# Patient Record
Sex: FEMALE | Race: WHITE | NOT HISPANIC OR LATINO | Employment: OTHER | URBAN - METROPOLITAN AREA
[De-identification: names, ages, dates, MRNs, and addresses within clinical notes are randomized per-mention and may not be internally consistent; named-entity substitution may affect disease eponyms.]

---

## 2017-01-03 ENCOUNTER — ALLSCRIPTS OFFICE VISIT (OUTPATIENT)
Dept: OTHER | Facility: OTHER | Age: 82
End: 2017-01-03

## 2017-01-03 LAB
CLARITY UR: NORMAL
COLOR UR: YELLOW
GLUCOSE (HISTORICAL): NORMAL
HGB UR QL STRIP.AUTO: NORMAL
KETONES UR STRIP-MCNC: NORMAL MG/DL
LEUKOCYTE ESTERASE UR QL STRIP: NORMAL
NITRITE UR QL STRIP: NORMAL
PH UR STRIP.AUTO: 7 [PH]
PROT UR STRIP-MCNC: NORMAL MG/DL
SP GR UR STRIP.AUTO: 1

## 2017-01-23 ENCOUNTER — HOSPITAL ENCOUNTER (OUTPATIENT)
Dept: MAMMOGRAPHY | Facility: HOSPITAL | Age: 82
Discharge: HOME | End: 2017-01-23
Attending: INTERNAL MEDICINE
Payer: MEDICARE

## 2017-01-23 DIAGNOSIS — C91.10 CHRONIC LYMPHOCYTIC LEUKEMIA OF B-CELL TYPE NOT HAVING ACHIEVED REMISSION (HCC): ICD-10-CM

## 2017-01-23 DIAGNOSIS — Z12.31 ENCOUNTER FOR SCREENING MAMMOGRAM FOR MALIGNANT NEOPLASM OF BREAST: ICD-10-CM

## 2017-01-23 PROCEDURE — G0202 SCR MAMMO BI INCL CAD: HCPCS

## 2017-01-24 ENCOUNTER — ALLSCRIPTS OFFICE VISIT (OUTPATIENT)
Dept: OTHER | Facility: OTHER | Age: 82
End: 2017-01-24

## 2017-01-24 ENCOUNTER — HOSPITAL ENCOUNTER (OUTPATIENT)
Dept: RADIOLOGY | Facility: MEDICAL CENTER | Age: 82
Discharge: HOME | End: 2017-01-24
Payer: MEDICARE

## 2017-01-24 ENCOUNTER — TRANSCRIBE ORDERS (OUTPATIENT)
Dept: ADMINISTRATIVE | Facility: HOSPITAL | Age: 82
End: 2017-01-24

## 2017-01-24 DIAGNOSIS — M25.562 PAIN IN LEFT KNEE: ICD-10-CM

## 2017-01-24 DIAGNOSIS — M19.90 OSTEOARTHRITIS: ICD-10-CM

## 2017-01-24 PROCEDURE — 73562 X-RAY EXAM OF KNEE 3: CPT

## 2017-01-25 ENCOUNTER — GENERIC CONVERSION - ENCOUNTER (OUTPATIENT)
Dept: OTHER | Facility: OTHER | Age: 82
End: 2017-01-25

## 2017-01-30 ENCOUNTER — ALLSCRIPTS OFFICE VISIT (OUTPATIENT)
Dept: OTHER | Facility: OTHER | Age: 82
End: 2017-01-30

## 2017-02-07 ENCOUNTER — GENERIC CONVERSION - ENCOUNTER (OUTPATIENT)
Dept: OTHER | Facility: OTHER | Age: 82
End: 2017-02-07

## 2017-04-04 ENCOUNTER — APPOINTMENT (OUTPATIENT)
Dept: LAB | Facility: HOSPITAL | Age: 82
End: 2017-04-04
Attending: UROLOGY
Payer: MEDICARE

## 2017-04-04 ENCOUNTER — TRANSCRIBE ORDERS (OUTPATIENT)
Dept: ADMINISTRATIVE | Facility: HOSPITAL | Age: 82
End: 2017-04-04

## 2017-04-04 ENCOUNTER — ALLSCRIPTS OFFICE VISIT (OUTPATIENT)
Dept: OTHER | Facility: OTHER | Age: 82
End: 2017-04-04

## 2017-04-04 DIAGNOSIS — C67.9 MALIGNANT NEOPLASM OF BLADDER (HCC): ICD-10-CM

## 2017-04-04 DIAGNOSIS — C67.9 MALIGNANT NEOPLASM OF URINARY BLADDER, UNSPECIFIED SITE (HCC): Primary | ICD-10-CM

## 2017-04-04 LAB
CLARITY UR: NORMAL
COLOR UR: YELLOW
GLUCOSE (HISTORICAL): NORMAL
HGB UR QL STRIP.AUTO: NORMAL
KETONES UR STRIP-MCNC: NORMAL MG/DL
LEUKOCYTE ESTERASE UR QL STRIP: NORMAL
NITRITE UR QL STRIP: NORMAL
PH UR STRIP.AUTO: 6.5 [PH]
PROT UR STRIP-MCNC: NORMAL MG/DL
SP GR UR STRIP.AUTO: 1.01

## 2017-04-04 PROCEDURE — 88112 CYTOPATH CELL ENHANCE TECH: CPT

## 2017-04-10 DIAGNOSIS — K86.2 CYST OF PANCREAS: ICD-10-CM

## 2017-04-24 ENCOUNTER — APPOINTMENT (OUTPATIENT)
Dept: LAB | Facility: CLINIC | Age: 82
End: 2017-04-24
Payer: MEDICARE

## 2017-04-24 DIAGNOSIS — K86.2 CYST OF PANCREAS: ICD-10-CM

## 2017-04-24 LAB
ALBUMIN SERPL BCP-MCNC: 3.7 G/DL (ref 3.5–5)
ALP SERPL-CCNC: 62 U/L (ref 46–116)
ALT SERPL W P-5'-P-CCNC: 32 U/L (ref 12–78)
ANION GAP SERPL CALCULATED.3IONS-SCNC: 7 MMOL/L (ref 4–13)
AST SERPL W P-5'-P-CCNC: 21 U/L (ref 5–45)
BASOPHILS # BLD AUTO: 0.04 THOUSANDS/ΜL (ref 0–0.1)
BASOPHILS NFR BLD AUTO: 1 % (ref 0–1)
BILIRUB SERPL-MCNC: 0.53 MG/DL (ref 0.2–1)
BUN SERPL-MCNC: 19 MG/DL (ref 5–25)
CALCIUM SERPL-MCNC: 9.7 MG/DL (ref 8.3–10.1)
CHLORIDE SERPL-SCNC: 99 MMOL/L (ref 100–108)
CO2 SERPL-SCNC: 33 MMOL/L (ref 21–32)
CREAT SERPL-MCNC: 0.85 MG/DL (ref 0.6–1.3)
EOSINOPHIL # BLD AUTO: 0.19 THOUSAND/ΜL (ref 0–0.61)
EOSINOPHIL NFR BLD AUTO: 4 % (ref 0–6)
ERYTHROCYTE [DISTWIDTH] IN BLOOD BY AUTOMATED COUNT: 12.3 % (ref 11.6–15.1)
GFR SERPL CREATININE-BSD FRML MDRD: >60 ML/MIN/1.73SQ M
GLUCOSE SERPL-MCNC: 90 MG/DL (ref 65–140)
HCT VFR BLD AUTO: 38.3 % (ref 34.8–46.1)
HGB BLD-MCNC: 12.6 G/DL (ref 11.5–15.4)
LDH SERPL-CCNC: 181 U/L (ref 81–234)
LYMPHOCYTES # BLD AUTO: 1.88 THOUSANDS/ΜL (ref 0.6–4.47)
LYMPHOCYTES NFR BLD AUTO: 39 % (ref 14–44)
MCH RBC QN AUTO: 31.7 PG (ref 26.8–34.3)
MCHC RBC AUTO-ENTMCNC: 32.9 G/DL (ref 31.4–37.4)
MCV RBC AUTO: 96 FL (ref 82–98)
MONOCYTES # BLD AUTO: 0.48 THOUSAND/ΜL (ref 0.17–1.22)
MONOCYTES NFR BLD AUTO: 10 % (ref 4–12)
NEUTROPHILS # BLD AUTO: 2.26 THOUSANDS/ΜL (ref 1.85–7.62)
NEUTS SEG NFR BLD AUTO: 46 % (ref 43–75)
NRBC BLD AUTO-RTO: 0 /100 WBCS
PLATELET # BLD AUTO: 199 THOUSANDS/UL (ref 149–390)
PMV BLD AUTO: 10.8 FL (ref 8.9–12.7)
POTASSIUM SERPL-SCNC: 3.9 MMOL/L (ref 3.5–5.3)
PROT SERPL-MCNC: 7.4 G/DL (ref 6.4–8.2)
RBC # BLD AUTO: 3.98 MILLION/UL (ref 3.81–5.12)
SODIUM SERPL-SCNC: 139 MMOL/L (ref 136–145)
WBC # BLD AUTO: 4.85 THOUSAND/UL (ref 4.31–10.16)

## 2017-04-24 PROCEDURE — 36415 COLL VENOUS BLD VENIPUNCTURE: CPT

## 2017-04-24 PROCEDURE — 85025 COMPLETE CBC W/AUTO DIFF WBC: CPT

## 2017-04-24 PROCEDURE — 80053 COMPREHEN METABOLIC PANEL: CPT

## 2017-04-24 PROCEDURE — 83615 LACTATE (LD) (LDH) ENZYME: CPT

## 2017-04-27 ENCOUNTER — ALLSCRIPTS OFFICE VISIT (OUTPATIENT)
Dept: OTHER | Facility: OTHER | Age: 82
End: 2017-04-27

## 2017-05-04 ENCOUNTER — ALLSCRIPTS OFFICE VISIT (OUTPATIENT)
Dept: OTHER | Facility: OTHER | Age: 82
End: 2017-05-04

## 2017-05-15 ENCOUNTER — ALLSCRIPTS OFFICE VISIT (OUTPATIENT)
Dept: OTHER | Facility: OTHER | Age: 82
End: 2017-05-15

## 2017-05-15 DIAGNOSIS — R89.2 ABNORMAL LEVEL OF OTHER DRUGS, MEDICAMENTS AND BIOLOGICAL SUBSTANCES IN SPECIMENS FROM OTHER ORGANS, SYSTEMS AND TISSUES: ICD-10-CM

## 2017-05-15 DIAGNOSIS — I10 ESSENTIAL (PRIMARY) HYPERTENSION: ICD-10-CM

## 2017-05-15 DIAGNOSIS — M79.643 PAIN OF HAND: ICD-10-CM

## 2017-05-15 DIAGNOSIS — K86.2 CYST OF PANCREAS: ICD-10-CM

## 2017-05-15 DIAGNOSIS — E78.2 MIXED HYPERLIPIDEMIA: ICD-10-CM

## 2017-05-15 DIAGNOSIS — I25.10 ATHEROSCLEROTIC HEART DISEASE OF NATIVE CORONARY ARTERY WITHOUT ANGINA PECTORIS: ICD-10-CM

## 2017-05-18 ENCOUNTER — OFFICE VISIT (OUTPATIENT)
Dept: URGENT CARE | Facility: MEDICAL CENTER | Age: 82
End: 2017-05-18
Payer: MEDICARE

## 2017-05-18 ENCOUNTER — HOSPITAL ENCOUNTER (OUTPATIENT)
Dept: RADIOLOGY | Facility: MEDICAL CENTER | Age: 82
Discharge: HOME | End: 2017-05-18
Admitting: FAMILY MEDICINE
Payer: MEDICARE

## 2017-05-18 ENCOUNTER — TRANSCRIBE ORDERS (OUTPATIENT)
Dept: ADMINISTRATIVE | Facility: HOSPITAL | Age: 82
End: 2017-05-18

## 2017-05-18 DIAGNOSIS — M81.0 SENILE OSTEOPOROSIS: ICD-10-CM

## 2017-05-18 DIAGNOSIS — M06.4 INFLAMMATORY POLYARTHROPATHY (HCC): ICD-10-CM

## 2017-05-18 DIAGNOSIS — M79.643 PAIN OF HAND: ICD-10-CM

## 2017-05-18 DIAGNOSIS — M81.0 OSTEOPOROSIS, UNSPECIFIED OSTEOPOROSIS TYPE, UNSPECIFIED PATHOLOGICAL FRACTURE PRESENCE: Primary | ICD-10-CM

## 2017-05-18 DIAGNOSIS — M79.643 PAIN OF HAND, UNSPECIFIED LATERALITY: ICD-10-CM

## 2017-05-18 DIAGNOSIS — M35.00 SICCA SYNDROME (HCC): ICD-10-CM

## 2017-05-18 DIAGNOSIS — M54.2 CERVICALGIA: ICD-10-CM

## 2017-05-18 PROCEDURE — G0463 HOSPITAL OUTPT CLINIC VISIT: HCPCS

## 2017-05-18 PROCEDURE — 73130 X-RAY EXAM OF HAND: CPT

## 2017-05-18 PROCEDURE — 99213 OFFICE O/P EST LOW 20 MIN: CPT

## 2017-05-23 ENCOUNTER — LAB (OUTPATIENT)
Dept: LAB | Facility: CLINIC | Age: 82
End: 2017-05-23
Payer: MEDICARE

## 2017-05-23 DIAGNOSIS — M06.4 INFLAMMATORY POLYARTHROPATHY (HCC): ICD-10-CM

## 2017-05-23 DIAGNOSIS — M81.0 SENILE OSTEOPOROSIS: ICD-10-CM

## 2017-05-23 DIAGNOSIS — M79.643 PAIN OF HAND, UNSPECIFIED LATERALITY: ICD-10-CM

## 2017-05-23 DIAGNOSIS — M81.0 OSTEOPOROSIS, UNSPECIFIED OSTEOPOROSIS TYPE, UNSPECIFIED PATHOLOGICAL FRACTURE PRESENCE: ICD-10-CM

## 2017-05-23 DIAGNOSIS — M35.00 SICCA SYNDROME (HCC): ICD-10-CM

## 2017-05-23 DIAGNOSIS — M54.2 CERVICALGIA: ICD-10-CM

## 2017-05-23 LAB
25(OH)D3 SERPL-MCNC: 41.9 NG/ML (ref 30–100)
CRP SERPL QL: <3 MG/L
ERYTHROCYTE [SEDIMENTATION RATE] IN BLOOD: 28 MM/HOUR (ref 0–20)

## 2017-05-23 PROCEDURE — 86430 RHEUMATOID FACTOR TEST QUAL: CPT

## 2017-05-23 PROCEDURE — 85652 RBC SED RATE AUTOMATED: CPT

## 2017-05-23 PROCEDURE — 86038 ANTINUCLEAR ANTIBODIES: CPT

## 2017-05-23 PROCEDURE — 86235 NUCLEAR ANTIGEN ANTIBODY: CPT

## 2017-05-23 PROCEDURE — 82306 VITAMIN D 25 HYDROXY: CPT

## 2017-05-23 PROCEDURE — 86140 C-REACTIVE PROTEIN: CPT

## 2017-05-23 PROCEDURE — 36415 COLL VENOUS BLD VENIPUNCTURE: CPT

## 2017-05-24 LAB
ENA SS-A AB SER-ACNC: <0.2 AI (ref 0–0.9)
ENA SS-B AB SER-ACNC: <0.2 AI (ref 0–0.9)
RHEUMATOID FACT SER QL LA: NEGATIVE
RYE IGE QN: NEGATIVE

## 2017-05-25 ENCOUNTER — HOSPITAL ENCOUNTER (OUTPATIENT)
Dept: RADIOLOGY | Age: 82
Discharge: HOME | End: 2017-05-25
Payer: MEDICARE

## 2017-05-25 ENCOUNTER — TRANSCRIBE ORDERS (OUTPATIENT)
Dept: ADMINISTRATIVE | Age: 82
End: 2017-05-25

## 2017-05-25 DIAGNOSIS — M06.4 INFLAMMATORY POLYARTHROPATHY (HCC): ICD-10-CM

## 2017-05-25 DIAGNOSIS — M54.40 ACUTE RIGHT-SIDED LOW BACK PAIN WITH SCIATICA, SCIATICA LATERALITY UNSPECIFIED: ICD-10-CM

## 2017-05-25 DIAGNOSIS — M81.0 OSTEOPOROSIS, UNSPECIFIED OSTEOPOROSIS TYPE, UNSPECIFIED PATHOLOGICAL FRACTURE PRESENCE: ICD-10-CM

## 2017-05-25 DIAGNOSIS — M81.0 SENILE OSTEOPOROSIS: ICD-10-CM

## 2017-05-25 DIAGNOSIS — M54.2 CERVICALGIA: Primary | ICD-10-CM

## 2017-05-25 DIAGNOSIS — M35.00 SICCA SYNDROME (HCC): ICD-10-CM

## 2017-05-25 DIAGNOSIS — M54.2 CERVICALGIA: ICD-10-CM

## 2017-05-25 DIAGNOSIS — M79.643 PAIN OF HAND, UNSPECIFIED LATERALITY: ICD-10-CM

## 2017-05-25 PROCEDURE — 77080 DXA BONE DENSITY AXIAL: CPT

## 2017-05-25 PROCEDURE — 72050 X-RAY EXAM NECK SPINE 4/5VWS: CPT

## 2017-06-05 ENCOUNTER — TRANSCRIBE ORDERS (OUTPATIENT)
Dept: ADMINISTRATIVE | Facility: HOSPITAL | Age: 82
End: 2017-06-05

## 2017-06-05 ENCOUNTER — ALLSCRIPTS OFFICE VISIT (OUTPATIENT)
Dept: OTHER | Facility: OTHER | Age: 82
End: 2017-06-05

## 2017-06-05 DIAGNOSIS — K86.3 CYST AND PSEUDOCYST OF PANCREAS: Primary | ICD-10-CM

## 2017-06-05 DIAGNOSIS — K86.2 CYST AND PSEUDOCYST OF PANCREAS: Primary | ICD-10-CM

## 2017-06-12 ENCOUNTER — APPOINTMENT (OUTPATIENT)
Dept: LAB | Facility: CLINIC | Age: 82
End: 2017-06-12
Payer: MEDICARE

## 2017-06-12 DIAGNOSIS — I10 ESSENTIAL (PRIMARY) HYPERTENSION: ICD-10-CM

## 2017-06-12 DIAGNOSIS — R89.2 ABNORMAL LEVEL OF OTHER DRUGS, MEDICAMENTS AND BIOLOGICAL SUBSTANCES IN SPECIMENS FROM OTHER ORGANS, SYSTEMS AND TISSUES: ICD-10-CM

## 2017-06-12 DIAGNOSIS — E78.2 MIXED HYPERLIPIDEMIA: ICD-10-CM

## 2017-06-12 DIAGNOSIS — I25.10 ATHEROSCLEROTIC HEART DISEASE OF NATIVE CORONARY ARTERY WITHOUT ANGINA PECTORIS: ICD-10-CM

## 2017-06-12 LAB
BUN SERPL-MCNC: 28 MG/DL (ref 5–25)
CHOLEST SERPL-MCNC: 166 MG/DL (ref 50–200)
CREAT SERPL-MCNC: 0.79 MG/DL (ref 0.6–1.3)
GFR SERPL CREATININE-BSD FRML MDRD: >60 ML/MIN/1.73SQ M
HDLC SERPL-MCNC: 79 MG/DL (ref 40–60)
LDLC SERPL CALC-MCNC: 70 MG/DL (ref 0–100)
TRIGL SERPL-MCNC: 86 MG/DL

## 2017-06-12 PROCEDURE — 36415 COLL VENOUS BLD VENIPUNCTURE: CPT

## 2017-06-12 PROCEDURE — 84520 ASSAY OF UREA NITROGEN: CPT

## 2017-06-12 PROCEDURE — 82565 ASSAY OF CREATININE: CPT

## 2017-06-12 PROCEDURE — 80061 LIPID PANEL: CPT

## 2017-06-14 ENCOUNTER — HOSPITAL ENCOUNTER (OUTPATIENT)
Dept: RADIOLOGY | Facility: HOSPITAL | Age: 82
Discharge: HOME | End: 2017-06-14
Attending: INTERNAL MEDICINE
Payer: MEDICARE

## 2017-06-14 DIAGNOSIS — K86.2 CYST OF PANCREAS: ICD-10-CM

## 2017-06-14 PROCEDURE — A9585 GADOBUTROL INJECTION: HCPCS | Performed by: INTERNAL MEDICINE

## 2017-06-14 PROCEDURE — 74183 MRI ABD W/O CNTR FLWD CNTR: CPT

## 2017-06-14 RX ADMIN — GADOBUTROL 5.49 ML: 604.72 INJECTION INTRAVENOUS at 13:04

## 2017-06-15 ENCOUNTER — GENERIC CONVERSION - ENCOUNTER (OUTPATIENT)
Dept: OTHER | Facility: OTHER | Age: 82
End: 2017-06-15

## 2017-07-07 ENCOUNTER — TRANSCRIBE ORDERS (OUTPATIENT)
Dept: LAB | Facility: CLINIC | Age: 82
End: 2017-07-07

## 2017-07-07 ENCOUNTER — APPOINTMENT (OUTPATIENT)
Dept: LAB | Facility: CLINIC | Age: 82
End: 2017-07-07
Payer: MEDICARE

## 2017-07-07 DIAGNOSIS — M79.643 PAIN OF HAND, UNSPECIFIED LATERALITY: ICD-10-CM

## 2017-07-07 DIAGNOSIS — C91.10 CHRONIC LYMPHOCYTIC LEUKEMIA OF B-CELL TYPE NOT HAVING ACHIEVED REMISSION (HCC): ICD-10-CM

## 2017-07-07 DIAGNOSIS — M19.041 ARTHRITIS OF RIGHT HAND: ICD-10-CM

## 2017-07-07 DIAGNOSIS — M54.5 LOW BACK PAIN, UNSPECIFIED BACK PAIN LATERALITY, UNSPECIFIED CHRONICITY, WITH SCIATICA PRESENCE UNSPECIFIED: ICD-10-CM

## 2017-07-07 DIAGNOSIS — M06.4 INFLAMMATORY POLYARTHROPATHY (HCC): ICD-10-CM

## 2017-07-07 DIAGNOSIS — M54.2 CERVICALGIA: Primary | ICD-10-CM

## 2017-07-07 DIAGNOSIS — M54.2 CERVICALGIA: ICD-10-CM

## 2017-07-07 DIAGNOSIS — M81.0 SENILE OSTEOPOROSIS: ICD-10-CM

## 2017-07-07 DIAGNOSIS — M35.00 SICCA SYNDROME (HCC): ICD-10-CM

## 2017-07-07 LAB
ALBUMIN SERPL BCP-MCNC: 3.7 G/DL (ref 3.5–5)
ALP SERPL-CCNC: 76 U/L (ref 46–116)
ALT SERPL W P-5'-P-CCNC: 40 U/L (ref 12–78)
ANION GAP SERPL CALCULATED.3IONS-SCNC: 2 MMOL/L (ref 4–13)
AST SERPL W P-5'-P-CCNC: 25 U/L (ref 5–45)
BASOPHILS # BLD AUTO: 0.04 THOUSANDS/ΜL (ref 0–0.1)
BASOPHILS NFR BLD AUTO: 1 % (ref 0–1)
BILIRUB SERPL-MCNC: 0.42 MG/DL (ref 0.2–1)
BUN SERPL-MCNC: 26 MG/DL (ref 5–25)
CALCIUM SERPL-MCNC: 9.7 MG/DL (ref 8.3–10.1)
CHLORIDE SERPL-SCNC: 102 MMOL/L (ref 100–108)
CO2 SERPL-SCNC: 33 MMOL/L (ref 21–32)
CREAT SERPL-MCNC: 0.86 MG/DL (ref 0.6–1.3)
EOSINOPHIL # BLD AUTO: 0.28 THOUSAND/ΜL (ref 0–0.61)
EOSINOPHIL NFR BLD AUTO: 6 % (ref 0–6)
ERYTHROCYTE [DISTWIDTH] IN BLOOD BY AUTOMATED COUNT: 12.8 % (ref 11.6–15.1)
ERYTHROCYTE [SEDIMENTATION RATE] IN BLOOD: 16 MM/HOUR (ref 0–20)
GFR SERPL CREATININE-BSD FRML MDRD: >60 ML/MIN/1.73SQ M
GLUCOSE P FAST SERPL-MCNC: 86 MG/DL (ref 65–99)
HCT VFR BLD AUTO: 36.8 % (ref 34.8–46.1)
HGB BLD-MCNC: 12.2 G/DL (ref 11.5–15.4)
LDH SERPL-CCNC: 167 U/L (ref 81–234)
LYMPHOCYTES # BLD AUTO: 1.91 THOUSANDS/ΜL (ref 0.6–4.47)
LYMPHOCYTES NFR BLD AUTO: 44 % (ref 14–44)
MCH RBC QN AUTO: 31.2 PG (ref 26.8–34.3)
MCHC RBC AUTO-ENTMCNC: 33.2 G/DL (ref 31.4–37.4)
MCV RBC AUTO: 94 FL (ref 82–98)
MONOCYTES # BLD AUTO: 0.46 THOUSAND/ΜL (ref 0.17–1.22)
MONOCYTES NFR BLD AUTO: 10 % (ref 4–12)
NEUTROPHILS # BLD AUTO: 1.71 THOUSANDS/ΜL (ref 1.85–7.62)
NEUTS SEG NFR BLD AUTO: 39 % (ref 43–75)
NRBC BLD AUTO-RTO: 0 /100 WBCS
PLATELET # BLD AUTO: 190 THOUSANDS/UL (ref 149–390)
PMV BLD AUTO: 10.6 FL (ref 8.9–12.7)
POTASSIUM SERPL-SCNC: 4 MMOL/L (ref 3.5–5.3)
PROT SERPL-MCNC: 7.1 G/DL (ref 6.4–8.2)
RBC # BLD AUTO: 3.91 MILLION/UL (ref 3.81–5.12)
SODIUM SERPL-SCNC: 137 MMOL/L (ref 136–145)
URATE SERPL-MCNC: 4.7 MG/DL (ref 2–6.8)
WBC # BLD AUTO: 4.41 THOUSAND/UL (ref 4.31–10.16)

## 2017-07-07 PROCEDURE — 85652 RBC SED RATE AUTOMATED: CPT

## 2017-07-07 PROCEDURE — 36415 COLL VENOUS BLD VENIPUNCTURE: CPT

## 2017-07-07 PROCEDURE — 84550 ASSAY OF BLOOD/URIC ACID: CPT

## 2017-07-07 PROCEDURE — 85025 COMPLETE CBC W/AUTO DIFF WBC: CPT

## 2017-07-07 PROCEDURE — 83615 LACTATE (LD) (LDH) ENZYME: CPT

## 2017-07-07 PROCEDURE — 80053 COMPREHEN METABOLIC PANEL: CPT

## 2017-07-24 ENCOUNTER — ALLSCRIPTS OFFICE VISIT (OUTPATIENT)
Dept: OTHER | Facility: OTHER | Age: 82
End: 2017-07-24

## 2017-07-24 DIAGNOSIS — C91.10 CHRONIC LYMPHOCYTIC LEUKEMIA OF B-CELL TYPE NOT HAVING ACHIEVED REMISSION (HCC): ICD-10-CM

## 2017-08-01 ENCOUNTER — ALLSCRIPTS OFFICE VISIT (OUTPATIENT)
Dept: OTHER | Facility: OTHER | Age: 82
End: 2017-08-01

## 2017-08-03 ENCOUNTER — ALLSCRIPTS OFFICE VISIT (OUTPATIENT)
Dept: OTHER | Facility: OTHER | Age: 82
End: 2017-08-03

## 2017-09-14 ENCOUNTER — ALLSCRIPTS OFFICE VISIT (OUTPATIENT)
Dept: OTHER | Facility: OTHER | Age: 82
End: 2017-09-14

## 2017-09-14 DIAGNOSIS — M25.511 PAIN IN RIGHT SHOULDER: ICD-10-CM

## 2017-09-14 DIAGNOSIS — M54.2 CERVICALGIA: ICD-10-CM

## 2017-09-19 ENCOUNTER — TRANSCRIBE ORDERS (OUTPATIENT)
Dept: LAB | Facility: CLINIC | Age: 82
End: 2017-09-19

## 2017-09-19 ENCOUNTER — APPOINTMENT (OUTPATIENT)
Dept: LAB | Facility: CLINIC | Age: 82
End: 2017-09-19
Payer: MEDICARE

## 2017-09-19 DIAGNOSIS — C67.9 MALIGNANT NEOPLASM OF BLADDER (HCC): ICD-10-CM

## 2017-09-19 DIAGNOSIS — I10 ESSENTIAL (PRIMARY) HYPERTENSION: ICD-10-CM

## 2017-09-19 LAB
ANION GAP SERPL CALCULATED.3IONS-SCNC: 6 MMOL/L (ref 4–13)
BUN SERPL-MCNC: 24 MG/DL (ref 5–25)
CALCIUM SERPL-MCNC: 9.5 MG/DL (ref 8.3–10.1)
CHLORIDE SERPL-SCNC: 101 MMOL/L (ref 100–108)
CO2 SERPL-SCNC: 31 MMOL/L (ref 21–32)
CREAT SERPL-MCNC: 0.67 MG/DL (ref 0.6–1.3)
GFR SERPL CREATININE-BSD FRML MDRD: 83 ML/MIN/1.73SQ M
GLUCOSE SERPL-MCNC: 95 MG/DL (ref 65–140)
POTASSIUM SERPL-SCNC: 3.8 MMOL/L (ref 3.5–5.3)
SODIUM SERPL-SCNC: 138 MMOL/L (ref 136–145)

## 2017-09-19 PROCEDURE — 36415 COLL VENOUS BLD VENIPUNCTURE: CPT

## 2017-09-19 PROCEDURE — 80048 BASIC METABOLIC PNL TOTAL CA: CPT

## 2017-09-25 ENCOUNTER — HOSPITAL ENCOUNTER (OUTPATIENT)
Dept: RADIOLOGY | Facility: MEDICAL CENTER | Age: 82
Discharge: HOME | End: 2017-09-25
Payer: MEDICARE

## 2017-09-25 DIAGNOSIS — C67.9 MALIGNANT NEOPLASM OF BLADDER (HCC): ICD-10-CM

## 2017-09-25 PROCEDURE — 74178 CT ABD&PLV WO CNTR FLWD CNTR: CPT

## 2017-09-25 RX ADMIN — IOHEXOL 100 ML: 350 INJECTION, SOLUTION INTRAVENOUS at 10:23

## 2017-09-27 ENCOUNTER — LAB REQUISITION (OUTPATIENT)
Dept: LAB | Facility: HOSPITAL | Age: 82
End: 2017-09-27
Payer: MEDICARE

## 2017-09-27 ENCOUNTER — ALLSCRIPTS OFFICE VISIT (OUTPATIENT)
Dept: OTHER | Facility: OTHER | Age: 82
End: 2017-09-27

## 2017-09-27 DIAGNOSIS — N39.0 URINARY TRACT INFECTION: ICD-10-CM

## 2017-09-27 LAB
BILIRUB UR QL STRIP: ABNORMAL
CLARITY UR: ABNORMAL
COLOR UR: YELLOW
GLUCOSE (HISTORICAL): ABNORMAL
HGB UR QL STRIP.AUTO: ABNORMAL
KETONES UR STRIP-MCNC: ABNORMAL MG/DL
LEUKOCYTE ESTERASE UR QL STRIP: ABNORMAL
NITRITE UR QL STRIP: ABNORMAL
PH UR STRIP.AUTO: 8 [PH]
PROT UR STRIP-MCNC: ABNORMAL MG/DL
SP GR UR STRIP.AUTO: 1.01
UROBILINOGEN UR QL STRIP.AUTO: ABNORMAL

## 2017-09-27 PROCEDURE — 87086 URINE CULTURE/COLONY COUNT: CPT | Performed by: PHYSICIAN ASSISTANT

## 2017-09-28 LAB — BACTERIA UR CULT: NORMAL

## 2017-09-29 ENCOUNTER — GENERIC CONVERSION - ENCOUNTER (OUTPATIENT)
Dept: OTHER | Facility: OTHER | Age: 82
End: 2017-09-29

## 2017-10-02 ENCOUNTER — GENERIC CONVERSION - ENCOUNTER (OUTPATIENT)
Dept: OTHER | Facility: OTHER | Age: 82
End: 2017-10-02

## 2017-10-02 ENCOUNTER — ALLSCRIPTS OFFICE VISIT (OUTPATIENT)
Dept: OTHER | Facility: OTHER | Age: 82
End: 2017-10-02

## 2017-10-03 ENCOUNTER — ALLSCRIPTS OFFICE VISIT (OUTPATIENT)
Dept: OTHER | Facility: OTHER | Age: 82
End: 2017-10-03

## 2017-10-03 ENCOUNTER — TRANSCRIBE ORDERS (OUTPATIENT)
Dept: ADMINISTRATIVE | Facility: HOSPITAL | Age: 82
End: 2017-10-03

## 2017-10-03 DIAGNOSIS — C67.9 MALIGNANT NEOPLASM OF URINARY BLADDER, UNSPECIFIED SITE (HCC): Primary | ICD-10-CM

## 2017-10-03 LAB
BILIRUB UR QL STRIP: NORMAL
CLARITY UR: NORMAL
COLOR UR: NORMAL
GLUCOSE (HISTORICAL): NORMAL
HGB UR QL STRIP.AUTO: NORMAL
KETONES UR STRIP-MCNC: NORMAL MG/DL
LEUKOCYTE ESTERASE UR QL STRIP: NORMAL
NITRITE UR QL STRIP: NORMAL
PH UR STRIP.AUTO: 6 [PH]
PROT UR STRIP-MCNC: NORMAL MG/DL
SP GR UR STRIP.AUTO: 1
UROBILINOGEN UR QL STRIP.AUTO: NORMAL

## 2017-10-04 NOTE — PROCEDURES
Assessment  1  Bladder cancer (188 9) (C67 9)    Plan  Bladder cancer    · (1) BASIC METABOLIC PROFILE; Status:Active; Requested for:03Apr2018; Perform:[facility]; BWA:[id number];VDGVPHA; For:Bladder cancer; Ordered By:Mariia Betancourt;   · CT ABDOMEN PELVIS W WO CONTRAST; Status:Hold For - Scheduling; Requested  for:03Apr2018; Perform:[facility]; FMP:65NRZ3324;EGMTBWE; For:Bladder cancer; Ordered By:Mariia Betancourt;   · Cystourethroscopy - POC; Status:Active - Perform Order; Requested for:03Apr2018; Perform: In Office; Due:03Apr2019;Ordered; For:Bladder cancer; Ordered By:Mariia Betancourt;   · Urine Dip Non-Automated- POC; Status:Complete - Retrospective By Protocol  Authorization;   Done: 01EEU7209 12:24PM   Performed: In Office; [phone]; Last Updated By:Chris Mercedes; 10/3/2017 12:28:41 PM;Ordered; For:Bladder cancer; Ordered By:Mariia Betancourt;    Discussion/Summary  Discussion Summary:   Cystoscopy shows no evidence of recurrent bladder cancer at this time  She'll return in 6 months for repeat cystoscopy with CT abdomen/pelvis prior to visit  All questions answered at this time  Medication SE Review and Pt Understands Tx: The treatment plan was reviewed with the patient/guardian  The patient/guardian understands and agrees with the treatment plan   Understands and agrees with treatment plan: The treatment plan was reviewed with the patient/guardian  The patient/guardian understands and agrees with the treatment plan      Chief Complaint  Chief Complaint Free Text Note Form: Patient presents for cysto; bladder cancer      History of Present Illness  HPI: Patient is seen for a 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  She denies any gross hematuria or changes in urinary pattern  Prior CT scan shows no evidence of upper tract recurrence  A septated cyst is noted within the right kidney  Presents for surveillance cystoscopy               Review of Systems  Complete-Female Urology:   Constitutional: No fever, no chills, feels well, no tiredness, no recent weight gain or weight loss  Respiratory: No complaints of shortness of breath, no wheezing, no cough, no SOB on exertion, no orthopnea, no PND  Cardiovascular: No complaints of slow heart rate, no fast heart rate, no chest pain, no palpitations, no leg claudication, no lower extremity edema  Gastrointestinal: No complaints of abdominal pain, no constipation, no nausea or vomiting, no diarrhea, no bloody stools  Genitourinary: Empty sensation,-hematuria-and-stream quality fair, but-as noted in HPI,-no dysuria,-no urinary hesitancy,-no feelings of urinary urgency-and-no incontinence-   The patient presents with complaints of nocturia (2 times )  Musculoskeletal: No complaints of arthralgias, no myalgias, no joint swelling or stiffness, no limb pain or swelling  Integumentary: No complaints of skin rash or lesions, no itching, no skin wounds, no breast pain or lump  Hematologic/Lymphatic: No complaints of swollen glands, no swollen glands in the neck, does not bleed easily, does not bruise easily  Neurological: No complaints of headache, no confusion, no convulsions, no numbness, no dizziness or fainting, no tingling, no limb weakness, no difficulty walking  Active Problems  1  Acute pain of left knee (719 46) (M25 562)   2  Advance directive declined by patient (V49 89) (Z78 9)   3  Allergic rhinitis (477 9) (J30 9)   4  Anemia due to vitamin B12 deficiency (281 1) (D51 9)   5  Ankle pain, left (719 47) (M25 572)   6  Ankle pain, right (719 47) (M25 571)   7  Arthritis (716 90) (M19 90)   8  Back pain (724 5) (M54 9)   9  Bilateral impacted cerumen (380 4) (H61 23)   10  Bladder cancer (188 9) (C67 9)   11  CAD (coronary artery disease) (414 00) (I25 10)   12  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)   13   Chronic right shoulder pain (436 28,914 92) (M25 511,G89 29) 14  Closed displaced fracture of trapezium of left wrist, initial encounter (814 05) (S62 172A)   15  Depression, controlled (311) (F32 9)   16  Drug toxicity (796 0) (R89 2)   17  Ear canal dryness, bilateral (380 89) (H61 893)   18  Edema, lower extremity (782 3) (R60 0)   19  Encounter for special screening examination for genitourinary disorder (V81 6) (Z13 89)   20  Fatigue (780 79) (R53 83)   21  Frontal headache (784 0) (R51)   22  Gastric reflux syndrome (530 81) (K21 9)   23  Hand pain (729 5) (M79 643)   24  Heart valve disorder (424 90) (I38)   25  High blood pressure (401 9) (I10)   26  IBS (irritable bowel syndrome) (564 1) (K58 9)   27  Impacted cerumen of left ear (380 4) (H61 22)   28  Left wrist pain (719 43) (M25 532)   29  Leukopenia (288 50) (D72 819)   30  Medication refill (V68 1) (Z76 0)   31  Mild vitamin D deficiency (268 9) (E55 9)   32  Mixed hyperlipidemia (272 2) (E78 2)   33  Neck pain (723 1) (M54 2)   34  Need for influenza vaccination (V04 81) (Z23)   35  Need for pneumococcal vaccination (V03 82) (Z23)   36  Pancreatic cyst (577 2) (K86 2)   37  Pre-syncope (780 2) (R55)   38  Right wrist pain (719 43) (M25 531)   39  Screening for neurological condition (V80 09) (Z13 89)   40  Screening mammogram for high-risk patient (V76 11) (Z12 31)   41  Urge incontinence of urine (788 31) (N39 41)   42  Urinary urgency (788 63) (R39 15)   43  UTI (urinary tract infection) (599 0) (N39 0)    Past Medical History  1  History of cancer (V10 90) (Z85 9)   2  History of hepatitis (V12 09) (Z86 19)   3  History of kidney stones (V13 01) (Z87 442)   4  History of pneumonia (V12 61) (Z87 01)   5  History of shingles (V12 09) (Z86 19)   6  History of urinary frequency (V13 09) (Z87 898)   7  History of urinary tract infection (V13 02) (Z87 440)   8  History of varicella (V12 09) (Z86 19)  Active Problems And Past Medical History Reviewed:    The active problems and past medical history were reviewed and updated today  Surgical History  1  History of Cath Stent Placement   2  History of Diagnostic Cystoscopy   3  History of Hysterectomy  Surgical History Reviewed: The surgical history was reviewed and updated today  Family History  Mother    1  Denied: Family history of Colon cancer   2  Family history of arthritis (V17 7) (Z82 61)   3  Family history of cardiac disorder (V17 49) (Z82 49)   4  Denied: Family history of colonic polyps   5  Denied: Family history of Crohn's disease   6  Denied: Family history of liver disease   7  Family history of osteoporosis (V17 81) (Z82 62)  Father    8  Denied: Family history of Colon cancer   9  Family history of cardiac disorder (V17 49) (Z82 49)   10  Denied: Family history of colonic polyps   11  Denied: Family history of Crohn's disease   12  Family history of hypertension (V17 49) (Z82 49)   13  Denied: Family history of liver disease  Brother    15  Family history of hypertension (V17 49) (Z82 49)  Family History Reviewed: The family history was reviewed and updated today  Social History   · Advance directive declined by patient (V49 89) (Z78 9)   · Always uses seat belt   · Never a smoker   · No alcohol use   · No drug use   ·  (V61 07) (Z63 4)  Social History Reviewed: The social history was reviewed and updated today  Current Meds   1  Adult Aspirin EC Low Strength 81 MG Oral Tablet Delayed Release; TAKE 1 TABLET   DAILY; Therapy: (Recorded:04Oct2016) to Recorded   2  Allegra Allergy 180 MG Oral Tablet; Therapy: (Recorded:04Oct2016) to Recorded   3  AmLODIPine Besylate 5 MG Oral Tablet; TAKE 1 TABLET DAILY; Therapy: (Recorded:37Tso3420) to  Requested for: 77SHN2302 Recorded   4  Atenolol 25 MG Oral Tablet; take 1 tablet by mouth once daily; Therapy: 18NNH6450 to (Evaluate:30Jan2018)  Requested for: 38Yvp0705; Last   Rx:61Nel0031 Ordered   5  Atorvastatin Calcium 40 MG Oral Tablet; take 1 tablet by mouth once daily;    Therapy: 50ZEQ8268 to (Ian Nephew)  Requested for: 92Pwt6018; Last   Rx:04Spa5179 Ordered   6  Bumetanide 1 MG Oral Tablet; take half tablet by mouth once daily  Requested for:   78Qeq5996; Last Rx:03Dfj7172 Ordered   7  Citrucel Oral Powder; Therapy: (Recorded:19Lyu0968) to Recorded   8  [employer] Daily Multiple Oral Tablet; Therapy: (Recorded:05Dii8680) to Recorded   9  Dicyclomine HCl - 10 MG Oral Capsule; TAKE ONE CAPSULE BY MOUTH TWICE DAILY; Therapy: 13SNA9564 to (Last Rx:50Hom3310)  Requested for: 61OHJ0887 Ordered   10  DULoxetine HCl - 40 MG Oral Capsule Delayed Release Particles; Therapy: (Recorded:17Zsu8007) to Recorded   11  Meloxicam 7 5 MG Oral Tablet; TAKE 1 TABLET DAILY WITH FOOD; Therapy: 78Yea3617 to (Evaluate:76Axm8598)  Requested for: 58Rty8319 Recorded   12  Mometasone Furoate 0 1 % External Cream; APPLY SPARINGLY TO AFFECTED AREA(S)    ONCE DAILY; Therapy: 02SHI6671 to (Evaluate:33Vhu2494)  Requested for: 42AZR1490; Last    Rx:03Hrz2664 Ordered   13  Montelukast Sodium 10 MG Oral Tablet; Take 1 tablet daily  Requested for: 53Qlw4381;    Last Rx:92Sfe6678 Ordered   14  Pantoprazole Sodium 40 MG Oral Tablet Delayed Release; TAKE 1 TABLET DAILY; Therapy: 39MNH2058 to (Paul Brar)  Requested for: 92Kwb1551; Last    Rx:48Esg9860 Ordered   15  Vitamin D3 2000 UNIT Oral Tablet; Therapy: (Recorded:49Dxt6164) to Recorded  Medication List Reviewed: The medication list was reviewed and updated today  Allergies  1  No Known Drug Allergies    Vitals  Vital Signs    Recorded: 36EDG7388 12:24PM   Heart Rate 70   Systolic 641   Diastolic 74   Height 5 ft 1 in   Weight 120 lb 2 oz   BMI Calculated 22 7   BSA Calculated 1 52     Physical Exam    Constitutional   General appearance: No acute distress, well appearing and well nourished  Pulmonary   Respiratory effort: No increased work of breathing or signs of respiratory distress  Auscultation of lungs: Clear to auscultation  Cardiovascular   Auscultation of heart: Normal rate and rhythm, normal S1 and S2, without murmurs  Examination of extremities for edema and/or varicosities: Normal     Abdomen   Abdomen: Non-tender, no masses  Genitourinary   External genitalia and vagina: Abnormal  -atrophy  Urethra: Normal, no discharge  Musculoskeletal   Gait and station: Normal     Digits and nails: Normal without clubbing or cyanosis  Inspection/palpation of joints, bones, and muscles: Normal     Skin   Skin and subcutaneous tissue: Normal without rashes or lesions  Lymphatic   Palpation of lymph nodes in groin: No lymphadenopathy  Neurologic   Cranial nerves: Cranial nerves 2-12 intact  Results/Data  Urine Dip Non-Automated- POC 62VLW2655 12:24PM Elena Vidal     Test Name Result Flag Reference   Color Lucy     Clarity Transparent     Leukocytes -     Nitrite -     Blood +     Bilirubin -     Urobilinogen -     Protein -     Ph 6 0     Specific Gravity 1 005     Ketone -     Glucose -         Procedure    Procedure: Diagnostic cystourethroscopy  Indications for the procedure include Bladder Cancer  Risks and benefits were discussed with the patient  Consent was obtained prior to the procedure and is detailed in the patient's record  Pre-op evaluation: urinalysis was performed with normal results  Anesthesia: the cystoscope was lubricated with 2% lidocaine gel  Procedure Note:     The patient was prepped and draped in the usual sterile fashion using betadine  The periurethral area was exposed and a lubricated 16 Indian flexible cystoscope was introduced into the urethral meatus  The urethra was normal  The cystoscope was advanced to the urethrovesical junction and the bladder was distended with saline  All regions of the bladder were systematically inspected and the bladder appeared normal and No recurrent bladder tumor  The specimen(s) were sent to pathology  Post-Procedure:  The bladder was drained and the cystoscope was removed the patient tolerated the procedure well-and-the vital signs were stable  Complications: none        Future Appointments    Date/Time Provider Specialty Site   10/05/2017 10:15 AM Daquan Mendoza, [address]     Signatures   Electronically signed by : DANIEL Baker ; Oct  3 2017 12:38PM EST                       (Author)

## 2017-10-05 ENCOUNTER — ALLSCRIPTS OFFICE VISIT (OUTPATIENT)
Dept: OTHER | Facility: OTHER | Age: 82
End: 2017-10-05

## 2017-10-12 ENCOUNTER — HOSPITAL ENCOUNTER (OUTPATIENT)
Dept: NON INVASIVE DIAGNOSTICS | Facility: MEDICAL CENTER | Age: 82
Discharge: HOME | End: 2017-10-12
Payer: MEDICARE

## 2017-10-12 DIAGNOSIS — I10 ESSENTIAL (PRIMARY) HYPERTENSION: ICD-10-CM

## 2017-10-12 PROCEDURE — 93306 TTE W/DOPPLER COMPLETE: CPT

## 2017-10-16 ENCOUNTER — APPOINTMENT (OUTPATIENT)
Dept: PHYSICAL THERAPY | Facility: REHABILITATION | Age: 82
End: 2017-10-16
Payer: MEDICARE

## 2017-10-16 PROCEDURE — 97162 PT EVAL MOD COMPLEX 30 MIN: CPT

## 2017-10-16 PROCEDURE — G8991 OTHER PT/OT GOAL STATUS: HCPCS

## 2017-10-16 PROCEDURE — G8990 OTHER PT/OT CURRENT STATUS: HCPCS

## 2017-10-17 NOTE — PROGRESS NOTES
[address] [place] [id number] [phone]               Thank you for choosing us for your health care visit with Jessica Wellington DO.   We are glad to serve you and happy to provide you with this summary of your vi Assessment  1  Heartburn (787 1) (R12)   2  Abdominal discomfort, epigastric (789 06) (R10 13)    Plan  Abdominal discomfort, epigastric, Heartburn    · 1 - Monica Iglesias DO (Gastroenterology) Co-Management  *  Status: Active   Requested for: 02DXI3441  Care Summary provided  : Yes  Arthritis, Depression, controlled    · DULoxetine HCl - 30 MG Oral Capsule Delayed Release Particles; take 1  capsule daily  Bladder cancer    · Fluzone High-Dose 0 5 ML Intramuscular Suspension Prefilled Syringe  Gastric reflux syndrome    · Pantoprazole Sodium 40 MG Oral Tablet Delayed Release; TAKE 1 TABLET  TWICE DAILY 30 MINUTES BEFORE BREAKFAST AND DINNER  Unlinked    · DULoxetine HCl - 40 MG Oral Capsule Delayed Release Particles    Discussion/Summary    F/u approx 2 months  The patient was counseled regarding instructions for management,-- patient and family education,-- impressions  Possible side effects of new medications were reviewed with the patient/guardian today  The treatment plan was reviewed with the patient/guardian  The patient/guardian understands and agrees with the treatment plan      Chief Complaint  Patient presents for follow up exam of chronic conditions and to review recent blood work results  History of Present Illness  urine sx resolvedhave heartburn so bad and burning in stomach, usually take rolaids before going to bed, doesn't wake me during the nighton protonix for so long, wondering if just not working   after I eat about half hour, get burning so bad, had ulcers years ago and that's what it feels like it's bugging me againon 12th for echo rx'd by Cheikh Machuca her cymbalta today- she is and always has been on 30mg capsule one a day, her initial new pt med list recorded as 2 a day incorrectly, then refill, pt thinks she looked at her 's bottle by mistake, and he was taking a 40mg tablet once a day      Review of Systems    Constitutional: No fever, no chills, feels well, no tiredness, no Assoc Dx:  Gastroesophageal reflux disease without esophagitis [K21.9]          Medical Issues Discussed Today     Encounter for routine child health examination without abnormal findings    -  Primary    Gastroesophageal reflux disease without esophagiti recent weight gain or weight loss  Eyes: No complaints of eye pain, no red eyes, no eyesight problems, no discharge, no dry eyes, no itching of eyes  Cardiovascular: No complaints of slow heart rate, no fast heart rate, no chest pain, no palpitations, no leg claudication, no lower extremity edema  Respiratory: No complaints of shortness of breath, no wheezing, no cough, no SOB on exertion, no orthopnea, no PND  Gastrointestinal: as noted in HPI,-- no nausea,-- no vomiting,-- no constipation,-- no diarrhea-- and-- no blood in stools  Genitourinary: No complaints of dysuria, no incontinence, no pelvic pain, no dysmenorrhea, no vaginal discharge or bleeding  Integumentary: No complaints of skin rash or lesions, no itching, no skin wounds, no breast pain or lump  Neurological: No complaints of headache, no confusion, no convulsions, no numbness, no dizziness or fainting, no tingling, no limb weakness, no difficulty walking  Endocrine: No complaints of proptosis, no hot flashes, no muscle weakness, no deepening of the voice, no feelings of weakness  Hematologic/Lymphatic: No complaints of swollen glands, no swollen glands in the neck, does not bleed easily, does not bruise easily  Active Problems  1  Acute pain of left knee (719 46) (M25 562)   2  Advance directive declined by patient (V49 89) (Z78 9)   3  Allergic rhinitis (477 9) (J30 9)   4  Anemia due to vitamin B12 deficiency (281 1) (D51 9)   5  Ankle pain, left (719 47) (M25 572)   6  Ankle pain, right (719 47) (M25 571)   7  Arthritis (716 90) (M19 90)   8  Back pain (724 5) (M54 9)   9  Bilateral impacted cerumen (380 4) (H61 23)   10  Bladder cancer (188 9) (C67 9)   11  CAD (coronary artery disease) (414 00) (I25 10)   12  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)   13  Chronic right shoulder pain (719 41,338 29) (M25 511,G89 29)   14   Closed displaced fracture of trapezium of left wrist, initial encounter (814 05) (S62 172A)   15  Depression, controlled (311) (F32 9)   16  Drug toxicity (796 0) (R89 2)   17  Ear canal dryness, bilateral (380 89) (H61 893)   18  Edema, lower extremity (782 3) (R60 0)   19  Encounter for special screening examination for genitourinary disorder (V81 6) (Z13 89)   20  Fatigue (780 79) (R53 83)   21  Frontal headache (784 0) (R51)   22  Gastric reflux syndrome (530 81) (K21 9)   23  Hand pain (729 5) (M79 643)   24  Heart valve disorder (424 90) (I38)   25  High blood pressure (401 9) (I10)   26  IBS (irritable bowel syndrome) (564 1) (K58 9)   27  Impacted cerumen of left ear (380 4) (H61 22)   28  Left wrist pain (719 43) (M25 532)   29  Leukopenia (288 50) (D72 819)   30  Medication refill (V68 1) (Z76 0)   31  Mild vitamin D deficiency (268 9) (E55 9)   32  Mixed hyperlipidemia (272 2) (E78 2)   33  Neck pain (723 1) (M54 2)   34  Need for influenza vaccination (V04 81) (Z23)   35  Need for pneumococcal vaccination (V03 82) (Z23)   36  Pancreatic cyst (577 2) (K86 2)   37  Pre-syncope (780 2) (R55)   38  Right wrist pain (719 43) (M25 531)   39  Screening for neurological condition (V80 09) (Z13 89)   40  Screening mammogram for high-risk patient (V76 11) (Z12 31)   41  Urge incontinence of urine (788 31) (N39 41)   42  Urinary urgency (788 63) (R39 15)   43  UTI (urinary tract infection) (599 0) (N39 0)    Past Medical History  1  History of cancer (V10 90) (Z85 9)   2  History of hepatitis (V12 09) (Z86 19)   3  History of kidney stones (V13 01) (Z87 442)   4  History of pneumonia (V12 61) (Z87 01)   5  History of shingles (V12 09) (Z86 19)   6  History of urinary frequency (V13 09) (Z87 898)   7  History of urinary tract infection (V13 02) (Z87 440)   8  History of varicella (V12 09) (Z86 19)    The active problems and past medical history were reviewed and updated today  Surgical History  1  History of Cath Stent Placement   2  History of Diagnostic Cystoscopy   3   History of often. Sometimes toddlers need to try a food 10 times before they actually accept and enjoy it. It is also important to encourage play time as soon as they start crawling and walking.  As your children grow, continue to help them live a healthy active lifes Hysterectomy    The surgical history was reviewed and updated today  Family History  Mother    1  Denied: Family history of Colon cancer   2  Family history of arthritis (V17 7) (Z82 61)   3  Family history of cardiac disorder (V17 49) (Z82 49)   4  Denied: Family history of colonic polyps   5  Denied: Family history of Crohn's disease   6  Denied: Family history of liver disease   7  Family history of osteoporosis (V17 81) (Z82 62)  Father    8  Denied: Family history of Colon cancer   9  Family history of cardiac disorder (V17 49) (Z82 49)   10  Denied: Family history of colonic polyps   11  Denied: Family history of Crohn's disease   12  Family history of hypertension (V17 49) (Z82 49)   13  Denied: Family history of liver disease  Brother    15  Family history of hypertension (V17 49) (Z82 49)    The family history was reviewed and updated today  Social History   · Advance directive declined by patient (V49 89) (Z78 9)   · Always uses seat belt   · Never a smoker   · No alcohol use   · No drug use   ·  (V61 07) (Z63 4)  The social history was reviewed and updated today  The social history was reviewed and is unchanged  Current Meds   1  Adult Aspirin EC Low Strength 81 MG Oral Tablet Delayed Release; TAKE 1 TABLET   DAILY; Therapy: (Recorded:21Ooa8790) to Recorded   2  Allegra Allergy 180 MG Oral Tablet; Therapy: (Recorded:04Oct2016) to Recorded   3  AmLODIPine Besylate 5 MG Oral Tablet; TAKE 1 TABLET DAILY; Therapy: (Recorded:57Yls9076) to  Requested for: 29WUC0350 Recorded   4  Atenolol 25 MG Oral Tablet; take 1 tablet by mouth once daily; Therapy: 51VNK5173 to (Evaluate:86Ntm1079)  Requested for: 58Boc6020; Last   Rx:44Wns0867 Ordered   5  Atorvastatin Calcium 40 MG Oral Tablet; take 1 tablet by mouth once daily; Therapy: 37ZYT2563 to (Magdy Sargent)  Requested for: 99Luq0313; Last   Rx:74Glr7834 Ordered   6   Bumetanide 1 MG Oral Tablet; take half tablet by mouth once daily  Requested for:   15Ufh7991; Last Rx:85Grj9588 Ordered   7  Citrucel Oral Powder; Therapy: (Recorded:04Oct2016) to Recorded   8  [employer]S Daily Multiple Oral Tablet; Therapy: (Recorded:04Oct2016) to Recorded   9  DULoxetine HCl - 40 MG Oral Capsule Delayed Release Particles; Therapy: (Recorded:45Rtd3130) to Recorded   10  Meloxicam 7 5 MG Oral Tablet; TAKE 1 TABLET DAILY WITH FOOD; Therapy: 19Sjq8548 to (Evaluate:64Gpi1461)  Requested for: 26Hky1331 Recorded   11  Montelukast Sodium 10 MG Oral Tablet; Take 1 tablet daily  Requested for: 57Zfg6204;    Last Rx:67Nda4012 Ordered   12  Pantoprazole Sodium 40 MG Oral Tablet Delayed Release; TAKE 1 TABLET DAILY; Therapy: 61HEB5089 to (Sybil Burt)  Requested for: 64Bti7144; Last    Rx:87Enj4218 Ordered   13  Vitamin D3 2000 UNIT Oral Tablet; Therapy: (Recorded:34Yzg2416) to Recorded    The medication list was reviewed and updated today  Allergies  1  No Known Drug Allergies    Vitals  Vital Signs    Recorded: 54BND5001 10:22AM   Temperature 97 3 F   Heart Rate 68   Systolic 535   Diastolic 72   Height 5 ft 1 in   Weight 120 lb 6 oz   BMI Calculated 22 74   BSA Calculated 1 52   O2 Saturation 99     Physical Exam    Constitutional   General appearance: No acute distress, well appearing and well nourished  appears younger than stated age  Eyes   Conjunctiva and lids: No swelling, erythema or discharge  Pupils and irises: Equal, round and reactive to light  Ears, Nose, Mouth, and Throat   Oropharynx: Normal with no erythema, edema, exudate or lesions  Pulmonary   Respiratory effort: No increased work of breathing or signs of respiratory distress  Auscultation of lungs: Clear to auscultation  Cardiovascular   Auscultation of heart: Normal rate and rhythm, normal S1 and S2, without murmurs  Examination of extremities for edema and/or varicosities: Normal     Abdomen   Abdomen: Non-tender, no masses   -- except epigastric discomfort/mild tenderness  Liver and spleen: No hepatomegaly or splenomegaly  Musculoskeletal   Gait and station: Normal     Digits and nails: Normal without clubbing or cyanosis  Skin   Skin and subcutaneous tissue: Normal without rashes or lesions  Neurologic   Reflexes: 2+ and symmetric  Sensation: No sensory loss  Psychiatric   Mood and affect: Normal          Future Appointments    Date/Time Provider Specialty Site   12/12/2017 11:00 AM Shyann Can DO Gastroenterology Adult [facility]   12/05/2017 11:00 AM Rob Veronica DO Family Medicine [facility] [place]   04/03/2018 02:00 PM ADNIEL Mchugh   Urology [facility] [place]     Signatures   Electronically signed by : Yuridia Luz DO; Oct 16 2017  9:59AM EST                       (Author)

## 2017-10-19 ENCOUNTER — APPOINTMENT (OUTPATIENT)
Dept: PHYSICAL THERAPY | Facility: REHABILITATION | Age: 82
End: 2017-10-19
Payer: MEDICARE

## 2017-10-19 PROCEDURE — 97140 MANUAL THERAPY 1/> REGIONS: CPT

## 2017-10-19 PROCEDURE — 97110 THERAPEUTIC EXERCISES: CPT

## 2017-10-23 ENCOUNTER — APPOINTMENT (OUTPATIENT)
Dept: PHYSICAL THERAPY | Facility: REHABILITATION | Age: 82
End: 2017-10-23
Payer: MEDICARE

## 2017-10-23 PROCEDURE — 97140 MANUAL THERAPY 1/> REGIONS: CPT

## 2017-10-23 PROCEDURE — 97110 THERAPEUTIC EXERCISES: CPT

## 2017-10-26 ENCOUNTER — APPOINTMENT (OUTPATIENT)
Dept: PHYSICAL THERAPY | Facility: REHABILITATION | Age: 82
End: 2017-10-26
Payer: MEDICARE

## 2017-10-26 PROCEDURE — 97110 THERAPEUTIC EXERCISES: CPT

## 2017-10-26 PROCEDURE — 97140 MANUAL THERAPY 1/> REGIONS: CPT

## 2017-10-30 ENCOUNTER — APPOINTMENT (OUTPATIENT)
Dept: PHYSICAL THERAPY | Facility: REHABILITATION | Age: 82
End: 2017-10-30
Payer: MEDICARE

## 2017-10-30 PROCEDURE — 97140 MANUAL THERAPY 1/> REGIONS: CPT

## 2017-10-30 PROCEDURE — 97014 ELECTRIC STIMULATION THERAPY: CPT

## 2017-10-30 PROCEDURE — 97110 THERAPEUTIC EXERCISES: CPT

## 2017-11-02 ENCOUNTER — APPOINTMENT (OUTPATIENT)
Dept: PHYSICAL THERAPY | Facility: REHABILITATION | Age: 82
End: 2017-11-02
Payer: MEDICARE

## 2017-11-03 ENCOUNTER — APPOINTMENT (OUTPATIENT)
Dept: PHYSICAL THERAPY | Facility: REHABILITATION | Age: 82
End: 2017-11-03
Payer: MEDICARE

## 2017-11-03 PROCEDURE — 97140 MANUAL THERAPY 1/> REGIONS: CPT

## 2017-11-03 PROCEDURE — 97110 THERAPEUTIC EXERCISES: CPT

## 2017-11-06 ENCOUNTER — APPOINTMENT (OUTPATIENT)
Dept: PHYSICAL THERAPY | Facility: REHABILITATION | Age: 82
End: 2017-11-06
Payer: MEDICARE

## 2017-11-06 PROCEDURE — 97110 THERAPEUTIC EXERCISES: CPT

## 2017-11-06 PROCEDURE — 97140 MANUAL THERAPY 1/> REGIONS: CPT

## 2017-11-09 ENCOUNTER — APPOINTMENT (OUTPATIENT)
Dept: PHYSICAL THERAPY | Facility: REHABILITATION | Age: 82
End: 2017-11-09
Payer: MEDICARE

## 2017-11-09 PROCEDURE — 97140 MANUAL THERAPY 1/> REGIONS: CPT

## 2017-11-09 PROCEDURE — G8991 OTHER PT/OT GOAL STATUS: HCPCS | Performed by: PHYSICAL THERAPIST

## 2017-11-09 PROCEDURE — 97014 ELECTRIC STIMULATION THERAPY: CPT

## 2017-11-09 PROCEDURE — 97110 THERAPEUTIC EXERCISES: CPT

## 2017-11-09 PROCEDURE — G8992 OTHER PT/OT  D/C STATUS: HCPCS | Performed by: PHYSICAL THERAPIST

## 2017-11-16 ENCOUNTER — APPOINTMENT (OUTPATIENT)
Dept: PHYSICAL THERAPY | Facility: REHABILITATION | Age: 82
End: 2017-11-16
Payer: MEDICARE

## 2017-11-20 ENCOUNTER — TRANSCRIBE ORDERS (OUTPATIENT)
Dept: LAB | Facility: CLINIC | Age: 82
End: 2017-11-20

## 2017-11-20 ENCOUNTER — APPOINTMENT (OUTPATIENT)
Dept: LAB | Facility: CLINIC | Age: 82
End: 2017-11-20
Payer: MEDICARE

## 2017-11-20 DIAGNOSIS — D72.819 DECREASED WHITE BLOOD CELL COUNT: ICD-10-CM

## 2017-11-20 LAB
ALBUMIN SERPL BCP-MCNC: 3.3 G/DL (ref 3.5–5)
ALP SERPL-CCNC: 76 U/L (ref 46–116)
ALT SERPL W P-5'-P-CCNC: 29 U/L (ref 12–78)
ANION GAP SERPL CALCULATED.3IONS-SCNC: 3 MMOL/L (ref 4–13)
AST SERPL W P-5'-P-CCNC: 16 U/L (ref 5–45)
BASOPHILS # BLD AUTO: 0.02 THOUSANDS/ΜL (ref 0–0.1)
BASOPHILS NFR BLD AUTO: 1 % (ref 0–1)
BILIRUB SERPL-MCNC: 0.62 MG/DL (ref 0.2–1)
BUN SERPL-MCNC: 20 MG/DL (ref 5–25)
CALCIUM SERPL-MCNC: 9.5 MG/DL (ref 8.3–10.1)
CHLORIDE SERPL-SCNC: 102 MMOL/L (ref 100–108)
CO2 SERPL-SCNC: 32 MMOL/L (ref 21–32)
CREAT SERPL-MCNC: 0.59 MG/DL (ref 0.6–1.3)
EOSINOPHIL # BLD AUTO: 0.16 THOUSAND/ΜL (ref 0–0.61)
EOSINOPHIL NFR BLD AUTO: 4 % (ref 0–6)
ERYTHROCYTE [DISTWIDTH] IN BLOOD BY AUTOMATED COUNT: 11.8 % (ref 11.6–15.1)
GFR SERPL CREATININE-BSD FRML MDRD: 87 ML/MIN/1.73SQ M
GLUCOSE SERPL-MCNC: 93 MG/DL (ref 65–140)
HCT VFR BLD AUTO: 35.7 % (ref 34.8–46.1)
HGB BLD-MCNC: 11.9 G/DL (ref 11.5–15.4)
LDH SERPL-CCNC: 153 U/L (ref 81–234)
LYMPHOCYTES # BLD AUTO: 1.65 THOUSANDS/ΜL (ref 0.6–4.47)
LYMPHOCYTES NFR BLD AUTO: 42 % (ref 14–44)
MCH RBC QN AUTO: 30.4 PG (ref 26.8–34.3)
MCHC RBC AUTO-ENTMCNC: 33.3 G/DL (ref 31.4–37.4)
MCV RBC AUTO: 91 FL (ref 82–98)
MONOCYTES # BLD AUTO: 0.47 THOUSAND/ΜL (ref 0.17–1.22)
MONOCYTES NFR BLD AUTO: 12 % (ref 4–12)
NEUTROPHILS # BLD AUTO: 1.67 THOUSANDS/ΜL (ref 1.85–7.62)
NEUTS SEG NFR BLD AUTO: 41 % (ref 43–75)
NRBC BLD AUTO-RTO: 0 /100 WBCS
PLATELET # BLD AUTO: 180 THOUSANDS/UL (ref 149–390)
PMV BLD AUTO: 10.7 FL (ref 8.9–12.7)
POTASSIUM SERPL-SCNC: 4.4 MMOL/L (ref 3.5–5.3)
PROT SERPL-MCNC: 6.8 G/DL (ref 6.4–8.2)
RBC # BLD AUTO: 3.91 MILLION/UL (ref 3.81–5.12)
SODIUM SERPL-SCNC: 137 MMOL/L (ref 136–145)
URATE SERPL-MCNC: 4.2 MG/DL (ref 2–6.8)
WBC # BLD AUTO: 3.98 THOUSAND/UL (ref 4.31–10.16)

## 2017-11-20 PROCEDURE — 85025 COMPLETE CBC W/AUTO DIFF WBC: CPT

## 2017-11-20 PROCEDURE — 80053 COMPREHEN METABOLIC PANEL: CPT

## 2017-11-20 PROCEDURE — 84550 ASSAY OF BLOOD/URIC ACID: CPT

## 2017-11-20 PROCEDURE — 83615 LACTATE (LD) (LDH) ENZYME: CPT

## 2017-11-20 PROCEDURE — 36415 COLL VENOUS BLD VENIPUNCTURE: CPT

## 2017-12-04 ENCOUNTER — ALLSCRIPTS OFFICE VISIT (OUTPATIENT)
Dept: OTHER | Facility: OTHER | Age: 82
End: 2017-12-04

## 2017-12-05 ENCOUNTER — ALLSCRIPTS OFFICE VISIT (OUTPATIENT)
Dept: OTHER | Facility: OTHER | Age: 82
End: 2017-12-05

## 2017-12-06 NOTE — PROGRESS NOTES
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)   2  Leukopenia (288 50) (D72 819)   3  Pancreatic cyst (577 2) (K86 2)    Plan  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leukemia    · (1) CBC/PLT/DIFF; Status:Active; Requested for:21May2018; Perform:[facility]; BFD:[id number];MNSVHBM; For: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leukemia; Ordered By:Proothi, Gentry;   · (1) COMPREHENSIVE METABOLIC PANEL; Status:Active; Requested for:21May2018; Perform:[facility]; OTU:68MXT2069;BNZWBCN; For: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leukemia; Ordered By:Proothi, Gentry;   · (1) LD (LDH); Status:Active; Requested for:21May2018; Perform:[facility]; JOSY:37NPW5025;GGYOFJD;NXQIN granular lymphocytic leukemia; Ordered By:Proothi, Gentry;   · Follow-up visit in 6 months Evaluation and Treatment  Follow-up  Status: Complete Done: 32IEO7657 10:40AM   Ordered;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leukemia; Ordered By: Finn Gu Performed:  Due: 03LFN2235; Last Updated By: Jorgito Herron; 12/4/2017 11:57:13 AM  Pancreatic cyst    · We recommend that you examine your own breasts for lumps and other changes  ;Status:Complete;   Done: 91SUB4883   Ordered;cyst; Ordered By:Es Patel;    Discussion/Summary  Discussion Summary: In 2011 patient was diagnosed to have asymptomatic large granular cell lymphocytic leukemia and for that she has not required therapy and disease has not progressed  Patient is not symptomatic from this disease  Parameters are being monitored  No fever, chills or bleeding  No enlarged liver and spleen  No enlarged lymph nodes  No night sweats or weight loss  No skin rash has other medical problems  For pancreatic cyst and IBS patient follows with her gastroenterologist has 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 and had laser therapy and BCG and undergoes cystoscopies on regular basis  She has 2 stents in her heart   She has history of kidney stone  She has history of Raynaud's  She had herpes zoster in January 2016  History of osteopenia  She has been on vitamin D and prolia  She has appointment with her rheumatologist   History of hepatitis in 1993   History of ALONSO and BSO in 1980 for bleeding  No cancer  History of pulmonary embolism several years ago  History of stomach ulcer in 1970 of pneumonias in 2003 and 2004  of hypertension  History of IBS with irregular bowel movements  History of pancreatic cyst  She follows with a gastroenterologist has chronic fatigue  Migue Henriquez Her weight has stabilized  She is sensitive to cold weather  swelling of the ankles  of dizziness in November 2016  History of arthritis in the neck and all over  She follows with a rheumatologist examination and test results are as recorded and discussed in detail  Hematologically stable  Condition discussed and explained  Questions answered  She will continue to follow with her primary physician and other consultants  discussed importance of self breast examination, eating healthy foods and exercises as tolerated  Counseling Documentation With Imm: The patient was counseled regarding diagnostic results,-- risk factor reductions,-- prognosis,-- patient and family education,-- impressions  total time of encounter was 30 minutes-- and-- 20 minutes was spent counseling  Goals and Barriers: The patient has the current Goals: Prolongation of survival  The patent has the current Barriers: None  Patient's Capacity to Self-Care: Patient is able to Self-Care  Patient agrees and allows to involve family/caregiver in development of care plan:   Medication SE Review and Pt Understands Tx: The treatment plan was reviewed with the patient/guardian  The patient/guardian understands and agrees with the treatment plan   Self Referrals:   Self Referrals: No      Chief Complaint  Chief Complaint Free Text Note Form: Large granular cell lymphocytic leukemia, LGL        History of Present Illness  HPI: In 2011 patient was diagnosed to have asymptomatic large granular cell lymphocytic leukemia and for that she has not required therapy and disease has not progressed  Patient is not symptomatic from this disease  Parameters are being monitored  No fever, chills or bleeding  No enlarged liver and spleen  No enlarged lymph nodes  No night sweats or weight loss  No skin rash has other medical problems  For pancreatic cyst and IBS patient follows with her gastroenterologist has 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 and had laser therapy and BCG and undergoes cystoscopies on regular basis  She has 2 stents in her heart  She has history of kidney stone  She has history of Raynaud's  She had herpes zoster in January 2016  History of osteopenia  She has been on vitamin D and prolia  She has appointment with her rheumatologist   History of hepatitis in 1993   History of ALONSO and BSO in 1980 for bleeding  No cancer  History of pulmonary embolism several years ago  History of stomach ulcer in 1970 of pneumonias in 2003 and 2004  of hypertension  History of IBS with irregular bowel movements  History of pancreatic cyst  She follows with a gastroenterologist has chronic fatigue  Christine Cutting Her weight has stabilized  She is sensitive to cold weather  swelling of the ankles  of dizziness in November 2016  History of arthritis in the neck and all over  She follows with a rheumatologist       Review of Systems        Presently no headaches, seizures, diplopia, dysphagia, hoarseness, angina pain, chest pain, palpitations, shortness of breath, cough, hemoptysis, abdominal pain, melena, or hematuria  No fever, chills, bleeding, bone pains, skin rash, weight loss, nausea, and vomiting  Christine Cutting Appetite is fair  No night sweats  Patient has  arthritic symptoms  No leg cramps  No swollen glands  Anxious  No recent or frequent infections  No GYN symptoms    Other symptoms as in history of present illness  Reviewed 14 systems  ROS Reviewed:   ROS reviewed  Active Problems    1  Abdominal discomfort, epigastric (789 06) (R10 13)   2  Acute pain of left knee (719 46) (M25 562)   3  Advance directive declined by patient (V49 89) (Z78 9)   4  Allergic rhinitis (477 9) (J30 9)   5  Anemia due to vitamin B12 deficiency (281 1) (D51 9)   6  Ankle pain, left (719 47) (M25 572)   7  Ankle pain, right (719 47) (M25 571)   8  Arthritis (716 90) (M19 90)   9  Back pain (724 5) (M54 9)   10  Bilateral impacted cerumen (380 4) (H61 23)   11  Bladder cancer (188 9) (C67 9)   12  CAD (coronary artery disease) (414 00) (I25 10)   13  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)   14  Chronic right shoulder pain (719 41,338 29) (M25 511,G89 29)   15  Closed displaced fracture of trapezium of left wrist, initial encounter (814 05) (S62 172A)   16  Depression, controlled (311) (F32 9)   17  Drug toxicity (796 0) (R89 2)   18  Ear canal dryness, bilateral (380 89) (H61 893)   19  Edema, lower extremity (782 3) (R60 0)   20  Encounter for special screening examination for genitourinary disorder (V81 6) (Z13 89)   21  Fatigue (780 79) (R53 83)   22  Frontal headache (784 0) (R51)   23  Gastric reflux syndrome (530 81) (K21 9)   24  Hand pain (729 5) (M79 643)   25  Heart valve disorder (424 90) (I38)   26  Heartburn (787 1) (R12)   27  High blood pressure (401 9) (I10)   28  IBS (irritable bowel syndrome) (564 1) (K58 9)   29  Impacted cerumen of left ear (380 4) (H61 22)   30  Left wrist pain (719 43) (M25 532)   31  Leukopenia (288 50) (D72 819)   32  Medication refill (V68 1) (Z76 0)   33  Mild vitamin D deficiency (268 9) (E55 9)   34  Mixed hyperlipidemia (272 2) (E78 2)   35  Neck pain (723 1) (M54 2)   36  Need for influenza vaccination (V04 81) (Z23)   37  Need for pneumococcal vaccination (V03 82) (Z23)   38  Pancreatic cyst (577 2) (K86 2)   39  Pre-syncope (780 2) (R55)   40   Right wrist pain (716 43) (M26 626)   41  Screening for neurological condition (V80 09) (Z13 89)   42  Screening mammogram for high-risk patient (V76 11) (Z12 31)   43  Urge incontinence of urine (788 31) (N39 41)   44  Urinary urgency (788 63) (R39 15)   45  UTI (urinary tract infection) (599 0) (N39 0)      Reviewed   Past Medical History  1  History of cancer (V10 90) (Z85 9)   2  History of hepatitis (V12 09) (Z86 19)   3  History of kidney stones (V13 01) (Z87 442)   4  History of pneumonia (V12 61) (Z87 01)   5  History of shingles (V12 09) (Z86 19)   6  History of urinary frequency (V13 09) (Z87 898)   7  History of urinary tract infection (V13 02) (Z87 440)   8  History of varicella (V12 09) (Z86 19)  Active Problems And Past Medical History Reviewed: The active problems and past medical history were reviewed and updated today  Surgical History  1  History of Cath Stent Placement   2  History of Diagnostic Cystoscopy   3  History of Hysterectomy  Surgical History Reviewed: The surgical history was reviewed and updated today  Family History  Mother    1  Denied: Family history of Colon cancer   2  Family history of arthritis (V17 7) (Z82 61)   3  Family history of cardiac disorder (V17 49) (Z82 49)   4  Denied: Family history of colonic polyps   5  Denied: Family history of Crohn's disease   6  Denied: Family history of liver disease   7  Family history of osteoporosis (V17 81) (Z82 62)  Father    8  Denied: Family history of Colon cancer   9  Family history of cardiac disorder (V17 49) (Z82 49)   10  Denied: Family history of colonic polyps   11  Denied: Family history of Crohn's disease   12  Family history of hypertension (V17 49) (Z82 49)   13  Denied: Family history of liver disease  Brother    15  Family history of hypertension (V17 49) (Z82 49)  Family History Reviewed: The family history was reviewed and updated today         Social History     · Advance directive declined by patient (V49 89) (Z78 9) · Always uses seat belt   · Never a smoker   · No alcohol use   · No drug use   ·  (V61 07) (Z63 4)  Social History Reviewed: The social history was reviewed and updated today  Current Meds   1  Adult Aspirin EC Low Strength 81 MG Oral Tablet Delayed Release; TAKE 1 TABLET DAILY; Therapy: (Recorded:04Oct2016) to Recorded   2  Allegra Allergy 180 MG Oral Tablet; Therapy: (Recorded:04Oct2016) to Recorded   3  AmLODIPine Besylate 5 MG Oral Tablet; TAKE 1 TABLET DAILY; Therapy: (Recorded:14Dqu3066) to  Requested for: 84TFB9417 Recorded   4  Atenolol 25 MG Oral Tablet; take 1 tablet by mouth once daily; Therapy: 12ZSH7071 to (Evaluate:30Jan2018)  Requested for: 38Nmz7516; Last Rx:75Tva1045 Ordered   5  Atorvastatin Calcium 40 MG Oral Tablet; take 1 tablet by mouth once daily; Therapy: 00EUF7898 to (Evaluate:89Waq3097)  Requested for: 22Nov2017; Last Rx:22Nov2017 Ordered   6  Bumetanide 1 MG Oral Tablet; take half tablet by mouth once daily  Requested for: 14Gfv2722; Last Rx:02Aug2017 Ordered   7  Citrucel Oral Powder; Therapy: (Recorded:04Oct2016) to Recorded   8  [employer] Daily Multiple Oral Tablet; Therapy: (Recorded:04Oct2016) to Recorded   9  DULoxetine HCl - 30 MG Oral Capsule Delayed Release Particles; take 1 capsule daily  Requested for: 02UFU9283; Last Rx:05Oct2017 Ordered   10  Meloxicam 7 5 MG Oral Tablet; TAKE 1 TABLET DAILY WITH FOOD; Therapy: 58Mcy4013 to (Evaluate:03Rcr3221)  Requested for: 14Sep2017 Recorded   11  Montelukast Sodium 10 MG Oral Tablet; Take 1 tablet daily  Requested for: 24Thv6236;  Last Rx:27Qcj1514 Ordered   12  Pantoprazole Sodium 40 MG Oral Tablet Delayed Release; TAKE 1 TABLET TWICE DAILY  30 MINUTES BEFORE BREAKFAST AND DINNER; Therapy: 72JJA3062 to (Percell Mask)  Requested for: 00ZJR9347; Last  Rx:05Oct2017 Ordered   15  Vitamin D3 2000 UNIT Oral Tablet; Therapy: (Recorded:96Ish9308) to Recorded    Allergies    1   No Known Drug Allergies      Reviewed   Vitals  Vital Signs    Recorded: 38NSK7220 11:05AM   Temperature 97 4 F   Heart Rate 79   Respiration 15   Systolic 161   Diastolic 74   Height 5 ft 1 in   Weight 120 lb 2 oz   BMI Calculated 22 7   BSA Calculated 1 52   O2 Saturation 99   Pain Scale 0         Reviewed   Physical Exam        Patient is alert and oriented   She is not in distress  Vital signs are stable  No icterus  No oral thrush  No palpable neck mass  Regular heart rate  Systolic murmur  Clear lung fields  No palpable breast mass  Abdomen soft and nontender  No palpable abdominal mass  No ascites  No edema of ankles  No calf tenderness  No focal neurological deficit  No skin rash  No palpable lymphadenopathy  Good arterial pulses  No clubbing  Anxious     Performance status one  ECOG 1       Results/Data  (1) CBC/PLT/DIFF 56RHG3609 10:23AM José Luis Patel Order Number: [id number]     Test Name Result Flag Reference   WBC COUNT 3 98 Thousand/uL L 4 31-10 16   RBC COUNT 3 91 Million/uL  3 81-5 12   HEMOGLOBIN 11 9 g/dL  11 5-15 4   HEMATOCRIT 35 7 %  34 8-46  1   MCV 91 fL  82-98   MCH 30 4 pg  26 8-34 3   MCHC 33 3 g/dL  31 4-37 4   RDW 11 8 %  11 6-15 1   MPV 10 7 fL  8 9-12 7   PLATELET COUNT 211 Thousands/uL  149-390   nRBC AUTOMATED 0 /100 WBCs     NEUTROPHILS RELATIVE PERCENT 41 % L 43-75   LYMPHOCYTES RELATIVE PERCENT 42 %  14-44   MONOCYTES RELATIVE PERCENT 12 %  4-12   EOSINOPHILS RELATIVE PERCENT 4 %  0-6   BASOPHILS RELATIVE PERCENT 1 %  0-1   NEUTROPHILS ABSOLUTE COUNT 1 67 Thousands/? ??L L 1 85-7 62   LYMPHOCYTES ABSOLUTE COUNT 1 65 Thousands/? ??L  0 60-4 47   MONOCYTES ABSOLUTE COUNT 0 47 Thousand/? ??L  0 17-1 22   EOSINOPHILS ABSOLUTE COUNT 0 16 Thousand/? ??L  0 00-0 61   BASOPHILS ABSOLUTE COUNT 0 02 Thousands/? ??L  0 00-0 10     (1) COMPREHENSIVE METABOLIC PANEL 95NOG4962 14:45WV Sin Quan Order Number: [id number]     Test Name Result Flag Reference   GLUCOSE,RANDM 93 mg/dL       If the patient is fasting, the ADA then defines impaired fasting glucose as > 100 mg/dL and diabetes as > or equal to 123 mg/dL  Specimen collection should occur prior to Sulfasalazine administration due to the potential for falsely depressed results  Specimen collection should occur prior to Sulfapyridine administration due to the potential for falsely elevated results  SODIUM 137 mmol/L  136-145   POTASSIUM 4 4 mmol/L  3 5-5 3   CHLORIDE 102 mmol/L  100-108   CARBON DIOXIDE 32 mmol/L  21-32   ANION GAP (CALC) 3 mmol/L L 4-13   BLOOD UREA NITROGEN 20 mg/dL  5-25   CREATININE 0 59 mg/dL L 0 60-1 30   Standardized to IDMS reference method   CALCIUM 9 5 mg/dL  8 3-10 1   BILI, TOTAL 0 62 mg/dL  0 20-1 00   ALK PHOSPHATAS 76 U/L     ALT (SGPT) 29 U/L  12-78   Specimen collection should occur prior to Sulfasalazine and/or Sulfapyridine administration due to the potential for falsely depressed results  AST(SGOT) 16 U/L  5-45   Specimen collection should occur prior to Sulfasalazine administration due to the potential for falsely depressed results  ALBUMIN 3 3 g/dL L 3 5-5 0   TOTAL PROTEIN 6 8 g/dL  6 4-8 2   eGFR 87 ml/min/1 73sq m       National Kidney Disease Education Program recommendations are as follows: GFR calculation is accurate only with a steady state creatinine Chronic Kidney disease less than 60 ml/min/1 73 sq  meters Kidney failure less than 15 ml/min/1 73 sq  meters  (1) LD (LDH) 51VHL7366 10:23AM Trever Weeks    Order Number: [id number]     Test Name Result Flag Reference   LD (LDH) 153 U/L       (1) URIC ACID 51BPE6157 10:23AM Lynda Rogers Order Number: [id number]     Test Name Result Flag Reference   URIC ACID 4 2 mg/dL  2 0-6 8   Specimen collection should occur prior to Metamizole administration due to the potential for falsely depressed results       ECHO COMPLETE WITH CONTRAST IF INDICATED 12Oct2017 12:41PM Nahomi Sanchez      Order Number: [id number]   - Patient Instructions: To schedule this appointment, please contact Central Scheduling at [phone]  Test Name Result Flag Reference   ECHO COMPLETE WITH CONTRAST IF INDICATED (Report)       [facility], [address] [place] [address]  [phone]   Transthoracic Echocardiogram  2D, M-mode, Doppler, and Color Doppler   Study date: 12-Oct-2017   Patient: Sabra Melara  MR number: [id number]  Account number: [de-identified]  : 1935  Age: [de-identified] years  Gender: Female  Status: Outpatient  Location: [place] [address]   Height: 59 in  Weight: 120 lb  BP: 142/ 70 mmHg   Indications: Evaluate for coronary artery disease  Diagnoses: I25 10 - Atherosclerotic heart disease of native coronary artery without angina pectoris   Sonographer: REJI Thakkar  Primary Physician: Puja Oreilly DO  Referring Physician: Chaya Chaidez DO  Group: [facility]  Interpreting Physician: Chaya Chaidez DO   SUMMARY   LEFT VENTRICLE:  Systolic function was normal  Ejection fraction was estimated in the range of 60 % to 65 %  There were no regional wall motion abnormalities  Doppler parameters were consistent with abnormal left ventricular relaxation (grade 1 diastolic dysfunction)  MITRAL VALVE:  There was mild regurgitation  AORTIC VALVE:  There was mild to moderate regurgitation  TRICUSPID VALVE:  There was mild regurgitation  Estimated peak PA pressure was 43 mmHg  PULMONIC VALVE:  There was mild regurgitation  HISTORY: Lower extremity edema  PRIOR HISTORY: Risk factors: hypertension  PROCEDURE: The study was performed in the [place] [address]  This was a routine study  The transthoracic approach was used  The study included complete 2D imaging, M-mode, complete spectral Doppler, and color Doppler  The heart rate was  72 bpm, at the start of the study   Images were obtained from the parasternal, apical, subcostal, and suprasternal notch acoustic windows  Echocardiographic views were limited due to poor acoustic window availability, decreased penetration,  and lung interference  This was a technically difficult study  LEFT VENTRICLE: Size was normal  Systolic function was normal  Ejection fraction was estimated in the range of 60 % to 65 %  There were no regional wall motion abnormalities  Wall thickness was normal  DOPPLER: Doppler parameters were  consistent with abnormal left ventricular relaxation (grade 1 diastolic dysfunction)  RIGHT VENTRICLE: The size was normal  Systolic function was normal  Wall thickness was normal    LEFT ATRIUM: Size was normal    RIGHT ATRIUM: Size was normal    MITRAL VALVE: Valve structure was normal  There was normal leaflet separation  DOPPLER: The transmitral velocity was within the normal range  There was no evidence for stenosis  There was mild regurgitation  AORTIC VALVE: The valve was trileaflet  Leaflets exhibited mildly to moderately increased thickness, normal cuspal separation, and sclerosis  DOPPLER: Transaortic velocity was within the normal range  There was no evidence for stenosis  There was mild to moderate regurgitation  TRICUSPID VALVE: The valve structure was normal  There was normal leaflet separation  DOPPLER: The transtricuspid velocity was within the normal range  There was no evidence for stenosis  There was mild regurgitation  Estimated peak PA  pressure was 43 mmHg  PULMONIC VALVE: Leaflets exhibited normal thickness, no calcification, and normal cuspal separation  DOPPLER: The transpulmonic velocity was within the normal range  There was mild regurgitation  PERICARDIUM: There was no pericardial effusion  The pericardium was normal in appearance  AORTA: The root exhibited normal size  SYSTEMIC VEINS: IVC: The inferior vena cava was not well visualized     SYSTEM MEASUREMENT TABLES   2D  %FS: 33 5 %  AV Diam: 2 85 cm  EDV(Teich): 54 1 ml  EF(Teich): 63 21 %  ESV(Teich): 19 9 ml  IVSd: 1 09 cm  LA Diam: 3 14 cm  LVIDd: 3 59 cm  LVIDs: 2 39 cm  LVPWd: 0 99 cm  SV(Cube): 32 69 ml  SV(Teich): 34 19 ml   CW  AR Dec Clarke: 4 72 m/s2  AR Dec Time: 893 81 ms  AR PHT: 259 21 ms  AR Vmax: 4 22 m/s  AR maxP 16 mmHg  AV Env  Ti: 336 41 ms  AV MaxP 23 mmHg  AV VTI: 37 72 cm  AV Vmax: 1 82 m/s  AV Vmean: 1 12 m/s  AV meanP 86 mmHg  TR MaxP 11 mmHg  TR Vmax: 2 92 m/s   MM  TAPSE: 2 55 cm   PW  E': 0 06 m/s  E/E': 13 8  LVOT Env  Ti: 365 99 ms  LVOT VTI: 23 23 cm  LVOT Vmax: 1 08 m/s  LVOT Vmean: 0 64 m/s  LVOT maxP 7 mmHg  LVOT meanP 88 mmHg  MV A Ricky: 1 08 m/s  MV Dec Clarke: 4 89 m/s2  MV DecT: 178 93 ms  MV E Ricky: 0 88 m/s  MV E/A Ratio: 0 81   [facility] Accredited [facility]chocardi[facility] Laboratory   Prepared and electronically signed by   Paulo Huynh DO  Signed 12-Oct-2017 17:21:03     CT ABDOMEN PELVIS W WO CONTRAST [id number] 09:59AM Alejandro Wright Order Number: [id number]   - Patient Instructions: [facility]   4 HOUR FAST   To schedule this appointment, please contact Central Scheduling at [phone]  Test Name Result Flag Reference   CT ABDOMEN PELVIS W WO CONTRAST (Report)       CT ABDOMEN AND PELVIS WITH AND WITHOUT IV CONTRAST   INDICATION: Follow-up bladder and left ureteral cancer  COMPARISON: CT chest, abdomen, pelvis 2016; MRI abdomen 2007  TECHNIQUE: CT of the kidneys was performed without intravenous contrast  Dynamic postcontrast CT evaluation of the abdomen and pelvis was performed in both nephrographic and delayed phases after the administration of intravenous contrast  Reformatted   images were created in axial, sagittal, and coronal planes  Radiation dose length product (DLP) for this visit: 499 mGy-cm    This examination, like all CT scans performed in the [facility], was performed utilizing techniques to minimize radiation dose exposure, including the use of iterative   reconstruction and automated exposure control  IV Contrast: 100 mL of iohexol (OMNIPAQUE)     Enteric Contrast: Not administered   FINDINGS:   ABDOMEN   RIGHT KIDNEY AND URETER:  No solid renal mass  No detectable ureteral mass  Multiple cysts are stable as is scarring in the midpole  No hydronephrosis or hydroureter  No urinary tract calculi  No perinephric collection  LEFT KIDNEY AND URETER:  No solid renal mass  No detectable ureteral mass  Several cortical cysts are stable the largest arising from the upper and lower poles  No hydronephrosis or hydroureter  No urinary tract calculi  No perinephric collection  URINARY BLADDER:  No bladder wall mass  No calculi  LUNG BASES: Unremarkable  LIVER/BILIARY TREE: There are one or more hepatic simple cyst(s) present  No CT evidence of suspicious solid hepatic mass  Normal hepatic contours  No biliary dilatation  GALLBLADDER: No calcified gallstones  No pericholecystic inflammatory change  SPLEEN: Unremarkable  PANCREAS: Nonenhancing cysts within the pancreatic neck and the pancreatic tail are stable in size and morphology with no dilatation of main pancreatic duct or acute peripancreatic inflammation seen  These have been described in the MRI of 2017 and   continued surveillance is recommended including repeat MRI/MRCP in June 2018 to confirm stability at one year  ADRENAL GLANDS: Unremarkable  STOMACH, BOWEL AND APPENDIX: Abundant fecal debris throughout the colon without obstruction or wall thickening  ABDOMINOPELVIC CAVITY: No ascites  No free intraperitoneal air  No lymphadenopathy  VESSELS: Atherosclerotic changes are present  No evidence of aneurysm  PELVIS   REPRODUCTIVE ORGANS: Patient is status post hysterectomy  ABDOMINAL WALL/INGUINAL REGIONS: A small fat-containing supraumbilical hernia stable  OSSEOUS STRUCTURES: No acute fracture or destructive osseous lesion  IMPRESSION:    1   No enhancing urogenital mass lesions with stable bilateral renal cysts and right mid pole scarring  2  Stable nonenhancing pancreatic cysts with no dilatation of main duct  Follow-up MRI/MRCP in June 2018 recommended as discussed above  3  No metastatic disease in the abdomen or pelvis      Workstation performed: [id number]   Signed by:  Isidoro Nelson MD  9/28/17     Future Appointments    Date/Time Provider Specialty Site   12/12/2017 11:00 AM Madelaine Linares DO Gastroenterology Adult [facility] GASTROENTEROLOGY  [facility]   01/08/2018 03:00 PM Marni Moncada DO Cardiology [facility] CARDIOLOGY [facility]   12/05/2017 11:00 AM Afia Staley DO Family Medicine [facility]   06/08/2018 10:40 AM Proothi, Darcel Cogan, MD Hematology [facility]       Signatures   Electronically signed by : Ariella Ho MD; Dec  5 2017 12:03AM EST                       (Author)

## 2017-12-13 NOTE — PROGRESS NOTES
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)   2  Raynaud's disease without gangrene (443 0) (I73 00)   3  Herpes zoster infection of thoracic region (053 9) (B02 9)   4  Encounter for Medicare annual wellness exam (V70 0) (Z00 00)   5  Arthritis (716 90) (M19 90)    Plan  Herpes zoster infection of thoracic region    · Acyclovir 5 % External Ointment (Zovirax); APPLY A SMALL AMOUNT 3 TIMES DAILYAS DIRECTED   · PredniSONE 20 MG Oral Tablet; 2 tabs po day 1, then 1 tab po day 2, then half tabpo days 3 and 4, then d/c    Discussion/Summary    Start humidifier, if no better then hold allegra unless allergy sx flarehad shingles about a year or two ago -april 2016- and had the vaccine given 6 months later- in [place], [place], will need repeat vaccination- either with zostavax, which provides immunity only about 51% of the time, or with new zoster vaccine when available  The patient was counseled regarding diagnostic results,-- instructions for management,-- patient and family education,-- impressions  Possible side effects of new medications were reviewed with the patient/guardian today  The treatment plan was reviewed with the patient/guardian  The patient/guardian understands and agrees with the treatment plan      Chief Complaint  Pt is here for a check up  Pt has lots of questions about different doctors        History of Present Illness  saw her oncolog yesterday and all good don't need to see again for 6 monthssure why I'm going to arthritis doctor, because can't take antiinflammatories- kill my stomach- so all she tells me is to take tylenolto ear doctor to clean out earsdoctor - had to see her due to problem with my stomach, reflux, but couldn't get an appt with her for 2 months and during that time the reflux disappearedtaking the allegra, over a month voice is hoarse it's drying me out so, so what I've done is take singulair in morning and allegra at night, but still wake up drybeen off of prolia for close to a year per ptitchy rash left upper back/shoulder for about 3-4 weeks      Review of Systems   Constitutional: No fever, no chills, feels well, no tiredness, no recent weight gain or weight loss  Eyes: No complaints of eye pain, no red eyes, no eyesight problems, no discharge, no dry eyes, no itching of eyes  ENT: as noted in HPI,-- no earache,-- no nosebleeds,-- no sore throat-- and-- no nasal discharge  Cardiovascular: No complaints of slow heart rate, no fast heart rate, no chest pain, no palpitations, no leg claudication, no lower extremity edema  Respiratory: No complaints of shortness of breath, no wheezing, no cough, no SOB on exertion, no orthopnea, no PND  Gastrointestinal: No complaints of abdominal pain, no constipation, no nausea or vomiting, no diarrhea, no bloody stools  Genitourinary: No complaints of dysuria, no incontinence, no pelvic pain, no dysmenorrhea, no vaginal discharge or bleeding  Musculoskeletal: PT for the neck did take the tingling away, but didn't seem to help otherwise, so stopped, but-- as noted in HPI,-- no limb pain-- and-- no limb swelling  Integumentary: as noted in HPI  Psychiatric: stressed out with the holidays here, and it's 3 yrs nov 25tth that lost my daughter, and  been gone for 7 months now, but-- not suicidal,-- no personality change-- and-- no emotional problems  Endocrine: No complaints of proptosis, no hot flashes, no muscle weakness, no deepening of the voice, no feelings of weakness  Hematologic/Lymphatic: No complaints of swollen glands, no swollen glands in the neck, does not bleed easily, does not bruise easily  Active Problems  1  Acute pain of left knee (719 46) (M25 562)   2  Advance directive declined by patient (V49 89) (Z78 9)   3  Allergic rhinitis (477 9) (J30 9)   4  Anemia due to vitamin B12 deficiency (281 1) (D51 9)   5  Ankle pain, left (719 47) (M25 572)   6  Ankle pain, right (719 47) (M25 571)   7  Arthritis (716 90) (M19 90)   8  Back pain (724 5) (M54 9)   9  Bladder cancer (188 9) (C67 9)   10  CAD (coronary artery disease) (414 00) (I25 10)   11  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)   12  Chronic right shoulder pain (719 41,338 29) (M25 511,G89 29)   13  Depression, controlled (311) (F32 9)   14  Ear canal dryness, bilateral (380 89) (H61 893)   15  Encounter for special screening examination for genitourinary disorder (V81 6) (Z13 89)   16  Fatigue (780 79) (R53 83)   17  Hand pain (729 5) (M79 643)   18  Heart valve disorder (424 90) (I38)   19  Heartburn (787 1) (R12)   20  High blood pressure (401 9) (I10)   21  IBS (irritable bowel syndrome) (564 1) (K58 9)   22  Left wrist pain (719 43) (M25 532)   23  Leukopenia (288 50) (D72 819)   24  Medication refill (V68 1) (Z76 0)   25  Mild vitamin D deficiency (268 9) (E55 9)   26  Mixed hyperlipidemia (272 2) (E78 2)   27  Neck pain (723 1) (M54 2)   28  Need for influenza vaccination (V04 81) (Z23)   29  Need for pneumococcal vaccination (V03 82) (Z23)   30  Pancreatic cyst (577 2) (K86 2)   31  Pre-syncope (780 2) (R55)   32  Right wrist pain (719 43) (M25 531)   33  Screening for neurological condition (V80 09) (Z13 89)   34  Screening mammogram for high-risk patient (V76 11) (Z12 31)    Past Medical History    1  History of Abdominal discomfort, epigastric (789 06) (R10 13)   2  History of Bilateral impacted cerumen (380 4) (H61 23)   3  History of Closed displaced fracture of trapezium of left wrist, initial encounter (814 05) (S62 172A)   4  History of Drug toxicity (796 0) (R89 2)   5  History of Edema, lower extremity (782 3) (R60 0)   6  History of Frontal headache (784 0) (R51)   7  History of Gastric reflux syndrome (530 81) (K21 9)   8  History of cancer (V10 90) (Z85 9)   9  History of hepatitis (V12 09) (Z86 19)   10  History of kidney stones (V13 01) (Z87 442)   11   History of malignant neoplasm of bladder (V10 51) (Z85 51)   12  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)   13  History of pneumonia (V12 61) (Z87 01)   14  History of shingles (V12 09) (Z86 19)   15  History of urinary frequency (V13 09) (Z87 898)   16  History of urinary tract infection (V13 02) (Z87 440)   17  History of urinary tract infection (V13 02) (Z87 440)   18  History of urinary urgency (V13 00) (Z87 448)   19  History of varicella (V12 09) (Z86 19)   20  History of Impacted cerumen of left ear (380 4) (H61 22)   21  History of Urge incontinence of urine (788 31) (N39 41)    The active problems and past medical history were reviewed and updated today  Surgical History  1  History of Cath Stent Placement   2  History of Diagnostic Cystoscopy   3  History of Hysterectomy    The surgical history was reviewed and updated today  Family History  Mother    1  Denied: Family history of Colon cancer   2  Family history of arthritis (V17 7) (Z82 61)   3  Family history of cardiac disorder (V17 49) (Z82 49)   4  Denied: Family history of colonic polyps   5  Denied: Family history of Crohn's disease   6  Denied: Family history of liver disease   7  Family history of osteoporosis (V17 81) (Z82 62)  Father    8  Denied: Family history of Colon cancer   9  Family history of cardiac disorder (V17 49) (Z82 49)   10  Denied: Family history of colonic polyps   11  Denied: Family history of Crohn's disease   12  Family history of hypertension (V17 49) (Z82 49)   13  Denied: Family history of liver disease  Brother    15  Family history of hypertension (V17 49) (Z82 49)    The family history was reviewed and updated today  Social History     · Advance directive declined by patient (V47 89) (Z78 9)   · Always uses seat belt   · Never a smoker   · No alcohol use   · No drug use   ·  (V61 07) (Z63 4)  The social history was reviewed and updated today  The social history was reviewed and is unchanged  Current Meds   1   Adult Aspirin EC Low Strength 81 MG Oral Tablet Delayed Release; TAKE 1 TABLET DAILY; Therapy: (Recorded:04Oct2016) to Recorded   2  Allegra Allergy 180 MG Oral Tablet; Therapy: (Recorded:04Oct2016) to Recorded   3  AmLODIPine Besylate 5 MG Oral Tablet; TAKE 1 TABLET DAILY; Therapy: (Recorded:48Xmi5246) to  Requested for: 10MXH9135 Recorded   4  Atenolol 25 MG Oral Tablet; take 1 tablet by mouth once daily; Therapy: 73ARJ8678 to (Evaluate:30Jan2018)  Requested for: 54Vrs0734; Last Rx:73Sdp4898 Ordered   5  Atorvastatin Calcium 40 MG Oral Tablet; take 1 tablet by mouth once daily; Therapy: 76AZP8537 to (Evaluate:84Mgk3611)  Requested for: 22Nov2017; Last Rx:22Nov2017 Ordered   6  Bumetanide 1 MG Oral Tablet; take half tablet by mouth once daily  Requested for: 19Wfj1897; Last Rx:97Sha0732 Ordered   7  Citrucel Oral Powder; Therapy: (Recorded:04Oct2016) to Recorded   8  [employer] Daily Multiple Oral Tablet; Therapy: (Recorded:04Oct2016) to Recorded   9  DULoxetine HCl - 30 MG Oral Capsule Delayed Release Particles; take 1 capsule daily  Requested for: 70LHH8469; Last Rx:05Oct2017 Ordered   10  Meloxicam 7 5 MG Oral Tablet; TAKE 1 TABLET DAILY WITH FOOD; Therapy: 97Qvs8346 to (Evaluate:45Nkl5998)  Requested for: 07Nig5373 Recorded   11  Montelukast Sodium 10 MG Oral Tablet; Take 1 tablet daily  Requested for: 27Apr2017;  Last Rx:75Baw4064 Ordered   12  Pantoprazole Sodium 40 MG Oral Tablet Delayed Release; TAKE 1 TABLET TWICE DAILY  30 MINUTES BEFORE BREAKFAST AND DINNER; Therapy: 48WJV4675 to (Neil Seek)  Requested for: 73ZGD8930; Last  Rx:79Dww9010 Ordered   15  Vitamin D3 2000 UNIT Oral Tablet; Therapy: (Recorded:00Drt9412) to Recorded    The medication list was reviewed and updated today  Allergies  1   No Known Drug Allergies    Vitals  Vital Signs    Recorded: 26MYN4334 11:08AM   Temperature 98 1 F   Heart Rate 83   Respiration 16   Systolic 538   Diastolic 70   Height 5 ft 1 in   Weight 120 lb 2 oz   BMI Calculated 22 7   BSA Calculated 1 52   O2 Saturation 98       Physical Exam   Constitutional  General appearance: No acute distress, well appearing and well nourished  appears younger than stated age  Eyes  Conjunctiva and lids: No swelling, erythema or discharge  Pupils and irises: Equal, round and reactive to light  Ears, Nose, Mouth, and Throat  External inspection of ears and nose: Normal    Otoscopic examination: Tympanic membranes translucent with normal light reflex  Canals patent without erythema  Oropharynx: Normal with no erythema, edema, exudate or lesions  Pulmonary  Respiratory effort: No increased work of breathing or signs of respiratory distress  Auscultation of lungs: Clear to auscultation  Cardiovascular  Auscultation of heart: Normal rate and rhythm, normal S1 and S2, without murmurs  Examination of extremities for edema and/or varicosities: Normal    Abdomen  Abdomen: Non-tender, no masses  Liver and spleen: No hepatomegaly or splenomegaly  Lymphatic  Palpation of lymph nodes in neck: No lymphadenopathy  Musculoskeletal  Gait and station: Normal    Digits and nails: Normal without clubbing or cyanosis  Skin  Skin and subcutaneous tissue: Normal without rashes or lesions  Examination of the skin for lesions: Abnormal   Multiple vesicle(s) left T2 area clustered unilaterally in dermatomal distrib left T2, all scabbed  Neurologic  Cranial nerves: Cranial nerves 2-12 intact  Reflexes: 2+ and symmetric  Sensation: No sensory loss     Psychiatric  Orientation to person, place, and time: Normal    Mood and affect: Normal          Results/Data  Urine Dip Non-Automated- POC 14KDW6376 12:43PM Juan Lockelencho     Test Name Result Flag Reference   Color Yellow       Clarity Transparent     Leukocytes -     Nitrite -     Blood large     Protein -     Ph 7 0     Specific Gravity 1 005     Ketone -     Glucose -         Future Appointments    Date/Time Provider Specialty Site 01/08/2018 03:00 PM Sophia Rios DO Cardiology [facility] [place]   03/06/2018 10:30 AM Foreign Moya DO Family Medicine [facility] [place]   06/08/2018 10:40 AM Roberto Patel MD Hematology [facility]       Signatures   Electronically signed by : Candelaria Sargent DO; Dec 12 2017 10:37AM EST                       (Author)

## 2018-01-08 ENCOUNTER — GENERIC CONVERSION - ENCOUNTER (OUTPATIENT)
Dept: OTHER | Facility: OTHER | Age: 83
End: 2018-01-08

## 2018-01-10 ENCOUNTER — TRANSCRIBE ORDERS (OUTPATIENT)
Dept: ADMINISTRATIVE | Facility: HOSPITAL | Age: 83
End: 2018-01-10

## 2018-01-10 DIAGNOSIS — Z12.39 SCREENING BREAST EXAMINATION: Primary | ICD-10-CM

## 2018-01-10 DIAGNOSIS — Z12.31 ENCOUNTER FOR SCREENING MAMMOGRAM FOR MALIGNANT NEOPLASM OF BREAST: ICD-10-CM

## 2018-01-11 NOTE — RESULT NOTES
Verified Results  * XR KNEE 3 VW LEFT NON INJURY [id number] 01:04PM Molly De Guzman Order Number: [id number]     Test Name Result Flag Reference   XR KNEE 3 VW LEFT (Report)     LEFT KNEE     INDICATION: Left knee pain  COMPARISON: None     VIEWS: 3; 4 images     FINDINGS:     There is no acute fracture or dislocation  There is no joint effusion  Minimal spurring medially  No lytic or blastic lesions are seen  Arterial calcifications present  IMPRESSION:     No acute osseous abnormality         Workstation performed: [id number]     Signed by:   Galilea Miranda MD   1/25/17

## 2018-01-12 NOTE — RESULT NOTES
Discussion/Summary   discussed cysts with pt, they are stable, will repeat imaging in 6 months to one year      Verified Results  * MRI ABDOMEN W WO CONTRAST AND MRCP [id number] 11:15AM Hilda Mckinney Order Number: [id number]   Performing Comments: please evaluate pancreas cyst   - Patient Instructions: To schedule this appointment, please contact Central Scheduling at [phone]  Test Name Result Flag Reference   MRI ABDOMEN W WO CONTRAST AND MRCP (Report)     MRI OF THE ABDOMEN (PANCREAS) WITH AND WITHOUT CONTRAST WITH MRCP     INDICATION: Evaluate pancreatic cyst      COMPARISON: CT chest, abdomen and pelvis 12/6/2016     TECHNIQUE: The following pulse sequences were obtained on a 1 5 T scanner: Coronal and axial T2 with TE of 90 and 180 respectively, axial T2 with fat saturation, axial FIESTA fat-sat, axial T1-weighted in-and-out-of phase, axial DWI/ADC, pre-contrast    axial T1 with fat saturation, post-contrast dynamic axial T1 with fat saturation at 20, 70, and 180 seconds, followed by coronal T1 with fat saturation and 7-minute delayed axial T1 with fat saturation  3D MRCP images were obtained with radial thick    slabs and projections  Pre- and postcontrast subtraction images were also obtained  IV Contrast: 5 49 mL of gadobutrol injection (MULTI-DOSE)      FINDINGS:   Study is limited by respiratory motion  PANCREAS:    General: Mildly atrophic  Lesions: 2 cystic lesions are again identified, 1 in the neck measuring 14 x 12 mm x 20 the other in the tail measuring 14 x 7 x 11 mm  These appear stable accounting for differences in interobserver variability  No discernible postcontrast enhancement    within limitations of motion  Both appear to indicate with the pancreatic duct and 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)  Additional subcentimeter cysts are seen in the head and body on MRCP images       Duct: Main pancreatic duct is normal caliber  Pancreas divisum is evident  BILARY DUCTS:    No intra-hepatic biliary ductal dilatation  Common bile duct is normal in caliber  No evidence of bile duct stricture or choledocholithiasis  No mass identified  LIVER: There are tiny cysts in both lobes  GALLBLADDER: Normal      ADRENAL GLANDS: Normal      SPLEEN: Normal      KIDNEYS: Bilateral renal cysts  Probable scarring anterior right kidney  ABDOMINAL CAVITY: No lymphadenopathy or mass  No ascites  BOWEL: Unremarkable MRI appearance  OSSEOUS STRUCTURES: No osseous destruction  Multilevel degenerative changes of the spine with lumbar levoscoliosis  2 2 x 2 1 cm well-circumscribed T2 hyperintense lesion centered along the left S2 neural foramen  There is postcontrast enhancement, 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  This is unchanged in overall size from the prior study in    retrospect  VASCULAR STRUCTURES: Visualized vasculature is normal      ABDOMINAL WALL: Normal      LOWER CHEST: Unremarkable MRI appearance  IMPRESSION:     1  Pancreatic cystic lesions as above the largest of which appear stable accounting for differences in interobserver variability  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 and are without suspicious stigmata such as postcontrast enhancement or main    pancreatic duct dilatation  Study is moderately limited by respiratory motion  Follow-up MRI/MRCP in 6 months recommended to ensure stability  2  Pancreas divisum  Otherwise, unremarkable MRCP  3  2 2 cm enhancing 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

## 2018-01-13 ENCOUNTER — OFFICE VISIT (OUTPATIENT)
Dept: URGENT CARE | Facility: MEDICAL CENTER | Age: 83
End: 2018-01-13
Payer: MEDICARE

## 2018-01-13 VITALS
TEMPERATURE: 96.8 F | RESPIRATION RATE: 18 BRPM | DIASTOLIC BLOOD PRESSURE: 82 MMHG | SYSTOLIC BLOOD PRESSURE: 142 MMHG | HEIGHT: 61 IN | OXYGEN SATURATION: 98 % | BODY MASS INDEX: 23.26 KG/M2 | HEART RATE: 62 BPM | WEIGHT: 123.2 LBS

## 2018-01-13 VITALS
DIASTOLIC BLOOD PRESSURE: 68 MMHG | RESPIRATION RATE: 16 BRPM | TEMPERATURE: 97.4 F | SYSTOLIC BLOOD PRESSURE: 114 MMHG | BODY MASS INDEX: 22.56 KG/M2 | HEIGHT: 61 IN | OXYGEN SATURATION: 99 % | WEIGHT: 119.5 LBS | HEART RATE: 62 BPM

## 2018-01-13 VITALS
WEIGHT: 124 LBS | HEART RATE: 74 BPM | DIASTOLIC BLOOD PRESSURE: 80 MMHG | BODY MASS INDEX: 24.35 KG/M2 | SYSTOLIC BLOOD PRESSURE: 112 MMHG | HEIGHT: 60 IN

## 2018-01-13 VITALS
RESPIRATION RATE: 18 BRPM | OXYGEN SATURATION: 98 % | HEART RATE: 69 BPM | DIASTOLIC BLOOD PRESSURE: 70 MMHG | BODY MASS INDEX: 22.75 KG/M2 | SYSTOLIC BLOOD PRESSURE: 124 MMHG | HEIGHT: 61 IN | TEMPERATURE: 97.7 F | WEIGHT: 120.5 LBS

## 2018-01-13 VITALS
WEIGHT: 121.25 LBS | BODY MASS INDEX: 22.89 KG/M2 | HEIGHT: 61 IN | HEART RATE: 68 BPM | TEMPERATURE: 97.4 F | OXYGEN SATURATION: 95 % | DIASTOLIC BLOOD PRESSURE: 80 MMHG | SYSTOLIC BLOOD PRESSURE: 148 MMHG

## 2018-01-13 VITALS
HEART RATE: 71 BPM | SYSTOLIC BLOOD PRESSURE: 142 MMHG | HEIGHT: 61 IN | DIASTOLIC BLOOD PRESSURE: 80 MMHG | WEIGHT: 120 LBS | BODY MASS INDEX: 22.66 KG/M2

## 2018-01-13 VITALS
WEIGHT: 122.38 LBS | BODY MASS INDEX: 23.11 KG/M2 | SYSTOLIC BLOOD PRESSURE: 138 MMHG | OXYGEN SATURATION: 99 % | HEIGHT: 61 IN | HEART RATE: 69 BPM | TEMPERATURE: 95.9 F | DIASTOLIC BLOOD PRESSURE: 68 MMHG

## 2018-01-13 VITALS
SYSTOLIC BLOOD PRESSURE: 132 MMHG | HEART RATE: 68 BPM | OXYGEN SATURATION: 99 % | HEIGHT: 61 IN | BODY MASS INDEX: 22.73 KG/M2 | DIASTOLIC BLOOD PRESSURE: 72 MMHG | TEMPERATURE: 97.3 F | WEIGHT: 120.38 LBS

## 2018-01-13 VITALS
DIASTOLIC BLOOD PRESSURE: 72 MMHG | WEIGHT: 121.5 LBS | HEIGHT: 60 IN | BODY MASS INDEX: 23.85 KG/M2 | RESPIRATION RATE: 16 BRPM | HEART RATE: 74 BPM | OXYGEN SATURATION: 99 % | TEMPERATURE: 96.6 F | SYSTOLIC BLOOD PRESSURE: 140 MMHG

## 2018-01-13 PROCEDURE — 99213 OFFICE O/P EST LOW 20 MIN: CPT

## 2018-01-13 PROCEDURE — G0463 HOSPITAL OUTPT CLINIC VISIT: HCPCS

## 2018-01-13 NOTE — RESULT NOTES
Verified Results  (1) CBC/PLT/DIFF 19Oct2016 09:12AM Demi Sebastian Order Number: [id number]     Test Name Result Flag Reference   WBC COUNT 3 88 Thousand/uL L 4 31-10 16   RBC COUNT 3 93 Million/uL  3 81-5 12   HEMOGLOBIN 12 3 g/dL  11 5-15 4   HEMATOCRIT 37 5 %  34 8-46  1   MCV 95 fL  82-98   MCH 31 3 pg  26 8-34 3   MCHC 32 8 g/dL  31 4-37 4   RDW 12 3 %  11 6-15 1   MPV 10 3 fL  8 9-12 7   PLATELET COUNT 034 Thousands/uL  149-390   nRBC AUTOMATED 0 /100 WBCs     NEUTROPHILS RELATIVE PERCENT 35 % L 43-75   LYMPHOCYTES RELATIVE PERCENT 49 % H 14-44   MONOCYTES RELATIVE PERCENT 10 %  4-12   EOSINOPHILS RELATIVE PERCENT 5 %  0-6   BASOPHILS RELATIVE PERCENT 1 %  0-1   NEUTROPHILS ABSOLUTE COUNT 1 35 Thousands/?L L 1 85-7 62   LYMPHOCYTES ABSOLUTE COUNT 1 88 Thousands/?L  0 60-4 47   MONOCYTES ABSOLUTE COUNT 0 39 Thousand/?L  0 17-1 22   EOSINOPHILS ABSOLUTE COUNT 0 21 Thousand/?L  0 00-0 61   BASOPHILS ABSOLUTE COUNT 0 05 Thousands/?L  0 00-0 10   - Patient Instructions: This bloodwork is non-fasting  Please drink two glasses of water morning of bloodwork  - Patient Instructions: This bloodwork is non-fasting  Please drink two glasses of water morning of bloodwork  (1) COMPREHENSIVE METABOLIC PANEL 10MFI7111 83:37YR Demi Sebastian Order Number: [id number]     Test Name Result Flag Reference   GLUCOSE,RANDM 88 mg/dL     If the patient is fasting, the ADA then defines impaired fasting glucose as > 100 mg/dL and diabetes as > or equal to 123 mg/dL     SODIUM 136 mmol/L  136-145   POTASSIUM 3 9 mmol/L  3 5-5 3   CHLORIDE 101 mmol/L  100-108   CARBON DIOXIDE 32 mmol/L  21-32   ANION GAP (CALC) 3 mmol/L L 4-13   BLOOD UREA NITROGEN 22 mg/dL  5-25   CREATININE 0 92 mg/dL  0 60-1 30   Standardized to IDMS reference method   CALCIUM 8 8 mg/dL  8 3-10 1   BILI, TOTAL 0 57 mg/dL  0 20-1 00   ALK PHOSPHATAS 42 U/L L    ALT (SGPT) 32 U/L  12-78   AST(SGOT) 23 U/L  5-45   ALBUMIN 3 8 g/dL  3 5-5 0   TOTAL PROTEIN 7 4 g/dL  6 4-8 2   eGFR Non-African American 58 9 ml/min/1 73sq m     - Patient Instructions: This is a fasting blood test  Water,black tea or black  coffee only after 9:00pm the night before test Drink 2 glasses of water the morning of test - Patient Instructions: This bloodwork is non-fasting  Please drink two glasses of   water morning of bloodwork  National Kidney Disease Education Program recommendations are as follows:  GFR calculation is accurate only with a steady state creatinine  Chronic Kidney disease less than 60 ml/min/1 73 sq  meters  Kidney failure less than 15 ml/min/1 73 sq  meters  (1) LIPID PANEL, FASTING 19Oct2016 09:12AM Tennille Rios Order Number: [id number]     Test Name Result Flag Reference   CHOLESTEROL 159 mg/dL     HDL,DIRECT 59 mg/dL  40-60   Specimen collection should occur prior to Metamizole administration due to the potential for falsely depressed results  LDL CHOLESTEROL CALCULATED 78 mg/dL  0-100   - Patient Instructions: This is a fasting blood test  Water,black tea or black  coffee only after 9:00pm the night before test   Drink 2 glasses of water the morning of test     - Patient Instructions: This is a fasting blood test  Water,black tea or black  coffee only after 9:00pm the night before test Drink 2 glasses of water the morning of test - Patient Instructions: This bloodwork is non-fasting  Please drink two glasses of   water morning of bloodwork  Triglyceride:         Normal              <150 mg/dl       Borderline High    150-199 mg/dl       High               200-499 mg/dl       Very High          >499 mg/dl  Cholesterol:         Desirable        <200 mg/dl      Borderline High  200-239 mg/dl      High             >239 mg/dl  HDL Cholesterol:        High    >59 mg/dL      Low     <41 mg/dL  LDL CALCULATED:    This screening LDL is a calculated result    It does not have the accuracy of the Direct Measured LDL in the monitoring of patients with hyperlipidemia and/or statin therapy  Direct Measure LDL (QPV505) must be ordered separately in these patients  TRIGLYCERIDES 108 mg/dL  <=150   Specimen collection should occur prior to N-Acetylcysteine or Metamizole administration due to the potential for falsely depressed results  (1) TSH 89ADB0627 09:12AM David Grossman Order Number: [id number]     Test Name Result Flag Reference   TSH 3 190 uIU/mL  0 358-3 740   - Patient Instructions: This bloodwork is non-fasting  Please drink two glasses of water morning of bloodwork  - Patient Instructions: This is a fasting blood test  Water,black tea or black  coffee only after 9:00pm the night before test Drink 2 glasses of water the morning of test - Patient Instructions: This bloodwork is non-fasting  Please drink two glasses of   water morning of bloodwork  Patients undergoing fluorescein dye angiography may retain small amounts of fluorescein in the body for 48-72 hours post procedure  Samples containing fluorescein can produce falsely depressed TSH values  If the patient had this procedure,a specimen should be resubmitted post fluorescein clearance            The recommended reference ranges for TSH during pregnancy are as follows:  First trimester 0 1 to 2 5 uIU/mL  Second trimester  0 2 to 3 0 uIU/mL  Third trimester 0 3 to 3 0 uIU/m     (1) URINALYSIS w URINE C/S REFLEX (will reflex a microscopy if leukocytes, occult blood, or nitrites are not within normal limits) 19Oct2016 09:12AM David Grossman Order Number: [id number]     Test Name Result Flag Reference   COLOR Yellow     CLARITY Clear     PH UA 7 5  4 5-8 0   LEUKOCYTE ESTERASE UA Negative  Negative   NITRITE UA Negative  Negative   PROTEIN UA Negative mg/dl  Negative   GLUCOSE UA Negative mg/dl  Negative   KETONES UA Negative mg/dl  Negative   UROBILINOGEN UA 0 2 E U /dl  0 2, 1 0 E U /dl   BILIRUBIN UA Negative  Negative   BLOOD UA Small A Negative   SPECIFIC GRAVITY UA 1 021  1 003-1 030   BACTERIA None Seen /hpf  None Seen, Occasional   EPITHELIAL CELLS None Seen /hpf  None Seen, Occasional   RBC UA 4-10 /hpf A None Seen   WBC UA None Seen /hpf  None Seen     (1) VITAMIN D 25-HYDROXY 19Oct2016 09:12AM Jori Yin    Order Number: [id number]     Test Name Result Flag Reference   VIT D 25-HYDROX 38 7 ng/mL  30 0-100 0   This assay is a certified procedure of the CDC Vitamin D Standardization Certification Program (VDSCP)     Deficiency <20ng/ml   Insufficiency 20-30ng/ml   Sufficient  ng/ml     *Patients undergoing fluorescein dye angiography may retain small amounts of fluorescein in the body for 48-72 hours post procedure  Samples containing fluorescein can produce falsely elevated Vitamin D values  If the patient had this procedure, a specimen should be resubmitted post fluorescein clearance  (1) VITAMIN B12 19[facility]2016 09:12[facility]: [id number]     Test Name Result Flag Reference   VITAMIN B12 407 pg/mL  100-900   - Patient Instructions: This is a fasting blood test  Water,black tea or black  coffee only after 9:00pm the night before test Drink 2 glasses of water the morning of test - Patient Instructions: This bloodwork is non-fasting  Please drink two glasses of   water morning of bloodwork  (1) FERRITIN 44TED6090 09:12AM Bobbi Octave Order Number: [id number]     Test Name Result Flag Reference   FERRITIN 59 ng/mL  8-388   - Patient Instructions: This is a fasting blood test  Water,black tea or black  coffee only after 9:00pm the night before test Drink 2 glasses of water the morning of test - Patient Instructions: This bloodwork is non-fasting  Please drink two glasses of   water morning of bloodwork       (1) IRON 18DWG6231 09:12AM Bobbi Octave Order Number: [id number]     Test Name Result Flag Reference   IRON 72 ug/dL     Patients treated with metal-binding drugs (ie  Deferoxamine) may have depressed iron values  - Patient Instructions: This is a fasting blood test  Water,black tea or black  coffee only after 9:00pm the night before test Drink 2 glasses of water the morning of test - Patient Instructions: This bloodwork is non-fasting  Please drink two glasses of   water morning of bloodwork

## 2018-01-14 VITALS
WEIGHT: 119.5 LBS | TEMPERATURE: 97.7 F | OXYGEN SATURATION: 94 % | SYSTOLIC BLOOD PRESSURE: 140 MMHG | RESPIRATION RATE: 16 BRPM | HEART RATE: 69 BPM | HEIGHT: 59 IN | BODY MASS INDEX: 24.09 KG/M2 | DIASTOLIC BLOOD PRESSURE: 62 MMHG

## 2018-01-14 VITALS
BODY MASS INDEX: 24.15 KG/M2 | HEIGHT: 60 IN | SYSTOLIC BLOOD PRESSURE: 154 MMHG | HEART RATE: 78 BPM | RESPIRATION RATE: 16 BRPM | DIASTOLIC BLOOD PRESSURE: 78 MMHG | WEIGHT: 123 LBS | TEMPERATURE: 98.5 F

## 2018-01-14 VITALS
WEIGHT: 121.13 LBS | DIASTOLIC BLOOD PRESSURE: 78 MMHG | HEART RATE: 68 BPM | BODY MASS INDEX: 24.42 KG/M2 | TEMPERATURE: 96.2 F | OXYGEN SATURATION: 99 % | HEIGHT: 59 IN | SYSTOLIC BLOOD PRESSURE: 138 MMHG

## 2018-01-14 VITALS
WEIGHT: 120.13 LBS | SYSTOLIC BLOOD PRESSURE: 138 MMHG | DIASTOLIC BLOOD PRESSURE: 74 MMHG | HEART RATE: 70 BPM | HEIGHT: 61 IN | BODY MASS INDEX: 22.68 KG/M2

## 2018-01-14 VITALS
SYSTOLIC BLOOD PRESSURE: 162 MMHG | BODY MASS INDEX: 23.11 KG/M2 | HEART RATE: 76 BPM | DIASTOLIC BLOOD PRESSURE: 84 MMHG | WEIGHT: 122.4 LBS | HEIGHT: 61 IN

## 2018-01-14 VITALS
OXYGEN SATURATION: 99 % | RESPIRATION RATE: 16 BRPM | TEMPERATURE: 96.6 F | HEIGHT: 61 IN | SYSTOLIC BLOOD PRESSURE: 130 MMHG | BODY MASS INDEX: 22.73 KG/M2 | HEART RATE: 67 BPM | DIASTOLIC BLOOD PRESSURE: 78 MMHG | WEIGHT: 120.38 LBS

## 2018-01-14 NOTE — RESULT NOTES
Verified Results  (1) THYROXINE 19Oct2016 09:12AM Josselyn Khannalding Order Number: [id number]     Test Name Result Flag Reference   T4 TOTAL 9 2 ug/dL  4 5 - 12 0   Performed at:  [facility] [place] [address]  [id number]  : Hima Royal MD, Phone:  [phone]     (1) URINALYSIS w URINE C/S REFLEX (will reflex a microscopy if leukocytes, occult blood, or nitrites are not within normal limits) 19Oct2016 09:12AM Josselyn Marieing Order Number: [id number]     Test Name Result Flag Reference   COLOR Yellow     CLARITY Clear     PH UA 7 5  4 5-8 0   LEUKOCYTE ESTERASE UA Negative  Negative   NITRITE UA Negative  Negative   PROTEIN UA Negative mg/dl  Negative   GLUCOSE UA Negative mg/dl  Negative   KETONES UA Negative mg/dl  Negative   UROBILINOGEN UA 0 2 E U /dl  0 2, 1 0 E U /dl   BILIRUBIN UA Negative  Negative   BLOOD UA Small A Negative   SPECIFIC GRAVITY UA 1 021  1 003-1 030   BACTERIA None Seen /hpf  None Seen, Occasional   EPITHELIAL CELLS None Seen /hpf  None Seen, Occasional   RBC UA 4-10 /hpf A None Seen   WBC UA None Seen /hpf  None Seen   CLINICAL REPORT (Report)     Test:        Urine culture  Specimen Source:  Urine, Clean Catch  Specimen Type:   Urine  Specimen Date:   10/19/2016 9:12 AM  Result Date:    10/20/2016 12:08 PM  Result Status:   Final result  Resulting Lab:   [place] [address]            Tel: [phone]      CULTURE                                       ------------------                                   40,000-49,000 cfu/ml Mixed Contaminants X3

## 2018-01-16 NOTE — RESULT NOTES
Discussion/Summary   Spoke with patient on the phone, urine culture negative may stop antibiotics      Verified Results  (1) URINE CULTURE 87Liq4423 05:40PM Junior Sierra     Test Name Result Flag Reference   CLINICAL REPORT (Report)     Test:        Urine culture  Specimen Type:   Urine  Specimen Date:   9/27/2017 5:40 PM  Result Date:    9/28/2017 6:53 PM  Result Status:   Final result  Resulting Lab:   [facility] [address] [place] [address]            Tel: [phone]      CULTURE                                       ------------------                                   1148-9902 cfu/ml Mixed Contaminants X2

## 2018-01-16 NOTE — PROGRESS NOTES
Assessment   1  Stye (373 11) (H00 019)    Plan   Stye    · Sulfamethoxazole-Trimethoprim 800-160 MG Oral Tablet (Bactrim DS); TAKE 1    TABLET TWICE DAILY UNTIL FINISHED      continue hot packs 3 times a day  if worse go to ER/ f/u with PCP next week if not resolved  Chief Complaint   1  Eye Pain  Chief Complaint Free Text Note Form: Pt started with redness/swelling of bottom R eye lid since yesterday  Eye is tearing and is uncomfortable      History of Present Illness   HPI: 46-year-old female complains of right eye lower lid swelling and pain for 2 days  She reports that is progressing and now more painful  Eyes watering  No change in vision  Hospital Based Practices Required Assessment:      Pain Assessment      the patient states they have pain  The pain is located in the R eye lid  (on a scale of 0 to 10, the patient rates the pain at 3 )       Prefered Language is  english  Primary Language is  english  Readiness To Learn: Receptive  Barriers To Learning: none  Preferred Learning: verbal      Active Problems   1  Acute pain of left knee (719 46) (M25 562)   2  Advance directive declined by patient (V49 89) (Z78 9)   3  Allergic rhinitis (477 9) (J30 9)   4  Anemia due to vitamin B12 deficiency (281 1) (D51 9)   5  Ankle pain, left (719 47) (M25 572)   6  Ankle pain, right (719 47) (M25 571)   7  Arthritis (716 90) (M19 90)   8  Back pain (724 5) (M54 9)   9  Bladder cancer (188 9) (C67 9)   10  CAD (coronary artery disease) (414 00) (I25 10)   11  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)   12  Chronic right shoulder pain (719 41,338 29) (M25 511,G89 29)   13  Depression, controlled (311) (F32 9)   14  Ear canal dryness, bilateral (380 89) (H61 893)   15  Encounter for Medicare annual wellness exam (V70 0) (Z00 00)   16  Encounter for special screening examination for genitourinary disorder (V81 6) (Z13 89)   17  Fatigue (780 79) (R53 83)   18   Hand pain (729 5) (M79 643)   19  Heart valve disorder (424 90) (I38)   20  Heartburn (787 1) (R12)   21  Herpes zoster infection of thoracic region (053 9) (B02 9)   22  High blood pressure (401 9) (I10)   23  IBS (irritable bowel syndrome) (564 1) (K58 9)   24  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)   25  Left wrist pain (719 43) (M25 532)   26  Leukopenia (288 50) (D72 819)   27  Medication refill (V68 1) (Z76 0)   28  Mild vitamin D deficiency (268 9) (E55 9)   29  Mixed hyperlipidemia (272 2) (E78 2)   30  Neck pain (723 1) (M54 2)   31  Need for influenza vaccination (V04 81) (Z23)   32  Need for pneumococcal vaccination (V03 82) (Z23)   33  Pancreatic cyst (577 2) (K86 2)   34  Pre-syncope (780 2) (R55)   35  Raynaud's disease without gangrene (443 0) (I73 00)   36  Right wrist pain (719 43) (M25 531)   37  Screening for neurological condition (V80 09) (Z13 89)   38  Screening mammogram for high-risk patient (V76 11) (Z12 31)    Past Medical History   1  History of Abdominal discomfort, epigastric (789 06) (R10 13)   2  History of Bilateral impacted cerumen (380 4) (H61 23)   3  History of Closed displaced fracture of trapezium of left wrist, initial encounter (814 05)     (S62 172A)   4  History of Drug toxicity (796 0) (R89 2)   5  History of Edema, lower extremity (782 3) (R60 0)   6  History of Frontal headache (784 0) (R51)   7  History of Gastric reflux syndrome (530 81) (K21 9)   8  History of cancer (V10 90) (Z85 9)   9  History of hepatitis (V12 09) (Z86 19)   10  History of kidney stones (V13 01) (Z87 442)   11  History of malignant neoplasm of bladder (V10 51) (Z85 51)   12  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)   13  History of pneumonia (V12 61) (Z87 01)   14  History of shingles (V12 09) (Z86 19)   15  History of urinary frequency (V13 09) (Z87 898)   16  History of urinary tract infection (V13 02) (Z87 440)   17  History of urinary tract infection (V13 02) (Z87 440)   18  History of urinary urgency (V13 00) (Z87 448)   19  History of varicella (V12 09) (Z86 19)   20  History of Impacted cerumen of left ear (380 4) (H61 22)   21  History of Urge incontinence of urine (788 31) (N39 41)    Family History   Mother    1  Denied: Family history of Colon cancer   2  Family history of arthritis (V17 7) (Z82 61)   3  Family history of cardiac disorder (V17 49) (Z82 49)   4  Denied: Family history of colonic polyps   5  Denied: Family history of Crohn's disease   6  Denied: Family history of liver disease   7  Family history of osteoporosis (V17 81) (Z82 62)  Father    8  Denied: Family history of Colon cancer   9  Family history of cardiac disorder (V17 49) (Z82 49)   10  Denied: Family history of colonic polyps   11  Denied: Family history of Crohn's disease   12  Family history of hypertension (V17 49) (Z82 49)   13  Denied: Family history of liver disease  Brother    15  Family history of hypertension (V17 49) (Z82 49)    Social History    · Advance directive declined by patient (V49 89) (Z78 9)   · Always uses seat belt   · Never a smoker   · No alcohol use   · No drug use   ·  (V61 07) (Z63 4)    Surgical History   1  History of Cath Stent Placement   2  History of Diagnostic Cystoscopy   3  History of Hysterectomy    Current Meds    1  Adult Aspirin EC Low Strength 81 MG Oral Tablet Delayed Release; TAKE 1 TABLET     DAILY; Therapy: (Recorded:04Oct2016) to Recorded   2  Allegra Allergy 180 MG Oral Tablet; Therapy: (Recorded:04Oct2016) to Recorded   3  AmLODIPine Besylate 5 MG Oral Tablet; TAKE 1 TABLET DAILY  Requested for:     95DUM7883; Last Rx:05Jan2018 Ordered   4  Atenolol 25 MG Oral Tablet; take 1 tablet by mouth once daily; Therapy: 27UIR0327 to (Evaluate:30Jan2018)  Requested for: 51Bqn6778; Last     Rx:79Euq7849 Ordered   5  Atorvastatin Calcium 40 MG Oral Tablet; take 1 tablet by mouth once daily;      Therapy: 90DVR3495 to (Evaluate:20Feb2018)  Requested for: 22Nov2017; Last     Rx:22Nov2017 Ordered   6  Bumetanide 1 MG Oral Tablet; take half tablet by mouth once daily  Requested for:     02Lbu1895; Last Rx:65Aok5064 Ordered   7  Citrucel Oral Powder; Therapy: (Recorded:04Oct2016) to Recorded   8  [facility] Daily Multiple Oral Tablet; Therapy: (Recorded:04Oct2016) to Recorded   9  DULoxetine HCl - 30 MG Oral Capsule Delayed Release Particles; take 1 capsule daily      Requested for: 06XRK7895; Last Rx:05Oct2017 Ordered   10  Meloxicam 7 5 MG Oral Tablet; TAKE TABLET  PRN; Therapy: 83Bzw1101 to (Evaluate:70Rvw4799)  Requested for: 69UJT4870 Recorded   11  Montelukast Sodium 10 MG Oral Tablet; Take 1 tablet daily  Requested for: 27Apr2017;      Last Rx:27Apr2017 Ordered   12  Norvasc 5 MG Oral Tablet; TAKE 1 TABLET DAILY; Therapy: 34CRN8611 to Recorded   13  Pantoprazole Sodium 40 MG Oral Tablet Delayed Release; TAKE 1 TABLET TWICE DAILY      30 MINUTES BEFORE BREAKFAST AND DINNER; Therapy: 80QFR3631 to (Robin Maldonado)  Requested for: 40RMD6542; Last      Rx:36Mta3201 Ordered   14  Vitamin D3 2000 UNIT Oral Tablet; Therapy: (Recorded:90Tat0163) to Recorded    Allergies   1  No Known Drug Allergies    Vitals   Signs   Recorded: 87IMR2961 12:59PM   Temperature: 98 4 F  Heart Rate: 76  Respiration: 18  Systolic: 508  Diastolic: 82  Weight: 896 lb   BMI Calculated: 23 24  BSA Calculated: 1 54  O2 Saturation: 100  Pain Scale: 3    Physical Exam        Constitutional      General appearance: No acute distress, well appearing and well nourished  Eyes      Conjunctiva and lids: Abnormal  -- Right eye lower lid medial margin with 3 small pustules and 2 mm underlying nodule  Nothing expressed with gentle pressure  Conjunctiva normal       Pupils and irises: Equal, round and reactive to light         Ears, Nose, Mouth, and Throat      External inspection of ears and nose: Normal        Otoscopic examination: Tympanic membranes translucent with normal light reflex  Canals patent without erythema  Nasal mucosa, septum, and turbinates: Normal without edema or erythema  Oropharynx: Normal with no erythema, edema, exudate or lesions  Lymphatic      Palpation of lymph nodes in neck: No lymphadenopathy         Future Appointments      Date/Time Provider Specialty Site   03/06/2018 10:30 AM Basim Akbar DO Family Medicine [facility]   06/08/2018 10:40 AM Sourav Patel MD Hematology Oncology [facility]     Signatures    Electronically signed by : Linh Dumont, [facility]; Jan 13 2018  1:22PM EST                       (Author)     Electronically signed by : THOM Barnes ; Pedrito 15 2018 11:49AM EST                       (Co-author)

## 2018-01-17 NOTE — MISCELLANEOUS
Message   Recorded as Task   Date: 02/07/2017 10:47 AM, Created By: GIORGI BRADSHAWM   Task Name: Call Back   Assigned To: Alexa Logan   Regarding Patient: Liz Cobb, Status: Active   Comment:    Hanna Seth - 07 Feb 2017 10:47 AM     TASK CREATED  FYI: [facility] called and stated the pt wants to wait until april to see the GI specialist    Noted  Active Problems    1  Acute pain of left knee (719 46) (M25 562)   2  Advance directive declined by patient (V49 89) (Z78 9)   3  Allergic rhinitis (477 9) (J30 9)   4  Anemia due to vitamin B12 deficiency (281 1) (D51 9)   5  Arthritis (716 90) (M19 90)   6  Back pain (724 5) (M54 9)   7  Bladder cancer (188 9) (C67 9)   8  CAD (coronary artery disease) (414 00) (I25 10)   9  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)   10  Chronic lymphocytic leukemia (204 10) (C91 10)   11  Depression, controlled (311) (F32 9)   12  Encounter for special screening examination for genitourinary disorder (V81 6) (Z13 89)   13  Fatigue (780 79) (R53 83)   14  Gastric reflux syndrome (530 81) (K21 9)   15  Heart valve disorder (424 90) (I38)   16  High blood pressure (401 9) (I10)   17  IBS (irritable bowel syndrome) (564 1) (K58 9)   18  Leukopenia (288 50) (D72 819)   19  Mild vitamin D deficiency (268 9) (E55 9)   20  Mixed hyperlipidemia (272 2) (E78 2)   21  Neck pain (723 1) (M54 2)   22  Need for influenza vaccination (V04 81) (Z23)   23  Pancreatic cyst (577 2) (K86 2)   24  Pre-syncope (780 2) (R55)   25  Screening for neurological condition (V80 09) (Z13 89)   26  Screening mammogram for high-risk patient (V76 11) (Z12 31)    Current Meds   1  Adult Aspirin EC Low Strength 81 MG Oral Tablet Delayed Release; TAKE 1 TABLET   DAILY; Therapy: (Recorded:04Oct2016) to Recorded   2  Allegra Allergy 180 MG Oral Tablet (Fexofenadine HCl); Therapy: (Recorded:04Oct2016) to Recorded   3  AmLODIPine Besylate 5 MG Oral Tablet (Norvasc);  Take 1 tablet daily  Requested for:   35UFG9729; Last Rx:04Jan2017 Ordered   4  Atenolol 25 MG Oral Tablet (Tenormin); take 1 tablet by mouth once daily; Therapy: 62GTA0535 to (Shameka Gil)  Requested for: 26Jan2017; Last   Rx:26Jan2017 Ordered   5  Bentyl 20 MG TABS (Dicyclomine HCl); Take 1 tablet daily; Therapy: (Recorded:04Oct2016) to Recorded   6  Bumex 0 5 MG Oral Tablet (Bumetanide); take 1 tablet daily prn; Therapy: (Recorded:04Oct2016) to Recorded   7  Citrucel Oral Powder; Therapy: (Recorded:04Oct2016) to Recorded   8  [employer] Daily Multiple Oral Tablet; Therapy: (Recorded:04Oct2016) to Recorded   9  DULoxetine HCl - 40 MG Oral Capsule Delayed Release Particles; Therapy: (Recorded:82Ivk4072) to Recorded   10  Lipitor 20 MG Oral Tablet (Atorvastatin Calcium); TAKE 40 MG Every other day; Therapy: (Recorded:29Nov2016) to Recorded   11  Prolia 60 MG/ML Subcutaneous Solution; Therapy: (Recorded:04Oct2016) to Recorded   12  Protonix 40 MG Oral Packet; Therapy: (Recorded:04Oct2016) to Recorded   13  Singulair 10 MG Oral Tablet (Montelukast Sodium); Take 1 tablet daily; Therapy: (Recorded:04Oct2016) to Recorded   14  Ultram 50 MG Oral Tablet (TraMADol HCl); Therapy: (Recorded:54Lwp4602) to Recorded   15  Vitamin D3 2000 UNIT Oral Tablet; Therapy: (Recorded:69Bou1192) to Recorded    Allergies    1   No Known Drug Allergies    Signatures   Electronically signed by : Hardy Alvarado, ; Feb 7 2017 10:51AM EST                       (Author)

## 2018-01-19 ENCOUNTER — ALLSCRIPTS OFFICE VISIT (OUTPATIENT)
Dept: OTHER | Facility: OTHER | Age: 83
End: 2018-01-19

## 2018-01-21 NOTE — PROGRESS NOTES
Assessment   1  Hordeolum internum of right lower eyelid (373 12) (H00 022)   2  Hordeolum externum of right lower eyelid (373 11) (H00 012)    Plan    Allergic rhinitis    · Montelukast Sodium 10 MG Oral Tablet (Singulair); Take 1 tablet daily  Bladder cancer    · Shingrix 50 MCG Intramuscular Suspension Reconstituted (Shingrix 50 MCG    Intramuscular Suspension Reconstituted); 0 5 ml IM  Hordeolum externum of right lower eyelid    · Cephalexin 500 MG Oral Tablet (Cephalexin Monohydrate); TAKE 1 TABLET 3    TIMES DAILY   · Erythromycin 5 MG/GM Ophthalmic Ointment; Apply to inside edge of eyelid four    times a day   · *VB - Eye Exam; Status:Active; Requested JSY:[id number];       Prashant Whittington MD, Kathi Zelaya  (Ophthalmology) Co-Management  *  Status: Hold For - Scheduling  Requested for: 05UOP8854     Ordered; For: Hordeolum externum of right lower eyelid, Hordeolum internum of right lower eyelid;  Ordered By: Ravi Amador  Performed:   Due: 31UBT4272       Discussion/Summary      Continue compresses, pt also agreeab;e to obtaining shingrix- rx escribed to pharmacy and pt advised 2 doses with second dose to be given 2-6 months after first     The patient was counseled regarding instructions for management,-- patient and family education,-- impressions  Possible side effects of new medications were reviewed with the patient/guardian today  The treatment plan was reviewed with the patient/guardian  The patient/guardian understands and agrees with the treatment plan      Chief Complaint   Patient went to the urgent care at [place] [address] in [address] because she developed 4 styes in her right eye  She states that she was put on Bactrim DS  She states that she is finished with the medication but does not think that she has improved  She states there is some improvement        History of Present Illness   HPI: per c/c reviewed by me, took 5 days bactrim - finished 2 days ago, was starting to get better, but now coming back again- swelling/bump, pain, drainage, redness prior similar problem, no difficulty with vision from sx      Review of Systems        Constitutional: No fever, no chills, feels well, no tiredness, no recent weight gain or loss  ENT: no ear ache, no loss of hearing, no nosebleeds or nasal discharge, no sore throat or hoarseness  Other Symptoms: eyes per hpi  Active Problems   1  Acute pain of left knee (719 46) (M25 562)   2  Advance directive declined by patient (V49 89) (Z78 9)   3  Allergic rhinitis (477 9) (J30 9)   4  Anemia due to vitamin B12 deficiency (281 1) (D51 9)   5  Ankle pain, left (719 47) (M25 572)   6  Ankle pain, right (719 47) (M25 571)   7  Arthritis (716 90) (M19 90)   8  Back pain (724 5) (M54 9)   9  Bladder cancer (188 9) (C67 9)   10  CAD (coronary artery disease) (414 00) (I25 10)   11  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)   12  Chronic right shoulder pain (719 41,338 29) (M25 511,G89 29)   13  Depression, controlled (311) (F32 9)   14  Ear canal dryness, bilateral (380 89) (H61 893)   15  Encounter for Medicare annual wellness exam (V70 0) (Z00 00)   16  Encounter for special screening examination for genitourinary disorder (V81 6) (Z13 89)   17  Fatigue (780 79) (R53 83)   18  Hand pain (729 5) (M79 643)   19  Heart valve disorder (424 90) (I38)   20  Heartburn (787 1) (R12)   21  Herpes zoster infection of thoracic region (053 9) (B02 9)   22  High blood pressure (401 9) (I10)   23  Hordeolum externum of right lower eyelid (373 11) (H00 012)   24  IBS (irritable bowel syndrome) (564 1) (K58 9)   25  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)   26  Left wrist pain (719 43) (M25 532)   27  Leukopenia (288 50) (D72 819)   28  Medication refill (V68 1) (Z76 0)   29  Mild vitamin D deficiency (268 9) (E55 9)   30  Mixed hyperlipidemia (272 2) (E78 2)   31  Neck pain (723 1) (M54 2)   32  Need for influenza vaccination (V04 81) (Z23)   33   Need for pneumococcal vaccination (V03 82) (Z23)   34  Pancreatic cyst (577 2) (K86 2)   35  Pre-syncope (780 2) (R55)   36  Raynaud's disease without gangrene (443 0) (I73 00)   37  Right wrist pain (719 43) (M25 531)   38  Screening for neurological condition (V80 09) (Z13 89)   39  Screening mammogram for high-risk patient (V76 11) (Z12 31)    Past Medical History   1  History of Abdominal discomfort, epigastric (789 06) (R10 13)   2  History of Bilateral impacted cerumen (380 4) (H61 23)   3  History of Closed displaced fracture of trapezium of left wrist, initial encounter (814 05)     (S62 172A)   4  History of Drug toxicity (796 0) (R89 2)   5  History of Edema, lower extremity (782 3) (R60 0)   6  History of Frontal headache (784 0) (R51)   7  History of Gastric reflux syndrome (530 81) (K21 9)   8  History of cancer (V10 90) (Z85 9)   9  History of hepatitis (V12 09) (Z86 19)   10  History of kidney stones (V13 01) (Z87 442)   11  History of malignant neoplasm of bladder (V10 51) (Z85 51)   12  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)   13  History of pneumonia (V12 61) (Z87 01)   14  History of shingles (V12 09) (Z86 19)   15  History of urinary frequency (V13 09) (Z87 898)   16  History of urinary tract infection (V13 02) (Z87 440)   17  History of urinary tract infection (V13 02) (Z87 440)   18  History of urinary urgency (V13 00) (Z87 448)   19  History of varicella (V12 09) (Z86 19)   20  History of Impacted cerumen of left ear (380 4) (H61 22)   21  History of Urge incontinence of urine (788 31) (N39 41)  Active Problems And Past Medical History Reviewed: The active problems and past medical history were reviewed and updated today  Family History   Mother    1  Denied: Family history of Colon cancer   2  Family history of arthritis (V17 7) (Z82 61)   3  Family history of cardiac disorder (V17 49) (Z82 49)   4  Denied: Family history of colonic polyps   5   Denied: Family history of Crohn's disease   6  Denied: Family history of liver disease   7  Family history of osteoporosis (V17 81) (Z82 62)  Father    8  Denied: Family history of Colon cancer   9  Family history of cardiac disorder (V17 49) (Z82 49)   10  Denied: Family history of colonic polyps   11  Denied: Family history of Crohn's disease   12  Family history of hypertension (V17 49) (Z82 49)   13  Denied: Family history of liver disease  Brother    15  Family history of hypertension (V17 49) (Z82 49)  Family History Reviewed: The family history was reviewed and updated today  Social History    · Advance directive declined by patient (V49 89) (Z78 9)   · Always uses seat belt   · Never a smoker   · No alcohol use   · No drug use   ·  (V61 07) (Z63 4)  The social history was reviewed and updated today  The social history was reviewed and is unchanged  Surgical History   1  History of Cath Stent Placement   2  History of Diagnostic Cystoscopy   3  History of Hysterectomy  Surgical History Reviewed: The surgical history was reviewed and updated today  Current Meds    1  Adult Aspirin EC Low Strength 81 MG Oral Tablet Delayed Release; TAKE 1 TABLET     DAILY; Therapy: (Recorded:04Oct2016) to Recorded   2  Allegra Allergy 180 MG Oral Tablet; Therapy: (Recorded:04Oct2016) to Recorded   3  AmLODIPine Besylate 5 MG Oral Tablet; TAKE 1 TABLET DAILY  Requested for:     64MCI6996; Last Rx:05Jan2018 Ordered   4  Atenolol 25 MG Oral Tablet; take 1 tablet by mouth once daily; Therapy: 79AHX3369 to (Evaluate:30Jan2018)  Requested for: 93Oid2432; Last     Rx:03Aug2017 Ordered   5  Atorvastatin Calcium 40 MG Oral Tablet; take 1 tablet by mouth once daily; Therapy: 38PLM2417 to (Evaluate:68Zbf4244)  Requested for: 22Nov2017; Last     Rx:22Nov2017 Ordered   6  Bumetanide 1 MG Oral Tablet; take half tablet by mouth once daily  Requested for:     02Aug2017; Last Rx:02Aug2017 Ordered   7   Citrucel Oral Powder; Therapy: (Recorded:56Ezb4201) to Recorded   8  [facility] Daily Multiple Oral Tablet; Therapy: (Recorded:04Oct2016) to Recorded   9  DULoxetine HCl - 30 MG Oral Capsule Delayed Release Particles; take 1 capsule daily      Requested for: 20WXB3682; Last Rx:73Ncs6636 Ordered   10  Meloxicam 7 5 MG Oral Tablet; TAKE TABLET  PRN; Therapy: 35Jik1948 to (Evaluate:48Ccp8692)  Requested for: 79HKV4911 Recorded   11  Montelukast Sodium 10 MG Oral Tablet; Take 1 tablet daily  Requested for: 10Bcv8467;      Last Rx:33Lhj1141 Ordered   12  Pantoprazole Sodium 40 MG Oral Tablet Delayed Release; TAKE 1 TABLET TWICE      DAILY 30 MINUTES BEFORE BREAKFAST AND DINNER; Therapy: 01FBX7217 to (Birtha Skiff)  Requested for: 41FDJ0586; Last      Rx:89Ghq7136 Ordered   15  Vitamin D3 2000 UNIT Oral Tablet; Therapy: (Recorded:35Dfc3464) to Recorded     The medication list was reviewed and updated today  Allergies   1  No Known Drug Allergies    Vitals    Recorded: 96KIO6248 10:30AM   Temperature 97 1 F   Heart Rate 80   Respiration 16   Systolic 720   Diastolic 72   Height 5 ft 1 in   Weight 121 lb 4 oz   BMI Calculated 22 91   BSA Calculated 1 53     Physical Exam        Constitutional      General appearance: No acute distress, well appearing and well nourished  Eyes      Conjunctiva and lids: Abnormal   Conjunctiva Findings: right hyperemia-- and-- increased tearing on the right  Eye Lids: 7 mm right lower eyelid stye-- and-- right lower eyelid chalazion, but-- no lower eyelid blepharitis  -- internal and external hordeol  Pupils and irises: Equal, round and reactive to light  Ears, Nose, Mouth, and Throat      Oropharynx: Normal with no erythema, edema, exudate or lesions  Pulmonary      Respiratory effort: No increased work of breathing or signs of respiratory distress  Lymphatic      Palpation of lymph nodes in neck: No lymphadenopathy         Neurologic      Cranial nerves: Cranial nerves 2-12 intact  Psychiatric      Mood and affect: Normal           Results/Data   PHQ-9 Adult Depression Screening 73JZN9233 11:15AM User, Ahs      Test Name Result Flag Reference   PHQ-9 Adult Depression Score 1     Over the last two weeks, how often have you been bothered by any of the following problems? Little interest or pleasure in doing things: Not at all - 0     Feeling down, depressed, or hopeless: Not at all - 0     Trouble falling or staying asleep, or sleeping too much: Not at all - 0     Feeling tired or having little energy: Several days - 1     Poor appetite or over eating: Not at all - 0     Feeling bad about yourself - or that you are a failure or have let yourself or your family down: Not at all - 0     Trouble concentrating on things, such as reading the newspaper or watching television: Not at all - 0     Moving or speaking so slowly that other people could have noticed  Or the opposite -  being so fidgety or restless that you have been moving around a lot more than usual: Not at all - 0     Thoughts that you would be better off dead, or of hurting yourself in some way: Not at all - 0   PHQ-9 Adult Depression Screening Negative     PHQ-9 Difficulty Level Not difficult at all     PHQ-9 Severity Minimal Depression          Procedure        Procedure: Visual Acuity Test       Indication: suspected vision loss  Inforrmation supplied by a Snellen chart        Results: 20/50 in both eyes with corrective device,-- 20/50 in the right eye with corrective device,-- 20/50 in the left eye with corrective device      Future Appointments      Date/Time Provider Specialty Site   03/06/2018 10:30 AM Jenna Chauhan, [address] [place] [address]   06/08/2018 10:40 AM Tanisha Funes MD Hematology Oncology [facility]     Signatures    Electronically signed by : Michel Alonso DO; Jan 20 2018  4:09PM EST                       (Author)

## 2018-01-22 VITALS
HEIGHT: 61 IN | RESPIRATION RATE: 16 BRPM | TEMPERATURE: 97.1 F | DIASTOLIC BLOOD PRESSURE: 72 MMHG | SYSTOLIC BLOOD PRESSURE: 140 MMHG | BODY MASS INDEX: 22.89 KG/M2 | WEIGHT: 121.25 LBS | HEART RATE: 80 BPM

## 2018-01-22 VITALS
HEART RATE: 83 BPM | RESPIRATION RATE: 16 BRPM | HEIGHT: 61 IN | TEMPERATURE: 98.1 F | OXYGEN SATURATION: 98 % | SYSTOLIC BLOOD PRESSURE: 130 MMHG | WEIGHT: 120.13 LBS | DIASTOLIC BLOOD PRESSURE: 70 MMHG | BODY MASS INDEX: 22.68 KG/M2

## 2018-01-22 VITALS
DIASTOLIC BLOOD PRESSURE: 74 MMHG | TEMPERATURE: 97.4 F | HEIGHT: 61 IN | SYSTOLIC BLOOD PRESSURE: 136 MMHG | HEART RATE: 79 BPM | BODY MASS INDEX: 22.68 KG/M2 | WEIGHT: 120.13 LBS | OXYGEN SATURATION: 99 % | RESPIRATION RATE: 15 BRPM

## 2018-01-22 VITALS
DIASTOLIC BLOOD PRESSURE: 70 MMHG | HEIGHT: 60 IN | HEART RATE: 73 BPM | BODY MASS INDEX: 23.57 KG/M2 | WEIGHT: 120.06 LBS | SYSTOLIC BLOOD PRESSURE: 142 MMHG

## 2018-01-23 VITALS
DIASTOLIC BLOOD PRESSURE: 82 MMHG | OXYGEN SATURATION: 100 % | RESPIRATION RATE: 18 BRPM | BODY MASS INDEX: 23.24 KG/M2 | TEMPERATURE: 98.4 F | WEIGHT: 123 LBS | HEART RATE: 76 BPM | SYSTOLIC BLOOD PRESSURE: 174 MMHG

## 2018-01-23 NOTE — PROGRESS NOTES
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)   2  Raynaud's disease without gangrene (443 0) (I73 00)   3  Herpes zoster infection of thoracic region (053 9) (B02 9)   4  Encounter for Medicare annual wellness exam (V70 0) (Z00 00)    Plan  Acute pain of left knee    · 1 - Sebastián Jaramillo MD, Uma Soto  (Orthopedic Surgery) Physician Referral  Consult Only: the  expectation is that the referring provider will communicate back to the patient on  treatment options  Evaluation and Treatment: the expectation is that the referred to  provider will communicate back to the patient on treatment options  Status: Canceled  Care Summary provided  : Yes  Ankle pain, left, Ankle pain, right, Left wrist pain, Right wrist pain    · 2 - Yolette Ramirez MD  (Rheumatology) Co-Management  *  Status: Canceled  Care Summary provided  : Yes  Chronic right shoulder pain, Neck pain    · *1 -  PHYSICAL THERAPY-[facility] Co-Management  *  Status: Canceled  Care Summary provided  : Yes  Herpes zoster infection of thoracic region    · Acyclovir 5 % External Ointment (Zovirax); APPLY A SMALL AMOUNT 3 TIMES  DAILY AS DIRECTED   · PredniSONE 20 MG Oral Tablet; 2 tabs po day 1, then 1 tab po day 2, then half tab  po days 3 and 4, then d/c    Discussion/Summary  Impression: Initial Annual Wellness Visit  Cardiovascular screening and counseling: Dx - V81 2 Screen for CV Disorder  Diabetes screening and counseling: Dx - V77 1 Screen for DM  Colorectal cancer screening and counseling: Dx - V76 51 Screen for CRC  Abdominal aortic aneurysm screening and counseling: Dx - V81 2 Screen for CV Disorder  Glaucoma screening and counseling: Dx - V80 1 Screen for Glaucoma  Chief Complaint  Patient presents for Medicare wellness visit  History of Present Illness  Welcome to Medicare and Wellness Visits: The patient is being seen for the initial annual wellness visit     Medicare Screening and Risk Factors Hospitalizations: no previous hospitalizations  Medicare Screening Tests Risk Questions   Abdominal aortic aneurysm risk assessment: none indicated  Osteoporosis risk assessment: , female gender and over 48years of age  Drug and Alcohol Use: The patient has never smoked cigarettes and has never used smokeless tobacco  The patient reports never drinking alcohol  She has never used illicit drugs  Diet and Physical Activity: Current diet includes well balanced meals, 2 servings of fruit per day, 1 servings of vegetables per day, 1 servings of meat per day, 1-2 servings of whole grains per day, 1 servings of simple carbohydrates per day, 2 servings of dairy products per day and 3 cups of tea per day  She exercises infrequently  Exercise: walking  Mood Disorder and Cognitive Impairment Screening: PHQ-9 Depression Scale   Over the past 2 weeks, how often have you been bothered by the following problems? 1 ) Little interest or pleasure in doing things? Not at all    2 ) Feeling down, depressed or hopeless? Not at all    3 ) Trouble falling asleep or sleeping too much? Several days  4 ) Feeling tired or having little energy? Not at all    5 ) Poor appetite or overeating? Not at all    6 ) Feeling bad about yourself, or that you are a failure, or have let yourself or your family down? Not at all    7 ) Trouble concentrating on things, such as reading a newspaper or watching television? Several days  8 ) Moving or speaking so slowly that other people could have noticed, or the opposite, moving or speaking faster than usual? Not at all  TOTAL SCORE: 2  How difficult have these problems made it for you to do your work, take care of things at home, or get along with people? Not at all  Functional Ability/Level of Safety: Hearing is slightly decreased, slightly decreased in the right ear and slightly decreased in the left ear  She reports hearing difficulties  She does not use a hearing aid   The patient is currently able to do activities of daily living without limitations, able to participate in social activities without limitations and able to drive without limitations  Activities of daily living details: does not need help using the phone, no transportation help needed, does not need help shopping, no meal preparation help needed, does not need help doing housework, does not need help doing laundry, does not need help managing medications and does not need help managing money  Fall risk factors: The patient fell 1 times in the past 12 months  Injury History: no alcohol use, no mobility impairment, no visual impairment, no urinary incontinence and no previous fall  Home safety risk factors:  no grab bars in the bathroom, but no unfamiliar surroundings, no loose rugs, no poor household lighting, no uneven floors, no household clutter and handrails on the stairs  Advance Directives: Advance directives: living will, durable power of  for health care directives and advance directives  Co-Managers and Medical Equipment/Suppliers: See Patient Care Team      Patient Care Team    Care Team Member Role Specialty Office Number   Naynati TOMAS  Urology [id number], 1966 [facility]  Cardiology [phone]   Bettina Hernandez MD Specialist Hematology Oncology [phone]   [address] [facility] Specialist Gastroenterology Adult [phone]   Luis Speaker DO  Family Medicine [phone]     Review of Systems    Constitutional: negative  Cardiovascular: negative  Respiratory: negative  Gastrointestinal: negative  Integumentary and Breasts: negative  Neurological: negative  Endocrine: negative  Hematologic and Lymphatic: negative  Active Problems    1  Acute pain of left knee (719 46) (R14 029)   2  Advance directive declined by patient (V49 89) (Z78 9)   3  Allergic rhinitis (477 9) (J30 9)   4  Anemia due to vitamin B12 deficiency (281 1) (D51 9)   5  Ankle pain, left (719 47) (M25 572)   6  Ankle pain, right (719 47) (M25 571)   7  Arthritis (716 90) (M19 90)   8  Back pain (724 5) (M54 9)   9  Bilateral impacted cerumen (380 4) (H61 23)   10  Bladder cancer (188 9) (C67 9)   11  CAD (coronary artery disease) (414 00) (I25 10)   12  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)   13  Chronic right shoulder pain (719 41,338 29) (M25 511,G89 29)   14  Closed displaced fracture of trapezium of left wrist, initial encounter (814 05) (S62 172A)   15  Depression, controlled (311) (F32 9)   16  Drug toxicity (796 0) (R89 2)   17  Ear canal dryness, bilateral (380 89) (H61 893)   18  Edema, lower extremity (782 3) (R60 0)   19  Encounter for special screening examination for genitourinary disorder (V81 6) (Z13 89)   20  Fatigue (780 79) (R53 83)   21  Frontal headache (784 0) (R51)   22  Gastric reflux syndrome (530 81) (K21 9)   23  Hand pain (729 5) (M79 643)   24  Heart valve disorder (424 90) (I38)   25  Heartburn (787 1) (R12)   26  Herpes zoster infection of thoracic region (053 9) (B02 9)   27  High blood pressure (401 9) (I10)   28  IBS (irritable bowel syndrome) (564 1) (K58 9)   29  Impacted cerumen of left ear (380 4) (H61 22)   30  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)   31  Left wrist pain (719 43) (M25 532)   32  Leukopenia (288 50) (D72 819)   33  Medication refill (V68 1) (Z76 0)   34  Mild vitamin D deficiency (268 9) (E55 9)   35  Mixed hyperlipidemia (272 2) (E78 2)   36  Neck pain (723 1) (M54 2)   37  Need for influenza vaccination (V04 81) (Z23)   38  Need for pneumococcal vaccination (V03 82) (Z23)   39  Pancreatic cyst (577 2) (K86 2)   40  Pre-syncope (780 2) (R55)   41  Raynaud's disease without gangrene (443 0) (I73 00)   42  Right wrist pain (719 43) (M25 531)   43  Screening for neurological condition (V80 09) (Z13 89)   44  Screening mammogram for high-risk patient (V76 11) (Z12 31)   45   Urge incontinence of urine (788 31) (N39 41)   46  Urinary urgency (788 63) (R39 15)   47  UTI (urinary tract infection) (599 0) (N39 0)    Past Medical History    · History of Abdominal discomfort, epigastric (789 06) (R10 13)   · History of cancer (V10 90) (Z85 9)   · History of hepatitis (V12 09) (Z86 19)   · History of kidney stones (V13 01) (Z87 442)   · History of malignant neoplasm of bladder (V10 51) (Z85 51)   · 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)   · History of pneumonia (V12 61) (Z87 01)   · History of shingles (V12 09) (Z86 19)   · History of urinary frequency (V13 09) (D51 764)   · History of urinary tract infection (V13 02) (Z87 440)   · History of varicella (V12 09) (Z86 19)    The active problems and past medical history were reviewed and updated today  Surgical History    · History of Cath Stent Placement   · History of Diagnostic Cystoscopy   · History of Hysterectomy    The surgical history was reviewed and updated today  Family History  Mother    · Denied: Family history of Colon cancer   · Family history of arthritis (V17 7) (Z82 61)   · Family history of cardiac disorder (V17 49) (Z82 49)   · Denied: Family history of colonic polyps   · Denied: Family history of Crohn's disease   · Denied: Family history of liver disease   · Family history of osteoporosis (V17 81) (Z82 62)  Father    · Denied: Family history of Colon cancer   · Family history of cardiac disorder (V17 49) (Z82 49)   · Denied: Family history of colonic polyps   · Denied: Family history of Crohn's disease   · Family history of hypertension (V17 49) (Z82 49)   · Denied: Family history of liver disease  Brother    · Family history of hypertension (V17 49) (Z82 49)    The family history was reviewed and updated today         Social History    · Advance directive declined by patient (V49 89) (Z78 9)   · Always uses seat belt   · Never a smoker   · No alcohol use   · No drug use   ·  (V61 07) (Z63 4)  The social history was reviewed and updated today  The social history was reviewed and is unchanged  Current Meds   1  Adult Aspirin EC Low Strength 81 MG Oral Tablet Delayed Release; TAKE 1 TABLET   DAILY; Therapy: (Recorded:04Oct2016) to Recorded   2  Allegra Allergy 180 MG Oral Tablet; Therapy: (Recorded:04Oct2016) to Recorded   3  AmLODIPine Besylate 5 MG Oral Tablet; TAKE 1 TABLET DAILY; Therapy: (Recorded:14Dfr2357) to  Requested for: 06DVH1518 Recorded   4  Atenolol 25 MG Oral Tablet; take 1 tablet by mouth once daily; Therapy: 82HPF0442 to (Evaluate:30Jan2018)  Requested for: 56Esk8074; Last   Rx:81Vce2174 Ordered   5  Atorvastatin Calcium 40 MG Oral Tablet; take 1 tablet by mouth once daily; Therapy: 00ELH6121 to (Evaluate:66Ikj1191)  Requested for: 22Nov2017; Last   Rx:22Nov2017 Ordered   6  Bumetanide 1 MG Oral Tablet; take half tablet by mouth once daily  Requested for:   99Nre3433; Last Rx:91Qmp5483 Ordered   7  Citrucel Oral Powder; Therapy: (Recorded:04Oct2016) to Recorded   8  [facility]S Daily Multiple Oral Tablet; Therapy: (Recorded:04Oct2016) to Recorded   9  DULoxetine HCl - 30 MG Oral Capsule Delayed Release Particles; take 1 capsule daily    Requested for: 09EZD7554; Last Rx:05Oct2017 Ordered   10  Meloxicam 7 5 MG Oral Tablet; TAKE 1 TABLET DAILY WITH FOOD; Therapy: 58Nva6088 to (Evaluate:28Sep2017)  Requested for: 33Isa3903 Recorded   11  Montelukast Sodium 10 MG Oral Tablet; Take 1 tablet daily  Requested for: 90Nsn4320;    Last Rx:09Iga6665 Ordered   12  Pantoprazole Sodium 40 MG Oral Tablet Delayed Release; TAKE 1 TABLET TWICE    DAILY 30 MINUTES BEFORE BREAKFAST AND DINNER; Therapy: 61XCW1067 to (Milagros Ledesma)  Requested for: 41HLX5955; Last    Rx:05Oct2017 Ordered   15  Vitamin D3 2000 UNIT Oral Tablet; Therapy: (Recorded:81Puf3694) to Recorded    Allergies    1   No Known Drug Allergies    Immunizations   1 2    Influenza  04-Oct-2016 05-Oct-2017    PCV  24-Jul-2017 Vitals  Signs    Temperature: 98 1 F  Heart Rate: 83  Respiration: 16  Systolic: 370  Diastolic: 70  Height: 5 ft 1 in  Weight: 120 lb 2 oz  BMI Calculated: 22 7  BSA Calculated: 1 52  O2 Saturation: 98    Physical Exam    Constitutional   General appearance: No acute distress, well appearing and well nourished  appears younger than stated age  Head and Face   Head and face: Normal     Palpation of the face and sinuses: No sinus tenderness  Eyes   Conjunctiva and lids: No swelling, erythema or discharge  Pupils and irises: Equal, round, reactive to light  Ears, Nose, Mouth, and Throat   Oropharynx: Normal with no erythema, edema, exudate or lesions  Neck   Neck: Supple, symmetric, trachea midline, no masses  Thyroid: Normal, no thyromegaly  Pulmonary   Respiratory effort: No increased work of breathing or signs of respiratory distress  Percussion of chest: Normal     Auscultation of lungs: Clear to auscultation  Cardiovascular   Auscultation of heart: Normal rate and rhythm, normal S1 and S2, no murmurs  Carotid pulses: 2+ bilaterally  Abdominal aorta: Normal     Femoral pulses: 2+ bilaterally  Pedal pulses: 2+ bilaterally  Peripheral vascular exam: Normal     Examination of extremities for edema and/or varicosities: Normal     Abdomen   Abdomen: Non-tender, no masses  Liver and spleen: No hepatomegaly or splenomegaly  Examination for hernias: No hernia appreciated  Lymphatic   Palpation of lymph nodes in neck: No lymphadenopathy  Musculoskeletal   Muscle strength/tone: Normal     Skin   Skin and subcutaneous tissue: Normal without rashes or lesions  Palpation of skin and subcutaneous tissue: Normal turgor  Neurologic   Cranial nerves: Cranial nerves II-XII intact  Cortical function: Normal mental status  Reflexes: 2+ and symmetric  Sensation: No sensory loss  Coordination: Normal finger to nose and heel to shin      Psychiatric   Orientation to person, place, and time: Normal     Mood and affect: Normal        Results/Data  Urine Dip Non-Automated- POC [id number] 12:43PM Wyatt Savory     Test Name Result Flag Reference   Color Yellow     Clarity Transparent     Leukocytes -     Nitrite -     Blood large     Protein -     Ph 7 0     Specific Gravity 1 005     Ketone -     Glucose -         Future Appointments    Date/Time Provider Specialty Site   01/08/2018 03:00 PM Laurent Hammond DO Cardiology [facility]   03/06/2018 10:30 AM Kayley Wharton DO Family Medicine [facility]   06/08/2018 10:40 AM Nga Patel MD Hematology Oncology [facility]     Signatures   Electronically signed by : Morena Sanchez DO; Dec 10 2017 10:38AM EST                       (Author)

## 2018-01-24 VITALS
DIASTOLIC BLOOD PRESSURE: 64 MMHG | HEIGHT: 61 IN | HEART RATE: 78 BPM | BODY MASS INDEX: 22.84 KG/M2 | SYSTOLIC BLOOD PRESSURE: 122 MMHG | WEIGHT: 121 LBS

## 2018-01-26 DIAGNOSIS — I10 ESSENTIAL HYPERTENSION: Primary | ICD-10-CM

## 2018-01-26 RX ORDER — BUMETANIDE 1 MG/1
TABLET ORAL
Refills: 0 | COMMUNITY
Start: 2017-10-23 | End: 2018-01-26 | Stop reason: SDUPTHER

## 2018-01-29 RX ORDER — ATENOLOL 50 MG/1
50 TABLET ORAL DAILY
Qty: 90 TABLET | Refills: 0 | Status: ON HOLD | OUTPATIENT
Start: 2018-01-29 | End: 2018-04-26 | Stop reason: CLARIF

## 2018-01-29 RX ORDER — BUMETANIDE 1 MG/1
1 TABLET ORAL DAILY
Qty: 45 TABLET | Refills: 0 | Status: ON HOLD | OUTPATIENT
Start: 2018-01-29 | End: 2018-04-26 | Stop reason: CLARIF

## 2018-01-31 ENCOUNTER — HOSPITAL ENCOUNTER (OUTPATIENT)
Dept: RADIOLOGY | Facility: MEDICAL CENTER | Age: 83
Discharge: HOME | End: 2018-01-31
Payer: MEDICARE

## 2018-01-31 DIAGNOSIS — Z12.31 ENCOUNTER FOR SCREENING MAMMOGRAM FOR MALIGNANT NEOPLASM OF BREAST: ICD-10-CM

## 2018-01-31 PROCEDURE — 77067 SCR MAMMO BI INCL CAD: CPT

## 2018-02-12 DIAGNOSIS — E78.5 HYPERLIPIDEMIA, UNSPECIFIED HYPERLIPIDEMIA TYPE: Primary | ICD-10-CM

## 2018-02-12 RX ORDER — ATORVASTATIN CALCIUM 40 MG/1
40 TABLET, FILM COATED ORAL DAILY
Qty: 90 TABLET | Refills: 0 | Status: ON HOLD | OUTPATIENT
Start: 2018-02-12 | End: 2018-04-26 | Stop reason: CLARIF

## 2018-03-06 ENCOUNTER — OFFICE VISIT (OUTPATIENT)
Dept: FAMILY MEDICINE CLINIC | Facility: CLINIC | Age: 83
End: 2018-03-06
Payer: MEDICARE

## 2018-03-06 VITALS
WEIGHT: 132 LBS | DIASTOLIC BLOOD PRESSURE: 80 MMHG | HEIGHT: 61 IN | BODY MASS INDEX: 24.92 KG/M2 | TEMPERATURE: 96.7 F | HEART RATE: 64 BPM | RESPIRATION RATE: 17 BRPM | OXYGEN SATURATION: 98 % | SYSTOLIC BLOOD PRESSURE: 160 MMHG

## 2018-03-06 DIAGNOSIS — I10 ESSENTIAL HYPERTENSION: ICD-10-CM

## 2018-03-06 PROCEDURE — 99214 OFFICE O/P EST MOD 30 MIN: CPT | Performed by: FAMILY MEDICINE

## 2018-03-06 RX ORDER — DULOXETIN HYDROCHLORIDE 30 MG/1
1 CAPSULE, DELAYED RELEASE ORAL DAILY
COMMUNITY
End: 2018-04-03 | Stop reason: SDUPTHER

## 2018-03-06 RX ORDER — MELOXICAM 7.5 MG/1
TABLET ORAL
Status: ON HOLD | COMMUNITY
Start: 2016-08-31 | End: 2018-04-26 | Stop reason: CLARIF

## 2018-03-06 RX ORDER — AMLODIPINE BESYLATE 5 MG/1
10 TABLET ORAL DAILY
Qty: 180 TABLET | Refills: 0 | Status: SHIPPED | OUTPATIENT
Start: 2018-03-06 | End: 2018-03-19 | Stop reason: DRUGHIGH

## 2018-03-06 RX ORDER — MONTELUKAST SODIUM 10 MG/1
1 TABLET ORAL DAILY
Status: ON HOLD | COMMUNITY
End: 2018-04-26 | Stop reason: CLARIF

## 2018-03-06 RX ORDER — FEXOFENADINE HCL 180 MG/1
TABLET ORAL
Status: ON HOLD | COMMUNITY
End: 2018-04-26 | Stop reason: CLARIF
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
## 2018-03-19 ENCOUNTER — OFFICE VISIT (OUTPATIENT)
Dept: FAMILY MEDICINE CLINIC | Facility: CLINIC | Age: 83
End: 2018-03-19
Payer: MEDICARE

## 2018-03-19 VITALS
HEIGHT: 61 IN | WEIGHT: 123.4 LBS | TEMPERATURE: 96.9 F | HEART RATE: 96 BPM | SYSTOLIC BLOOD PRESSURE: 148 MMHG | DIASTOLIC BLOOD PRESSURE: 74 MMHG | BODY MASS INDEX: 23.3 KG/M2 | OXYGEN SATURATION: 99 %

## 2018-03-19 DIAGNOSIS — T50.905A MEDICATION SIDE EFFECT, INITIAL ENCOUNTER: Primary | ICD-10-CM

## 2018-03-19 DIAGNOSIS — I10 ESSENTIAL HYPERTENSION: ICD-10-CM

## 2018-03-19 PROCEDURE — 99213 OFFICE O/P EST LOW 20 MIN: CPT | Performed by: FAMILY MEDICINE

## 2018-03-19 RX ORDER — AMLODIPINE BESYLATE 5 MG/1
TABLET ORAL
Qty: 180 TABLET | Refills: 0 | Status: ON HOLD | OUTPATIENT
Start: 2018-03-19 | End: 2018-04-26 | Stop reason: CLARIF

## 2018-03-19 NOTE — PATIENT INSTRUCTIONS
Keep appt already scheduled to f/u here, if bp climbs back up, would consider 7 5mg daily amlodipine, which pt states she tried one day and didn't make her feel as bad as the 10mg daily

## 2018-03-19 NOTE — PROGRESS NOTES
Assessment/Plan:         Diagnoses and all orders for this visit:    Medication side effect, initial encounter    Essential hypertension  -     amLODIPine (NORVASC) 5 mg tablet; Take 5mg every day except Sundays take 10mg          Subjective:   Chief Complaint   Patient presents with    Follow-up     follow up exam because does not feel well from recently added medication  Patient ID: Pippa Munoz is a [de-identified] y o  female  Per c/c reviewed by me  Was here 3/6 with continued uncontrolled HTN, so amlodipine dose was increased from 5mg to 10mg daily   States, "For a week didn't feel anything, then all of a sudden, my face is flushing, my head is pounding, eyes very tired, legs are more swollen, but by late afternoon every day, start to feel alright"  Pt brought in bp log today per copy, and some readings low- 101/65- 111/62        The following portions of the patient's history were reviewed and updated as appropriate: allergies, current medications, past family history, past medical history, past social history, past surgical history and problem list     Review of Systems   Constitutional: Negative  HENT: Negative  Eyes: Negative for photophobia, discharge, redness and visual disturbance  Respiratory: Negative  Cardiovascular: Positive for leg swelling  Negative for chest pain and palpitations  Neurological: Positive for light-headedness (admits feels a little lightheaded) and headaches (per hpi)  Negative for dizziness, tremors, seizures, syncope, facial asymmetry, speech difficulty, weakness and numbness  Objective:      /74 (BP Location: Left arm, Patient Position: Sitting, Cuff Size: Standard)   Pulse 96   Temp (!) 96 9 °F (36 1 °C) (Tympanic)   Ht 5' 1" (1 549 m)   Wt 56 kg (123 lb 6 4 oz)   SpO2 99%   BMI 23 32 kg/m²          Physical Exam   Constitutional: She appears well-developed and well-nourished  She is cooperative  Non-toxic appearance   She does not have a sickly appearance  She does not appear ill  No distress  Eyes: Conjunctivae and lids are normal  Pupils are equal, round, and reactive to light  Cardiovascular: Normal rate, regular rhythm, normal heart sounds and normal pulses  Pulmonary/Chest: Effort normal and breath sounds normal    Neurological: She is alert  Nursing note and vitals reviewed

## 2018-03-22 DIAGNOSIS — K21.9 GASTROESOPHAGEAL REFLUX DISEASE, ESOPHAGITIS PRESENCE NOT SPECIFIED: Primary | ICD-10-CM

## 2018-03-23 ENCOUNTER — APPOINTMENT (OUTPATIENT)
Dept: LAB | Facility: CLINIC | Age: 83
End: 2018-03-23
Payer: MEDICARE

## 2018-03-23 ENCOUNTER — TRANSCRIBE ORDERS (OUTPATIENT)
Dept: LAB | Facility: CLINIC | Age: 83
End: 2018-03-23

## 2018-03-23 DIAGNOSIS — C67.9 MALIGNANT NEOPLASM OF BLADDER (HCC): ICD-10-CM

## 2018-03-23 LAB
ANION GAP SERPL CALCULATED.3IONS-SCNC: 5 MMOL/L (ref 4–13)
BUN SERPL-MCNC: 21 MG/DL (ref 5–25)
CALCIUM SERPL-MCNC: 9.5 MG/DL (ref 8.3–10.1)
CHLORIDE SERPL-SCNC: 102 MMOL/L (ref 100–108)
CO2 SERPL-SCNC: 31 MMOL/L (ref 21–32)
CREAT SERPL-MCNC: 0.67 MG/DL (ref 0.6–1.3)
GFR SERPL CREATININE-BSD FRML MDRD: 83 ML/MIN/1.73SQ M
GLUCOSE SERPL-MCNC: 91 MG/DL (ref 65–140)
POTASSIUM SERPL-SCNC: 4 MMOL/L (ref 3.5–5.3)
SODIUM SERPL-SCNC: 138 MMOL/L (ref 136–145)

## 2018-03-23 PROCEDURE — 80048 BASIC METABOLIC PNL TOTAL CA: CPT

## 2018-03-23 PROCEDURE — 36415 COLL VENOUS BLD VENIPUNCTURE: CPT

## 2018-03-24 RX ORDER — PANTOPRAZOLE SODIUM 40 MG/1
40 TABLET, DELAYED RELEASE ORAL DAILY
Qty: 90 TABLET | Refills: 0 | Status: ON HOLD | OUTPATIENT
Start: 2018-03-24 | End: 2018-04-26 | Stop reason: CLARIF

## 2018-03-26 ENCOUNTER — HOSPITAL ENCOUNTER (OUTPATIENT)
Dept: RADIOLOGY | Facility: MEDICAL CENTER | Age: 83
Discharge: HOME | End: 2018-03-26
Payer: MEDICARE

## 2018-03-26 DIAGNOSIS — C67.9 MALIGNANT NEOPLASM OF BLADDER (HCC): ICD-10-CM

## 2018-03-26 PROCEDURE — 74178 CT ABD&PLV WO CNTR FLWD CNTR: CPT

## 2018-03-26 RX ADMIN — IOHEXOL 100 ML: 350 INJECTION, SOLUTION INTRAVENOUS at 10:58

## 2018-04-03 DIAGNOSIS — F32.A DEPRESSION, UNSPECIFIED DEPRESSION TYPE: Primary | ICD-10-CM

## 2018-04-03 DIAGNOSIS — C67.9 MALIGNANT NEOPLASM OF BLADDER (HCC): ICD-10-CM

## 2018-04-03 RX ORDER — DULOXETIN HYDROCHLORIDE 30 MG/1
30 CAPSULE, DELAYED RELEASE ORAL DAILY
Qty: 30 CAPSULE | Refills: 3 | Status: ON HOLD | OUTPATIENT
Start: 2018-04-03 | End: 2018-04-26 | Stop reason: CLARIF

## 2018-04-07 ENCOUNTER — APPOINTMENT (EMERGENCY)
Dept: RADIOLOGY | Facility: HOSPITAL | Age: 83
DRG: 481 | End: 2018-04-07
Payer: MEDICARE

## 2018-04-07 ENCOUNTER — HOSPITAL ENCOUNTER (INPATIENT)
Facility: HOSPITAL | Age: 83
LOS: 5 days | DRG: 481 | End: 2018-04-12
Attending: EMERGENCY MEDICINE | Admitting: ORTHOPAEDIC SURGERY
Payer: MEDICARE

## 2018-04-07 DIAGNOSIS — S72.002A CLOSED FRACTURE OF LEFT HIP, INITIAL ENCOUNTER (HCC): ICD-10-CM

## 2018-04-07 DIAGNOSIS — S72.22XA CLOSED LEFT SUBTROCHANTERIC FEMUR FRACTURE, INITIAL ENCOUNTER (HCC): Primary | ICD-10-CM

## 2018-04-07 DIAGNOSIS — I10 ESSENTIAL HYPERTENSION: ICD-10-CM

## 2018-04-07 DIAGNOSIS — T50.905A MEDICATION SIDE EFFECT, INITIAL ENCOUNTER: ICD-10-CM

## 2018-04-07 PROBLEM — I25.10 CORONARY ARTERY DISEASE WITHOUT ANGINA PECTORIS: Status: ACTIVE | Noted: 2018-04-07

## 2018-04-07 PROBLEM — E78.5 HYPERLIPIDEMIA: Status: ACTIVE | Noted: 2018-04-07

## 2018-04-07 PROBLEM — B02.9 SHINGLES: Status: ACTIVE | Noted: 2018-04-07

## 2018-04-07 PROBLEM — K21.9 GASTROESOPHAGEAL REFLUX DISEASE WITHOUT ESOPHAGITIS: Status: ACTIVE | Noted: 2018-04-07

## 2018-04-07 LAB
ANION GAP SERPL CALCULATED.3IONS-SCNC: 6 MMOL/L (ref 4–13)
ATRIAL RATE: 84 BPM
BASOPHILS # BLD AUTO: 0.03 THOUSANDS/ΜL (ref 0–0.1)
BASOPHILS NFR BLD AUTO: 0 % (ref 0–1)
BUN SERPL-MCNC: 23 MG/DL (ref 5–25)
CALCIUM SERPL-MCNC: 8.7 MG/DL (ref 8.3–10.1)
CHLORIDE SERPL-SCNC: 105 MMOL/L (ref 100–108)
CO2 SERPL-SCNC: 27 MMOL/L (ref 21–32)
CREAT SERPL-MCNC: 0.74 MG/DL (ref 0.6–1.3)
EOSINOPHIL # BLD AUTO: 0.06 THOUSAND/ΜL (ref 0–0.61)
EOSINOPHIL NFR BLD AUTO: 1 % (ref 0–6)
ERYTHROCYTE [DISTWIDTH] IN BLOOD BY AUTOMATED COUNT: 12.7 % (ref 11.6–15.1)
GFR SERPL CREATININE-BSD FRML MDRD: 76 ML/MIN/1.73SQ M
GLUCOSE SERPL-MCNC: 118 MG/DL (ref 65–140)
HCT VFR BLD AUTO: 36.8 % (ref 34.8–46.1)
HGB BLD-MCNC: 11.9 G/DL (ref 11.5–15.4)
LYMPHOCYTES # BLD AUTO: 1.42 THOUSANDS/ΜL (ref 0.6–4.47)
LYMPHOCYTES NFR BLD AUTO: 14 % (ref 14–44)
MCH RBC QN AUTO: 30.3 PG (ref 26.8–34.3)
MCHC RBC AUTO-ENTMCNC: 32.3 G/DL (ref 31.4–37.4)
MCV RBC AUTO: 94 FL (ref 82–98)
MONOCYTES # BLD AUTO: 0.72 THOUSAND/ΜL (ref 0.17–1.22)
MONOCYTES NFR BLD AUTO: 7 % (ref 4–12)
NEUTROPHILS # BLD AUTO: 8.11 THOUSANDS/ΜL (ref 1.85–7.62)
NEUTS SEG NFR BLD AUTO: 78 % (ref 43–75)
NRBC BLD AUTO-RTO: 0 /100 WBCS
P AXIS: 73 DEGREES
PLATELET # BLD AUTO: 189 THOUSANDS/UL (ref 149–390)
PMV BLD AUTO: 10 FL (ref 8.9–12.7)
POTASSIUM SERPL-SCNC: 3.8 MMOL/L (ref 3.5–5.3)
PR INTERVAL: 152 MS
QRS AXIS: 77 DEGREES
QRSD INTERVAL: 80 MS
QT INTERVAL: 388 MS
QTC INTERVAL: 458 MS
RBC # BLD AUTO: 3.93 MILLION/UL (ref 3.81–5.12)
SODIUM SERPL-SCNC: 138 MMOL/L (ref 136–145)
T WAVE AXIS: 51 DEGREES
VENTRICULAR RATE: 84 BPM
WBC # BLD AUTO: 10.37 THOUSAND/UL (ref 4.31–10.16)

## 2018-04-07 PROCEDURE — 99285 EMERGENCY DEPT VISIT HI MDM: CPT

## 2018-04-07 PROCEDURE — 72170 X-RAY EXAM OF PELVIS: CPT

## 2018-04-07 PROCEDURE — 99222 1ST HOSP IP/OBS MODERATE 55: CPT | Performed by: PHYSICIAN ASSISTANT

## 2018-04-07 PROCEDURE — 80048 BASIC METABOLIC PNL TOTAL CA: CPT | Performed by: EMERGENCY MEDICINE

## 2018-04-07 PROCEDURE — 71045 X-RAY EXAM CHEST 1 VIEW: CPT

## 2018-04-07 PROCEDURE — 96374 THER/PROPH/DIAG INJ IV PUSH: CPT

## 2018-04-07 PROCEDURE — 73552 X-RAY EXAM OF FEMUR 2/>: CPT

## 2018-04-07 PROCEDURE — 85025 COMPLETE CBC W/AUTO DIFF WBC: CPT | Performed by: EMERGENCY MEDICINE

## 2018-04-07 PROCEDURE — 36415 COLL VENOUS BLD VENIPUNCTURE: CPT | Performed by: EMERGENCY MEDICINE

## 2018-04-07 PROCEDURE — 93005 ELECTROCARDIOGRAM TRACING: CPT

## 2018-04-07 PROCEDURE — 96375 TX/PRO/DX INJ NEW DRUG ADDON: CPT

## 2018-04-07 RX ORDER — BUMETANIDE 1 MG/1
1 TABLET ORAL DAILY
Status: CANCELLED | OUTPATIENT
Start: 2018-04-08

## 2018-04-07 RX ORDER — SODIUM CHLORIDE, SODIUM LACTATE, POTASSIUM CHLORIDE, CALCIUM CHLORIDE 600; 310; 30; 20 MG/100ML; MG/100ML; MG/100ML; MG/100ML
75 INJECTION, SOLUTION INTRAVENOUS CONTINUOUS
Status: DISCONTINUED | OUTPATIENT
Start: 2018-04-08 | End: 2018-04-08

## 2018-04-07 RX ORDER — DOCUSATE SODIUM 100 MG/1
100 CAPSULE, LIQUID FILLED ORAL 2 TIMES DAILY
Status: DISCONTINUED | OUTPATIENT
Start: 2018-04-08 | End: 2018-04-12 | Stop reason: HOSPADM

## 2018-04-07 RX ORDER — SENNOSIDES 8.6 MG
1 TABLET ORAL
Status: DISCONTINUED | OUTPATIENT
Start: 2018-04-07 | End: 2018-04-12 | Stop reason: HOSPADM

## 2018-04-07 RX ORDER — ACETAMINOPHEN 325 MG/1
650 TABLET ORAL EVERY 6 HOURS SCHEDULED
Status: DISCONTINUED | OUTPATIENT
Start: 2018-04-08 | End: 2018-04-12 | Stop reason: HOSPADM

## 2018-04-07 RX ORDER — FENTANYL CITRATE 50 UG/ML
50 INJECTION, SOLUTION INTRAMUSCULAR; INTRAVENOUS ONCE
Status: COMPLETED | OUTPATIENT
Start: 2018-04-07 | End: 2018-04-07

## 2018-04-07 RX ORDER — MORPHINE SULFATE 2 MG/ML
2 INJECTION, SOLUTION INTRAMUSCULAR; INTRAVENOUS EVERY 2 HOUR PRN
Status: DISCONTINUED | OUTPATIENT
Start: 2018-04-07 | End: 2018-04-12 | Stop reason: HOSPADM

## 2018-04-07 RX ORDER — METHOCARBAMOL 500 MG/1
500 TABLET, FILM COATED ORAL EVERY 6 HOURS SCHEDULED
Status: DISCONTINUED | OUTPATIENT
Start: 2018-04-08 | End: 2018-04-08

## 2018-04-07 RX ORDER — ONDANSETRON 2 MG/ML
4 INJECTION INTRAMUSCULAR; INTRAVENOUS ONCE
Status: COMPLETED | OUTPATIENT
Start: 2018-04-07 | End: 2018-04-07

## 2018-04-07 RX ORDER — OXYCODONE HYDROCHLORIDE 10 MG/1
10 TABLET ORAL EVERY 4 HOURS PRN
Status: DISCONTINUED | OUTPATIENT
Start: 2018-04-07 | End: 2018-04-12 | Stop reason: HOSPADM

## 2018-04-07 RX ORDER — ONDANSETRON 2 MG/ML
4 INJECTION INTRAMUSCULAR; INTRAVENOUS EVERY 6 HOURS PRN
Status: DISCONTINUED | OUTPATIENT
Start: 2018-04-07 | End: 2018-04-12 | Stop reason: HOSPADM

## 2018-04-07 RX ORDER — OXYCODONE HYDROCHLORIDE 5 MG/1
5 TABLET ORAL EVERY 4 HOURS PRN
Status: DISCONTINUED | OUTPATIENT
Start: 2018-04-07 | End: 2018-04-12 | Stop reason: HOSPADM

## 2018-04-07 RX ORDER — CALCIUM CARBONATE 200(500)MG
1000 TABLET,CHEWABLE ORAL DAILY PRN
Status: DISCONTINUED | OUTPATIENT
Start: 2018-04-07 | End: 2018-04-12 | Stop reason: HOSPADM

## 2018-04-07 RX ORDER — SIMETHICONE 80 MG
80 TABLET,CHEWABLE ORAL 4 TIMES DAILY PRN
Status: DISCONTINUED | OUTPATIENT
Start: 2018-04-07 | End: 2018-04-12 | Stop reason: HOSPADM

## 2018-04-07 RX ORDER — LORAZEPAM 2 MG/ML
0.5 INJECTION INTRAMUSCULAR ONCE
Status: COMPLETED | OUTPATIENT
Start: 2018-04-07 | End: 2018-04-07

## 2018-04-07 RX ADMIN — FENTANYL CITRATE 50 MCG: 50 INJECTION, SOLUTION INTRAMUSCULAR; INTRAVENOUS at 22:39

## 2018-04-07 RX ADMIN — ONDANSETRON 4 MG: 2 INJECTION INTRAMUSCULAR; INTRAVENOUS at 21:39

## 2018-04-07 RX ADMIN — LORAZEPAM 0.5 MG: 2 INJECTION INTRAMUSCULAR; INTRAVENOUS at 23:16

## 2018-04-07 RX ADMIN — HYDROMORPHONE HYDROCHLORIDE 1 MG: 1 INJECTION, SOLUTION INTRAMUSCULAR; INTRAVENOUS; SUBCUTANEOUS at 21:40

## 2018-04-07 NOTE — ED ATTENDING ATTESTATION
Jackie William MD, saw and evaluated the patient  All available labs and X-rays were ordered by me or the resident and have been reviewed by myself  I discussed the patient with the resident / non-physician and agree with the resident's / non-physician practitioner's findings and plan as documented in the resident's / non-physician practicitioner's note, except where noted  At this point, I agree with the current assessment done in the ED  Chief Complaint   Patient presents with    Fall     pt with shortening and external rotation of L hip following fall in shower   Hip Injury - Major     This is an 82year old who was at home and in the bathroom and then fell over on to her left side  She had sudden onset of severe pain in the left hip, feeling like something cracked  No numbness/tingling distally  No f/ch/n/v/cp/sob  This was not related to dizziness  No prodrome  She's met Saavna before  PE:  Vitals:    04/11/18 1720 04/12/18 0029 04/12/18 0600 04/12/18 0759   BP:  129/63  115/53   BP Location:  Right arm  Right arm   Pulse:  82  95   Resp:  18  18   Temp:  98 3 °F (36 8 °C)  98 2 °F (36 8 °C)   TempSrc:  Oral  Oral   SpO2: 98% 99%  98%   Weight:   62 6 kg (138 lb)    Height:       General: VSS, NAD, awake, alert  Well-nourished, well-developed  Appears stated age  Speaking normally in full sentences  Head: Normocephalic, atraumatic, nontender  Eyes: PERRL, EOM-I  No diplopia  No hyphema  No subconjunctival hemorrhages  Symmetrical lids  ENT: Atraumatic external nose and ears  MMM  No malocclusion  No stridor  Normal phonation  No drooling  Normal swallowing  Neck: Symmetric, trachea midline  No JVD  CV: RRR  +S1/S2  No murmurs or gallops  Peripheral pulses +2 throughout  No chest wall tenderness  Lungs:   Unlabored No retractions  CTAB, lungs sounds equal bilateral    No tachypnea     Abd: +BS, soft, NT/ND    MSK:   LEFT>  EHL/FHL/PF/DF 5/5  She attempts HF and has severe pain  Palpable tenderness left lateral  No hematoma yet  Left leg is externally rotated + shortened  No tenderness of the 5th metatarsal, no tenderness of fibular head, no cog-sensation with rotation along joint of LisFranc  Back:   No rashes  Skin: Dry, intact  Neuro: AAOx3, GCS 15, CN II-XII grossly intact  Motor grossly intact  Psychiatric/Behavioral: Appropriate mood and affect   Exam: deferred  A:  - Left hip fracture  P:  - XR to confirm  - pain control  - ORtho   - 13 point ROS was performed and all are normal unless stated in the history above  - Nursing note reviewed  Vitals reviewed  - Orders placed by myself and/or advanced practitioner / resident     - Previous chart was not reviewed  - No language barrier    - History obtained from patient  - There are no limitations to the history obtained  - Critical care time: Not applicable for this patient  Final Diagnosis:  1  Closed left subtrochanteric femur fracture, initial encounter ([facility] [address] )    2  Medication side effect, initial encounter    3  Essential hypertension    4   Closed fracture of left hip, initial encounter [facility])      ED Course      Medications   sodium chloride 0 9 % infusion (0 mL/hr Intravenous Stopped 4/9/18 1217)   HYDROmorphone (DILAUDID) injection 1 mg (1 mg Intravenous Given 4/7/18 2140)   ondansetron (ZOFRAN) injection 4 mg (4 mg Intravenous Given 4/7/18 2139)   fentanyl citrate (PF) 100 MCG/2ML 50 mcg (50 mcg Intravenous Given 4/7/18 2239)   ceFAZolin (ANCEF) IVPB (premix) 2,000 mg (2,000 mg Intravenous Given 4/8/18 1231)   LORazepam (ATIVAN) 2 mg/mL injection 0 5 mg (0 5 mg Intravenous Given 4/7/18 2316)   ceFAZolin (ANCEF) IVPB (premix) 1,000 mg (1,000 mg Intravenous New Bag 4/9/18 0540)   albumin human (FLEXBUMIN) 5 % injection 12 5 g (0 g Intravenous Stopped 4/8/18 2158)   sodium chloride 0 9 % bolus 250 mL (250 mL Intravenous New Bag 4/9/18 0848)   diphenhydrAMINE (BENADRYL) injection 25 mg (25 mg Intravenous Given 4/9/18 1030)   furosemide (LASIX) injection 20 mg (20 mg Intravenous Given 4/9/18 1407)     XR femur 2 vw left   Final Result      Fluoroscopic guidance provided for surgical procedure  Please refer to the separate procedure notes for additional details  Workstation performed: [id number]         XR chest 1 view   Final Result      No acute cardiopulmonary disease  Workstation performed: [id number]         XR pelvis ap only 1 or 2 vw   ED Interpretation   Abnormal   The PELVIS was ordered by me and interpreted by me independently  On my read, it appears:   - Left proximal femur fx      Final Result      Intertrochanteric, left hip fracture  As per comments in the PACS workstation, findings are concordant with preliminary interpretation provided by the emergency room physician  Workstation performed: [id number]         XR femur 2 views LEFT   Final Result      Intertrochanteric hip fracture  As per comments in the PACS workstation, findings are concordant with preliminary interpretation provided by the emergency room physician                 Workstation performed: [id number]           Orders Placed This Encounter   Procedures    Walker    Commode chair    Commode chair    Walker Rolling    XR femur 2 views LEFT    XR pelvis ap only 1 or 2 vw    XR chest 1 view    XR femur 2 vw left    CBC and differential    Basic metabolic panel    Protime-INR    APTT    Troponin I    CBC and differential    Basic metabolic panel    Basic metabolic panel    Hemoglobin and hematocrit, blood    PTH, intact    Sedimentation rate, automated    TSH, 3rd generation    Vitamin D 25 hydroxy    T4, free    Protein electrophoresis, serum    Protein electrophoresis, urine    Thyroid stimulating immunoglobulin    Albumin    Anti-microsomal antibody    Anti-thyroglobulin antibody    Ambulatory referral to Physical Therapy    Insert urinary catheter    Continuous Pulse Oximetry    Activity as tolerated    Weight Bearing Restrictions    No driving until    Call provider for:  severe uncontrolled pain    Call provider for:  redness, tenderness, or signs of infection (pain, swelling, redness, odor or green/yellow discharge around incision site)    Call provider for: active or persistent bleeding    Leave dressing on - Keep it clean, dry, and intact until clinic visit    Inpatient consult to Endocrinology    Inpatient consult to Physical Medicine Rehab    Discontinue Isolation: Contact, Droplet    OT eval and treat    EKG RESULTS    ECG 12 lead    ECG 12 lead    Type and screen    Prepare RBC:Has consent been obtained? Yes; Where is the Surgery Scheduled? [address] [facility], 2 Units    Inpatient Admission     Labs Reviewed   CBC AND DIFFERENTIAL - Abnormal        Result Value Ref Range Status    WBC 10 37 (*) 4 31 - 10 16 Thousand/uL Final    RBC 3 93  3 81 - 5 12 Million/uL Final    Hemoglobin 11 9  11 5 - 15 4 g/dL Final    Hematocrit 36 8  34 8 - 46 1 % Final    MCV 94  82 - 98 fL Final    MCH 30 3  26 8 - 34 3 pg Final    MCHC 32 3  31 4 - 37 4 g/dL Final    RDW 12 7  11 6 - 15 1 % Final    MPV 10 0  8 9 - 12 7 fL Final    Platelets 938  648 - 390 Thousands/uL Final    nRBC 0  /100 WBCs Final    Neutrophils Relative 78 (*) 43 - 75 % Final    Lymphocytes Relative 14  14 - 44 % Final    Monocytes Relative 7  4 - 12 % Final    Eosinophils Relative 1  0 - 6 % Final    Basophils Relative 0  0 - 1 % Final    Neutrophils Absolute 8 11 (*) 1 85 - 7 62 Thousands/µL Final    Lymphocytes Absolute 1 42  0 60 - 4 47 Thousands/µL Final    Monocytes Absolute 0 72  0 17 - 1 22 Thousand/µL Final    Eosinophils Absolute 0 06  0 00 - 0 61 Thousand/µL Final    Basophils Absolute 0 03  0 00 - 0 10 Thousands/µL Final   BASIC METABOLIC PANEL    Sodium 227  136 - 145 mmol/L Final    Potassium 3 8  3 5 - 5 3 mmol/L Final    Comment: Slightly Hemolyzed;  Results May be Affected    Chloride 105  100 - 108 mmol/L Final    CO2 27  21 - 32 mmol/L Final    Anion Gap 6  4 - 13 mmol/L Final    BUN 23  5 - 25 mg/dL Final    Creatinine 0 74  0 60 - 1 30 mg/dL Final    Comment: Standardized to IDMS reference method    Glucose 118  65 - 140 mg/dL Final    Comment:   If the patient is fasting, the ADA then defines impaired fasting glucose as > 100 mg/dL and diabetes as > or equal to 123 mg/dL  Specimen collection should occur prior to Sulfasalazine administration due to the potential for falsely depressed results  Specimen collection should occur prior to Sulfapyridine administration due to the potential for falsely elevated results  Calcium 8 7  8 3 - 10 1 mg/dL Final    eGFR 76  ml/min/1 73sq m Final    Narrative:     National Kidney Disease Education Program recommendations are as follows:  GFR calculation is accurate only with a steady state creatinine  Chronic Kidney disease less than 60 ml/min/1 73 sq  meters  Kidney failure less than 15 ml/min/1 73 sq  meters  Time reflects when diagnosis was documented in both MDM as applicable and the Disposition within this note     Time User Action Codes Description Comment    4/7/2018 10:00 PM Neal Baptiste [address]  Closed left subtrochanteric femur fracture, initial encounter ([facility] )     4/7/2018 10:36 PM Learta Buerger [T88  7XXA] Medication side effect, initial encounter     4/7/2018 10:36 PM Arty Dueñas T Modify Jassi Rama  7XXA] Medication side effect, initial encounter     4/7/2018 10:36 PM Arty Dueñas T Add [I10] Essential hypertension     4/7/2018 10:36 PM Arty Dueñas T Modify [I10] Essential hypertension     4/7/2018 10:36 PM Noretta Kroner [T88  7XXA] Medication side effect, initial encounter     4/7/2018 10:36 PM Arty Dueñas T Add [T88  7XXA] Medication side effect, initial encounter     4/7/2018 10:36 PM Arty Dueñas T Modify Jassi Rama  7XXA] Medication side effect, initial encounter     4/7/2018 10:36 PM Loretta Mcwilliams Remove [I10] Essential hypertension     4/7/2018 10:36 PM Evette Michael T Add [I10] Essential hypertension     4/7/2018 10:36 PM Evette Michael T Modify [I10] Essential hypertension     4/7/2018 10:38 PM Evette Michael T Add [S72 002A] Closed fracture of left hip, initial encounter ([facility] [address] )     4/7/2018 10:38 PM Evette Michael T Modify [S72 002A] Closed fracture of left hip, initial encounter [facility])       ED Disposition     None      MD Documentation    [address] Name, [address] Acute      RN Documentation    [address] Name, [address] Acute      Follow-up Information     Follow up With Specialties Details Why [address] [facility] MD [place] [facility] Call in 2 day(s)  [address] [facility] [place] [address]  Man 73, DO Family Medicine Follow up Within 1 week of discharge [address] [facility]      King Adriana MD Endocrinology Follow up Please call for follow up appointment in 6 weeks for your hyperthyroidism   [address] [facility] [address] [facility]          Discharge Medication List as of 4/12/2018  1:56 PM      START taking these medications    Details   acetaminophen (TYLENOL) 650 mg CR tablet Take 1 tablet (650 mg total) by mouth every 8 (eight) hours as needed for mild pain for up to 30 days, Starting Sun 4/8/2018, Until Tue 5/8/2018, Print      enoxaparin (LOVENOX) 40 mg/0 4 mL 1 subcutaneous injection daily x 28 days AFTER surgery, Print      oxyCODONE (ROXICODONE) 5 mg immediate release tablet Take 1-2 tablets every 4-6 hrs as needed for pain control, Print         CONTINUE these medications which have NOT CHANGED    Details   amLODIPine (NORVASC) 5 mg tablet Take 5mg every day except Sundays take 10mg, Normal      aspirin 81 MG tablet Take 81 mg by mouth daily, Until Discontinued, Historical Med      atenolol (TENORMIN) 50 mg tablet Take 1 tablet (50 mg total) by mouth daily, Starting Mon 1/29/2018, Normal      atorvastatin (LIPITOR) 40 mg tablet Take 1 tablet (40 mg total) by mouth daily, Starting Mon 2/12/2018, Normal      bumetanide (BUMEX) 1 mg tablet Take 1 tablet (1 mg total) by mouth daily, Starting Mon 1/29/2018, Normal      dicyclomine (BENTYL) 20 mg tablet Take 20 mg by mouth every 6 (six) hours, Until Discontinued, Historical Med      DULoxetine (CYMBALTA) 30 mg delayed release capsule Take 1 capsule (30 mg total) by mouth daily, Starting Tue 4/3/2018, Normal      fexofenadine (ALLEGRA) 180 MG tablet Take by mouth, Historical Med      meloxicam (MOBIC) 7 5 mg tablet Take by mouth, Starting Wed 8/31/2016, Historical Med      methylcellulose (CITRUCEL) oral powder Take by mouth, Historical Med      montelukast (SINGULAIR) 10 mg tablet Take 1 tablet by mouth daily, Historical Med      Multiple Vitamin ([facility] DAILY MULTIPLE PO) Take by mouth, Historical Med      pantoprazole (PROTONIX) 40 mg tablet Take 1 tablet (40 mg total) by mouth daily, Starting Sat 3/24/2018, Normal             Outpatient Discharge Orders  Walker     Commode chair     Commode chair     Walker Rolling     Ambulatory referral to Physical Therapy   Standing Status: Future  Standing Exp  Date: 10/08/18     Activity as tolerated     Weight Bearing Restrictions     No driving until     Call provider for:  severe uncontrolled pain     Call provider for:  redness, tenderness, or signs of infection (pain, swelling, redness, odor or green/yellow discharge around incision site)     Call provider for: active or persistent bleeding     Leave dressing on - Keep it clean, dry, and intact until clinic visit       Prior to Admission Medications   Prescriptions Last Dose Informant Patient Reported? Taking?    DULoxetine (CYMBALTA) 30 mg delayed release capsule   No Yes   Sig: Take 1 capsule (30 mg total) by mouth daily   Multiple Vitamin ([facility] DAILY MULTIPLE PO)   Yes Yes   Sig: Take by mouth   amLODIPine (NORVASC) 5 mg tablet   No Yes   Sig: Take 5mg every day except Sundays take 10mg   aspirin 81 MG tablet   Yes Yes   Sig: Take 81 mg by mouth daily   atenolol (TENORMIN) 50 mg tablet   No Yes   Sig: Take 1 tablet (50 mg total) by mouth daily   Patient taking differently: Take 25 mg by mouth daily     atorvastatin (LIPITOR) 40 mg tablet   No Yes   Sig: Take 1 tablet (40 mg total) by mouth daily   bumetanide (BUMEX) 1 mg tablet   No Yes   Sig: Take 1 tablet (1 mg total) by mouth daily   dicyclomine (BENTYL) 20 mg tablet   Yes Yes   Sig: Take 20 mg by mouth every 6 (six) hours   fexofenadine (ALLEGRA) 180 MG tablet   Yes Yes   Sig: Take by mouth   meloxicam (MOBIC) 7 5 mg tablet   Yes Yes   Sig: Take by mouth   methylcellulose (CITRUCEL) oral powder   Yes Yes   Sig: Take by mouth   montelukast (SINGULAIR) 10 mg tablet   Yes Yes   Sig: Take 1 tablet by mouth daily   pantoprazole (PROTONIX) 40 mg tablet   No Yes   Sig: Take 1 tablet (40 mg total) by mouth daily      Facility-Administered Medications: None       Portions of the record may have been created with voice recognition software  Occasional wrong word or "sound a like" substitutions may have occurred due to the inherent limitations of voice recognition software  Read the chart carefully and recognize, using context, where substitutions have occurred      Electronically signed by:  [facility]

## 2018-04-08 ENCOUNTER — APPOINTMENT (INPATIENT)
Dept: RADIOLOGY | Facility: HOSPITAL | Age: 83
DRG: 481 | End: 2018-04-08
Payer: MEDICARE

## 2018-04-08 ENCOUNTER — ANESTHESIA (INPATIENT)
Dept: PERIOP | Facility: HOSPITAL | Age: 83
DRG: 481 | End: 2018-04-08
Payer: MEDICARE

## 2018-04-08 ENCOUNTER — ANESTHESIA EVENT (INPATIENT)
Dept: PERIOP | Facility: HOSPITAL | Age: 83
DRG: 481 | End: 2018-04-08
Payer: MEDICARE

## 2018-04-08 PROBLEM — I50.32 CHRONIC DIASTOLIC (CONGESTIVE) HEART FAILURE (HCC): Chronic | Status: ACTIVE | Noted: 2018-04-08

## 2018-04-08 LAB
ABO GROUP BLD: NORMAL
APTT PPP: 30 SECONDS (ref 23–35)
BLD GP AB SCN SERPL QL: NEGATIVE
INR PPP: 0.99 (ref 0.86–1.16)
PROTHROMBIN TIME: 13.1 SECONDS (ref 12.1–14.4)
RH BLD: POSITIVE
SPECIMEN EXPIRATION DATE: NORMAL
TROPONIN I SERPL-MCNC: <0.02 NG/ML

## 2018-04-08 PROCEDURE — 73552 X-RAY EXAM OF FEMUR 2/>: CPT

## 2018-04-08 PROCEDURE — C1769 GUIDE WIRE: HCPCS | Performed by: ORTHOPAEDIC SURGERY

## 2018-04-08 PROCEDURE — 27245 TREAT THIGH FRACTURE: CPT | Performed by: ORTHOPAEDIC SURGERY

## 2018-04-08 PROCEDURE — 84484 ASSAY OF TROPONIN QUANT: CPT | Performed by: INTERNAL MEDICINE

## 2018-04-08 PROCEDURE — 99222 1ST HOSP IP/OBS MODERATE 55: CPT | Performed by: ORTHOPAEDIC SURGERY

## 2018-04-08 PROCEDURE — C1713 ANCHOR/SCREW BN/BN,TIS/BN: HCPCS | Performed by: ORTHOPAEDIC SURGERY

## 2018-04-08 PROCEDURE — 86850 RBC ANTIBODY SCREEN: CPT | Performed by: ORTHOPAEDIC SURGERY

## 2018-04-08 PROCEDURE — 86920 COMPATIBILITY TEST SPIN: CPT

## 2018-04-08 PROCEDURE — 99232 SBSQ HOSP IP/OBS MODERATE 35: CPT | Performed by: INTERNAL MEDICINE

## 2018-04-08 PROCEDURE — 86901 BLOOD TYPING SEROLOGIC RH(D): CPT | Performed by: ORTHOPAEDIC SURGERY

## 2018-04-08 PROCEDURE — 85610 PROTHROMBIN TIME: CPT | Performed by: ORTHOPAEDIC SURGERY

## 2018-04-08 PROCEDURE — 85730 THROMBOPLASTIN TIME PARTIAL: CPT | Performed by: ORTHOPAEDIC SURGERY

## 2018-04-08 PROCEDURE — 86900 BLOOD TYPING SEROLOGIC ABO: CPT | Performed by: ORTHOPAEDIC SURGERY

## 2018-04-08 PROCEDURE — 0QS706Z REPOSITION LEFT UPPER FEMUR WITH INTRAMEDULLARY INTERNAL FIXATION DEVICE, OPEN APPROACH: ICD-10-PCS | Performed by: ORTHOPAEDIC SURGERY

## 2018-04-08 DEVICE — 11.0MM TI HELICAL BLADE 90MM-STERILE: Type: IMPLANTABLE DEVICE | Site: HIP | Status: FUNCTIONAL

## 2018-04-08 DEVICE — 10MM/130 DEG TI CANN TROCH FIXATION NAIL 380MM/LEFT-STER: Type: IMPLANTABLE DEVICE | Site: HIP | Status: FUNCTIONAL

## 2018-04-08 DEVICE — 5.0MM TI LOCKING SCREW W/T25 STARDRIVE 48MM F/IM NAIL-STER: Type: IMPLANTABLE DEVICE | Site: HIP | Status: FUNCTIONAL

## 2018-04-08 RX ORDER — OXYCODONE HYDROCHLORIDE 5 MG/1
TABLET ORAL
Qty: 30 TABLET | Refills: 0 | Status: ON HOLD | OUTPATIENT
Start: 2018-04-08 | End: 2018-04-26 | Stop reason: CLARIF

## 2018-04-08 RX ORDER — SENNOSIDES 8.6 MG
650 CAPSULE ORAL EVERY 8 HOURS PRN
Qty: 30 TABLET | Refills: 0 | Status: ON HOLD | OUTPATIENT
Start: 2018-04-08 | End: 2018-04-26 | Stop reason: CLARIF

## 2018-04-08 RX ORDER — SODIUM CHLORIDE, SODIUM LACTATE, POTASSIUM CHLORIDE, CALCIUM CHLORIDE 600; 310; 30; 20 MG/100ML; MG/100ML; MG/100ML; MG/100ML
INJECTION, SOLUTION INTRAVENOUS CONTINUOUS PRN
Status: DISCONTINUED | OUTPATIENT
Start: 2018-04-08 | End: 2018-04-08 | Stop reason: SURG

## 2018-04-08 RX ORDER — ASPIRIN 81 MG/1
81 TABLET, CHEWABLE ORAL DAILY
Status: DISCONTINUED | OUTPATIENT
Start: 2018-04-09 | End: 2018-04-12 | Stop reason: HOSPADM

## 2018-04-08 RX ORDER — METHOCARBAMOL 500 MG/1
500 TABLET, FILM COATED ORAL EVERY 6 HOURS PRN
Status: DISCONTINUED | OUTPATIENT
Start: 2018-04-08 | End: 2018-04-12 | Stop reason: HOSPADM

## 2018-04-08 RX ORDER — FENTANYL CITRATE 50 UG/ML
INJECTION, SOLUTION INTRAMUSCULAR; INTRAVENOUS AS NEEDED
Status: DISCONTINUED | OUTPATIENT
Start: 2018-04-08 | End: 2018-04-08 | Stop reason: SURG

## 2018-04-08 RX ORDER — DICYCLOMINE HCL 20 MG
20 TABLET ORAL EVERY 6 HOURS
Status: DISCONTINUED | OUTPATIENT
Start: 2018-04-08 | End: 2018-04-09

## 2018-04-08 RX ORDER — PROPOFOL 10 MG/ML
INJECTION, EMULSION INTRAVENOUS AS NEEDED
Status: DISCONTINUED | OUTPATIENT
Start: 2018-04-08 | End: 2018-04-08 | Stop reason: SURG

## 2018-04-08 RX ORDER — LIDOCAINE HYDROCHLORIDE 10 MG/ML
INJECTION, SOLUTION INFILTRATION; PERINEURAL AS NEEDED
Status: DISCONTINUED | OUTPATIENT
Start: 2018-04-08 | End: 2018-04-08 | Stop reason: SURG

## 2018-04-08 RX ORDER — VALACYCLOVIR HYDROCHLORIDE 1 G/1
1000 TABLET, FILM COATED ORAL EVERY 12 HOURS SCHEDULED
Status: DISCONTINUED | OUTPATIENT
Start: 2018-04-08 | End: 2018-04-08

## 2018-04-08 RX ORDER — FENTANYL CITRATE/PF 50 MCG/ML
50 SYRINGE (ML) INJECTION
Status: DISCONTINUED | OUTPATIENT
Start: 2018-04-08 | End: 2018-04-08 | Stop reason: HOSPADM

## 2018-04-08 RX ORDER — EPHEDRINE SULFATE 50 MG/ML
INJECTION, SOLUTION INTRAVENOUS AS NEEDED
Status: DISCONTINUED | OUTPATIENT
Start: 2018-04-08 | End: 2018-04-08 | Stop reason: SURG

## 2018-04-08 RX ORDER — ONDANSETRON 2 MG/ML
INJECTION INTRAMUSCULAR; INTRAVENOUS AS NEEDED
Status: DISCONTINUED | OUTPATIENT
Start: 2018-04-08 | End: 2018-04-08 | Stop reason: SURG

## 2018-04-08 RX ORDER — AMLODIPINE BESYLATE 5 MG/1
5 TABLET ORAL DAILY
Status: DISCONTINUED | OUTPATIENT
Start: 2018-04-08 | End: 2018-04-12 | Stop reason: HOSPADM

## 2018-04-08 RX ORDER — PANTOPRAZOLE SODIUM 40 MG/1
40 TABLET, DELAYED RELEASE ORAL
Status: DISCONTINUED | OUTPATIENT
Start: 2018-04-08 | End: 2018-04-12 | Stop reason: HOSPADM

## 2018-04-08 RX ORDER — SODIUM CHLORIDE, SODIUM LACTATE, POTASSIUM CHLORIDE, CALCIUM CHLORIDE 600; 310; 30; 20 MG/100ML; MG/100ML; MG/100ML; MG/100ML
75 INJECTION, SOLUTION INTRAVENOUS CONTINUOUS
Status: DISCONTINUED | OUTPATIENT
Start: 2018-04-08 | End: 2018-04-09

## 2018-04-08 RX ORDER — SODIUM CHLORIDE, SODIUM LACTATE, POTASSIUM CHLORIDE, CALCIUM CHLORIDE 600; 310; 30; 20 MG/100ML; MG/100ML; MG/100ML; MG/100ML
50 INJECTION, SOLUTION INTRAVENOUS CONTINUOUS
Status: DISCONTINUED | OUTPATIENT
Start: 2018-04-08 | End: 2018-04-08 | Stop reason: SDUPTHER

## 2018-04-08 RX ORDER — ONDANSETRON 2 MG/ML
4 INJECTION INTRAMUSCULAR; INTRAVENOUS ONCE AS NEEDED
Status: DISCONTINUED | OUTPATIENT
Start: 2018-04-08 | End: 2018-04-08 | Stop reason: HOSPADM

## 2018-04-08 RX ORDER — ATENOLOL 25 MG/1
25 TABLET ORAL DAILY
Status: DISCONTINUED | OUTPATIENT
Start: 2018-04-08 | End: 2018-04-12 | Stop reason: HOSPADM

## 2018-04-08 RX ORDER — ATORVASTATIN CALCIUM 40 MG/1
40 TABLET, FILM COATED ORAL
Status: DISCONTINUED | OUTPATIENT
Start: 2018-04-08 | End: 2018-04-12 | Stop reason: HOSPADM

## 2018-04-08 RX ORDER — ALBUMIN, HUMAN INJ 5% 5 %
12.5 SOLUTION INTRAVENOUS ONCE
Status: COMPLETED | OUTPATIENT
Start: 2018-04-08 | End: 2018-04-08

## 2018-04-08 RX ORDER — DULOXETIN HYDROCHLORIDE 30 MG/1
30 CAPSULE, DELAYED RELEASE ORAL DAILY
Status: DISCONTINUED | OUTPATIENT
Start: 2018-04-08 | End: 2018-04-12 | Stop reason: HOSPADM

## 2018-04-08 RX ADMIN — ATORVASTATIN CALCIUM 40 MG: 40 TABLET, FILM COATED ORAL at 18:00

## 2018-04-08 RX ADMIN — ACETAMINOPHEN 650 MG: 325 TABLET ORAL at 05:03

## 2018-04-08 RX ADMIN — ACETAMINOPHEN 650 MG: 325 TABLET ORAL at 18:00

## 2018-04-08 RX ADMIN — ALBUMIN HUMAN 12.5 G: 0.05 INJECTION, SOLUTION INTRAVENOUS at 21:38

## 2018-04-08 RX ADMIN — FENTANYL CITRATE 25 MCG: 50 INJECTION, SOLUTION INTRAMUSCULAR; INTRAVENOUS at 13:51

## 2018-04-08 RX ADMIN — METHOCARBAMOL 500 MG: 500 TABLET ORAL at 05:03

## 2018-04-08 RX ADMIN — DOCUSATE SODIUM 100 MG: 100 CAPSULE, LIQUID FILLED ORAL at 18:00

## 2018-04-08 RX ADMIN — ACETAMINOPHEN 650 MG: 325 TABLET ORAL at 00:14

## 2018-04-08 RX ADMIN — PANTOPRAZOLE SODIUM 40 MG: 40 TABLET, DELAYED RELEASE ORAL at 05:11

## 2018-04-08 RX ADMIN — FENTANYL CITRATE 50 MCG: 50 INJECTION, SOLUTION INTRAMUSCULAR; INTRAVENOUS at 12:54

## 2018-04-08 RX ADMIN — OXYCODONE HYDROCHLORIDE 5 MG: 5 TABLET ORAL at 20:48

## 2018-04-08 RX ADMIN — SODIUM CHLORIDE, SODIUM LACTATE, POTASSIUM CHLORIDE, AND CALCIUM CHLORIDE: .6; .31; .03; .02 INJECTION, SOLUTION INTRAVENOUS at 12:13

## 2018-04-08 RX ADMIN — PROPOFOL 180 MG: 10 INJECTION, EMULSION INTRAVENOUS at 12:24

## 2018-04-08 RX ADMIN — ATENOLOL 25 MG: 25 TABLET ORAL at 08:52

## 2018-04-08 RX ADMIN — SODIUM CHLORIDE, SODIUM LACTATE, POTASSIUM CHLORIDE, AND CALCIUM CHLORIDE 75 ML/HR: .6; .31; .03; .02 INJECTION, SOLUTION INTRAVENOUS at 00:15

## 2018-04-08 RX ADMIN — METHOCARBAMOL 500 MG: 500 TABLET ORAL at 18:00

## 2018-04-08 RX ADMIN — EPHEDRINE SULFATE 10 MG: 50 INJECTION, SOLUTION INTRAMUSCULAR; INTRAVENOUS; SUBCUTANEOUS at 13:17

## 2018-04-08 RX ADMIN — DULOXETINE HYDROCHLORIDE 30 MG: 30 CAPSULE, DELAYED RELEASE ORAL at 08:52

## 2018-04-08 RX ADMIN — CEFAZOLIN SODIUM 1000 MG: 1 SOLUTION INTRAVENOUS at 20:41

## 2018-04-08 RX ADMIN — VALACYCLOVIR 1000 MG: 1 TABLET, FILM COATED ORAL at 01:23

## 2018-04-08 RX ADMIN — OXYCODONE HYDROCHLORIDE 5 MG: 5 TABLET ORAL at 09:43

## 2018-04-08 RX ADMIN — FENTANYL CITRATE 50 MCG: 50 INJECTION, SOLUTION INTRAMUSCULAR; INTRAVENOUS at 14:28

## 2018-04-08 RX ADMIN — FENTANYL CITRATE 25 MCG: 50 INJECTION, SOLUTION INTRAMUSCULAR; INTRAVENOUS at 12:24

## 2018-04-08 RX ADMIN — OXYCODONE HYDROCHLORIDE 10 MG: 10 TABLET ORAL at 16:36

## 2018-04-08 RX ADMIN — EPHEDRINE SULFATE 10 MG: 50 INJECTION, SOLUTION INTRAMUSCULAR; INTRAVENOUS; SUBCUTANEOUS at 12:42

## 2018-04-08 RX ADMIN — SODIUM CHLORIDE, SODIUM LACTATE, POTASSIUM CHLORIDE, AND CALCIUM CHLORIDE 50 ML/HR: .6; .31; .03; .02 INJECTION, SOLUTION INTRAVENOUS at 14:48

## 2018-04-08 RX ADMIN — METHOCARBAMOL 500 MG: 500 TABLET ORAL at 00:15

## 2018-04-08 RX ADMIN — CEFAZOLIN SODIUM 2000 MG: 2 SOLUTION INTRAVENOUS at 12:31

## 2018-04-08 RX ADMIN — AMLODIPINE BESYLATE 5 MG: 5 TABLET ORAL at 08:52

## 2018-04-08 RX ADMIN — LIDOCAINE HYDROCHLORIDE 50 MG: 10 INJECTION, SOLUTION INFILTRATION; PERINEURAL at 12:24

## 2018-04-08 RX ADMIN — ONDANSETRON 4 MG: 2 INJECTION INTRAMUSCULAR; INTRAVENOUS at 13:38

## 2018-04-08 NOTE — DISCHARGE INSTRUCTIONS
Discharge Instructions - Orthopedics  Elva Allen 82 y o  female MRN: [id number]  Unit/Bed#: PACU 02    Weight Bearing Status:                                           Weight bearing as tolerated left lower extremity    DVT prophylaxis:  Complete course of Lovenox as directed    Pain:  Continue analgesics as directed    Dressing Instructions:   Keep dressing clean, dry and intact until follow up appointment  PT/OT:  Continue PT/OT on outpatient basis as directed    Appt Instructions: If you do not have your appointment, please call the clinic at [phone] to f/u with Dr Rosalee Rogers in 2 weeks  Otherwise followup as scheduled below:      Contact the office sooner if you experience any increased numbness/tingling in the extremities  Follow up with Endocrine in 4-6 weeks  Call for appt once discharged from hospital       Prevention of Falls and Fractures  Safety first to prevent falls: At any age, people can change their environments to reduce their risk of falling and breaking a bone  Outdoor safety tips: In nasty weather, use a walker or cane for added stability  Wear warm boots with rubber soles for added traction  Look carefully at floor surfaces in public buildings  Many floors are made of highly polished marble or tile that can be very slippery  If floors have plastic or carpet runners in place, stay on them whenever possible  Identify community services that can provide assistance, such as 24-hour pharmacies and grocery stores that take orders over the phone and deliver  It is especially important to use these services in bad weather  Use a shoulder bag, makenna pack, or backpack to leave hands free  Stop at curbs and check their height before stepping up or down  Be cautious at curbs that have been cut away to allow access for bikes or wheelchairs  The incline up or down may lead to a fall  Indoor safety tips:  Keep all rooms free from clutter, especially the floors    Keep floor surfaces smooth but not slippery  When entering rooms, be aware of differences in floor levels and thresholds  Wear supportive, low-heeled shoes, even at home  Avoid walking around in socks, stockings, or floppy, backless slippers  Check that all carpets and area rugs have skid-proof backing or are tacked to the floor, including carpeting on stairs  Keep electrical and telephone cords and wires out of walkways  Be sure that all stairwells are adequately lit and that stairs have handrails on both sides  Consider placing fluorescent tape on the edges of the top and bottom steps  For optimal safety, install grab bars on bathroom walls beside tubs, showers, and toilets  If you are unstable on your feet, consider using a plastic chair with a back and nonskid leg tips in the shower  Use a rubber bath mat in the shower or tub  Keep a flashlight with fresh batteries beside your bed  Add ceiling fixtures to rooms lit by lamps only, or install lamps that can be turned on by a switch near the entry point into the room  Another option is to install voice- or sound-activated lamps  Use bright light bulbs in your home  If you must use a step-stool for hard-to-reach areas, use a sturdy one with a handrail and wide steps  A better option is to reorganize work and storage areas to minimize the need for stooping or excessive reaching  Consider purchasing a portable phone that you can take with you from room to room  It provides security because you can answer the phone without rushing for it and you can call for help should an accident occur  Dont let prescriptions run low  Always keep at least 1 weeks worth of medications on hand at home  Check prescriptions with your doctor and pharmacist to see if they may be increasing your risk of falling  If you take multiple medications, check with your doctor and pharmacist about possible interactions between the different medications    Arrange with a family member or friend for daily contact  Try to have at least one person who knows where you are  If you live alone, you may wish to contract with a monitoring company that will respond to your call 24 hours a day  Watch yourself in a mirror  Does your body lean or sway back and forth or side to side? People with decreased ability to balance often have a high degree of body sway and are more likely to fall  ([employer] com au  [facility] gov/Health_Info/Bone/Osteoporosis/Fracture/prevent_falls  asp#d)  This patient has been enrolled in Own the Bone program (www ownthebone  org), which is a national post-fracture, systems-based, multidisciplinary fragility fracture prevention initiative program   In order to help with this initiative, please provide the appropriate counseling regarding the following prevention measures at the patient's next office visit:    Regular weight bearing and muscle strengthening  Fall Prevention  Smoking Cessation  Alcohol Consumption  Vitamin D supplementation  Calcium supplementation  Any additional pharmacotherapy recommendations    Per endocrinology, you will need repeat T4 in 4 weeks and follow up in their office in 6 weeks, number provided  Please take your methimazole 5 mg daily

## 2018-04-08 NOTE — ASSESSMENT & PLAN NOTE
· bumex on hold for surgery  · Okay for some gentle IVF if needed post operatively by primary team but would be hopeful to restart diuretics tomorrow

## 2018-04-08 NOTE — PROGRESS NOTES
Progress Note - Leslie Tim 1935, 82 y o  female MRN: [id number]    Unit/Bed#: OR POOL Encounter: [id number]    Primary Care Provider: Sarah Alcaraz DO   Date and time admitted to hospital: 4/7/2018  7:47 PM      * Closed fracture of left hip ([address] )   Assessment & Plan    - S/P ground level mechanical fall in bathtub  - Care per primary team - s/p OR today for cephalo-medullary nailing left intertrochanteric femur fracture  - Pain control, DVT ppx, PT/OT  -monitor labs closely for any post-operative anemia          Chronic diastolic (congestive) heart failure (HCC)   Assessment & Plan    · bumex on hold for surgery  · Okay for some gentle IVF if needed post operatively by primary team but would be hopeful to restart diuretics tomorrow         Shingles   Assessment & Plan    Per attending note, no need for tx since present >72 hours  Monitor symptoms         Gastroesophageal reflux disease without esophagitis   Assessment & Plan    - No symptoms currently  - Continue home dose protonix        Coronary artery disease without angina pectoris - S/P stent placement x 2 (2011)   Assessment & Plan    - No anginal symptoms  - 12 lead EKG reveals SR with PVC's  No ischemic changes noted  - Echocardiogram 10/2017 revealed LVEF 60-65%  Grade 1 diastolic dysfunction  Mild mitral/tricuspid regurgitation  Moderate aortic regurgitation  No evidence of aortic stenosis  - Continue home dose ASA 81mg          Hyperlipidemia   Assessment & Plan    - Well controlled on current regimen  - Continue home dose Lipitor        Essential hypertension   Assessment & Plan    - BP well controlled  - Continue home dose Norvasc and Atenolol  - Monitor BP per unit protocol          VTE Pharmacologic Prophylaxis:   Pharmacologic: Enoxaparin (Lovenox)  Mechanical VTE Prophylaxis in Place: Yes    Education and Discussions with Family / Patient: patient  Son and DIL at bedside    Time Spent for Care: 30 minutes    More than 50% of total time spent on counseling and coordination of care as described above  Current Length of Stay: 1 day(s)    Current Patient Status: Inpatient   Certification Statement: The patient will continue to require additional inpatient hospital stay due to per primary service    Discharge Plan: per primary service    Code Status: Level 1 - Full Code      Subjective:   Pt seen immediately post-op and she is very lethargic but appears comfortable  Family at bedside  Objective:     Vitals:   Temp (24hrs), Av 5 °F (36 9 °C), Min:97 8 °F (36 6 °C), Max:98 9 °F (37 2 °C)    HR:  [] 84  Resp:  [9-42] 9  BP: (100-185)/(52-83) 122/53  SpO2:  [89 %-100 %] 100 %  Body mass index is 23 99 kg/m²  Input and Output Summary (last 24 hours): Intake/Output Summary (Last 24 hours) at 18 1507  Last data filed at 18 1351   Gross per 24 hour   Intake              600 ml   Output             2750 ml   Net            -2150 ml       Physical Exam:     Physical Exam   Constitutional: She appears lethargic  No distress  Nasal cannula in place  Cardiovascular: Normal rate and regular rhythm  Murmur heard  Pulmonary/Chest: Effort normal and breath sounds normal  No respiratory distress  She has no wheezes  Abdominal: Soft  Bowel sounds are normal  There is tenderness (mild suprapubic tenderness )  Musculoskeletal: She exhibits no edema  Pain with ROM L leg   Neurological: She appears lethargic  Skin: Skin is warm and dry  She is not diaphoretic  Psychiatric: She has a normal mood and affect  Nursing note and vitals reviewed        Additional Data:     Labs:      Results from last 7 days  Lab Units 18   WBC Thousand/uL 10 37*   HEMOGLOBIN g/dL 11 9   HEMATOCRIT % 36 8   PLATELETS Thousands/uL 189   NEUTROS PCT % 78*   LYMPHS PCT % 14   MONOS PCT % 7   EOS PCT % 1       Results from last 7 days  Lab Units 18   SODIUM mmol/L 138   POTASSIUM mmol/L 3 8   CHLORIDE mmol/L 105   CO2 mmol/L 27   BUN mg/dL 23   CREATININE mg/dL 0 74   CALCIUM mg/dL 8 7   GLUCOSE RANDOM mg/dL 118       Results from last 7 days  Lab Units 04/08/18  0203   INR  0 99       * I Have Reviewed All Lab Data Listed Above  * Additional Pertinent Lab Tests Reviewed:  All Labs Within Last 24 Hours Reviewed    Imaging:    Imaging Reports Reviewed Today Include: all  Imaging Personally Reviewed by Myself Includes:  none    Recent Cultures (last 7 days):           Last 24 Hours Medication List:     Current Facility-Administered Medications:  EMS replenish medication  Does not apply Once Marvin Barrett MD    acetaminophen 650 mg Oral Q6H Alesha Saavedra MD    amLODIPine 5 mg Oral Daily Kana Hensley MD    [START ON 4/9/2018] aspirin 81 mg Oral Daily Kana Hensley MD    atenolol 25 mg Oral Daily Kana Hensley MD    atorvastatin 40 mg Oral After Iker Wakefield MD    calcium carbonate 1,000 mg Oral Daily PRN Kana Hensley MD    dicyclomine 20 mg Oral Q6H Kana Hensley MD    docusate sodium 100 mg Oral BID Kana Hensley MD    DULoxetine 30 mg Oral Daily Kana Hensley MD    [START ON 4/9/2018] enoxaparin 40 mg Subcutaneous Daily Kana Hensley MD    fentaNYL 50 mcg Intravenous Q5 Min PRN Tomasz Saleem MD    lactated ringers 50 mL/hr Intravenous Continuous Tomasz Saleem MD Last Rate: 50 mL/hr (04/08/18 1448)   methocarbamol 500 mg Oral Q6H [facility] Zia Britton PA-C    morphine injection 2 mg Intravenous Q2H PRN Kana Hensley MD    ondansetron 4 mg Intravenous Q6H PRN Kana Hensley MD    ondansetron 4 mg Intravenous Once PRN Tomasz Saleem MD    oxyCODONE 10 mg Oral Q4H PRN Kana Hensley MD    oxyCODONE 5 mg Oral Q4H PRN Kana Hensley MD    pantoprazole 40 mg Oral Early Morning Kana Hensley MD    senna 1 tablet Oral HS PRN Kana Hensley MD    simethicone 80 mg Oral 4x Daily PRN Kana Hensley MD         Today, Patient Was Seen By: Jayna Fagan PA-C    ** Please Note: Dictation voice to text software may have been used in the creation of this document   **

## 2018-04-08 NOTE — PHYSICAL THERAPY NOTE
PT CANCELLATION NOTE    Orders received  PT reviewed chart  However patient did not have an "up with assistance" or "OOB" order  PT contacted ortho  MD told PT to hold at this time and wait until after she has surgery  PT will follow up after surgery        Paige Markham, PT

## 2018-04-08 NOTE — ASSESSMENT & PLAN NOTE
- S/P ground level mechanical fall in bathtub  - Care per primary team - s/p OR today for cephalo-medullary nailing left intertrochanteric femur fracture  - Pain control, DVT ppx, PT/OT  -monitor labs closely for any post-operative anemia

## 2018-04-08 NOTE — PROGRESS NOTES
Orthopedics   Juanetta Lesches 82 y o  female MRN: [id number]  Unit/Bed#: CW3 351-01      Subjective:   82 y  o female status post fall in shower with leftIntertrochanteric femur fracture  No events overnight  Has continued left hip pain          Labs:    0  Lab Value Date/Time   HCT 36 8 04/07/2018 2030   HCT 35 7 11/20/2017 1023   HCT 36 8 07/07/2017 0909   HGB 11 9 04/07/2018 2030   HGB 11 9 11/20/2017 1023   HGB 12 2 07/07/2017 0909   INR 0 99 04/08/2018 0203   WBC 10 37 (H) 04/07/2018 2030   WBC 3 98 (L) 11/20/2017 1023   WBC 4 41 07/07/2017 0909   ESR 16 07/07/2017 0909   CRP <3 0 05/23/2017 1129       Meds:    Current Facility-Administered Medications:      EMS REPLENISHMENT MED, , Does not apply, Once, Marvin Barrett MD    acetaminophen (TYLENOL) tablet 650 mg, 650 mg, Oral, Q6H [facility], Kana Hensley MD, 650 mg at 04/08/18 0503    amLODIPine (NORVASC) tablet 5 mg, 5 mg, Oral, Daily, Kana Hensley MD  [facility]  [START ON 4/9/2018] aspirin chewable tablet 81 mg, 81 mg, Oral, Daily, Kana Hensley MD    atenolol (TENORMIN) tablet 25 mg, 25 mg, Oral, Daily, Kana Hensley MD    atorvastatin (LIPITOR) tablet 40 mg, 40 mg, Oral, After Poonam Basurto MD    calcium carbonate (TUMS) chewable tablet 1,000 mg, 1,000 mg, Oral, Daily PRN, Kana Hensley MD    dicyclomine (BENTYL) tablet 20 mg, 20 mg, Oral, Q6H, Kana Hensley MD    docusate sodium (COLACE) capsule 100 mg, 100 mg, Oral, BID, Kana Hensley MD    DULoxetine (CYMBALTA) delayed release capsule 30 mg, 30 mg, Oral, Daily, Kana Hensley MD  [facility]  [START ON 4/9/2018] enoxaparin (LOVENOX) subcutaneous injection 40 mg, 40 mg, Subcutaneous, Daily, Kana Hensley MD    methocarbamol (ROBAXIN) tablet 500 mg, 500 mg, Oral, Q6H [facility], Staci Flynn PA-C, 500 mg at 04/08/18 0503    morphine injection 2 mg, 2 mg, Intravenous, Q2H PRN, Kana Hensley MD    ondansetron (ZOFRAN) injection 4 mg, 4 mg, Intravenous, Q6H PRN, Kana Hensley MD    oxyCODONE (ROXICODONE) immediate release tablet 10 mg, 10 mg, Oral, Q4H PRN, Melinda Lopez MD    oxyCODONE (ROXICODONE) IR tablet 5 mg, 5 mg, Oral, Q4H PRN, Melinda Lopez MD    pantoprazole (PROTONIX) EC tablet 40 mg, 40 mg, Oral, Early Morning, Melinda Lopez MD, 40 mg at 04/08/18 0511    senna (SENOKOT) tablet 8 6 mg, 1 tablet, Oral, HS PRN, Melinda Lopez MD    simethicone (MYLICON) chewable tablet 80 mg, 80 mg, Oral, 4x Daily PRN, Melinda Loepz MD    Blood Culture:   No results found for: BLOODCX    Wound Culture:   No results found for: WOUNDCULT    Ins and Outs:  I/O last 24 hours: In: -   Out: 2600 [Urine:2600]          Physical Exam:   /63 (BP Location: Left arm)   Pulse 91   Temp 98 8 °F (37 1 °C) (Oral)   Resp 16   Ht 5' 1" (1 549 m)   Wt 57 4 kg (126 lb 8 7 oz)   SpO2 100%   BMI 23 91 kg/m²   Musculoskeletal: left lower extremity  · Skin intact  · Leg externally rotated  · Tender to palpation over hip  · Sensation intact L1-S1  · Positive ankle dorsi/plantar flexion, EHL/FHL  · 2+ DP pulse      _*_*_*_*_*_*_*_*_*_*_*_*_*_*_*_*_*_*_*_*_*_*_*_*_*_*_*_*_*_*_*_*_*_*_*_*_*_*_*_*_*    Assessment:  82 y  o female status post fall in shower with leftIntertrochanteric femur fracture    Plan:   · Non weight bearing left lower extremity  · To OR today for IMN left femur   Cleared  · Will check pre op troponin and monitor post op  · Post op EKG  · Post op PT  · DVT ppx  · NPO  · Pain control  · Medicine for all medical management   · Dispo: Pending Jey Vargas MD

## 2018-04-08 NOTE — PLAN OF CARE
Problem: SAFETY ADULT  Goal: Maintain or return mobility status to optimal level  INTERVENTIONS:  - Assess patient's baseline mobility status (ambulation, transfers, stairs, etc )    - Identify cognitive and physical deficits and behaviors that affect mobility  - Identify mobility aids required to assist with transfers and/or ambulation (gait belt, sit-to-stand, lift, walker, cane, etc )  - [place] fall precautions as indicated by assessment  - Record patient progress and toleration of activity level on Mobility SBAR; progress patient to next Phase/Stage  - Instruct patient to call for assistance with activity based on assessment  - Request Rehabilitation consult to assist with strengthening/weightbearing, etc   Outcome: Progressing

## 2018-04-08 NOTE — H&P
Orthopedics   Speedy Nelson 82 y o  female MRN: [id number]  Unit/Bed#: X ray      Chief Complaint:   left hip pain    HPI:   82 y  o female ambulates with cane status post slip and fall in shower complaining of left hip pain and inability to bear weight  Pain is well localized to the hip and is made worse with motion or contact to the area  Denies numbness or tingling  She has a history of bladder cancer that is in remission and CAD requiring stent in 2011, hypertension, and hyperlipidemia  Takes aspirin    Review Of Systems:   · Skin: Normal  · Neuro: See HPI  · Musculoskeletal: See HPI  · 14 point review of systems negative except as stated above     Past Medical History:   Past Medical History:   Diagnosis Date    Bladder cancer ([facility] [address] )     GERD (gastroesophageal reflux disease)     Hepatitis     Hyperlipidemia     Hypertension     Kidney stones     Malignant neoplasm of urethra (HCC)     Pneumonia     Shingles        Past Surgical History:   Past Surgical History:   Procedure Laterality Date    CORONARY ANGIOPLASTY WITH STENT PLACEMENT      stent x 2    CYSTOSCOPY      diagnostic - onset 4/4/17    HYSTERECTOMY         Family History:  Family history reviewed and non-contributory  Family History   Problem Relation Age of Onset    Arthritis Mother     Osteoporosis Mother     Heart disease Father     Hypertension Father     Hypertension Brother        Social History:  Social History     Social History    Marital status:      Spouse name: N/A    Number of children: N/A    Years of education: N/A     Social History Main Topics    Smoking status: Never Smoker    Smokeless tobacco: Never Used    Alcohol use No    Drug use: No    Sexual activity: No     Other Topics Concern    None     Social History Narrative    Advance directives declined by parents    Always uses seat belt           Allergies: Allergies   Allergen Reactions    Lactose      Other reaction(s):  Indigestion/GI Upset  Other      Other reaction(s): Mental Status Change  After surgery   Whole blood  = passed out   historical allergy; verify with patient           Labs:    0  Lab Value Date/Time   HCT 36 8 04/07/2018 2030   HCT 35 7 11/20/2017 1023   HCT 36 8 07/07/2017 0909   HGB 11 9 04/07/2018 2030   HGB 11 9 11/20/2017 1023   HGB 12 2 07/07/2017 0909   WBC 10 37 (H) 04/07/2018 2030   WBC 3 98 (L) 11/20/2017 1023   WBC 4 41 07/07/2017 0909   ESR 16 07/07/2017 0909   CRP <3 0 05/23/2017 1129       Meds:    Current Facility-Administered Medications:      EMS REPLENISHMENT MED, , Does not apply, Once, Byron Haddad MD    [START ON 4/8/2018] acetaminophen (TYLENOL) tablet 650 mg, 650 mg, Oral, Q6H [facility], Jolly Das MD    calcium carbonate (TUMS) chewable tablet 1,000 mg, 1,000 mg, Oral, Daily PRN, MD Nasra Paul  [START ON 4/8/2018] docusate sodium (COLACE) capsule 100 mg, 100 mg, Oral, BID, MD Nasra Paul  [START ON 4/9/2018] enoxaparin (LOVENOX) subcutaneous injection 40 mg, 40 mg, Subcutaneous, Daily, Jolly Das MD    [START ON 4/8/2018] lactated ringers infusion, 75 mL/hr, Intravenous, Continuous, Jolly Das MD    morphine injection 2 mg, 2 mg, Intravenous, Q2H PRN, Jolly Das MD    ondansetron (ZOFRAN) injection 4 mg, 4 mg, Intravenous, Q6H PRN, Jolly Das MD    oxyCODONE (ROXICODONE) immediate release tablet 10 mg, 10 mg, Oral, Q4H PRN, Jolly Das MD    oxyCODONE (ROXICODONE) IR tablet 5 mg, 5 mg, Oral, Q4H PRN, Jolly Das MD    senna (SENOKOT) tablet 8 6 mg, 1 tablet, Oral, HS PRN, Jolly Das MD    simethicone (MYLICON) chewable tablet 80 mg, 80 mg, Oral, 4x Daily PRN, Jolly Das MD    Current Outpatient Prescriptions:     amLODIPine (NORVASC) 5 mg tablet, Take 5mg every day except Sundays take 10mg, Disp: 180 tablet, Rfl: 0    aspirin 81 MG tablet, Take 81 mg by mouth daily, Disp: , Rfl:     atenolol (TENORMIN) 50 mg tablet, Take 1 tablet (50 mg total) by mouth daily (Patient taking differently: Take 25 mg by mouth daily  ), Disp: 90 tablet, Rfl: 0    atorvastatin (LIPITOR) 40 mg tablet, Take 1 tablet (40 mg total) by mouth daily, Disp: 90 tablet, Rfl: 0    bumetanide (BUMEX) 1 mg tablet, Take 1 tablet (1 mg total) by mouth daily, Disp: 45 tablet, Rfl: 0    dicyclomine (BENTYL) 20 mg tablet, Take 20 mg by mouth every 6 (six) hours, Disp: , Rfl:     DULoxetine (CYMBALTA) 30 mg delayed release capsule, Take 1 capsule (30 mg total) by mouth daily, Disp: 30 capsule, Rfl: 3    fexofenadine (ALLEGRA) 180 MG tablet, Take by mouth, Disp: , Rfl:     meloxicam (MOBIC) 7 5 mg tablet, Take by mouth, Disp: , Rfl:     methylcellulose (CITRUCEL) oral powder, Take by mouth, Disp: , Rfl:     montelukast (SINGULAIR) 10 mg tablet, Take 1 tablet by mouth daily, Disp: , Rfl:     Multiple Vitamin ([facility] DAILY MULTIPLE PO), Take by mouth, Disp: , Rfl:     pantoprazole (PROTONIX) 40 mg tablet, Take 1 tablet (40 mg total) by mouth daily, Disp: 90 tablet, Rfl: 0    Blood Culture:   No results found for: BLOODCX    Wound Culture:   No results found for: WOUNDCULT    Ins and Outs:  No intake/output data recorded  Physical Exam:   /76 (BP Location: Right arm)   Pulse 104   Temp 98 4 °F (36 9 °C) (Oral)   Resp (!) 42   Wt 54 9 kg (121 lb)   SpO2 100%   BMI 22 86 kg/m²   Gen: Alert and oriented to person, place, time  HEENT: EOMI, eyes clear, moist mucus membranes, hearing intact  Respiratory: Bilateral chest rise  No audible wheezing found  Cardiovascular: Regular Rate and Rhythm  Abdomen: soft nontender/nondistended  Musculoskeletal: left lower extremity  · Skin intact, limb shortened and externally rotated  · Tender to palpation over hip  · Positive log roll  · Sensation intact L1-S1  · Positive ankle dorsi/plantar flexion, EHL/FHL  · 2+ DP pulse    Radiology:   I personally reviewed the films    X-rays left hip shows Intertrochanteric femur fracture    _*_*_*_*_*_*_*_*_*_*_*_*_*_*_*_*_*_*_*_*_*_*_*_*_*_*_*_*_*_*_*_*_*_*_*_*_*_*_*_*_*    Assessment:  82 y  o female status post fall in shower with leftIntertrochanteric femur fracture    Plan:   · Non weight bearing left lower extremity  · Analgesics for pain  · NPO at midnight  · [place] Catheter insertion  · Post op PT  · Medicine consult for all medical management and preoperative risk stratification  · To OR for IM nail femur fracture of Intertrochanteric femur fracture  · Dispo: Pending Jey Vargas MD

## 2018-04-08 NOTE — OP NOTE
Brief Op Note  Elham Ped  MRN: [id number]      Unit/Bed#: Operating Room    PreOp Dx: left intertrochanteric femur fracture      Postop Dx: left intertrochanteric femur fracture      Procedure:  Cephalo-medullary Nailing left intertrochanteric femur fracture      Surgeon: Jose Scott MD      Assistants:Adonis Nazario MD ,Johnathon Doran PA-C       Fluids:       EBL:       Drains: none      Specimens: No       Complications: No       Condition: stable transferred to postanesthesia care unit      Implants: Synthes TFN  Femoral nail: 10 x 102 mm  Helical Blade: 90 mm  One distal locking screw        82 y  o female, presents at this time, secondary to treatment of left intertrochanteric femur fracture    The patient was told of all the pros and cons of operative and nonoperative intervention  Some of the complications of operative intervention include infection, bleeding, scarring, nerve injury, vascular injury, malunion, nonunion, continued pain, decreased range of motion, DVT, PE death, loss of limb, need for further surgery, not obtain an results  The patient wished  Patient did consent for operative intervention for this pathology  Patient was brought to the operating room  Patient was anesthetized as anesthesia team  Patient was prepped and draped in normal sterile fashion  After this was done, we did conduct a time out to make sure correct  Patient was in the room, prepped marked and draped  Implants were in the room, Rep  For the implants were present, DVT prophylaxis and antibiotics were dressed  Patient was placed in the fracture table  Reduction was obtained and this was confirmed with C-arm imaging  This was done prior to patient being prepped and draped in normal sterile fashion  Incision was made 2 cm proximal to the greater trochanteric region and this was extended proximally about 4-5 cm  After going through skin and fatty tissue, incision was made through tensor fascia   Guidewire was placed at the greater trochanteric region and advanced past the lesser trochanteric region  This was confirmed on both AP and lateral films  Opening reamer was then used  A guidewire was then passed to the level of the physeal scar of the distal femur  We confirmed appropriate positioning at the distal femur to make sure we did not broach the anterior cortex  At this time, we did wean, with the fracture reduced  Once this was done, placed our true femoral nail  We did make an incision at this time for helical blade  After going through skin, fatty tissue, and fascia mil[place], were able to place our jig in the appropriate position  Guidewire was then placed at the appropriate position in the femoral head and this was confirmed on both AP and lateral films  We did use a lateral wall reamer and then a step drill  After measuring the appropriate size of our helical blade, a helical blade was then placed and it was confirmed to be in the appropriate position in the femoral head on both AP and lateral films  Perfect circles were obtained at the distal femur for distal locking screw placement  Appropriate drill was used and then the appropriate screw was placed at the static distal locking screw region  Final x-rays were then obtained and it was noted that our reduction was obtained and maintained with our construct  All wounds were copiously irrigated with normal saline  #1 Vicryl sutures for the fascial layer, 2-0 Vicryl sutures for the subcutaneous layer, and this was reinforced with staples  Wounds were cleaned and dried  Adaptic, 4 x 4, ABDs, Tegaderm was placed   Patient was awakened as anesthesia team, and transported to postanesthesia care unit in stable condition

## 2018-04-08 NOTE — SOCIAL WORK
CM met with Pt, her son and daughter-n-law with an introduction and explanation of role  Pt reported residing alone in a ranch style home with no use of DME, either 5 steps or a ramp to enter the home  Pt reported being independent with ADLs with no hx of VNA, SNF, mental health or drug/alcohol placements  Pt denied having a living will and reported using [employer] in [facility]  CM reviewed d/c planning process including the following: identifying help at home, patient preference for d/c planning needs, Discharge Lounge, [facility] Meds to Bed program, availability of treatment team to discuss questions or concerns patient and/or family may have regarding understanding medications and recognizing signs and symptoms once discharged  CM also encouraged patient to follow up with all recommended appointments after discharge  Patient advised of importance for patient and family to participate in managing patients medical well being

## 2018-04-08 NOTE — PLAN OF CARE
Problem: Potential for Falls  Goal: Patient will remain free of falls  INTERVENTIONS:  - Assess patient frequently for physical needs  -  Identify cognitive and physical deficits and behaviors that affect risk of falls  -  [place] fall precautions as indicated by assessment   - Educate patient/family on patient safety including physical limitations  - Instruct patient to call for assistance with activity based on assessment  - Modify environment to reduce risk of injury  - Consider OT/PT consult to assist with strengthening/mobility   Outcome: Progressing      Problem: SAFETY ADULT  Goal: Patient will remain free of falls  INTERVENTIONS:  - Assess patient frequently for physical needs  -  Identify cognitive and physical deficits and behaviors that affect risk of falls    -  [place] fall precautions as indicated by assessment   - Educate patient/family on patient safety including physical limitations  - Instruct patient to call for assistance with activity based on assessment  - Modify environment to reduce risk of injury  - Consider OT/PT consult to assist with strengthening/mobility   Outcome: Progressing

## 2018-04-08 NOTE — ASSESSMENT & PLAN NOTE
- No anginal symptoms  - 12 lead EKG reveals SR with PVC's  No ischemic changes noted  - Echocardiogram 10/2017 revealed LVEF 60-65%  Grade 1 diastolic dysfunction  Mild mitral/tricuspid regurgitation  Moderate aortic regurgitation  No evidence of aortic stenosis    - Continue home dose ASA 81mg

## 2018-04-08 NOTE — ED PROVIDER NOTES
History  Chief Complaint   Patient presents with    Fall     pt with shortening and external rotation of L hip following fall in shower   Hip Injury - Major     This is a 83 yo F with history of cardiac disease who presents today with fall and left hip injury  Patient states she was in the shower when she had a mechanical fall  She did not hit her head, no LOC  No headache or neck pain  Paramedic found patient in the bathtub with rotated left leg  Patient was in severe pain and received fentanyl  Currently states she cannot move her left leg  No numbness or tingling  No back pain,a bdominal pain, chest pain or shortness of breath  Impression: most likely fracture  Will xray and labs  Treat pain             Prior to Admission Medications   Prescriptions Last Dose Informant Patient Reported? Taking?    DULoxetine (CYMBALTA) 30 mg delayed release capsule   No Yes   Sig: Take 1 capsule (30 mg total) by mouth daily   Multiple Vitamin ([facility] DAILY MULTIPLE PO)   Yes Yes   Sig: Take by mouth   amLODIPine (NORVASC) 5 mg tablet   No Yes   Sig: Take 5mg every day except Sundays take 10mg   aspirin 81 MG tablet   Yes Yes   Sig: Take 81 mg by mouth daily   atenolol (TENORMIN) 50 mg tablet   No Yes   Sig: Take 1 tablet (50 mg total) by mouth daily   Patient taking differently: Take 25 mg by mouth daily     atorvastatin (LIPITOR) 40 mg tablet   No Yes   Sig: Take 1 tablet (40 mg total) by mouth daily   bumetanide (BUMEX) 1 mg tablet   No Yes   Sig: Take 1 tablet (1 mg total) by mouth daily   dicyclomine (BENTYL) 20 mg tablet   Yes Yes   Sig: Take 20 mg by mouth every 6 (six) hours   fexofenadine (ALLEGRA) 180 MG tablet   Yes Yes   Sig: Take by mouth   meloxicam (MOBIC) 7 5 mg tablet   Yes Yes   Sig: Take by mouth   methylcellulose (CITRUCEL) oral powder   Yes Yes   Sig: Take by mouth   montelukast (SINGULAIR) 10 mg tablet   Yes Yes   Sig: Take 1 tablet by mouth daily   pantoprazole (PROTONIX) 40 mg tablet   No Yes   Sig: Take 1 tablet (40 mg total) by mouth daily      Facility-Administered Medications: None       Past Medical History:   Diagnosis Date    Bl[facility] ([facility] [address] )     Coronary artery disease     S/P stent placement x 2 in 2011    GERD (gastroesophageal reflux disease)     Hepatitis     Hyperlipidemia     Hypertension     Kidney stones     Malignant neoplasm of urethra (HCC)     Pneumonia     Shingles        Past Surgical History:   Procedure Laterality Date    CORONARY ANGIOPLASTY WITH STENT PLACEMENT      stent x 2    CYSTOSCOPY      diagnostic - onset 4/4/17    HYSTERECTOMY         Family History   Problem Relation Age of Onset    Arthritis Mother     Osteoporosis Mother     Heart disease Father     Hypertension Father     Hypertension Brother      I have reviewed and agree with the history as documented  Social History   Substance Use Topics    Smoking status: Never Smoker    Smokeless tobacco: Never Used    Alcohol use No        Review of Systems   Constitutional: Negative  Negative for diaphoresis and fever  HENT: Negative  Respiratory: Negative  Negative for cough, shortness of breath and wheezing  Cardiovascular: Negative  Negative for chest pain, palpitations and leg swelling  Gastrointestinal: Negative for abdominal distention, abdominal pain, nausea and vomiting  Genitourinary: Negative  Musculoskeletal: Negative for neck pain  Left hip pain     Skin: Negative  Neurological: Negative  Psychiatric/Behavioral: Negative  All other systems reviewed and are negative        Physical Exam  ED Triage Vitals [04/07/18 1957]   Temperature Pulse Respirations Blood Pressure SpO2   98 4 °F (36 9 °C) 83 18 (!) 176/72 98 %      Temp Source Heart Rate Source Patient Position - Orthostatic VS BP Location FiO2 (%)   Oral Monitor Lying Right arm --      Pain Score       8           Orthostatic Vital Signs  Vitals:    04/07/18 2245 04/07/18 2345 04/08/18 0142 04/08/18 0722   BP: 156/73 (!) 185/83 134/63 123/60   Pulse: 98 91  94   Patient Position - Orthostatic VS: Lying   Lying       Physical Exam   Constitutional: She is oriented to person, place, and time  She appears well-developed  HENT:   Head: Normocephalic and atraumatic  Eyes: EOM are normal  Pupils are equal, round, and reactive to light  Neck: Normal range of motion  Neck supple  Cardiovascular: Normal rate, regular rhythm and normal heart sounds  No murmur heard  Pulmonary/Chest: Effort normal and breath sounds normal    Abdominal: Soft  Bowel sounds are normal  She exhibits no distension  There is no tenderness  There is no rebound and no guarding  Musculoskeletal: Normal range of motion  She exhibits tenderness  Left leg external rotated and shortened  2+ DP pulses  Normal cap refill    Neurological: She is alert and oriented to person, place, and time  Skin: Skin is warm and dry  Psychiatric: She has a normal mood and affect         ED Medications  Medications    EMS REPLENISHMENT MED (not administered)   amLODIPine (NORVASC) tablet 5 mg (5 mg Oral Given 4/8/18 0852)   aspirin chewable tablet 81 mg (not administered)   atenolol (TENORMIN) tablet 25 mg (25 mg Oral Given 4/8/18 0852)   atorvastatin (LIPITOR) tablet 40 mg (not administered)   dicyclomine (BENTYL) tablet 20 mg (20 mg Oral Not Given 4/8/18 0842)   DULoxetine (CYMBALTA) delayed release capsule 30 mg (30 mg Oral Given 4/8/18 0852)   pantoprazole (PROTONIX) EC tablet 40 mg (40 mg Oral Given 4/8/18 0511)   docusate sodium (COLACE) capsule 100 mg ( Oral MAR Unhold 4/8/18 1201)   senna (SENOKOT) tablet 8 6 mg ( Oral MAR Unhold 4/8/18 1201)   ondansetron (ZOFRAN) injection 4 mg ( Intravenous MAR Unhold 4/8/18 1201)   calcium carbonate (TUMS) chewable tablet 1,000 mg ( Oral MAR Unhold 4/8/18 1201)   simethicone (MYLICON) chewable tablet 80 mg ( Oral MAR Unhold 4/8/18 1201)   enoxaparin (LOVENOX) subcutaneous injection 40 mg ( Subcutaneous MAR Unhold 4/8/18 1201)   acetaminophen (TYLENOL) tablet 650 mg ( Oral MAR Unhold 4/8/18 1201)   oxyCODONE (ROXICODONE) IR tablet 5 mg ( Oral MAR Unhold 4/8/18 1201)   oxyCODONE (ROXICODONE) immediate release tablet 10 mg ( Oral MAR Unhold 4/8/18 1201)   morphine injection 2 mg ( Intravenous MAR Unhold 4/8/18 1201)   methocarbamol (ROBAXIN) tablet 500 mg ( Oral MAR Unhold 4/8/18 1201)   HYDROmorphone (DILAUDID) injection 1 mg (1 mg Intravenous Given 4/7/18 2140)   ondansetron (ZOFRAN) injection 4 mg (4 mg Intravenous Given 4/7/18 2139)   fentanyl citrate (PF) 100 MCG/2ML 50 mcg (50 mcg Intravenous Given 4/7/18 2239)   ceFAZolin (ANCEF) IVPB (premix) 2,000 mg (2,000 mg Intravenous Given 4/8/18 1231)   LORazepam (ATIVAN) 2 mg/mL injection 0 5 mg (0 5 mg Intravenous Given 4/7/18 2316)       Diagnostic Studies  Results Reviewed     Procedure Component Value Units Date/Time    Protime-INR [[id number]]  (Normal) Collected:  04/08/18 0203    Lab Status:  Final result Specimen:  Blood from Arm, Right Updated:  04/08/18 0235     Protime 13 1 seconds      INR 0 99    APTT [[id number]]  (Normal) Collected:  04/08/18 0203    Lab Status:  Final result Specimen:  Blood from Arm, Right Updated:  04/08/18 0235     PTT 30 seconds     Narrative: Therapeutic Heparin Range = 60-90 seconds    Basic metabolic panel [[id number]] Collected:  04/07/18 2030    Lab Status:  Final result Specimen:  Blood from Arm, Left Updated:  04/07/18 2056     Sodium 138 mmol/L      Potassium 3 8 mmol/L      Chloride 105 mmol/L      CO2 27 mmol/L      Anion Gap 6 mmol/L      BUN 23 mg/dL      Creatinine 0 74 mg/dL      Glucose 118 mg/dL      Calcium 8 7 mg/dL      eGFR 76 ml/min/1 73sq m     Narrative:         National Kidney Disease Education Program recommendations are as follows:  GFR calculation is accurate only with a steady state creatinine  Chronic Kidney disease less than 60 ml/min/1 73 sq  meters  Kidney failure less than 15 ml/min/1 73 sq  meters      CBC and differential [[id number]]  (Abnormal) Collected:  04/07/18 2030    Lab Status:  Final result Specimen:  Blood from Arm, Left Updated:  04/07/18 2040     WBC 10 37 (H) Thousand/uL      RBC 3 93 Million/uL      Hemoglobin 11 9 g/dL      Hematocrit 36 8 %      MCV 94 fL      MCH 30 3 pg      MCHC 32 3 g/dL      RDW 12 7 %      MPV 10 0 fL      Platelets 099 Thousands/uL      nRBC 0 /100 WBCs      Neutrophils Relative 78 (H) %      Lymphocytes Relative 14 %      Monocytes Relative 7 %      Eosinophils Relative 1 %      Basophils Relative 0 %      Neutrophils Absolute 8 11 (H) Thousands/µL      Lymphocytes Absolute 1 42 Thousands/µL      Monocytes Absolute 0 72 Thousand/µL      Eosinophils Absolute 0 06 Thousand/µL      Basophils Absolute 0 03 Thousands/µL                  XR chest 1 view   Final Result by Oliver Hancock MD (04/08 0732)      No acute cardiopulmonary disease  Workstation performed: [id number]         XR pelvis ap only 1 or 2 vw   ED Interpretation by Lavell Eid MD (04/07 2026)   Abnormal   The PELVIS was ordered by me and interpreted by me independently  On my read, it appears:   - Left proximal femur fx      Final Result by Oliver Hancock MD (04/08 1783)      Intertrochanteric, left hip fracture  As per comments in the PACS workstation, findings are concordant with preliminary interpretation provided by the emergency room physician  Workstation performed: [id number]         XR femur 2 views LEFT   Final Result by Oliver Hancock MD (04/08 4457)      Intertrochanteric hip fracture  As per comments in the PACS workstation, findings are concordant with preliminary interpretation provided by the emergency room physician                 Workstation performed: [id number]         XR femur 2 vw left    (Results Pending)         Procedures  Procedures      Phone Consults  ED Phone Contact    ED Course  ED Course as of Apr 08 1401   Sat Apr 07, 2018 2045 Spoke with ortho      2058   I have discussed the results with the patient  Patient states she she does not want anything for pain currently  Identification of Seniors at [address] [place] [address] Most Recent Value   (ISAR) Identification of Seniors at Risk   Before the illness or injury that brought you to the Emergency, did you need someone to help you on a regular basis? 0 Filed at: 04/07/2018 1959   In the last 24 hours, have you needed more help than usual?  0 Filed at: 04/07/2018 1959   Have you been hospitalized for one or more nights during the past 6 months? 0 Filed at: 04/07/2018 1959   In general, do you see well?  0 Filed at: 04/07/2018 1959   In general, do you have serious problems with your memory? 0 Filed at: 04/07/2018 1959   Do you take more than three different medications every day? 1 Filed at: 04/07/2018 1959   ISAR Score  1 Filed at: 04/07/2018 [address] Time    Disposition  Final diagnoses:   Closed left subtrochanteric femur fracture, initial encounter [facility])     Time reflects when diagnosis was documented in both MDM as applicable and the Disposition within this note     Time User Action Codes Description Comment    4/7/2018 10:00 PM Neal Baptiste 92  Closed left [facility]rochanteric femur [facility]     4/7/2018 10:36 PM Aashish Davis  7XXA] Medication side effect, initial encounter     4/7/2018 10:36 PM [place] T Modify [place] Dally  7XXA] Medication side effect, initial encounter     4/7/2018 10:36 PM [place] T Add [I10] Essential hypertension     4/7/2018 10:36 PM [place] T Modify [I10] Essential hypertension     4/7/2018 10:36 PM Ana Francisco [T88  7XXA] Medication side effect, initial encounter     4/7/2018 10:36 PM Gurdon Armin T Add [T88  7XXA] Medication side effect, initial encounter     4/7/2018 10:36 PM Gurdon [place] T Modify [place]  7XXA] Medication side effect, initial encounter     4/7/2018 10:36 PM Luis Fernando Mitchel[place]ung OMER Remove [I10] Essential hypertension     4/7/2018 10:36 PM Gould Raymond OMER Add [I10] Essential hypertension     4/7/2018 10:36 PM Luis Fernando [place] OMER Modify [I10] Essential hypertension     4/7/2018 10:38 PM Luis Fernando OMER Add [S72 002A] Closed fracture of left hip, initial encounter ([facility] [address] )     4/7/2018 10:38 PM Luis Fernando OMER Modify [S72 002A] Closed fracture of left hip, initial encounter [facility])       ED Disposition     None      Follow-up Information    None       Current Discharge Medication List      CONTINUE these medications which have NOT CHANGED    Details   amLODIPine (NORVASC) 5 mg tablet Take 5mg every day except Sundays take 10mg  Qty: 180 tablet, Refills: 0    Associated Diagnoses: Essential hypertension      aspirin 81 MG tablet Take 81 mg by mouth daily      atenolol (TENORMIN) 50 mg tablet Take 1 tablet (50 mg total) by mouth daily  Qty: 90 tablet, Refills: 0    Associated Diagnoses: Essential hypertension      atorvastatin (LIPITOR) 40 mg tablet Take 1 tablet (40 mg total) by mouth daily  Qty: 90 tablet, Refills: 0    Associated Diagnoses: Hyperlipidemia, unspecified hyperlipidemia type      bumetanide (BUMEX) 1 mg tablet Take 1 tablet (1 mg total) by mouth daily  Qty: 45 tablet, Refills: 0    Associated Diagnoses: Essential hypertension      dicyclomine (BENTYL) 20 mg tablet Take 20 mg by mouth every 6 (six) hours      DULoxetine (CYMBALTA) 30 mg delayed release capsule Take 1 capsule (30 mg total) by mouth daily  Qty: 30 capsule, Refills: 3    Associated Diagnoses: Depression, unspecified depression type      fexofenadine (ALLEGRA) 180 MG tablet Take by mouth      meloxicam (MOBIC) 7 5 mg tablet Take by mouth      methylcellulose (CITRUCEL) oral powder Take by mouth      montelukast (SINGULAIR) 10 mg tablet Take 1 tablet by mouth daily      Multiple Vitamin ([facility] DAILY MULTIPLE PO) Take by mouth      pantoprazole (PROTONIX) 40 mg tablet Take 1 tablet (40 mg total) by mouth daily  Qty: 90 tablet, Refills: 0    Associated Diagnoses: Gastroesophageal reflux disease, esophagitis presence not specified           No discharge procedures on file  ED Provider  Attending physically available and evaluated Roberto Sherif CORTEZ managed the patient along with the ED Attending      Electronically Signed by         Yo Santa MD  04/08/18 4428

## 2018-04-08 NOTE — CONSULTS
Consult- Darlene Zaman 1935, 82 y o  female MRN: [id number]    Unit/Bed#: CW3 351-01 Encounter: [id number]    Primary Care Provider: Mara Todd DO   Date and time admitted to hospital: 4/7/2018  7:47 PM      Consults    * Closed fracture of left hip ([address] )   Assessment & Plan    - S/P ground level mechanical fall in bathtub  - Care per primary team - plan for OR  - Pain control: Oxycodone and Morphine PRN, OTC Tylenol and Robaxin          Shingles   Assessment & Plan    - Initiate Valtrex 1000mg PO Q8hrs x 7 days  - Airborne/contact precautions  Gastroesophageal reflux disease without esophagitis   Assessment & Plan    - No symptoms currently  - Continue home dose protonix        Coronary artery disease without angina pectoris - S/P stent placement x 2 (2011)   Assessment & Plan    - No anginal symptoms  - 12 lead EKG reveals SR with PVC's  No ischemic changes noted  - Echocardiogram 10/2017 revealed LVEF 60-65%  Grade 1 diastolic dysfunction  Mild mitral/tricuspid regurgitation  Moderate aortic regurgitation  No evidence of aortic stenosis  - Continue home dose ASA 81mg          Hyperlipidemia   Assessment & Plan    - Well controlled on current regimen  - Continue home dose Lipitor        Essential hypertension   Assessment & Plan    - BP well controlled  - Continue home dose Norvasc and Atenolol  - Monitor BP per unit protocol          ---- Attending to complete pre-op risk stratification---      VTE Prophylaxis: Enoxaparin (Lovenox)  / foot pump applied     Recommendations for Discharge:  · Discharge per primary team      Counseling / Coordination of Care Time: 30 minutes  Greater than 50% of total time spent on patient counseling and coordination of care  Collaboration of Care:  Were Recommendations Directly Discussed with Primary Treatment Team? - No     History of Present Illness:    Darlene Zaman is a 82 y o  female with PMH of HTN, hyperlipidemia, GERD, IBS and bladder/urethral Ca that is currently in remission who is originally admitted to the Ortho surgery service on 4/7/2018 due to Lt intertrochanteric femur fracture s/p ground level mechanical fall  Denies any dizziness or LOC  Denies head injury  Denies CHP or palpitations  We are consulted for medical management and pre-op clearance     CV:   - Hx of CAD  No active anginal symptoms   - Last seen by Dr Radha Mendoza on 01/08/2017  Per records of that visit CAD is stable, BP well controlled, Echo revealed preserved LV systolic function  No need for repeat stress test unless patient has anginal symptoms  - 12 lead EKG reveals SR with occasional PVCs  No ischemic changes noted   - (+) murmur - known  Resp:  - No hx of asthma or COPD  - Does have reactive airway 2/2 seasonal allergies  Currently on Allegra daily and Singulair PRN  - BBSCTA  - CXR - No acute cardiopulmonary process noted  FEN:  - Electrolytes WNL  GI:  - Hx of IBS  Symptoms currently well controlled  - No active diarrhea/constipation      Review of Systems:    Review of Systems   Constitutional: Negative for activity change, appetite change, chills, fever and unexpected weight change  HENT: Negative for postnasal drip, rhinorrhea, sinus pressure and sore throat  Respiratory: Negative for cough, chest tightness and shortness of breath  Cardiovascular: Negative for chest pain, palpitations and leg swelling  Gastrointestinal: Positive for nausea  Negative for abdominal pain, constipation, diarrhea and vomiting  Genitourinary: Negative for difficulty urinating, dysuria and urgency  Musculoskeletal: Positive for back pain and gait problem  Negative for neck pain and neck stiffness  Skin: Positive for rash  Recurrence of shingles Rt flank   Neurological: Negative for dizziness, weakness, light-headedness and headaches         Past Medical and Surgical History:     Past Medical History:   Diagnosis Date    Bladder cancer ([facility] [address] )     Coronary artery disease     S/P stent placement x 2 in 2011    GERD (gastroesophageal reflux disease)     Hepatitis     Hyperlipidemia     Hypertension     Kidney stones     Malignant neoplasm of urethra (HCC)     Pneumonia     Shingles        Past Surgical History:   Procedure Laterality Date    CORONARY ANGIOPLASTY WITH STENT PLACEMENT      stent x 2    CYSTOSCOPY      diagnostic - onset 4/4/17    HYSTERECTOMY         Meds/Allergies:    all medications and allergies reviewed    Allergies: Allergies   Allergen Reactions    Lactose      Other reaction(s): Indigestion/GI Upset    Other      Other reaction(s): Mental Status Change  After surgery  Whole blood  = passed out   historical allergy; verify with patient       Social History:     Marital Status:     Substance Use History:   History   Alcohol Use No     History   Smoking Status    Never Smoker   Smokeless Tobacco    Never Used     History   Drug Use No       Family History:    Family History   Problem Relation Age of Onset    Arthritis Mother     Osteoporosis Mother     Heart disease Father     Hypertension Father     Hypertension Brother        Physical Exam:     Vitals:   Blood Pressure: 156/73 (04/07/18 2245)  Pulse: 98 (04/07/18 2245)  Temperature: 98 4 °F (36 9 °C) (04/07/18 1957)  Temp Source: Oral (04/07/18 1957)  Respirations: (!) 24 (04/07/18 2245)  Weight - Scale: 54 9 kg (121 lb) (04/07/18 1957)  SpO2: 98 % (04/07/18 2338)    Physical Exam   Constitutional: She is oriented to person, place, and time  She appears well-developed and well-nourished  She appears distressed  HENT:   Head: Normocephalic and atraumatic  Eyes: Conjunctivae and EOM are normal  Pupils are equal, round, and reactive to light  Neck: Normal range of motion  Neck supple  Cardiovascular: Normal rate, regular rhythm and intact distal pulses  Exam reveals no gallop and no friction rub  Murmur heard    Pulmonary/Chest: Effort normal and breath sounds normal  No respiratory distress  She has no wheezes  She has no rales  Abdominal: Soft  Bowel sounds are normal  She exhibits no distension  There is tenderness  There is no rebound and no guarding  Slight tenderness to BLQ and suprapubic area  States this is chronic  Musculoskeletal: Normal range of motion  She exhibits no edema or deformity  Neurological: She is alert and oriented to person, place, and time  Skin: Skin is warm and dry  No rash noted  No erythema  Psychiatric: She has a normal mood and affect  Additional Data:     Lab Results: I have personally reviewed pertinent reports  Results from last 7 days  Lab Units 04/07/18 2030   WBC Thousand/uL 10 37*   HEMOGLOBIN g/dL 11 9   HEMATOCRIT % 36 8   PLATELETS Thousands/uL 189   NEUTROS PCT % 78*   LYMPHS PCT % 14   MONOS PCT % 7   EOS PCT % 1       Results from last 7 days  Lab Units 04/07/18 2030   SODIUM mmol/L 138   POTASSIUM mmol/L 3 8   CHLORIDE mmol/L 105   CO2 mmol/L 27   BUN mg/dL 23   CREATININE mg/dL 0 74   CALCIUM mg/dL 8 7   GLUCOSE RANDOM mg/dL 118             No results found for: HGBA1C    Imaging: I have personally reviewed pertinent reports  XR pelvis ap only 1 or 2 vw   ED Interpretation by Kwesi Luna MD (04/07 2026)   Abnormal   The PELVIS was ordered by me and interpreted by me independently  On my read, it appears:   - Left proximal femur fx      XR femur 2 views LEFT    (Results Pending)   XR chest 1 view    (Results Pending)       EKG, Pathology, and Other Studies Reviewed on Admission:   · EKG: SR with occasional PVC  No acute ischemic changes noted  ** Please Note: This note has been constructed using a voice recognition system   **

## 2018-04-08 NOTE — ASSESSMENT & PLAN NOTE
- S/P ground level mechanical fall in bathtub  - Care per primary team - plan for OR  - Pain control: Oxycodone and Morphine PRN, OTC Tylenol and Robaxin

## 2018-04-08 NOTE — OCCUPATIONAL THERAPY NOTE
OCCUPATIONAL THERAPY SCREEN  Orders received and chart reviewed  Pt is pending sx s/p intertrochanteric fx L femur  Pt initially admitted s/p fall at home  OT will formally evaluate postop IMN L femur  Please reconsult postoperatively   Thank you  Nancy Bangura, OT

## 2018-04-08 NOTE — ANESTHESIA PREPROCEDURE EVALUATION
Review of Systems/Medical History  Patient summary reviewed        Cardiovascular  EKG reviewed, Hyperlipidemia, Hypertension , CAD, , Cardiac stents (X2 in 2011) > 1 year   Comment: LEFT VENTRICLE:  Systolic function was normal  Ejection fraction was estimated in the range of 60 % to 65 %  There were no regional wall motion abnormalities  Doppler parameters were consistent with abnormal left ventricular relaxation (grade 1 diastolic dysfunction)      MITRAL VALVE:  There was mild regurgitation      AORTIC VALVE:  There was mild to moderate regurgitation      TRICUSPID VALVE:  There was mild regurgitation  Estimated peak PA pressure was 43 mmHg      PULMONIC VALVE:  There was mild regurgitation  ,  Pulmonary  Pneumonia,        GI/Hepatic  Negative GI/hepatic ROS   GERD , Liver disease, , Hepatitis ,        Kidney stones,        Endo/Other  Negative endo/other ROS      GYN  Negative gynecology ROS          Hematology  Negative hematology ROS      Musculoskeletal  Negative musculoskeletal ROS        Neurology  Negative neurology ROS      Psychology   Negative psychology ROS              Physical Exam    Airway    Mallampati score: I  TM Distance: >3 FB  Neck ROM: full     Dental       Cardiovascular  Cardiovascular exam normal    Pulmonary  Pulmonary exam normal     Other Findings        Anesthesia Plan  ASA Score- 3     Anesthesia Type- general with ASA Monitors  Additional Monitors:   Airway Plan:         Plan Factors-    Induction- intravenous  Postoperative Plan-     Informed Consent- Anesthetic plan and risks discussed with patient  I personally reviewed this patient with the CRNA  Discussed and agreed on the Anesthesia Plan with the CRNA  Checo Devi

## 2018-04-08 NOTE — ED NOTES
Pt sleeping and O2 sat went down to 80  Admitting at bedside when episode happened, pt repositioned and placed on 3 L via NC and sat returned to 98 percent         Phoenix Griffiths, NORBERT  04/07/18 5644

## 2018-04-09 PROBLEM — D62 ACUTE BLOOD LOSS ANEMIA: Status: ACTIVE | Noted: 2018-04-09

## 2018-04-09 LAB
25(OH)D3 SERPL-MCNC: 47.9 NG/ML (ref 30–100)
ALBUMIN SERPL BCP-MCNC: 2.9 G/DL (ref 3.5–5)
ANION GAP SERPL CALCULATED.3IONS-SCNC: 4 MMOL/L (ref 4–13)
BASOPHILS # BLD AUTO: 0.01 THOUSANDS/ΜL (ref 0–0.1)
BASOPHILS NFR BLD AUTO: 0 % (ref 0–1)
BUN SERPL-MCNC: 24 MG/DL (ref 5–25)
CALCIUM SERPL-MCNC: 8.2 MG/DL (ref 8.3–10.1)
CHLORIDE SERPL-SCNC: 103 MMOL/L (ref 100–108)
CO2 SERPL-SCNC: 28 MMOL/L (ref 21–32)
CREAT SERPL-MCNC: 0.94 MG/DL (ref 0.6–1.3)
EOSINOPHIL # BLD AUTO: 0 THOUSAND/ΜL (ref 0–0.61)
EOSINOPHIL NFR BLD AUTO: 0 % (ref 0–6)
ERYTHROCYTE [DISTWIDTH] IN BLOOD BY AUTOMATED COUNT: 13.2 % (ref 11.6–15.1)
ERYTHROCYTE [SEDIMENTATION RATE] IN BLOOD: 28 MM/HOUR (ref 0–20)
GFR SERPL CREATININE-BSD FRML MDRD: 57 ML/MIN/1.73SQ M
GLUCOSE SERPL-MCNC: 117 MG/DL (ref 65–140)
HCT VFR BLD AUTO: 22 % (ref 34.8–46.1)
HCT VFR BLD AUTO: 28.6 % (ref 34.8–46.1)
HGB BLD-MCNC: 7.2 G/DL (ref 11.5–15.4)
HGB BLD-MCNC: 9.9 G/DL (ref 11.5–15.4)
LYMPHOCYTES # BLD AUTO: 1.65 THOUSANDS/ΜL (ref 0.6–4.47)
LYMPHOCYTES NFR BLD AUTO: 29 % (ref 14–44)
MCH RBC QN AUTO: 30.8 PG (ref 26.8–34.3)
MCHC RBC AUTO-ENTMCNC: 32.7 G/DL (ref 31.4–37.4)
MCV RBC AUTO: 94 FL (ref 82–98)
MONOCYTES # BLD AUTO: 0.81 THOUSAND/ΜL (ref 0.17–1.22)
MONOCYTES NFR BLD AUTO: 14 % (ref 4–12)
NEUTROPHILS # BLD AUTO: 3.22 THOUSANDS/ΜL (ref 1.85–7.62)
NEUTS SEG NFR BLD AUTO: 57 % (ref 43–75)
NRBC BLD AUTO-RTO: 1 /100 WBCS
PLATELET # BLD AUTO: 124 THOUSANDS/UL (ref 149–390)
PMV BLD AUTO: 9.7 FL (ref 8.9–12.7)
POTASSIUM SERPL-SCNC: 4 MMOL/L (ref 3.5–5.3)
PTH-INTACT SERPL-MCNC: 106.8 PG/ML (ref 18.4–80.1)
RBC # BLD AUTO: 2.34 MILLION/UL (ref 3.81–5.12)
SODIUM SERPL-SCNC: 135 MMOL/L (ref 136–145)
T4 FREE SERPL-MCNC: 1.75 NG/DL (ref 0.76–1.46)
TSH SERPL DL<=0.05 MIU/L-ACNC: 0.02 UIU/ML (ref 0.36–3.74)
WBC # BLD AUTO: 5.7 THOUSAND/UL (ref 4.31–10.16)

## 2018-04-09 PROCEDURE — 84165 PROTEIN E-PHORESIS SERUM: CPT | Performed by: PATHOLOGY

## 2018-04-09 PROCEDURE — G8978 MOBILITY CURRENT STATUS: HCPCS

## 2018-04-09 PROCEDURE — 30233N1 TRANSFUSION OF NONAUTOLOGOUS RED BLOOD CELLS INTO PERIPHERAL VEIN, PERCUTANEOUS APPROACH: ICD-10-PCS | Performed by: INTERNAL MEDICINE

## 2018-04-09 PROCEDURE — 85025 COMPLETE CBC W/AUTO DIFF WBC: CPT | Performed by: INTERNAL MEDICINE

## 2018-04-09 PROCEDURE — G8988 SELF CARE GOAL STATUS: HCPCS

## 2018-04-09 PROCEDURE — 84443 ASSAY THYROID STIM HORMONE: CPT | Performed by: ORTHOPAEDIC SURGERY

## 2018-04-09 PROCEDURE — 80048 BASIC METABOLIC PNL TOTAL CA: CPT | Performed by: INTERNAL MEDICINE

## 2018-04-09 PROCEDURE — 84165 PROTEIN E-PHORESIS SERUM: CPT | Performed by: INTERNAL MEDICINE

## 2018-04-09 PROCEDURE — 85652 RBC SED RATE AUTOMATED: CPT | Performed by: ORTHOPAEDIC SURGERY

## 2018-04-09 PROCEDURE — 84439 ASSAY OF FREE THYROXINE: CPT | Performed by: INTERNAL MEDICINE

## 2018-04-09 PROCEDURE — 99223 1ST HOSP IP/OBS HIGH 75: CPT | Performed by: INTERNAL MEDICINE

## 2018-04-09 PROCEDURE — G8979 MOBILITY GOAL STATUS: HCPCS

## 2018-04-09 PROCEDURE — G8987 SELF CARE CURRENT STATUS: HCPCS

## 2018-04-09 PROCEDURE — 84166 PROTEIN E-PHORESIS/URINE/CSF: CPT | Performed by: INTERNAL MEDICINE

## 2018-04-09 PROCEDURE — 99232 SBSQ HOSP IP/OBS MODERATE 35: CPT | Performed by: INTERNAL MEDICINE

## 2018-04-09 PROCEDURE — P9021 RED BLOOD CELLS UNIT: HCPCS

## 2018-04-09 PROCEDURE — 97167 OT EVAL HIGH COMPLEX 60 MIN: CPT

## 2018-04-09 PROCEDURE — 85014 HEMATOCRIT: CPT | Performed by: INTERNAL MEDICINE

## 2018-04-09 PROCEDURE — 82040 ASSAY OF SERUM ALBUMIN: CPT | Performed by: INTERNAL MEDICINE

## 2018-04-09 PROCEDURE — 85018 HEMOGLOBIN: CPT | Performed by: INTERNAL MEDICINE

## 2018-04-09 PROCEDURE — 84166 PROTEIN E-PHORESIS/URINE/CSF: CPT | Performed by: PATHOLOGY

## 2018-04-09 PROCEDURE — 83970 ASSAY OF PARATHORMONE: CPT | Performed by: ORTHOPAEDIC SURGERY

## 2018-04-09 PROCEDURE — 99024 POSTOP FOLLOW-UP VISIT: CPT | Performed by: ORTHOPAEDIC SURGERY

## 2018-04-09 PROCEDURE — 82306 VITAMIN D 25 HYDROXY: CPT | Performed by: ORTHOPAEDIC SURGERY

## 2018-04-09 PROCEDURE — 97163 PT EVAL HIGH COMPLEX 45 MIN: CPT

## 2018-04-09 RX ORDER — FUROSEMIDE 10 MG/ML
20 INJECTION INTRAMUSCULAR; INTRAVENOUS ONCE
Status: COMPLETED | OUTPATIENT
Start: 2018-04-09 | End: 2018-04-09

## 2018-04-09 RX ORDER — DIPHENHYDRAMINE HYDROCHLORIDE 50 MG/ML
25 INJECTION INTRAMUSCULAR; INTRAVENOUS ONCE
Status: COMPLETED | OUTPATIENT
Start: 2018-04-09 | End: 2018-04-09

## 2018-04-09 RX ORDER — SODIUM CHLORIDE 9 MG/ML
75 INJECTION, SOLUTION INTRAVENOUS CONTINUOUS
Status: DISPENSED | OUTPATIENT
Start: 2018-04-09 | End: 2018-04-09

## 2018-04-09 RX ORDER — METHIMAZOLE 5 MG/1
5 TABLET ORAL DAILY
Status: DISCONTINUED | OUTPATIENT
Start: 2018-04-10 | End: 2018-04-12 | Stop reason: HOSPADM

## 2018-04-09 RX ORDER — SODIUM CHLORIDE 9 MG/ML
75 INJECTION, SOLUTION INTRAVENOUS CONTINUOUS
Status: DISCONTINUED | OUTPATIENT
Start: 2018-04-09 | End: 2018-04-09

## 2018-04-09 RX ORDER — DICYCLOMINE HCL 20 MG
20 TABLET ORAL EVERY 6 HOURS PRN
Status: DISCONTINUED | OUTPATIENT
Start: 2018-04-09 | End: 2018-04-12 | Stop reason: HOSPADM

## 2018-04-09 RX ORDER — CALCIUM CARBONATE 200(500)MG
500 TABLET,CHEWABLE ORAL 2 TIMES DAILY
Status: DISCONTINUED | OUTPATIENT
Start: 2018-04-09 | End: 2018-04-12 | Stop reason: HOSPADM

## 2018-04-09 RX ADMIN — FUROSEMIDE 20 MG: 10 INJECTION, SOLUTION INTRAMUSCULAR; INTRAVENOUS at 14:07

## 2018-04-09 RX ADMIN — ACETAMINOPHEN 650 MG: 325 TABLET ORAL at 05:17

## 2018-04-09 RX ADMIN — METHOCARBAMOL 500 MG: 500 TABLET ORAL at 16:21

## 2018-04-09 RX ADMIN — ACETAMINOPHEN 650 MG: 325 TABLET ORAL at 00:39

## 2018-04-09 RX ADMIN — ATORVASTATIN CALCIUM 40 MG: 40 TABLET, FILM COATED ORAL at 17:45

## 2018-04-09 RX ADMIN — OXYCODONE HYDROCHLORIDE 10 MG: 10 TABLET ORAL at 12:30

## 2018-04-09 RX ADMIN — PANTOPRAZOLE SODIUM 40 MG: 40 TABLET, DELAYED RELEASE ORAL at 05:17

## 2018-04-09 RX ADMIN — MORPHINE SULFATE 2 MG: 2 INJECTION, SOLUTION INTRAMUSCULAR; INTRAVENOUS at 00:40

## 2018-04-09 RX ADMIN — DOCUSATE SODIUM 100 MG: 100 CAPSULE, LIQUID FILLED ORAL at 08:44

## 2018-04-09 RX ADMIN — MORPHINE SULFATE 2 MG: 2 INJECTION, SOLUTION INTRAMUSCULAR; INTRAVENOUS at 09:54

## 2018-04-09 RX ADMIN — ACETAMINOPHEN 650 MG: 325 TABLET ORAL at 17:45

## 2018-04-09 RX ADMIN — OXYCODONE HYDROCHLORIDE 10 MG: 10 TABLET ORAL at 16:21

## 2018-04-09 RX ADMIN — DIPHENHYDRAMINE HYDROCHLORIDE 25 MG: 50 INJECTION, SOLUTION INTRAMUSCULAR; INTRAVENOUS at 10:30

## 2018-04-09 RX ADMIN — CEFAZOLIN SODIUM 1000 MG: 1 SOLUTION INTRAVENOUS at 05:40

## 2018-04-09 RX ADMIN — DULOXETINE HYDROCHLORIDE 30 MG: 30 CAPSULE, DELAYED RELEASE ORAL at 08:43

## 2018-04-09 RX ADMIN — Medication 500 MG: at 17:45

## 2018-04-09 RX ADMIN — SODIUM CHLORIDE 250 ML: 0.9 INJECTION, SOLUTION INTRAVENOUS at 08:48

## 2018-04-09 RX ADMIN — ENOXAPARIN SODIUM 40 MG: 40 INJECTION SUBCUTANEOUS at 08:43

## 2018-04-09 RX ADMIN — OXYCODONE HYDROCHLORIDE 10 MG: 10 TABLET ORAL at 21:13

## 2018-04-09 RX ADMIN — DOCUSATE SODIUM 100 MG: 100 CAPSULE, LIQUID FILLED ORAL at 17:45

## 2018-04-09 RX ADMIN — ASPIRIN 81 MG 81 MG: 81 TABLET ORAL at 08:44

## 2018-04-09 RX ADMIN — OXYCODONE HYDROCHLORIDE 10 MG: 10 TABLET ORAL at 05:17

## 2018-04-09 RX ADMIN — SODIUM CHLORIDE 75 ML/HR: 0.9 INJECTION, SOLUTION INTRAVENOUS at 08:48

## 2018-04-09 NOTE — ASSESSMENT & PLAN NOTE
· Patient's hgb dropped from 11 9-->7 2 today  Recommending transfusing 2 units PRBC, d/w ortho  · Patient reports reaction of "passing out" from whole blood infusion >40 years ago after hysterectomy  · I discussed this with son who confirms this as well  I spoke with blood bank who confirms PRBC should be okay if run slow  Patient willing to accept blood transfusion   Consent done by ortho included in operative consent

## 2018-04-09 NOTE — PROGRESS NOTES
UO 250mL over last 24hrs with [place] cath in place  BP has been soft overnight  Creatinine increased to 0 94 this am from 0 74 yesterday  250mL of 5% Albumin admin last night  Will admin 250mL NS bolus now followed by 75mL/hr until tolerating PO diet well and urine output increases

## 2018-04-09 NOTE — PLAN OF CARE
Problem: PHYSICAL THERAPY ADULT  Goal: Performs mobility at highest level of function for planned discharge setting  See evaluation for individualized goals  Treatment/Interventions: Functional transfer training, LE strengthening/ROM, Therapeutic exercise, Endurance training, Patient/family training, Equipment eval/education, Bed mobility, Gait training, Spoke to nursing, OT  Equipment Recommended: Ramya Centeno       See flowsheet documentation for full assessment, interventions and recommendations  Prognosis: Good  Problem List: Decreased strength, Decreased range of motion, Decreased endurance, Impaired balance, Decreased mobility, Decreased skin integrity, Orthopedic restrictions, Pain  Assessment: Pt is 82 y o  female seen for PT evaluation s/p admit to [facility] on 4/7/2018 w/ Closed fracture of left hip (HCC) s/p fall in bath tub  Pt POD#1 L IM nailing PT consulted to assess pt's functional mobility and d/c needs  Order placed for PT eval and tx, w/ up as tolerated and WBAT L LE order  Comorbidities affecting pt's physical performance at time of assessment include: h/o bladder CA, CAD, HLD, HTN  PTA, pt was independent w/ all functional mobility w/ no AD  Personal factors affecting pt at time of IE include: stairs to enter home, inability to ambulate household distances, inability to navigate community distances, inability to navigate level surfaces w/o external assistance, limited home support, positive fall history, anxiety, inability to perform IADLs, inability to perform ADLs and inability to live alone  Please find objective findings from PT assessment regarding body systems outlined above with impairments and limitations including weakness, decreased ROM, impaired balance, decreased endurance, impaired coordination, gait deviations, pain, decreased activity tolerance, decreased functional mobility tolerance, fall risk, orthopedic restrictions and decreased skin integrity   The following objective measures performed on IE also reveal limitations: Barthel Index: 35/100  Pt's clinical presentation is currently unstable/unpredictable seen in pt's presentation of pain, IV meds, fall risk, significant decline from baseline, currently low hemoglobin requiring blood transfusion  Pt to benefit from continued PT tx to address deficits as defined above and maximize level of functional independent mobility and consistency  From PT/mobility standpoint, recommendation at time of d/c would be IP rehab pending progress in order to facilitate return to PLOF  Barriers to Discharge: Decreased caregiver support     Recommendation: Post acute IP rehab     PT - OK to Discharge: Yes (to rehab when medically cleared)    See flowsheet documentation for full assessment

## 2018-04-09 NOTE — PHYSICAL THERAPY NOTE
PHYSICAL THERAPY EVALUATION        Patient Name: Amanda Carrera  Today's Date: 4/9/2018         Patient Active Problem List   Diagnosis    Essential hypertension    Medication side effect, initial encounter    [facility] [facility]    [facility] fracture of left hip [facility] [address] [facility]    Hyperlipidemia    Coronary artery disease without angina pectoris - S/P stent placement x 2 (2011)    Gastroesophageal reflux disease without esophagitis    Shingles    Chronic diastolic (congestive) heart failure (HCC)    Acute blood loss anemia       Past Medical History:   Diagnosis Date    [facility]adder [facility] [address] [facility]     Coronary artery disease     S/P stent placement x 2 in 2011    GERD (gastroesophageal reflux disease)     Hepatitis     Hyperlipidemia     Hypertension     Kidney stones     Malignant neoplasm of urethra (HCC)     Pneumonia     Shingles        Past Surgical History:   Procedure Laterality Date    CORONARY ANGIOPLASTY WITH STENT PLACEMENT      stent x 2    CYSTOSCOPY      diagnostic - onset 4/4/17    HYSTERECTOMY        04/09/18 1022   Note Type   Note type Eval only   Pain Assessment   Pain Assessment 0-10   Pain Score 8   Pain Type Acute pain;Surgical pain   Pain Location Hip;Leg   Pain Orientation Left   Home Living   Type of [address] One level;Stairs to enter with rails; Ramped entrance  (5 [address]ALL[address])   Prior Function   Level of [place] Independent with ADLs and functional mobility   Lives With Alone   Receives Help From Family   ADL Assistance Independent   IADLs Needs assistance   Falls in the last 6 months 1 to 4   Comments pt reports no falls previous to this one   Restrictions/Precautions   Weight Bearing Precautions Per Order Yes   LLE Weight Bearing Per Order WBAT   Other Precautions Fall Risk;Pain;Multiple lines;WBS; Telemetry   General   Family/Caregiver Present Yes  (family at bedside)   Cognition   Overall Cognitive Status [facility]   Arousal/Participation Alert   Attention Attends with cues to redirect   Orientation Level Oriented X4   Memory Within functional limits   Following Commands Follows multistep commands with increased time or repetition   Comments pt very anxious, occasionally screaming when she was not being touched or moved; RN aware   RUE Assessment   RUE Assessment WFL   LUE Assessment   LUE Assessment WFL   RLE Assessment   RLE Assessment X  (AROM WFL)   Strength RLE   RLE Overall Strength 4/5   LLE Assessment   LLE Assessment X  (AROM ltd by pain/weakness/edema, dec  willingness to move)   Strength LLE   LLE Overall Strength 2-/5   Coordination   Sensation (reports 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 )   Light Touch   RLE Light Touch Grossly intact   LLE Light Touch Grossly intact   Bed Mobility   Supine to Sit 2  Maximal assistance   Additional items Assist x 2; Increased time required;Verbal cues;LE management   Sit to Supine 1  Dependent   Additional items Assist x 2; Increased time required;Verbal cues;LE management   Additional Comments pt c/o lightheadedness/dizziness/nausea at EOB  Pt resisting assist from PT/OT to return to bed, despite pt wanting to return to bed   Transfers   Sit to Stand 3  Moderate assistance   Additional items Assist x 2; Increased time required;Verbal cues   Stand to Sit 3  Moderate assistance   Additional items Assist x 2; Increased time required;Verbal cues   Additional Comments pt unable to initiate gait or side stepping toward [facility]   Balance   Static Sitting Fair -   Dynamic Sitting Poor +   Static Standing Poor   Dynamic Standing Poor -   Endurance Deficit   Endurance Deficit Yes   Endurance Deficit Description pain, weakness, self-limiting, low hemoglobin, anxiety   Activity Tolerance   Activity Tolerance Patient limited by pain; Patient limited by fatigue;Treatment limited secondary to medical complications (Comment)   Nurse Made Aware Rn confirmed pt appropriate for PT; discussed pt's progress and anxiety with RN   Assessment   Prognosis Good Problem List Decreased strength;Decreased range of motion;Decreased endurance; Impaired balance;Decreased mobility; Decreased skin integrity;Orthopedic restrictions;Pain   Assessment Pt is 82 y o  female seen for PT evaluation s/p admit to [facility] on 4/7/2018 w/ Closed fracture of left hip (HCC) s/p fall in bath tub  Pt POD#1 L IM nailing PT consulted to assess pt's functional mobility and d/c needs  Order placed for PT eval and tx, w/ up as tolerated and WBAT L LE order  Comorbidities affecting pt's physical performance at time of assessment include: h/o bladder CA, CAD, HLD, HTN  PTA, pt was independent w/ all functional mobility w/ no AD  Personal factors affecting pt at time of IE include: stairs to enter home, inability to ambulate household distances, inability to navigate community distances, inability to navigate level surfaces w/o external assistance, limited home support, positive fall history, anxiety, inability to perform IADLs, inability to perform ADLs and inability to live alone  Please find objective findings from PT assessment regarding body systems outlined above with impairments and limitations including weakness, decreased ROM, impaired balance, decreased endurance, impaired coordination, gait deviations, pain, decreased activity tolerance, decreased functional mobility tolerance, fall risk, orthopedic restrictions and decreased skin integrity  The following objective measures performed on IE also reveal limitations: Barthel Index: 35/100  Pt's clinical presentation is currently unstable/unpredictable seen in pt's presentation of pain, IV meds, fall risk, significant decline from baseline, currently low hemoglobin requiring blood transfusion  Pt to benefit from continued PT tx to address deficits as defined above and maximize level of functional independent mobility and consistency   From PT/mobility standpoint, recommendation at time of d/c would be IP rehab pending progress in order to facilitate return to PLOF  Barriers to Discharge Decreased caregiver support   Goals   Patient Goals to have less pain   STG Expiration Date 04/19/18   Short Term Goal #1 In 10 days: Pt will perform sup<>sit Antonio  Pt will perform sit<>stand and bed<>chair transfer Antonio with RW  Pt will progress to ambulation as tolerated and perform Antonio x50 ft with RW  Treatment Day 0   Plan   Treatment/Interventions Functional transfer training;LE strengthening/ROM; Therapeutic exercise; Endurance training;Patient/family training;Equipment eval/education; Bed mobility;Gait training;Spoke to nursing;OT   PT Frequency Other (Comment)  (6x/wk)   Recommendation   Recommendation Post acute IP rehab   Equipment Recommended Walker   PT - OK to Discharge Yes  (to rehab when medically cleared)   Barthel Index   Feeding 10   Bathing 0   Grooming Score 0   Dressing Score 0   Bladder Score 10   Bowels Score 10   Toilet Use Score 0   Transfers (Bed/Chair) Score 5   Mobility (Level Surface) Score 0   Stairs Score 0   Barthel Index Score 35         Delilah Onofre, PT

## 2018-04-09 NOTE — PROGRESS NOTES
Orthopedics   Viann August 82 y o  female MRN: [id number]  Unit/Bed#: CW3 351-01      Subjective:  82 y  o female post operative day 1 left femoral intramedullary nail  No current complaints, pain controlled      Labs:    0  Lab Value Date/Time   HCT 36 8 04/07/2018 2030   HCT 35 7 11/20/2017 1023   HCT 36 8 07/07/2017 0909   HGB 11 9 04/07/2018 2030   HGB 11 9 11/20/2017 1023   HGB 12 2 07/07/2017 0909   INR 0 99 04/08/2018 0203   WBC 10 37 (H) 04/07/2018 2030   WBC 3 98 (L) 11/20/2017 1023   WBC 4 41 07/07/2017 0909   ESR 16 07/07/2017 0909   CRP <3 0 05/23/2017 1129       Meds:    Current Facility-Administered Medications:      EMS REPLENISHMENT MED, , Does not apply, Once, Sandra Hughes MD    acetaminophen (TYLENOL) tablet 650 mg, 650 mg, Oral, Q6H [facility], Jaime Aldridge MD, 650 mg at 04/09/18 0517    amLODIPine (NORVASC) tablet 5 mg, 5 mg, Oral, Daily, Jaime Aldridge MD, 5 mg at 04/08/18 0574    aspirin chewable tablet 81 mg, 81 mg, Oral, Daily, Jaime Aldridge MD    atenolol (TENORMIN) tablet 25 mg, 25 mg, Oral, Daily, Jaime Aldridge MD, 25 mg at 04/08/18 5987    atorvastatin (LIPITOR) tablet 40 mg, 40 mg, Oral, After Charmayne Greenhouse, MD, 40 mg at 04/08/18 1800    calcium carbonate (TUMS) chewable tablet 1,000 mg, 1,000 mg, Oral, Daily PRN, Jaime Aldridge MD    dicyclomine (BENTYL) tablet 20 mg, 20 mg, Oral, Q6H, Jaime Aldridge MD    docusate sodium (COLACE) capsule 100 mg, 100 mg, Oral, BID, Jamie Aldridge MD, 100 mg at 04/08/18 1800    DULoxetine (CYMBALTA) delayed release capsule 30 mg, 30 mg, Oral, Daily, Jaime Aldridge MD, 30 mg at 04/08/18 0852    enoxaparin (LOVENOX) subcutaneous injection 40 mg, 40 mg, Subcutaneous, Daily, Jaime Aldridge MD    lactated ringers infusion, 75 mL/hr, Intravenous, Continuous, Milly Galicia PA-C, Last Rate: 75 mL/hr at 04/08/18 2158, 75 mL/hr at 04/08/18 2158    methocarbamol (ROBAXIN) tablet 500 mg, 500 mg, Oral, Q6H PRN, Antoine Rosales PA-C   morphine injection 2 mg, 2 mg, Intravenous, Q2H PRN, Kathleen Guardado MD, 2 mg at 04/09/18 0040    ondansetron (ZOFRAN) injection 4 mg, 4 mg, Intravenous, Q6H PRN, Kathleen Guardado MD    oxyCODONE (ROXICODONE) immediate release tablet 10 mg, 10 mg, Oral, Q4H PRN, Kathleen Guardado MD, 10 mg at 04/09/18 0517    oxyCODONE (ROXICODONE) IR tablet 5 mg, 5 mg, Oral, Q4H PRN, Kathleen Guardado MD, 5 mg at 04/08/18 2048    pantoprazole (PROTONIX) EC tablet 40 mg, 40 mg, Oral, Early Morning, Kathleen Guardado MD, 40 mg at 04/09/18 0517    senna (SENOKOT) tablet 8 6 mg, 1 tablet, Oral, HS PRN, Kathleen Guardado MD    simethicone (MYLICON) chewable tablet 80 mg, 80 mg, Oral, 4x Daily PRN, Kathleen Guardado MD    Blood Culture:   No results found for: BLOODCX    Wound Culture:   No results found for: WOUNDCULT    Ins and Outs:  I/O last 24 hours: In: 1380 [P O :480; I V :900]  Out: 2850 [Urine:2850]          Physical exam:  Vitals:    04/09/18 0314   BP: 109/54   Pulse: 87   Resp: 18   Temp: 97 8 °F (36 6 °C)   SpO2: 97%     left lower extremity  · Dressing clean dry intact  · Sensation intact L1-S1  · Motor intact to knee flexion/extension, EHL/FHL  · 2+ dorsalis pedis pulse    _*_*_*_*_*_*_*_*_*_*_*_*_*_*_*_*_*_*_*_*_*_*_*_*_*_*_*_*_*_*_*_*_*_*_*_*_*_*_*_*_*    Assessment: 82 y  o female post operative day 1 left femur IMN   Pt doing well    Plan:  · Up and out of bed  · Weightbearing as tolerated  · PT/OT  · DVT prophylaxis  · Analgesics  · Dispo: Ortho will follow  · Will continue to assess for acute blood loss anemia    Gary Rodríguez MD

## 2018-04-09 NOTE — PLAN OF CARE
Problem: OCCUPATIONAL THERAPY ADULT  Goal: Performs self-care activities at highest level of function for planned discharge setting  See evaluation for individualized goals  Treatment Interventions: ADL retraining, UE strengthening/ROM, Functional transfer training, Endurance training, Patient/family training, Equipment evaluation/education, Compensatory technique education, Activityengagement          See flowsheet documentation for full assessment, interventions and recommendations  Limitation: Decreased ADL status, Decreased Safe judgement during ADL, Decreased endurance, Decreased self-care trans, Decreased high-level ADLs  Prognosis: Good  Assessment: Pt is a 82 y o  female who was admitted to [facility] on 4/7/2018 with Closed fracture of left hip (HCC) 2* slip and fall in shower s/p IMN  Pt's problem list also includes PMH of HTN, previous surgery, cancer history and bladder ca, GERD, hepatitis, HLD, kidney stones, malignant neoplasm of urethra, pneumonia, shingles  At baseline pt was completing adls and mobility independently  Pt lives alone in ranch home with 5 steps or ramp  Currently pt requires mod to max assist for overall ADLS and mod to max x 2  for functional mobility/transfers  Pt currently presents with impairments in the following categories -limited home support, difficulty performing ADLS, difficulty performing IADLS , decreased initiation and engagement  and health management  activity tolerance, endurance, standing balance/tolerance, sitting balance/tolerance and safety    These impairments, as well as pt's fatigue, pain, orthopedic restricitions , WBS , decreased caregiver support and risk for falls  limit pt's ability to safely engage in all baseline areas of occupation, includingbathing, dressing, toileting, functional mobility/transfers, community mobility, laundry , house maintenance, medication management, meal prep, cleaning, social participation  and leisure activities From OT standpoint, recommend inpt rehab upon D/C  OT will continue to follow to address the below stated goals        OT Discharge Recommendation: Short Term Rehab

## 2018-04-09 NOTE — OCCUPATIONAL THERAPY NOTE
[address]  Evaluation     Patient Name: Devante Cole  Today's Date: 4/9/2018  Problem List  Patient Active Problem List   Diagnosis    Essential hypertension    Medication side effect, initial encounter    [facility]    [facility] fracture of [facility] hip [facility]    Hyperlipidemia    Coronary artery disease without angina pectoris - S/P stent placement x 2 (2011)    Gastroesophageal reflux disease without esophagitis    Shingles    Chronic diastolic (congestive) heart failure (HCC)    Acute blood loss anemia     Past Medical History  Past Medical History:   Diagnosis Date    Bladder cancer [facility] [address] [facility]     Coronary artery disease     S/P stent placement x 2 in 2011    GERD (gastroesophageal reflux disease)     Hepatitis     Hyperlipidemia     Hypertension     Kidney stones     Malignant neoplasm of urethra (HCC)     Pneumonia     Shingles      Past Surgical History  Past Surgical History:   Procedure Laterality Date    CORONARY ANGIOPLASTY WITH STENT PLACEMENT      stent x 2    CYSTOSCOPY      diagnostic - onset 4/4/17    HYSTERECTOMY      GA OPEN RX FEMUR FX+INTRAMED ALEJANDRO Left 4/8/2018    Procedure: INSERTION NAIL IM FEMUR ANTEGRADE (TROCHANTERIC); Surgeon: Zelda Reyes MD;  Location: [facility];  Service: Orthopedics         04/09/18 1025   Note Type   Note type Eval/Treat   Restrictions/Precautions   Weight Bearing Precautions Per Order Yes   RUE Weight Bearing Per Order WBAT   LUE Weight Bearing Per Order WBAT   RLE Weight Bearing Per Order WBAT   LLE Weight Bearing Per Order WBAT   Other Precautions Chair Alarm; Fall Risk;Pain   Pain Assessment   Pain Assessment 0-10   Pain Score 8   Pain Type Acute pain   Pain Location Hip   Pain Orientation Left   Hospital Pain Intervention(s) Repositioned; Ambulation/increased activity; Emotional support   Home Living   Type of [address];Stairs to enter with rails  (also has ramp access)   Prior Function Level of [place] Independent with ADLs and functional mobility   Lives With Alone   Receives Help From Family   ADL Assistance Independent   IADLs Needs assistance   Falls in the last 6 months 1 to 4   Vocational Retired   [address] [employer] PRN   Reciprocal Relationships SUPPORTIVE FAMILY   Service to Others RETIRED   Intrinsic Gratification MOSTLY SEDENTARY   Subjective   Subjective "I HAVE SO MUCH PAIN   ADL   Eating Assistance 5  Supervision/Setup   Grooming Assistance 5  Supervision/Setup   UB Bathing Assistance 3  Moderate Assistance   LB Bathing Assistance 2  Maximal Assistance   UB Dressing Assistance 3  Moderate Assistance   LB Dressing Assistance 2  Maximal [address]  2  Maximal Assistance   Bed Mobility   Supine to Sit 2  Maximal assistance   Additional items Assist x 2; Increased time required;Verbal cues   Sit to Supine 2  Maximal assistance   Additional items Assist x 2; Increased time required   Transfers   Sit to Stand 3  Moderate assistance   Additional items Assist x 2   Stand to Sit 3  Moderate assistance   Additional items Assist x 2   Stand pivot Unable to assess   Additional Comments STOOD WITH ASSIST X 2 - TOLERATED STANDING X 1 MIN - UNABLE TO TRANSITION OOB TO CHAIR    Balance   Static Sitting Fair   Dynamic Sitting Fair -   Static Standing Poor   Activity Tolerance   Activity Tolerance Patient limited by fatigue;Patient limited by pain;Treatment limited secondary to medical complications (Comment)   RUE Assessment   RUE Assessment WFL   LUE Assessment   LUE Assessment WFL   Cognition   Arousal/Participation Alert   Attention Attends with cues to redirect   Orientation Level Oriented X4   Memory Within functional limits   Following Commands Follows multistep commands with increased time or repetition   Comments ANXIOUS T/O SESSION RE: PAIN AND ANTICIPATION OF PAIN - SPOKE TO RN (ADAM) RE: POSSIBILITY OF ADDING ANTIANXIETY MEDICATION TO PAIN REGIME   Assessment   Limitation Decreased ADL status; Decreased Safe judgement during ADL;Decreased endurance;Decreased self-care trans;Decreased high-level ADLs   Prognosis Good   Assessment Pt is a 82 y o  female who was [facility] to [facility] on [facility]7/2018 with Closed fracture of left hip (HCC) 2* slip and fall in shower s/p IMN  Pt's problem list also includes PMH of HTN, previous surgery, cancer history and bladder ca, GERD, hepatitis, HLD, kidney stones, malignant neoplasm of urethra, pneumonia, shingles  At baseline pt was completing adls and mobility independently  Pt lives alone in ranch home with 5 steps or ramp  Currently pt requires mod to max assist for overall ADLS and mod to max x 2  for functional mobility/transfers  Pt currently presents with impairments in the following categories -limited home support, difficulty performing ADLS, difficulty performing IADLS , decreased initiation and engagement  and health management  activity tolerance, endurance, standing balance/tolerance, sitting balance/tolerance and safety   These impairments, as well as pt's fatigue, pain, orthopedic restricitions , WBS , decreased caregiver support and risk for falls  limit pt's ability to safely engage in all baseline areas of occupation, includingbathing, dressing, toileting, functional mobility/transfers, community mobility, laundry , house maintenance, medication management, meal prep, cleaning, social participation  and leisure activities  From OT standpoint, recommend inpt rehab upon D/C  OT will continue to follow to address the below stated goals  Goals   Patient Goals less pan    LTG Time Frame 10-14   Long Term Goal #1 REFER TO ESTABLISHED GOALS BELOW   Plan   Treatment Interventions ADL retraining;UE strengthening/ROM; Functional transfer training; Endurance training;Patient/family training;Equipment evaluation/education; Compensatory technique education; Activityengagement Goal Expiration Date 04/23/18   OT Frequency 3-5x/wk   Recommendation   OT Discharge Recommendation Short Term Rehab   Barthel Index   Feeding 10   Bathing 0   Grooming Score 0   Dressing Score 5   Bladder Score 5   Bowels Score 10   Toilet Use Score 5   Transfers (Bed/Chair) Score 5   Mobility (Level Surface) Score 0   Stairs Score 0   Barthel Index Score 40       OCCUPATIONAL THERAPY GOALS:    *MIN A  ADLS AFTER SETUP WITH USE OF ADAPTIVE DEVICES PRN  *MIN A  TOILETING AND CLOTHING MANAGEMENT   *MIN A  FUNCTIONAL MOB AND TRANSFERS TO ALL SURFACES WITH FAIR TO FAIR+ BALANCE/SAFETY FOR PARTICIPATION IN DYNAMIC ADLS   *DEMONSTRATE GOOD CARRYOVER WITH SAFE USE OF RW DURING FUNCTIONAL TASKS  *ASSESS DME NEEDS  *INCREASE ACTIVITY TOLERANCE TO 40-45 MIN FOR PARTICIPATION IN ADLS AND ENJOYABLE ACTIVITIES     *PT TO PARTICIPATE IN ONGOING FUNCTIONAL COGNITIVE ASSESSMENT WITH GOOD ATTENTION/PARTICIPATION TO ASSIST WITH SAFE D/C RECOMMENDATIONS     Ilya Rivera, OT

## 2018-04-09 NOTE — PROGRESS NOTES
Progress Note - Amanda Carrera 1935, 82 y o  female MRN: [id number]    Unit/Bed#: CW3 351-01 Encounter: [id number]    Primary Care Provider: Yuridia Luz DO   Date and time admitted to hospital: 4/7/2018  7:47 PM      * Closed fracture of left hip (HCC)   Assessment & Plan    · S/P ground level mechanical fall in bathtub  · Care per primary team -POD #1 s/p left femur IMN  · Pain control, DVT ppx, PT/OT  · Patient's hgb dropped from 11 9-->7 2 today  Recommending transfusing 2 units PRBC, d/w ortho  · Patient reports reaction of "passing out" from whole blood infusion >40 years ago after hysterectomy  · I discussed this with son who confirms this as well  I spoke with blood bank who confirms PRBC should be okay if run slow  Patient willing to accept blood transfusion  Consent done by ortho included in operative consent          Acute blood loss anemia   Assessment & Plan    · Patient's hgb dropped from 11 9-->7 2 today  Recommending transfusing 2 units PRBC, d/w ortho  · Patient reports reaction of "passing out" from whole blood infusion >40 years ago after hysterectomy  · I discussed this with son who confirms this as well  I spoke with blood bank who confirms PRBC should be okay if run slow  Patient willing to accept blood transfusion  Consent done by ortho included in operative consent        Chronic diastolic (congestive) heart failure ([facility] [address] )   Assessment & Plan    · bumex on hold currently  · Okay for some gentle IVF given low BPs   Would most benefit from blood  · Will give dose of IV lasix in between PRBC units        Shingles   Assessment & Plan    Per attending note, no need for tx since present >72 hours  Monitor symptoms         Gastroesophageal reflux disease without esophagitis   Assessment & Plan    - No symptoms currently  - Continue home dose protonix        Coronary artery disease without angina pectoris - S/P stent placement x 2 (2011)   Assessment & Plan    - No anginal symptoms  - 12 lead EKG reveals SR with PVC's  No ischemic changes noted  - Echocardiogram 10/2017 revealed LVEF 60-65%  Grade 1 diastolic dysfunction  Mild mitral/tricuspid regurgitation  Moderate aortic regurgitation  No evidence of aortic stenosis  - Continue home dose ASA 81mg          Hyperlipidemia   Assessment & Plan    - Well controlled on current regimen  - Continue home dose Lipitor        Essential hypertension   Assessment & Plan    - BP well controlled  - Continue home dose Norvasc and Atenolol  - Monitor BP per unit protocol          VTE Pharmacologic Prophylaxis:   Pharmacologic: Enoxaparin (Lovenox)  Mechanical VTE Prophylaxis in Place: Yes    Patient Centered Rounds: I have performed bedside rounds with nursing staff today  Discussions with Specialists or Other Care Team Provider: [facility] blood bank  Education and Discussions with Family / Patient: patient  Son updated via phone  Time Spent for Care: 30 minutes  More than 50% of total time spent on counseling and coordination of care as described above  Current Length of Stay: 2 day(s)    Current Patient Status: Inpatient     Discharge Plan: per primary service  Not yet medically stable  Needs Hgb better    Code Status: Level 1 - Full Code      Subjective:   Pt reports having some pain in L hip with any movement but okay at rest  She denies dizzines,lightheadedness, SOB, CP  She does feel very fatigued  I discussed her HGb level with her and she told me about her reaction to whole blood 40 years ago  Objective:     Vitals:   Temp (24hrs), Av 9 °F (36 6 °C), Min:97 6 °F (36 4 °C), Max:98 5 °F (36 9 °C)    HR:  [81-96] 96  Resp:  [9-20] 18  BP: ()/(52-76) 112/59  SpO2:  [89 %-100 %] 96 %  Body mass index is 23 99 kg/m²  Input and Output Summary (last 24 hours):        Intake/Output Summary (Last 24 hours) at 18 0923  Last data filed at 18 0600   Gross per 24 hour   Intake             1580 ml   Output              500 ml   Net 1080 ml       Physical Exam:     Physical Exam   Constitutional: She is oriented to person, place, and time  Fatigued    Cardiovascular: Normal rate and regular rhythm  Murmur heard  Pulmonary/Chest: Effort normal and breath sounds normal  No respiratory distress  She has no wheezes  Abdominal: Soft  Bowel sounds are normal  She exhibits no distension  There is no tenderness  Musculoskeletal: She exhibits no edema  L hip dressing c/d/i   Neurological: She is alert and oriented to person, place, and time  Skin: There is pallor  Additional Data:     Labs:      Results from last 7 days  Lab Units 04/09/18  0548   WBC Thousand/uL 5 70   HEMOGLOBIN g/dL 7 2*   HEMATOCRIT % 22 0*   PLATELETS Thousands/uL 124*   NEUTROS PCT % 57   LYMPHS PCT % 29   MONOS PCT % 14*   EOS PCT % 0       Results from last 7 days  Lab Units 04/09/18  0548   SODIUM mmol/L 135*   POTASSIUM mmol/L 4 0   CHLORIDE mmol/L 103   CO2 mmol/L 28   BUN mg/dL 24   CREATININE mg/dL 0 94   CALCIUM mg/dL 8 2*   GLUCOSE RANDOM mg/dL 117       Results from last 7 days  Lab Units 04/08/18  0203   INR  0 99       * I Have Reviewed All Lab Data Listed Above  * Additional Pertinent Lab Tests Reviewed:  All Labs Within Last 24 Hours Reviewed    Imaging:    Imaging Reports Reviewed Today Include: dio  Imaging Personally Reviewed by Myself Includes:  none    Recent Cultures (last 7 days):           Last 24 Hours Medication List:     Current Facility-Administered Medications:  EMS replenish medication  Does not apply Once Laura Gomez MD    acetaminophen 650 mg Oral Q6H Ritu Reina MD    amLODIPine 5 mg Oral Daily Bebo Bennett MD    aspirin 81 mg Oral Daily Bebo Bennett MD    atenolol 25 mg Oral Daily Bebo Bennett MD    atorvastatin 40 mg Oral After Belinda Yancey MD    calcium carbonate 1,000 mg Oral Daily PRN Bebo Bennett MD    dicyclomine 20 mg Oral Q6H Bebo Bennett MD    diphenhydrAMINE 25 mg Intravenous Once Aura Rodríguez PA-C    docusate sodium 100 mg Oral BID Debi Fear, MD    DULoxetine 30 mg Oral Daily Debi Fear, MD    enoxaparin 40 mg Subcutaneous Daily Debi Fear, MD    furosemide 20 mg Intravenous Once Aura Rodríguez PA-C    methocarbamol 500 mg Oral Q6H PRN Shreyas Suazo PA-C    morphine injection 2 mg Intravenous Q2H PRN Debi Fear, MD    ondansetron 4 mg Intravenous Q6H PRN Debi Fear, MD    oxyCODONE 10 mg Oral Q4H PRN Debi Fear, MD    oxyCODONE 5 mg Oral Q4H PRN Debi Fear, MD    pantoprazole 40 mg Oral Early Morning Debi Fear, MD    senna 1 tablet Oral HS PRN Debi Fear, MD    simethicone 80 mg Oral 4x Daily PRN Debi Fear, MD    sodium chloride 250 mL Intravenous Once Shreyas Suazo PA-C Last Rate: 250 mL (04/09/18 0848)   sodium chloride 75 mL/hr Intravenous Continuous Shreyas Suazo PA-C Last Rate: 75 mL/hr (04/09/18 0848)        Today, Patient Was Seen By: Aura Rodríguez PA-C    ** Please Note: Dictation voice to text software may have been used in the creation of this document   **

## 2018-04-09 NOTE — CASE MANAGEMENT
Initial Clinical Review    Admission: Date/Time/Statement: 4/7/18 @ 2236 Inpatient Written     Orders Placed This Encounter   Procedures    Inpatient Admission     Standing Status:   Standing     Number of Occurrences:   1     Order Specific Question:   Admitting Physician     Answer:   Antwan Cohen [[id number]]     Order Specific Question:   Level of Care     Answer:   Med Surg [16]     Order Specific Question:   Estimated length of stay     Answer:   More than 2 Midnights     Order Specific Question:   Certification     Answer:   I certify that inpatient services are medically necessary for this patient for a duration of greater than two midnights  See H&P and MD Progress Notes for additional information about the patient's course of treatment  ED: Date/Time/Mode of Arrival:   ED Arrival Information     Expected Arrival Acuity Means of Arrival Escorted By Service Admission Type    - 4/7/2018 19:46 Emergent Ambulance [facility] Emergency    Arrival Complaint    injury from fall          Chief Complaint:   Chief Complaint   Patient presents with   [facility] Fall     pt with shortening and external rotation of L hip following fall in shower   Hip Injury - Major       History of Illness: This is a 83 yo F with history of cardiac disease who presents today with fall and left hip injury  Patient states she was in the shower when she had a mechanical fall  She did not hit her head, no LOC  No headache or neck pain  Paramedic found patient in the bathtub with rotated left leg  Patient was in severe pain and received fentanyl  Currently states she cannot move her left leg       ED Vital Signs:   ED Triage Vitals   Temperature Pulse Respirations Blood Pressure SpO2   04/07/18 1957 04/07/18 1957 04/07/18 1957 04/07/18 1957 04/07/18 1957   98 4 °F (36 9 °C) 83 18 (!) 176/72 98 %      Temp Source Heart Rate Source Patient Position - Orthostatic VS BP Location FiO2 (%)   04/07/18 1957 04/07/18 1957 04/07/18 1957 04/07/18 1957 04/08/18 1356   Oral Monitor Lying Right arm 100      Pain Score       04/07/18 1957       8        Wt Readings from Last 1 Encounters:   04/08/18 57 6 kg (126 lb 15 8 oz)       Vital Signs (abnormal): /83    Abnormal Labs/Diagnostic Test Results:   WBC 10 37 (H)   Neutrophils Relative 78 (H)     Neutrophils Absolute 8 11 (H)     CXR:  NAD  XR left femur:  Intertrochanteric, left hip fracture  XR pelvis:  Intertrochanteric, left hip fracture  ED Treatment:   Medication Administration from 04/07/2018 1946 to 04/07/2018 2355       Date/Time Order Dose Route Action     04/07/2018 2140 HYDROmorphone (DILAUDID) injection 1 mg 1 mg Intravenous Given     04/07/2018 2139 ondansetron (ZOFRAN) injection 4 mg 4 mg Intravenous Given     04/07/2018 2239 fentanyl citrate (PF) 100 MCG/2ML 50 mcg 50 mcg Intravenous Given     04/07/2018 2316 LORazepam (ATIVAN) 2 mg/mL injection 0 5 mg 0 5 mg Intravenous Given          Past Medical/Surgical History: Active Ambulatory Problems     Diagnosis Date Noted    Essential hypertension 03/19/2018    Medication side effect, initial encounter 03/19/2018    Closed left hip fracture ([address] ) 04/07/2018     Resolved Ambulatory Problems     Diagnosis Date Noted    No Resolved Ambulatory Problems     Past Medical History:   Diagnosis Date    Bladder cancer ([address] )     Coronary artery disease     GERD (gastroesophageal reflux disease)     Hepatitis     Hyperlipidemia     Hypertension     Kidney stones     Malignant neoplasm of urethra ([place] [address] )     Pneumonia     Shingles        Admitting Diagnosis: Injury [T14 90XA]  Essential hypertension [I10]  Closed fracture of left hip, initial encounter ([place] [address] ) [S72 002A]  Medication side effect, initial encounter [T88  7XXA]  Closed left subtrochanteric femur fracture, initial encounter ([place] [address] ) [S72 22XA]    Age/Sex: 82 y o  female    Assessment:   82 y  o female status post fall in shower with leftIntertrochanteric femur fracture     Plan:   · Non weight bearing left lower extremity  · Analgesics for pain  · NPO at midnight  · [place] Catheter insertion  · Post op PT  · Medicine consult for all medical management and preoperative risk stratification  · To OR for IM nail femur fracture of Intertrochanteric femur fracture  · Dispo: Pending OR    Admission Orders:  NPO  OR for insertion nail IM femur antegrade  Non weight bearing  Incentive spirometry  Urinary catheter  Daily weights, I/O  Neurovascular checks q4h  Pt, ot  Consult internal med  Sequential compression device    Scheduled Meds:   Current Facility-Administered Medications:  EMS replenish medication  Does not apply Once   acetaminophen 650 mg Oral Q6H [address]   amLODIPine 5 mg Oral Daily   aspirin 81 mg Oral Daily   atenolol 25 mg Oral Daily   atorvastatin 40 mg Oral After Dinner   calcium carbonate 1,000 mg Oral Daily PRN   dicyclomine 20 mg Oral Q6H   docusate sodium 100 mg Oral BID   DULoxetine 30 mg Oral Daily   enoxaparin 40 mg Subcutaneous Daily   methocarbamol 500 mg Oral Q6H PRN   morphine injection 2 mg Intravenous Q2H PRN   ondansetron 4 mg Intravenous Q6H PRN   oxyCODONE 10 mg Oral Q4H PRN   oxyCODONE 5 mg Oral Q4H PRN   pantoprazole 40 mg Oral Early Morning   senna 1 tablet Oral HS PRN   simethicone 80 mg Oral 4x Daily PRN   sodium chloride 250 mL Intravenous Once   sodium chloride 75 mL/hr Intravenous Continuous     Continuous Infusions:   sodium chloride 75 mL/hr     PRN Meds: calcium carbonate    methocarbamol    morphine injection 2mg IV x1 thus far    ondansetron    oxyCODONE IR 10mg x2 thus far    oxyCODONE IR 5mg x2 thus far    senna    simethicone    Thank you,  [facility] in the [facility] by [address] for 2017  Network Utilization Review Department  Phone: [phone]; Fax [phone]  ATTENTION: The Network Utilization Review Department is now centralized for our 7 Facilities   Make a note that we have a new phone and fax numbers for our Department  Please call with any questions or concerns to [phone] and carefully follow the prompts so that you are directed to the right person  All voicemails are confidential  Fax any determinations, approvals, denials, and requests for initial or continue stay review clinical to [phone]  Due to HIGH CALL volume, it would be easier if you could please send faxed requests to expedite your requests and in part, help us provide discharge notifications faster

## 2018-04-09 NOTE — PROGRESS NOTES
Lor Bunch with [facility] notified pt still having low Bps and has c/o lightheadedness  Albumin to be administered  Will continue to monitor

## 2018-04-09 NOTE — CONSULTS
Consultation - Rian Gil 82 y o  female MRN: [id number]    Unit/Bed#: CW3 351-01 Encounter: [id number]      Assessment/Plan     Assessment: This is a 82y o -year-old female with vitamin D deficiency and left intratrochanteric femur fracture  Plan:    Osteoporosis with fracture of left hip  -Check PTH, Vit D, ESR, TSH/FT4, SPEP and UPEP  -will need DEXA as o/p, will need o/p Endo follow-up at d/c for discussion of treatment to prevent future fractures  -will need Vit D replacement, dose dependent on current level, will await final vit D results    Vitamin D deficiency  -will replete with dose dependent on most recent Vit D level    Consults    CC: Left hip fracture    History of Present Illness     HPI: Rian Gil is a 82y o  year old female with history of CLL, osteopenia, Vitamin D deficiency, urothelial cancer, CAD, and GERD who presented with a left hip fracture sustained during a fall in the shower at home  She reports that she has had osteopenia for at least ten years and has followed with a Rheumatologist who has previously prescribed Prolia and Boniva  She has been off both of these for at least three years although she does continue to take low dose vitamin D daily  She does not drink large amounts of carbonated beverages, she is lactose intolerant and only drinks small amounts of lactose free milk  She is a lifelong non-smoker and does not drink alcohol  Review of Systems   Constitutional: Negative for chills and fever  HENT: Negative  Respiratory: Negative for cough and shortness of breath  Cardiovascular: Negative for chest pain and palpitations  Gastrointestinal: Negative  Genitourinary: Negative  Musculoskeletal: Positive for arthralgias  Neurological: Negative  Psychiatric/Behavioral: Positive for confusion  Pt attributes to "10mg of oxy" administered prior to interview    All other systems reviewed and are negative        Historical Information   Past Medical History:   Diagnosis Date    Bladder cancer ([facility] [address] )     Coronary artery disease     S/P stent placement x 2 in 2011    GERD (gastroesophageal reflux disease)     Hepatitis     Hyperlipidemia     Hypertension     Kidney stones     Malignant neoplasm of urethra (HCC)     Pneumonia     Shingles      Past Surgical History:   Procedure Laterality Date    CORONARY ANGIOPLASTY WITH STENT PLACEMENT      stent x 2    CYSTOSCOPY      diagnostic - onset 4/4/17    HYSTERECTOMY       Social History   History   Alcohol Use No     History   Drug Use No     History   Smoking Status    Never Smoker   Smokeless Tobacco    Never Used     Family History:   Family History   Problem Relation Age of Onset    Arthritis Mother     Osteoporosis Mother     Heart disease Father     Hypertension Father     Hypertension Brother        Meds/Allergies   Current Facility-Administered Medications   Medication Dose Route Frequency Provider Last Rate Last Dose     EMS REPLENISHMENT MED   Does not apply Once Karin Henriquez MD        acetaminophen (TYLENOL) tablet 650 mg  650 mg Oral Q6H Selene Jane MD   650 mg at 04/09/18 0517    amLODIPine (NORVASC) tablet 5 mg  5 mg Oral Daily Kevin Pierce MD   5 mg at 04/08/18 4068    aspirin chewable tablet 81 mg  81 mg Oral Daily Kevin Pierce MD   81 mg at 04/09/18 0844    atenolol (TENORMIN) tablet 25 mg  25 mg Oral Daily Kevin Pierce MD   25 mg at 04/08/18 0852    atorvastatin (LIPITOR) tablet 40 mg  40 mg Oral After Barbara Owusu MD   40 mg at 04/08/18 1800    calcium carbonate (TUMS) chewable tablet 1,000 mg  1,000 mg Oral Daily PRN Kevin Pierce MD        dicyclomine (BENTYL) tablet 20 mg  20 mg Oral Q6H PRN Trisha De Jesus PA-C        docusate sodium (COLACE) capsule 100 mg  100 mg Oral BID Kevin Pierce MD   100 mg at 04/09/18 0844    DULoxetine (CYMBALTA) delayed release capsule 30 mg  30 mg Oral Daily Kevin Pierce MD   30 mg at 04/09/18 0843    enoxaparin (LOVENOX) subcutaneous injection 40 mg  40 mg Subcutaneous Daily Kevin Pierce MD   40 mg at 04/09/18 0843    furosemide (LASIX) injection 20 mg  20 mg Intravenous Once Trisha De Jesus PA-C        methocarbamol (ROBAXIN) tablet 500 mg  500 mg Oral Q6H PRN Ana Toney PA-C        morphine injection 2 mg  2 mg Intravenous Q2H PRN Kevin Pierce MD   2 mg at 04/09/18 0954    ondansetron (ZOFRAN) injection 4 mg  4 mg Intravenous Q6H PRN Kevin Pierce MD        oxyCODONE (ROXICODONE) immediate release tablet 10 mg  10 mg Oral Q4H PRN Kevin Pierce MD   10 mg at 04/09/18 1230    oxyCODONE (ROXICODONE) IR tablet 5 mg  5 mg Oral Q4H PRN Kevin Pierce MD   5 mg at 04/08/18 2048    pantoprazole (PROTONIX) EC tablet 40 mg  40 mg Oral Early Morning Kevin Pierce MD   40 mg at 04/09/18 0517    senna (SENOKOT) tablet 8 6 mg  1 tablet Oral HS PRN Kevin Pierce MD        simethicone (MYLICON) chewable tablet 80 mg  80 mg Oral 4x Daily PRN Kevin Pierce MD        sodium chloride 0 9 % infusion  75 mL/hr Intravenous Continuous Ana Toney PA-C   Stopped at 04/09/18 1217     Allergies   Allergen Reactions    Lactose      Other reaction(s): Indigestion/GI Upset    Other      Other reaction(s): Mental Status Change  After surgery  Whole blood  = passed out   historical allergy; verify with patient       Objective   Vitals: Blood pressure 101/52, pulse 101, temperature 98 °F (36 7 °C), temperature source Oral, resp  rate 18, height 5' 1" (1 549 m), weight 57 6 kg (126 lb 15 8 oz), SpO2 96 %  Intake/Output Summary (Last 24 hours) at 04/09/18 1333  Last data filed at 04/09/18 4523   Gross per 24 hour   Intake             1700 ml   Output              350 ml   Net             1350 ml     Invasive Devices     Peripheral Intravenous Line            Peripheral IV 04/07/18 Left Antecubital 1 day                Physical Exam   Constitutional: She is oriented to person, place, and time   She appears well-developed and well-nourished  No distress  HENT:   Head: Normocephalic and atraumatic  Eyes: Pupils are equal, round, and reactive to light  Right eye exhibits no discharge  Left eye exhibits no discharge  No scleral icterus  Neck: Normal range of motion  Neck supple  No JVD present  No tracheal deviation present  Cardiovascular: Normal rate, regular rhythm, normal heart sounds and intact distal pulses  Exam reveals no gallop and no friction rub  No murmur heard  Pulmonary/Chest: Effort normal    Abdominal: Soft  Bowel sounds are normal    Musculoskeletal: She exhibits deformity  LLE in surgical dressings   Neurological: She is alert and oriented to person, place, and time  Skin: Skin is warm and dry  She is not diaphoretic  Psychiatric: She has a normal mood and affect  Nursing note and vitals reviewed  The history was obtained from the review of the chart, patient  Lab Results:       Lab Results   Component Value Date    WBC 5 70 04/09/2018    HGB 7 2 (L) 04/09/2018    HCT 22 0 (L) 04/09/2018    MCV 94 04/09/2018     (L) 04/09/2018     Lab Results   Component Value Date/Time    BUN 24 04/09/2018 05:48 AM     (L) 04/09/2018 05:48 AM    K 4 0 04/09/2018 05:48 AM     04/09/2018 05:48 AM    CO2 28 04/09/2018 05:48 AM    CREATININE 0 94 04/09/2018 05:48 AM    AST 16 11/20/2017 10:23 AM    ALT 29 11/20/2017 10:23 AM    ALB 3 3 (L) 11/20/2017 10:23 AM     No results for input(s): CHOL, HDL, LDL, TRIG, VLDL in the last 72 hours  No results found for: Timur Herron  No results found for: POCGLU    Imaging Studies: I have personally reviewed pertinent reports  Portions of the record may have been created with voice recognition software

## 2018-04-09 NOTE — PROGRESS NOTES
04/09/18 1100   Clinical Encounter Type   Visited With Patient; Family   Routine Visit Introduction   Referral From    Referral To [phone] Encounters   Communion Patient wants communion   Patient Spiritual Encounters   Spiritual Encounter Notes Cultivated a relationship of care and support;     [employer]  Provided prayer

## 2018-04-09 NOTE — ASSESSMENT & PLAN NOTE
· bumex on hold currently  · Okay for some gentle IVF given low BPs   Would most benefit from blood  · Will give dose of IV lasix in between PRBC units

## 2018-04-10 PROBLEM — M81.0 OSTEOPOROSIS: Chronic | Status: ACTIVE | Noted: 2018-04-10

## 2018-04-10 PROBLEM — E87.0 HYPERNATREMIA: Status: ACTIVE | Noted: 2018-04-10

## 2018-04-10 PROBLEM — E05.90 HYPERTHYROIDISM: Status: ACTIVE | Noted: 2018-04-10

## 2018-04-10 LAB
ABO GROUP BLD BPU: NORMAL
ABO GROUP BLD BPU: NORMAL
ANION GAP SERPL CALCULATED.3IONS-SCNC: 5 MMOL/L (ref 4–13)
BPU ID: NORMAL
BPU ID: NORMAL
BUN SERPL-MCNC: 28 MG/DL (ref 5–25)
CALCIUM SERPL-MCNC: 8.5 MG/DL
CHLORIDE SERPL-SCNC: 101 MMOL/L (ref 100–108)
CO2 SERPL-SCNC: 29 MMOL/L (ref 21–32)
CREAT SERPL-MCNC: 0.78 MG/DL (ref 0.6–1.3)
CROSSMATCH: NORMAL
CROSSMATCH: NORMAL
ERYTHROCYTE [DISTWIDTH] IN BLOOD BY AUTOMATED COUNT: 14.7 % (ref 11.6–15.1)
GFR SERPL CREATININE-BSD FRML MDRD: 72 ML/MIN/1.73SQ M
GLUCOSE SERPL-MCNC: 118 MG/DL (ref 65–140)
HCT VFR BLD AUTO: 27.9 % (ref 34.8–46.1)
HGB BLD-MCNC: 9.3 G/DL (ref 11.5–15.4)
MCH RBC QN AUTO: 30.1 PG (ref 26.8–34.3)
MCHC RBC AUTO-ENTMCNC: 33.3 G/DL (ref 31.4–37.4)
MCV RBC AUTO: 90 FL (ref 82–98)
PLATELET # BLD AUTO: 120 THOUSANDS/UL (ref 149–390)
PMV BLD AUTO: 10 FL (ref 8.9–12.7)
POTASSIUM SERPL-SCNC: 3.9 MMOL/L (ref 3.5–5.3)
RBC # BLD AUTO: 3.09 MILLION/UL (ref 3.81–5.12)
SODIUM SERPL-SCNC: 135 MMOL/L (ref 136–145)
UNIT DISPENSE STATUS: NORMAL
UNIT DISPENSE STATUS: NORMAL
UNIT PRODUCT CODE: NORMAL
UNIT PRODUCT CODE: NORMAL
UNIT RH: NORMAL
UNIT RH: NORMAL
WBC # BLD AUTO: 9.32 THOUSAND/UL (ref 4.31–10.16)

## 2018-04-10 PROCEDURE — 84445 ASSAY OF TSI GLOBULIN: CPT | Performed by: INTERNAL MEDICINE

## 2018-04-10 PROCEDURE — 99232 SBSQ HOSP IP/OBS MODERATE 35: CPT | Performed by: INTERNAL MEDICINE

## 2018-04-10 PROCEDURE — 86800 THYROGLOBULIN ANTIBODY: CPT | Performed by: INTERNAL MEDICINE

## 2018-04-10 PROCEDURE — 86376 MICROSOMAL ANTIBODY EACH: CPT | Performed by: INTERNAL MEDICINE

## 2018-04-10 PROCEDURE — 80048 BASIC METABOLIC PNL TOTAL CA: CPT | Performed by: INTERNAL MEDICINE

## 2018-04-10 PROCEDURE — 85027 COMPLETE CBC AUTOMATED: CPT | Performed by: PHYSICIAN ASSISTANT

## 2018-04-10 PROCEDURE — 99024 POSTOP FOLLOW-UP VISIT: CPT | Performed by: ORTHOPAEDIC SURGERY

## 2018-04-10 RX ADMIN — ACETAMINOPHEN 650 MG: 325 TABLET ORAL at 23:10

## 2018-04-10 RX ADMIN — METHOCARBAMOL 500 MG: 500 TABLET ORAL at 01:10

## 2018-04-10 RX ADMIN — DOCUSATE SODIUM 100 MG: 100 CAPSULE, LIQUID FILLED ORAL at 09:16

## 2018-04-10 RX ADMIN — METHOCARBAMOL 500 MG: 500 TABLET ORAL at 16:23

## 2018-04-10 RX ADMIN — PANTOPRAZOLE SODIUM 40 MG: 40 TABLET, DELAYED RELEASE ORAL at 06:03

## 2018-04-10 RX ADMIN — Medication 500 MG: at 09:15

## 2018-04-10 RX ADMIN — DULOXETINE HYDROCHLORIDE 30 MG: 30 CAPSULE, DELAYED RELEASE ORAL at 09:15

## 2018-04-10 RX ADMIN — OXYCODONE HYDROCHLORIDE 10 MG: 10 TABLET ORAL at 01:10

## 2018-04-10 RX ADMIN — Medication 500 MG: at 17:28

## 2018-04-10 RX ADMIN — METHOCARBAMOL 500 MG: 500 TABLET ORAL at 09:14

## 2018-04-10 RX ADMIN — ACETAMINOPHEN 650 MG: 325 TABLET ORAL at 12:03

## 2018-04-10 RX ADMIN — ENOXAPARIN SODIUM 40 MG: 40 INJECTION SUBCUTANEOUS at 09:16

## 2018-04-10 RX ADMIN — METHOCARBAMOL 500 MG: 500 TABLET ORAL at 22:47

## 2018-04-10 RX ADMIN — ACETAMINOPHEN 650 MG: 325 TABLET ORAL at 17:28

## 2018-04-10 RX ADMIN — ACETAMINOPHEN 650 MG: 325 TABLET ORAL at 01:10

## 2018-04-10 RX ADMIN — ATORVASTATIN CALCIUM 40 MG: 40 TABLET, FILM COATED ORAL at 17:28

## 2018-04-10 RX ADMIN — ACETAMINOPHEN 650 MG: 325 TABLET ORAL at 06:03

## 2018-04-10 RX ADMIN — OXYCODONE HYDROCHLORIDE 10 MG: 10 TABLET ORAL at 12:03

## 2018-04-10 RX ADMIN — OXYCODONE HYDROCHLORIDE 10 MG: 10 TABLET ORAL at 22:47

## 2018-04-10 RX ADMIN — DOCUSATE SODIUM 100 MG: 100 CAPSULE, LIQUID FILLED ORAL at 17:28

## 2018-04-10 RX ADMIN — OXYCODONE HYDROCHLORIDE 10 MG: 10 TABLET ORAL at 16:23

## 2018-04-10 RX ADMIN — OXYCODONE HYDROCHLORIDE 10 MG: 10 TABLET ORAL at 06:03

## 2018-04-10 RX ADMIN — ASPIRIN 81 MG 81 MG: 81 TABLET ORAL at 09:16

## 2018-04-10 RX ADMIN — METHIMAZOLE 5 MG: 5 TABLET ORAL at 09:16

## 2018-04-10 NOTE — PROGRESS NOTES
Progress Note - Chavez Strickland 1935, 82 y o  female MRN: [id number]    Unit/Bed#: CW3 351-01 Encounter: [id number]    Primary Care Provider: Jeffery Luong DO   Date and time admitted to hospital: 4/7/2018  7:47 PM     Patient is medically cleared from [address] perspective  Please start methimazole at dc per endocrinology instructions  She will need repeat T4 in 4 weeks and f/u with endocrinology in 6 weeks  (placed in discharge instructions ) She can resume bumex at discharge  All other medications should stay same  Please call with any questions or concerns    * Closed fracture of left hip ([address] )   Assessment & Plan    · S/P ground level mechanical fall in bathtub  · Care per primary team -POD #2 s/p left femur IMN  · Pain control, DVT ppx, PT/OT  · Patient's hgb dropped from 11 9-->7 2 yesterday  Received 2 units PRBC with improvement to 9 3 today          Hyperthyroidism   Assessment & Plan    · Endocrinology following  · Started on methimazole  · Needs repeat FT4 in 4 weeks and f u with endocrinology in 6 weeks        Acute blood loss anemia   Assessment & Plan    · Patient's hgb dropped from 11 9-->7 2 yesterday  S/P 2 units PRBCs with improvement to 9 3  · Patient reports reaction of "passing out" from whole blood infusion >40 years ago after hysterectomy  · I discussed this with son who confirmed this as well  I spoke with blood bank who confirms PRBC should be okay if run slow  Patient tolerated transfusion well  Osteoporosis   Assessment & Plan    · Endo following  W/u ongoing with SPEP,UPEP, PTH, albumin and vit D level        Chronic diastolic (congestive) heart failure (HCC)   Assessment & Plan    · bumex on hold  · Okay for some gentle IVF per ortho   Got dose of IV lasix in between blood transfusions  · Resume bumex at dc        Gastroesophageal reflux disease without esophagitis   Assessment & Plan    - No symptoms currently  - Continue home dose protonix        Coronary artery disease without angina pectoris - S/P stent placement x 2 ()   Assessment & Plan    - No anginal symptoms  - 12 lead EKG reveals SR with PVC's  No ischemic changes noted  - Echocardiogram 10/2017 revealed LVEF 60-65%  Grade 1 diastolic dysfunction  Mild mitral/tricuspid regurgitation  Moderate aortic regurgitation  No evidence of aortic stenosis  - Continue home dose ASA 81mg          Hyperlipidemia   Assessment & Plan    - Well controlled on current regimen  - Continue home dose Lipitor        Essential hypertension   Assessment & Plan    - BP well controlled  - Continue home dose Norvasc and Atenolol  - Monitor BP per unit protocol          VTE Pharmacologic Prophylaxis:   Pharmacologic: Enoxaparin (Lovenox)  Mechanical VTE Prophylaxis in Place: Yes    Patient Centered Rounds: I have performed bedside rounds with nursing staff today  Education and Discussions with Family / Patient: patient  Time Spent for Care: 30 minutes  More than 50% of total time spent on counseling and coordination of care as described above  Current Length of Stay: 3 day(s)    Current Patient Status: Inpatient   Certification Statement: Per primary team     Discharge Plan: patient is medically stable from [address]  Will sign off  Code Status: Level 1 - Full Code      Subjective:   Pt reports feeling well today  Still tired but improved since getting blood  Denies any SOB, CP, palpitations  Pain stable at rest but increases with movement  Objective:     Vitals:   Temp (24hrs), Av 9 °F (37 2 °C), Min:98 °F (36 7 °C), Max:100 3 °F (37 9 °C)    HR:  [] 110  Resp:  [18-20] 18  BP: (100-140)/(52-75) 100/52  SpO2:  [93 %-97 %] 94 %  Body mass index is 25 91 kg/m²  Input and Output Summary (last 24 hours):        Intake/Output Summary (Last 24 hours) at 04/10/18 0912  Last data filed at 04/10/18 0757   Gross per 24 hour   Intake             1272 ml   Output             1100 ml   Net              172 ml Physical Exam:     Physical Exam   Constitutional: She is oriented to person, place, and time  No distress  Cardiovascular: Normal rate and regular rhythm  Murmur heard  Pulmonary/Chest: Effort normal and breath sounds normal    Abdominal: Soft  Bowel sounds are normal  She exhibits no distension  There is no tenderness  Musculoskeletal: She exhibits no edema  LLE dressing c/d/i  Pain with ROM   Neurological: She is alert and oriented to person, place, and time  Skin: Skin is warm and dry  She is not diaphoretic  Psychiatric: She has a normal mood and affect  Nursing note and vitals reviewed  Additional Data:     Labs:      Results from last 7 days  Lab Units 04/10/18  0528  04/09/18  0548   WBC Thousand/uL 9 32  --  5 70   HEMOGLOBIN g/dL 9 3*  < > 7 2*   HEMATOCRIT % 27 9*  < > 22 0*   PLATELETS Thousands/uL 120*  --  124*   NEUTROS PCT %  --   --  57   LYMPHS PCT %  --   --  29   MONOS PCT %  --   --  14*   EOS PCT %  --   --  0   < > = values in this interval not displayed  Results from last 7 days  Lab Units 04/10/18  0528   SODIUM mmol/L 135*   POTASSIUM mmol/L 3 9   CHLORIDE mmol/L 101   CO2 mmol/L 29   BUN mg/dL 28*   CREATININE mg/dL 0 78   CALCIUM mg/dL 8 5   GLUCOSE RANDOM mg/dL 118       Results from last 7 days  Lab Units 04/08/18  0203   INR  0 99       * I Have Reviewed All Lab Data Listed Above  * Additional Pertinent Lab Tests Reviewed:  All Labs Within Last 24 Hours Reviewed    Imaging:    Imaging Reports Reviewed Today Include: none  Imaging Personally Reviewed by Myself Includes:  none    Recent Cultures (last 7 days):           Last 24 Hours Medication List:     Current Facility-Administered Medications:  EMS replenish medication  Does not apply Once Georges Quispe MD   acetaminophen 650 mg Oral Q6H Skinny Hanna MD   amLODIPine 5 mg Oral Daily Bg Desai MD   aspirin 81 mg Oral Daily Bg Desai MD   atenolol 25 mg Oral Daily Bg Desai MD atorvastatin 40 mg Oral After Tiki Dumont MD   calcium carbonate 1,000 mg Oral Daily PRN Heather Ceballos MD   calcium carbonate 500 mg Oral BID Render Iglesias, DO   dicyclomine 20 mg Oral Q6H PRN Alyce Smith PA-C   docusate sodium 100 mg Oral BID Heather Ceballos MD   DULoxetine 30 mg Oral Daily Heather Ceballos MD   enoxaparin 40 mg Subcutaneous Daily Heather Ceballos MD   methimazole 5 mg Oral Daily Render Iglesias, DO   methocarbamol 500 mg Oral Q6H PRN Gary Frey PA-C   morphine injection 2 mg Intravenous Q2H PRN Heather Ceballos MD   ondansetron 4 mg Intravenous Q6H PRN Heather Ceballos MD   oxyCODONE 10 mg Oral Q4H PRN Heather Ceballos MD   oxyCODONE 5 mg Oral Q4H PRN Heather Ceballos MD   pantoprazole 40 mg Oral Early Morning Heather Ceballos MD   senna 1 tablet Oral HS PRN Heather Ceballos MD   simethicone 80 mg Oral 4x Daily PRN Heather Ceballos MD        Today, Patient Was Seen By: Sg Vogel PA-C    ** Please Note: Dictation voice to text software may have been used in the creation of this document   **

## 2018-04-10 NOTE — ASSESSMENT & PLAN NOTE
· Endocrinology following  · Started on methimazole  · Needs repeat FT4 in 4 weeks and f u with endocrinology in 6 weeks

## 2018-04-10 NOTE — SOCIAL WORK
CM met with patient, to discuss discharge plan of going to rehab prior to discharge  Preferred providers list given to patient with explanation  Patient would like to talk this over with her family, CM will meet up early in am for  choices

## 2018-04-10 NOTE — PROGRESS NOTES
Orthopedics   Erendira Roberts 82 y o  female MRN: [id number]  Unit/Bed#: CW3 351-01      Subjective:  82 y  o female post operative day 2 left femoral intramedullary nail   No O/N events    Labs:    0  Lab Value Date/Time   HCT 27 9 (L) 04/10/2018 0528   HCT 28 6 (L) 04/09/2018 1829   HCT 22 0 (L) 04/09/2018 0548   HGB 9 3 (L) 04/10/2018 0528   HGB 9 9 (L) 04/09/2018 1829   HGB 7 2 (L) 04/09/2018 0548   INR 0 99 04/08/2018 0203   WBC 9 32 04/10/2018 0528   WBC 5 70 04/09/2018 0548   WBC 10 37 (H) 04/07/2018 2030   ESR 28 (H) 04/09/2018 1829   CRP <3 0 05/23/2017 1129       Meds:    Current Facility-Administered Medications:      EMS REPLENISHMENT MED, , Does not apply, Once, Aimee Patino MD    acetaminophen (TYLENOL) tablet 650 mg, 650 mg, Oral, Q6H [facility], Heather Ceballos MD, 650 mg at 04/10/18 0603    amLODIPine (NORVASC) tablet 5 mg, 5 mg, Oral, Daily, Heather Ceballos MD, 5 mg at 04/08/18 7308    aspirin chewable tablet 81 mg, 81 mg, Oral, Daily, Heather Ceballos MD, 81 mg at 04/09/18 0844    atenolol (TENORMIN) tablet 25 mg, 25 mg, Oral, Daily, Heather Ceballos MD, 25 mg at 04/08/18 7681    atorvastatin (LIPITOR) tablet 40 mg, 40 mg, Oral, After Cody Castellon MD, 40 mg at 04/09/18 1745    calcium carbonate (TUMS) chewable tablet 1,000 mg, 1,000 mg, Oral, Daily PRN, Heather Ceballos MD    calcium carbonate (TUMS) chewable tablet 500 mg, 500 mg, Oral, BID, Carola Zepeda DO, 500 mg at 04/09/18 1745    dicyclomine (BENTYL) tablet 20 mg, 20 mg, Oral, Q6H PRN, Sg Vogel PA-C    docusate sodium (COLACE) capsule 100 mg, 100 mg, Oral, BID, Heather Ceballos MD, 100 mg at 04/09/18 1745    DULoxetine (CYMBALTA) delayed release capsule 30 mg, 30 mg, Oral, Daily, Heather Ceballos MD, 30 mg at 04/09/18 0843    enoxaparin (LOVENOX) subcutaneous injection 40 mg, 40 mg, Subcutaneous, Daily, Heather Ceballos MD, 40 mg at 04/09/18 0843    methimazole (TAPAZOLE) tablet 5 mg, 5 mg, Oral, Daily, Suyapa Queen, DO   methocarbamol (ROBAXIN) tablet 500 mg, 500 mg, Oral, Q6H PRN, Betty Andrade PA-C, 500 mg at 04/10/18 0110    morphine injection 2 mg, 2 mg, Intravenous, Q2H PRN, David Spicer MD, 2 mg at 04/09/18 0954    ondansetron (ZOFRAN) injection 4 mg, 4 mg, Intravenous, Q6H PRN, David Spicer MD    oxyCODONE (ROXICODONE) immediate release tablet 10 mg, 10 mg, Oral, Q4H PRN, David Spicer MD, 10 mg at 04/10/18 0603    oxyCODONE (ROXICODONE) IR tablet 5 mg, 5 mg, Oral, Q4H PRN, David Spicer MD, 5 mg at 04/08/18 2048    pantoprazole (PROTONIX) EC tablet 40 mg, 40 mg, Oral, Early Morning, David Spicer MD, 40 mg at 04/10/18 0603    senna (SENOKOT) tablet 8 6 mg, 1 tablet, Oral, HS PRN, David Spicer MD    simethicone (MYLICON) chewable tablet 80 mg, 80 mg, Oral, 4x Daily PRN, David Spicer MD    Blood Culture:   No results found for: BLOODCX    Wound Culture:   No results found for: WOUNDCULT    Ins and Outs:  I/O last 24 hours: In: 8656 [P O :920; Blood:402]  Out: 1350 [Urine:1350]          Physical exam:  Vitals:    04/10/18 0318   BP: 134/63   Pulse: (!) 115   Resp: 18   Temp: 98 8 °F (37 1 °C)   SpO2: 93%     left lower extremity  · Dressing clean dry intact  · Sensation intact L1-S1  · Motor intact to knee flexion/extension, EHL/FHL  · Toes WWP    _*_*_*_*_*_*_*_*_*_*_*_*_*_*_*_*_*_*_*_*_*_*_*_*_*_*_*_*_*_*_*_*_*_*_*_*_*_*_*_*_*    Assessment: 82 y  o female post operative day 2 left femur IMN   Pt doing well    Plan:  · Up and out of bed  · Weightbearing as tolerated  · PT/OT  · DVT prophylaxis  · Analgesics  · Dispo: Ortho will follow  · Patient noted to have acute blood loss anemia due to a drop in Hbg of > 2 0g from preop levels, will monitor vital signs and resuscitate with IV fluids as needed    Casi Mayen MD

## 2018-04-10 NOTE — ASSESSMENT & PLAN NOTE
· bumex on hold  · Okay for some gentle IVF per ortho   Got dose of IV lasix in between blood transfusions  · Resume bumex at dc

## 2018-04-10 NOTE — DISCHARGE SUMMARY
Discharge Summary - Orthopedics   Alpha Erp 82 y o  female MRN: [id number]  Unit/Bed#: CW3 351-01    Attending Physician: Isabel Guzman    Admitting diagnosis: Injury [T14 90XA]  Essential hypertension [I10]  Closed fracture of left hip, initial encounter ([facility] ) [S72 002A]  Medication side effect, initial encounter [T88  7XXA]  Closed left subtrochanteric femur fracture, initial encounter [facility]) [S72 22XA]    Discharge diagnosis: Injury [T14 90XA]  Essential hypertension [I10]  Closed fracture of left hip, initial encounter ([facility] ) [S72 002A]  Medication side effect, initial encounter [T88  7XXA]  Closed left subtrochanteric femur fracture, initial encounter [facility]) [S72 22XA]    Date of admission: 4/7/2018    Date of discharge: 04/10/18    Procedure: Left Femoral Cephalomedulalry Nail    HPI & Hospital course:  82 y o  female who presented to the ED after a fall sustaining a left intertrochanteric fracture  Pt was admitted to the orthopaedic service and taken to the OR on 4/8/2018 for Left femoral cephallomedullary naiing  Prior to surgery the risks and benefits of surgery were explained and informed consent was obtained  Surgery went without complications and pt was discharged to the PACU in a stable condition and was transferred to the floor  Post-operatively pain control was achieved and patient worked with physical and occupational therapy, who agreed that patient needed acute rehab  Patient also received 2 units packed RBC for low hemoglobin  On discharge date pt was cleared by PT and the medicine team and determined to be safe for discharge  Daily discussion was had with the patient, nursing staff, orthopaedic team, and family members if present  All questions were answered to the patients satisifaction          0  Lab Value Date/Time   HGB 9 3 (L) 04/10/2018 0528   HGB 9 9 (L) 04/09/2018 1829   HGB 7 2 (L) 04/09/2018 0548   HGB 11 9 04/07/2018 2030   HGB 11 9 11/20/2017 1023   HGB 12 2 07/07/2017 0909   HGB 12 6 04/24/2017 1128   HGB 12 4 12/27/2016 0927   HGB 11 4 (L) 11/24/2016 1824   HGB 12 3 10/19/2016 0912     Greater than 2 gram decrease in Hb qualifies for diagnosis of acute blood loss anemia  Vital signs remained stable and pt was resuscitated with IVF as needed  Discharge Instructions:   · WBAT LLE  · Keep dressings clean and dry at all times   · Complete DVT prophylaxis as prescribed   · Physical therapy  · Follow-up as scheduled, otherwise call for appt  Discharge Medications: For the complete list of discharge medications, please refer to the patient's medication reconciliation

## 2018-04-10 NOTE — ASSESSMENT & PLAN NOTE
· Patient's hgb dropped from 11 9-->7 2 yesterday  S/P 2 units PRBCs with improvement to 9 3  · Patient reports reaction of "passing out" from whole blood infusion >40 years ago after hysterectomy  · I discussed this with son who confirmed this as well  I spoke with blood bank who confirms PRBC should be okay if run slow  Patient tolerated transfusion well

## 2018-04-10 NOTE — PLAN OF CARE
DISCHARGE PLANNING     Discharge to home or other facility with appropriate resources Progressing        DISCHARGE PLANNING - CARE MANAGEMENT     Discharge to post-acute care or home with appropriate resources Progressing        PAIN - ADULT     Verbalizes/displays adequate comfort level or baseline comfort level Progressing        Potential for Falls     Patient will remain free of falls Progressing        Prexisting or High Potential for Compromised Skin Integrity     Skin integrity is maintained or improved Progressing        SAFETY ADULT     Maintain or return mobility status to optimal level Progressing     Patient will remain free of falls Progressing

## 2018-04-11 LAB
ERYTHROCYTE [DISTWIDTH] IN BLOOD BY AUTOMATED COUNT: 14.2 % (ref 11.6–15.1)
HCT VFR BLD AUTO: 25.1 % (ref 34.8–46.1)
HGB BLD-MCNC: 8.4 G/DL (ref 11.5–15.4)
MCH RBC QN AUTO: 30.9 PG (ref 26.8–34.3)
MCHC RBC AUTO-ENTMCNC: 33.5 G/DL (ref 31.4–37.4)
MCV RBC AUTO: 92 FL (ref 82–98)
PLATELET # BLD AUTO: 118 THOUSANDS/UL (ref 149–390)
PMV BLD AUTO: 10.1 FL (ref 8.9–12.7)
RBC # BLD AUTO: 2.72 MILLION/UL (ref 3.81–5.12)
THYROGLOB AB SERPL-ACNC: <1 IU/ML (ref 0–0.9)
THYROPEROXIDASE AB SERPL-ACNC: 149 IU/ML (ref 0–34)
TSI SER-ACNC: 0.69 IU/L (ref 0–0.55)
WBC # BLD AUTO: 7.4 THOUSAND/UL (ref 4.31–10.16)

## 2018-04-11 PROCEDURE — 97530 THERAPEUTIC ACTIVITIES: CPT

## 2018-04-11 PROCEDURE — 99221 1ST HOSP IP/OBS SF/LOW 40: CPT | Performed by: PHYSICAL MEDICINE & REHABILITATION

## 2018-04-11 PROCEDURE — 85027 COMPLETE CBC AUTOMATED: CPT | Performed by: PHYSICIAN ASSISTANT

## 2018-04-11 PROCEDURE — 97112 NEUROMUSCULAR REEDUCATION: CPT

## 2018-04-11 PROCEDURE — 97535 SELF CARE MNGMENT TRAINING: CPT

## 2018-04-11 PROCEDURE — 99024 POSTOP FOLLOW-UP VISIT: CPT | Performed by: ORTHOPAEDIC SURGERY

## 2018-04-11 RX ADMIN — DULOXETINE HYDROCHLORIDE 30 MG: 30 CAPSULE, DELAYED RELEASE ORAL at 09:26

## 2018-04-11 RX ADMIN — ASPIRIN 81 MG 81 MG: 81 TABLET ORAL at 09:26

## 2018-04-11 RX ADMIN — AMLODIPINE BESYLATE 5 MG: 5 TABLET ORAL at 09:26

## 2018-04-11 RX ADMIN — DOCUSATE SODIUM 100 MG: 100 CAPSULE, LIQUID FILLED ORAL at 17:33

## 2018-04-11 RX ADMIN — Medication 500 MG: at 17:33

## 2018-04-11 RX ADMIN — OXYCODONE HYDROCHLORIDE 5 MG: 5 TABLET ORAL at 17:33

## 2018-04-11 RX ADMIN — ACETAMINOPHEN 650 MG: 325 TABLET ORAL at 05:25

## 2018-04-11 RX ADMIN — Medication 500 MG: at 09:26

## 2018-04-11 RX ADMIN — ACETAMINOPHEN 650 MG: 325 TABLET ORAL at 13:11

## 2018-04-11 RX ADMIN — ATORVASTATIN CALCIUM 40 MG: 40 TABLET, FILM COATED ORAL at 17:33

## 2018-04-11 RX ADMIN — ACETAMINOPHEN 650 MG: 325 TABLET ORAL at 19:09

## 2018-04-11 RX ADMIN — ENOXAPARIN SODIUM 40 MG: 40 INJECTION SUBCUTANEOUS at 09:26

## 2018-04-11 RX ADMIN — PANTOPRAZOLE SODIUM 40 MG: 40 TABLET, DELAYED RELEASE ORAL at 05:25

## 2018-04-11 RX ADMIN — METHIMAZOLE 5 MG: 5 TABLET ORAL at 09:26

## 2018-04-11 RX ADMIN — DOCUSATE SODIUM 100 MG: 100 CAPSULE, LIQUID FILLED ORAL at 09:26

## 2018-04-11 RX ADMIN — ATENOLOL 25 MG: 25 TABLET ORAL at 09:26

## 2018-04-11 NOTE — PLAN OF CARE
Problem: PHYSICAL THERAPY ADULT  Goal: Performs mobility at highest level of function for planned discharge setting  See evaluation for individualized goals  Treatment/Interventions: Functional transfer training, LE strengthening/ROM, Therapeutic exercise, Endurance training, Patient/family training, Equipment eval/education, Bed mobility, Gait training, Spoke to nursing, OT  Equipment Recommended: Lulú Quintanilla       See flowsheet documentation for full assessment, interventions and recommendations  Outcome: Progressing  Prognosis: Good  Problem List: Decreased strength, Decreased range of motion, Decreased endurance, Impaired balance, Decreased mobility, Decreased skin integrity, Orthopedic restrictions, Pain  Assessment: The pt  has improved mobility today, but she remains fearful of increased pain  She was able to stand and pivot over to the chair, but she was unable to take any steps again today  She was able to maintain standing for 7 minutes during the transfer as well  She required instruction throughout the session, and occasional redirection to task as she would get distracted with her fear of pain and falling  She will benefit from continued physical therapy in order to safely progress her functional mobility  Barriers to Discharge: Inaccessible home environment, Decreased caregiver support     Recommendation: Post acute IP rehab     PT - OK to Discharge: Yes (to rehab when medically cleared)    See flowsheet documentation for full assessment

## 2018-04-11 NOTE — PROGRESS NOTES
Orthopedics   Alpha Erp 82 y o  female MRN: [id number]  Unit/Bed#: CW3 351-01      Subjective:  82 y  o female post operative day 3 left femoral intramedullary nail   Continuing physical therapy    Labs:    0  Lab Value Date/Time   HCT 25 1 (L) 04/11/2018 0546   HCT 27 9 (L) 04/10/2018 0528   HCT 28 6 (L) 04/09/2018 1829   HGB 8 4 (L) 04/11/2018 0546   HGB 9 3 (L) 04/10/2018 0528   HGB 9 9 (L) 04/09/2018 1829   INR 0 99 04/08/2018 0203   WBC 7 40 04/11/2018 0546   WBC 9 32 04/10/2018 0528   WBC 5 70 04/09/2018 0548   ESR 28 (H) 04/09/2018 1829   CRP <3 0 05/23/2017 1129       Meds:    Current Facility-Administered Medications:      EMS REPLENISHMENT MED, , Does not apply, Once, Stu Boucher MD    acetaminophen (TYLENOL) tablet 650 mg, 650 mg, Oral, Q6H [address], Sara Tpoete MD, 650 mg at 04/11/18 1311    amLODIPine (NORVASC) tablet 5 mg, 5 mg, Oral, Daily, Sara Topete MD, 5 mg at 04/11/18 1633    aspirin chewable tablet 81 mg, 81 mg, Oral, Daily, Sara Topete MD, 81 mg at 04/11/18 0926    atenolol (TENORMIN) tablet 25 mg, 25 mg, Oral, Daily, Sara Topete MD, 25 mg at 04/11/18 0926    atorvastatin (LIPITOR) tablet 40 mg, 40 mg, Oral, After Trever Bose MD, 40 mg at 04/10/18 1728    calcium carbonate (TUMS) chewable tablet 1,000 mg, 1,000 mg, Oral, Daily PRN, Sara Topete MD    calcium carbonate (TUMS) chewable tablet 500 mg, 500 mg, Oral, BID, Carola Zepeda DO, 500 mg at 04/11/18 0926    dicyclomine (BENTYL) tablet 20 mg, 20 mg, Oral, Q6H PRN, Shakir Otero PA-C    docusate sodium (COLACE) capsule 100 mg, 100 mg, Oral, BID, Sara Topete MD, 100 mg at 04/11/18 0926    DULoxetine (CYMBALTA) delayed release capsule 30 mg, 30 mg, Oral, Daily, Sara Topete MD, 30 mg at 04/11/18 0926    enoxaparin (LOVENOX) subcutaneous injection 40 mg, 40 mg, Subcutaneous, Daily, Sara Topete MD, 40 mg at 04/11/18 0926    methimazole (TAPAZOLE) tablet 5 mg, 5 mg, Oral, Daily, Beverly Siddiqi, DO, 5 mg at 04/11/18 0926    methocarbamol (ROBAXIN) tablet 500 mg, 500 mg, Oral, Q6H PRN, Gary Frey PA-C, 500 mg at 04/10/18 2247    morphine injection 2 mg, 2 mg, Intravenous, Q2H PRN, Heather Ceballos MD, 2 mg at 04/09/18 0954    ondansetron (ZOFRAN) injection 4 mg, 4 mg, Intravenous, Q6H PRN, Heather Ceballos MD    oxyCODONE (ROXICODONE) immediate release tablet 10 mg, 10 mg, Oral, Q4H PRN, Heather Ceballos MD, 10 mg at 04/10/18 2247    oxyCODONE (ROXICODONE) IR tablet 5 mg, 5 mg, Oral, Q4H PRN, Heather Ceballos MD, 5 mg at 04/08/18 2048    pantoprazole (PROTONIX) EC tablet 40 mg, 40 mg, Oral, Early Morning, Heather Ceballos MD, 40 mg at 04/11/18 0525    senna (SENOKOT) tablet 8 6 mg, 1 tablet, Oral, HS PRN, Heather Ceballos MD    simethicone (MYLICON) chewable tablet 80 mg, 80 mg, Oral, 4x Daily PRN, Heather Ceballos MD    Blood Culture:   No results found for: BLOODCX    Wound Culture:   No results found for: WOUNDCULT    Ins and Outs:  I/O last 24 hours: In: 780 [P O :780]  Out: 850 [Urine:850]          Physical exam:  Vitals:    04/11/18 1138   BP: 117/56   Pulse: 87   Resp: 16   Temp: 98 1 °F (36 7 °C)   SpO2: 100%     left lower extremity  · Dressing clean dry intact  · Sensation intact L1-S1  · Motor intact to knee flexion/extension, EHL/FHL  · Toes WWP, extremity warm and well perfused    _*_*_*_*_*_*_*_*_*_*_*_*_*_*_*_*_*_*_*_*_*_*_*_*_*_*_*_*_*_*_*_*_*_*_*_*_*_*_*_*_*    Assessment: 82 y  o female post operative day 3  left femur IMN   Pt doing well    Plan:  · Up and out of bed  · Weightbearing as tolerated  · PT/OT  · DVT prophylaxis  · Analgesics as needed  · Dispo: Ortho will follow  · Patient noted to have acute blood loss anemia due to a drop in Hbg of > 2 0g from preop levels, will monitor vital signs and resuscitate with IV fluids as needed    Dexter Baxter

## 2018-04-11 NOTE — PLAN OF CARE
Problem: OCCUPATIONAL THERAPY ADULT  Goal: Performs self-care activities at highest level of function for planned discharge setting  See evaluation for individualized goals  Treatment Interventions: ADL retraining, UE strengthening/ROM, Functional transfer training, Endurance training, Patient/family training, Equipment evaluation/education, Compensatory technique education, Activityengagement          See flowsheet documentation for full assessment, interventions and recommendations  Outcome: Progressing  Limitation: Decreased ADL status, Decreased Safe judgement during ADL, Decreased endurance, Decreased self-care trans, Decreased high-level ADLs  Prognosis: Good  Assessment: pt participated in pm ot session and was seen focusing on toileting and return to bed transfer after sitting in recliner cahir since lunch  pt requried mod asst x 2 to stand and trnasfer to/from chair commode and eob  pt required max asst x 2 for sit to supine  pt with c/o feeling faint after toileting and when returning to bed  pt reports relief after lying in bed 5 min wit hcold cloth  pts hr was 120 and pulse ox was 99% on RA   pt requries asst x 2 for toileting and mobility  pt with c/o pain and swlling in  l le   pt was motivated and cooperative this session and willing to attempt commode for first time vs using bed pan       OT Discharge Recommendation: Short Term Rehab     April Trang Coleman, [address]

## 2018-04-11 NOTE — PHYSICAL THERAPY NOTE
Physical Therapy Progress Note     04/11/18 1215   Pain Assessment   Pain Assessment 0-10   Pain Score 6   Pain Type Acute pain   Pain Location Hip   Pain Orientation Left   Hospital Pain Intervention(s) Cold applied;Repositioned; Ambulation/increased activity; Emotional support   Response to Interventions Tolerated  Restrictions/Precautions   RLE Weight Bearing Per Order WBAT   LLE Weight Bearing Per Order WBAT   Other Precautions Chair Alarm; Bed Alarm; Fall Risk;Pain  (Alarm active post session )   Subjective   Subjective The pt  is fearful of increased pain, but she would like to get out to the chair  Bed Mobility   Supine to Sit 3  Moderate assistance   Additional items Assist x 2   Transfers   Sit to Stand 3  Moderate assistance   Additional items Assist x 2; Increased time required;Verbal cues   Stand to Sit 3  Moderate assistance   Additional items Assist x 2; Increased time required;Verbal cues   Stand pivot 3  Moderate assistance   Additional items Assist x 2; Increased time required;Verbal cues   Ambulation/Elevation   Assistive Device Rolling walker   Balance   Static Sitting Fair +   Dynamic Sitting Fair -   Static Standing Poor   Ambulatory Poor -   Activity Tolerance   Activity Tolerance Patient limited by pain; Patient tolerated treatment well   Nurse Made Aware Brittanie Jeter RN  Exercises   THR Supine;Sitting;Bilateral;AROM;AAROM;10 reps   Assessment   Prognosis Good   Problem List Decreased strength;Decreased range of motion;Decreased endurance; Impaired balance;Decreased mobility; Decreased skin integrity;Orthopedic restrictions;Pain   Assessment The pt  has improved mobility today, but she remains fearful of increased pain  She was able to stand and pivot over to the chair, but she was unable to take any steps again today  She was able to maintain standing for 7 minutes during the transfer as well   She required instruction throughout the session, and occasional redirection to task as she would get distracted with her fear of pain and falling  She will benefit from continued physical therapy in order to safely progress her functional mobility  Barriers to Discharge Inaccessible home environment;Decreased caregiver support   Goals   Patient Goals To get better  STG Expiration Date 04/19/18   Treatment Day 1   Plan   Treatment/Interventions Functional transfer training;LE strengthening/ROM; Therapeutic exercise; Endurance training;Patient/family training;Bed mobility;Gait training   Progress Progressing toward goals   PT Frequency 5x/wk; Weekend   Recommendation   Recommendation Post acute IP rehab   Equipment Recommended HERRERA Marie, PTA

## 2018-04-11 NOTE — SOCIAL WORK
CM received a phone call from Mehrdad Marquis who did the PM&R consult and reported that patient is appropriate for acute rehab  CM received a phone call from Abel Jackson from [facility] admissions, who reported they can accept the patient tomorrow to [facility]  Admissions will call tomorrow with room assignment

## 2018-04-11 NOTE — CONSULTS
PHYSICAL MEDICINE AND REHABILITATION CONSULT NOTE  Pierce Lea 82 y o  female MRN: [id number]  Unit/Bed#: CW3 351-01 Encounter: [id number]    Requested by (Physician/Service): Bob Li MD  Reason for Consultation:  Assessment of rehabilitation needs    Chief complaint: left intertrochanteric femur fracute     HPI: Pierce Lea is a 82 y o  female with a PMH of HTN, HLD, GERD, bladder cancer who presented to [place] [id number] after having fallen at home in the shower  She was found by EMS with a rotated left leg  She was seen by orthopedics and underwent a left femoral IM nailing  She was anemic post-operatively and required 2 units of PRBC  She was seen by endocrinology for severe osteoporosis and hyperthyroidism  She follows with a rheumatologist as an out patient and will need to follow up with them in six weeks  She was started on methimazole for hyperthyroidism  She has been otherwise stable and participating with therapy  Last therapy notes were from 4/9 and will need updated therapy notes      Subjective: The patient was seen in her room  She states she is feeling better then she has been and her pain is better controlled  She feels that she did better with therapy today because of better pain control  She lives alone, she reports supportive family near by but they can not provide 24/7 assistance  She was independent prior to admission  She denies any other complaints        Review of Systems: A 10-point review of systems was performed  Negative except as listed above      Assessment: Therapy notes from 4/9       PT OT   Bed Mobility   Supine to Sit 2  Maximal assistance   Additional items Assist x 2; Increased time required;Verbal cues;LE management   Sit to Supine 1  Dependent   Additional items Assist x 2; Increased time required;Verbal cues;LE management   Additional Comments pt c/o lightheadedness/dizziness/nausea at EOB   Pt resisting assist from PT/OT to return to bed, despite pt wanting to return to bed   Transfers   Sit to Stand 3  Moderate assistance   Additional items Assist x 2; Increased time required;Verbal cues   Stand to Sit 3  Moderate assistance   Additional items Assist x 2; Increased time required;Verbal cues   Additional Comments pt unable to initiate gait or side stepping toward [facility]   Balance   Static Sitting Fair -   Dynamic Sitting Poor +   Static Standing Poor   Dynamic Standing Poor -   Endurance Deficit   Endurance Deficit Yes   Endurance Deficit Description pain, weakness, self-limiting, low hemoglobin, anxiety   Activity Tolerance   Activity Tolerance Patient limited by pain; Patient limited by fatigue;Treatment limited secondary to medical complications (Comment)      Subjective   Subjective "I HAVE SO MUCH PAIN   ADL   Eating Assistance 5  Supervision/Setup   Grooming Assistance 5  Supervision/Setup   UB Bathing Assistance 3  Moderate Assistance   LB Bathing Assistance 2  Maximal Assistance   UB Dressing Assistance 3  Moderate Assistance   LB Dressing Assistance 2  Maximal [address]  2  Maximal Assistance   Bed Mobility   Supine to Sit 2  Maximal assistance   Additional items Assist x 2; Increased time required;Verbal cues   Sit to Supine 2  Maximal assistance   Additional items Assist x 2; Increased time required   Transfers   Sit to Stand 3  Moderate assistance   Additional items Assist x 2   Stand to Sit 3  Moderate assistance   Additional items Assist x 2   Stand pivot Unable to assess   Additional Comments STOOD WITH ASSIST X 2 - TOLERATED STANDING X 1 MIN - UNABLE TO TRANSITION OOB TO CHAIR    Balance   Static Sitting Fair   Dynamic Sitting Fair -   Static Standing Poor          Plan:    - Chart reviewed  - Labs reviewed  - Imaging reviewed  - Continue PT/OT while in hospital  - Patient may be a good candidate for acute inpatient rehabilitation  Will need updated therapy notes to make final determination      Thank you for allowing the PM&R service to participate in the care of this patient  We will continue to follow [employer] [place] [employer] progress with you  Please do not hesitate to call with questions or concerns    Physical Exam:  General: alert, no apparent distress, cooperative and comfortable  Head: Normal, normocephalic, atraumatic  Eye: Normal external eye, conjunctiva, lids   Ears: Normal external ears  Nose: Normal external nose, mucus membranes  Pharynx: Dental Hygiene adequate  Normal buccal mucosa  Normal pharynx  Neck / Thyroid: Supple, no masses, nodes, nodules or enlargement  Pulmonary: clear to auscultation bilaterally and no crackles, no wheezes, chest expansion normal  Cardiovascular: normal rate, regular rhythm, normal S1, S2, no murmurs, rubs, clicks or gallops  Abdomen: soft, nontender, nondistended, no masses or organomegaly  Skin/Extremity: LLE edema at surgical site  Inicision: Dressing C/D/I   Neurologic: normal without focal findings  Psych: Appropriate affect, alert and oriented to person, place and time   Musculoskeletal - Strength: Left lower extremity limited due to pain and swelling  Right  Left  Site  Right  Left  Site    5 5  S Ab: Shoulder Abductors  5  NT HF: Hip Flexors    5 5  EF: Elbow Flexors  5 NT KF: Knee Flexors    5  5  EE: Elbow Extensors  5  NT KE: Knee Extensors    5  5  WE: Wrist Extensors  5  4 DR: Dorsi Flexors    5  5  FF: Finger Flexors  5  4 PF: Plantar Flexors    5  5  HI: Hand Intrinsics  5  4 EHL: Extensor Hallucis Longus     Viann August Lives with: lives alone  She lives in [facility]) single family home  The living area: can live on one level  Equipment in home: None  There 5 steps or ramp to enter the home  Patient/family's goals: Return to previous home/apartment  The patient will not have 24 hour ARC Supervision/physical assistance: supervision/physical assistance available upon discharge      Allergies: Allergies   Allergen Reactions    Lactose      Other reaction(s):  Indigestion/GI Upset    Other Other reaction(s): Mental Status Change  After surgery  Whole blood  = passed out   historical allergy; verify with patient        Past Medical History:   Past Surgical History:   Family History:   Social history:   Past Medical History:   Diagnosis Date    [facility] [address]     Coronary artery disease     S/P stent placement x 2 in 2011    GERD (gastroesophageal reflux disease)     Hepatitis     Hyperlipidemia     Hypertension     Kidney stones     Malignant neoplasm of urethra ([facility] [address]     Pneumonia     Shingles     Past Surgical History:   Procedure Laterality Date    CORONARY ANGIOPLASTY WITH STENT PLACEMENT      stent x 2    CYSTOSCOPY      diagnostic - onset 4/4/17    HYSTERECTOMY      VT OPEN RX FEMUR FX+INTRAMED ALEJANDRO Left 4/8/2018    Procedure: INSERTION NAIL IM FEMUR ANTEGRADE (TROCHANTERIC); Surgeon: Yolanda Quevedo MD;  Location: [facility];  Service: Orthopedics     Family History   Problem Relation Age of Onset    Arthritis Mother     Osteoporosis Mother     Heart disease Father     Hypertension Father     Hypertension Brother       Social History     Social History    Marital status:       Spouse name: N/A    Number of children: N/A    Years of education: N/A     Social History Main Topics    Smoking status: Never Smoker    Smokeless tobacco: Never Used    Alcohol use No    Drug use: No    Sexual activity: No     Other Topics Concern    None     Social History Narrative    Advance directives declined by parents    Always uses seat belt              Vital Signs: Reviewed    Temp:  [98 1 °F (36 7 °C)-99 3 °F (37 4 °C)] 98 1 °F (36 7 °C)  HR:  [] 87  Resp:  [16-18] 16  BP: ()/(50-62) 117/56   Intake/Output Summary (Last 24 hours) at 04/11/18 1320  Last data filed at 04/11/18 0810   Gross per 24 hour   Intake                0 ml   Output              550 ml   Net             -550 ml        Laboratory: Reviewed    Lab Results   Component Value Date    HGB 8 4 (L) 04/11/2018    HCT 25 1 (L) 04/11/2018    WBC 7 40 04/11/2018     Lab Results   Component Value Date    BUN 28 (H) 04/10/2018     (L) 04/10/2018    K 3 9 04/10/2018     04/10/2018    GLUCOSE 118 04/10/2018    CREATININE 0 78 04/10/2018     Lab Results   Component Value Date    PROTIME 13 1 04/08/2018    INR 0 99 04/08/2018        Imaging: Reviewed  Xr Femur 2 Vw Left    Result Date: 4/8/2018  Impression: Fluoroscopic guidance provided for surgical procedure  Please refer to the separate procedure notes for additional details  Workstation performed: [id number]     Xr Femur 2 Views Left    Result Date: 4/8/2018  Impression: Intertrochanteric hip fracture  As per comments in the PACS workstation, findings are concordant with preliminary interpretation provided by the emergency room physician  Workstation performed: [id number]     Xr Chest 1 View    Result Date: 4/8/2018  Impression: No acute cardiopulmonary disease  Workstation performed: [id number]     Xr Pelvis Ap Only 1 Or 2 Vw    Result Date: 4/8/2018  Impression: Intertrochanteric, left hip fracture  As per comments in the PACS workstation, findings are concordant with preliminary interpretation provided by the emergency room physician     Workstation performed: [id number]       Current Medications:     Current Facility-Administered Medications:      EMS REPLENISHMENT MED, , Does not apply, Once, Esa Burton MD    acetaminophen (TYLENOL) tablet 650 mg, 650 mg, Oral, Q6H [address], Jacinta Doherty MD, 650 mg at 04/11/18 1311    amLODIPine (NORVASC) tablet 5 mg, 5 mg, Oral, Daily, Jacinta Doherty MD, 5 mg at 04/11/18 1600    aspirin chewable tablet 81 mg, 81 mg, Oral, Daily, Jacinta Doherty MD, 81 mg at 04/11/18 0926    atenolol (TENORMIN) tablet 25 mg, 25 mg, Oral, Daily, Jacinta Doherty MD, 25 mg at 04/11/18 0926    atorvastatin (LIPITOR) tablet 40 mg, 40 mg, Oral, After Luh Li MD, 40 mg at 04/10/18 1728    calcium carbonate (TUMS) chewable tablet 1,000 mg, 1,000 mg, Oral, Daily PRN, Beto Swenson MD    calcium carbonate (TUMS) chewable tablet 500 mg, 500 mg, Oral, BID, Carola Zepeda DO, 500 mg at 04/11/18 0926    dicyclomine (BENTYL) tablet 20 mg, 20 mg, Oral, Q6H PRN, Yanet Ro PA-C    docusate sodium (COLACE) capsule 100 mg, 100 mg, Oral, BID, Beto Swenson MD, 100 mg at 04/11/18 0926    DULoxetine (CYMBALTA) delayed release capsule 30 mg, 30 mg, Oral, Daily, Beto Swenson MD, 30 mg at 04/11/18 0926    enoxaparin (LOVENOX) subcutaneous injection 40 mg, 40 mg, Subcutaneous, Daily, Beto Swenson MD, 40 mg at 04/11/18 0926    methimazole (TAPAZOLE) tablet 5 mg, 5 mg, Oral, Daily, Ema Espinal DO, 5 mg at 04/11/18 0926    methocarbamol (ROBAXIN) tablet 500 mg, 500 mg, Oral, Q6H PRN, Maria Eugenia Thorpe PA-C, 500 mg at 04/10/18 2247    morphine injection 2 mg, 2 mg, Intravenous, Q2H PRN, Beto Swenson MD, 2 mg at 04/09/18 0954    ondansetron (ZOFRAN) injection 4 mg, 4 mg, Intravenous, Q6H PRN, Beto Swenson MD    oxyCODONE (ROXICODONE) immediate release tablet 10 mg, 10 mg, Oral, Q4H PRN, Beto Swenson MD, 10 mg at 04/10/18 2247    oxyCODONE (ROXICODONE) IR tablet 5 mg, 5 mg, Oral, Q4H PRN, Beto Swenson MD, 5 mg at 04/08/18 2048    pantoprazole (PROTONIX) EC tablet 40 mg, 40 mg, Oral, Early Morning, Beto Swenson MD, 40 mg at 04/11/18 0525    senna (SENOKOT) tablet 8 6 mg, 1 tablet, Oral, HS PRN, Beto Swenson MD    simethicone (MYLICON) chewable tablet 80 mg, 80 mg, Oral, 4x Daily PRN, Beto Swenson MD

## 2018-04-11 NOTE — OCCUPATIONAL THERAPY NOTE
Occupational Therapy Treatment Note:       04/11/18 1650   Restrictions/Precautions   RLE Weight Bearing Per Order WBAT   LLE Weight Bearing Per Order WBAT   Pain Assessment   Pain Assessment 0-10   Pain Score 6   Pain Type Acute pain   Pain Location Hip   Pain Orientation Left   ADL   Toileting Assistance  2  Maximal Assistance   Toileting Comments pt was able to wipe arden area seated and requried asst for backside  pt required max asst to amnage hosp gown prior to and after sitting   Functional Standing Tolerance   Time 1 min during toileting demosntrating fair minus balnace with rw   Bed Mobility   Sit to Supine 2  Maximal assistance   Additional items Assist x 2   Transfers   Sit to Stand 3  Moderate assistance   Additional items Assist x 2  (rw)   Stand to Sit 3  Moderate assistance   Additional items Assist x 2  (rw)   Toilet Transfers   Toilet Transfer to Standard bedside commode   Toilet Transfer Technique Stand pivot   Toilet Transfers Maximal assistance   Activity Tolerance   Activity Tolerance Patient limited by pain   Assessment   Assessment pt participated in pm ot session and was seen focusing on toileting and return to bed transfer after sitting in recliner cahir since lunch  pt requried mod asst x 2 to stand and trnasfer to/from chair commode and eob  pt required max asst x 2 for sit to supine  pt with c/o feeling faint after toileting and when returning to bed  pt reports relief after lying in bed 5 min wit hcold cloth  pts hr was 120 and pulse ox was 99% on RA   pt requries asst x 2 for toileting and mobility  pt with c/o pain and swlling in  l le   pt was motivated and cooperative this session and willing to attempt commode for first time vs using bed pan  Plan   Treatment Interventions ADL retraining;Functional transfer training;UE strengthening/ROM; Activityengagement;Patient/family training;Equipment evaluation/education; Endurance training   Goal Expiration Date 04/23/18   OT Frequency 3-5x/wk   Recommendation   OT Discharge Recommendation Short Term Rehab   Barthel Index   Feeding 10   Bathing 0   Grooming Score 5   Dressing Score 5   Bladder Score 5   Bowels Score 10   Toilet Use Score 0   Transfers (Bed/Chair) Score 5   Mobility (Level Surface) Score 0   Stairs Score 0   Barthel Index Score 40   Modified [place] Scale   Modified Thelma Scale 4   April A VICTORINO Callahan

## 2018-04-12 ENCOUNTER — HOSPITAL ENCOUNTER (INPATIENT)
Facility: HOSPITAL | Age: 83
LOS: 15 days | Discharge: HOME HEALTH | DRG: 560 | End: 2018-04-27
Attending: PHYSICAL MEDICINE & REHABILITATION | Admitting: PHYSICAL MEDICINE & REHABILITATION
Payer: MEDICARE

## 2018-04-12 VITALS
TEMPERATURE: 98.2 F | DIASTOLIC BLOOD PRESSURE: 53 MMHG | RESPIRATION RATE: 18 BRPM | HEIGHT: 61 IN | WEIGHT: 138 LBS | HEART RATE: 95 BPM | BODY MASS INDEX: 26.06 KG/M2 | SYSTOLIC BLOOD PRESSURE: 115 MMHG | OXYGEN SATURATION: 98 %

## 2018-04-12 DIAGNOSIS — E05.90 HYPERTHYROIDISM: ICD-10-CM

## 2018-04-12 DIAGNOSIS — L60.2 OVERGROWN TOENAILS: ICD-10-CM

## 2018-04-12 DIAGNOSIS — D62 ACUTE BLOOD LOSS ANEMIA: ICD-10-CM

## 2018-04-12 DIAGNOSIS — M81.0 OSTEOPOROSIS: Chronic | ICD-10-CM

## 2018-04-12 DIAGNOSIS — I10 ESSENTIAL HYPERTENSION: Primary | ICD-10-CM

## 2018-04-12 DIAGNOSIS — S72.142D INTERTROCHANTERIC FRACTURE OF LEFT FEMUR, CLOSED, WITH ROUTINE HEALING, SUBSEQUENT ENCOUNTER: ICD-10-CM

## 2018-04-12 LAB
ALBUMIN SERPL ELPH-MCNC: 3.28 G/DL (ref 3.5–5)
ALBUMIN SERPL ELPH-MCNC: 58.5 % (ref 52–65)
ALBUMIN UR ELPH-MCNC: 100 %
ALPHA1 GLOB MFR UR ELPH: 0 %
ALPHA1 GLOB SERPL ELPH-MCNC: 0.47 G/DL (ref 0.1–0.4)
ALPHA1 GLOB SERPL ELPH-MCNC: 8.4 % (ref 2.5–5)
ALPHA2 GLOB MFR UR ELPH: 0 %
ALPHA2 GLOB SERPL ELPH-MCNC: 0.57 G/DL (ref 0.4–1.2)
ALPHA2 GLOB SERPL ELPH-MCNC: 10.1 % (ref 7–13)
B-GLOBULIN MFR UR ELPH: 0 %
BETA GLOB ABNORMAL SERPL ELPH-MCNC: 0.29 G/DL (ref 0.4–0.8)
BETA1 GLOB SERPL ELPH-MCNC: 5.1 % (ref 5–13)
BETA2 GLOB SERPL ELPH-MCNC: 4.5 % (ref 2–8)
BETA2+GAMMA GLOB SERPL ELPH-MCNC: 0.25 G/DL (ref 0.2–0.5)
GAMMA GLOB ABNORMAL SERPL ELPH-MCNC: 0.75 G/DL (ref 0.5–1.6)
GAMMA GLOB MFR UR ELPH: 0 %
GAMMA GLOB SERPL ELPH-MCNC: 13.4 % (ref 12–22)
IGG/ALB SER: 1.41 {RATIO} (ref 1.1–1.8)
PROT PATTERN UR ELPH-IMP: NORMAL
PROT SERPL-MCNC: 5.6 G/DL (ref 6.4–8.2)
PROT UR-MCNC: 7 MG/DL

## 2018-04-12 PROCEDURE — 99024 POSTOP FOLLOW-UP VISIT: CPT | Performed by: ORTHOPAEDIC SURGERY

## 2018-04-12 PROCEDURE — 97530 THERAPEUTIC ACTIVITIES: CPT

## 2018-04-12 PROCEDURE — 99223 1ST HOSP IP/OBS HIGH 75: CPT | Performed by: PHYSICAL MEDICINE & REHABILITATION

## 2018-04-12 PROCEDURE — 97112 NEUROMUSCULAR REEDUCATION: CPT

## 2018-04-12 PROCEDURE — 97116 GAIT TRAINING THERAPY: CPT

## 2018-04-12 PROCEDURE — 97110 THERAPEUTIC EXERCISES: CPT

## 2018-04-12 RX ORDER — METHIMAZOLE 5 MG/1
5 TABLET ORAL DAILY
Status: CANCELLED | OUTPATIENT
Start: 2018-04-13

## 2018-04-12 RX ORDER — POLYETHYLENE GLYCOL 3350 17 G/17G
17 POWDER, FOR SOLUTION ORAL DAILY PRN
Status: DISCONTINUED | OUTPATIENT
Start: 2018-04-12 | End: 2018-04-26

## 2018-04-12 RX ORDER — MORPHINE SULFATE 2 MG/ML
2 INJECTION, SOLUTION INTRAMUSCULAR; INTRAVENOUS EVERY 2 HOUR PRN
Status: CANCELLED | OUTPATIENT
Start: 2018-04-12

## 2018-04-12 RX ORDER — DOCUSATE SODIUM 100 MG/1
100 CAPSULE, LIQUID FILLED ORAL 2 TIMES DAILY
Status: DISCONTINUED | OUTPATIENT
Start: 2018-04-12 | End: 2018-04-26

## 2018-04-12 RX ORDER — ATENOLOL 25 MG/1
25 TABLET ORAL DAILY
Status: CANCELLED | OUTPATIENT
Start: 2018-04-13

## 2018-04-12 RX ORDER — SENNOSIDES 8.6 MG
1 TABLET ORAL
Status: CANCELLED | OUTPATIENT
Start: 2018-04-12

## 2018-04-12 RX ORDER — ONDANSETRON 2 MG/ML
4 INJECTION INTRAMUSCULAR; INTRAVENOUS EVERY 6 HOURS PRN
Status: CANCELLED | OUTPATIENT
Start: 2018-04-12

## 2018-04-12 RX ORDER — ACETAMINOPHEN 325 MG/1
650 TABLET ORAL EVERY 6 HOURS SCHEDULED
Status: CANCELLED | OUTPATIENT
Start: 2018-04-12

## 2018-04-12 RX ORDER — ASPIRIN 81 MG/1
81 TABLET, CHEWABLE ORAL DAILY
Status: DISCONTINUED | OUTPATIENT
Start: 2018-04-13 | End: 2018-04-27 | Stop reason: HOSPADM

## 2018-04-12 RX ORDER — DULOXETIN HYDROCHLORIDE 30 MG/1
30 CAPSULE, DELAYED RELEASE ORAL DAILY
Status: CANCELLED | OUTPATIENT
Start: 2018-04-13

## 2018-04-12 RX ORDER — OXYCODONE HYDROCHLORIDE 5 MG/1
5 TABLET ORAL EVERY 4 HOURS PRN
Status: DISCONTINUED | OUTPATIENT
Start: 2018-04-12 | End: 2018-04-27 | Stop reason: HOSPADM

## 2018-04-12 RX ORDER — BUMETANIDE 1 MG/1
0.5 TABLET ORAL DAILY
Status: DISCONTINUED | OUTPATIENT
Start: 2018-04-13 | End: 2018-04-27 | Stop reason: HOSPADM

## 2018-04-12 RX ORDER — CALCIUM CARBONATE 200(500)MG
1000 TABLET,CHEWABLE ORAL DAILY PRN
Status: CANCELLED | OUTPATIENT
Start: 2018-04-12

## 2018-04-12 RX ORDER — PANTOPRAZOLE SODIUM 40 MG/1
40 TABLET, DELAYED RELEASE ORAL
Status: DISCONTINUED | OUTPATIENT
Start: 2018-04-13 | End: 2018-04-27 | Stop reason: HOSPADM

## 2018-04-12 RX ORDER — OXYCODONE HYDROCHLORIDE 10 MG/1
10 TABLET ORAL EVERY 4 HOURS PRN
Status: DISCONTINUED | OUTPATIENT
Start: 2018-04-12 | End: 2018-04-27 | Stop reason: HOSPADM

## 2018-04-12 RX ORDER — METHIMAZOLE 5 MG/1
5 TABLET ORAL DAILY
Status: DISCONTINUED | OUTPATIENT
Start: 2018-04-13 | End: 2018-04-27 | Stop reason: HOSPADM

## 2018-04-12 RX ORDER — OXYCODONE HYDROCHLORIDE 5 MG/1
5 TABLET ORAL EVERY 4 HOURS PRN
Status: CANCELLED | OUTPATIENT
Start: 2018-04-12

## 2018-04-12 RX ORDER — AMLODIPINE BESYLATE 5 MG/1
5 TABLET ORAL DAILY
Status: CANCELLED | OUTPATIENT
Start: 2018-04-13

## 2018-04-12 RX ORDER — OXYCODONE HYDROCHLORIDE 10 MG/1
10 TABLET ORAL EVERY 4 HOURS PRN
Status: CANCELLED | OUTPATIENT
Start: 2018-04-12

## 2018-04-12 RX ORDER — AMLODIPINE BESYLATE 5 MG/1
5 TABLET ORAL DAILY
Status: DISCONTINUED | OUTPATIENT
Start: 2018-04-13 | End: 2018-04-27 | Stop reason: HOSPADM

## 2018-04-12 RX ORDER — DOCUSATE SODIUM 100 MG/1
100 CAPSULE, LIQUID FILLED ORAL 2 TIMES DAILY
Status: CANCELLED | OUTPATIENT
Start: 2018-04-12

## 2018-04-12 RX ORDER — ATENOLOL 25 MG/1
25 TABLET ORAL DAILY
Status: DISCONTINUED | OUTPATIENT
Start: 2018-04-13 | End: 2018-04-27 | Stop reason: HOSPADM

## 2018-04-12 RX ORDER — ACETAMINOPHEN 325 MG/1
975 TABLET ORAL EVERY 8 HOURS SCHEDULED
Status: DISCONTINUED | OUTPATIENT
Start: 2018-04-12 | End: 2018-04-27 | Stop reason: HOSPADM

## 2018-04-12 RX ORDER — ACETAMINOPHEN 325 MG/1
TABLET ORAL
Status: COMPLETED
Start: 2018-04-12 | End: 2018-04-12

## 2018-04-12 RX ORDER — DICYCLOMINE HCL 20 MG
20 TABLET ORAL EVERY 6 HOURS PRN
Status: CANCELLED | OUTPATIENT
Start: 2018-04-12

## 2018-04-12 RX ORDER — ASPIRIN 81 MG/1
81 TABLET, CHEWABLE ORAL DAILY
Status: CANCELLED | OUTPATIENT
Start: 2018-04-13

## 2018-04-12 RX ORDER — MELATONIN
2000 DAILY
Status: DISCONTINUED | OUTPATIENT
Start: 2018-04-13 | End: 2018-04-27 | Stop reason: HOSPADM

## 2018-04-12 RX ORDER — ATORVASTATIN CALCIUM 40 MG/1
40 TABLET, FILM COATED ORAL
Status: CANCELLED | OUTPATIENT
Start: 2018-04-12

## 2018-04-12 RX ORDER — PANTOPRAZOLE SODIUM 40 MG/1
40 TABLET, DELAYED RELEASE ORAL
Status: CANCELLED | OUTPATIENT
Start: 2018-04-13

## 2018-04-12 RX ORDER — SIMETHICONE 80 MG
80 TABLET,CHEWABLE ORAL 4 TIMES DAILY PRN
Status: CANCELLED | OUTPATIENT
Start: 2018-04-12

## 2018-04-12 RX ORDER — METHOCARBAMOL 500 MG/1
500 TABLET, FILM COATED ORAL EVERY 6 HOURS PRN
Status: CANCELLED | OUTPATIENT
Start: 2018-04-12

## 2018-04-12 RX ORDER — BISACODYL 10 MG
10 SUPPOSITORY, RECTAL RECTAL DAILY PRN
Status: DISCONTINUED | OUTPATIENT
Start: 2018-04-12 | End: 2018-04-26

## 2018-04-12 RX ORDER — ATORVASTATIN CALCIUM 40 MG/1
40 TABLET, FILM COATED ORAL
Status: DISCONTINUED | OUTPATIENT
Start: 2018-04-12 | End: 2018-04-27 | Stop reason: HOSPADM

## 2018-04-12 RX ORDER — DULOXETIN HYDROCHLORIDE 30 MG/1
30 CAPSULE, DELAYED RELEASE ORAL DAILY
Status: DISCONTINUED | OUTPATIENT
Start: 2018-04-13 | End: 2018-04-27 | Stop reason: HOSPADM

## 2018-04-12 RX ORDER — CALCIUM CARBONATE 200(500)MG
500 TABLET,CHEWABLE ORAL 2 TIMES DAILY
Status: CANCELLED | OUTPATIENT
Start: 2018-04-12

## 2018-04-12 RX ADMIN — ATORVASTATIN CALCIUM 40 MG: 40 TABLET, FILM COATED ORAL at 19:36

## 2018-04-12 RX ADMIN — DOCUSATE SODIUM 100 MG: 100 CAPSULE, LIQUID FILLED ORAL at 17:01

## 2018-04-12 RX ADMIN — ENOXAPARIN SODIUM 40 MG: 40 INJECTION SUBCUTANEOUS at 08:41

## 2018-04-12 RX ADMIN — ACETAMINOPHEN 650 MG: 325 TABLET ORAL at 06:12

## 2018-04-12 RX ADMIN — ACETAMINOPHEN 975 MG: 325 TABLET, FILM COATED ORAL at 21:08

## 2018-04-12 RX ADMIN — ACETAMINOPHEN 650 MG: 325 TABLET ORAL at 01:03

## 2018-04-12 RX ADMIN — DOCUSATE SODIUM 100 MG: 100 CAPSULE, LIQUID FILLED ORAL at 08:41

## 2018-04-12 RX ADMIN — PANTOPRAZOLE SODIUM 40 MG: 40 TABLET, DELAYED RELEASE ORAL at 06:12

## 2018-04-12 RX ADMIN — DULOXETINE HYDROCHLORIDE 30 MG: 30 CAPSULE, DELAYED RELEASE ORAL at 08:41

## 2018-04-12 RX ADMIN — OXYCODONE HYDROCHLORIDE 5 MG: 5 TABLET ORAL at 17:01

## 2018-04-12 RX ADMIN — OXYCODONE HYDROCHLORIDE 5 MG: 5 TABLET ORAL at 04:24

## 2018-04-12 RX ADMIN — ATENOLOL 25 MG: 25 TABLET ORAL at 08:42

## 2018-04-12 RX ADMIN — Medication 500 MG: at 08:41

## 2018-04-12 RX ADMIN — ASPIRIN 81 MG 81 MG: 81 TABLET ORAL at 08:41

## 2018-04-12 RX ADMIN — METHIMAZOLE 5 MG: 5 TABLET ORAL at 08:41

## 2018-04-12 RX ADMIN — AMLODIPINE BESYLATE 5 MG: 5 TABLET ORAL at 08:41

## 2018-04-12 NOTE — SOCIAL WORK
CM received a phone call from [facility], reporting they can accept patient today,Patient will go to room 971, with 2:00 PM  time  Patient, nursing, physician made aware of  time

## 2018-04-12 NOTE — PROGRESS NOTES
PHYSICAL MEDICINE AND REHABILITATION   PREADMISSION ASSESSMENT     Projected [facility] Diagnoses:  Impairment of mobility, safety and Activities of Daily Living (ADLs) due to Orthopedic Disorders:  08 11  Unilateral Hip Fracture   Etiologic Dx: Left Intertrochanteric Femur Fracture  Date of Onset: 4/7/2018   Date of surgery: 4/8/2018 Cephalo-medullary Nailing left intertrochanteric femur fracture    PATIENT INFORMATION  Name: Rian Gil Phone #: [phone] (home)   Address: [facility] [place] [address]  YOB: 1935 Age: 82 y o  SS#   Marital Status:   Ethnicity:   Employment Status: retired  Extended Emergency Contact Information  Primary Emergency Contact: Concepción Louis, [address] Phone: [phone]  Relation: Relative  Advance Directive: Level 1 Full Code - unknown advanced directive    INSURANCE/COVERAGE:     Primary Payor: MEDICARE / Plan: MEDICARE A AND B / Product Type: Medicare A & B Fee for Service /   Secondary Payer: AARP/AARP   Payer Contact:  Payer Contact:   Contact Phone:  Contact Phone:   Authorization #:   Coverage Dates:  LCD:   Medicare #: [id number]  Medicare Days: 60/30/60  Medical Record #: [id number]    REFERRAL SOURCE:   Referring provider: Aakash Blair MD  Referring facility: [facility]  Room: [place] [id number]  PCP: Jane Florence DO PCP phone number: [phone]    MEDICAL INFORMATION  HPI: Patient is an 82year old female that presented to [place] [address] on 4/7/2018 s/p fall while in the shower, with complaints of severe left leg pain  Patient denies loss of consciousness or hitting her head  Patient was found by EMS with noted shortening and external rotation of left lower extremity  Left femur x-ray showed an intertrochanteric hip fracture  CXR was negative   Patient was admitted under Orthopedic services, who made her non-weight bearing to left lower extremity and valle catheter was inserted  Overnight into 4/8, patient de-satted down to 86% on RA, requiring 3L of Oxygen via NC  SLIM was consulted regarding medical clearance  The patient's last ECHO showed a preserved LV function  It was also noted that patient had a recurrence of shingles on the right flank, but there was no need for Valtrex as it had been greater than 72 hours since it flared up  Recommendations were made to monitor cardiac status and fluid status pre-/post-op  On 4/8, patient went to the OR with Orthopedics for a cephalo-medullary nailing of the left intertrochanteric femur fracture  Post-operatively, EKG remained in SR, however, patient did develop hypotension and lightheadedness, requiring IV Albumin  Patient was made weight-bearing as tolerated to LLE  On 4/9, patient had low urine output with a slightly increased Creatinine, and patient was administered IVF bolus, with noted improvement  Patient's Hgb was also noted to be 7 2, and was transfused with 2 units of PRBC  Endocrinology was consulted regarding osteoporosis with hip fracture, and patient was initiated on Vitamin D supplement, with recommendations for a DEXA scan as an outpatient  TSH level was drawn, which was 0 016, consistent with hyperthyroidism, and patient was started on Methimazole  Patient is currently on a Regular diet, and is tolerating well  PT/OT therapies and PMR were consulted, and they are recommending patient for inpatient Acute Rehab  She has demonstrated that she can tolerate and participate in 3 hours of therapy per day  Past Medical History:   Past Surgical History:    Allergies:     Past Medical History:   Diagnosis Date    Bladder cancer ([facility] [address] )     Coronary artery disease     S/P stent placement x 2 in 2011    GERD (gastroesophageal reflux disease)     Hepatitis     Hyperlipidemia     Hypertension     Kidney stones     Malignant neoplasm of urethra (HCC)  Pneumonia     Shingles     Past Surgical History:   Procedure Laterality Date    CORONARY ANGIOPLASTY WITH STENT PLACEMENT      stent x 2    CYSTOSCOPY      diagnostic - onset 4/4/17    HYSTERECTOMY      KS OPEN RX FEMUR FX+INTRAMED ALEJANDRO Left 4/8/2018    Procedure: INSERTION NAIL IM FEMUR ANTEGRADE (TROCHANTERIC); Surgeon: Jonatan Marie MD;  Location: [facility];  Service: Orthopedics     Allergies   Allergen Reactions    Lactose      Other reaction(s): Indigestion/GI Upset    Other      Other reaction(s): Mental Status Change  After surgery  Whole blood  = passed out   historical allergy; verify with patient         Comorbidities: Fall, recurrence shingles right flank, hypotension, ABLA, anxiety, tachycardia, hyperthyroidism, osteoporosis, bladder cancer s/p cystoscopy, CAD s/p stents x2, GERD, hepatitis, HLD, HTN, kidney stones, PNA, shingles, hysterectomy  CURRENT VITAL SIGNS:   Temp:  [97 8 °F (36 6 °C)-98 3 °F (36 8 °C)] 98 2 °F (36 8 °C)  HR:  [82-95] 95  Resp:  [16-18] 18  BP: (115-129)/(53-63) 115/53   Intake/Output Summary (Last 24 hours) at 04/12/18 1040  Last data filed at 04/12/18 0844   Gross per 24 hour   Intake             1120 ml   Output              300 ml   Net              820 ml        LABORATORY RESULTS:      Lab Results   Component Value Date    HGB 8 4 (L) 04/11/2018    HCT 25 1 (L) 04/11/2018    WBC 7 40 04/11/2018     Lab Results   Component Value Date    BUN 28 (H) 04/10/2018     (L) 04/10/2018    K 3 9 04/10/2018     04/10/2018    GLUCOSE 118 04/10/2018    CREATININE 0 78 04/10/2018     Lab Results   Component Value Date    PROTIME 13 1 04/08/2018    INR 0 99 04/08/2018        DIAGNOSTIC STUDIES:  Xr Femur 2 Vw Left    Result Date: 4/8/2018  Impression: Fluoroscopic guidance provided for surgical procedure  Please refer to the separate procedure notes for additional details   Workstation performed: [id number]     Xr Femur 2 Views Left    Result Date: 4/8/2018  Impression: Intertrochanteric hip fracture  As per comments in the PACS workstation, findings are concordant with preliminary interpretation provided by the emergency room physician  Workstation performed: [id number]     Xr Chest 1 View    Result Date: 4/8/2018  Impression: No acute cardiopulmonary disease  Workstation performed: [id number]     Xr Pelvis Ap Only 1 Or 2 Vw    Result Date: 4/8/2018  Impression: Intertrochanteric, left hip fracture  As per comments in the PACS workstation, findings are concordant with preliminary interpretation provided by the emergency room physician  Workstation performed: [id number]       PRECAUTIONS/SPECIAL NEEDS:  Tobacco:   History   Smoking Status    Never Smoker   Smokeless Tobacco    Never Used   , Alcohol:    History   Alcohol Use No   , Weight Bearing Precautions:  weight bearing as tolerated, Anticoagulation:  aspirin 81 mg orally every day and Lovenox 40mg SQ daily, Edema Management, Safety Concerns, Pain Management, IV: Type: Peripheral Location: Left Forearm Reason: Medications/IVF, Visually Impaired, Language Preference: English and Fall Precautions      MEDICATIONS:     Current Facility-Administered Medications:      EMS REPLENISHMENT MED, , Does not apply, Once, Germaine Rangel MD    acetaminophen (TYLENOL) tablet 650 mg, 650 mg, Oral, Q6H [address], Nery Lam MD, 650 mg at 04/12/18 0612    amLODIPine (NORVASC) tablet 5 mg, 5 mg, Oral, Daily, Nery Lam MD, 5 mg at 04/12/18 0841    aspirin chewable tablet 81 mg, 81 mg, Oral, Daily, Nery Lam MD, 81 mg at 04/12/18 0841    atenolol (TENORMIN) tablet 25 mg, 25 mg, Oral, Daily, Nery Lam MD, 25 mg at 04/12/18 0842    atorvastatin (LIPITOR) tablet 40 mg, 40 mg, Oral, After Sergio Phan MD, 40 mg at 04/11/18 1733    calcium carbonate (TUMS) chewable tablet 1,000 mg, 1,000 mg, Oral, Daily PRN, Nery Lam MD    calcium carbonate (TUMS) chewable tablet 500 mg, 500 mg, Oral, BID, Danuta Peach, DO, 500 mg at 04/12/18 0841    dicyclomine (BENTYL) tablet 20 mg, 20 mg, Oral, Q6H PRN, Jean Merritt PA-C    docusate sodium (COLACE) capsule 100 mg, 100 mg, Oral, BID, Zeenat Juan MD, 100 mg at 04/12/18 0841    DULoxetine (CYMBALTA) delayed release capsule 30 mg, 30 mg, Oral, Daily, Zeenat Juan MD, 30 mg at 04/12/18 0841    enoxaparin (LOVENOX) subcutaneous injection 40 mg, 40 mg, Subcutaneous, Daily, Zeenat Juan MD, 40 mg at 04/12/18 0841    methimazole (TAPAZOLE) tablet 5 mg, 5 mg, Oral, Daily, Danuta Peach, DO, 5 mg at 04/12/18 0841    methocarbamol (ROBAXIN) tablet 500 mg, 500 mg, Oral, Q6H PRN, Gerson Holder PA-C, 500 mg at 04/10/18 2247    morphine injection 2 mg, 2 mg, Intravenous, Q2H PRN, Zeenat Juan MD, 2 mg at 04/09/18 0954    ondansetron (ZOFRAN) injection 4 mg, 4 mg, Intravenous, Q6H PRN, Zeenat Juan MD    oxyCODONE (ROXICODONE) immediate release tablet 10 mg, 10 mg, Oral, Q4H PRN, Zeenat Juan MD, 10 mg at 04/10/18 2247    oxyCODONE (ROXICODONE) IR tablet 5 mg, 5 mg, Oral, Q4H PRN, Zeenat Juan MD, 5 mg at 04/12/18 0424    pantoprazole (PROTONIX) EC tablet 40 mg, 40 mg, Oral, Early Morning, Zeenat Juan MD, 40 mg at 04/12/18 0612    senna (SENOKOT) tablet 8 6 mg, 1 tablet, Oral, HS PRN, Zeenat Juan MD    simethicone (MYLICON) chewable tablet 80 mg, 80 mg, Oral, 4x Daily PRN, Zeenat Juan MD    SKIN INTEGRITY:   no rashes, no erythema, rash right flank, Incision: healing well, no significant drainage, no dehiscence, no significant erythema, left hip incision with DSD    PRIOR LEVEL OF FUNCTION:  She lives in a(n) single family home  Speedy Nelson is  and lives alone  Self Care: Independent, Indoor Mobility: Independent, Stairs (in/outdoor): Independent and Cognition: Independent   Patient received assistance from family for IADL's as needed      HOME ENVIRONMENT:  The living area: can live on one level, bedroom on 1st floor and bathroom on 1st floor  There are 5 steps to enter the home, or option of a ramped entrance  The patient will not have 24 hour supervision/physical assistance available upon discharge  PREVIOUS DME:  Equipment in home (previous DME): None    FUNCTIONAL STATUS:  Physical Therapy Occupational Therapy Speech Therapy   4/12/2018, per PTA    Bed Mobility   Supine to Sit 4  Minimal assistance   Additional items Assist x 2; Increased time required;Verbal cues;LE management   Transfers   Sit to Stand 4  Minimal assistance   Additional items Assist x 2; Increased time required;Verbal cues   Stand to Sit 4  Minimal assistance   Additional items Assist x 1; Increased time required;Verbal cues   Ambulation/Elevation   Gait pattern Excessively slow; Step to;Short stride; Inconsistent jere; Shuffling;Decreased foot clearance;R Foot drag;L Foot drag   Gait Assistance 4  Minimal assist   Additional items Assist x 1;Verbal cues; Tactile cues   Assistive Device Rolling walker   Distance 5 feet  Balance   Static Sitting Fair +   Static Standing Poor +   Ambulatory Poor +   Activity Tolerance   Activity Tolerance Patient tolerated treatment well;Patient limited by pain   Nurse [address] Villaseñor RN  Exercises   THR Supine;Left;AAROM;10 reps   Assessment   Prognosis Good   Problem List Decreased strength;Decreased range of motion;Decreased endurance; Impaired balance;Decreased mobility; Decreased skin integrity;Orthopedic restrictions;Pain   Assessment The pt  has improved mobility as she was able to take steps today  She was only able to clear her feet partially, and as she fatigued she resorted to swivelling versus dragging her feet forward  She does continue to require instruction for each activity as she does occasionally become distracted  She also has less pain overall with mobility   She does continue to remain significantly impaired from her baseline, and she will benefit from continued physical therapy in order to safely progress her functional mobility  She continues to require extensive time for all mobility at this time  4/11/2018, per GLEASON    ADL   Toileting Assistance  2  Maximal Assistance   Toileting Comments pt was able to wipe arden area seated and requried asst for backside  pt required max asst to amnage hosp gown prior to and after sitting   Functional Standing Tolerance   Time 1 min during toileting demosntrating fair minus balnace with rw   Bed Mobility   Sit to Supine 2  Maximal assistance   Additional items Assist x 2   Transfers   Sit to Stand 3  Moderate assistance   Additional items Assist x 2  (rw)   Stand to Sit 3  Moderate assistance   Additional items Assist x 2  (rw)   Toilet Transfers   Toilet Transfer to Standard bedside commode   Toilet Transfer Technique Stand pivot   Toilet Transfers Maximal assistance   Activity Tolerance   Activity Tolerance Patient limited by pain   Assessment   Assessment pt participated in pm ot session and was seen focusing on toileting and return to bed transfer after sitting in recliner cahir since lunch  pt requried mod asst x 2 to stand and trnasfer to/from chair commode and eob  pt required max asst x 2 for sit to supine  pt with c/o feeling faint after toileting and when returning to bed  pt reports relief after lying in bed 5 min wit hcold cloth  pts hr was 120 and pulse ox was 99% on RA   pt requries asst x 2 for toileting and mobility  pt with c/o pain and swlling in  l le   pt was motivated and cooperative this session and willing to attempt commode for first time vs using bed pan  Per Nursing on 4/11, patient was mod assist with bathing and self-care  N/A     CURRENT GAP IN FUNCTION     Prior to Admission:     Functional Status: Patient was independent with mobility/ambulation, transfers, ADL's, IADL's  Estimated length of stay: 10 to 14 days    Anticipated Post-Discharge Disposition/Treatment  Disposition: Return to previous home/apartment    Outpatient Services: Physical Therapy (PT) and Occupational Therapy (OT)    BARRIERS TO DISCHARGE  Lovenox, Weakness, Pain, Balance Difficulty, Dizziness, Fatigue, Home Accessibility, Caregiver Accessibility, Financial Resources, Equipment Needs, Resource Availability and Lives Alone    INTERVENTIONS FOR DISCHARGE  Adaptive equipment, Patient/Family/Caregiver Education, [employer], Home Evaluation, Support Group, Financial Assistance, Arrange DME needs, Medication Changes as per MD recommendations, Therapy exercises and Center of balance support     REQUIRED THERAPY:  Patient will require PT and OT 90 minutes each per day, five days per week to achieve rehab goals  REQUIRED FUNCTIONAL AND MEDICAL MANAGEMENT FOR INPATIENT REHABILITATION:  Skin:  There are no pressure sores currently, rash right flank, left hip incision with DSD, Pain Management: Overall pain is well controlled, Deep Vein Thrombosis (DVT) Prophylaxis:  SCD's while in bed and ASA 81mg PO daily, Lovenox 40mg SQ daily, further IM management of additional medical conditions while on the ARC, she needs PT/OT intervention, patient/family education and training, possible Neuropsych and Orthopedics consults with any other needed consults prn, nursing medication review and management of bowel/bladder function  RECOMMENDED LEVEL OF CARE:  Patient presented to [facility] on [facility]/7/2018 s/p fall while in the shower, with complaints of severe left leg pain  Patient denies loss of consciousness or hitting her head  Patient was found by EMS with noted shortening and external rotation of left lower extremity  Left femur x-ray showed an intertrochanteric hip fracture  The patient's last ECHO showed a preserved LV function  It was also noted that patient had a recurrence of shingles on the right flank, but there was no need for Valtrex as it had been greater than 72 hours since it flared up   On 4/8, patient went to the OR with Orthopedics for a cephalo-medullary nailing of the left intertrochanteric femur fracture  Post-operatively, patient was made weight-bearing as tolerated to LLE  On 4/9, patient's Hgb was noted to be 7 2, and was transfused with 2 units of PRBC  Endocrinology was consulted regarding osteoporosis with hip fracture, and patient was initiated on Vitamin D supplement, with recommendations for a DEXA scan as an outpatient  TSH level was drawn, which was 0 016, consistent with hyperthyroidism, and patient was started on Methimazole  Patient is currently on a Regular diet, and is tolerating well  Prior to patient's admission, patient was fully independent with both ADL's and IADL's, however, family did assist with IADL's as needed  Currently, patient is a Min assist of 1-2 with RW for gait and transfers, and Mod assist for ADL's  Close medical management and PM&R management is recommended at this time while patient is on the [facility] acute rehab is recommended for patient to maximize overall strength and mobility to Modified Independent upon discharge to home with support of family

## 2018-04-12 NOTE — CONSULTS
Consultation - Wilder Hill 82 y o  female MRN: [id number]    Unit/Bed#: -01 Encounter: [id number]        History of Present Illness     HPI: Wilder Hill is a 82y o  year old female with a history of hypertension, hyperlipidemia, GERD, CAD hx stent x 2, bladder cancer, CLL, reactive airway disease, chronic diastolic heart failure presented on 4/8 after she slipped and fell onto her left hip while she was in the shower  She was found to have a left hip fracture  She was taken to the OR for IMN of the left femur  She had acute blood loss anemia with drop in hgb from 11 9 to 7 2  She was transfused with 2 units PRBC on 4/9  She was seen by endocrinology due to history of osteoporosis and incidentally found to have hyperthyroidism and started on methimazole  She was deemed medically stable and transferred to [facility] for ongoing therapy    ROS:  Constitutional: Negative  HENT: Negative  Respiratory: Negative  Cardiovascular: Negative  Gastrointestinal: Negative  Musculoskeletal: + left hip pain   Neurological: Negative  Psychiatric/Behavioral: Negative  Historical Information   Past Medical History:   Diagnosis Date    Bladder cancer ([address] )     Coronary artery disease     S/P stent placement x 2 in 2011    GERD (gastroesophageal reflux disease)     Hepatitis     Hyperlipidemia     Hypertension     Kidney stones     Malignant neoplasm of urethra (HCC)     Pneumonia     Shingles      Past Surgical History:   Procedure Laterality Date    CORONARY ANGIOPLASTY WITH STENT PLACEMENT      stent x 2    CYSTOSCOPY      diagnostic - onset 4/4/17    HYSTERECTOMY      [facility] OPEN RX FEMUR FX+INTRAMED ALEJANDRO Left 4/8/2018    Procedure: INSERTION NAIL IM FEMUR ANTEGRADE (TROCHANTERIC);   Surgeon: Stewart Maddox MD;  Location:  MAIN OR;  Service: Orthopedics     Social History   History   Alcohol Use No     History   Drug Use No     History   Smoking Status    Never Smoker   Smokeless Tobacco TRANSITIONAL CARE MANAGEMENT - HOSPITAL DISCHARGE FOLLOW-UP    Contacted Ms. Werner regarding follow-up for  after hospital discharge. She was discharged from the hospital on 12/16/17 for Increased ammonia level, hepatic encephalopathy. Review of the After Visit Summary from the recent hospitalization indicates that the patient needs to follow up with Dr. Vo.    She feels that she is doing fairly well at home.   Her diet concern is none. Overall, the patient is eating well.   Ambulation: stayed the same  Fever: is not present  Pain: has lidocaine cream for pain/cramps.  Activities of Daily Living (global): Partial assistance   Patient states that she does have sufficient family support. She feels that she is able to ask for assistance when needed.    Spoke with her aunt, Na Nunn, who is currently her caregiver.  Patient is living at her home.  Patient is doing \"a little better\" since discharge.     Additional patient/family concerns:  She will need additional home care and eventual change in living situation due to need for supervision and assistance with medication, disoriented.    Discharge medications were verified with the patient. She is fully compliant with the medication regimen prescribed at the time of discharge. She reports that she is not experiencing any medication side effects.    Upon discharge, the patient was to receive home healthcare services . These services have been initiated. These services have been scheduled and no further arrangements are needed at this time.     Patient has an appointment on 12/22 with Jr Vo MD. Ms. Werner was reminded about the importance of keeping this appointment.       Never Used     Family History   Problem Relation Age of Onset    Arthritis Mother     Osteoporosis Mother     Heart disease Father     Hypertension Father     Hypertension Brother        Meds/Allergies   current meds:  Current Facility-Administered Medications   Medication Dose Route Frequency    acetaminophen (TYLENOL) tablet 975 mg  975 mg Oral Q8H [address]    [START ON 4/13/2018] amLODIPine (NORVASC) tablet 5 mg  5 mg Oral Daily    [START ON 4/13/2018] aspirin chewable tablet 81 mg  81 mg Oral Daily    [START ON 4/13/2018] atenolol (TENORMIN) tablet 25 mg  25 mg Oral Daily    atorvastatin (LIPITOR) tablet 40 mg  40 mg Oral After Dinner    bisacodyl (DULCOLAX) rectal suppository 10 mg  10 mg Rectal Daily PRN    docusate sodium (COLACE) capsule 100 mg  100 mg Oral BID    [START ON 4/13/2018] DULoxetine (CYMBALTA) delayed release capsule 30 mg  30 mg Oral Daily    [START ON 4/13/2018] enoxaparin (LOVENOX) subcutaneous injection 40 mg  40 mg Subcutaneous Daily    [START ON 4/13/2018] methimazole (TAPAZOLE) tablet 5 mg  5 mg Oral Daily    oxyCODONE (ROXICODONE) immediate release tablet 10 mg  10 mg Oral Q4H PRN    oxyCODONE (ROXICODONE) IR tablet 5 mg  5 mg Oral Q4H PRN    [START ON 4/13/2018] pantoprazole (PROTONIX) EC tablet 40 mg  40 mg Oral Early Morning    polyethylene glycol (MIRALAX) packet 17 g  17 g Oral Daily PRN       PTA meds:   Prescriptions Prior to Admission   Medication    acetaminophen (TYLENOL) 650 mg CR tablet    amLODIPine (NORVASC) 5 mg tablet    aspirin 81 MG tablet    atenolol (TENORMIN) 50 mg tablet    atorvastatin (LIPITOR) 40 mg tablet    bumetanide (BUMEX) 1 mg tablet    dicyclomine (BENTYL) 20 mg tablet    DULoxetine (CYMBALTA) 30 mg delayed release capsule    enoxaparin (LOVENOX) 40 mg/0 4 mL    fexofenadine (ALLEGRA) 180 MG tablet    meloxicam (MOBIC) 7 5 mg tablet    methylcellulose (CITRUCEL) oral powder    montelukast (SINGULAIR) 10 mg tablet    Multiple Vitamin ([facility] DAILY MULTIPLE PO)    oxyCODONE (ROXICODONE) 5 mg immediate release tablet    pantoprazole (PROTONIX) 40 mg tablet     Allergies   Allergen Reactions    Lactose      Other reaction(s): Indigestion/GI Upset    Other      Other reaction(s): Mental Status Change  After surgery  Whole blood  = passed out   historical allergy; verify with patient       Objective   Vitals: Blood pressure 123/55, pulse 86, temperature 98 2 °F (36 8 °C), temperature source Oral, resp  rate 18, height 5' 1" (1 549 m), weight 62 6 kg (138 lb), SpO2 100 %  Physical Exam   Constitutional: Pt is in NAD, nontoxic  HENT: nares patent, oropharynx negative for thrush  Head: Normocephalic  Eyes: EOM are normal  Pupils are equal, round, and reactive to light  Neck: Neck supple  Cardiovascular: Normal rate and regular rhythm  No murmur heard  Pulmonary/Chest: Breath sounds normal  No respiratory distress  Pt has no wheezes  Pt has no rales  Abdominal: Soft  Bowel sounds are normal  Pt exhibits no distension  There is no tenderness  There is no rebound and no guarding  Extremities: Trace LE edema   Neurological: Pt is alert and oriented to person, place, and time  Psychiatric: Pt has a normal mood and affect         Lab Results:   Results from last 7 days  Lab Units 04/11/18  0546 04/10/18  0528   WBC Thousand/uL 7 40 9 32   HEMOGLOBIN g/dL 8 4* 9 3*   HEMATOCRIT % 25 1* 27 9*   PLATELETS Thousands/uL 118* 120*       Results from last 7 days  Lab Units 04/10/18  0528 04/09/18  0548   SODIUM mmol/L 135* 135*   POTASSIUM mmol/L 3 9 4 0   CHLORIDE mmol/L 101 103   CO2 mmol/L 29 28   BUN mg/dL 28* 24   CREATININE mg/dL 0 78 0 94   GLUCOSE RANDOM mg/dL 118 117   CALCIUM mg/dL 8 5 8 2*           Results from last 7 days  Lab Units 04/08/18  0203   INR  0 99       Glucose (mg/dL)   Date Value   04/10/2018 118   04/09/2018 117   04/07/2018 118   03/23/2018 91       Labs reviewed    Imaging: reviewed  EKG, Pathology, and Other Studies: I have personally reviewed pertinent reports  VTE Prophylaxis: Enoxaparin (Lovenox)    Code Status: Level 1 - Full Code     Assessment/Plan       1  Left Intertrochanteric femur fx s/p IMN: monitor incision  Pain control and therapy per primary team  Follow up with ortho as indicated  2  ABLA: s/p 2 units PRBC; follow hgb  Slight trend down after transfusion  CBC in AM  3  Chronic diastolic heart failure: Bumex has been on hold; Resume Bumex 0 5mg daily and monitor volume status  4  CAD hx Stent x 2: continue ASA 81mg, Atenolol and Lipitor  5  Hypertension: on home regimen of Norvasc 5mg daily and Atenolol 25mg daily  Monitor blood pressure and adjust as needed  6  Hyperthryoidism: continue methimazole; Repeat TSH/T4 in 4 weeks  Follow up with endocrinology  7  Reactive airway disease: No signs of acute flare  Continue Allegra and Singulair  8  DVT prophylaxis: Lovenox 40mg daily    Counseling / Coordination of Care  Total floor / unit time spent today 60 minutes  Greater than 50% of total time was spent with the patient and / or family counseling and / or coordination of care        Dannielle Patton PA-C

## 2018-04-12 NOTE — PROGRESS NOTES
Orthopedics   Devante Left 82 y o  female MRN: [id number]  Unit/Bed#: CW3 351-01      Subjective:  82 y  o female post operative day 4 left femoral intramedullary nail  Sat up in chair yesterday with physical therapy      Labs:    0  Lab Value Date/Time   HCT 25 1 (L) 04/11/2018 0546   HCT 27 9 (L) 04/10/2018 0528   HCT 28 6 (L) 04/09/2018 1829   HGB 8 4 (L) 04/11/2018 0546   HGB 9 3 (L) 04/10/2018 0528   HGB 9 9 (L) 04/09/2018 1829   INR 0 99 04/08/2018 0203   WBC 7 40 04/11/2018 0546   WBC 9 32 04/10/2018 0528   WBC 5 70 04/09/2018 0548   ESR 28 (H) 04/09/2018 1829   CRP <3 0 05/23/2017 1129       Meds:    Current Facility-Administered Medications:      EMS REPLENISHMENT MED, , Does not apply, Once, Marlen Cruz MD    acetaminophen (TYLENOL) tablet 650 mg, 650 mg, Oral, Q6H [facility], Laya Reynolds MD, 650 mg at 04/12/18 0612    amLODIPine (NORVASC) tablet 5 mg, 5 mg, Oral, Daily, Laya Reynolds MD, 5 mg at 04/11/18 1121    aspirin chewable tablet 81 mg, 81 mg, Oral, Daily, Laya Reynolds MD, 81 mg at 04/11/18 0926    atenolol (TENORMIN) tablet 25 mg, 25 mg, Oral, Daily, Laya Reynolds MD, 25 mg at 04/11/18 0926    atorvastatin (LIPITOR) tablet 40 mg, 40 mg, Oral, After Julia Ortiz MD, 40 mg at 04/11/18 1733    calcium carbonate (TUMS) chewable tablet 1,000 mg, 1,000 mg, Oral, Daily PRN, Laya Reynolds MD    calcium carbonate (TUMS) chewable tablet 500 mg, 500 mg, Oral, BID, Carola Zepeda DO, 500 mg at 04/11/18 1733    dicyclomine (BENTYL) tablet 20 mg, 20 mg, Oral, Q6H PRN, Ela Jc PA-C    docusate sodium (COLACE) capsule 100 mg, 100 mg, Oral, BID, Laya Reynolds MD, 100 mg at 04/11/18 1733    DULoxetine (CYMBALTA) delayed release capsule 30 mg, 30 mg, Oral, Daily, Laya Reynolds MD, 30 mg at 04/11/18 0926    enoxaparin (LOVENOX) subcutaneous injection 40 mg, 40 mg, Subcutaneous, Daily, Laya Reynolds MD, 40 mg at 04/11/18 0926    methimazole (TAPAZOLE) tablet 5 mg, 5 mg, Oral, Daily, Maia Marie DO, 5 mg at 04/11/18 0926    methocarbamol (ROBAXIN) tablet 500 mg, 500 mg, Oral, Q6H PRN, Gustavo Slimmer, PA-C, 500 mg at 04/10/18 2247    morphine injection 2 mg, 2 mg, Intravenous, Q2H PRN, Xu Strange MD, 2 mg at 04/09/18 0954    ondansetron (ZOFRAN) injection 4 mg, 4 mg, Intravenous, Q6H PRN, Xu Strange MD    oxyCODONE (ROXICODONE) immediate release tablet 10 mg, 10 mg, Oral, Q4H PRN, Xu Strange MD, 10 mg at 04/10/18 2247    oxyCODONE (ROXICODONE) IR tablet 5 mg, 5 mg, Oral, Q4H PRN, Xu Strange MD, 5 mg at 04/12/18 0424    pantoprazole (PROTONIX) EC tablet 40 mg, 40 mg, Oral, Early Morning, Xu Strange MD, 40 mg at 04/12/18 0612    senna (SENOKOT) tablet 8 6 mg, 1 tablet, Oral, HS PRN, Xu Strange MD    simethicone (MYLICON) chewable tablet 80 mg, 80 mg, Oral, 4x Daily PRN, Xu Strange MD    Blood Culture:   No results found for: BLOODCX    Wound Culture:   No results found for: WOUNDCULT    Ins and Outs:  I/O last 24 hours: In: 200 [P O :980]  Out: 650 [Urine:650]          Physical exam:  Vitals:    04/12/18 0029   BP: 129/63   Pulse: 82   Resp: 18   Temp: 98 3 °F (36 8 °C)   SpO2: 99%     left lower extremity  · Dressing CDI  · Sensation intact L1-S1  · Motor intact EHL/FHL  · Toes WWP, extremity warm and well perfused    _*_*_*_*_*_*_*_*_*_*_*_*_*_*_*_*_*_*_*_*_*_*_*_*_*_*_*_*_*_*_*_*_*_*_*_*_*_*_*_*_*    Assessment: 82 y  o female post operative day 4  left femur IMN   Pt doing well  will require rehab placement    Plan:  · Up and out of bed  · Weightbearing as tolerated  · PT/OT as able  · DVT prophylaxis  · Analgesics as needed  · Dispo: Ortho will follow  · Patient noted to have acute blood loss anemia due to a drop in Hbg of > 2 0g from preop levels, will monitor vital signs and resuscitate with IV fluids as needed    Kathryn Owens

## 2018-04-12 NOTE — PHYSICAL THERAPY NOTE
Physical Therapy Progress Note     04/12/18 0835   Pain Assessment   Pain Assessment 0-10   Pain Score 3   Pain Type Acute pain   Pain Location Leg   Pain Orientation Left   Hospital Pain Intervention(s) Repositioned;Cold applied; Ambulation/increased activity; Emotional support   Response to Interventions Tolerated  Restrictions/Precautions   RLE Weight Bearing Per Order WBAT   LLE Weight Bearing Per Order WBAT   Other Precautions Chair Alarm; Fall Risk;Pain   Subjective   Subjective The pt  states that she is doing better, and she is agreeable to get out to the chair for breakfast    Bed Mobility   Supine to Sit 4  Minimal assistance   Additional items Assist x 2; Increased time required;Verbal cues;LE management   Transfers   Sit to Stand 4  Minimal assistance   Additional items Assist x 2; Increased time required;Verbal cues   Stand to Sit 4  Minimal assistance   Additional items Assist x 1; Increased time required;Verbal cues   Ambulation/Elevation   Gait pattern Excessively slow; Step to;Short stride; Inconsistent jere; Shuffling;Decreased foot clearance;R Foot drag;L Foot drag   Gait Assistance 4  Minimal assist   Additional items Assist x 1;Verbal cues; Tactile cues   Assistive Device Rolling walker   Distance 5 feet  Balance   Static Sitting Fair +   Static Standing Poor +   Ambulatory Poor +   Activity Tolerance   Activity Tolerance Patient tolerated treatment well;Patient limited by pain   Nurse [address] Villaseñor RN  Exercises   THR Supine;Left;AAROM;10 reps   Assessment   Prognosis Good   Problem List Decreased strength;Decreased range of motion;Decreased endurance; Impaired balance;Decreased mobility; Decreased skin integrity;Orthopedic restrictions;Pain   Assessment The pt  has improved mobility as she was able to take steps today  She was only able to clear her feet partially, and as she fatigued she resorted to swivelling versus dragging her feet forward   She does continue to require instruction for each activity as she does occasionally become distracted  She also has less pain overall with mobility  She does continue to remain significantly impaired from her baseline, and she will benefit from continued physical therapy in order to safely progress her functional mobility  She continues to require extensive time for all mobility at this time  Barriers to Discharge Inaccessible home environment;Decreased caregiver support   Goals   Patient Goals To continue getting better  STG Expiration Date 04/19/18   Treatment Day 2   Plan   Treatment/Interventions Functional transfer training;LE strengthening/ROM; Therapeutic exercise; Endurance training;Patient/family training;Bed mobility;Gait training   Progress Progressing toward goals   PT Frequency 5x/wk; Weekend   Recommendation   Recommendation Post acute IP rehab   Equipment Recommended [employer] Lauren, PTA

## 2018-04-12 NOTE — PLAN OF CARE
Problem: PHYSICAL THERAPY ADULT  Goal: Performs mobility at highest level of function for planned discharge setting  See evaluation for individualized goals  Treatment/Interventions: Functional transfer training, LE strengthening/ROM, Therapeutic exercise, Endurance training, Patient/family training, Equipment eval/education, Bed mobility, Gait training, Spoke to nursing, OT  Equipment Recommended: Miguel Angel Weston       See flowsheet documentation for full assessment, interventions and recommendations  Outcome: Progressing  Prognosis: Good  Problem List: Decreased strength, Decreased range of motion, Decreased endurance, Impaired balance, Decreased mobility, Decreased skin integrity, Orthopedic restrictions, Pain  Assessment: The pt  has improved mobility as she was able to take steps today  She was only able to clear her feet partially, and as she fatigued she resorted to swivelling versus dragging her feet forward  She does continue to require instruction for each activity as she does occasionally become distracted  She also has less pain overall with mobility  She does continue to remain significantly impaired from her baseline, and she will benefit from continued physical therapy in order to safely progress her functional mobility  She continues to require extensive time for all mobility at this time  Barriers to Discharge: Inaccessible home environment, Decreased caregiver support     Recommendation: Post acute IP rehab     PT - OK to Discharge: Yes (to rehab when medically cleared)    See flowsheet documentation for full assessment

## 2018-04-12 NOTE — H&P
H&P Exam - [place] 82 y o  female MRN: [id number]    Unit/Bed#: -01 Encounter: [id number]      Primary Rehab dx:  L IT fx    History of Present Illness   Pt is 81 yo female with L IT fx s/p IM nail fixation by Dr Candy Hester on 4/8  Issue #1: per patient pain is controlled when in bed however does require pain medication when Romy Side #2: d/w patient rehab plan        PMHx:  Hyperparathyroidism, hyperthyroidism, bladder CA, CLL, CAD, HTN, GERD, seasonal allergies, grade I DD w/chronic LE edema, depression, osteoporosis, hyponatremia, anemia, thrombocytopenia    Incidental findings of CT A/P of 3/26/18:  1) b/l renal cysts: per report unchanged compared to previous study of 9/25/17  2) rt renal nodules: per report unchanged compared to previous study of 9/25/17  3) hepatic cysts: per report unchanged compared to previous study of 9/25/17  4) pancreatic cysts: per report unchanged compared to previous study of 9/25/17  5) ventral hernia containing adipose: OP FU w/PCP    Other incidental findings:  6) PVCs: OP FU with Dr Mark Puente (pt's cardiologist)  7) elevated peak PA pressure: OP FU with Dr Mark Puente (pt's cardiologist)    Family History: Non contributory    ROS:  Constitutional: denies fevers, chills  HEENT: denies tinnitus  Pulm: denies SOB  CV: denies CP  GI: denies abdominal pain  Neuro: denies headache  Skin: denies rashes  Psych: denies depression  Extremities: denies edema    Allergies   Allergen Reactions    Lactose      Other reaction(s): Indigestion/GI Upset    Other      Other reaction(s): Mental Status Change  After surgery   Whole blood  = passed out   historical allergy; verify with patient         Funtional status on admission: min amb/tx  Functional status prior to admission: I PTA   Social history: lives alone, FF setup available, 5 DALLIN    Objective     Current Vitals:   Blood Pressure: 123/55 (04/12/18 1417)  Pulse: 86 (04/12/18 1417)  Temperature: 98 2 °F (36 8 °C) (04/12/18 1417)  Temp Source: Oral (04/12/18 1417)  Respirations: 18 (04/12/18 1417)  Height: 5' 1" (154 9 cm) (04/12/18 1417)  Weight - Scale: 62 6 kg (138 lb) (04/12/18 1417)  SpO2: 100 % (04/12/18 1417)    Gen: NAD   HEENT: No swelling of the tongue  Pulm: respirations unlabored  Card: +S1/S2   Abd: soft NT  Neuro: CN grossly intact, lt touch grossly intact, finger to nose without gross dysmetria b/l  MSK: 4/5 UE B/L, 4/5 dorsiflexion/plantar flexion b/l, 4/5 Rt hip flexion  Extrem: + LE edema L>R   Skin: no rashes  Psych: mood/affect stable               Comorbidities:   Hyperparathyroidism, hyperthyroidism, bladder CA, CLL, CAD, HTN, GERD, seasonal allergies, grade I DD w/chronic LE edema, depression, osteoporosis, hyponatremia, anemia, thrombocytopenia    Incidental findings of CT A/P of 3/26/18:  1) b/l renal cysts: per report unchanged compared to previous study of 9/25/17  2) rt renal nodules: per report unchanged compared to previous study of 9/25/17  3) hepatic cysts: per report unchanged compared to previous study of 9/25/17  4) pancreatic cysts: per report unchanged compared to previous study of 9/25/17  5) ventral hernia containing adipose: OP FU w/PCP    Other incidental findings:  6) PVCs: OP FU with Dr Scooby Reina (pt's cardiologist)  7) elevated peak PA pressure: OP FU with Dr Scooby Reina (pt's cardiologist)    Assessment:  Pt is 81 yo female with L IT fx s/p IM nail fixation by Dr Juany Marquis on 4/8    Plan:    Rehabilitation Necessity:   Medical impact on function as a result of impaired functional mobility, bed mobility, transfers, self-care/ADL's, endurance, range of motion/flexibility, and impaired coordination  Treatment plan will include a comprehensive rehabilitation program with intense therapies for 3 hours/day, 5 days/week    1  24-hour availability of a physician specializing in rehabilitation who will coordinate the rehabilitation disciplines, manage the comorbid conditions, monitor the patient's functional improvement, and maximize the rehabilitation outcome  2  Physical therapy to address bed mobidility, car and mat transfer, functional mobility with use of least restrictive assistive device, truncal strengthening, coordination, range of motion/flexibility, durable medical equipment evaluation, patient and family instruction and endurance training  3  Occupational therapy to address feeding, grooming, upper and lower body dressing and bathing, toileting, tub/toilet/bed transfers, durable medical equipment evaluation, range of motion/flexibility, dexterity, coordination and patient and family instruction  4  Rehabilitation nursing 24 hours per day to monitor bowel and bladder function, work on bowel routine, assess falls risk upon admission and periodically thereafter, implement and revise falls prevention strategies, maintain skin integrity through initial and daily pressure sore risk assessment (Silvestre scale), implement and revise pressure sore prevention strategies, educate patient and family members regarding medication administration, ADL's, transfers, and mobility and continue therapy carryover with ADL's, transfers, and mobility  5  Social work and case management consults for discharge planning/disposition issues, as well as coordination and communication of patient progress between family and providers    6  Rehab psychology- as needed for adjustment, coping        L IT fx: s/p IM nail fixation by Dr More Main on 4/8      Hyperparathyroidism: seen by endocrine as inpt in acute care, will d/w endocrine service (on home vit D 2000 units QD)    Hyperthyroidism: elevated thyroid microsomal antibody, elevated thyroid stimulating immunoglobulin, decreased TSH, elevated free T4, likely autoimmune etiology, seen by endocrine in acute care and started on tapazole and have recommended repeat free T4 in 4 wks and FU in 6 wks with endocrine    bladder CA: CT A/P of 3/26/18 did not reveal any findings to suggest recurrent or new urogenital neoplasm; follows with Dr Marni Bennett: follows with Dr Nakul Davis who per last visit note felt pt was hematologically stable     CAD: s/p stents, on home regimen of ASA 81 mg qd, atenolol 25 mg qd, and lipitor 40 mg qPM; OP FU with Dr Iesha Venegas    HTN: on home regimen of norvasc 5 mg qd and atenolol 25 mg qd     GERD: on home dose of protonix 40 mg qd (per pt will occasionally take BID if she feels it is very bad that day)     seasonal allergies: at home takes prn allegra and prn singulair; currently asymptomatic per pt    grade I DD w/chronic LE edema: per IM recs in acute care pt resumed by IM on home bumex 0 5 mg qd     Depression: on home cymbalta 30 mg qd     Osteoporosis: on home vitamin D 2000 units qd (vit D level of 4/9 WNL at 47 9), also on prolia as OP, managed by her rheumatologist, seen by endocrine and recommend FU with her rheumatologist for further management and DEXA scan at her rheumatologist discretion    Hyponatremia: mild 135 (), IM monitoring     Anemia: ABLA, Hg currently 8 4, transfuse for Hg <7 0, IM monitoring      Thrombocytopenia: plt ct currently 118, IM monitoring    DVT ppx: SCDs, lovenox x 28 days post-op per ortho    Incidental findings of CT A/P of 3/26/18:  1) b/l renal cysts: per report unchanged compared to previous study of 9/25/17  2) rt renal nodules: per report unchanged compared to previous study of 9/25/17  3) hepatic cysts: per report unchanged compared to previous study of 9/25/17  4) pancreatic cysts: per report unchanged compared to previous study of 9/25/17  5) ventral hernia containing adipose: OP FU w/PCP    Other incidental findings:  6) PVCs: OP FU with Dr Iesha Venegas (pt's cardiologist)  7) elevated peak PA pressure: OP FU with Dr Iesha Venegas (pt's cardiologist)          III  Estimated length of stay:  Weeks: 1-2    IV  Goals  Maximize functional transfers, ADLs, and mobility to decrease caregiver burden  V   Anticipated discharge setting  Home    VI  Prognosis:  Fair     CMS Required Post-Admission Physician Evaluation Elements  History and Physical, including medical history, functional history and active comorbidities as in above text  Post Admission Physician Evaluation:  The patient has the potential to make improvement and is in need of at least 2 of the following multidisciplinary therapies including, but not limited to physical, occupational, speech and respiratory  The patient may also need nutritional services, wound care and prosthetics/orthotics prescription  Given the patient's complex medical condition and risk of further medical complications, rehabilitative services cannot be safely provided at a lower level of care, such as a skilled nursing facility  I have reviewed the patient's functional and medical status at the time of the preadmission screening and they are the same as on the day of this admission  I acknowledge that I have personally performed a full physical examination on this patient within 24 hours of admission  I have determined that the patient is able to tolerate the above course of treatment, at the appropriate level of intensity, for a reasonable period of time  The patient demonstrated understanding the rehabilitation program and the discharge process after we discussed them    Agree in entirety: yes  Minor adaptions: none   Major changes: none    Note:    In addition to rehab needs this patient requires acute inpatient rehabilitation for hyponatremia, anemia, thrombocytopenia

## 2018-04-12 NOTE — CASE MANAGEMENT
Continued Stay Review    Date: 4/11    Vital Signs:    04/11/18 1138   BP: 117/56   Pulse: 87   Resp: 16   Temp: 98 1 °F (36 7 °C)   SpO2: 100%       Medications:   Scheduled Meds:   Current Facility-Administered Medications:  acetaminophen 650 mg Oral Q6H [address]   amLODIPine 5 mg Oral Daily   aspirin 81 mg Oral Daily   atenolol 25 mg Oral Daily   atorvastatin 40 mg Oral After Dinner   calcium carbonate 500 mg Oral BID   docusate sodium 100 mg Oral BID   DULoxetine 30 mg Oral Daily   enoxaparin 40 mg Subcutaneous Daily   methimazole 5 mg Oral Daily   pantoprazole 40 mg Oral Early Morning     Continuous Infusions:    PRN Meds: calcium carbonate    dicyclomine    methocarbamol    morphine injection    ondansetron    oxyCODONE    senna    simethicone    Abnormal Labs/Diagnostic Results:    04/11/18 0546    WBC 4 31 - 10 16 Thousand/uL 7 40    RBC 3 81 - 5 12 Million/uL 2 72     Hemoglobin 11 5 - 15 4 g/dL 8 4     Hematocrit 34 8 - 46 1 % 25 1         Age/Sex: 82 y o  female     Assessment: 82 y  o female post operative day 3  left femur IMN   Pt doing well     Plan:  · Up and out of bed  · Weightbearing as tolerated  · PT/OT  · DVT prophylaxis  · Analgesics as needed  · Dispo: Ortho will follow  · Patient noted to have acute blood loss anemia due to a drop in Hbg of > 2 0g from preop levels, will monitor vital signs and resuscitate with IV fluids as needed    Discharge Plan: Referral to [facility]

## 2018-04-13 LAB
ANION GAP SERPL CALCULATED.3IONS-SCNC: 4 MMOL/L (ref 4–13)
BASOPHILS # BLD AUTO: 0.02 THOUSANDS/ΜL (ref 0–0.1)
BASOPHILS NFR BLD AUTO: 0 % (ref 0–1)
BUN SERPL-MCNC: 19 MG/DL (ref 5–25)
CALCIUM SERPL-MCNC: 8.4 MG/DL
CHLORIDE SERPL-SCNC: 105 MMOL/L (ref 100–108)
CO2 SERPL-SCNC: 30 MMOL/L (ref 21–32)
CREAT SERPL-MCNC: 0.6 MG/DL (ref 0.6–1.3)
EOSINOPHIL # BLD AUTO: 0.05 THOUSAND/ΜL (ref 0–0.61)
EOSINOPHIL NFR BLD AUTO: 1 % (ref 0–6)
ERYTHROCYTE [DISTWIDTH] IN BLOOD BY AUTOMATED COUNT: 13.6 % (ref 11.6–15.1)
ERYTHROCYTE [DISTWIDTH] IN BLOOD BY AUTOMATED COUNT: 13.7 % (ref 11.6–15.1)
GFR SERPL CREATININE-BSD FRML MDRD: 86 ML/MIN/1.73SQ M
GLUCOSE SERPL-MCNC: 100 MG/DL (ref 65–140)
HCT VFR BLD AUTO: 22.5 % (ref 34.8–46.1)
HCT VFR BLD AUTO: 23.5 % (ref 34.8–46.1)
HGB BLD-MCNC: 7.5 G/DL (ref 11.5–15.4)
HGB BLD-MCNC: 7.5 G/DL (ref 11.5–15.4)
LYMPHOCYTES # BLD AUTO: 0.81 THOUSANDS/ΜL (ref 0.6–4.47)
LYMPHOCYTES NFR BLD AUTO: 17 % (ref 14–44)
MCH RBC QN AUTO: 30.1 PG (ref 26.8–34.3)
MCH RBC QN AUTO: 30.6 PG (ref 26.8–34.3)
MCHC RBC AUTO-ENTMCNC: 31.9 G/DL (ref 31.4–37.4)
MCHC RBC AUTO-ENTMCNC: 33.3 G/DL (ref 31.4–37.4)
MCV RBC AUTO: 92 FL (ref 82–98)
MCV RBC AUTO: 94 FL (ref 82–98)
MONOCYTES # BLD AUTO: 0.69 THOUSAND/ΜL (ref 0.17–1.22)
MONOCYTES NFR BLD AUTO: 14 % (ref 4–12)
NEUTROPHILS # BLD AUTO: 3.26 THOUSANDS/ΜL (ref 1.85–7.62)
NEUTS SEG NFR BLD AUTO: 68 % (ref 43–75)
NRBC BLD AUTO-RTO: 0 /100 WBCS
PLATELET # BLD AUTO: 166 THOUSANDS/UL (ref 149–390)
PLATELET # BLD AUTO: 172 THOUSANDS/UL (ref 149–390)
PMV BLD AUTO: 9.1 FL (ref 8.9–12.7)
PMV BLD AUTO: 9.9 FL (ref 8.9–12.7)
POTASSIUM SERPL-SCNC: 3.9 MMOL/L (ref 3.5–5.3)
RBC # BLD AUTO: 2.45 MILLION/UL (ref 3.81–5.12)
RBC # BLD AUTO: 2.49 MILLION/UL (ref 3.81–5.12)
SODIUM SERPL-SCNC: 139 MMOL/L (ref 136–145)
WBC # BLD AUTO: 4.85 THOUSAND/UL (ref 4.31–10.16)
WBC # BLD AUTO: 4.91 THOUSAND/UL (ref 4.31–10.16)

## 2018-04-13 PROCEDURE — 80048 BASIC METABOLIC PNL TOTAL CA: CPT | Performed by: PHYSICAL MEDICINE & REHABILITATION

## 2018-04-13 PROCEDURE — 85025 COMPLETE CBC W/AUTO DIFF WBC: CPT | Performed by: NURSE PRACTITIONER

## 2018-04-13 PROCEDURE — 97162 PT EVAL MOD COMPLEX 30 MIN: CPT

## 2018-04-13 PROCEDURE — 97166 OT EVAL MOD COMPLEX 45 MIN: CPT

## 2018-04-13 PROCEDURE — 97530 THERAPEUTIC ACTIVITIES: CPT

## 2018-04-13 PROCEDURE — 85027 COMPLETE CBC AUTOMATED: CPT | Performed by: PHYSICAL MEDICINE & REHABILITATION

## 2018-04-13 PROCEDURE — 99232 SBSQ HOSP IP/OBS MODERATE 35: CPT | Performed by: PHYSICAL MEDICINE & REHABILITATION

## 2018-04-13 PROCEDURE — 97110 THERAPEUTIC EXERCISES: CPT

## 2018-04-13 PROCEDURE — 97535 SELF CARE MNGMENT TRAINING: CPT

## 2018-04-13 RX ORDER — CALCIUM CARBONATE 500(1250)
1 TABLET ORAL
Status: DISCONTINUED | OUTPATIENT
Start: 2018-04-14 | End: 2018-04-27 | Stop reason: HOSPADM

## 2018-04-13 RX ADMIN — ASPIRIN 81 MG 81 MG: 81 TABLET ORAL at 10:08

## 2018-04-13 RX ADMIN — BUMETANIDE 0.5 MG: 1 TABLET ORAL at 10:10

## 2018-04-13 RX ADMIN — PANTOPRAZOLE SODIUM 40 MG: 40 TABLET, DELAYED RELEASE ORAL at 05:42

## 2018-04-13 RX ADMIN — ATENOLOL 25 MG: 25 TABLET ORAL at 10:08

## 2018-04-13 RX ADMIN — ACETAMINOPHEN 975 MG: 325 TABLET, FILM COATED ORAL at 21:22

## 2018-04-13 RX ADMIN — ACETAMINOPHEN 975 MG: 325 TABLET, FILM COATED ORAL at 14:27

## 2018-04-13 RX ADMIN — DOCUSATE SODIUM 100 MG: 100 CAPSULE, LIQUID FILLED ORAL at 10:12

## 2018-04-13 RX ADMIN — OXYCODONE HYDROCHLORIDE 5 MG: 5 TABLET ORAL at 09:10

## 2018-04-13 RX ADMIN — ACETAMINOPHEN 975 MG: 325 TABLET, FILM COATED ORAL at 05:42

## 2018-04-13 RX ADMIN — ENOXAPARIN SODIUM 40 MG: 40 INJECTION SUBCUTANEOUS at 10:12

## 2018-04-13 RX ADMIN — VITAMIN D, TAB 1000IU (100/BT) 2000 UNITS: 25 TAB at 10:11

## 2018-04-13 RX ADMIN — DULOXETINE HYDROCHLORIDE 30 MG: 30 CAPSULE, DELAYED RELEASE ORAL at 10:12

## 2018-04-13 RX ADMIN — POLYETHYLENE GLYCOL 3350 17 G: 17 POWDER, FOR SOLUTION ORAL at 09:08

## 2018-04-13 RX ADMIN — DOCUSATE SODIUM 100 MG: 100 CAPSULE, LIQUID FILLED ORAL at 17:27

## 2018-04-13 RX ADMIN — METHIMAZOLE 5 MG: 5 TABLET ORAL at 11:31

## 2018-04-13 RX ADMIN — AMLODIPINE BESYLATE 5 MG: 5 TABLET ORAL at 10:07

## 2018-04-13 RX ADMIN — ATORVASTATIN CALCIUM 40 MG: 40 TABLET, FILM COATED ORAL at 17:27

## 2018-04-13 NOTE — SOCIAL WORK
Met w/pt and reviewed rehab routine and cm role  Pt resides alone and doesn't have any concerns about returning home  Pt states her dtr in law neva is very helpful but she doesn't expect her to stay with her  Pt has a ramp to enter her home due to her late husbands use of a w/c  Pt has a cane and shower seat w/o back  Pt is familiar with [facility] services and believes she had [facility] in the past  Pt is not interested in meals on wheels as she has too many dietary restrictions  Pt uses [facility] [address] in [place] for rx needs  Made pt aware of [facility] for dc needs  Informed of team mtg process and potential los of a week to 10 days  Pt is on  lovenox and is familiar as she had to provide to her late    Following for assist w/dc planning needs

## 2018-04-13 NOTE — PROGRESS NOTES
04/13/18 1300   Pain Assessment   Pain Score 6   Pain Location Hip   Pain Orientation Left   Hospital Pain Intervention(s) Medication (See MAR); Cold applied;Repositioned   Response to Interventions pt reported inc pain in LLE duirng standing, and some seated therex  end of session pt in recliner with legs elevated and ice pack on L hip for 20 min    Restrictions/Precautions   LLE Weight Bearing Per Order WBAT   Cognition   Overall Cognitive Status WFL   Arousal/Participation Cooperative   Subjective   Subjective pt reported feeling pretty well but having pain in LLE   QI: Sit to Stand   Assistance Needed Physical assistance; Adaptive equipment   Assistance Provided by [place] Less than 25%   Sit to Stand CARE Score 3   QI: Chair/Bed-to-Chair Transfer   Assistance Needed Physical assistance; Adaptive equipment   Assistance Provided by [place] Less than 25%   Chair/Bed-to-Chair Transfer CARE Score 3   Transfer Bed/Chair/Wheelchair   Limitations Noted In Balance; Endurance;LE Strength   Adaptive Equipment Roller Walker   Stand Pivot Minimal Assist   Sit to Stand Minimal Assist   Stand to Sit Minimal Assist   Bed, Chair, Wheelchair Transfer (FIM) 2 - [place] needs to lift or boost to rise AND assist to sit   Therapeutic Interventions   Strengthening AAROM for SAQ LLE seated in WC  AROM for knee flex/ext over SB over 2 dowels to dec friction and improve AROM L knee  Switched out WC to a better fit for pt  End of session legs elevated in recliner with pillow under LLE    Flexibility stretching L hamstring and calf 2x60 sec each    Assessment   Treatment Assessment Pt cont to demonstrate safety with functional tasks, as she takes her time and can sequence through transfers and sit<>stands well  Pt's LLE pain and dec strength are biggest barriers to safe dc home, as this limits her ability to ambulate  To cont to focus on progressing LLE strength, AROM and ability to WB in stance to progress ambulation distance      Barriers to Discharge Decreased caregiver support  (family works )   PT Barriers   Physical Impairment Decreased strength;Decreased range of motion;Decreased endurance; Impaired balance;Decreased mobility;Pain   Functional Limitation Car transfers; Ramp negotiation;Stair negotiation;Standing;Transfers; Walking   Plan   Treatment/Interventions LE strengthening/ROM; Functional transfer training; Therapeutic exercise; Endurance training;Bed mobility;Gait training;Equipment eval/education;Patient/family training   Recommendation   Recommendation Home with family support;Home PT;Outpatient PT   Equipment Recommended Walker   PT Therapy Minutes   PT Time In 1330   PT Time Out 1400   PT Total Time (minutes) 30   PT Mode of treatment - Individual (minutes) 0   PT Mode of treatment - Concurrent (minutes) 30   PT Mode of treatment - Group (minutes) 0   PT Mode of treatment - Co-treat (minutes) 0   PT Mode of Teatment - Total time(minutes) 30 minutes   Therapy Time missed   Time missed?  No

## 2018-04-13 NOTE — TREATMENT PLAN
Individualized Plan of [facility]  Rian Gil 82 y o  female MRN: [id number]  Unit/Bed#: -01 Encounter: [id number]     PATIENT INFORMATION  ADMISSION DATE: 4/12/2018  1:58 PM SAMANTHA CATEGORY: L hip fx   ADMISSION DIAGNOSIS: Displaced apophyseal fracture of left femur, initial encounter for closed fracture [S72 132A]  EXPECTED LOS: 1-2 wks     MEDICAL/FUNCTIONAL PROGNOSIS  Based on my assessment of the patient's medical conditions and current functional status, the prognosis for attaining medical and functional goals or the IRF stay is:  Fair     Medical Goals: pain control     ANTICIPATED DISCHARGE DISPOSITION AND SERVICES  COMMUNITY SETTING: home     ANTICIPATED FOLLOW-UP SERVICE:   Outpatient Therapy Services: PT/OT    DISCIPLINE SPECIFIC PLANS:  Required Disciplines & Services: PT/OT  REQUIRED THERAPY:  Therapy Hours per Day Days per Week Total Days   Physical Therapy 1 5 5 10   Occupational Therapy 1 5 5 10   Speech/Language Therapy 0 0 0   NOTE: Additional therapy time(s) may be added as appropriate to meet patient needs and to achieve functional goals        ANTICIPATED FUNCTIONAL OUTCOMES:  ADL: Patient will be independent with ADLs with least restrictive device upon completion of rehab program   Bladder/Bowel:   Patient will be independent with bladder/bowel managment with least restrictive device upon completion of rehab program   Transfers:   Patient will be independent with transfers with least restrictive device upon completion of rehab program   Locomotion:   Patient will be independent with locomotion with least restrictive device upon completion of rehab program   Cognitive: Patient will return to premorbid level of cognitive activity upon completion of rehab program     DISCHARGE PLANNING NEEDS  Equipment needs: tbd       REHAB ANTICIPATED PARTICIPATION RESTRICTIONS:  None

## 2018-04-13 NOTE — PROGRESS NOTES
04/13/18 1035   Patient Data   Rehab Impairment unilateral hip fx   Etiologic Diagnosis left intertrochanteric femur fx   Date of Onset 04/07/18   Home Setup   Type of Home Single Level   Method of Entry Stairs;Ramp;Hand Rail Bilateral   Number of Stairs 4   Number of Stairs in Home (first floor set up)   Prior Level of Function   Self-Care 3  Independent - Patient completed the activities by him/herself, with or without an assistive device, with no assistance from a helper  Indoor-Mobility (Ambulation) 3  Independent - Patient completed the activities by him/herself, with or without an assistive device, with no assistance from a helper  Stairs 3  Independent - Patient completed the activities by him/herself, with or without an assistive device, with no assistance from a helper  Functional Cognition 3  Independent - Patient completed the activities by him/herself, with or without an assistive device, with no assistance from a helper  Patient Preference   Nickname (Patient Preference) guero   Psychosocial   Psychosocial (WDL) WDL   Restrictions/Precautions   Precautions Fall Risk   LLE Weight Bearing Per Order WBAT   Pain Assessment   Pain Score 8   Pain Location Hip   Pain Orientation Left   Hospital Pain Intervention(s) Medication (See MAR); Repositioned   Response to Interventions pt reported feeling comfortable at rest start of session and then inc pain with transfers and standing   end of session pt sitting in recliner with legs rests elevated  and LLE on pillow for lunch    QI: M[address] 53 CARE Score 4   Toileting   Findings Pt was able to manage hyeinge after urination and needed A for underwear to move it over/around L hip dressing    Toileting (FIM) 3 - Patient completes  50-74% of all tasks   QI: Roll Left and Right   Assistance Needed Physical assistance   Assistance Provided by [place] 50%-74%   Roll Left and Right CARE Score 2   QI: Sit to Lying   Assistance Needed Physical assistance   Assistance Provided by [place] Less than 25%   Sit to Lying CARE Score 3   QI: Lying to Sitting on Side of Bed   Assistance Needed Physical assistance   Assistance Provided by [place] Less than 25%   Lying to Sitting on Side of Bed CARE Score 3   QI: Sit to Stand   Assistance Needed Physical assistance   Assistance Provided by [place] Less than 25%   Sit to Stand CARE Score 3   QI: Chair/Bed-to-Chair Transfer   Assistance Needed Physical assistance; Adaptive equipment   Assistance Provided by [place] Less than 25%   Chair/Bed-to-Chair Transfer CARE Score 3   QI: Car Transfer   Reason if not Attempted Activity not applicable   Car Transfer CARE Score 9   Transfer Bed/Chair/Wheelchair   Limitations Noted In Balance; Endurance;LE Strength   Adaptive Equipment Roller Walker   Stand Pivot Minimal Assist   Sit to Stand Minimal   Stand to Sit Minimal   Supine to Sit Minimal   Sit to Supine Minimal   Bed, Chair, Wheelchair Transfer (FIM) 2 - [place] needs to lift or boost to rise AND assist to sit   QI: Toilet Transfer   Assistance Needed Physical assistance; Adaptive equipment   Assistance Provided by [place] Less than 25%   Toilet Transfer CARE Score 3   Toilet Transfer   Surface Assessed Raised Toilet   Transfer Technique Stand Pivot   Limitations Noted In Balance;LE Strength; Endurance   Adaptive Equipment (RW)   Findings [place] down and up   Toilet Transfer (FIM) 2 - [place] needs to lift or boost to rise AND assist to sit   QI: Walk 10 Feet   Reason if not Attempted Activity not applicable   Walk 10 Feet CARE Score 9   QI: Walk 50 Feet with Two Turns   Reason if not Attempted Activity not applicable   Walk 50 Feet with Two Turns CARE Score 9   QI: Walk 150 Feet   Reason if not Attempted Activity not applicable   Walk 829 Feet CARE Score 9   QI: Walking 10 Feet on Uneven Surfaces   Reason if not Attempted Activity not applicable   Walking 10 Feet on Uneven Surfaces CARE Score 9 Ambulation   Does the patient walk? 2  Yes   Primary Discharge Mode of Locomotion Walk   Walk Assist Level Minimum Assist   Gait Pattern Inconsistant Geetha; Slow Geetha;Decreased foot clearance; Improper weight shift;Decreased L stance;Decreased R stance; Step to   Assist Device Mis Sandoval Walked (feet) 5 ft   Limitations Noted In Balance; Endurance; Heel Strike;Speed;Strength;Swing   Findings limited by pain   Walking (FIM) 1 - Patient ambulates less than 49 feet regardless of assist/device/set up   Wheelchair mobility   QI: Does the patient use a wheelchair? 0  No   QI: 1 Step (Curb)   Reason if not Attempted Activity not applicable   1 Step (Curb) CARE Score 9   QI: 4 Steps   Reason if not Attempted Activity not applicable   4 Steps CARE Score 9   QI: 12 Steps   Reason if not Attempted Activity not applicable   12 Steps CARE Score 9   Stairs   Findings not assessed due to inc pain  Pt also has ramp to enter in the back of the house if she has difficulty on stairs  Comprehension   QI: Comprehension 4  Undestands: Clear comprehension without cues or repetitions   Comprehension (FIM) 6 - Understands complex/abstract but requires  glasses for visual comp   Expression   QI: Expression 4  Express complex messages without difficulty and with speech that is clear and easy to [place]   Expression (FIM) 6 - Has only MILD difficulty with complex/abstract info   Social Interaction   Social Interaction (FIM) 6 - Interacts appropriately with others BUT requires extra  time   Problem Solving   Problem solving (FIM) 5 - Solves basic problems 90% of time   Memory   Memory (FIM) 5 - Needs cueing reminders <10%   RLE Assessment   RLE Assessment WFL   LLE Assessment   LLE Assessment (gross MMT 2/5)   Cognition   Arousal/Participation Cooperative   Objective Measure   PT Measure(s) session focused on review of household setup, and practicing bed mobility with bed elevated to height of bed at home   while supine, jimenez QUINTEROS for LLE KTC and hip flexion/knee flexion with limited ROM due to pain and swelling  Practiced transfer OOB to [facility], and then to commode over toilet  Assisted pt back to recliner at end of session  Discussed plan to progress to [place] level with use of RW for safe dc home, and will practice household management tasks that she will need to perform on her own, like meal prep and moving food from counter to table  Discussed recommendation for household chores and continuation of PT  Discharge Information   Patient's Discharge Plan to dc home with family support   Patient's Rehab Expectations to get better to go home and walk more   Impressions Pt presents w/inc pain, weakness and swelling LLE that limits her abiliyt to perform all functional tasks at this time  Switched RW to a youth RW so pt is able to use BUE support to offload LLE more, which assisted with ease of transfers  Although pt does not need much physical A for funcitonal mobility, she needs inc time to perform tasks and pivots on both feet instead of picking her feet up  This is limiting her from progressing ambulation at this time  Pt will cont to benefit from skilled PT to further progress WBing LLE, strength and AROM, as well as to improve overall activity tolerance     PT Therapy Minutes   PT Time In 1035   PT Time Out 1135   PT Total Time (minutes) 60   PT Mode of treatment - Individual (minutes) 60   PT Mode of treatment - Concurrent (minutes) 0   PT Mode of treatment - Group (minutes) 0   PT Mode of treatment - Co-treat (minutes) 0   PT Mode of Teatment - Total time(minutes) 60 minutes

## 2018-04-13 NOTE — PROGRESS NOTES
OT LTG   04/13/18 0700   Rehab Team Goals   ADL Team Goal Patient will be independent with ADLs with least restrictive device upon completion of rehab program   Cognitive Team Goal Patient will return to premorbid level of cognitive activity upon completion of rehab program   Rehab Team Interventions   OT Interventions Self Care;Home Management; Therapeutic Exercise;Community Reintegration; Energy Conservation;Splint Fabrication/Modification;Patient/Family Education   Eating Goal   QI: Eating Goal 06  Independent - Patient completes the activity by him/herself with no assistance from a helper  FIM Eating Goal Independant   Meal Complete All meals   Diet Level Regular   Status Target goal - two weeks   Interventions Optimal Position   Grooming Goal   QI: Oral Hygiene Goal 06  Independent - Patient completes the activity by him/herself with no assistance from a helper  FIM Grooming Goal Moderate [place]   Task Wash/Dry Face;Wash/Dry Hands;Brush Teeth;Comb Hair;Acquire Items; Make Up; Initiate Task;Complete Groom   Environment Seated at [facility] WBAT  (LLE)   Status Target goal - two weeks   Intervention Balance Work; Therapeutic Exercise; Tolerance Work   Bathing Goal   QI: Shower/bathe self Goal 06  Independent - Patient completes the activity by him/herself with no assistance from a helper  FIM Bathing Goal Moderate [place]   Task Lower Body Bathing   Adaptive Equipment Seat with back   Safety Precautions WBAT  (LLE)   Status Target goal - two weeks   Intervention Therapeutic Exercise;ADL Training;Assistive Device   Upper Body Dressing Goal   QI: Upper body dressing Goal 06  Independent - Patient completes the activity by him/herself with no assistance from a helper     FIM Upper Body Dressing Goal Moderate [place]   Task Upper Body   Environment Seated;Standing   Safety Precautions WBAT  (LLE)   Status Target goal - two weeks   Intervention Assistive Device;Balance Work;Therapeutic Exercise; Tolerance Work   Lower [place] Global Dressing Goal   QI: Lower body dressing Goal 06  Independent - Patient completes the activity by him/herself with no assistance from a helper  QI: Putting on/taking off footwear Goal 06  Independent - Patient completes the activity by him/herself with no assistance from a helper  FIM Lower Body Dressing Goal Moderate [place]   Task Lower Body   Adaptive Equipment Reacher;Sock Aide;Dressing Stick; Elastic Laces; Shoe Horn  (RW)   Environment Seated;Standing   Safety Precautions WBAT  (LLE)   Status Target goal - two weeks   Intervention Assistive Device;Balance Work; Therapeutic Exercise; Tolerance Work   Toileting Goal   QI: Toileting hygiene Goal 06  Independent - Patient completes the activity by him/herself with no assistance from a helper  FIM Toileting Goal Moderate [place]   Task Pants Up;Pants Down;Hygiene   Assistive Device (RW)   Safety Balance   Status Target goal - two weeks   Intervention ADL Training;Balance Work;Assistive Device   Comprehension Goal   Comprehension Assist Level Moderate [place]   Assist Device Glasses   Status Target goal - two weeks   Expression Goal   Expression Assist Level Moderate [place]   Function Demand Complex Needs   Status Target goal - two weeks   Social Interaction Goal   Social Interaction Assist Level Moderate [place]   Behaviors Appropriate   Status Target goal - two weeks   Intervention Social Skill Training;Stress Management;1:1 Counseling   Problem Solving Goal   Problem Solving Assist Level Moderate [place]   Basic Function Routine Solution;Problem Recognition   Executive Function Cause/Effect;Provide Solution;Reason/; Thought Organ;Sequencing   Status Target goal - two weeks   Intervention Cognitive Training;Perceptual Training; Safety Education;Visual Training   Memory Goal   Memory Assist Level Moderate [place]   Assist Device Memory Book;Name Sign;Patient Schedule;Communication Board   Short-Term Memory Orientation; Recent Recall   Long-Term Memory Past Events; [address] [place] [address] Program;Medication; Safety Strategy   Status Target goal - two weeks   Intervention Memory Book;Precautions Review;Assistive Device   Object Retrieval Goal   QI: Picking up object Goal 06  Independent - Patient completes the activity by him/herself with no assistance from a helper     Assistive Device  Reacher   Small Object Picked Up shoe   Additional Goal (other)   Goal Type (other) Pt will complete meal prep, medication management, and fincial management with mod I    Status Target goal - two weeks

## 2018-04-13 NOTE — PROGRESS NOTES
Progress Note - Sonia Lagunas 82 y o  female MRN: [id number]    Unit/Bed#: -01 Encounter: [id number]            Subjective:   D/W patient effective use of pain meds     Objective:     ROS  Gen: denies recent wt loss   Psych: denies mood change    Vitals: Blood pressure 129/60, pulse 86, temperature 97 7 °F (36 5 °C), temperature source Oral, resp  rate 17, height 5' 1" (1 549 m), weight 62 6 kg (138 lb), SpO2 99 %      Physical Exam:     Gen:        NAD   Neck:   trachea midline  Lungs:  respirations unlabored   Heart:    + S1 and S2   Abdomen:    Soft, non-tender  Extremities: + B/L LE edema L> R  Psych: mood/affect appropriate  Neurologic: awake, alert, appropriately answering questions     Functional :  Mobility: PENDING  Tx: PENDING  ADLs: PENDING         Current Facility-Administered Medications:  acetaminophen 975 mg Oral Q8H Roland Lombardo MD   amLODIPine 5 mg Oral Daily Lele Mccullough MD   aspirin 81 mg Oral Daily Lele Mccullough MD   atenolol 25 mg Oral Daily Lele Mccullough MD   atorvastatin 40 mg Oral After Rayshawn Real MD   bisacodyl 10 mg Rectal Daily PRN Lele Mccullough MD   bumetanide 0 5 mg Oral Daily Lianet Leal PA-C   cholecalciferol 2,000 Units Oral Daily Lele Mccullough MD   docusate sodium 100 mg Oral BID Lele Mccullough MD   DULoxetine 30 mg Oral Daily Lele Mccullough MD   enoxaparin 40 mg Subcutaneous Daily Lele Mccullough MD   methimazole 5 mg Oral Daily Lele Mccullough MD   oxyCODONE 10 mg Oral Q4H PRN Lele Mccullough MD   oxyCODONE 5 mg Oral Q4H PRN Lele Mccullough MD   pantoprazole 40 mg Oral Early Morning Lele Mccullough MD   polyethylene glycol 17 g Oral Daily PRN Lele Mccullough MD       bisacodyl    oxyCODONE    oxyCODONE    polyethylene glycol      Assessment:  Pt is 83 yo female with L IT fx s/p IM nail fixation by Dr Emmanuelle Mcdonald on 4/8    Plan:    Rehabilitation: cont PT/OT for ambulatory/ADL dysfxn    L IT fx: s/p IM nail fixation by Dr Emmanuelle Mcdonald on 4/8 (SPEP/UPEP, vit D level WNL)        Hyperparathyroidism: on home vit D 2000 units QD, d/w endocrine and recommended starting pt on 1000 mg calcium carbonate qd (1250 mg formulation per tab is what is on formulary)      Hyperthyroidism: elevated thyroid microsomal antibody, elevated thyroid stimulating immunoglobulin, decreased TSH, elevated free T4, likely autoimmune etiology, seen by endocrine in acute care and started on tapazole and have recommended repeat free T4 in 4 wks and FU in 6 wks with endocrine     bladder CA: CT A/P of 3/26/18 did not reveal any findings to suggest recurrent or new urogenital neoplasm; follows with Dr Olegario Pathak     CLL: follows with Dr Jose Parrish who per last visit note felt pt was hematologically stable      CAD: s/p stents, on home regimen of ASA 81 mg qd, atenolol 25 mg qd, and lipitor 40 mg qPM; OP FU with Dr Mark Puente     HTN: on home regimen of norvasc 5 mg qd and atenolol 25 mg qd      GERD: on home dose of protonix 40 mg qd (per pt will occasionally take BID if she feels it is very bad that day)      seasonal allergies: at home takes prn allegra and prn singulair; currently asymptomatic per pt     grade I DD w/chronic LE edema: per IM recs in acute care pt resumed by IM on home bumex 0 5 mg qd      Depression: on home cymbalta 30 mg qd      Osteoporosis: on home vitamin D 2000 units qd (vit D level of 4/9 WNL at 47 9), also on prolia as OP, managed by her rheumatologist, seen by endocrine and recommend FU with her rheumatologist for further management and DEXA scan at her rheumatologist discretion (SPEP/UPEP w/o monoclonal bands, vit D level WNL)     Hyponatremia: currently WNL      Anemia: ABLA, Hg currently 7 5, asymptomatic, not tachycardic or hypotensive & RR/POx 17 / 99%; per d/w IM monitor at this time, t/c transfusion for any further decrease in Hg     Thrombocytopenia: plt ct currently WNL     DVT ppx: SCDs, lovenox x 28 days post-op per ortho     Incidental findings of CT A/P of 3/26/18:  1) b/l renal cysts: per report unchanged compared to previous study of 9/25/17  2) rt renal nodules: per report unchanged compared to previous study of 9/25/17  3) hepatic cysts: per report unchanged compared to previous study of 9/25/17  4) pancreatic cysts: per report unchanged compared to previous study of 9/25/17  5) ventral hernia containing adipose: OP FU w/PCP     Other incidental findings:  6) PVCs: OP FU with Dr Valere Eisenmenger (pt's cardiologist)  7) elevated peak PA pressure: OP FU with Dr Valere Eisenmenger (pt's cardiologist)

## 2018-04-13 NOTE — PROGRESS NOTES
Internal Medicine Progress Note  Patient: Elva Allen  Age/sex: 82 y o  female  Medical Record #: [id number]      ASSESSMENT/PLAN:  Elva Allen is seen and examined and management for following issues:    1  Left Intertrochanteric femur fx s/p IMN on 4/8/18 ([place]):  Pain control per primary team  WBAT  2   ABLA; s/p 2 units PRBC: hemoglobin down to 7 5 this AM = repeat same; no sx so will not transfuse today  3   Chronic diastolic heart failure: Bumex has been on hold  Resumed Bumex 0 5mg daily today and will monitor; no dizziness/SOB    4   CAD hx Stent x 2 circumflex '11: continue ASA 81mg, Atenolol and Lipitor    5  HTN: stable on home regimen of Norvasc 5mg daily and Atenolol 25mg daily  6   Hyperthyroidism: continue Methimazole; Repeat TSH/T4 in 4 weeks  Follow up with endocrinology    7  Reactive airway disease: No signs of acute flare  Continue Allegra and Singulair    8  DVT prophylaxis: Lovenox 40mg daily    9  Hx chronic large granular lymphocytic leukemia:  Sees Dr Sade Yancey as OP; has not required tx    10  Hyperparathyroidism:  Calcium/Vit D; OP followup with Endocrine        Subjective: Patient seen and examined   No new or overnight issues     ROS:   GI: denies abdominal pain, change bowel habits or reflux symptoms  Neuro: No new neurologic changes  Respiratory: No Cough, SOB  Cardiovascular: No CP, palpitations     Scheduled Meds:    Current Facility-Administered Medications:  acetaminophen 975 mg Oral Q8H Anuj Comer MD   amLODIPine 5 mg Oral Daily Mayra Beach MD   aspirin 81 mg Oral Daily Mayra Beach MD   atenolol 25 mg Oral Daily Mayra Beach MD   atorvastatin 40 mg Oral After Virgil Morton MD   bisacodyl 10 mg Rectal Daily PRN Mayra Beach MD   bumetanide 0 5 mg Oral Daily Laura Bennett PA-C   cholecalciferol 2,000 Units Oral Daily Mayra Beach MD   docusate sodium 100 mg Oral BID Mayra Beach MD   DULoxetine 30 mg Oral Daily Mayra Beach MD   enoxaparin 40 mg Subcutaneous Daily Patricia Ness MD   methimazole 5 mg Oral Daily Patricia Ness MD   oxyCODONE 10 mg Oral Q4H PRN Patricia Ness MD   oxyCODONE 5 mg Oral Q4H PRN Patricia Ness MD   pantoprazole 40 mg Oral Early Morning Patricia Ness MD   polyethylene glycol 17 g Oral Daily PRN Patricia Ness MD       Labs:       Results from last 7 days  Lab Units 04/13/18  0525 04/11/18  0546   WBC Thousand/uL 4 91 7 40   HEMOGLOBIN g/dL 7 5* 8 4*   HEMATOCRIT % 22 5* 25 1*   PLATELETS Thousands/uL 172 118*       Results from last 7 days  Lab Units 04/13/18  0525 04/10/18  0528   SODIUM mmol/L 139 135*   POTASSIUM mmol/L 3 9 3 9   CHLORIDE mmol/L 105 101   CO2 mmol/L 30 29   BUN mg/dL 19 28*   CREATININE mg/dL 0 60 0 78   GLUCOSE RANDOM mg/dL 100 118   CALCIUM mg/dL 8 4 8 5           Results from last 7 days  Lab Units 04/08/18  0203   INR  0 99        Glucose (mg/dL)   Date Value   04/13/2018 100   04/10/2018 118   04/09/2018 117   04/07/2018 118       Labs reviewed    Physical Examination:  Vitals:   Vitals:    04/12/18 2046 04/13/18 0501 04/13/18 1007 04/13/18 1008   BP: 112/54 137/65 129/59    BP Location: Left arm      Pulse: 93 90  88   Resp: 16 20     Temp: 98 9 °F (37 2 °C) 98 6 °F (37 °C)     TempSrc: Oral Oral     SpO2: 98% 98%     Weight:       Height:           GEN: NAD  HEENT: NC/AT  RESP: BBS w/o crackles/wheeze/rhonci; resp unlabored  CV: +S1 S2, regular rate, no rubs/murmurs  ABD: soft, NT, ND, normal BS   : no valle  EXT: no edema of left lower leg but +edema of left hip and thigh  Skin: no rashes  Neuro: AAO      [x ] Total time spent: 30 Mins and greater than 50% of this time was spent counseling/coordinating care        Anice Patient, [address] [place] [address]  Internal Medicine

## 2018-04-14 PROCEDURE — 97530 THERAPEUTIC ACTIVITIES: CPT

## 2018-04-14 PROCEDURE — 97535 SELF CARE MNGMENT TRAINING: CPT

## 2018-04-14 PROCEDURE — 97110 THERAPEUTIC EXERCISES: CPT

## 2018-04-14 PROCEDURE — 97116 GAIT TRAINING THERAPY: CPT

## 2018-04-14 RX ADMIN — BUMETANIDE 0.5 MG: 1 TABLET ORAL at 09:08

## 2018-04-14 RX ADMIN — VITAMIN D, TAB 1000IU (100/BT) 2000 UNITS: 25 TAB at 09:04

## 2018-04-14 RX ADMIN — ENOXAPARIN SODIUM 40 MG: 40 INJECTION SUBCUTANEOUS at 09:03

## 2018-04-14 RX ADMIN — DULOXETINE HYDROCHLORIDE 30 MG: 30 CAPSULE, DELAYED RELEASE ORAL at 09:07

## 2018-04-14 RX ADMIN — ASPIRIN 81 MG 81 MG: 81 TABLET ORAL at 09:05

## 2018-04-14 RX ADMIN — OXYCODONE HYDROCHLORIDE 10 MG: 10 TABLET ORAL at 13:21

## 2018-04-14 RX ADMIN — ACETAMINOPHEN 975 MG: 325 TABLET, FILM COATED ORAL at 22:25

## 2018-04-14 RX ADMIN — ACETAMINOPHEN 975 MG: 325 TABLET, FILM COATED ORAL at 13:20

## 2018-04-14 RX ADMIN — ATORVASTATIN CALCIUM 40 MG: 40 TABLET, FILM COATED ORAL at 17:15

## 2018-04-14 RX ADMIN — DOCUSATE SODIUM 100 MG: 100 CAPSULE, LIQUID FILLED ORAL at 09:05

## 2018-04-14 RX ADMIN — POLYETHYLENE GLYCOL 3350 17 G: 17 POWDER, FOR SOLUTION ORAL at 09:03

## 2018-04-14 RX ADMIN — DOCUSATE SODIUM 100 MG: 100 CAPSULE, LIQUID FILLED ORAL at 17:15

## 2018-04-14 RX ADMIN — PANTOPRAZOLE SODIUM 40 MG: 40 TABLET, DELAYED RELEASE ORAL at 06:25

## 2018-04-14 RX ADMIN — OXYCODONE HYDROCHLORIDE 10 MG: 10 TABLET ORAL at 09:07

## 2018-04-14 RX ADMIN — AMLODIPINE BESYLATE 5 MG: 5 TABLET ORAL at 09:06

## 2018-04-14 RX ADMIN — Medication 1 TABLET: at 09:06

## 2018-04-14 RX ADMIN — ACETAMINOPHEN 975 MG: 325 TABLET, FILM COATED ORAL at 06:25

## 2018-04-14 RX ADMIN — METHIMAZOLE 5 MG: 5 TABLET ORAL at 09:08

## 2018-04-14 RX ADMIN — ATENOLOL 25 MG: 25 TABLET ORAL at 09:05

## 2018-04-14 NOTE — PROGRESS NOTES
Internal Medicine Progress Note  Patient: Juanetta Lesches  Age/sex: 82 y o  female  Medical Record #: [id number]      ASSESSMENT/PLAN:  Juanetta Lesches is seen and examined and management for following issues:    1  Left Intertrochanteric femur fx s/p IMN on 4/8/18 ([place]):  Pain control per primary team  WBAT  Has a lot of edema of left thigh which is uncomfortable (family says has decreased however) = will use thigh high SINHG on that leg    2  ABLA; s/p 2 units PRBC: hemoglobin down to 7 5 yesterday AM = repeat same; no sx so did not transfuse  Seems stable today so will continue to watch  3   Chronic diastolic heart failure: Bumex has been on hold  Resumed Bumex 0 5mg daily today and will monitor; no dizziness/SOB    4   CAD hx Stent x 2 circumflex '11: continue ASA 81mg, Atenolol and Lipitor    5  HTN: stable on home regimen of Norvasc 5mg daily and Atenolol 25mg daily  6   Hyperthyroidism: continue Methimazole; Repeat TSH/T4 in 4 weeks  Follow up with endocrinology    7  Reactive airway disease: No signs of acute flare  Continue Allegra and Singulair    8  DVT prophylaxis: Lovenox 40mg daily    9  Hx chronic large granular lymphocytic leukemia:  Sees Dr Louisa Carmona as OP; has not required tx    10  Hyperparathyroidism:  Calcium/Vit D; OP followup with Endocrine        Subjective: Patient seen and examined   No new or overnight issues     ROS:   GI: denies abdominal pain, change bowel habits or reflux symptoms  Neuro: No new neurologic changes  Respiratory: No Cough, SOB  Cardiovascular: No CP, palpitations     Scheduled Meds:    Current Facility-Administered Medications:  acetaminophen 975 mg Oral Q8H [address] Shona Jimenez MD   amLODIPine 5 mg Oral Daily Shona Jimenez MD   aspirin 81 mg Oral Daily Shona Jimenez MD   atenolol 25 mg Oral Daily Shona Jimenez MD   atorvastatin 40 mg Oral After [facility] MD Alexandra   bisacodyl 10 mg Rectal Daily PRN Shona Jimenez MD   bumetanide 0 5 mg Oral Daily Chetan López PABRENDA   calcium carbonate 1 tablet Oral Daily With Breakfast Alice Vogel MD   cholecalciferol 2,000 Units Oral Daily Alice Vogel MD   docusate sodium 100 mg Oral BID Alice Vogel MD   DULoxetine 30 mg Oral Daily Alice Vogel MD   enoxaparin 40 mg Subcutaneous Daily Alice Vogel MD   methimazole 5 mg Oral Daily Alice Vogel MD   oxyCODONE 10 mg Oral Q4H PRN Alice Vogel MD   oxyCODONE 5 mg Oral Q4H PRN Alice Vogel MD   pantoprazole 40 mg Oral Early Morning Alice Vogel MD   polyethylene glycol 17 g Oral Daily PRN Alice Vogel MD       Labs:       Results from last 7 days  Lab Units 04/13/18  0949 04/13/18  0525   WBC Thousand/uL 4 85 4 91   HEMOGLOBIN g/dL 7 5* 7 5*   HEMATOCRIT % 23 5* 22 5*   PLATELETS Thousands/uL 166 172       Results from last 7 days  Lab Units 04/13/18  0525 04/10/18  0528   SODIUM mmol/L 139 135*   POTASSIUM mmol/L 3 9 3 9   CHLORIDE mmol/L 105 101   CO2 mmol/L 30 29   BUN mg/dL 19 28*   CREATININE mg/dL 0 60 0 78   GLUCOSE RANDOM mg/dL 100 118   CALCIUM mg/dL 8 4 8 5           Results from last 7 days  Lab Units 04/08/18  0203   INR  0 99          Glucose (mg/dL)   Date Value   04/13/2018 100   04/10/2018 118   04/09/2018 117   04/07/2018 118       Labs reviewed    Physical Examination:  Vitals:   Vitals:    04/13/18 2044 04/14/18 0707 04/14/18 0905 04/14/18 0906   BP: 118/56 127/59 133/69 133/69   BP Location: Right arm Right arm     Pulse: 90 88 94    Resp: 16 16     Temp: 98 1 °F (36 7 °C) 98 1 °F (36 7 °C)     TempSrc: Oral Oral     SpO2: 98% 99%     Weight:       Height:           GEN: NAD  HEENT: NC/AT  RESP: BBS w/o crackles/wheeze/rhonci; resp unlabored  CV: +S1 S2, regular rate, no rubs/murmurs  ABD: soft, NT, ND, normal BS   : no valle  EXT: no edema of left lower leg but +edema of left hip and thigh  Skin: no rashes  Neuro: AAO      [x ] Total time spent: 30 Mins and greater than 50% of this time was spent counseling/coordinating care        Maida Stevenson, [address] [place] [address] Medicine

## 2018-04-14 NOTE — PROGRESS NOTES
Physical Therapy Progress Note   04/14/18 2040   Pain Assessment   Pain Assessment 0-10   Pain Score Worst Possible Pain   Pain Location Hip;Leg   Pain Orientation Left   Hospital Pain Intervention(s) Medication (See MAR)  (given by NSG during session; )   Restrictions/Precautions   Precautions Fall Risk   Weight Bearing Restrictions Yes   RUE Weight Bearing Per Order WBAT   LUE Weight Bearing Per Order WBAT   RLE Weight Bearing Per Order WBAT   LLE Weight Bearing Per Order WBAT   ROM Restrictions No   Cognition   Overall Cognitive Status WFL   Arousal/Participation Cooperative   Attention Within functional limits   Orientation Level Oriented X4   Memory Decreased short term memory   Following Commands Follows multistep commands with increased time or repetition   Subjective   Subjective reports severe pain hip and leg pre/during/post session;    QI: Roll Left and Right   Comment in bedside recliner pre and post session;    QI: Sit to [address] Provided by [place] No physical assistance   Sit to Stand CARE Score 4   Bed Mobility   Findings NT   QI: Chair/Bed-to-Chair Transfer   Assistance Needed Supervision   Assistance Provided by [place] No physical assistance   Comment inc'd time d/t antalgia   Chair/Bed-to-Chair Transfer CARE Score 4   Transfer Bed/Chair/Wheelchair   Limitations Noted In Balance; Endurance   Adaptive Equipment Roller Walker   Stand Pivot Contact Guard   Sit to TXU Ian   Stand to W  R  Lenore, Chair, Wheelchair Transfer (FIM) 4 - Patient requires steadying assist or light touching   QI: Walk 10 Feet   Assistance Needed Supervision   Assistance Provided by [place] No physical assistance   Comment RW x30'    Walk 10 Feet CARE Score 4   Ambulation   Does the patient walk? 2  Yes   Primary Discharge Mode of Locomotion Walk   Walk Assist Level Contact Guard;Minimum Assist   Gait Pattern Inconsistant Geetha; Antalgic; Slow Geetha;Decreased foot clearance;R foot drag; Forward Flexion; Shuffle;Step to; Improper weight shift;Decreased L stance   Assist Device Roller Claire Sandoval Walked (feet) 30 ft   Limitations Noted In Balance; Endurance   Walking (FIM) 1 - Patient ambulates less than 49 feet regardless of assist/device/set up   Wheelchair mobility   QI: Does the patient use a wheelchair? 0  No   Findings NT   Wheelchair (FIM) 0 - Activity does not occur   Stairs   Findings NT   Toilet Transfer   Surface Assessed Standard Toilet   Findings cgA/Antonio   Toilet Transfer (FIM) 4 - Patient requires steadying assist or light touching   Therapeutic Interventions   Strengthening seated TE (marching and LAQ, AROM/AAROM; STS trials )   Equipment Use   NuStep x14 minutes B LE and B UE   Assessment   Treatment Assessment Pt severely limited by pain in R hip/leg this session; pain from affected limb have limited nearly all xfers and amb; pt cgA/Antonio for STS from bedside recliner; instructed in seated/standing TE and STS trials (as above) in effort to inc amount of weight bearing on R LE; NuStep x14 minutes, level 1; recommend cont PT POC   Barriers to Discharge Decreased caregiver support  (family works )   PT Barriers   Physical Impairment Decreased strength;Decreased range of motion;Decreased endurance; Impaired balance;Decreased mobility;Pain   Functional Limitation Car transfers; Ramp negotiation;Stair negotiation;Standing;Transfers; Walking   Plan   Treatment/Interventions ADL retraining;Functional transfer training;LE strengthening/ROM; Elevations; Therapeutic exercise; Endurance training;Cognitive reorientation;Patient/family training;Equipment eval/education; Bed mobility;Gait training   Progress Progressing toward goals   Recommendation   Recommendation Home with family support;Home PT;Outpatient PT   Equipment Recommended Walker   PT Therapy Minutes   PT Time In 1230   PT Time Out 1400   PT Total Time (minutes) 90   PT Mode of treatment - Individual (minutes) 90   PT Mode of treatment - Concurrent (minutes) 0   PT Mode of treatment - Group (minutes) 0   PT Mode of treatment - Co-treat (minutes) 0   PT Mode of Teatment - Total time(minutes) 90 minutes   Therapy Time missed   Time missed?  Delisa Gomez, PTA

## 2018-04-14 NOTE — PROGRESS NOTES
04/14/18 0931   Pain Assessment   Pain Assessment 0-10   Pain Score 5   Pain Type Surgical pain   Pain Location Hip;Leg   Pain Orientation Left   Hospital Pain Intervention(s) Medication (See MAR); Emotional support;Repositioned   Restrictions/Precautions   Precautions Fall Risk   Weight Bearing Restrictions Yes   LLE Weight Bearing Per Order WBAT   ROM Restrictions No   QI: Putting On/Taking Off Footwear   Assistance Needed Physical assistance   Assistance Provided by [place] 25%-49%   Comment Education and practice with use of [facility]  Pt osiris GRANT understanding of technique but does demo difficulty completing on LLE 2* to weakness  Putting On/Taking Off Footwear CARE Score 3   QI: Sit to Stand   Assistance Needed Physical assistance   Assistance Provided by [place] Less than 25%   Sit to Stand CARE Score 3   QI: Chair/Bed-to-Chair Transfer   Assistance Needed Physical assistance   Assistance Provided by [place] Less than 25%   Chair/Bed-to-Chair Transfer CARE Score 3   Transfer Bed/Chair/Wheelchair   Limitations Noted In Balance; Endurance;LE Strength   Adaptive Equipment Roller [employer], Chair, Wheelchair Transfer (FIM) 4 - Patient requires steadying assist or light touching   Functional Standing Tolerance   Time 5 minutes    Activity static standing with unilateral UE support    Comments See below  Exercise Tools   UE Ergometer Pt completes scifit for 5 minutes prograde and 5 minutes retrograde to increase overall endurance and UB strengthening for increased independence with ADL tasks      Cognition   Overall Cognitive Status WFL   Arousal/Participation Cooperative   Attention Within functional limits   Orientation Level Oriented X4   Memory Decreased short term memory   Following Commands Follows multistep commands with increased time or repetition   Activity Tolerance   Activity Tolerance Patient limited by pain   Assessment   Treatment Assessment Pt participated in skilled OT services with focus on LB dressing with use of LHAE, UB strengthening, and stand tolerance  Pt continues to be limited by decreased ROM/increased pain of LLE, decreased stand tolerance, and decreased endurance  Pt engaged in static standing activity with unilateral UE support on RW to maintain balance while completing card sorting activity  She tolerates ~5 minutes of static standing with VC's required to maintain wide MIRIAM and equal weight shift  Pt demos ability to complete LB dressing on RLE with use of LHAE she does require support of LLE to complete LB dressing with LHAE  Pt will continue to benefit from skilled OT services with focus on standing tolerance, functional mobility, UB strength, and endurance to increase safety and independence with ADL tasks  Prognosis Good   Problem List Decreased strength;Decreased range of motion;Decreased endurance; Impaired balance;Decreased mobility; Decreased skin integrity;Orthopedic restrictions;Pain   Plan   Treatment/Interventions ADL retraining;Functional transfer training; Therapeutic exercise; Compensatory technique education   Progress Progressing toward goals   Recommendation   OT Discharge Recommendation Home with family support   OT Therapy Minutes   OT Time In 0930   OT Time Out 1100   OT Total Time (minutes) 90   OT Mode of treatment - Individual (minutes) 0   OT Mode of treatment - Concurrent (minutes) 90   OT Mode of treatment - Group (minutes) 0   OT Mode of treatment - Co-treat (minutes) 0   OT Mode of Teatment - Total time(minutes) 90 minutes   Therapy Time missed   Time missed?  No

## 2018-04-15 RX ADMIN — METHIMAZOLE 5 MG: 5 TABLET ORAL at 09:46

## 2018-04-15 RX ADMIN — ATORVASTATIN CALCIUM 40 MG: 40 TABLET, FILM COATED ORAL at 17:33

## 2018-04-15 RX ADMIN — ACETAMINOPHEN 975 MG: 325 TABLET, FILM COATED ORAL at 14:04

## 2018-04-15 RX ADMIN — ACETAMINOPHEN 975 MG: 325 TABLET, FILM COATED ORAL at 05:46

## 2018-04-15 RX ADMIN — PANTOPRAZOLE SODIUM 40 MG: 40 TABLET, DELAYED RELEASE ORAL at 05:46

## 2018-04-15 RX ADMIN — DULOXETINE HYDROCHLORIDE 30 MG: 30 CAPSULE, DELAYED RELEASE ORAL at 09:46

## 2018-04-15 RX ADMIN — ASPIRIN 81 MG 81 MG: 81 TABLET ORAL at 09:43

## 2018-04-15 RX ADMIN — ACETAMINOPHEN 975 MG: 325 TABLET, FILM COATED ORAL at 21:49

## 2018-04-15 RX ADMIN — DOCUSATE SODIUM 100 MG: 100 CAPSULE, LIQUID FILLED ORAL at 09:42

## 2018-04-15 RX ADMIN — ENOXAPARIN SODIUM 40 MG: 40 INJECTION SUBCUTANEOUS at 09:42

## 2018-04-15 RX ADMIN — ATENOLOL 25 MG: 25 TABLET ORAL at 09:43

## 2018-04-15 RX ADMIN — BUMETANIDE 0.5 MG: 1 TABLET ORAL at 00:05

## 2018-04-15 RX ADMIN — DOCUSATE SODIUM 100 MG: 100 CAPSULE, LIQUID FILLED ORAL at 17:33

## 2018-04-15 RX ADMIN — VITAMIN D, TAB 1000IU (100/BT) 2000 UNITS: 25 TAB at 09:43

## 2018-04-15 RX ADMIN — Medication 1 TABLET: at 09:42

## 2018-04-15 RX ADMIN — AMLODIPINE BESYLATE 5 MG: 5 TABLET ORAL at 09:43

## 2018-04-15 NOTE — PROGRESS NOTES
Internal Medicine Progress Note  Patient: Speedy Nelson  Age/sex: 82 y o  female  Medical Record #: [id number]      ASSESSMENT/PLAN:  Speedy Nelson is seen and examined and management for following issues:    1  Left Intertrochanteric femur fx s/p IMN on 4/8/18 ([place]):  Pain control per primary team  WBAT  Has edema of left thigh which is uncomfortable (family says has decreased however) = will use thigh high SINGH on that leg    2  ABLA; s/p 2 units PRBC: hemoglobin down to 7 5 on 7 13/18 = repeat done and was same; no sx so did not transfuse  Seems stable today so will continue to watch and recheck in AM     3   Chronic diastolic heart failure: Bumex had been on hold  Resumed Bumex 0 5mg daily 4/14/18; no dizziness/SOB    4   CAD hx Stent x 2 circumflex '11: continue ASA 81mg, Atenolol and Lipitor    5  HTN: stable on home regimen of Norvasc 5mg daily and Atenolol 25mg daily  6   Hyperthyroidism: continue Methimazole; Repeat TSH/T4 in 4 weeks  Follow up with endocrinology    7  Reactive airway disease: No signs of acute flare  Continue Allegra and Singulair    8  DVT prophylaxis: Lovenox 40mg daily    9  Hx chronic large granular lymphocytic leukemia:  Sees Dr Lucia Kong as OP; has not required tx    10  Hyperparathyroidism:  Calcium/Vit D; OP followup with Endocrine        Subjective: Patient seen and examined   No new or overnight issues except low back pain    ROS:   GI: denies abdominal pain, change bowel habits or reflux symptoms  Neuro: No new neurologic changes  Respiratory: No Cough, SOB  Cardiovascular: No CP, palpitations     Scheduled Meds:    Current Facility-Administered Medications:  acetaminophen 975 mg Oral Q8H [facility] Fawad Mckeon MD   amLODIPine 5 mg Oral Daily Fawad Mckeon MD   aspirin 81 mg Oral Daily Fawad Mckeon MD   atenolol 25 mg Oral Daily Fawad Mckoen MD   atorvastatin 40 mg Oral After Fidelina Richard MD   bisacodyl 10 mg Rectal Daily PRN Fawad Mckeon MD   bumetanide 0 5 mg Oral Daily Alka Contreras PA-C   calcium carbonate 1 tablet Oral Daily With Breakfast Mari Vela MD   cholecalciferol 2,000 Units Oral Daily Mari Vela MD   docusate sodium 100 mg Oral BID Mari Vela MD   DULoxetine 30 mg Oral Daily Mari Vela MD   enoxaparin 40 mg Subcutaneous Daily Mari Vela MD   methimazole 5 mg Oral Daily Mari Vela MD   oxyCODONE 10 mg Oral Q4H PRN Mari Vela MD   oxyCODONE 5 mg Oral Q4H PRN Mari Vela MD   pantoprazole 40 mg Oral Early Morning Mari Vela MD   polyethylene glycol 17 g Oral Daily PRN Mari Vela MD       Labs:       Results from last 7 days  Lab Units 04/13/18  0949 04/13/18  0525   WBC Thousand/uL 4 85 4 91   HEMOGLOBIN g/dL 7 5* 7 5*   HEMATOCRIT % 23 5* 22 5*   PLATELETS Thousands/uL 166 172       Results from last 7 days  Lab Units 04/13/18  0525 04/10/18  0528   SODIUM mmol/L 139 135*   POTASSIUM mmol/L 3 9 3 9   CHLORIDE mmol/L 105 101   CO2 mmol/L 30 29   BUN mg/dL 19 28*   CREATININE mg/dL 0 60 0 78   GLUCOSE RANDOM mg/dL 100 118   CALCIUM mg/dL 8 4 8 5                  Glucose (mg/dL)   Date Value   04/13/2018 100   04/10/2018 118   04/09/2018 117   04/07/2018 118       Labs reviewed    Physical Examination:  Vitals:   Vitals:    04/14/18 0906 04/14/18 1433 04/14/18 2047 04/15/18 0602   BP: 133/69 109/54 117/58 125/58   BP Location:  Right arm Right arm Right arm   Pulse:  89 86 97   Resp:  16 16 17   Temp:  97 7 °F (36 5 °C) 98 °F (36 7 °C) 98 9 °F (37 2 °C)   TempSrc:  Oral Oral Oral   SpO2:  99% 99% 97%   Weight:       Height:           GEN: NAD  HEENT: NC/AT  RESP: BBS w/o crackles/wheeze/rhonci; resp unlabored  CV: +S1 S2, regular rate, no rubs/murmurs  ABD: soft, NT, ND, normal BS   : no valle  EXT: no edema of left lower leg but +edema of left hip and thigh (improving)  Skin: no rashes  Neuro: AAO      [x ] Total time spent: 30 Mins and greater than 50% of this time was spent counseling/coordinating care        Mindy James, [facility]  Internal Medicine

## 2018-04-16 LAB
ANION GAP SERPL CALCULATED.3IONS-SCNC: 4 MMOL/L (ref 4–13)
BASOPHILS # BLD AUTO: 0.02 THOUSANDS/ΜL (ref 0–0.1)
BASOPHILS NFR BLD AUTO: 0 % (ref 0–1)
BUN SERPL-MCNC: 19 MG/DL (ref 5–25)
CALCIUM SERPL-MCNC: 8.7 MG/DL
CHLORIDE SERPL-SCNC: 103 MMOL/L (ref 100–108)
CO2 SERPL-SCNC: 31 MMOL/L (ref 21–32)
CREAT SERPL-MCNC: 0.54 MG/DL (ref 0.6–1.3)
EOSINOPHIL # BLD AUTO: 0.2 THOUSAND/ΜL (ref 0–0.61)
EOSINOPHIL NFR BLD AUTO: 4 % (ref 0–6)
ERYTHROCYTE [DISTWIDTH] IN BLOOD BY AUTOMATED COUNT: 14.8 % (ref 11.6–15.1)
GFR SERPL CREATININE-BSD FRML MDRD: 89 ML/MIN/1.73SQ M
GLUCOSE SERPL-MCNC: 97 MG/DL (ref 65–140)
HCT VFR BLD AUTO: 24 % (ref 34.8–46.1)
HGB BLD-MCNC: 7.7 G/DL (ref 11.5–15.4)
LYMPHOCYTES # BLD AUTO: 1.21 THOUSANDS/ΜL (ref 0.6–4.47)
LYMPHOCYTES NFR BLD AUTO: 26 % (ref 14–44)
MCH RBC QN AUTO: 30.6 PG (ref 26.8–34.3)
MCHC RBC AUTO-ENTMCNC: 32.1 G/DL (ref 31.4–37.4)
MCV RBC AUTO: 95 FL (ref 82–98)
MONOCYTES # BLD AUTO: 0.64 THOUSAND/ΜL (ref 0.17–1.22)
MONOCYTES NFR BLD AUTO: 14 % (ref 4–12)
NEUTROPHILS # BLD AUTO: 2.55 THOUSANDS/ΜL (ref 1.85–7.62)
NEUTS SEG NFR BLD AUTO: 56 % (ref 43–75)
NRBC BLD AUTO-RTO: 0 /100 WBCS
PLATELET # BLD AUTO: 295 THOUSANDS/UL (ref 149–390)
PMV BLD AUTO: 9.1 FL (ref 8.9–12.7)
POTASSIUM SERPL-SCNC: 4.3 MMOL/L (ref 3.5–5.3)
RBC # BLD AUTO: 2.52 MILLION/UL (ref 3.81–5.12)
SODIUM SERPL-SCNC: 138 MMOL/L (ref 136–145)
WBC # BLD AUTO: 4.68 THOUSAND/UL (ref 4.31–10.16)

## 2018-04-16 PROCEDURE — 97110 THERAPEUTIC EXERCISES: CPT

## 2018-04-16 PROCEDURE — 97535 SELF CARE MNGMENT TRAINING: CPT

## 2018-04-16 PROCEDURE — 97116 GAIT TRAINING THERAPY: CPT

## 2018-04-16 PROCEDURE — 80048 BASIC METABOLIC PNL TOTAL CA: CPT | Performed by: NURSE PRACTITIONER

## 2018-04-16 PROCEDURE — 97112 NEUROMUSCULAR REEDUCATION: CPT

## 2018-04-16 PROCEDURE — 85025 COMPLETE CBC W/AUTO DIFF WBC: CPT | Performed by: NURSE PRACTITIONER

## 2018-04-16 PROCEDURE — 99232 SBSQ HOSP IP/OBS MODERATE 35: CPT | Performed by: PHYSICAL MEDICINE & REHABILITATION

## 2018-04-16 PROCEDURE — G0515 COGNITIVE SKILLS DEVELOPMENT: HCPCS

## 2018-04-16 RX ADMIN — ASPIRIN 81 MG 81 MG: 81 TABLET ORAL at 08:18

## 2018-04-16 RX ADMIN — AMLODIPINE BESYLATE 5 MG: 5 TABLET ORAL at 08:21

## 2018-04-16 RX ADMIN — ENOXAPARIN SODIUM 40 MG: 40 INJECTION SUBCUTANEOUS at 08:18

## 2018-04-16 RX ADMIN — PANTOPRAZOLE SODIUM 40 MG: 40 TABLET, DELAYED RELEASE ORAL at 05:24

## 2018-04-16 RX ADMIN — ACETAMINOPHEN 975 MG: 325 TABLET, FILM COATED ORAL at 21:31

## 2018-04-16 RX ADMIN — OXYCODONE HYDROCHLORIDE 10 MG: 10 TABLET ORAL at 08:16

## 2018-04-16 RX ADMIN — DULOXETINE HYDROCHLORIDE 30 MG: 30 CAPSULE, DELAYED RELEASE ORAL at 08:23

## 2018-04-16 RX ADMIN — BUMETANIDE 0.5 MG: 1 TABLET ORAL at 10:04

## 2018-04-16 RX ADMIN — ATORVASTATIN CALCIUM 40 MG: 40 TABLET, FILM COATED ORAL at 17:49

## 2018-04-16 RX ADMIN — ACETAMINOPHEN 975 MG: 325 TABLET, FILM COATED ORAL at 05:24

## 2018-04-16 RX ADMIN — DOCUSATE SODIUM 100 MG: 100 CAPSULE, LIQUID FILLED ORAL at 08:17

## 2018-04-16 RX ADMIN — OXYCODONE HYDROCHLORIDE 10 MG: 10 TABLET ORAL at 12:38

## 2018-04-16 RX ADMIN — METHIMAZOLE 5 MG: 5 TABLET ORAL at 08:23

## 2018-04-16 RX ADMIN — VITAMIN D, TAB 1000IU (100/BT) 2000 UNITS: 25 TAB at 08:17

## 2018-04-16 RX ADMIN — Medication 1 TABLET: at 08:17

## 2018-04-16 RX ADMIN — DOCUSATE SODIUM 100 MG: 100 CAPSULE, LIQUID FILLED ORAL at 17:49

## 2018-04-16 RX ADMIN — ATENOLOL 25 MG: 25 TABLET ORAL at 08:22

## 2018-04-16 RX ADMIN — ACETAMINOPHEN 975 MG: 325 TABLET, FILM COATED ORAL at 14:10

## 2018-04-16 NOTE — OCCUPATIONAL THERAPY NOTE
PT SEEN FOR BALWINDER COGNITIVE ASSESSMENT  SCORED  21/30 INDICATING A MILD COGNITIVE IMPAIRMENT FOR AGE/EDUCATION    SCORES ARE AS FOLLOWS:    VISUOSPATIAL/EXECUTIVE FUNCTION: 3/5    NAMING: 3/3    ATTENTION: 6/6    LANGUAGE: 1/3    ABSTRACTION: 1/2    DELAYED RECALL: 1/5    ORIENTATION: 6/6

## 2018-04-16 NOTE — PROGRESS NOTES
Internal Medicine Progress Note  Patient: Ashlee Nunez  Age/sex: 82 y o  female  Medical Record #: [id number]      ASSESSMENT/PLAN:  Ashlee Nunez is seen and examined and management for following issues:    1  Left Intertrochanteric femur fx s/p IMN on 4/8/18 ([place]):  Pain control per primary team  WBAT  Has edema of left thigh which is uncomfortable (family says has decreased however) = using thigh high SINGH on that leg and has improved    2  ABLA; s/p 2 units PRBC: hemoglobin down to 7 5 on 7 13/18 = repeat done and was same; no sx so did not transfuse  Stable today so will continue to watch  3   Chronic diastolic heart failure: Bumex had been on hold  Resumed Bumex 0 5mg daily 4/14/18; no dizziness/SOB    4   CAD hx Stent x 2 circumflex '11: continue ASA 81mg, Atenolol and Lipitor    5  HTN: stable on home regimen of Norvasc 5mg daily and Atenolol 25mg daily  6   Hyperthyroidism: continue Methimazole; Repeat TSH/T4 in 4 weeks  Follow up with endocrinology    7  Reactive airway disease: No signs of acute flare  Continue Allegra and Singulair    8  DVT prophylaxis: Lovenox 40mg daily    9  Hx chronic large granular lymphocytic leukemia:  Sees Dr Joshua Jane as OP; has not required tx    10  Hyperparathyroidism:  Calcium/Vit D; OP followup with Endocrine        Subjective: Patient seen and examined   No new or overnight issues except low back pain    ROS:   GI: denies abdominal pain, change bowel habits or reflux symptoms  Neuro: No new neurologic changes  Respiratory: No Cough, SOB  Cardiovascular: No CP, palpitations     Scheduled Meds:    Current Facility-Administered Medications:  acetaminophen 975 mg Oral Q8H [address] Jeramie Falcon MD   amLODIPine 5 mg Oral Daily Jeramie Falcon MD   aspirin 81 mg Oral Daily Jeramie Falcon MD   atenolol 25 mg Oral Daily Jeramie Falcon MD   atorvastatin 40 mg Oral After MD Davy   bisacodyl 10 mg Rectal Daily PRN Jeramie Falcon MD   bumetanide 0 5 mg Oral Daily Jessica Gomez PA-C   calcium carbonate 1 tablet Oral Daily With Breakfast Alice Vogel MD   cholecalciferol 2,000 Units Oral Daily Alice Vogel MD   docusate sodium 100 mg Oral BID Alice Vogel MD   DULoxetine 30 mg Oral Daily Alice Vogel MD   enoxaparin 40 mg Subcutaneous Daily Alice Vogel MD   methimazole 5 mg Oral Daily Alice Vogel MD   oxyCODONE 10 mg Oral Q4H PRN Alice Vogel MD   oxyCODONE 5 mg Oral Q4H PRN Alice Vogel MD   pantoprazole 40 mg Oral Early Morning Alice Vogel MD   polyethylene glycol 17 g Oral Daily PRN Alice Vogel MD       Labs:       Results from last 7 days  Lab Units 04/16/18  0513 04/13/18  0949   WBC Thousand/uL 4 68 4 85   HEMOGLOBIN g/dL 7 7* 7 5*   HEMATOCRIT % 24 0* 23 5*   PLATELETS Thousands/uL 295 166       Results from last 7 days  Lab Units 04/16/18  0513 04/13/18  0525   SODIUM mmol/L 138 139   POTASSIUM mmol/L 4 3 3 9   CHLORIDE mmol/L 103 105   CO2 mmol/L 31 30   BUN mg/dL 19 19   CREATININE mg/dL 0 54* 0 60   GLUCOSE RANDOM mg/dL 97 100   CALCIUM mg/dL 8 7 8 4                  Glucose (mg/dL)   Date Value   04/16/2018 97   04/13/2018 100   04/10/2018 118   04/09/2018 117       Labs reviewed    Physical Examination:  Vitals:   Vitals:    04/15/18 1328 04/15/18 2047 04/16/18 0507 04/16/18 0816   BP: 121/54 132/67 147/66 126/58   BP Location: Right arm Right arm Right arm Right arm   Pulse: 86 85 90 94   Resp: 16 16 18    Temp: 98 1 °F (36 7 °C) 98 3 °F (36 8 °C) 98 3 °F (36 8 °C)    TempSrc: Oral Oral Oral    SpO2: 100% 100% 97%    Weight:       Height:           GEN: NAD  HEENT: NC/AT  RESP: BBS w/o crackles/wheeze/rhonci; resp unlabored  CV: +S1 S2, regular rate, no rubs/murmurs  ABD: soft, NT, ND, normal BS   : no valle  EXT: no edema of left lower leg but +edema of left hip and thigh (improving)  Skin: no rashes  Neuro: AAO      [x ] Total time spent: 30 Mins and greater than 50% of this time was spent counseling/coordinating care        Maida Stevenson, [facility] ([place], [place])  Internal Medicine

## 2018-04-16 NOTE — PROGRESS NOTES
04/16/18 1230   Pain Assessment   Pain Assessment 0-10   Pain Score 7   Pain Type Surgical pain   Pain Location Hip   Pain Orientation Left   Restrictions/Precautions   Precautions Fall Risk   Weight Bearing Restrictions Yes   LLE Weight Bearing Per Order WBAT   ROM Restrictions No   QI: Putting On/Taking Off Footwear   Assistance Needed Physical assistance   Assistance Provided by [place] 25%-49%   Putting On/Taking Off Footwear CARE Score 3   QI: Sit to Stand   Assistance Needed Incidental touching; Adaptive equipment   Assistance Provided by [place] No physical assistance   Comment RW   Sit to Stand CARE Score 4   QI: Chair/Bed-to-Chair Transfer   Assistance Needed Incidental touching; Adaptive equipment   Assistance Provided by [place] No physical assistance   Chair/Bed-to-Chair Transfer CARE Score 4   Transfer Bed/Chair/Wheelchair   Limitations Noted In Balance; Endurance;Pain Management;UE Strength;LE Strength   Adaptive Equipment Roller Walker   Bed, Chair, Wheelchair Transfer (FIM) 4 - Patient requires steadying assist or light touching   Exercise Tools   Other Exercise Tool 1 Pt complete 3x10 UE ex using 1# dowel bar in all planes to promote UE strength/ ROM to increase independence with ADLs  Cognition   Overall Cognitive Status Impaired   Arousal/Participation Cooperative   Attention Within functional limits   Orientation Level Oriented X4   Memory Within functional limits   Following Commands Follows multistep commands with increased time or repetition   Activity Tolerance   Activity Tolerance Patient limited by pain   Medical Staff Made Aware RN made aware   Assessment   Treatment Assessment Pt seen in skilled OT for functional transfers, use of LHAE, strengthening, and endurance  Pt completed transfer from chair to recliner but pt stated she noticed her leg was extremely fatigued after having PT earlier in the day  PT completed BUE #1 dowel bar exercise in all planes while seated with good tolerance   Pt then practiced use of LHAE for donning/ doffing socks and shoes  Pt was able to recall and properly apply each piece of equipment appropriately when questioned by therapist how she would go about taking off/ putting on her socks and shoes  Pt cont to require A for donning L shoe due to WB restrictions  MOCA administered  Refer to supervising VICTORINO's note  Recommend that pt cont to work on ADLs, practicing using [address]  [employer], functional transfers, standing tolerance, endurance, UE strengthening, and safety awareness  Prognosis Good   Problem List Decreased strength;Decreased endurance;Decreased range of motion; Impaired balance;Decreased mobility; Decreased skin integrity;Orthopedic restrictions;Pain   Barriers to Discharge Decreased caregiver support; Inaccessible home environment   Plan   Treatment/Interventions ADL retraining;Functional transfer training;LE strengthening/ROM; Therapeutic exercise; Endurance training;Equipment eval/education; Compensatory technique education   Progress Progressing toward goals   Recommendation   OT Discharge Recommendation Home with family support   OT Therapy Minutes   OT Time In 1230   OT Time Out 1400   OT Total Time (minutes) 90   OT Mode of treatment - Individual (minutes) 0   OT Mode of treatment - Concurrent (minutes) 90   OT Mode of treatment - Group (minutes) 0   OT Mode of treatment - Co-treat (minutes) 0   OT Mode of Teatment - Total time(minutes) 90 minutes   Therapy Time missed   Time missed?  No

## 2018-04-16 NOTE — PROGRESS NOTES
Progress Note - Alpha Erp 82 y o  female MRN: [id number]    Unit/Bed#: -01 Encounter: [id number]            Subjective:   D/W patient coordinating timing of use of PRN pain meds with therapy     Objective:     ROS  Gen: denies recent wt loss   Psych: denies mood change    Vitals: Blood pressure 126/58, pulse 94, temperature 98 3 °F (36 8 °C), temperature source Oral, resp  rate 18, height 5' 1" (1 549 m), weight 62 6 kg (138 lb), SpO2 97 %      Physical Exam:     Gen:        NAD   Neck:   trachea midline  Lungs:  respirations unlabored   Heart:    + S1 and S2   Abdomen:    Soft, non-tender  Extremities: + B/L LE edema L> R  Psych: mood/affect appropriate  Neurologic: awake, alert, appropriately answering questions     Functional :  Mobility: CG-min  Tx: CG-sup  ADLs: PENDING         Current Facility-Administered Medications:  acetaminophen 975 mg Oral Q8H Cyndy Noriega MD   amLODIPine 5 mg Oral Daily Constantin Limon MD   aspirin 81 mg Oral Daily Constantin Limon MD   atenolol 25 mg Oral Daily Constantin Limon MD   atorvastatin 40 mg Oral After Frederick Ambriz MD   bisacodyl 10 mg Rectal Daily PRN Constantin Limon MD   bumetanide 0 5 mg Oral Daily Lisa Nguyen PA-C   calcium carbonate 1 tablet Oral Daily With Breakfast Constantin Limon MD   cholecalciferol 2,000 Units Oral Daily Constantin Limno MD   docusate sodium 100 mg Oral BID Constantin Limon MD   DULoxetine 30 mg Oral Daily Constantin Limon MD   enoxaparin 40 mg Subcutaneous Daily Constantin Limon MD   methimazole 5 mg Oral Daily Constantin Limon MD   oxyCODONE 10 mg Oral Q4H PRN Constantin Limon MD   oxyCODONE 5 mg Oral Q4H PRN Constantin Limon MD   pantoprazole 40 mg Oral Early Morning Constantin Limon MD   polyethylene glycol 17 g Oral Daily PRN Constantin Limon MD       bisacojamil    oxyCODONE    oxyCODONE    polyethylene glycol      Assessment:  Pt is 83 yo female with L IT fx s/p IM nail fixation by Dr Isabel Guzman on 4/8    Plan:    Rehabilitation: cont PT/OT for ambulatory/ADL dysfxn    L IT fx: s/p IM nail fixation by Dr Em Reynolds on 4/8 (SPEP/UPEP, vit D level WNL)        Hyperparathyroidism: on home vit D 2000 units QD, d/w endocrine and recommended starting pt on 1000 mg calcium carbonate qd (1250 mg formulation per tab is what is on formulary)      Hyperthyroidism: elevated thyroid microsomal antibody, elevated thyroid stimulating immunoglobulin, decreased TSH, elevated free T4, likely autoimmune etiology, seen by endocrine in acute care and started on tapazole and have recommended repeat free T4 in 4 wks and FU in 6 wks with endocrine     bladder CA: CT A/P of 3/26/18 did not reveal any findings to suggest recurrent or new urogenital neoplasm; follows with Dr Vijay Corona     CLL: follows with Dr Joshua Jane who per last visit note felt pt was hematologically stable      CAD: s/p stents, on home regimen of ASA 81 mg qd, atenolol 25 mg qd, and lipitor 40 mg qPM; OP FU with Dr Silvia Butt     HTN: on home regimen of norvasc 5 mg qd and atenolol 25 mg qd      GERD: on home dose of protonix 40 mg qd (per pt will occasionally take BID if she feels it is very bad that day)      seasonal allergies: at home takes prn allegra and prn singulair; currently asymptomatic per pt     grade I DD w/chronic LE edema: per IM recs in acute care pt resumed by IM on home bumex 0 5 mg qd      Depression: on home cymbalta 30 mg qd      Osteoporosis: on home vitamin D 2000 units qd (vit D level of 4/9 WNL at 47 9), also on prolia as OP, managed by her rheumatologist, seen by endocrine and recommend FU with her rheumatologist for further management and DEXA scan at her rheumatologist discretion (SPEP/UPEP w/o monoclonal bands, vit D level WNL)       Anemia: ABLA, Hg currently stable at 7 7, asymptomatic, not tachycardic or hypotensive & RR/POx 18 / 97%; per d/w IM monitor at this time, t/c transfusion for any further decrease in Hg        DVT ppx: SCDs, lovenox x 28 days post-op per ortho     Incidental findings of CT A/P of 3/26/18:  1) b/l renal cysts: per report unchanged compared to previous study of 9/25/17  2) rt renal nodules: per report unchanged compared to previous study of 9/25/17  3) hepatic cysts: per report unchanged compared to previous study of 9/25/17  4) pancreatic cysts: per report unchanged compared to previous study of 9/25/17  5) ventral hernia containing adipose: OP FU w/PCP     Other incidental findings:  6) PVCs: OP FU with Dr Marylen Duval (pt's cardiologist)  7) elevated peak PA pressure: OP FU with Dr Marylen Duval (pt's cardiologist)

## 2018-04-16 NOTE — PROGRESS NOTES
04/16/18 0830   Pain Assessment   Pain Assessment 0-10   Pain Score 7  (7 start of session, 5 end of session)   Pain Type Surgical pain   Pain Location Hip   Pain Orientation Left   Restrictions/Precautions   Weight Bearing Restrictions Yes   LLE Weight Bearing Per Order WBAT   Cognition   Overall Cognitive Status WFL   Arousal/Participation Cooperative   Attention Within functional limits   Orientation Level Oriented X4   Memory Within functional limits   Following Commands Follows multistep commands with increased time or repetition   Subjective   Subjective Pt was resting in recliner at start and end of session; was ready for therapy  QI: Sit to Lying   Assistance Needed Physical assistance   Assistance Provided by [place] Less than 25%   Comment support for LLE   Sit to Lying CARE Score 3   QI: Lying to Sitting on Side of Bed   Assistance Needed Physical assistance   Assistance Provided by [place] Less than 25%   Comment support LLE   Lying to Sitting on Side of Bed CARE Score 3   QI: Sit to Stand   Assistance Needed Supervision   Sit to Stand CARE Score 4   Bed Mobility   Able to Roll Left to Right;Right to Left;Scoot Up;Scoot Down   QI: Chair/Bed-to-Chair Transfer   Assistance Needed Incidental touching   Assistance Provided by [place] Less than 25%   Comment RW   Chair/Bed-to-Chair Transfer CARE Score 3   Transfer Bed/Chair/Wheelchair   Limitations Noted In Balance; Endurance;UE Strength;LE Strength   Adaptive Equipment Roller Walker   Stand Pivot Supervision  (Simultaneous filing   User may not have seen previous data )   Sit to Stand Supervision   Stand to Sit Supervision   Supine to Sit Minimal Assist   Sit to Supine Minimal Assist   Findings LLE support for supine<>sit   Bed, Chair, Wheelchair Transfer (FIM) 4 - Patient requires steadying assist or light touching   QI: Walk 10 [address] Provided by [place] No physical assistance   Comment RW 50'  (x1, 20')   Walk 10 Feet CARE Score 4   QI: Walk 50 Feet with Two [facility] Provided by [place] No physical assistance   Comment CGA   Walk 50 Feet with Two Turns CARE Score 4   Ambulation   Does the patient walk? 2  Yes   Primary Discharge Mode of Locomotion Walk   Walk Assist Level Supervision;Contact Guard  (CGA when fatigued)   Gait Pattern Slow Geetha; Forward Flexion; Inconsistant Geetha;Decreased foot clearance; Improper weight shift;Decreased R stance;Decreased L stance; Step to   Assist Device Roller Claire Sandoval Walked (feet) 50 ft  (x1, 20' Simultaneous filing  User may not have seen previous data )   Limitations Noted In Balance; Endurance; Heel Strike;Speed;Strength;Swing   Findings limited pain at end of session   Walking (FIM) 2 - Patient ambulates between 50 - 149 feet regardless of assist/device/set up   Wheelchair mobility   QI: Does the patient use a wheelchair? 0  No   Stairs   Type Stairs   Weight Bearing Precautions WBAT   Assist Devices Roller Walker;Bilateral Rail   Findings Pt attempted to place R foot on 4" step, limited by pain in LLE  (Simultaneous filing  User may not have seen previous data )   Stairs (FIM) 1 - Patient completes less than 25% of all tasks   Therapeutic Interventions   Strengthening Supine: SAQ LLE with small maroon bolster  AAROM LLE heel slide to midrange to improve L knee and hip flexion, limited by pain  AROM L hip ER  Raised walker height 1 level, pt approved  Flexibility L hamstring and calf in supine, passive stretch 3x30 sec each   Neuromuscular Re-Education Standing at  steps holding bilateral rails, weight shift into LLE   Modalities ice to L hip and lateral thigh 15 min supine midsession   Other switched to knee height winsome lynch, med   Assessment   Treatment Assessment Pt performed all transfers safely  Pt demonstrates some improvement in LLE strengthening, AROM, and ambulation with RW   Pt attempted WB on LLE to prepare for stepping onto stair, and was limited by pain  Pt continues to exhibit dec strength and pain, which limit ambulation and are a barrier to safe dc home at this time  Pt would benefit from cont skilled PT to address strength, activity tolerance, and pain to improve ambulation and safe dc home  Problem List Decreased strength;Decreased endurance;Decreased range of motion; Impaired balance;Decreased mobility; Decreased skin integrity;Orthopedic restrictions;Pain   Barriers to Discharge Decreased caregiver support   PT Barriers   Physical Impairment Decreased strength;Decreased range of motion;Decreased endurance; Impaired balance;Decreased mobility;Pain;Orthopedic restrictions   Functional Limitation Car transfers; Ramp negotiation;Stair negotiation;Standing;Transfers; Walking   Plan   Treatment/Interventions Functional transfer training;LE strengthening/ROM; Therapeutic exercise; Endurance training;Patient/family training;Equipment eval/education; Bed mobility;Gait training   Recommendation   Recommendation Outpatient PT   Equipment Recommended Walker   PT Therapy Minutes   PT Time In 0830   PT Time Out 1000   PT Total Time (minutes) 90   PT Mode of treatment - Individual (minutes) 90   PT Mode of treatment - Concurrent (minutes) 0   PT Mode of treatment - Group (minutes) 0   PT Mode of treatment - Co-treat (minutes) 0   PT Mode of Teatment - Total time(minutes) 90 minutes   Therapy Time missed   Time missed?  No      04/16/18 0830   Pain Assessment   Pain Assessment 0-10   Pain Score 7  (7 start of session, 5 end of session)   Pain Type Surgical pain   Pain Location Hip   Pain Orientation Left   Restrictions/Precautions   Weight Bearing Restrictions Yes   LLE Weight Bearing Per Order WBAT   Cognition   Overall Cognitive Status WFL   Arousal/Participation Cooperative   Attention Within functional limits   Orientation Level Oriented X4   Memory Within functional limits   Following Commands Follows multistep commands with increased time or repetition   Subjective   Subjective Pt was resting in recliner at start and end of session; was ready for therapy  QI: Sit to Lying   Assistance Needed Physical assistance   Assistance Provided by [place] Less than 25%   Comment support for LLE   Sit to Lying CARE Score 3   QI: Lying to Sitting on Side of Bed   Assistance Needed Physical assistance   Assistance Provided by [place] Less than 25%   Comment support LLE   Lying to Sitting on Side of Bed CARE Score 3   QI: Sit to Stand   Assistance Needed Supervision   Sit to Stand CARE Score 4   Bed Mobility   Able to Roll Left to Right;Right to Left;Scoot Up;Scoot Down   QI: Chair/Bed-to-Chair Transfer   Assistance Needed Incidental touching   Assistance Provided by [place] Less than 25%   Comment RW   Chair/Bed-to-Chair Transfer CARE Score 3   Transfer Bed/Chair/Wheelchair   Limitations Noted In Balance; Endurance;UE Strength;LE Strength   Adaptive Equipment Roller Walker   Stand Pivot Supervision  (Simultaneous filing  User may not have seen previous data )   Sit to Stand Supervision   Stand to Sit Supervision   Supine to Sit Minimal Assist   Sit to Supine Minimal Assist   Findings LLE support for supine<>sit   Bed, Chair, Wheelchair Transfer (FIM) 4 - Patient requires steadying assist or light touching   QI: Walk 10 [address] Provided by [place] No physical assistance   Comment RW 50'  (x1, 20')   Walk 10 Feet CARE Score 4   QI: Walk 50 Feet with Two [address] Provided by [place] No physical assistance   Comment CGA   Walk 50 Feet with Two Turns CARE Score 4   Ambulation   Does the patient walk? 2  Yes   Primary Discharge Mode of Locomotion Walk   Walk Assist Level Supervision;Contact Guard  (CGA when fatigued)   Gait Pattern Slow Geetha; Forward Flexion; Inconsistant Geetha;Decreased foot clearance; Improper weight shift;Decreased R stance;Decreased L stance; Step to Assist Device Roller Claire Sandoval Walked (feet) 50 ft  (x1, 20' Simultaneous filing  User may not have seen previous data )   Limitations Noted In Balance; Endurance; Heel Strike;Speed;Strength;Swing   Findings limited pain at end of session   Walking (FIM) 2 - Patient ambulates between 50 - 149 feet regardless of assist/device/set up   Wheelchair mobility   QI: Does the patient use a wheelchair? 0  No   Stairs   Type Stairs   Weight Bearing Precautions WBAT   Assist Devices Roller Walker;Bilateral Rail   Findings Pt attempted to place R foot on 4" step, limited by pain in LLE  (Simultaneous filing  User may not have seen previous data )   Stairs (FIM) 1 - Patient completes less than 25% of all tasks   Therapeutic Interventions   Strengthening Supine: SAQ LLE with small maroon bolster  AAROM LLE heel slide to midrange to improve L knee and hip flexion, limited by pain  AROM L hip ER  Raised walker height 1 level, pt approved  Flexibility L hamstring and calf in supine, passive stretch 3x30 sec each   Neuromuscular Re-Education Standing at  steps holding bilateral rails, weight shift into LLE   Modalities ice to L hip and lateral thigh 15 min supine midsession   Other switched to knee height winsome lynch, med   Assessment   Treatment Assessment Pt performed all transfers safely  Pt demonstrates some improvement in LLE strengthening, AROM, and ambulation with RW  Pt attempted WB on LLE to prepare for stepping onto stair, and was limited by pain  Pt continues to exhibit dec strength and pain, which limit ambulation and are a barrier to safe dc home at this time  Pt would benefit from cont skilled PT to address strength, activity tolerance, and pain to improve ambulation and safe dc home  Problem List Decreased strength;Decreased endurance;Decreased range of motion; Impaired balance;Decreased mobility; Decreased skin integrity;Orthopedic restrictions;Pain   Barriers to Discharge Decreased caregiver support PT Barriers   Physical Impairment Decreased strength;Decreased range of motion;Decreased endurance; Impaired balance;Decreased mobility;Pain;Orthopedic restrictions   Functional Limitation Car transfers; Ramp negotiation;Stair negotiation;Standing;Transfers; Walking   Plan   Treatment/Interventions Functional transfer training;LE strengthening/ROM; Therapeutic exercise; Endurance training;Patient/family training;Equipment eval/education; Bed mobility;Gait training   Recommendation   Recommendation Outpatient PT   Equipment Recommended Walker   PT Therapy Minutes   PT Time In 0830   PT Time Out 1000   PT Total Time (minutes) 90   PT Mode of treatment - Individual (minutes) 90   PT Mode of treatment - Concurrent (minutes) 0   PT Mode of treatment - Group (minutes) 0   PT Mode of treatment - Co-treat (minutes) 0   PT Mode of Teatment - Total time(minutes) 90 minutes   Therapy Time missed   Time missed?  No

## 2018-04-17 PROCEDURE — 97116 GAIT TRAINING THERAPY: CPT

## 2018-04-17 PROCEDURE — 99232 SBSQ HOSP IP/OBS MODERATE 35: CPT | Performed by: PHYSICAL MEDICINE & REHABILITATION

## 2018-04-17 PROCEDURE — 97535 SELF CARE MNGMENT TRAINING: CPT

## 2018-04-17 PROCEDURE — 97530 THERAPEUTIC ACTIVITIES: CPT

## 2018-04-17 PROCEDURE — 97110 THERAPEUTIC EXERCISES: CPT

## 2018-04-17 RX ADMIN — OXYCODONE HYDROCHLORIDE 10 MG: 10 TABLET ORAL at 08:11

## 2018-04-17 RX ADMIN — OXYCODONE HYDROCHLORIDE 10 MG: 10 TABLET ORAL at 12:27

## 2018-04-17 RX ADMIN — ATENOLOL 25 MG: 25 TABLET ORAL at 08:21

## 2018-04-17 RX ADMIN — ASPIRIN 81 MG 81 MG: 81 TABLET ORAL at 08:21

## 2018-04-17 RX ADMIN — BUMETANIDE 0.5 MG: 1 TABLET ORAL at 08:22

## 2018-04-17 RX ADMIN — DULOXETINE HYDROCHLORIDE 30 MG: 30 CAPSULE, DELAYED RELEASE ORAL at 08:22

## 2018-04-17 RX ADMIN — ACETAMINOPHEN 975 MG: 325 TABLET, FILM COATED ORAL at 06:18

## 2018-04-17 RX ADMIN — DOCUSATE SODIUM 100 MG: 100 CAPSULE, LIQUID FILLED ORAL at 08:19

## 2018-04-17 RX ADMIN — POLYETHYLENE GLYCOL 3350 17 G: 17 POWDER, FOR SOLUTION ORAL at 08:16

## 2018-04-17 RX ADMIN — ACETAMINOPHEN 975 MG: 325 TABLET, FILM COATED ORAL at 14:16

## 2018-04-17 RX ADMIN — PANTOPRAZOLE SODIUM 40 MG: 40 TABLET, DELAYED RELEASE ORAL at 06:18

## 2018-04-17 RX ADMIN — AMLODIPINE BESYLATE 5 MG: 5 TABLET ORAL at 08:20

## 2018-04-17 RX ADMIN — ENOXAPARIN SODIUM 40 MG: 40 INJECTION SUBCUTANEOUS at 08:21

## 2018-04-17 RX ADMIN — VITAMIN D, TAB 1000IU (100/BT) 2000 UNITS: 25 TAB at 08:20

## 2018-04-17 RX ADMIN — ATORVASTATIN CALCIUM 40 MG: 40 TABLET, FILM COATED ORAL at 17:39

## 2018-04-17 RX ADMIN — METHIMAZOLE 5 MG: 5 TABLET ORAL at 08:23

## 2018-04-17 RX ADMIN — ACETAMINOPHEN 975 MG: 325 TABLET, FILM COATED ORAL at 21:59

## 2018-04-17 RX ADMIN — Medication 1 TABLET: at 08:19

## 2018-04-17 RX ADMIN — DOCUSATE SODIUM 100 MG: 100 CAPSULE, LIQUID FILLED ORAL at 17:39

## 2018-04-17 NOTE — PROGRESS NOTES
04/17/18 0830   Pain Assessment   Pain Assessment 0-10   Pain Score 7   Pain Type Surgical pain   Pain Location Hip   Pain Orientation Left   Restrictions/Precautions   Precautions Fall Risk   Weight Bearing Restrictions Yes   LLE Weight Bearing Per Order WBAT   ROM Restrictions No   QI: Oral Hygiene   Assistance Needed Set-up / [address] Provided by [place] No physical assistance   Oral Hygiene CARE Score 5   Grooming   Able To Comb/Brush Hair;Wash/Dry Face;Brush/Clean Teeth;Wash/Dry Hands   Limitation Noted In Strength; Safety   Findings Pt completed all grooming tasks while seated in w/c at sink  Grooming (FIM) 5 - [place] sets up supplies or applies device   QI: Shower/Bathe Self   Assistance Needed Supervision;Verbal cues; Adaptive equipment   Assistance Provided by [place] No physical assistance   Shower/Bathe Self CARE Score 4   Bathing   Assessed Bath Style Shower   Anticipated D/C Bath Style Shower   Able to Tyrel Lee No   Able to [employer] Temperature No   Able to Wash/Rinse/Dry (body part) Left Arm;Right Arm;L Upper Leg;R Upper Leg;L Lower Leg/Foot;R Lower Leg/Foot;Chest;Abdomen;Perineal Area; Buttocks   Limitations Noted in Balance; Coordination; Endurance;ROM;Safety;Strength;Timeliness   Positioning Seated   Adaptive Equipment Hand Held Shower; Shower Bars;Tub Bench;Longhand Reacher   Findings  See assessment for further details  Bathing (FIM) 5 - Patient requires verbal cues but completes 10/10 parts   Tub/Shower Transfer   Limitations Noted In Balance; Coordination; Endurance;ROM;Safety;UE Strength;LE Strength   Adaptive Equipment Grab Bars;Transfer Bench   Assessed Shower   Findings Pt completed SPT from w/c <> shower bench  with use of grab bars and CGA for safety     Tub Transfer (FIM) 0 - Activity does not occur   Shower Transfer (FIM) 4 - Patient requires steadying assist or light touching   QI: Upper Body Dressing   Assistance Needed Supervision   Assistance Provided by [place] No physical assistance   Comment Pt completed UB dressing while seated in w/c  Upper Body Dressing CARE Score 4   QI: Lower Body Dressing   Assistance Needed Adaptive equipment;Supervision   Assistance Provided by [place] No physical assistance   Comment Pt completed LB dressing while seated and in stance for CM over hips with RW for support and CS for safety  Lower Body Dressing CARE Score 4   QI: Putting On/Taking Off Footwear   Assistance Needed Adaptive equipment;Physical assistance   Assistance Provided by [place] 25%-49%   Comment Pt able to don/doffing footwear with use of LHAE while seated  for RLE, cont to need A for donning LLE shoe  Putting On/Taking Off Footwear CARE Score 3   Dressing/Undressing Clothing   Remove UB Clothes Pullover Shirt   Remove LB Clothes Undergarment;Socks   Don UB Clothes Pullover Shirt;Bra   Don LB Clothes Pants; Undergarment;Socks;TEDs; Shoes   Limitations Noted In Balance; Endurance;Strength;Timeliness   Adaptive Equipment Reacher;Dressing Stick;LH [place];Sock Aide   Positioning Supported Sit;Standing   UB Dressing (FIM) 5 - Patient requires supervision/monitoring   LB Dressing (FIM) 4 - Patient completes 75% of all tasks   QI: Sit to Stand   Assistance Needed Incidental touching   Assistance Provided by [place] No physical assistance   Comment Pt able to push up from surface with RW for support  Pt noted to have good carryover with hand placement  Sit to Stand CARE Score 4   QI: Chair/Bed-to-Chair Transfer   Assistance Needed Incidental touching   Assistance Provided by [place] No physical assistance   Comment RW    Chair/Bed-to-Chair Transfer CARE Score 4   Transfer Bed/Chair/Wheelchair   Bed, Chair, Wheelchair Transfer (FIM) 4 - Patient requires steadying assist or light touching   QI: [address] Provided by [place] No physical assistance   Comment Pt completed arden hygiene while seated on [place] [id number] Score 4   Toileting   Able to Pull Clothing down yes, up yes  Able to Manage Clothing Closures Other   Manage Hygiene Bladder   Limitations Noted In Balance; Safety;UE Strength;LE Strength   Adaptive Equipment Grab Bar   Toileting (FIM) 4 - Patient requires steadying assist or light touching   QI: Toilet Transfer   Assistance Needed Incidental touching   Assistance Provided by [place] No physical assistance   Comment Pt cont to demo proper hand placement with SPT  but required incidential touching due to dec balance  Toilet Transfer CARE Score 4   Toilet Transfer   Surface Assessed Standard Commode   Transfer Technique Stand Pivot   Limitations Noted In Balance; Endurance; Safety;UE Strength;LE Strength   Toilet Transfer (FIM) 4 - Patient requires steadying assist or light touching   Functional Standing Tolerance   Time ~ 15 seconds    Activity to manage LB clothing over hips in stance  Cognition   Overall Cognitive Status Impaired   Arousal/Participation Alert; Cooperative   Attention Within functional limits   Orientation Level Oriented X4   Memory Within functional limits   Following Commands Follows multistep commands with increased time or repetition   Activity Tolerance   Activity Tolerance Patient limited by fatigue   Medical Staff Made Aware RN made aware  Assessment   Treatment Assessment Pt seen by skilled OT services to work on ADL re-training, functional transfers, use of LHAE, strength, endurance, safety awareness, and standing tolerance  Pt stated her pain "felt better today" and that she noticed an inc in flexibility in her L knee  Pt was agreeable to take shower in shower room  Pt stated that she has a walk in shower at home with a shower chair  Pt said she currently has suction cup grab bars at home for support in shower  Therapist discussed with patient how bolted grab bars are the safest option  Pt agreed to speak with her son about having grab bars installed   Pt discussed with therapist her most recent fall event at home  Therapist recommended to patient about looking into "Life Alert" communication system with family in event of another accident  Pt stated her children had already considered purchasing this system for her  Pt able to complete shower transfer from w/c <> transfer bench with use of grab bars and CG for safety  Once seated, pt completed all bathing tasks and was able to wash and rinse 10/10 body parts  Pt completed rear washing with B/L wt shifting on bench  Pt was able to reach R lower leg and foot to wash but had trouble reaching her LLE  Pt trialed using reacher wrapped with wash cloth in order to reach LLE  Therapist recommended to pt that a long handled sponge would work best  Pt was agreeable to the suggestion and claimed that she is interested in purchasing one in order to increase independence with bathing  Pt able to complete UB dressing while seated in w/c without A and bijan's good carryover with LHAE for LB dressing  Pt able to don undergarment, pants, and socks while seated and managed clothing over hips in stance while unilaterally supported by Rw  Pt demo's ability to don R shoe but cont to require A for donning L shoe due to wt bearing precaution  Pt stated that she lives alone and will be receiving help from family upon returning to home  Recommend that pt cont to work on I /ADL tasks, functional transfers, strength, endurance, standing tolerance, and safety  Prognosis Good   Problem List Decreased strength;Decreased endurance;Decreased range of motion; Impaired balance;Decreased mobility; Decreased skin integrity;Orthopedic restrictions;Pain;Decreased cognition   Barriers to Discharge Decreased caregiver support; Inaccessible home environment   Plan   Treatment/Interventions ADL retraining;Functional transfer training;LE strengthening/ROM; Therapeutic exercise; Endurance training;Equipment eval/education; Compensatory technique education   Progress Progressing toward goals Recommendation   OT Discharge Recommendation Home with family support   OT Therapy Minutes   OT Time In 0830   OT Time Out 1000   OT Total Time (minutes) 90   OT Mode of treatment - Individual (minutes) 90   OT Mode of treatment - Concurrent (minutes) 0   OT Mode of treatment - Group (minutes) 0   OT Mode of treatment - Co-treat (minutes) 0   OT Mode of Teatment - Total time(minutes) 90 minutes   Therapy Time missed   Time missed?  No

## 2018-04-17 NOTE — PROGRESS NOTES
Progress Note - Darlene Zaman 82 y o  female MRN: [id number]    Unit/Bed#: -01 Encounter: [id number]            Subjective:   Pt without complaint currently     Objective:     ROS  Gen: denies recent wt loss   Psych: denies mood change    Vitals: Blood pressure 144/63, pulse 86, temperature 97 7 °F (36 5 °C), temperature source Oral, resp  rate 18, height 5' 1" (1 549 m), weight 62 6 kg (138 lb), SpO2 97 %      Physical Exam:     Gen:        NAD   Neck:   trachea midline  Lungs:  respirations unlabored   Heart:    + S1 and S2   Abdomen:    Soft, non-tender  Extremities: + B/L LE edema L> R  Psych: mood/affect appropriate  Neurologic: awake, alert, appropriately answering questions     Functional :  Mobility: CG-min  Tx: CG-sup  ADLs: min         Current Facility-Administered Medications:  acetaminophen 975 mg Oral Q8H Yessi Dunaway MD   amLODIPine 5 mg Oral Daily Milka Mir MD   aspirin 81 mg Oral Daily Milka Mir MD   atenolol 25 mg Oral Daily Milka Mir MD   atorvastatin 40 mg Oral After Otto Richardson MD   bisacodyl 10 mg Rectal Daily PRN Milka Mir MD   bumetanide 0 5 mg Oral Daily Devon Benitez PA-C   calcium carbonate 1 tablet Oral Daily With Breakfast Milka Mir MD   cholecalciferol 2,000 Units Oral Daily Milka Mir MD   docusate sodium 100 mg Oral BID Milka Mir MD   DULoxetine 30 mg Oral Daily Milka Mir MD   enoxaparin 40 mg Subcutaneous Daily Milka Mir MD   methimazole 5 mg Oral Daily Milka Mir MD   oxyCODONE 10 mg Oral Q4H PRN Milka Mir MD   oxyCODONE 5 mg Oral Q4H PRN Milka Mir MD   pantoprazole 40 mg Oral Early Morning Milka Mir MD   polyethylene glycol 17 g Oral Daily PRN Milka Mir MD       bisacojamil    oxyCODONE    oxyCODONE    polyethylene glycol      Assessment:  Pt is 83 yo female with L IT fx s/p IM nail fixation by Dr Shama Gaytan on 4/8    Plan:    Rehabilitation: cont PT/OT for ambulatory/ADL dysfxn    L IT fx: s/p IM nail fixation by   [place] on 4/8 (SPEP/UPEP, vit D level WNL)        Hyperparathyroidism: on home vit D 2000 units QD, d/w endocrine and recommended starting pt on 1000 mg calcium carbonate qd (1250 mg formulation per tab is what is on formulary)      Hyperthyroidism: elevated thyroid microsomal antibody, elevated thyroid stimulating immunoglobulin, decreased TSH, elevated free T4, likely autoimmune etiology, seen by endocrine in acute care and started on tapazole and have recommended repeat free T4 in 4 wks and FU in 6 wks with endocrine     bladder CA: CT A/P of 3/26/18 did not reveal any findings to suggest recurrent or new urogenital neoplasm; follows with Dr Lesa Elkins     CLL: follows with Dr Torsten Hodgson who per last visit note felt pt was hematologically stable      CAD: s/p stents, on home regimen of ASA 81 mg qd, atenolol 25 mg qd, and lipitor 40 mg qPM; OP FU with Dr Masha Tee     HTN: on home regimen of norvasc 5 mg qd and atenolol 25 mg qd      GERD: on home dose of protonix 40 mg qd (per pt will occasionally take BID if she feels it is very bad that day)      seasonal allergies: at home takes prn allegra and prn singulair; currently asymptomatic per pt     grade I DD w/chronic LE edema: per IM recs in acute care pt resumed by IM on home bumex 0 5 mg qd      Depression: on home cymbalta 30 mg qd      Osteoporosis: on home vitamin D 2000 units qd (vit D level of 4/9 WNL at 47 9), also on prolia as OP, managed by her rheumatologist, seen by endocrine and recommend FU with her rheumatologist for further management and DEXA scan at her rheumatologist discretion (SPEP/UPEP w/o monoclonal bands, vit D level WNL)       Anemia: ABLA, Hg currently stable at 7 7, asymptomatic, not tachycardic or hypotensive & RR/POx 18 / 97%; per d/w IM monitor at this time, t/c transfusion for any further decrease in Hg        DVT ppx: SCDs, lovenox x 28 days post-op per ortho     Incidental findings of CT A/P of 3/26/18:  1) b/l renal cysts: per report unchanged compared to previous study of 9/25/17  2) rt renal nodules: per report unchanged compared to previous study of 9/25/17  3) hepatic cysts: per report unchanged compared to previous study of 9/25/17  4) pancreatic cysts: per report unchanged compared to previous study of 9/25/17  5) ventral hernia containing adipose: OP FU w/PCP     Other incidental findings:  6) PVCs: OP FU with Dr Jeni Ames (pt's cardiologist)  7) elevated peak PA pressure: OP FU with Dr Jeni Ames (pt's cardiologist)

## 2018-04-17 NOTE — PROGRESS NOTES
04/17/18 1300   Pain Assessment   Pain Assessment 0-10   Pain Score 5   Pain Type Acute pain   Pain Location Hip   Pain Orientation Left   Hospital Pain Intervention(s) Repositioned; Rest   Restrictions/Precautions   Precautions Fall Risk;Pain   Subjective   Subjective pt c/o increase pain this session, but ready for therapy   QI: Roll Left and Right   Reason if not Attempted Activity not applicable   Roll Left and Right CARE Score 9   QI: Sit to Lying   Reason if not Attempted Activity not applicable   Sit to Lying CARE Score 9   QI: Lying to Sitting on Side of Bed   Reason if not Attempted Activity not applicable   Lying to Sitting on Side of Bed CARE Score 9   QI: Sit to Stand   Assistance Needed Supervision; Adaptive equipment   Sit to Stand CARE Score 4   QI: Chair/Bed-to-Chair Transfer   Assistance Needed Incidental touching;Supervision; Adaptive equipment   Chair/Bed-to-Chair Transfer CARE Score 4   Transfer Bed/Chair/Wheelchair   Limitations Noted In Balance; Endurance;LE Strength   Adaptive Equipment Roller [employer], Chair, Wheelchair Transfer (FIM) 4 - Patient requires steadying assist or light touching   QI: Car Transfer   Reason if not Attempted Activity not applicable   Car Transfer CARE Score 9   QI: Walk 10 Feet   Assistance Needed Supervision; Adaptive equipment; Incidental touching   Walk 10 Feet CARE Score 4   QI: Walk 50 Feet with Two Turns   Reason if not Attempted Activity not applicable   Walk 50 Feet with Two Turns CARE Score 9   QI: Walk 150 Feet   Reason if not Attempted Activity not applicable   Walk 183 Feet CARE Score 9   QI: Walking 10 Feet on Uneven Surfaces   Reason if not Attempted Activity not applicable   Walking 10 Feet on Uneven Surfaces CARE Score 9   Ambulation   Does the patient walk? 2  Yes   Primary Discharge Mode of Locomotion Walk   Walk Assist Level Close Supervision;Contact Guard   Gait Pattern Slow Geetha;Decreased foot clearance;Decreased L stance; Improper weight shift Assist Device Taraer Claire Sandoval Walked (feet) 30 ft   Limitations Noted In Balance; Endurance; Heel Strike; Sequencing;Speed;Strength   Findings amb up ramp on ground floor   Walking (FIM) 1 - Patient ambulates less than 49 feet regardless of assist/device/set up   QI: Wheel 50 Feet with Two Turns   Reason if not Attempted Activity not applicable   Wheel 50 Feet with Two Turns CARE Score 9   QI: Wheel 150 Feet   Reason if not Attempted Activity not applicable   Wheel 835 Feet CARE Score 9   Wheelchair mobility   QI: Does the patient use a wheelchair? 0  No   QI: 1 Step (Curb)   Reason if not Attempted Activity not applicable   1 Step (Curb) CARE Score 9   QI: 4 Steps   Reason if not Attempted Activity not applicable   4 Steps CARE Score 9   QI: 12 Steps   Reason if not Attempted Safety concerns   12 Steps CARE Score 88   Stairs   Type Ramp   Findings pt amb up ramp on ground floor, but unable to amb back down from feeling lightheaded    QI: Picking Up Object   Reason if not Attempted Safety concerns   Picking Up Object CARE Score 88   QI: Toilet Transfer   Reason if not Attempted Activity not applicable   Toilet Transfer CARE Score 9   Therapeutic Interventions   Strengthening seated LAQ and hip add x30 each   Equipment Use   NuStep [id number]   Assessment   Treatment Assessment pt cont to focus on functional mobility to get home safely  pt able to amb up 30' ramp but due to fatigue and feeling lightheaded was unable to amb back down  will try again for progress  pt cont to have increase pain during functional mobility limiting gait distance and safety  pt to cont focus on strengthening, balance, and activity tolerance to progress with functional mobility and Ind  Problem List Decreased strength;Decreased endurance; Impaired balance;Decreased mobility; Decreased safety awareness;Pain   Barriers to Discharge Inaccessible home environment;Decreased caregiver support   PT Barriers   Physical Impairment Decreased strength;Decreased endurance; Impaired balance;Decreased mobility; Decreased safety awareness;Pain   Functional Limitation Walking;Ramp negotiation;Car transfers   Plan   Treatment/Interventions Functional transfer training;LE strengthening/ROM; Endurance training;Bed mobility;Gait training   Progress Progressing toward goals   Recommendation   Recommendation Outpatient PT   PT Therapy Minutes   PT Time In 1300   PT Time Out 1400   PT Total Time (minutes) 60   PT Mode of treatment - Individual (minutes) 0   PT Mode of treatment - Concurrent (minutes) 60   PT Mode of treatment - Group (minutes) 0   PT Mode of treatment - Co-treat (minutes) 0   PT Mode of Teatment - Total time(minutes) 60 minutes   Therapy Time missed   Time missed?  No

## 2018-04-17 NOTE — PCC OCCUPATIONAL THERAPY
Pt continues to make progress towards LTGs  She continues to present with dec standing tolerance, dec stand balance, dec endurance, and dec short term memory  Pt currently functioning at Supervision level for ADL tasks  Pt states she has a supportive family who can A with IADL tasks as needed  Pt will continue to benefit from skilled OT services with focus on noted deficits to reach Gloria goals

## 2018-04-17 NOTE — PROGRESS NOTES
Internal Medicine Progress Note  Patient: Brielle Bearden  Age/sex: 82 y o  female  Medical Record #: [id number]      ASSESSMENT/PLAN:  Brielle Bearden is seen and examined and management for following issues:    1  Left Intertrochanteric femur fx s/p IMN on 4/8/18 ([place]):  Pain control per primary team  WBAT  Has edema of left thigh which was uncomfortable = using thigh high SINGH on that leg and has improved    2  ABLA; s/p 2 units PRBC: hemoglobin down to 7 5 on 7 13/18 = repeat done and was same; no sx so did not transfuse  Stable today so will continue to watch  3   Chronic diastolic heart failure: Bumex had been on hold  Resumed Bumex 0 5mg daily 4/14/18; no dizziness/SOB    4   CAD hx Stent x 2 circumflex '11: continue ASA 81mg, Atenolol and Lipitor    5  HTN: stable on home regimen of Norvasc 5mg daily and Atenolol 25mg daily  6   Hyperthyroidism: continue Methimazole; Repeat TSH/T4 in 4 weeks  Follow up with endocrinology    7  Reactive airway disease: No signs of acute flare  Continue Allegra and Singulair    8  DVT prophylaxis: Lovenox 40mg daily    9  Hx chronic large granular lymphocytic leukemia:  Sees Dr Carola Kapadia as OP; has not required tx    10  Hyperparathyroidism:  Calcium/Vit D; OP followup with Endocrine        Subjective: Patient seen and examined   No new or overnight issues     ROS:   GI: denies abdominal pain, change bowel habits or reflux symptoms  Neuro: No new neurologic changes  Respiratory: No Cough, SOB  Cardiovascular: No CP, palpitations     Scheduled Meds:    Current Facility-Administered Medications:  acetaminophen 975 mg Oral Q8H [facility] Poonam Mosley MD   amLODIPine 5 mg Oral Daily Poonam Mosley MD   aspirin 81 mg Oral Daily Poonam Mosley MD   atenolol 25 mg Oral Daily Poonam Mosley MD   atorvastatin 40 mg Oral After Steve Lemon MD   bisacodyl 10 mg Rectal Daily PRN Poonam Mosley MD   bumetanide 0 5 mg Oral Daily Tom Sorensne PA-C   calcium carbonate 1 tablet Oral Daily With Breakfast Monica Reyes MD   cholecalciferol 2,000 Units Oral Daily Monica Reyes, MD   docusate sodium 100 mg Oral BID Monica Reyes MD   DULoxetine 30 mg Oral Daily Monica Reyes MD   enoxaparin 40 mg Subcutaneous Daily Monica Reyes MD   methimazole 5 mg Oral Daily Monica Reyes MD   oxyCODONE 10 mg Oral Q4H PRN Monica Reyes, MD   oxyCODONE 5 mg Oral Q4H PRN Monica Reyes, MD   pantoprazole 40 mg Oral Early Morning Monica Reyes, MD   polyethylene glycol 17 g Oral Daily PRN Monica Reyes MD       Labs:       Results from last 7 days  Lab Units 04/16/18  0513 04/13/18  0949   WBC Thousand/uL 4 68 4 85   HEMOGLOBIN g/dL 7 7* 7 5*   HEMATOCRIT % 24 0* 23 5*   PLATELETS Thousands/uL 295 166       Results from last 7 days  Lab Units 04/16/18  0513 04/13/18  0525   SODIUM mmol/L 138 139   POTASSIUM mmol/L 4 3 3 9   CHLORIDE mmol/L 103 105   CO2 mmol/L 31 30   BUN mg/dL 19 19   CREATININE mg/dL 0 54* 0 60   GLUCOSE RANDOM mg/dL 97 100   CALCIUM mg/dL 8 7 8 4                  Glucose (mg/dL)   Date Value   04/16/2018 97   04/13/2018 100   04/10/2018 118   04/09/2018 117       Labs reviewed    Physical Examination:  Vitals:   Vitals:    04/16/18 2036 04/17/18 0604 04/17/18 0819 04/17/18 0820   BP: 122/58 128/66 126/68 126/68   BP Location: Right arm Left arm     Pulse: 80 84  102   Resp: 16 18     Temp: 98 4 °F (36 9 °C) 98 7 °F (37 1 °C)     TempSrc: Oral Oral     SpO2: 98% 97%     Weight:       Height:           GEN: NAD  HEENT: NC/AT  RESP: BBS w/o crackles/wheeze/rhonci; resp unlabored  CV: +S1 S2, regular rate, no rubs/murmurs  ABD: soft, NT, ND, normal BS   : no valle  EXT: no edema of left lower leg but +edema of left hip and thigh (improving)  Skin: no rashes  Neuro: AAO      [x ] Total time spent: 30 Mins and greater than 50% of this time was spent counseling/coordinating care        Abhijit Brody, [address] [facility]  Internal Medicine

## 2018-04-17 NOTE — PCC PHYSICAL THERAPY
Pt functioning at CS/CGA with RW  Pt cont to have decrease strength, safety and increase in pain limiting overall activity tolerance  Due to pt living alone, pt cont to need skilled PT to improve deficits to progress to mod I for safe d/c  Home  Pt will need RW for d/c home      Pt cont to show progress towards mod I goals with RW  Pt cont to be limited due to weakness and activity tolerance due to pain  Pt to cont with skilled PT to progress with functional mobility and Ind for safe d/c home

## 2018-04-17 NOTE — PCC CARE MANAGEMENT
Pt is motivated and participating well with therapy  Pt expects to return home and is knowledgeable of both hhc and outpt services  Following to assist w/dc planning needs

## 2018-04-17 NOTE — PROGRESS NOTES
04/17/18 1100   Pain Assessment   Pain Assessment 0-10   Pain Score 3   Pain Type Acute pain   Pain Location Hip   Pain Orientation Left   Hospital Pain Intervention(s) Medication (See MAR)   Restrictions/Precautions   Precautions Fall Risk   Subjective   Subjective pt c/o some pain but feeling ok start of session   QI: Roll Left and Right   Reason if not Attempted Activity not applicable   Roll Left and Right CARE Score 9   QI: Sit to Lying   Assistance Needed Verbal cues; Incidental touching; Adaptive equipment   Comment practiced with leg    Sit to Lying CARE Score 4   QI: Lying to Sitting on Side of Bed   Assistance Needed Incidental touching;Verbal cues; Adaptive equipment   Comment with leg    Lying to Sitting on Side of Bed CARE Score 4   QI: Sit to Stand   Assistance Needed Supervision; Adaptive equipment   Sit to Stand CARE Score 4   QI: Chair/Bed-to-Chair Transfer   Assistance Needed Incidental touching;Supervision; Adaptive equipment   Assistance Provided by Sinclair No physical assistance   Comment RW; CS/CGA   Chair/Bed-to-Chair Transfer CARE Score 4   Transfer Bed/Chair/Wheelchair   Limitations Noted In Balance; Endurance;LE Strength;UE Strength   Adaptive Equipment Roller Walker   Stand Pivot Supervision;Contact Guard   Sit to TXU Ian   Stand to Sit Supervision   Supine to [facility]   Sit to Supine Contact Guard   Findings practiced bed txs with leg    Bed, Chair, Wheelchair Transfer (FIM) 4 - Patient requires steadying assist or light touching   QI: Car Transfer   Reason if not Attempted Activity not applicable   Car Transfer CARE Score 9   QI: Walk 10 Feet   Assistance Needed Supervision; Adaptive equipment; Incidental touching   Walk 10 Feet CARE Score 4   QI: Walk 50 Feet with Two Turns   Reason if not Attempted Activity not applicable   Walk 50 Feet with Two Turns CARE Score 9   QI: Walk 150 Feet   Reason if not Attempted Activity not applicable   Walk 505 Feet CARE Score 9   QI: Walking 10 Feet on Uneven Surfaces   Reason if not Attempted Activity not applicable   Walking 10 Feet on Uneven Surfaces CARE Score 9   Ambulation   Does the patient walk? 2  Yes   Primary Discharge Mode of Locomotion Walk   Walk Assist Level Close Supervision;Contact Guard   Gait Pattern Slow Geetha;Decreased foot clearance;Decreased L stance; Improper weight shift   Assist Device Roller Claire Sandoval Walked (feet) 10 ft  (x2)   Limitations Noted In Balance; Endurance; Heel Strike;Speed;Strength   Findings only short distance in gym, did not assess further distances   Walking (FIM) 1 - Patient ambulates less than 49 feet regardless of assist/device/set up   QI: Wheel 50 Feet with Two Turns   Reason if not Attempted Activity not applicable   Wheel 50 Feet with Two Turns CARE Score 9   QI: Wheel 150 Feet   Reason if not Attempted Activity not applicable   Wheel 901 Feet CARE Score 9   Wheelchair mobility   QI: Does the patient use a wheelchair? 0  No   Wheelchair (FIM) 0 - Activity does not occur   QI: 1 Step (Curb)   Reason if not Attempted Activity not applicable   1 Step (Curb) CARE Score 9   QI: 4 Steps   Reason if not Attempted Activity not applicable   4 Steps CARE Score 9   QI: 12 Steps   Reason if not Attempted Activity not applicable   12 Steps CARE Score 9   QI: Picking Up Object   Reason if not Attempted Safety concerns   Picking Up Object CARE Score 88   QI: Toilet Transfer   Reason if not Attempted Activity not applicable   Toilet Transfer CARE Score 9   Therapeutic Interventions   Strengthening supine heel slides LLE x20, AAROM, hip abd supine x20 AAROM   Assessment   Treatment Assessment session focused on improving bed txs with use of leg   pt practiced several times and by end of session pt able to perform at Sup level but requires increase time to perform  reviewed exs to perform in room to improve bed txs    pt to cont focus on strength, endurance and balance to progress with functional mobility and Ind   Problem List Decreased strength;Decreased endurance; Impaired balance;Decreased mobility; Decreased safety awareness;Pain   Barriers to Discharge Inaccessible home environment;Decreased caregiver support   PT Barriers   Physical Impairment Decreased strength;Decreased endurance; Impaired balance;Decreased mobility; Decreased safety awareness;Pain   Functional Limitation Walking;Ramp negotiation;Car transfers   Plan   Treatment/Interventions Functional transfer training;LE strengthening/ROM; Endurance training;Gait training;Bed mobility   Progress Progressing toward goals   Recommendation   Recommendation Outpatient PT; Home with family support   PT Therapy Minutes   PT Time In 1100   PT Time Out 1130   PT Total Time (minutes) 30   PT Mode of treatment - Individual (minutes) 30   PT Mode of treatment - Concurrent (minutes) 0   PT Mode of treatment - Group (minutes) 0   PT Mode of treatment - Co-treat (minutes) 0   PT Mode of Teatment - Total time(minutes) 30 minutes   Therapy Time missed   Time missed?  No

## 2018-04-18 PROCEDURE — 97110 THERAPEUTIC EXERCISES: CPT

## 2018-04-18 PROCEDURE — 97116 GAIT TRAINING THERAPY: CPT

## 2018-04-18 PROCEDURE — 99233 SBSQ HOSP IP/OBS HIGH 50: CPT | Performed by: PHYSICAL MEDICINE & REHABILITATION

## 2018-04-18 PROCEDURE — 97530 THERAPEUTIC ACTIVITIES: CPT

## 2018-04-18 RX ADMIN — ACETAMINOPHEN 975 MG: 325 TABLET, FILM COATED ORAL at 21:06

## 2018-04-18 RX ADMIN — DOCUSATE SODIUM 100 MG: 100 CAPSULE, LIQUID FILLED ORAL at 09:07

## 2018-04-18 RX ADMIN — BUMETANIDE 0.5 MG: 1 TABLET ORAL at 09:12

## 2018-04-18 RX ADMIN — PANTOPRAZOLE SODIUM 40 MG: 40 TABLET, DELAYED RELEASE ORAL at 05:55

## 2018-04-18 RX ADMIN — ACETAMINOPHEN 975 MG: 325 TABLET, FILM COATED ORAL at 13:20

## 2018-04-18 RX ADMIN — ENOXAPARIN SODIUM 40 MG: 40 INJECTION SUBCUTANEOUS at 09:07

## 2018-04-18 RX ADMIN — DOCUSATE SODIUM 100 MG: 100 CAPSULE, LIQUID FILLED ORAL at 17:10

## 2018-04-18 RX ADMIN — DULOXETINE HYDROCHLORIDE 30 MG: 30 CAPSULE, DELAYED RELEASE ORAL at 09:12

## 2018-04-18 RX ADMIN — ASPIRIN 81 MG 81 MG: 81 TABLET ORAL at 09:07

## 2018-04-18 RX ADMIN — ATORVASTATIN CALCIUM 40 MG: 40 TABLET, FILM COATED ORAL at 17:10

## 2018-04-18 RX ADMIN — ATENOLOL 25 MG: 25 TABLET ORAL at 10:11

## 2018-04-18 RX ADMIN — OXYCODONE HYDROCHLORIDE 10 MG: 10 TABLET ORAL at 09:10

## 2018-04-18 RX ADMIN — ACETAMINOPHEN 975 MG: 325 TABLET, FILM COATED ORAL at 05:55

## 2018-04-18 RX ADMIN — Medication 1 TABLET: at 09:07

## 2018-04-18 RX ADMIN — AMLODIPINE BESYLATE 5 MG: 5 TABLET ORAL at 10:11

## 2018-04-18 RX ADMIN — METHIMAZOLE 5 MG: 5 TABLET ORAL at 09:12

## 2018-04-18 RX ADMIN — VITAMIN D, TAB 1000IU (100/BT) 2000 UNITS: 25 TAB at 09:07

## 2018-04-18 NOTE — PCC NURSING
Pt admit to [facility] due to fall at home in shower, pt had Left intertrochanteric hip fx s/p IM nail fixation by Dr Renetta Sepulveda on 4/8  WBAT LLE; 3 Incisions- steri strips on one incisions, all DOMINIQUE, healing  Pain managed with tylenol and oxycodone  PMH: Hyperparathyroidism, hyperthyroidism, bladder CA, CLL, CAD, HTN, GERD, seasonal allergies, grade I DD w/chronic LE edema, depression, osteoporosis, hyponatremia, anemia, thrombocytopenia  Hyperthyroidism- managed with TAPAZOLE 5mg daily  HTN managed with Norvasc and Atenolol  C/O constipation- disimpacted by nursing and given bowel meds  This week we will continue to  monitor labs & VS  We will work on pain management, prevent skin breakdown and promote incisional healing  We will work on bowel management  We will work on safety with transfers and keep pt free from falls

## 2018-04-18 NOTE — NUTRITION
04/18/18 1633   Recommendations/Interventions   Nutrition Recommendations Other (specify)  (suggest diet change to 2gNa)

## 2018-04-18 NOTE — SOCIAL WORK
Met w/pt and reviewed team update and barriers to dc  Pt in agreement that she is not ready to return home  Following to assist w/dc planning needs after next team mtg

## 2018-04-18 NOTE — PROGRESS NOTES
Progress Note - Juanetta Lesches 82 y o  female MRN: [id number]    Unit/Bed#: -01 Encounter: [id number]            Subjective:   D/W pt continued rehab stay to improve function and pt agreeable     Objective:     ROS  Gen: denies recent wt loss   Psych: denies mood change    Vitals: Blood pressure 138/60, pulse 85, temperature 97 5 °F (36 4 °C), temperature source Oral, resp  rate 18, height 5' 1" (1 549 m), weight 59 kg (130 lb 1 1 oz), SpO2 99 %      Physical Exam:     Gen:        NAD   Neck:   trachea midline  Lungs:  respirations unlabored   Heart:    + S1 and S2   Abdomen:    Soft, non-tender  Extremities: + B/L LE edema L> R  Psych: mood/affect appropriate  Neurologic: awake, alert, appropriately answering questions     Functional :  Mobility: CG-sup  Tx: CG  ADLs: min-sup        Current Facility-Administered Medications:  acetaminophen 975 mg Oral Q8H Aman Gloria MD   amLODIPine 5 mg Oral Daily Shona Jimenez MD   aspirin 81 mg Oral Daily Shona Jimenez MD   atenolol 25 mg Oral Daily Shona Jimenez MD   atorvastatin 40 mg Oral After Eloisa Dominguez MD   bisacodyl 10 mg Rectal Daily PRN Shona Jimenez MD   bumetanide 0 5 mg Oral Daily Chetan López PA-C   calcium carbonate 1 tablet Oral Daily With Breakfast Shona Jimenez MD   cholecalciferol 2,000 Units Oral Daily Shona Jimenez MD   docusate sodium 100 mg Oral BID Shona Jimenez MD   DULoxetine 30 mg Oral Daily Shona Jimenez MD   enoxaparin 40 mg Subcutaneous Daily Shona Jimenez MD   methimazole 5 mg Oral Daily Shona Jimenez MD   oxyCODONE 10 mg Oral Q4H PRN Shona Jimenez MD   oxyCODONE 5 mg Oral Q4H PRN Shona Jimenez MD   pantoprazole 40 mg Oral Early Morning Shona Jimenez MD   polyethylene glycol 17 g Oral Daily PRN Shona Jimenez MD       bisacodyl    oxyCODONE    oxyCODONE    polyethylene glycol      Assessment:  Pt is 83 yo female with L IT fx s/p IM nail fixation by Dr Sukhdev Rush on 4/8    Plan:    Rehabilitation: cont PT/OT for ambulatory/ADL dysfxn    L IT fx: s/p IM nail fixation by Dr Warren Roblero on 4/8 (SPEP/UPEP, vit D level WNL)        Hyperparathyroidism: on home vit D 2000 units QD, d/w endocrine and recommended starting pt on 1000 mg calcium carbonate qd (1250 mg formulation per tab is what is on formulary)      Hyperthyroidism: elevated thyroid microsomal antibody, elevated thyroid stimulating immunoglobulin, decreased TSH, elevated free T4, likely autoimmune etiology, seen by endocrine in acute care and started on tapazole and have recommended repeat free T4 in 4 wks and FU in 6 wks with endocrine     bladder CA: CT A/P of 3/26/18 did not reveal any findings to suggest recurrent or new urogenital neoplasm; follows with Dr Ana Cali     CLL: follows with Dr Lucy Funes who per last visit note felt pt was hematologically stable      CAD: s/p stents, on home regimen of ASA 81 mg qd, atenolol 25 mg qd, and lipitor 40 mg qPM; OP FU with Dr Foreign Milian     HTN: on home regimen of norvasc 5 mg qd and atenolol 25 mg qd      GERD: on home dose of protonix 40 mg qd (per pt will occasionally take BID if she feels it is very bad that day)      seasonal allergies: at home takes prn allegra and prn singulair; currently asymptomatic per pt     grade I DD w/chronic LE edema: per IM recs in acute care pt resumed by IM on home bumex 0 5 mg qd      Depression: on home cymbalta 30 mg qd      Osteoporosis: on home vitamin D 2000 units qd (vit D level of 4/9 WNL at 47 9), also on prolia as OP, managed by her rheumatologist, seen by endocrine and recommend FU with her rheumatologist for further management and DEXA scan at her rheumatologist discretion (SPEP/UPEP w/o monoclonal bands, vit D level WNL)       Anemia: ABLA, Hg currently stable at 7 7, asymptomatic, not tachycardic or hypotensive & RR/POx 18 / 99%; per d/w IM monitor at this time, t/c transfusion for any further decrease in Hg        DVT ppx: SCDs, lovenox x 28 days post-op per ortho  Dispo: reteam     Incidental findings of CT A/P of 3/26/18:  1) b/l renal cysts: per report unchanged compared to previous study of 9/25/17  2) rt renal nodules: per report unchanged compared to previous study of 9/25/17  3) hepatic cysts: per report unchanged compared to previous study of 9/25/17  4) pancreatic cysts: per report unchanged compared to previous study of 9/25/17  5) ventral hernia containing adipose: OP FU w/PCP     Other incidental findings:  6) PVCs: OP FU with Dr Álvaro Ann (pt's cardiologist)  7) elevated peak PA pressure: OP FU with Dr Álvaro Ann (pt's cardiologist)     This patient was discussed by the Interdisciplinary Team in weekly case conference today  The care of the patient was extensively discussed with all care providers and an appropriate rehabilitation plan was formulated unique for this patient  Barriers were identified preventing progression of therapy and appropriate interventions were discussed with each discipline  Please see the team note for input from all disciplines regarding barriers, intervention, and discharge planning      [ x ] Total time spent: 45 Mins, and greater than 50% of this time was spent counseling/coordinating care

## 2018-04-18 NOTE — TEAM CONFERENCE
Acute RehabilitationTeam Conference Note  Date: 4/18/2018   Time: 11:32 AM       Patient Name:  Callum Madison       Medical Record Number: [id number]   YOB: 1935  Sex: Female          Room/Bed:  [facility]-01  Payor Info:  Payor: Marielena Pavon / Plan: MEDICARE A AND B / Product Type: Medicare A & B Fee for Service /      Admitting Diagnosis: Displaced apophyseal fracture of left femur, initial encounter for closed fracture [S72 132A]   Admit Date/Time:  4/12/2018  1:58 PM  Admission Comments: No comment available     Primary Diagnosis:  Intertrochanteric fracture of left femur, closed, with routine healing, subsequent encounter  Principal Problem: Intertrochanteric fracture of left femur, closed, with routine healing, subsequent encounter    Patient Active Problem List    Diagnosis Date Noted    Intertrochanteric fracture of left femur, closed, with routine healing, subsequent encounter 04/12/2018    Hyperthyroidism 04/10/2018    Osteoporosis 04/10/2018    Acute blood loss anemia 04/09/2018    Chronic diastolic [facility]congestive) heart failure ([facility] [place] [facility] ) 04/08/2018    Closed left hip fracture ([facility] ) 04/07/2018    Closed fracture of left hip ([facility] ) 04/07/2018    Hyperlipidemia 04/07/2018    Coronary artery disease without angina pectoris - S/P stent placement x 2 (2011) 04/07/2018    Gastroesophageal reflux disease without esophagitis 04/07/2018    Essential hypertension 03/19/2018    Medication side effect, initial encounter 03/19/2018       Physical Therapy:    Weight Bearing Status: Weight Bearing as Tolerated  Transfers: Contact Guard  Bed Mobility: Contact Guard  Amulation Distance (ft): 50 feet  Ambulation: Supervision, Contact Guard  Assistive Device for Ambulation: Roller Walker  Ramp: Minimal Assistance  Assistive Device for Ramp: Roller Walker  Discharge Recommendations: Home with:  [address] with[de-identified] Outpatient Physical Therapy    Pt functioning at /CGA with RW    Pt cont to have decrease strength, safety and increase in pain limiting overall activity tolerance  Due to pt living alone, pt cont to need skilled PT to improve deficits to progress to mod I for safe d/c  Home  Pt will need RW for d/c home      Occupational Therapy:  Eating: Independent  Grooming: Supervision  Bathing: Supervision  Bathing: Supervision  Upper Body Dressing: Supervision  Lower Body Dressing: Minimal Assistance  Toileting: Minimal Assistance  Tub/Shower Transfer: Minimal Assistance  Toilet Transfer: Minimal Assistance  Cognition: Exceptions to WNL  Cognition: Decreased Memory  Orientation: Person, Place, Time, Situation  Discharge Recommendations: Home with:  [address] with[de-identified] Family Support       Pt making progress towards LTGs  She continues to present with dec standing tolerance, dec standing balance, dec endurance, dec short term memory, and dec strength  Pt currently functioning at [address] level for ADL tasks  Pt will continue to benefit from skilled OT services with focus on noted deficits to reach Gloria goals  Speech Therapy:           No notes on file    Nursing Notes:  Appetite: Fair  Diet Type: Regular/House                      Diet Patient/Family Education Complete: No    Type of Wound (LDA): Incision           Incision 04/08/18 Hip Left (Active)   Incision Description Clean;Dry; Intact 4/17/2018  3:19 PM   Niyah-wound Assessment Clean;Dry; Intact 4/17/2018  3:19 PM   Closure Staples 4/17/2018  3:19 PM   Drainage Amount None 4/17/2018  3:19 PM   Drainage Description Serosanguineous 4/14/2018  9:07 AM   Treatments Site care;Cleansed 4/17/2018  8:11 AM   Dressing Open to air 4/17/2018  3:19 PM   Dressing Changed New dressing applied 4/14/2018  9:07 AM   Patient Tolerance Tolerated well 4/17/2018  3:19 PM   Dressing Status Clean;Dry; Intact 4/15/2018  7:50 PM           Type of Wound Patient/Family Education: No  Bladder: 6 - Modified [place]     Bladder Patient/Family Education: No  Bowel: 6 - Modified [place] Bowel Patient/Family Education: No  Pain Location: Leg  Pain Orientation: Left  Pain Score: 3                       Hospital Pain Intervention(s): Medication (See MAR), Repositioned  Pain Patient/Family Education: No  Medication Management/Safety  Injectable: Lovenox  Safe Administration: Yes  Medication Patient/Family Education Complete: No    Pt admit to [facility] due to fell at home in shower, pt had Left intertrochanteric hip fx s/p IM nail fixation by Dr Juany Marquis on 4/8  WBAT LLE  3 Incisions- staples; few intact blisters  Proximal incision w/small amount SS drainage, gauze&tegaderm  PMH: Hyperparathyroidism, hyperthyroidism, bladder CA, CLL, CAD, HTN, GERD, seasonal allergies, grade I DD w/chronic LE edema, depression, osteoporosis, hyponatremia, anemia, thrombocytopenia  Hyperthyroidism- managed with TAPAZOLE 5mg daily  4/16 Hgb 7 7  pt is Min A SPT w/ RW    This week we will monitor LAB ( Hgb), VS, and safety transfer  Free from fall and sepsis  Case Management:     Discharge Planning  Goal Length of Stay: 14  Living Arrangements: Alone  Support Systems: Children, Family members  Assistance Needed: TBD  Type of Current Residence: Private residence  Current Home Care Services: No  Pt is motivated and participating well with therapy  Pt expects to return home and is knowledgeable of both hhc and outpt services  Following to assist w/dc planning needs  Is the patient actively participating in therapies? yes  List any modifications to the treatment plan:     Barriers Interventions   Pain  Pain meds, repositioning   wbs status Therapy exercises   stairs Ramp entry available   Lives alone Family is supportive         Is the patient making expected progress toward goals?  yes  List any update or changes to goals:     Medical Goals: Patient will be medically stable for discharge to [facility] upon completion of rehab program and Patient will be able to manage medical conditions and comorbid conditions with medications and follow up upon completion of rehab program    Weekly Team Goals:   Rehab Team Goals  ADL Team Goal: Patient will be independent with ADLs with least restrictive device upon completion of rehab program  Transfer Team Goal: Patient will be independent with transfers with least restrictive device upon completion of rehab program  Locomotion Team Goal: Patient will be independent with locomotion with least restrictive device upon completion of rehab program  Cognitive Team Goal: Patient will return to premorbid level of cognitive activity upon completion of rehab program    Discussion: in attendance to review pts progress is rn pt ot cm and physician  Pt is making progress but is limited by wb status an dpain  Pt resides alone and will hav efamily to assist at nihgt and weekends  Pt is currently max to total lower body adls, and max for toileting  Pt is ambulating at close supervision for household distances       Anticipated Discharge Date:  reteam

## 2018-04-18 NOTE — PROGRESS NOTES
04/18/18 0830   Pain Assessment   Pain Assessment 0-10   Pain Score 5   Pain Type Surgical pain   Pain Location Leg   Pain Orientation Left   Restrictions/Precautions   Precautions Fall Risk;Pain   Weight Bearing Restrictions Yes   LLE Weight Bearing Per Order WBAT   ROM Restrictions No   QI: Sit to Stand   Assistance Needed Supervision   Assistance Provided by [place] No physical assistance   Sit to Stand CARE Score 4   QI: Chair/Bed-to-Chair Transfer   Assistance Needed Incidental touching   Assistance Provided by [place] No physical assistance   Comment CGA with RW  Chair/Bed-to-Chair Transfer CARE Score 4   Transfer Bed/Chair/Wheelchair   Limitations Noted In Balance; Endurance;Pain Management;LE Strength;UE Strength   Adaptive Equipment Roller Walker   Bed, Chair, Wheelchair Transfer (FIM) 4 - Patient requires steadying assist or light touching  (Simultaneous filing  User may not have seen previous data )   Functional Standing Tolerance   Time 10 min 20 seconds / 6 min 26 seconds   Activity pt completed table top card matching activity in stance  Pt also completed table top bingo in stance  Exercise Tools   Other Exercise Tool 1 Pt completed 3x10 BUE 2# dowel bar ex in all planes to promote UE strength/ ROM to increase independence with functional transfers  Cognition   Overall Cognitive Status Impaired   Arousal/Participation Alert; Cooperative   Attention Within functional limits   Orientation Level Oriented X4   Memory Within functional limits   Following Commands Follows multistep commands with increased time or repetition   Activity Tolerance   Activity Tolerance Patient limited by fatigue   Assessment   Treatment Assessment Pt seen by skilled OT services to work on functional transfers with Rw, UE/LE strength, standing tolerance, ROM endurance, and safety  Pt engaged in table top card matching activity in stance with support of RW for continuous 10 min and 20 secs   Pt required occasional v/c's to maintain proper base of support in stance due to notation of LLE gradually rotating inward  Pt then completed functional reach activity unsupported on EOM to promote core strengthening to increase independence with ADL tasks  Pt showed good tolerance with unsupported sit and reach saying she "was surprised she could do it " Pt then engaged in table top bingo activity seated while functionally reaching for pieces and in supported stance for 6 min 26 sec  Pt cont to be limited by strength, endurance, standing tolerance, and balance from wt bearing precautions  Recommend that pt cont to work on ADL tasks, functional transfers, strengthening, dynamic reaching, and ROM  Prognosis Good   Problem List Decreased strength;Decreased endurance; Impaired balance;Decreased mobility; Decreased safety awareness;Pain   Barriers to Discharge Inaccessible home environment;Decreased caregiver support   Plan   Treatment/Interventions Functional transfer training;LE strengthening/ROM; Therapeutic exercise; Endurance training;Equipment eval/education; Compensatory technique education; ADL retraining   Progress Progressing toward goals   Recommendation   OT Discharge Recommendation Home with family support   OT Therapy Minutes   OT Time In 0830   OT Time Out 1000   OT Total Time (minutes) 90   OT Mode of treatment - Individual (minutes) 30   OT Mode of treatment - Concurrent (minutes) 60   OT Mode of treatment - Group (minutes) 0   OT Mode of treatment - Co-treat (minutes) 0   OT Mode of Teatment - Total time(minutes) 90 minutes   Therapy Time missed   Time missed?  No

## 2018-04-18 NOTE — PLAN OF CARE
Discharge to home or other facility with appropriate resources Progressing      Absence or prevention of progression during hospitalization Progressing      Verbalizes/displays adequate comfort level or baseline comfort level Progressing      Patient will remain free of falls Progressing      Skin integrity is maintained or improved Progressing      Maintain or return to baseline ADL function Progressing      Maintain or return mobility status to optimal level Progressing

## 2018-04-18 NOTE — PROGRESS NOTES
Internal Medicine Progress Note  Patient: Nicole Sheets  Age/sex: 82 y o  female  Medical Record #: [id number]      ASSESSMENT/PLAN:  Nicole Sheets is seen and examined and management for following issues:    1  Left Intertrochanteric femur fx s/p IMN on 4/8/18 ([place]):  Pain control per primary team  WBAT  Has edema of left thigh = using thigh high SINGH on that leg and has improved    2  ABLA; s/p 2 units PRBC: hemoglobin down to 7 5 on 7 13/18 = repeat done and was same; no sx so did not transfuse  Stable - will continue to watch  3   Chronic diastolic heart failure: Bumex had been on hold  Resumed Bumex 0 5mg daily 4/14/18; no dizziness/SOB    4   CAD hx Stent x 2 circumflex '11: continue ASA 81mg, Atenolol and Lipitor    5  HTN: stable on home regimen of Norvasc 5mg daily and Atenolol 25mg daily  6   Hyperthyroidism: continue Methimazole; Repeat TSH/T4 in 4 weeks  Follow up with endocrinology    7  Reactive airway disease: No signs of acute flare  Continue Allegra and Singulair    8  DVT prophylaxis: Lovenox 40mg daily    9  Hx chronic large granular lymphocytic leukemia:  Sees Dr Colten Eldridge as OP; has not required tx    10  Hyperparathyroidism:  Calcium/Vit D; OP followup with Endocrine        Subjective: Patient seen and examined   No new or overnight issues     ROS:   GI: denies abdominal pain, change bowel habits or reflux symptoms  Neuro: No new neurologic changes  Respiratory: No Cough, SOB  Cardiovascular: No CP, palpitations     Scheduled Meds:    Current Facility-Administered Medications:  acetaminophen 975 mg Oral Q8H [address] Patricia Padilla MD   amLODIPine 5 mg Oral Daily Patricia Padilla MD   aspirin 81 mg Oral Daily Patricia Padilla MD   atenolol 25 mg Oral Daily Patricia Padilla MD   atorvastatin 40 mg Oral After Marlene Jarrell MD   bisacodyl 10 mg Rectal Daily PRN Patricia Padilla MD   bumetanide 0 5 mg Oral Daily Concepcion Reed PA-C   calcium carbonate 1 tablet Oral Daily With Breakfast Marcio Manriquez MD   cholecalciferol 2,000 Units Oral Daily Dwayne Chadwick MD   docusate sodium 100 mg Oral BID Dwayne Chadwick MD   DULoxetine 30 mg Oral Daily Dwayne Chadwick MD   enoxaparin 40 mg Subcutaneous Daily Dwayne Chadwick MD   methimazole 5 mg Oral Daily Dwayne Chadwick MD   oxyCODONE 10 mg Oral Q4H PRN Dwayne Chadwick MD   oxyCODONE 5 mg Oral Q4H PRN Dwayne Chadwick MD   pantoprazole 40 mg Oral Early Morning Dwayne Chadwick MD   polyethylene glycol 17 g Oral Daily PRN Dwayne Chadwick MD       Labs:       Results from last 7 days  Lab Units 04/16/18  0513 04/13/18  0949   WBC Thousand/uL 4 68 4 85   HEMOGLOBIN g/dL 7 7* 7 5*   HEMATOCRIT % 24 0* 23 5*   PLATELETS Thousands/uL 295 166       Results from last 7 days  Lab Units 04/16/18  0513 04/13/18  0525   SODIUM mmol/L 138 139   POTASSIUM mmol/L 4 3 3 9   CHLORIDE mmol/L 103 105   CO2 mmol/L 31 30   BUN mg/dL 19 19   CREATININE mg/dL 0 54* 0 60   GLUCOSE RANDOM mg/dL 97 100   CALCIUM mg/dL 8 7 8 4                  Glucose (mg/dL)   Date Value   04/16/2018 97   04/13/2018 100   04/10/2018 118   04/09/2018 117       Labs reviewed    Physical Examination:  Vitals:   Vitals:    04/17/18 0820 04/17/18 1347 04/17/18 2100 04/18/18 0528   BP: 126/68 144/63 133/62 133/61   BP Location:  Right arm Left arm Right arm   Pulse: 102 86 83 83   Resp:  18 18 18   Temp:  97 7 °F (36 5 °C) 97 9 °F (36 6 °C) 99 °F (37 2 °C)   TempSrc:  Oral Oral Oral   SpO2:   99% 98%   Weight:    59 kg (130 lb 1 1 oz)   Height:           GEN: NAD  HEENT: NC/AT  RESP: BBS w/o crackles/wheeze/rhonci; resp unlabored  CV: +S1 S2, regular rate, no rubs/murmurs  ABD: soft, NT, ND, normal BS   : no valle  EXT: no edema of left lower leg but +edema of left hip and thigh (improving)  Skin: no rashes  Neuro: AAO      [x ] Total time spent: 30 Mins and greater than 50% of this time was spent counseling/coordinating care        Yecenia Mars   Internal Medicine

## 2018-04-19 LAB
ANION GAP SERPL CALCULATED.3IONS-SCNC: 5 MMOL/L (ref 4–13)
BASOPHILS # BLD AUTO: 0.02 THOUSANDS/ΜL (ref 0–0.1)
BASOPHILS NFR BLD AUTO: 0 % (ref 0–1)
BUN SERPL-MCNC: 17 MG/DL (ref 5–25)
CALCIUM SERPL-MCNC: 8.8 MG/DL (ref 8.3–10.1)
CHLORIDE SERPL-SCNC: 105 MMOL/L (ref 100–108)
CO2 SERPL-SCNC: 28 MMOL/L (ref 21–32)
CREAT SERPL-MCNC: 0.56 MG/DL (ref 0.6–1.3)
EOSINOPHIL # BLD AUTO: 0.27 THOUSAND/ΜL (ref 0–0.61)
EOSINOPHIL NFR BLD AUTO: 5 % (ref 0–6)
ERYTHROCYTE [DISTWIDTH] IN BLOOD BY AUTOMATED COUNT: 16.3 % (ref 11.6–15.1)
GFR SERPL CREATININE-BSD FRML MDRD: 88 ML/MIN/1.73SQ M
GLUCOSE SERPL-MCNC: 96 MG/DL (ref 65–140)
HCT VFR BLD AUTO: 26 % (ref 34.8–46.1)
HGB BLD-MCNC: 8.4 G/DL (ref 11.5–15.4)
LYMPHOCYTES # BLD AUTO: 1.56 THOUSANDS/ΜL (ref 0.6–4.47)
LYMPHOCYTES NFR BLD AUTO: 31 % (ref 14–44)
MCH RBC QN AUTO: 31.5 PG (ref 26.8–34.3)
MCHC RBC AUTO-ENTMCNC: 32.3 G/DL (ref 31.4–37.4)
MCV RBC AUTO: 97 FL (ref 82–98)
MONOCYTES # BLD AUTO: 0.51 THOUSAND/ΜL (ref 0.17–1.22)
MONOCYTES NFR BLD AUTO: 10 % (ref 4–12)
NEUTROPHILS # BLD AUTO: 2.66 THOUSANDS/ΜL (ref 1.85–7.62)
NEUTS SEG NFR BLD AUTO: 54 % (ref 43–75)
NRBC BLD AUTO-RTO: 0 /100 WBCS
PLATELET # BLD AUTO: 375 THOUSANDS/UL (ref 149–390)
PMV BLD AUTO: 8.6 FL (ref 8.9–12.7)
POTASSIUM SERPL-SCNC: 4.1 MMOL/L (ref 3.5–5.3)
RBC # BLD AUTO: 2.67 MILLION/UL (ref 3.81–5.12)
SODIUM SERPL-SCNC: 138 MMOL/L (ref 136–145)
WBC # BLD AUTO: 5.11 THOUSAND/UL (ref 4.31–10.16)

## 2018-04-19 PROCEDURE — 97530 THERAPEUTIC ACTIVITIES: CPT | Performed by: OCCUPATIONAL THERAPY ASSISTANT

## 2018-04-19 PROCEDURE — 99232 SBSQ HOSP IP/OBS MODERATE 35: CPT | Performed by: PHYSICAL MEDICINE & REHABILITATION

## 2018-04-19 PROCEDURE — 80048 BASIC METABOLIC PNL TOTAL CA: CPT | Performed by: NURSE PRACTITIONER

## 2018-04-19 PROCEDURE — 97110 THERAPEUTIC EXERCISES: CPT | Performed by: OCCUPATIONAL THERAPY ASSISTANT

## 2018-04-19 PROCEDURE — 97110 THERAPEUTIC EXERCISES: CPT

## 2018-04-19 PROCEDURE — 97535 SELF CARE MNGMENT TRAINING: CPT | Performed by: OCCUPATIONAL THERAPY ASSISTANT

## 2018-04-19 PROCEDURE — 97530 THERAPEUTIC ACTIVITIES: CPT

## 2018-04-19 PROCEDURE — 85025 COMPLETE CBC W/AUTO DIFF WBC: CPT | Performed by: NURSE PRACTITIONER

## 2018-04-19 PROCEDURE — 97116 GAIT TRAINING THERAPY: CPT

## 2018-04-19 RX ADMIN — ACETAMINOPHEN 975 MG: 325 TABLET, FILM COATED ORAL at 13:32

## 2018-04-19 RX ADMIN — AMLODIPINE BESYLATE 5 MG: 5 TABLET ORAL at 08:02

## 2018-04-19 RX ADMIN — METHIMAZOLE 5 MG: 5 TABLET ORAL at 08:03

## 2018-04-19 RX ADMIN — DULOXETINE HYDROCHLORIDE 30 MG: 30 CAPSULE, DELAYED RELEASE ORAL at 08:03

## 2018-04-19 RX ADMIN — ATORVASTATIN CALCIUM 40 MG: 40 TABLET, FILM COATED ORAL at 17:01

## 2018-04-19 RX ADMIN — BUMETANIDE 0.5 MG: 1 TABLET ORAL at 08:03

## 2018-04-19 RX ADMIN — ASPIRIN 81 MG 81 MG: 81 TABLET ORAL at 08:02

## 2018-04-19 RX ADMIN — ATENOLOL 25 MG: 25 TABLET ORAL at 08:01

## 2018-04-19 RX ADMIN — Medication 1 TABLET: at 08:01

## 2018-04-19 RX ADMIN — ACETAMINOPHEN 975 MG: 325 TABLET, FILM COATED ORAL at 05:35

## 2018-04-19 RX ADMIN — ENOXAPARIN SODIUM 40 MG: 40 INJECTION SUBCUTANEOUS at 08:03

## 2018-04-19 RX ADMIN — VITAMIN D, TAB 1000IU (100/BT) 2000 UNITS: 25 TAB at 08:02

## 2018-04-19 RX ADMIN — OXYCODONE HYDROCHLORIDE 10 MG: 10 TABLET ORAL at 14:24

## 2018-04-19 RX ADMIN — DOCUSATE SODIUM 100 MG: 100 CAPSULE, LIQUID FILLED ORAL at 08:02

## 2018-04-19 RX ADMIN — DOCUSATE SODIUM 100 MG: 100 CAPSULE, LIQUID FILLED ORAL at 17:01

## 2018-04-19 RX ADMIN — OXYCODONE HYDROCHLORIDE 10 MG: 10 TABLET ORAL at 08:00

## 2018-04-19 RX ADMIN — ACETAMINOPHEN 975 MG: 325 TABLET, FILM COATED ORAL at 21:05

## 2018-04-19 RX ADMIN — PANTOPRAZOLE SODIUM 40 MG: 40 TABLET, DELAYED RELEASE ORAL at 06:19

## 2018-04-19 NOTE — PROGRESS NOTES
Physical Therapy Progress Note     04/18/18 1330   Pain Assessment   Pain Assessment 0-10   Pain Score 3   Restrictions/Precautions   Precautions Fall Risk;Pain   Weight Bearing Restrictions Yes   RUE Weight Bearing Per Order WBAT   LUE Weight Bearing Per Order WBAT   RLE Weight Bearing Per Order WBAT   LLE Weight Bearing Per Order WBAT   ROM Restrictions No   Cognition   Overall Cognitive Status Impaired   Arousal/Participation Alert; Cooperative   Attention Within functional limits   Orientation Level Oriented X4   Memory Within functional limits   Following Commands Follows multistep commands with increased time or repetition   Subjective   Subjective reports 3/10 pain at rest priro to session;    Bed Mobility   Able to Roll Left to Right;Right to Left;Scoot Up   Findings Antonio   Transfer Bed/Chair/Wheelchair   Limitations Noted In Balance; Endurance   Adaptive Equipment Roller Walker   Stand Pivot Supervision   Sit to Stand Supervision   Stand to Sit Supervision   Supine to Sit Minimal Assist   Sit to Supine Minimal Assist   Findings Antonio for B LE management;   Bed, Chair, Wheelchair Transfer (FIM) 4 - Patient requires steadying assist or light touching   Ambulation   Primary Discharge Mode of Locomotion Walk   Walk Assist Level Close Supervision;Minimum Assist   Gait Pattern Slow Geetha; Antalgic; Inconsistant Geetha;Decreased foot clearance   Assist Device Mis Sandoval Walked (feet) 150 ft   Limitations Noted In Balance; Endurance   Findings limited by pain   Walking (FIM) 4 - Patient requires steadying assist or light touching AND distance 150 feet or more, no rest   Wheelchair mobility   Findings NT   Stairs   Findings assessed ramp/curb in AM   Toilet Transfer   Surface Assessed Standard Toilet   Toilet Transfer (FIM) 4 - Patient requires steadying assist or light touching   Therapeutic Interventions   Strengthening supine TE (SAQ, SLR, hip abd/add, bridges)   Assessment   Treatment Assessment 30-minute session focused on pt xfer in/out of chair and bed as well as amb 150' CS/cgA with RW; instructed in supine TE (as above) and finished session in bedside relciner with all needs in reach; recommend cont PT POC:    Problem List Decreased strength;Decreased endurance; Impaired balance;Decreased mobility; Decreased safety awareness;Pain   Barriers to Discharge Inaccessible home environment;Decreased caregiver support   PT Barriers   Physical Impairment Decreased strength;Decreased endurance; Impaired balance;Decreased mobility; Decreased safety awareness;Pain   Functional Limitation Walking;Standing;Ramp negotiation;Car transfers   Plan   Treatment/Interventions ADL retraining;Functional transfer training;LE strengthening/ROM; Elevations; Therapeutic exercise; Endurance training;Cognitive reorientation;Patient/family training;Equipment eval/education; Bed mobility;Gait training   Progress Progressing toward goals   Recommendation   Recommendation Outpatient PT; Home with family support   Equipment Recommended Walker   PT Therapy Minutes   PT Time In 1330   PT Time Out 1400   PT Total Time (minutes) 30   PT Mode of treatment - Individual (minutes) 0   PT Mode of treatment - Concurrent (minutes) 30   PT Mode of treatment - Group (minutes) 0   PT Mode of treatment - Co-treat (minutes) 0   PT Mode of Teatment - Total time(minutes) 30 minutes   Therapy Time missed   Time missed?  No     Odalis Fell, PTA

## 2018-04-19 NOTE — PROGRESS NOTES
Physical Therapy Progress Note   04/18/18 1030   Pain Assessment   Pain Assessment 0-10   Pain Score 3   Pain Type Surgical pain   Pain Location Leg   Pain Orientation Left   Restrictions/Precautions   Precautions Fall Risk;Pain   Weight Bearing Restrictions Yes   RUE Weight Bearing Per Order WBAT   LUE Weight Bearing Per Order WBAT   RLE Weight Bearing Per Order WBAT   LLE Weight Bearing Per Order WBAT   ROM Restrictions No   Cognition   Overall Cognitive Status Impaired   Arousal/Participation Alert; Cooperative   Attention Within functional limits   Orientation Level Oriented X4   Memory Within functional limits   Following Commands Follows multistep commands with increased time or repetition   Subjective   Subjective reports 3/10 pain in hip/leg   QI: Roll Left and Right   Assistance Needed Physical assistance   Assistance Provided by [place] 50%-74%   Roll Left and Right CARE Score 2   QI: Sit to Lying   Assistance Needed Physical assistance   Assistance Provided by [place] 25%-49%   Sit to Lying CARE Score 3   QI: Lying to Sitting on Side of Bed   Assistance Needed Physical assistance   Assistance Provided by [place] Less than 25%   Lying to Sitting on Side of Bed CARE Score 3   QI: Sit to [address] Provided by [place] No physical assistance   Sit to Stand CARE Score 4   Bed Mobility   Able to Roll Left to Right;Right to Left;Scoot Up   Findings [place]   QI: Chair/Bed-to-Chair Transfer   Assistance Needed Supervision   Assistance Provided by [place] No physical assistance   Chair/Bed-to-Chair Transfer CARE Score 4   Transfer Bed/Chair/Wheelchair   Limitations Noted In Balance; Endurance   Adaptive Equipment Roller Walker   Stand Pivot Supervision   Sit to Stand Supervision   Stand to Sit Supervision   Supine to Sit Minimal Assist   Sit to Supine Minimal Assist   Findings requires [place] for supine <> sit d/t B LE management;    Bed, Chair, Wheelchair Transfer (FIM) 4 - Patient requires steadying assist or light touching   QI: Car Transfer   Reason if not Attempted Activity not applicable   Car Transfer CARE Score 9   QI: [address] Provided by [place] No physical assistance   Walk 10 Feet CARE Score 4   QI: Walk 50 Feet with Two [address] Provided by [place] No physical assistance   Walk 50 Feet with Two Turns CARE Score 4   QI: Walk 150 [address] Provided by [place] No physical assistance   Walk 150 Feet CARE Score 4   QI: Walking 10 Feet on Uneven Surfaces   Reason if not Attempted Activity not applicable   Walking 10 Feet on Uneven Surfaces CARE Score 9   Ambulation   Does the patient walk? 2  Yes   Primary Discharge Mode of Locomotion Walk   Walk Assist Level Close Supervision;Minimum Assist   Gait Pattern Slow Geetha; Antalgic; Inconsistant Geetha;Decreased foot clearance   Assist Device Roller Claire Sandoval Walked (feet) 150 ft   Limitations Noted In Balance; Endurance   Findings limited by pain   Walking (FIM) 4 - Patient requires steadying assist or light touching AND distance 150 feet or more, no rest   QI: Wheel 50 Feet with Two Turns   Reason if not Attempted Activity not applicable   Wheel 50 Feet with Two Turns CARE Score 9   QI: Wheel 150 Feet   Reason if not Attempted Activity not applicable   Wheel 119 Feet CARE Score 9   Wheelchair mobility   QI: Does the patient use a wheelchair? 0   No   Findings NT   QI: 1 Step (Curb)   Reason if not Attempted Activity not applicable   1 Step (Curb) CARE Score 9   QI: 4 Steps   Reason if not Attempted Activity not applicable   4 Steps CARE Score 9   QI: 12 Steps   Reason if not Attempted Activity not applicable   12 Steps CARE Score 9   Stairs   Type Ramp;Curb   # of Steps 1   Weight Bearing Precautions WBAT   Assist Devices Roller Walker   Findings S   QI: Picking Up Object   Reason if not Attempted Activity not applicable   Picking Up Object CARE Score 9   QI: Toilet Transfer   Assistance Needed Supervision   Assistance Provided by [place] No physical assistance   Toilet Transfer CARE Score 4   Toilet Transfer   Surface Assessed Standard Toilet   Toilet Transfer (FIM) 4 - Patient requires steadying assist or light touching   Therapeutic Interventions   Strengthening seated and standing TE (marches, LAQ, hip flex/ext/abd/add, mini squats, calf raises, HS curls) reps until fatigued   Flexibility manual stretch B HS and gastroc;    Equipment Use   NuStep 10 minutes, level 2   Assessment   Treatment Assessment Pt cont to be limited by pain and having a difficult time with initial stand up and weight bearing to amb; antalgic gait but can be cued to self correct; pain is limiting factor; curb and ramp with S/cgA and RW; instructed in seated/standing TE (as above) and NuStep x10 minutes, level 2; finished session in bedside recliner with alarms active and all needs in reach; recommend cont PT POC;    Problem List Decreased strength;Decreased endurance; Impaired balance;Decreased mobility; Decreased safety awareness;Pain   Barriers to Discharge Inaccessible home environment;Decreased caregiver support   PT Barriers   Physical Impairment Decreased strength;Decreased endurance; Impaired balance;Decreased mobility; Decreased safety awareness;Pain   Functional Limitation Walking;Standing;Ramp negotiation;Car transfers   Plan   Treatment/Interventions ADL retraining;Functional transfer training;LE strengthening/ROM; Elevations; Therapeutic exercise; Endurance training;Cognitive reorientation;Patient/family training;Equipment eval/education; Bed mobility;Gait training   Progress Progressing toward goals   Recommendation   Recommendation Outpatient PT; Home with family support   Equipment Recommended Walker   PT Therapy Minutes   PT Time In 1030   PT Time Out 1130   PT Total Time (minutes) 60   PT Mode of treatment - Individual (minutes) 60 PT Mode of treatment - Concurrent (minutes) 0   PT Mode of treatment - Group (minutes) 0   PT Mode of treatment - Co-treat (minutes) 0   PT Mode of Teatment - Total time(minutes) 60 minutes   Therapy Time missed   Time missed?  No     Jailene Leija, PTA

## 2018-04-19 NOTE — PROGRESS NOTES
Internal Medicine Progress Note  Patient: Leroy Talavera  Age/sex: 82 y o  female  Medical Record #: [id number]      ASSESSMENT/PLAN:  Leroy Talavera is seen and examined and management for following issues:    1  Left Intertrochanteric femur fx s/p IMN on 4/8/18 ([place]):  Pain control per primary team  WBAT  Has edema of left thigh = using thigh high SINGH on that leg and has improved    2  ABLA; s/p 2 units PRBC: improving - will continue to watch; no sx  3   Chronic diastolic heart failure: Bumex had been on hold  Resumed Bumex 0 5mg daily 4/14/18; no dizziness/SOB    4   CAD hx Stent x 2 circumflex '11: continue ASA 81mg, Atenolol and Lipitor    5  HTN: stable on home regimen of Norvasc 5mg daily and Atenolol 25mg daily  6   Hyperthyroidism: continue Methimazole; Repeat TSH/T4 in 4 weeks  Follow up with endocrinology    7  Reactive airway disease: No signs of acute flare  Continue Allegra and Singulair    8  DVT prophylaxis: Lovenox 40mg daily    9  Hx chronic large granular lymphocytic leukemia:  Sees Dr Lilo Nino as OP; has not required tx    10  Hyperparathyroidism:  Calcium/Vit D; OP followup with Endocrine        Subjective: Patient seen and examined   No new or overnight issues     ROS:   GI: denies abdominal pain, change bowel habits or reflux symptoms  Neuro: No new neurologic changes  Respiratory: No Cough, SOB  Cardiovascular: No CP, palpitations     Scheduled Meds:    Current Facility-Administered Medications:  acetaminophen 975 mg Oral Q8H Gunnar Galaviz MD   amLODIPine 5 mg Oral Daily Sriram Lindo MD   aspirin 81 mg Oral Daily Sriram Lindo MD   atenolol 25 mg Oral Daily Sriram Lindo MD   atorvastatin 40 mg Oral After Marylu Gibbons MD   bisacodyl 10 mg Rectal Daily PRN Sriram Lindo MD   bumetanide 0 5 mg Oral Daily Aaliyah Weir PA-C   calcium carbonate 1 tablet Oral Daily With Qamar Jackson MD   cholecalciferol 2,000 Units Oral Daily Sriram Lindo MD   docusate sodium 100 mg Oral BID Mayra Beach MD   DULoxetine 30 mg Oral Daily Mayra Beach MD   enoxaparin 40 mg Subcutaneous Daily Mayra Beach MD   methimazole 5 mg Oral Daily Mayra Beach MD   oxyCODONE 10 mg Oral Q4H PRN Mayra Beach MD   oxyCODONE 5 mg Oral Q4H PRN Mayra Beach MD   pantoprazole 40 mg Oral Early Morning Mayra Beach MD   polyethylene glycol 17 g Oral Daily PRN Mayra Beach MD       Labs:       Results from last 7 days  Lab Units 04/19/18  0547 04/16/18  0513   WBC Thousand/uL 5 11 4 68   HEMOGLOBIN g/dL 8 4* 7 7*   HEMATOCRIT % 26 0* 24 0*   PLATELETS Thousands/uL 375 295       Results from last 7 days  Lab Units 04/19/18  0547 04/16/18  0513   SODIUM mmol/L 138 138   POTASSIUM mmol/L 4 1 4 3   CHLORIDE mmol/L 105 103   CO2 mmol/L 28 31   BUN mg/dL 17 19   CREATININE mg/dL 0 56* 0 54*   GLUCOSE RANDOM mg/dL 96 97   CALCIUM mg/dL 8 8 8 7                  Glucose (mg/dL)   Date Value   04/19/2018 96   04/16/2018 97   04/13/2018 100   04/10/2018 118       Labs reviewed    Physical Examination:  Vitals:   Vitals:    04/18/18 1337 04/18/18 2049 04/19/18 0535 04/19/18 0737   BP: 138/60 114/60 129/60 127/60   BP Location: Left arm Left arm  Right arm   Pulse: 85  80 91   Resp: 18 18 20    Temp: 97 5 °F (36 4 °C) 98 4 °F (36 9 °C) 98 8 °F (37 1 °C)    TempSrc: Oral Oral Oral    SpO2: 99% 100% 98%    Weight:       Height:           GEN: NAD  HEENT: NC/AT  RESP: BBS w/o crackles/wheeze/rhonci; resp unlabored  CV: +S1 S2, regular rate, no rubs/murmurs  ABD: soft, NT, ND, normal BS   : no valle  EXT: no edema of left lower leg but +edema of left hip and thigh (steadily improving)  Skin: no rashes  Neuro: AAO      [x ] Total time spent: 30 Mins and greater than 50% of this time was spent counseling/coordinating care        Janie Holland, [address]  Internal Medicine

## 2018-04-19 NOTE — PROGRESS NOTES
Progress Note - Darlene Zaman 82 y o  female MRN: [id number]    Unit/Bed#: -01 Encounter: [id number]            Subjective:   Pt c/o intermittent Rt 2nd toe pain (not currently painful at all per pt)     Objective:     ROS  Gen: denies recent wt loss   Psych: denies mood change    Vitals: Blood pressure 129/61, pulse 76, temperature 98 3 °F (36 8 °C), temperature source Oral, resp  rate 20, height 5' 1" (1 549 m), weight 59 kg (130 lb 1 1 oz), SpO2 99 %      Physical Exam:     Gen:        NAD   Neck:   trachea midline  Lungs:  respirations unlabored   Heart:    + S1 and S2   Abdomen:    Soft, non-tender  Extremities: + B/L LE edema L> R, Rt 2nd toe--> no tenderness to palpation, no erythema, no swelling, no warmth, full pain free active and passive ROM (not currently painful at all per pt)   Psych: mood/affect appropriate  Neurologic: awake, alert, appropriately answering questions     Functional :  Mobility: CG-sup  Tx: CG  ADLs: min-sup        Current Facility-Administered Medications:  acetaminophen 975 mg Oral Q8H [facility] Milka Mir MD   amLODIPine 5 mg Oral Daily Milka Mir MD   aspirin 81 mg Oral Daily Milka Mir MD   atenolol 25 mg Oral Daily Milka Mir MD   atorvastatin 40 mg Oral After Otto Richardson MD   bisacodyl 10 mg Rectal Daily PRN Milka Mir MD   bumetanide 0 5 mg Oral Daily Devon Benitez PA-C   calcium carbonate 1 tablet Oral Daily With Primo Martin MD   cholecalciferol 2,000 Units Oral Daily Milka Mir MD   docusate sodium 100 mg Oral BID Milka Mir MD   DULoxetine 30 mg Oral Daily Milka Mir MD   enoxaparin 40 mg Subcutaneous Daily Milka Mir MD   methimazole 5 mg Oral Daily Milka Mir MD   oxyCODONE 10 mg Oral Q4H PRN Milka Mir MD   oxyCODONE 5 mg Oral Q4H PRN Milka Mir MD   pantoprazole 40 mg Oral Early Morning Milka Mir MD   polyethylene glycol 17 g Oral Daily PRN Milka Mir MD       bisacodyl    oxyCODONE    oxyCODONE    polyethylene glycol      Assessment:  Pt is 81 yo female with L IT fx s/p IM nail fixation by Dr Candy Hester on 4/8    Plan:    Rehabilitation: cont PT/OT for ambulatory/ADL dysfxn    L IT fx: s/p IM nail fixation by Dr Candy Hester on 4/8 (SPEP/UPEP, vit D level WNL)        Hyperparathyroidism: on home vit D 2000 units QD, d/w endocrine and recommended starting pt on 1000 mg calcium carbonate qd (1250 mg formulation per tab is what is on formulary)      Hyperthyroidism: elevated thyroid microsomal antibody, elevated thyroid stimulating immunoglobulin, decreased TSH, elevated free T4, likely autoimmune etiology, seen by endocrine in acute care and started on tapazole and have recommended repeat free T4 in 4 wks and FU in 6 wks with endocrine     bladder CA: CT A/P of 3/26/18 did not reveal any findings to suggest recurrent or new urogenital neoplasm; follows with Dr Olegario Pathak     CLL: follows with Dr Jose Parrish who per last visit note felt pt was hematologically stable      CAD: s/p stents, on home regimen of ASA 81 mg qd, atenolol 25 mg qd, and lipitor 40 mg qPM; OP FU with Dr Mark Puente     HTN: on home regimen of norvasc 5 mg qd and atenolol 25 mg qd      GERD: on home dose of protonix 40 mg qd (per pt will occasionally take BID if she feels it is very bad that day)      seasonal allergies: at home takes prn allegra and prn singulair; currently asymptomatic per pt     grade I DD w/chronic LE edema: per IM recs in acute care pt resumed by IM on home bumex 0 5 mg qd      Depression: on home cymbalta 30 mg qd      Osteoporosis: on home vitamin D 2000 units qd (vit D level of 4/9 WNL at 47 9), also on prolia as OP, managed by her rheumatologist, seen by endocrine and recommend FU with her rheumatologist for further management and DEXA scan at her rheumatologist discretion (SPEP/UPEP w/o monoclonal bands, vit D level WNL)     intermittent Rt 2nd toe pain: not currently painful at all per pt; no tenderness to palpation, no erythema, no swelling, no warmth, full pain free active and passive ROM; bedside evaluation/examination done by Dr Clarissa Alcaraz and felt no further imaging/intervention is warranted at this time (per Dr Susie Angela no need for formal consult order to be placed at this time but may do so if symptoms change or worsen)     Anemia: ABLA, Hg currently improved to 8 4        DVT ppx: SCDs, lovenox x 28 days post-op per ortho  Dispo: reteam     Incidental findings of CT A/P of 3/26/18:  1) b/l renal cysts: per report unchanged compared to previous study of 9/25/17  2) rt renal nodules: per report unchanged compared to previous study of 9/25/17  3) hepatic cysts: per report unchanged compared to previous study of 9/25/17  4) pancreatic cysts: per report unchanged compared to previous study of 9/25/17  5) ventral hernia containing adipose: OP FU w/PCP     Other incidental findings:  6) PVCs: OP FU with Dr Paco Delaney (pt's cardiologist)  7) elevated peak PA pressure: OP FU with Dr Paco Delaney (pt's cardiologist)

## 2018-04-19 NOTE — PROGRESS NOTES
Occupational Therapy Treatment Note:     04/19/18 0801   Pain Assessment   Pain Assessment 0-10   Pain Score 4   Pain Location Hip   Pain Orientation Left   Hospital Pain Intervention(s) Medication (See MAR); Cold applied;Repositioned; Rest   Restrictions/Precautions   Precautions Fall Risk;Pain   LLE Weight Bearing Per Order WBAT   ROM Restrictions No   QI: Putting On/Taking Off Footwear   Assistance Needed Adaptive equipment;Set-up / clean-up;Supervision;Verbal cues   Assistance Provided by [place] No physical assistance   Comment When provided with increased time and VCs for successful technique pt able to doff socks using dressing stick and don shoes with shoelaces remaining tied using reacher and [place] [address] with S; incuding managing velcro straps on shoes using reacher  Putting On/Taking Off Footwear CARE Score 4   QI: Sit to Stand   Assistance Needed Adaptive equipment;Set-up / clean-up;Supervision   Assistance Provided by [place] No physical assistance   Comment RW  Sit to Stand CARE Score 4   QI: Chair/Bed-to-Chair Transfer   Assistance Needed Adaptive equipment;Set-up / clean-up;Supervision   Assistance Provided by [place] No physical assistance   Comment RW  Chair/Bed-to-Chair Transfer CARE Score 4   Transfer Bed/Chair/Wheelchair   Limitations Noted In Balance; Endurance;LE Strength   Adaptive Equipment Roller Walker   Stand Pivot Supervision   Sit to Stand Supervision   Stand to Sit Supervision   Findings Pt completed functional mobility from pt room to OT gym using RW with CS and no needed rest breaks  VCs warranted at times to point L toes straight due to noted inversion, which pt reported was her "normal" at baseline      Bed, Chair, Wheelchair Transfer (FIM) 5 - Patient requires supervision/monitoring   QI: [address] Needed Adaptive equipment;Set-up / clean-up;Supervision   Assistance Provided by [place] No physical assistance   Comment Pt completed arden-care while seated and clothing mgmt over hips while standing briefly unsupported with S and no noted LOB  Toileting Hygiene CARE Score 4   Toileting   Able to [address] down yes, up yes  Able to Manage Clothing Closures Other  (None needed; elastic waisted pants  )   Manage Hygiene Bladder   Limitations Noted In Balance; Safety;LE Strength   Adaptive Equipment Other  (RW)   Toileting (FIM) 5 - Patient requires supervision/monitoring   QI: Toilet Transfer   Assistance Needed Adaptive equipment;Set-up / clean-up;Supervision   Assistance Provided by [place] No physical assistance   Comment Using RW and commode over toilet  Toilet Transfer CARE Score 4   Toilet Transfer   Surface Assessed Raised Toilet  (Commode over toilet  )   Transfer Technique Standard   Limitations Noted In Balance; Endurance; Safety;LE Strength   Adaptive Equipment (RW)   Toilet Transfer (FIM) 5 - Patient requires supervision/monitoring   Health Management   Health Management Level of Assistance Close supervision;Distant supervision   Health Management Pt reported to use an morning/noon/evening/bedtime style pill organizer at baseline  Pt completed simulated medication mgmt task using morning/noon/evening/bedtime style pill organizer with S; pt with 1 noted error however was able to self correct  Exercise Tools   Other Exercise Tool 1 Pt reported prior to fall she had recently completed outpt therapy to address R shoulder/neck discomfort and decreased cervical ROM which would impact tasks such as turning head to see for oncoming traffic when driving; pt reported noted aggravation from pushing through RW with UEs in order to compensate for WB'ing through LLE   Pt with partial carryover of exercises taught at outpt however refresher warranted; established new HEP and instructed pt on completing shoulder shrugs and rolls forward/backward and cervical forward flex/ext, lateral flex/ext, head turns, head circles clockwise and counter clockwise, chin tuck, and various isometric exercises  Cognition   Overall Cognitive Status Impaired  (Mild impairment questionable )   Arousal/Participation Alert; Cooperative   Attention Within functional limits   Orientation Level Oriented X4   Memory Decreased short term memory   Following Commands Follows multistep commands with increased time or repetition   Activity Tolerance   Activity Tolerance Patient tolerated treatment well   Assessment   Treatment Assessment OT tx sessions focused on transfers, standing balance, functional mobility, toileting, LB dressing task of sock and shoe mgmt, R shoulder/neck ROM/light stretching, and IADL task of medication mgmt  Refer above for details on pt performance  Pt would benefit from continued skilled OT services in order to achieve highest functional abilities  Prognosis Good   Problem List Decreased strength;Decreased range of motion;Decreased endurance; Impaired balance;Decreased mobility;Pain   Barriers to Discharge Inaccessible home environment;Decreased caregiver support   Plan   Treatment/Interventions ADL retraining;Functional transfer training;LE strengthening/ROM; Therapeutic exercise; Endurance training;Patient/family training;Equipment eval/education; Compensatory technique education;OT   Progress Progressing toward goals   Recommendation   OT Discharge Recommendation Home with family support   OT Therapy Minutes   OT Time In 0800   OT Time Out 0930   OT Total Time (minutes) 90   OT Mode of treatment - Individual (minutes) 90   OT Mode of treatment - Concurrent (minutes) 0   OT Mode of treatment - Group (minutes) 0   OT Mode of treatment - Co-treat (minutes) 0   OT Mode of Teatment - Total time(minutes) 90 minutes   Therapy Time missed   Time missed? No   VICTORINO Ashley     Additionally: Discussed Medical Alert system with pt and provided with handout as pt verbalizing interest and lives alone

## 2018-04-19 NOTE — PROGRESS NOTES
04/19/18 1350   Pain Assessment   Pain Assessment 0-10   Pain Score 7   Pain Type Surgical pain;Acute pain   Pain Location Hip   Pain Orientation Left   Hospital Pain Intervention(s) Medication (See MAR); Cold applied   Restrictions/Precautions   Precautions Fall Risk;Pain   Subjective   Subjective pt c/o increase pain after walking and stated she did not take her pain medication prior to therapy   QI: Roll Left and Right   Reason if not Attempted Activity not applicable   Roll Left and Right CARE Score 9   QI: Sit to Lying   Assistance Needed Physical assistance   Assistance Provided by [place] 25%-49%   Comment increase pain, needed more help this afternoon   Sit to Lying CARE Score 3   QI: Lying to Sitting on Side of Bed   Assistance Needed Physical assistance   Assistance Provided by [place] Less than 25%   Comment increase pain, needed more help   Lying to Sitting on Side of Bed CARE Score 3   QI: Sit to [address] Provided by [place] No physical assistance   Comment RW   Sit to Stand CARE Score 4   Bed Mobility   Findings Antonio   QI: Chair/Bed-to-Chair Transfer   Assistance Needed Supervision   Assistance Provided by [place] No physical assistance   Chair/Bed-to-Chair Transfer CARE Score 4   Transfer Bed/Chair/Wheelchair   Limitations Noted In Balance; Endurance;Pain Management;LE Strength   Adaptive Equipment Roller Walker   Stand Pivot Supervision   Sit to Stand Supervision   Stand to Sit Supervision   Supine to Sit Minimal Assist   Sit to Supine Minimal Assist   Findings needed more A with bed txs this session due to increase pain   Bed, Chair, Wheelchair Transfer (FIM) 4 - [place] lifts one extremity during transfer   QI: Car Transfer   Reason if not Attempted Activity not applicable   Car Transfer CARE Score 9   QI: Walk 10 Feet   Assistance Needed Supervision; Adaptive equipment   Walk 10 Feet CARE Score 4   QI: Walk 50 Feet with Two Turns   Assistance Needed Supervision; Adaptive equipment   Assistance Provided by [place] No physical assistance   Walk 50 Feet with Two Turns CARE Score 4   QI: Walk 150 Feet   Reason if not Attempted Activity not applicable   Walk 138 Feet CARE Score 9   QI: Walking 10 Feet on Uneven Surfaces   Reason if not Attempted Activity not applicable   Walking 10 Feet on Uneven Surfaces CARE Score 9   Ambulation   Does the patient walk? 2  Yes   Primary Discharge Mode of Locomotion Walk   Walk Assist Level Close Supervision   Gait Pattern Slow Geetha;Decreased foot clearance; Inconsistant Geetha;Decreased L stance; Improper weight shift   Assist Device Roller Claire Sandoval Walked (feet) 80 ft  (x2)   Limitations Noted In Balance; Endurance; Heel Strike; Sequencing;Speed;Strength   Findings limited by pain   Walking (FIM) 2 - Patient ambulates between 50 - 149 feet regardless of assist/device/set up   QI: Wheel 50 Feet with Two Turns   Reason if not Attempted Activity not applicable   Wheel 50 Feet with Two Turns CARE Score 9   QI: Wheel 150 Feet   Reason if not Attempted Activity not applicable   Wheel 445 Feet CARE Score 9   Wheelchair mobility   QI: Does the patient use a wheelchair? 0   No   QI: 1 Step (Curb)   Reason if not Attempted Activity not applicable   1 Step (Curb) CARE Score 9   QI: 4 Steps   Reason if not Attempted Activity not applicable   4 Steps CARE Score 9   QI: 12 Steps   Reason if not Attempted Activity not applicable   12 Steps CARE Score 9   QI: Picking Up Object   Reason if not Attempted Activity not applicable   Picking Up Object CARE Score 9   QI: Toilet Transfer   Reason if not Attempted Activity not applicable   Toilet Transfer CARE Score 9   Toilet Transfer   Toilet Transfer (FIM) 5 - Patient requires supervision/monitoring   Therapeutic Interventions   Strengthening supine SAQ x30, hip abd LLE x20, heel slides x20 LLE   Flexibility B/L passive stretching gastroc and HS   Assessment   Treatment Assessment pt with increase pain today limiting overall activity tolerance and needing increase A with bed txs today  pt felt RW was to low, raised up one notch for comfort  pt need cues during amb to clear L foot to decrease fall risk  pt to cont focus on strengthening, endurance and safety to progress with functional mobility and Ind  Problem List Decreased strength;Decreased endurance; Impaired balance;Decreased mobility; Decreased safety awareness;Pain   Barriers to Discharge Inaccessible home environment;Decreased caregiver support   PT Barriers   Physical Impairment Decreased strength;Decreased endurance; Impaired balance;Decreased mobility; Decreased safety awareness;Pain   Functional Limitation Walking;Standing;Ramp negotiation;Car transfers   Plan   Treatment/Interventions Functional transfer training;LE strengthening/ROM; Endurance training;Gait training;Bed mobility   Progress Progressing toward goals   Recommendation   Recommendation Outpatient PT   PT Therapy Minutes   PT Time In 1350   PT Time Out 1530   PT Total Time (minutes) 100   PT Mode of treatment - Individual (minutes) 100   PT Mode of treatment - Concurrent (minutes) 0   PT Mode of treatment - Group (minutes) 0   PT Mode of treatment - Co-treat (minutes) 0   PT Mode of Teatment - Total time(minutes) 100 minutes   Therapy Time missed   Time missed?  No

## 2018-04-20 PROCEDURE — 97530 THERAPEUTIC ACTIVITIES: CPT

## 2018-04-20 PROCEDURE — 99232 SBSQ HOSP IP/OBS MODERATE 35: CPT | Performed by: PHYSICAL MEDICINE & REHABILITATION

## 2018-04-20 PROCEDURE — 97110 THERAPEUTIC EXERCISES: CPT

## 2018-04-20 PROCEDURE — 97535 SELF CARE MNGMENT TRAINING: CPT

## 2018-04-20 RX ADMIN — ACETAMINOPHEN 975 MG: 325 TABLET, FILM COATED ORAL at 13:19

## 2018-04-20 RX ADMIN — DULOXETINE HYDROCHLORIDE 30 MG: 30 CAPSULE, DELAYED RELEASE ORAL at 10:00

## 2018-04-20 RX ADMIN — DOCUSATE SODIUM 100 MG: 100 CAPSULE, LIQUID FILLED ORAL at 09:59

## 2018-04-20 RX ADMIN — Medication 1 TABLET: at 07:42

## 2018-04-20 RX ADMIN — PANTOPRAZOLE SODIUM 40 MG: 40 TABLET, DELAYED RELEASE ORAL at 05:46

## 2018-04-20 RX ADMIN — ASPIRIN 81 MG 81 MG: 81 TABLET ORAL at 09:59

## 2018-04-20 RX ADMIN — ACETAMINOPHEN 975 MG: 325 TABLET, FILM COATED ORAL at 05:46

## 2018-04-20 RX ADMIN — ATENOLOL 25 MG: 25 TABLET ORAL at 10:00

## 2018-04-20 RX ADMIN — OXYCODONE HYDROCHLORIDE 10 MG: 10 TABLET ORAL at 07:42

## 2018-04-20 RX ADMIN — VITAMIN D, TAB 1000IU (100/BT) 2000 UNITS: 25 TAB at 09:59

## 2018-04-20 RX ADMIN — METHIMAZOLE 5 MG: 5 TABLET ORAL at 10:01

## 2018-04-20 RX ADMIN — ACETAMINOPHEN 975 MG: 325 TABLET, FILM COATED ORAL at 21:52

## 2018-04-20 RX ADMIN — OXYCODONE HYDROCHLORIDE 10 MG: 10 TABLET ORAL at 13:16

## 2018-04-20 RX ADMIN — BUMETANIDE 0.5 MG: 1 TABLET ORAL at 10:01

## 2018-04-20 RX ADMIN — ATORVASTATIN CALCIUM 40 MG: 40 TABLET, FILM COATED ORAL at 17:04

## 2018-04-20 RX ADMIN — ENOXAPARIN SODIUM 40 MG: 40 INJECTION SUBCUTANEOUS at 09:59

## 2018-04-20 RX ADMIN — AMLODIPINE BESYLATE 5 MG: 5 TABLET ORAL at 09:59

## 2018-04-20 NOTE — PROGRESS NOTES
Progress Note - Dillan Smith 82 y o  female MRN: [id number]    Unit/Bed#: -01 Encounter: [id number]            Subjective:   Pt without complaint currently     Objective:     ROS  Gen: denies recent wt loss   Psych: denies mood change    Vitals: Blood pressure 112/64, pulse 92, temperature 98 5 °F (36 9 °C), temperature source Oral, resp  rate 20, height 5' 1" (1 549 m), weight 59 kg (130 lb 1 1 oz), SpO2 98 %      Physical Exam:     Gen:        NAD   Neck:   trachea midline  Lungs:  respirations unlabored   Heart:    + S1 and S2   Abdomen:    Soft, non-tender  Extremities: + B/L LE edema L> R, Rt 2nd toe--> no tenderness to palpation, no erythema, no swelling, no warmth, full pain free active and passive ROM (not currently painful at all per pt)   Psych: mood/affect appropriate  Neurologic: awake, alert, appropriately answering questions     Functional :  Mobility: CG-sup  Tx: CG  ADLs: min-sup        Current Facility-Administered Medications:  acetaminophen 975 mg Oral Q8H [address] Fawad Gottlieb MD   amLODIPine 5 mg Oral Daily Fawad Gottlieb MD   aspirin 81 mg Oral Daily Fawad Gottlieb MD   atenolol 25 mg Oral Daily Fawad Gottlieb MD   atorvastatin 40 mg Oral After Emanuel Villela MD   bisacodyl 10 mg Rectal Daily PRN Fawad Gottlieb MD   bumetanide 0 5 mg Oral Daily Donavan Bee PA-C   calcium carbonate 1 tablet Oral Daily With Monster Briggs MD   cholecalciferol 2,000 Units Oral Daily Fawad Gottlieb MD   docusate sodium 100 mg Oral BID Fawad Gottlieb MD   DULoxetine 30 mg Oral Daily Fawad Gottlieb MD   enoxaparin 40 mg Subcutaneous Daily Fawad Gottlieb MD   methimazole 5 mg Oral Daily Fawad Gottlieb MD   oxyCODONE 10 mg Oral Q4H PRN Fawad Gottlieb MD   oxyCODONE 5 mg Oral Q4H PRN Fawad Gottlieb MD   pantoprazole 40 mg Oral Early Morning Fawad Gottlieb MD   polyethylene glycol 17 g Oral Daily PRN Fawad Gottlieb MD       bisacodyl    oxyCODONE    oxyCODONE    polyethylene glycol      Assessment:  Pt is 81 yo female with L IT fx s/p IM nail fixation by Dr Giovanni Cristina on 4/8    Plan:    Rehabilitation: cont PT/OT for ambulatory/ADL dysfxn    L IT fx: s/p IM nail fixation by Dr Giovanni Cristina on 4/8 (SPEP/UPEP, vit D level WNL)        Hyperparathyroidism: on home vit D 2000 units QD, d/w endocrine and recommended starting pt on 1000 mg calcium carbonate qd (1250 mg formulation per tab is what is on formulary)      Hyperthyroidism: elevated thyroid microsomal antibody, elevated thyroid stimulating immunoglobulin, decreased TSH, elevated free T4, likely autoimmune etiology, seen by endocrine in acute care and started on tapazole and have recommended repeat free T4 in 4 wks and FU in 6 wks with endocrine     bladder CA: CT A/P of 3/26/18 did not reveal any findings to suggest recurrent or new urogenital neoplasm; follows with Dr Lion Collins     CLL: follows with Dr Osiel Linn who per last visit note felt pt was hematologically stable      CAD: s/p stents, on home regimen of ASA 81 mg qd, atenolol 25 mg qd, and lipitor 40 mg qPM; OP FU with Dr Neena Bird     HTN: on home regimen of norvasc 5 mg qd and atenolol 25 mg qd      GERD: on home dose of protonix 40 mg qd (per pt will occasionally take BID if she feels it is very bad that day)      seasonal allergies: at home takes prn allegra and prn singulair; currently asymptomatic per pt     grade I DD w/chronic LE edema: per IM recs in acute care pt resumed by IM on home bumex 0 5 mg qd      Depression: on home cymbalta 30 mg qd      Osteoporosis: on home vitamin D 2000 units qd (vit D level of 4/9 WNL at 47 9), also on prolia as OP, managed by her rheumatologist, seen by endocrine and recommend FU with her rheumatologist for further management and DEXA scan at her rheumatologist discretion (SPEP/UPEP w/o monoclonal bands, vit D level WNL)     intermittent Rt 2nd toe pain: not currently painful at all per pt; no tenderness to palpation, no erythema, no swelling, no warmth, full pain free active and passive ROM; bedside evaluation/examination done by Dr Aashish Costello and felt no further imaging/intervention is warranted at this time (per Dr Stan Wyatt no need for formal consult order to be placed at this time but may do so if symptoms change or worsen)     Anemia: ABLA, Hg currently improved to 8 4        DVT ppx: SCDs, lovenox x 28 days post-op per ortho  Dispo: reteam     Incidental findings of CT A/P of 3/26/18:  1) b/l renal cysts: per report unchanged compared to previous study of 9/25/17  2) rt renal nodules: per report unchanged compared to previous study of 9/25/17  3) hepatic cysts: per report unchanged compared to previous study of 9/25/17  4) pancreatic cysts: per report unchanged compared to previous study of 9/25/17  5) ventral hernia containing adipose: OP FU w/PCP     Other incidental findings:  6) PVCs: OP FU with Dr Sylvie Katz (pt's cardiologist)  7) elevated peak PA pressure: OP FU with Dr Sylvie Katz (pt's cardiologist)

## 2018-04-20 NOTE — PROGRESS NOTES
04/20/18 1401   Pain Assessment   Pain Score 4   Pain Type Surgical pain   Pain Location Leg   Pain Orientation Left   Restrictions/Precautions   Precautions Fall Risk;Pain   Weight Bearing Restrictions Yes   RUE Weight Bearing Per Order --    LUE Weight Bearing Per Order --    RLE Weight Bearing Per Order --    LLE Weight Bearing Per Order WBAT   ROM Restrictions No   Cognition   Arousal/Participation Cooperative   Subjective   Subjective pt reports that she is good and ready for therapy   QI: Sit to [address] Needed Physical assistance   Assistance Provided by [place] Less than 25%   Comment support for LLE   Sit to Lying CARE Score 3   QI: Lying to Sitting on Side of Bed   Assistance Needed Physical assistance   Assistance Provided by [place] Less than 25%   Comment support LLE   Lying to Sitting on Side of Bed CARE Score 3   QI: Sit to Stand   Assistance Needed Adaptive equipment;Supervision   Assistance Provided by [place] No physical assistance   Comment RW   Sit to Stand CARE Score 4   QI: Chair/Bed-to-Chair Transfer   Assistance Needed Supervision; Adaptive equipment   Assistance Provided by [place] No physical assistance   Comment RW   Chair/Bed-to-Chair Transfer CARE Score 4   Transfer Bed/Chair/Wheelchair   Limitations Noted In Balance; Endurance;LE Strength   Adaptive Equipment Roller Walker   Sit Pivot Supervision   Stand Pivot Supervision   Sit to Stand Supervision   Stand to Sit Supervision   Supine to Sit Assist x 1;Minimal Assist  (support LLE)   Sit to Supine Assist x 1;Minimal Assist  (support LLE)   Findings support LLE supine<>sit   QI: Walk 10 Feet   Assistance Needed Supervision   Assistance Provided by [place] No physical assistance   Comment RW   Walk 10 Feet CARE Score 4   QI: Walk 50 Feet with Two [facility] Provided by [place] No physical assistance   Comment RW   Walk 50 Feet with Two Turns CARE Score 4   Ambulation   Does the patient walk?  2  Yes   Primary Discharge Mode of Locomotion Walk   Walk Assist Level Supervision   Gait Pattern Slow Geetha;Decreased foot clearance; Forward Flexion;Decreased L stance; Improper weight shift; Step through; Decreased R stance   Assist Device Mis Sandoval Walked (feet) 50 ft   Limitations Noted In Balance; Endurance; Heel Strike;Speed;Strength;Swing   Findings VC to keep RW closer so not reaching while ambulating   Walking (FIM) 2 - Patient ambulates between 50 - 149 feet regardless of assist/device/set up   Wheelchair mobility   QI: Does the patient use a wheelchair? 0  No   Stairs   Type Stairs   # of Steps 3  (x2 (4"  stairs))   Weight Bearing Precautions LLE;WBAT   Assist Devices Bilateral Rail   Findings Ax1 for safetyPt uses proper sequence, at times R foot does not clear stair   Stairs (FIM) 1 - Patient goes up and down less than 4 stairs regardless of assist/device/set up   Therapeutic Interventions   Strengthening supine L SAQ 2x10   Flexibility gastroc, hamstrings passive stretch 2x60   Neuromuscular Re-Education negotiating  steps using B rails   Assessment   Treatment Assessment session focused on LLE strengthening and overall activity tolerance  Pt cont to demonstrate diminised L knee flexion during stair negotiation and ambulation  Pt performed all sit<>stand transfers, gait training, and stair activity safely and with proper sequence  LLE strength is biggest barrier to safe dc to home at this time  Pt would benefit from cont skilled PT to improved LLE strength, AROM, and WB for safe ambulation and full functional activity participation  Barriers to Discharge Decreased caregiver support; Inaccessible home environment   PT Barriers   Physical Impairment Decreased strength;Decreased range of motion;Decreased endurance;Decreased mobility; Impaired balance;Pain   Functional Limitation Car transfers; Ramp negotiation;Stair negotiation;Transfers; Walking   Plan   Treatment/Interventions Functional transfer training;LE strengthening/ROM; Elevations; Therapeutic exercise; Endurance training;Bed mobility;Gait training   Progress Progressing toward goals   Recommendation   Recommendation Outpatient PT   PT Therapy Minutes   PT Time In 1400   PT Time Out 1430   PT Total Time (minutes) 30   PT Mode of treatment - Individual (minutes) 0   PT Mode of treatment - Concurrent (minutes) 30   PT Mode of treatment - Group (minutes) 0   PT Mode of treatment - Co-treat (minutes) 0   PT Mode of Teatment - Total time(minutes) 30 minutes   Therapy Time missed   Time missed?  No

## 2018-04-20 NOTE — PROGRESS NOTES
Internal Medicine Progress Note  Patient: Cooper Dance  Age/sex: 82 y o  female  Medical Record #: [id number]      ASSESSMENT/PLAN:  Cooper Dance is seen and examined and management for following issues:    1  Left Intertrochanteric femur fx s/p IMN on 4/8/18 ([place]):  Pain control per primary team  WBAT  Has edema of left thigh = using thigh high SINGH on that leg and has improved    2  ABLA; s/p 2 units PRBC: improving - will continue to watch; no sx  3   Chronic diastolic heart failure: Bumex had been on hold  Resumed Bumex 0 5mg daily 4/14/18; no dizziness/SOB    4   CAD hx Stent x 2 circumflex '11: continue ASA 81mg, Atenolol and Lipitor    5  HTN: stable on home regimen of Norvasc 5mg daily and Atenolol 25mg daily  6   Hyperthyroidism: continue Methimazole; Repeat TSH/T4 in 4 weeks  Follow up with endocrinology    7  Reactive airway disease: No signs of acute flare  Continue Allegra and Singulair    8  DVT prophylaxis: Lovenox 40mg daily    9  Hx chronic large granular lymphocytic leukemia:  Sees Dr Ayanna Trujillo as OP; has not required tx    10  Hyperparathyroidism:  Calcium/Vit D; OP followup with Endocrine        Subjective: Patient seen and examined   No new or overnight issues     ROS:   GI: denies abdominal pain, change bowel habits or reflux symptoms  Neuro: No new neurologic changes  Respiratory: No Cough, SOB  Cardiovascular: No CP, palpitations     Scheduled Meds:    Current Facility-Administered Medications:  acetaminophen 975 mg Oral Q8H Wil Breen MD   amLODIPine 5 mg Oral Daily Sheri Rojas MD   aspirin 81 mg Oral Daily Sheri Rojas MD   atenolol 25 mg Oral Daily Sheri Rojas MD   atorvastatin 40 mg Oral After [facility] MD Alexandra   bisacodyl 10 mg Rectal Daily PRN Sheri Rojas MD   bumetanide 0 5 mg Oral Daily Marlon Gramajo PA-C   calcium carbonate 1 tablet Oral Daily With Riccardo Duverney, MD   cholecalciferol 2,000 Units Oral Daily Sheri Rojas MD   docusate sodium 100 mg Oral BID Hayden Luo MD   DULoxetine 30 mg Oral Daily Hayden Luo MD   enoxaparin 40 mg Subcutaneous Daily Hayden Luo MD   methimazole 5 mg Oral Daily Hayden Luo MD   oxyCODONE 10 mg Oral Q4H PRN Hayden Luo MD   oxyCODONE 5 mg Oral Q4H PRN Hayden Luo MD   pantoprazole 40 mg Oral Early Morning Hayden Luo MD   polyethylene glycol 17 g Oral Daily PRN Hayden Luo MD       Labs:       Results from last 7 days  Lab Units 04/19/18  0547 04/16/18  0513   WBC Thousand/uL 5 11 4 68   HEMOGLOBIN g/dL 8 4* 7 7*   HEMATOCRIT % 26 0* 24 0*   PLATELETS Thousands/uL 375 295       Results from last 7 days  Lab Units 04/19/18  0547 04/16/18  0513   SODIUM mmol/L 138 138   POTASSIUM mmol/L 4 1 4 3   CHLORIDE mmol/L 105 103   CO2 mmol/L 28 31   BUN mg/dL 17 19   CREATININE mg/dL 0 56* 0 54*   GLUCOSE RANDOM mg/dL 96 97   CALCIUM mg/dL 8 8 8 7                  Glucose (mg/dL)   Date Value   04/19/2018 96   04/16/2018 97   04/13/2018 100   04/10/2018 118       Labs reviewed    Physical Examination:  Vitals:   Vitals:    04/19/18 2018 04/20/18 0546 04/20/18 0959 04/20/18 1000   BP: 126/60 149/67 112/64 112/64   BP Location: Right arm      Pulse: 75 83  92   Resp: 18 20     Temp: 98 4 °F (36 9 °C) 98 5 °F (36 9 °C)     TempSrc: Oral Oral     SpO2: 98% 98%     Weight:       Height:           GEN: NAD  HEENT: NC/AT  RESP: BBS w/o crackles/wheeze/rhonci; resp unlabored  CV: +S1 S2, regular rate, no rubs/murmurs  ABD: soft, NT, ND, normal BS   : no valle  EXT: no edema of left lower leg but + mild edema of left hip and thigh (steadily improving)  Skin: no rashes  Neuro: AAO      [x ] Total time spent: 30 Mins and greater than 50% of this time was spent counseling/coordinating care        Angelia Todd, [facility]  Internal Medicine

## 2018-04-20 NOTE — PROGRESS NOTES
04/20/18 0830   Pain Assessment   Pain Assessment 0-10   Pain Score 2   Pain Type Surgical pain   Pain Location Leg   Pain Orientation Left   Hospital Pain Intervention(s) Ambulation/increased activity   Cognition   Overall Cognitive Status Impaired   Arousal/Participation Alert; Cooperative   Attention Within functional limits   Orientation Level Oriented X4   Memory Decreased short term memory   Following Commands Follows multistep commands with increased time or repetition   Subjective   Subjective reports that she is doing well and her leg is feeling good today   QI: Sit to Lying   Assistance Needed Supervision   Comment uses leg    Sit to Lying CARE Score 4   QI: Lying to Sitting on Side of Bed   Assistance Needed Physical assistance   Assistance Provided by [place] Less than 25%   Lying to Sitting on Side of Bed CARE Score 3   QI: Sit to Stand   Assistance Needed Supervision   Sit to Stand CARE Score 4   QI: Chair/Bed-to-Chair Transfer   Assistance Needed Supervision   Comment RW   Chair/Bed-to-Chair Transfer CARE Score 4   Transfer Bed/Chair/Wheelchair   Limitations Noted In [place]; Endurance; Sequencing;UE Strength;LE Strength   Adaptive Equipment Roller Walker   Stand Pivot Supervision   Sit to Stand Supervision   Stand to Sit Supervision   Supine to [facility]   Sit to Supine Supervision   Bed, Chair, Wheelchair Transfer (FIM) 4 - Patient requires steadying assist or light touching   QI: Walk 10 Feet   Assistance Needed Supervision   Walk 10 Feet CARE Score 4   QI: Walk 50 Feet with Two Turns   Assistance Needed Supervision   Walk 50 Feet with Two Turns CARE Score 4   QI: Walk 150 Feet   Assistance Needed Supervision   Walk 150 Feet CARE Score 4   Ambulation   Does the patient walk? 2  Yes   Primary Discharge Mode of Locomotion Walk   Walk Assist Level Supervision   Gait Pattern Slow Geetha;Decreased foot clearance; Forward Flexion;Narrow MIRIAM; Poor UE WB;Step to; Step through; Decreased L stance; Improper weight shift   Assist Device Roller Claire Sandoval Walked (feet) 150 ft   Limitations Noted In Endurance; Heel Strike;Speed;Strength;Swing   Findings VC to increase knee flexion on L and increasing step length with RLE   Walking (FIM) 5 - Patient requires verbal cues AND distance 150 feet or more, no rest   QI: 4 Steps   Assistance Needed Incidental touching   Assistance Provided by [place] Less than 25%   Comment CG   4 Steps CARE Score 3   Stairs   Type Ramp;Stairs   # of Steps 5   Weight Bearing Precautions WBAT;LLE   Assist Devices Roller Walker;Bilateral Rail   Stairs (FIM) 4 - Patient requires steadying assist or light touching AND patient goes up and down full flight (12- 14 stairs)   Therapeutic Interventions   Strengthening SLR (AAROM LLE) 10x2   Flexibility hamstring and gastroc 60"x2   Assessment   Treatment Assessment session focused on increasing strength and endurance for increased functional mobility; pt ambs with decreased knee flexion on LLE and short step with RLE; VC to increase step length and the L step became shorter and the R then matched, switched from step to to step through pattern; pt needs CG for 5 stairs with BHR and moves her RLE quickly due to fear of hurting her LLE; continue POC as per PT   Problem List Decreased strength;Decreased endurance;Decreased mobility; Decreased coordination;Decreased range of motion   Barriers to Discharge Inaccessible home environment;Decreased caregiver support   PT Barriers   Physical Impairment Decreased strength;Decreased range of motion;Decreased endurance;Decreased mobility; Decreased coordination   Functional Limitation Walking;Stair negotiation;Ramp negotiation;Car transfers;Transfers   Plan   Treatment/Interventions ADL retraining;Functional transfer training;LE strengthening/ROM; Elevations; Therapeutic exercise; Endurance training;Bed mobility;Gait training   Progress Progressing toward goals   Recommendation   Recommendation Outpatient PT   Equipment Recommended Walker   PT Therapy Minutes   PT Time In 0830   PT Time Out 0930   PT Total Time (minutes) 60   PT Mode of treatment - Individual (minutes) 60   PT Mode of treatment - Concurrent (minutes) 0   PT Mode of treatment - Group (minutes) 0   PT Mode of treatment - Co-treat (minutes) 0   PT Mode of Teatment - Total time(minutes) 60 minutes   Therapy Time missed   Time missed?  No

## 2018-04-21 ENCOUNTER — APPOINTMENT (INPATIENT)
Dept: RADIOLOGY | Facility: HOSPITAL | Age: 83
DRG: 560 | End: 2018-04-21
Payer: MEDICARE

## 2018-04-21 PROCEDURE — 97542 WHEELCHAIR MNGMENT TRAINING: CPT

## 2018-04-21 PROCEDURE — 97530 THERAPEUTIC ACTIVITIES: CPT

## 2018-04-21 PROCEDURE — 73552 X-RAY EXAM OF FEMUR 2/>: CPT

## 2018-04-21 PROCEDURE — 97110 THERAPEUTIC EXERCISES: CPT

## 2018-04-21 RX ADMIN — ACETAMINOPHEN 975 MG: 325 TABLET, FILM COATED ORAL at 13:03

## 2018-04-21 RX ADMIN — DOCUSATE SODIUM 100 MG: 100 CAPSULE, LIQUID FILLED ORAL at 08:36

## 2018-04-21 RX ADMIN — VITAMIN D, TAB 1000IU (100/BT) 2000 UNITS: 25 TAB at 08:36

## 2018-04-21 RX ADMIN — ENOXAPARIN SODIUM 40 MG: 40 INJECTION SUBCUTANEOUS at 08:40

## 2018-04-21 RX ADMIN — BUMETANIDE 0.5 MG: 1 TABLET ORAL at 08:38

## 2018-04-21 RX ADMIN — PANTOPRAZOLE SODIUM 40 MG: 40 TABLET, DELAYED RELEASE ORAL at 05:42

## 2018-04-21 RX ADMIN — AMLODIPINE BESYLATE 5 MG: 5 TABLET ORAL at 08:38

## 2018-04-21 RX ADMIN — OXYCODONE HYDROCHLORIDE 10 MG: 10 TABLET ORAL at 08:36

## 2018-04-21 RX ADMIN — ACETAMINOPHEN 975 MG: 325 TABLET, FILM COATED ORAL at 05:42

## 2018-04-21 RX ADMIN — DULOXETINE HYDROCHLORIDE 30 MG: 30 CAPSULE, DELAYED RELEASE ORAL at 08:38

## 2018-04-21 RX ADMIN — ATORVASTATIN CALCIUM 40 MG: 40 TABLET, FILM COATED ORAL at 17:25

## 2018-04-21 RX ADMIN — METHIMAZOLE 5 MG: 5 TABLET ORAL at 08:38

## 2018-04-21 RX ADMIN — OXYCODONE HYDROCHLORIDE 5 MG: 5 TABLET ORAL at 20:27

## 2018-04-21 RX ADMIN — DOCUSATE SODIUM 100 MG: 100 CAPSULE, LIQUID FILLED ORAL at 17:25

## 2018-04-21 RX ADMIN — Medication 1 TABLET: at 08:39

## 2018-04-21 RX ADMIN — ACETAMINOPHEN 975 MG: 325 TABLET, FILM COATED ORAL at 21:17

## 2018-04-21 RX ADMIN — ASPIRIN 81 MG 81 MG: 81 TABLET ORAL at 08:36

## 2018-04-21 RX ADMIN — OXYCODONE HYDROCHLORIDE 5 MG: 5 TABLET ORAL at 13:04

## 2018-04-21 RX ADMIN — ATENOLOL 25 MG: 25 TABLET ORAL at 08:39

## 2018-04-21 NOTE — PROGRESS NOTES
04/21/18 0900   Pain Assessment   Pain Assessment 0-10   Pain Score 5   Pain Type Acute pain   Pain Location Hip;Rib cage; Neck   Restrictions/Precautions   Precautions Fall Risk;Pain   Weight Bearing Restrictions Yes   LLE Weight Bearing Per Order WBAT   ROM Restrictions No   Grooming   Grooming (FIM) 0 - Activity does not occur   Bathing   Bathing (FIM) 0 - Activity does not occur   Tub/Shower Transfer   Tub Transfer (FIM) 0 - Activity does not occur   Shower Transfer (FIM) 0 - Activity does not occur   Dressing/Undressing Clothing   UB Dressing (FIM) 0 - Activity does not occur   LB Dressing (FIM) 0 - Activity does not occur   QI: Sit to Stand   Assistance Needed Supervision   Assistance Provided by [place] No physical assistance   Comment CS with RW   Sit to Stand CARE Score 4   QI: Chair/Bed-to-Chair Transfer   Assistance Needed Supervision   Assistance Provided by [place] No physical assistance   Chair/Bed-to-Chair Transfer CARE Score 4   Transfer Bed/Chair/Wheelchair   Limitations Noted In Balance; Endurance;Pain Management;UE Strength;LE Strength   Adaptive Equipment Roller Walker   Findings Pt demo'd good carryover with proper hand placement and was CS with RW due to inc fatigue  Bed, Chair, Wheelchair Transfer (FIM) 5 - Patient requires supervision/monitoring   Toileting   Toileting (FIM) 0 - Activity does not occur   Toilet Transfer   Toilet Transfer (FIM) 0 - Activity does not occur   Functional Standing Tolerance   Time 10 minutes   Activity Balloon toss activity with RW for support  Exercise Tools   Other Exercise Tool 1 Pt completed 3x10 non-resistive towel glide stretches in all planes to promote flexibility/ ROM to increase independence with functional transfers  Cognition   Overall Cognitive Status WFL   Arousal/Participation Alert; Cooperative   Attention Within functional limits   Orientation Level Oriented X4   Memory Within functional limits   Following Commands Follows multistep commands with increased time or repetition   Activity Tolerance   Activity Tolerance Patient limited by fatigue;Patient limited by pain   Medical Staff Made Aware RN made aware  Assessment   Treatment Assessment Pt seen by skilled OT services to work on BUE strengthening, endurance, standing tolerance, balance, and IADL tasks (managing finances)  Upon arrival to therapy, pt stated feel inc pain in typical (hip)/ atypical (rib cage) areas  Pt completed seated non-resistive ther-ex at table top to incorporate gentle stretching in prep for session  Pt then engaged in balloon toss activity while standing with support of RW for 10 minutes before requiring rest break  Pt then completed check register worksheet while seated on EOM unsupported  Pt was able to complete check register worksheet with 100% accuracy with min v/c's for carry over of directions from helper  Good carryover with task after v/c's Pt stated she handles her own finances at home by having bills automatically withdrawn from her account and periodically checking her bank statement online to ensure correct amounts were cleared  Pt also reported to therapist, in detail, her home routine with medications  Pt stated she uses pills organizers and keeps a written log of PRN medications/ abx  No issues noted  Pt cont to be limited by inc pain/ fatigue with activity, balance, strength, and weight bearing tolerance of LLE  Recommend that pt cont to work on ADL tasks, functional transfers, activity tolerance, strengthening, and endurance  Prognosis Good   Problem List Decreased strength;Decreased range of motion;Decreased endurance; Impaired balance;Decreased mobility;Pain;Orthopedic restrictions   Barriers to Discharge Decreased caregiver support; Inaccessible home environment   Plan   Treatment/Interventions ADL retraining;Functional transfer training;LE strengthening/ROM; Therapeutic exercise; Endurance training;Patient/family training;Equipment eval/education; Compensatory technique education   Progress Progressing toward goals   Recommendation   OT Discharge Recommendation Home with family support   OT Therapy Minutes   OT Time In 0900   OT Time Out 1000   OT Total Time (minutes) 60   OT Mode of treatment - Individual (minutes) 30   OT Mode of treatment - Concurrent (minutes) 30   OT Mode of treatment - Group (minutes) 0   OT Mode of treatment - Co-treat (minutes) 0   OT Mode of Teatment - Total time(minutes) 60 minutes   Therapy Time missed   Time missed?  No

## 2018-04-21 NOTE — PROGRESS NOTES
04/20/18 1000   Pain Assessment   Pain Assessment 0-10   Pain Score 3   Pain Type Surgical pain   Pain Location Leg   Pain Orientation Left   Restrictions/Precautions   Precautions Fall Risk;Pain   Weight Bearing Restrictions Yes   LLE Weight Bearing Per Order WBAT   ROM Restrictions No   QI: Oral Hygiene   Assistance Needed Set-up / [facility] Provided by [place] No physical assistance   Oral Hygiene CARE Score 5   Grooming   Able To Initiate Tasks; Acquire Items;Comb/Brush Hair;Wash/Dry Face;Brush/Clean Teeth;Wash/Dry Hands   Limitation Noted In Safety;Strength   Findings Seated in w/c at sink 2* to decreased standing tolerance  Grooming (FIM) 5 - Patient requires supervision/monitoring   QI: Shower/Bathe Self   Assistance Needed Supervision   Assistance Provided by [place] No physical assistance   Shower/Bathe Self CARE Score 4   [address]; Tub   Anticipated D/C Bath Style Shower   Able to [employer] No   Able to [employer] Temperature Yes   Able to Wash/Rinse/Dry (body part) Left Arm;Right Arm;L Upper Leg;R Upper Leg;L Lower Leg/Foot;R Lower Leg/Foot;Chest;Abdomen;Perineal Area; Buttocks   Limitations Noted in Balance; Coordination; Endurance; Safety;Strength   Positioning Seated;Standing   Adaptive Equipment Hand Held Shower;Longhand Reacher;Tub Bench; Shower Bars   Findings  Pt able to complete all bathing while seated on shower chair  Use of LH reacher to wash LLE  Bathing (FIM) 5 - Patient requires supervision/monitoring but completes 10/10 parts   Tub/Shower Transfer   Limitations Noted In Balance; Endurance; Safety;LE Strength   Adaptive Equipment Transfer Bench   Assessed Tub/shower combo   Findings Pt will traditionally complete walk in shower transfer having to step over small lip      Tub Transfer (FIM) 4 - Patient requires steadying assist or light touching   QI: Upper Body Dressing   Assistance Needed Supervision   Assistance Provided by [place] No physical assistance   Upper Body Dressing CARE Score 4   QI: Lower Body Dressing   Assistance Needed Adaptive equipment; Incidental touching   Assistance Provided by [place] No physical assistance   Comment Pt demos G carryover of LHAE  Steadying A while in stance for CM over hips  Lower Body Dressing CARE Score 4   QI: Putting On/Taking Off Footwear   Assistance Needed Supervision; Adaptive equipment;Verbal cues   Assistance Provided by [place] No physical assistance   Putting On/Taking Off Footwear CARE Score 4   Dressing/Undressing Clothing   Remove UB Clothes Pullover Shirt;Bra   Remove LB Clothes Undergarment;Pants;Socks; [address] UB Clothes Pullover Shirt;Bra   Don LB Clothes Pants; Undergarment;Socks; Shoes   Limitations Noted In Balance; Endurance; Safety;Strength   Adaptive Equipment Reacher;Dressing Stick;LH [place];Sock Aide   Positioning Supported Sit;Standing   Findings Pt demos increased carryover of all LHAE  Demos increased strength and ROM of LLE  UB Dressing (FIM) 5 - Patient requires supervision/monitoring   LB Dressing (FIM) 4 - Patient requires steadying assist or light touching   QI: Sit to Stand   Assistance Needed Supervision; Adaptive equipment   Assistance Provided by [place] No physical assistance   Sit to Stand CARE Score 4   QI: Chair/Bed-to-Chair Transfer   Assistance Needed Supervision   Assistance Provided by [place] No physical assistance   Chair/Bed-to-Chair Transfer CARE Score 4   Transfer Bed/Chair/Wheelchair   Limitations Noted In Balance; Endurance;UE Strength;LE Strength;Pain Management   Adaptive Equipment Roller Walker   Bed, Chair, Wheelchair Transfer (FIM) 4 - Patient requires steadying assist or light touching   Exercise Tools   UE Ergometer Pt completes 5 minutes prograde and an additional 5 minutes retrograde on the scifit on level 1 to increase overall endurance and UB strength for increased independence with ADL tasks   Pt tolerates well with one rest break in between to manage fatigue  Cognition   Overall Cognitive Status Impaired   Arousal/Participation Alert; Cooperative   Attention Within functional limits   Orientation Level Oriented X4   Memory Decreased short term memory   Following Commands Follows multistep commands with increased time or repetition   Activity Tolerance   Activity Tolerance Patient tolerated treatment well   Assessment   Treatment Assessment Pt participated in skilled OT services with focus on ADL retraining, functional transfers, UB strengthening, and endurance  Pt continues to demonstrate G carryover of LHAE education and demos increased independence with LB dressing this session  She continues to require CGA while in stance for CM over hips and for functional mobility with RW 2* to increased pain in LLE with increased activity  Pt reports she is responsible for all IADL tasks but will have minimal A for more difficult tasks such as grocery shopping from her son and daughter in law  Educated pt on walker tray, basket, and bag to increase independence with item retrieval/transport tasks with pt verbalizing understanding  To trial kitchen mobility next session  Pt will continue to benefit from skilled OT services with focus on IADL tasks  Prognosis Good   Problem List Decreased strength;Decreased endurance;Decreased mobility; Decreased coordination;Decreased range of motion   Plan   Treatment/Interventions ADL retraining;Functional transfer training; Therapeutic exercise; Endurance training;Equipment eval/education; Compensatory technique education; Bed mobility   Progress Progressing toward goals   OT Therapy Minutes   OT Time In 1000   OT Time Out 1130   OT Total Time (minutes) 90   OT Mode of treatment - Individual (minutes) 90   OT Mode of treatment - Concurrent (minutes) 0   OT Mode of treatment - Group (minutes) 0   OT Mode of treatment - Co-treat (minutes) 0   OT Mode of Teatment - Total time(minutes) 90 minutes   Therapy Time missed   Time missed?  No

## 2018-04-21 NOTE — PROGRESS NOTES
04/21/18 0830   Pain Assessment   Pain Assessment 0-10   Pain Score 5   Pain Type Acute pain   Pain Location Hip;Sternum; Shoulder   Pain Orientation Right;Left   Hospital Pain Intervention(s) Medication (See MAR); Cold applied  (@ end of session)   Restrictions/Precautions   Precautions Fall Risk;Pain   Weight Bearing Restrictions No   ROM Restrictions No   Cognition   Overall Cognitive Status WFL   Arousal/Participation Cooperative   Attention Within functional limits   Orientation Level Oriented X4   Memory Decreased short term memory   Following Commands Follows multistep commands with increased time or repetition   Subjective   Subjective Pt states with overall soreness in rib cage shoulder region as well as hip  meds given during session   QI: Roll Left and Right   Reason if not Attempted Activity not applicable   Roll Left and Right CARE Score 9   QI: Sit to Lying   Reason if not Attempted Activity not applicable   Sit to Lying CARE Score 9   QI: Lying to Sitting on Side of Bed   Reason if not Attempted Activity not applicable   Lying to Sitting on Side of Bed CARE Score 9   QI: Sit to [place] [address] Provided by [place] No physical assistance   Sit to Stand CARE Score 4   QI: Chair/Bed-to-Chair Transfer   Assistance Needed Supervision;Verbal cues   Assistance Provided by [place] No physical assistance   Chair/Bed-to-Chair Transfer CARE Score 4   Transfer Bed/Chair/Wheelchair   Limitations Noted In Coordination;Pain Management; Sequencing;LE Strength   Adaptive Equipment Roller Walker   Stand Pivot Supervision   Sit to Stand Supervision   Stand to JULIANA HERNANDEZ  [place], Chair, Wheelchair Transfer (FIM) 5 - Patient requires supervision/monitoring   QI: Car Transfer   Reason if not Attempted Activity not applicable   Car Transfer CARE Score 9   QI: Walk 10 Feet   Reason if not Attempted Activity not applicable   Walk 10 Feet CARE Score 9   QI: Walk 50 Feet with Two Turns   Reason if not Attempted Activity not applicable   Walk 50 Feet with Two Turns CARE Score 9   QI: Walk 150 Feet   Reason if not Attempted Activity not applicable   Walk 467 Feet CARE Score 9   QI: Walking 10 Feet on Uneven Surfaces   Reason if not Attempted Activity not applicable   Walking 10 Feet on Uneven Surfaces CARE Score 9   Ambulation   Does the patient walk? 2  Yes   Primary Discharge Mode of Locomotion Walk   QI: Wheel 50 Feet with Two [address] Provided by [place] No physical assistance   Wheel 50 Feet with Two Turns CARE Score 4   QI: Wheel 150 Feet   Reason if not Attempted Activity not applicable   Wheel 859 Feet CARE Score 9   Wheelchair mobility   QI: Does the patient use a wheelchair? 1  Yes   QI: Indicate the type of wheelchair 1  Manual   Wheelchair Assist Level Supervision   Method Right upper extremity; Left upper extremity;Right lower extremity; Left lower extremity   Needs Assist With Locking Brakes   Distance Level Surface (feet) 100 ft   Findings wc propulsion for LE coordination and inc L LE knee flexion ; trialed only to conserve energy at this time   Wheelchair (FIM) 5 - Patient requires supervision/monitoring AND wheels distance 150 feet or more, no rest   QI: 1 Step (Curb)   Reason if not Attempted Activity not applicable   1 Step (Curb) CARE Score 9   QI: 4 Steps   Reason if not Attempted Activity not applicable   4 Steps CARE Score 9   QI: 12 Steps   Reason if not Attempted Activity not applicable   12 Steps CARE Score 9   QI: Picking Up Object   Reason if not Attempted Activity not applicable   Picking Up Object CARE Score 9   QI: Toilet Transfer   Assistance Needed Supervision;Verbal cues   Assistance Provided by [place] No physical assistance   Comment grab bars and wc   Toilet Transfer CARE Score 4   Toilet Transfer   Surface Assessed Raised Toilet   Limitations Noted In Balance; Endurance; Sequencing;LE Strength   Adaptive Equipment Grab Bar   Positioning Concerns LE Support   Findings S cues for stand PT noted good coordination of UEs vs LE to perform transfer   Toilet Transfer (FIM) 5 - Patient requires supervision/monitoring   Therapeutic Interventions   Balance static standing at recliner chair x 5'   Assessment   Treatment Assessment Focused on energy conservation, pain mgmgt and endurnace throguh functional therapuetic tasks and wc proplusion  Pt presents with incd soreness and difficulty grasping RW w L UE due to pre morbid issues she conveyed during session regarding her neck, shoulders and OQ issues w rib cage  Pt received meds during session  Able to transition S level w most difficulty w properly transferring wt laterally at this time  Pt will cont to benefit from PT services to meet fucntioanl goals  Problem List Decreased strength;Decreased endurance;Decreased mobility; Decreased coordination;Decreased range of motion   Barriers to Discharge Decreased caregiver support; Inaccessible home environment   PT Barriers   Physical Impairment Decreased strength;Decreased range of motion;Decreased endurance;Decreased mobility; Impaired balance;Pain   Functional Limitation Car transfers; Ramp negotiation;Stair negotiation;Transfers; Walking   Plan   Treatment/Interventions Functional transfer training;LE strengthening/ROM; Therapeutic exercise; Endurance training   Progress Progressing toward goals   Recommendation   Recommendation Outpatient PT; Home with family support   PT Therapy Minutes   PT Time In 0830   PT Time Out 0900   PT Total Time (minutes) 30   PT Mode of treatment - Individual (minutes) 30   PT Mode of treatment - Concurrent (minutes) 0   PT Mode of treatment - Group (minutes) 0   PT Mode of treatment - Co-treat (minutes) 0   PT Mode of Teatment - Total time(minutes) 30 minutes   Therapy Time missed   Time missed?  No

## 2018-04-21 NOTE — PROGRESS NOTES
Internal Medicine Progress Note  Patient: Devante Cole  Age/sex: 82 y o  female  Medical Record #: [id number]      ASSESSMENT/PLAN:  Devante Cole is seen and examined and management for following issues:    1  Left Intertrochanteric femur fx s/p IMN on 4/8/18 ([place]):  Pain control per primary team  WBAT  Has edema of left thigh = using thigh high SINGH on that leg and has improved    2  ABLA; s/p 2 units PRBC: improving - will continue to watch; no sx  3   Chronic diastolic heart failure: Bumex had been on hold  Resumed Bumex 0 5mg daily 4/14/18; no dizziness/SOB    4   CAD hx Stent x 2 circumflex '11: continue ASA 81mg, Atenolol and Lipitor    5  HTN: stable on home regimen of Norvasc 5mg daily and Atenolol 25mg daily  6   Hyperthyroidism: continue Methimazole; Repeat TSH/T4 in 4 weeks  Follow up with endocrinology    7  Reactive airway disease: No signs of acute flare  Continue Allegra and Singulair    8  DVT prophylaxis: Lovenox 40mg daily    9  Hx chronic large granular lymphocytic leukemia:  Sees Dr Keesha Minor as OP; has not required tx    10  Hyperparathyroidism:  Calcium/Vit D; OP followup with Endocrine        Subjective: Patient seen and examined  She reports that she did a lot in therapy yesterday and is sore today  She denies any other complaints      ROS:   GI: denies abdominal pain, change bowel habits or reflux symptoms  Neuro: No new neurologic changes  Respiratory: No Cough, SOB  Cardiovascular: No CP, palpitations     Scheduled Meds:    Current Facility-Administered Medications:  acetaminophen 975 mg Oral Q8H Corinna El MD   amLODIPine 5 mg Oral Daily Angela Woods MD   aspirin 81 mg Oral Daily Angela Woods MD   atenolol 25 mg Oral Daily Angela Woods MD   atorvastatin 40 mg Oral After Courtney Hawthorne MD   bisacodyl 10 mg Rectal Daily PRN Angela Woods MD   bumetanide 0 5 mg Oral Daily Donny Isidro PA-C   calcium carbonate 1 tablet Oral Daily With Jose Fernandez MD cholecalciferol 2,000 Units Oral Daily Donal Dale MD   docusate sodium 100 mg Oral BID Donal Dale MD   DULoxetine 30 mg Oral Daily Donal Dale MD   enoxaparin 40 mg Subcutaneous Daily Donal Dale MD   methimazole 5 mg Oral Daily Donal Dale MD   oxyCODONE 10 mg Oral Q4H PRN Donal Dale MD   oxyCODONE 5 mg Oral Q4H PRN Donal Dale MD   pantoprazole 40 mg Oral Early Morning Donal Dale MD   polyethylene glycol 17 g Oral Daily PRN Donal Dale MD       Labs:       Results from last 7 days  Lab Units 04/19/18  0547 04/16/18  0513   WBC Thousand/uL 5 11 4 68   HEMOGLOBIN g/dL 8 4* 7 7*   HEMATOCRIT % 26 0* 24 0*   PLATELETS Thousands/uL 375 295       Results from last 7 days  Lab Units 04/19/18  0547 04/16/18  0513   SODIUM mmol/L 138 138   POTASSIUM mmol/L 4 1 4 3   CHLORIDE mmol/L 105 103   CO2 mmol/L 28 31   BUN mg/dL 17 19   CREATININE mg/dL 0 56* 0 54*   GLUCOSE RANDOM mg/dL 96 97   CALCIUM mg/dL 8 8 8 7                  Glucose (mg/dL)   Date Value   04/19/2018 96   04/16/2018 97   04/13/2018 100   04/10/2018 118       Labs reviewed    Physical Examination:  Vitals:   Vitals:    04/20/18 2113 04/21/18 0542 04/21/18 0838 04/21/18 0839   BP: 121/57 153/68 134/58 134/58   BP Location: Left arm      Pulse: 78 80  98   Resp: 17 20     Temp: 98 °F (36 7 °C) 98 8 °F (37 1 °C)     TempSrc: Oral Oral     SpO2: 98% 100%     Weight:       Height:           GEN: NAD  HEENT: NC/AT  RESP: BBS w/o crackles/wheeze/rhonci; resp unlabored  CV: +S1 S2, regular rate, no rubs/murmurs  ABD: soft, NT, ND, normal BS   : no valle  EXT: no edema of left lower leg but + mild edema of left hip and thigh (steadily improving)  Skin: no rashes  Neuro: AAO      [x ] Total time spent: 30 Mins and greater than 50% of this time was spent counseling/coordinating care        Laura Davis PA-C  Internal Medicine

## 2018-04-22 PROCEDURE — 97530 THERAPEUTIC ACTIVITIES: CPT

## 2018-04-22 PROCEDURE — 97116 GAIT TRAINING THERAPY: CPT

## 2018-04-22 PROCEDURE — 97110 THERAPEUTIC EXERCISES: CPT

## 2018-04-22 RX ADMIN — METHIMAZOLE 5 MG: 5 TABLET ORAL at 08:18

## 2018-04-22 RX ADMIN — ACETAMINOPHEN 975 MG: 325 TABLET, FILM COATED ORAL at 20:27

## 2018-04-22 RX ADMIN — OXYCODONE HYDROCHLORIDE 5 MG: 5 TABLET ORAL at 20:28

## 2018-04-22 RX ADMIN — PANTOPRAZOLE SODIUM 40 MG: 40 TABLET, DELAYED RELEASE ORAL at 05:51

## 2018-04-22 RX ADMIN — VITAMIN D, TAB 1000IU (100/BT) 2000 UNITS: 25 TAB at 08:21

## 2018-04-22 RX ADMIN — BUMETANIDE 0.5 MG: 1 TABLET ORAL at 14:08

## 2018-04-22 RX ADMIN — ACETAMINOPHEN 975 MG: 325 TABLET, FILM COATED ORAL at 05:51

## 2018-04-22 RX ADMIN — Medication 1 TABLET: at 08:18

## 2018-04-22 RX ADMIN — DOCUSATE SODIUM 100 MG: 100 CAPSULE, LIQUID FILLED ORAL at 17:02

## 2018-04-22 RX ADMIN — ATENOLOL 25 MG: 25 TABLET ORAL at 08:21

## 2018-04-22 RX ADMIN — OXYCODONE HYDROCHLORIDE 10 MG: 10 TABLET ORAL at 08:19

## 2018-04-22 RX ADMIN — AMLODIPINE BESYLATE 5 MG: 5 TABLET ORAL at 08:19

## 2018-04-22 RX ADMIN — ENOXAPARIN SODIUM 40 MG: 40 INJECTION SUBCUTANEOUS at 08:17

## 2018-04-22 RX ADMIN — ATORVASTATIN CALCIUM 40 MG: 40 TABLET, FILM COATED ORAL at 17:02

## 2018-04-22 RX ADMIN — DOCUSATE SODIUM 100 MG: 100 CAPSULE, LIQUID FILLED ORAL at 08:21

## 2018-04-22 RX ADMIN — ACETAMINOPHEN 975 MG: 325 TABLET, FILM COATED ORAL at 14:13

## 2018-04-22 RX ADMIN — ASPIRIN 81 MG 81 MG: 81 TABLET ORAL at 08:19

## 2018-04-22 RX ADMIN — DULOXETINE HYDROCHLORIDE 30 MG: 30 CAPSULE, DELAYED RELEASE ORAL at 08:18

## 2018-04-22 NOTE — PROGRESS NOTES
04/22/18 0830   Pain Assessment   Pain Assessment No/denies pain   Hospital Pain Intervention(s) Medication (See MAR)   Restrictions/Precautions   Precautions Fall Risk;Pain   Weight Bearing Restrictions No   ROM Restrictions No   Cognition   Overall Cognitive Status WFL   Arousal/Participation Alert; Cooperative   Attention Within functional limits   Orientation Level Oriented X4   Memory Within functional limits   Following Commands Follows multistep commands with increased time or repetition   Subjective   Subjective Pt states w contd concern over OA exacerbation however is amenable to therapy   QI: Roll Left and Right   Assistance Needed Supervision   Assistance Provided by Leon No physical assistance   Roll Left and Right CARE Score 4   QI: Sit to [address] Provided by [place] No physical assistance   Sit to Lying CARE Score 4   QI: Lying to Sitting on Side of Bed   Assistance Needed Supervision   Assistance Provided by Leon No physical assistance   Lying to Sitting on Side of Bed CARE Score 4   QI: Sit to [address] Provided by Leon No physical assistance   Sit to Stand CARE Score 4   Bed Mobility   Able to Roll Left to Right;Right to Left   Adaptive Equipment (mat ther ex)   Findings trialed w elevated hi lo mat to simulate pt home bed approx 28" high S w use of leg  and verbal cues for technqiue and working efficiently   QI: Chair/Bed-to-Chair Transfer   Assistance Needed Supervision   Assistance Provided by Leon No physical assistance   Chair/Bed-to-Chair Transfer CARE Score 4   Transfer Bed/Chair/Wheelchair   Limitations Noted In Balance; Coordination; Endurance; Sequencing;LE Strength   Adaptive Equipment Roller Walker   Stand Pivot Supervision   Sit to Stand Supervision   Stand to Sit Supervision   Supine to Sit Supervision   Sit to Supine Supervision   Bed, Chair, Wheelchair Transfer (FIM) 5 - Patient requires supervision/monitoring   QI: Car Transfer   Reason if not Attempted Activity not applicable   Car Transfer CARE Score 9   QI: [address] Provided by [place] No physical assistance   Walk 10 Feet CARE Score 4   QI: Walk 50 Feet with Two [address] Provided by [place] No physical assistance   Walk 50 Feet with Two Turns CARE Score 4   QI: Walk 150 Feet   Reason if not Attempted Activity not applicable   Walk 994 Feet CARE Score 9   QI: Walking 10 Feet on Uneven Surfaces   Reason if not Attempted Activity not applicable   Walking 10 Feet on Uneven Surfaces CARE Score 9   Ambulation   Does the patient walk? 2  Yes   Primary Discharge Mode of Locomotion Walk   Walk Assist Level Supervision   Gait Pattern Slow Geetha;Decreased foot clearance;Narrow MIRIAM; Improper weight shift   Assist Device Roller Claire Sandoval Walked (feet) 50 ft  (- 75 x 2)   Limitations Noted In Balance;Device Management; Heel Strike;Speed;Strength;Swing   Findings verbal cues for inc lateral wt shift L for R LE clearance and articulating L LE knee flexion for swing to HS to develop fluid gait   Walking (FIM) 2 - Patient ambulates between 50 - 149 feet regardless of assist/device/set up   QI: Wheel 50 Feet with Two Turns   Reason if not Attempted Activity not applicable   Wheel 50 Feet with Two Turns CARE Score 9   QI: Wheel 150 Feet   Reason if not Attempted Activity not applicable   Wheel 732 Feet CARE Score 9   Wheelchair mobility   QI: Does the patient use a wheelchair? 1  Yes   QI: Indicate the type of wheelchair 1   Manual   QI: 1 Step (Curb)   Reason if not Attempted Activity not applicable   1 Step (Curb) CARE Score 9   QI: 4 Steps   Reason if not Attempted Activity not applicable   4 Steps CARE Score 9   QI: 12 Steps   Reason if not Attempted Activity not applicable   12 Steps CARE Score 9   QI: Picking Up Object   Reason if not Attempted Activity not applicable   Picking Up Object CARE Score 9   QI: Toilet Transfer   Reason if not Attempted Activity not applicable   Toilet Transfer CARE Score 9   Therapeutic Interventions   Strengthening supine mat ther ex L hip Abd vs SB x 30    Flexibility gentle HS, hip Add L stretch x 1' ea   Equipment Use   NuStep L1 x 12'   Assessment   Treatment Assessment Focused on L LE strengthening and flexibility and endurance through functional therapuetic tasks  Trialed bed mobility w leg  simulating home bed w pt able to get on/off hi lo mat  Deficet noted in L hip abd due to edema  Able to progress from AAROM> AROM to complete L hip abd on mat  versus SB however needed to utilize leg  to manuever getting off mat at this time  Short distrance ambualtion trials w cues for inc L lat wt shifitng to clear R LE and inc L knee flexion to articluate HS  Pt will cont to benefit from PT services to meet functional goals  Problem List Decreased strength;Decreased range of motion;Decreased endurance; Impaired balance;Decreased mobility;Pain;Orthopedic restrictions   Barriers to Discharge Decreased caregiver support; Inaccessible home environment   PT Barriers   Physical Impairment Decreased strength;Decreased range of motion;Decreased endurance;Decreased mobility; Impaired balance;Pain   Functional Limitation Car transfers; Ramp negotiation;Stair negotiation;Transfers; Walking   Plan   Treatment/Interventions Functional transfer training;LE strengthening/ROM; Elevations; Therapeutic exercise; Endurance training;Bed mobility;Gait training   Progress Progressing toward goals   Recommendation   Recommendation Home with family support; Outpatient PT   PT Therapy Minutes   PT Time In 0830   PT Time Out 0930   PT Total Time (minutes) 60   PT Mode of treatment - Individual (minutes) 60   PT Mode of treatment - Concurrent (minutes) 0   PT Mode of treatment - Group (minutes) 0   PT Mode of treatment - Co-treat (minutes) 0   PT Mode of Teatment - Total time(minutes) 60 minutes   Therapy Time missed   Time missed?  No

## 2018-04-22 NOTE — PROGRESS NOTES
Internal Medicine Progress Note  Patient: Callum Madison  Age/sex: 82 y o  female  Medical Record #: [id number]      ASSESSMENT/PLAN:  Callum Madison is seen and examined and management for following issues:    1  Left Intertrochanteric femur fx s/p IMN on 4/8/18 ([place]):  Pain control per primary team  WBAT  Has edema of left thigh = using thigh high SINGH on that leg and has improved    2  ABLA; s/p 2 units PRBC: improving - will continue to watch; no sx  3   Chronic diastolic heart failure: Bumex had been on hold  Resumed Bumex 0 5mg daily 4/14/18; no dizziness/SOB    4   CAD hx Stent x 2 circumflex '11: continue ASA 81mg, Atenolol and Lipitor    5  HTN: stable on home regimen of Norvasc 5mg daily and Atenolol 25mg daily  6   Hyperthyroidism: continue Methimazole; Repeat TSH/T4 in 4 weeks  Follow up with endocrinology    7  Reactive airway disease: No signs of acute flare  Continue Allegra and Singulair    8  DVT prophylaxis: Lovenox 40mg daily    9  Hx chronic large granular lymphocytic leukemia:  Sees Dr Lucy Funes as OP; has not required tx    10  Hyperparathyroidism:  Calcium/Vit D; OP followup with Endocrine        Subjective: Patient seen and examined  She reports that her muscle pain is improving  She is eating and sleeping well  She denies any other complaints      ROS:   GI: denies abdominal pain, change bowel habits or reflux symptoms  Neuro: No new neurologic changes  Respiratory: No Cough, SOB  Cardiovascular: No CP, palpitations     Scheduled Meds:    Current Facility-Administered Medications:  acetaminophen 975 mg Oral Q8H [address] Wendy Reyes MD   amLODIPine 5 mg Oral Daily Wendy Reyes MD   aspirin 81 mg Oral Daily Wendy Reyes MD   atenolol 25 mg Oral Daily Wendy Reyes MD   atorvastatin 40 mg Oral After Gerhardt Rolling, MD   bisacodyl 10 mg Rectal Daily PRN Wendy Reyes MD   bumetanide 0 5 mg Oral Daily Blaire Gil PA-C   calcium carbonate 1 tablet Oral Daily With Breakfast Marcio Susan Hector MD   cholecalciferol 2,000 Units Oral Daily Maureen Manriquez MD   docusate sodium 100 mg Oral BID Maureen Manriquez MD   DULoxetine 30 mg Oral Daily Maureen Manriquez MD   enoxaparin 40 mg Subcutaneous Daily Maureen Manriquez MD   methimazole 5 mg Oral Daily Maureen Manriquez MD   oxyCODONE 10 mg Oral Q4H PRN Maureen Manriquez MD   oxyCODONE 5 mg Oral Q4H PRN Maureen Manriquez MD   pantoprazole 40 mg Oral Early Morning Maureen Manriquez MD   polyethylene glycol 17 g Oral Daily PRN Maureen Manriquez MD       Labs:       Results from last 7 days  Lab Units 04/19/18  0547 04/16/18  0513   WBC Thousand/uL 5 11 4 68   HEMOGLOBIN g/dL 8 4* 7 7*   HEMATOCRIT % 26 0* 24 0*   PLATELETS Thousands/uL 375 295       Results from last 7 days  Lab Units 04/19/18  0547 04/16/18  0513   SODIUM mmol/L 138 138   POTASSIUM mmol/L 4 1 4 3   CHLORIDE mmol/L 105 103   CO2 mmol/L 28 31   BUN mg/dL 17 19   CREATININE mg/dL 0 56* 0 54*   GLUCOSE RANDOM mg/dL 96 97   CALCIUM mg/dL 8 8 8 7                  Glucose (mg/dL)   Date Value   04/19/2018 96   04/16/2018 97   04/13/2018 100   04/10/2018 118       Labs reviewed    Physical Examination:  Vitals:   Vitals:    04/21/18 0839 04/21/18 1322 04/21/18 2045 04/22/18 0815   BP: 134/58 125/59 125/60 154/68   BP Location:  Right arm Right arm Right arm   Pulse: 98 76 71 91   Resp:  20 18    Temp:  98 6 °F (37 °C) 98 °F (36 7 °C)    TempSrc:  Oral Oral    SpO2:  99% 100%    Weight:       Height:           GEN: NAD  HEENT: NC/AT  RESP: BBS w/o crackles/wheeze/rhonci; resp unlabored  CV: +S1 S2, regular rate, no rubs/murmurs  ABD: soft, NT, ND, normal BS   : no valle  EXT: no edema of left lower leg but + mild edema of left hip and thigh (steadily improving)  Skin: no rashes  Neuro: AAO      [ X ] Total time spent: 30 Mins and greater than 50% of this time was spent counseling/coordinating care        Brennan Quezada PA-C  Internal Medicine

## 2018-04-23 LAB
ANION GAP SERPL CALCULATED.3IONS-SCNC: 5 MMOL/L (ref 4–13)
BASOPHILS # BLD AUTO: 0.01 THOUSANDS/ΜL (ref 0–0.1)
BASOPHILS NFR BLD AUTO: 0 % (ref 0–1)
BUN SERPL-MCNC: 16 MG/DL (ref 5–25)
CALCIUM SERPL-MCNC: 8.9 MG/DL (ref 8.3–10.1)
CHLORIDE SERPL-SCNC: 103 MMOL/L (ref 100–108)
CO2 SERPL-SCNC: 29 MMOL/L (ref 21–32)
CREAT SERPL-MCNC: 0.65 MG/DL (ref 0.6–1.3)
EOSINOPHIL # BLD AUTO: 0.18 THOUSAND/ΜL (ref 0–0.61)
EOSINOPHIL NFR BLD AUTO: 4 % (ref 0–6)
ERYTHROCYTE [DISTWIDTH] IN BLOOD BY AUTOMATED COUNT: 16.5 % (ref 11.6–15.1)
GFR SERPL CREATININE-BSD FRML MDRD: 84 ML/MIN/1.73SQ M
GLUCOSE SERPL-MCNC: 100 MG/DL (ref 65–140)
HCT VFR BLD AUTO: 29.2 % (ref 34.8–46.1)
HGB BLD-MCNC: 9.4 G/DL (ref 11.5–15.4)
LYMPHOCYTES # BLD AUTO: 1.21 THOUSANDS/ΜL (ref 0.6–4.47)
LYMPHOCYTES NFR BLD AUTO: 28 % (ref 14–44)
MCH RBC QN AUTO: 31.6 PG (ref 26.8–34.3)
MCHC RBC AUTO-ENTMCNC: 32.2 G/DL (ref 31.4–37.4)
MCV RBC AUTO: 98 FL (ref 82–98)
MONOCYTES # BLD AUTO: 0.4 THOUSAND/ΜL (ref 0.17–1.22)
MONOCYTES NFR BLD AUTO: 9 % (ref 4–12)
NEUTROPHILS # BLD AUTO: 2.44 THOUSANDS/ΜL (ref 1.85–7.62)
NEUTS SEG NFR BLD AUTO: 59 % (ref 43–75)
NRBC BLD AUTO-RTO: 0 /100 WBCS
PLATELET # BLD AUTO: 406 THOUSANDS/UL (ref 149–390)
PMV BLD AUTO: 8.9 FL (ref 8.9–12.7)
POTASSIUM SERPL-SCNC: 4 MMOL/L (ref 3.5–5.3)
RBC # BLD AUTO: 2.97 MILLION/UL (ref 3.81–5.12)
SODIUM SERPL-SCNC: 137 MMOL/L (ref 136–145)
WBC # BLD AUTO: 4.26 THOUSAND/UL (ref 4.31–10.16)

## 2018-04-23 PROCEDURE — 80048 BASIC METABOLIC PNL TOTAL CA: CPT | Performed by: NURSE PRACTITIONER

## 2018-04-23 PROCEDURE — 97110 THERAPEUTIC EXERCISES: CPT

## 2018-04-23 PROCEDURE — 99232 SBSQ HOSP IP/OBS MODERATE 35: CPT | Performed by: PHYSICAL MEDICINE & REHABILITATION

## 2018-04-23 PROCEDURE — 97530 THERAPEUTIC ACTIVITIES: CPT

## 2018-04-23 PROCEDURE — 85025 COMPLETE CBC W/AUTO DIFF WBC: CPT | Performed by: NURSE PRACTITIONER

## 2018-04-23 PROCEDURE — 97535 SELF CARE MNGMENT TRAINING: CPT

## 2018-04-23 RX ADMIN — DOCUSATE SODIUM 100 MG: 100 CAPSULE, LIQUID FILLED ORAL at 08:41

## 2018-04-23 RX ADMIN — OXYCODONE HYDROCHLORIDE 5 MG: 5 TABLET ORAL at 21:05

## 2018-04-23 RX ADMIN — ATENOLOL 25 MG: 25 TABLET ORAL at 08:40

## 2018-04-23 RX ADMIN — DOCUSATE SODIUM 100 MG: 100 CAPSULE, LIQUID FILLED ORAL at 17:26

## 2018-04-23 RX ADMIN — BUMETANIDE 0.5 MG: 1 TABLET ORAL at 08:42

## 2018-04-23 RX ADMIN — PANTOPRAZOLE SODIUM 40 MG: 40 TABLET, DELAYED RELEASE ORAL at 05:22

## 2018-04-23 RX ADMIN — ENOXAPARIN SODIUM 40 MG: 40 INJECTION SUBCUTANEOUS at 08:39

## 2018-04-23 RX ADMIN — AMLODIPINE BESYLATE 5 MG: 5 TABLET ORAL at 08:41

## 2018-04-23 RX ADMIN — VITAMIN D, TAB 1000IU (100/BT) 2000 UNITS: 25 TAB at 08:41

## 2018-04-23 RX ADMIN — OXYCODONE HYDROCHLORIDE 10 MG: 10 TABLET ORAL at 07:07

## 2018-04-23 RX ADMIN — ASPIRIN 81 MG 81 MG: 81 TABLET ORAL at 08:40

## 2018-04-23 RX ADMIN — METHIMAZOLE 5 MG: 5 TABLET ORAL at 08:43

## 2018-04-23 RX ADMIN — ACETAMINOPHEN 975 MG: 325 TABLET, FILM COATED ORAL at 05:22

## 2018-04-23 RX ADMIN — DULOXETINE HYDROCHLORIDE 30 MG: 30 CAPSULE, DELAYED RELEASE ORAL at 08:43

## 2018-04-23 RX ADMIN — ACETAMINOPHEN 975 MG: 325 TABLET, FILM COATED ORAL at 14:19

## 2018-04-23 RX ADMIN — ATORVASTATIN CALCIUM 40 MG: 40 TABLET, FILM COATED ORAL at 17:26

## 2018-04-23 RX ADMIN — ACETAMINOPHEN 975 MG: 325 TABLET, FILM COATED ORAL at 21:04

## 2018-04-23 RX ADMIN — POLYETHYLENE GLYCOL 3350 17 G: 17 POWDER, FOR SOLUTION ORAL at 17:26

## 2018-04-23 RX ADMIN — OXYCODONE HYDROCHLORIDE 10 MG: 10 TABLET ORAL at 11:35

## 2018-04-23 RX ADMIN — Medication 1 TABLET: at 08:40

## 2018-04-23 NOTE — PROGRESS NOTES
04/23/18 1230   Pain Assessment   Pain Assessment 0-10   Pain Score 5   Pain Location Leg   Pain Orientation Left   Restrictions/Precautions   Precautions Fall Risk;Pain   LLE Weight Bearing Per Order WBAT   Cognition   Overall Cognitive Status WFL   Arousal/Participation Alert; Cooperative   Attention Within functional limits   Orientation Level Oriented X4   Memory Within functional limits   Following Commands Follows multistep commands with increased time or repetition   Subjective   Subjective reports she will try as much as she can   QI: Sit to Stand   Assistance Needed Supervision   Sit to Stand CARE Score 4   QI: Chair/Bed-to-Chair Transfer   Assistance Needed Supervision   Comment DS with RW   Chair/Bed-to-Chair Transfer CARE Score 4   Transfer Bed/Chair/Wheelchair   Limitations Noted In Pain Management;UE Strength;LE Strength   Adaptive Equipment Roller Walker   Stand Pivot Supervision   Sit to Stand Supervision   Stand to Sit Supervision   Findings DS   Bed, Chair, Wheelchair Transfer (FIM) 5 - Patient requires supervision/monitoring   QI: Walk 10 Feet   Assistance Needed Supervision   Comment DS with RW   Walk 10 Feet CARE Score 4   QI: Walk 50 Feet with Two Turns   Assistance Needed Supervision   Comment RW   Walk 50 Feet with Two Turns CARE Score 4   QI: Walk 150 Feet   Assistance Needed Supervision   Comment RW   Walk 150 Feet CARE Score 4   Ambulation   Does the patient walk? 2  Yes   Primary Discharge Mode of Locomotion Walk   Walk Assist Level Supervision   Gait Pattern Antalgic; Inconsistant Geetha; Slow Geetha;Decreased foot clearance; Forward Flexion;Narrow MIRIAM;Step to;Decreased L stance; Improper weight shift   Assist Device Mis Sandoval Walked (feet) 150 ft  (x2)   Limitations Noted In Endurance; Heel Strike;Posture;Speed;Strength;Swing   Findings VC to increase step length   Walking (FIM) 5 - Patient requires verbal cues AND distance 150 feet or more, no rest   QI: 4 Steps Assistance Needed Supervision   Comment [facility]   4 Steps CARE Score 4   QI: 12 Steps   Assistance Needed Supervision   Comment R   12 Steps CARE Score 4   Stairs   Type Stairs;Curb;Ramp   # of Steps 12   Weight Bearing Precautions LLE;WBAT   Assist Devices Bilateral Rail   Findings 6 inch curb   Stairs (FIM) 5 - Patient requires supervision/monitoring AND goes up and down full flight (12- 14 stairs)   Therapeutic Interventions   Strengthening NIKIA, rachna 10x2 1# CW;    Equipment Use   NuStep 12 mins level 2   Assessment   Treatment Assessment pt was able to amb up and down ramp on G floor with S, slow speed and short steps, decreased L stance; 12 stairs with S up and down one at a time with BHR; trialed 6" curb with RW and she was able to navigate appropriately after demonstration; pt ambs very slowly due to fear of re-injury; continue POC as per PT   Problem List Decreased strength;Decreased range of motion;Decreased endurance;Decreased mobility;Pain;Orthopedic restrictions   Barriers to Discharge Decreased caregiver support   PT Barriers   Physical Impairment Decreased strength;Decreased range of motion;Decreased endurance;Decreased mobility;Orthopedic restrictions;Pain   Functional Limitation Stair negotiation; Walking   Plan   Treatment/Interventions ADL retraining;Functional transfer training;LE strengthening/ROM; Elevations; Therapeutic exercise; Endurance training;Bed mobility;Gait training   Progress Progressing toward goals   Recommendation   Recommendation Outpatient PT   Equipment Recommended Walker   PT Therapy Minutes   PT Time In 1230   PT Time Out 1330   PT Total Time (minutes) 60   PT Mode of treatment - Individual (minutes) 30   PT Mode of treatment - Concurrent (minutes) 30   PT Mode of treatment - Group (minutes) 0   PT Mode of treatment - Co-treat (minutes) 0   PT Mode of Teatment - Total time(minutes) 60 minutes   Therapy Time missed   Time missed?  No

## 2018-04-23 NOTE — PROGRESS NOTES
Progress Note - Melba Dickens 82 y o  female MRN: [id number]    Unit/Bed#: -01 Encounter: [id number]            Subjective:   D/W patient FU XR results     Objective:     ROS  Gen: denies recent wt loss   Psych: denies mood change    Vitals: Blood pressure 122/57, pulse 87, temperature 98 5 °F (36 9 °C), temperature source Oral, resp  rate 18, height 5' 1" (1 549 m), weight 59 kg (130 lb 1 1 oz), SpO2 98 %      Physical Exam:     Gen:        NAD   Neck:   trachea midline  Lungs:  respirations unlabored   Heart:    + S1 and S2   Abdomen:    Soft, non-tender  Extremities: + B/L LE edema L> R, Rt 2nd toe--> no tenderness to palpation, no erythema, no swelling, no warmth, full pain free active and passive ROM (not currently painful at all per pt)   Psych: mood/affect appropriate  Neurologic: awake, alert, appropriately answering questions     Functional :  Mobility: CG-sup  Tx: CG  ADLs: min-sup        Current Facility-Administered Medications:  acetaminophen 975 mg Oral Q8H [facility] Henny Payment, MD   amLODIPine 5 mg Oral Daily Gerarda Payment, MD   aspirin 81 mg Oral Daily Gerarda Payment, MD   atenolol 25 mg Oral Daily Gerarda Payment, MD   atorvastatin 40 mg Oral After Gali Vincent MD   bisacodyl 10 mg Rectal Daily PRN Gerarda Payment, MD   bumetanide 0 5 mg Oral Daily Yesenia Patel PA-C   calcium carbonate 1 tablet Oral Daily With Deanne Goodman MD   cholecalciferol 2,000 Units Oral Daily Gerarda Payment, MD   docusate sodium 100 mg Oral BID Gerarda Payment, MD   DULoxetine 30 mg Oral Daily Gerarda Payment, MD   enoxaparin 40 mg Subcutaneous Daily Gerarda Payment, MD   methimazole 5 mg Oral Daily Gerarda Payment, MD   oxyCODONE 10 mg Oral Q4H PRN Gerarda Payment, MD   oxyCODONE 5 mg Oral Q4H PRN Gerarda Payment, MD   pantoprazole 40 mg Oral Early Morning Gerarda Payment, MD   polyethylene glycol 17 g Oral Daily PRN Gerarda Payment, MD moncadaacodyl    oxyCODONE    oxyCODONE    polyethylene glycol      Assessment:  Pt is 81 yo female with L IT fx s/p IM nail fixation by Dr Mroe Main on 4/8    Plan:    Rehabilitation: cont PT/OT for ambulatory/ADL dysfxn    L IT fx: s/p IM nail fixation by Dr More Main on 4/8 (SPEP/UPEP, vit D level WNL); FU XR of 4/21 showed healing fx, intact hardware, & stable alignment         Hyperparathyroidism: on home vit D 2000 units QD, d/w endocrine and recommended starting pt on 1000 mg calcium carbonate qd (1250 mg formulation per tab is what is on formulary)      Hyperthyroidism: elevated thyroid microsomal antibody, elevated thyroid stimulating immunoglobulin, decreased TSH, elevated free T4, likely autoimmune etiology, seen by endocrine in acute care and started on tapazole and have recommended repeat free T4 in 4 wks and FU in 6 wks with endocrine     bladder CA: CT A/P of 3/26/18 did not reveal any findings to suggest recurrent or new urogenital neoplasm; follows with Dr Josiephine Kocher     CLL: follows with Dr Jose Hinds who per last visit note felt pt was hematologically stable      CAD: s/p stents, on home regimen of ASA 81 mg qd, atenolol 25 mg qd, and lipitor 40 mg qPM; OP FU with Dr Nilsa Ross     HTN: on home regimen of norvasc 5 mg qd and atenolol 25 mg qd      GERD: on home dose of protonix 40 mg qd (per pt will occasionally take BID if she feels it is very bad that day)      seasonal allergies: at home takes prn allegra and prn singulair; currently asymptomatic per pt     grade I DD w/chronic LE edema: per IM recs in acute care pt resumed by IM on home bumex 0 5 mg qd      Depression: on home cymbalta 30 mg qd      Osteoporosis: on home vitamin D 2000 units qd (vit D level of 4/9 WNL at 47 9), also on prolia as OP, managed by her rheumatologist, seen by endocrine and recommend FU with her rheumatologist for further management and DEXA scan at her rheumatologist discretion (SPEP/UPEP w/o monoclonal bands, vit D level WNL)     intermittent Rt 2nd toe pain: not currently painful at all per pt; no tenderness to palpation, no erythema, no swelling, no warmth, full pain free active and passive ROM; bedside evaluation/examination done by Dr Tyrel Westfall and felt no further imaging/intervention is warranted at this time (per Dr Kumar Armijo no need for formal consult order to be placed at this time but may do so if symptoms change or worsen)     Anemia: ABLA, Hg currently improved to 9 4  Thrombocytosis: mild at 406 (); likely reactive  Leukopenia: mild at 4 26 (LLN 4 31), diff WNL      DVT ppx: SCDs, lovenox x 28 days post-op per ortho  Dispo: reteam     Incidental findings of CT A/P of 3/26/18:  1) b/l renal cysts: per report unchanged compared to previous study of 9/25/17  2) rt renal nodules: per report unchanged compared to previous study of 9/25/17  3) hepatic cysts: per report unchanged compared to previous study of 9/25/17  4) pancreatic cysts: per report unchanged compared to previous study of 9/25/17  5) ventral hernia containing adipose: OP FU w/PCP     Other incidental findings:  6) PVCs: OP FU with Dr Em Marquez (pt's cardiologist)  7) elevated peak PA pressure: OP FU with Dr Em Marquez (pt's cardiologist)

## 2018-04-23 NOTE — PROGRESS NOTES
04/23/18 1100   Pain Assessment   Pain Assessment 0-10   Pain Score 4   Pain Type Acute pain   Pain Location Leg   Pain Orientation Left   Restrictions/Precautions   LLE Weight Bearing Per Order WBAT   General   Change In Medical/Functional Status progressed to DS 4/23/2018 @ 1100   Cognition   Overall Cognitive Status WFL   Arousal/Participation Alert; Cooperative   Attention Within functional limits   Orientation Level Oriented X4   Memory Within functional limits   Following Commands Follows multistep commands with increased time or repetition   Subjective   Subjective reports that she is feeling better but feels her pain meds are wearing off at this time   QI: Sit to Stand   Assistance Needed Supervision   Sit to Stand CARE Score 4   QI: Chair/Bed-to-Chair Transfer   Assistance Needed Supervision   Comment RW   Chair/Bed-to-Chair Transfer CARE Score 4   Transfer Bed/Chair/Wheelchair   Limitations Noted In Confidence;Pain Management;UE Strength;LE Strength   Adaptive Equipment Roller Walker   Stand Pivot Supervision   Sit to Stand Supervision   Stand to [employer] Transfer Supervision   Bed, Chair, Wheelchair Transfer (FIM) 5 - Patient requires supervision/monitoring   QI: Car Transfer   Assistance Needed Supervision   Comment RW   Car Transfer CARE Score 4   QI: Walk 10 Feet   Assistance Needed Supervision   Walk 10 Feet CARE Score 4   QI: Walk 50 Feet with Two Turns   Assistance Needed Supervision   Walk 50 Feet with Two Turns CARE Score 4   QI: Walk 150 Feet   Assistance Needed Supervision   Walk 150 Feet CARE Score 4   Ambulation   Does the patient walk? 2  Yes   Primary Discharge Mode of Locomotion Walk   Walk Assist Level Supervision   Gait Pattern Inconsistant Geetha; Slow Geetha;Decreased foot clearance; Forward Flexion;Narrow MIRIAM;Step to;Decreased L stance; Improper weight shift   Assist Device Mis Sandoval Walked (feet) 150 ft  (x2)   Limitations Noted In Endurance; Heel Strike;Speed;Strength;Swing   Findings very short steps and she is able to increase length with VC   Walking (FIM) 5 - Patient requires verbal cues AND distance 150 feet or more, no rest   Therapeutic Interventions   Flexibility hamstring and gastroc 60"x2   Assessment   Treatment Assessment session focused on mobility and DS trial; pt is safe for DS and was progressed, nursing notified and communication boards were changed; pt ambs with decreased step length on BLE and antalgic gait, VC given to increase step length and she is able to but unable to maintain for a long distance, ambs very slow; pt needed VC for car xfer but then was able to demonstrate with S; pt explained to therapist what DS meant and she demonstrated understanding; continue POC as per PT   Problem List Decreased strength;Decreased range of motion;Decreased endurance;Decreased mobility;Pain;Orthopedic restrictions   Barriers to Discharge Decreased caregiver support   PT Barriers   Physical Impairment Decreased strength;Decreased range of motion;Decreased endurance;Decreased mobility;Orthopedic restrictions;Pain   Functional Limitation Stair negotiation; Walking   Plan   Treatment/Interventions ADL retraining;Functional transfer training;LE strengthening/ROM; Elevations; Therapeutic exercise; Endurance training;Bed mobility;Gait training   Progress Progressing toward goals   Recommendation   Recommendation Outpatient PT; Home with family support   Equipment Recommended Walker   PT Therapy Minutes   PT Time In 1100   PT Time Out 1130   PT Total Time (minutes) 30   PT Mode of treatment - Individual (minutes) 30   PT Mode of treatment - Concurrent (minutes) 0   PT Mode of treatment - Group (minutes) 0   PT Mode of treatment - Co-treat (minutes) 0   PT Mode of Teatment - Total time(minutes) 30 minutes   Therapy Time missed   Time missed?  No

## 2018-04-23 NOTE — PROGRESS NOTES
Internal Medicine Progress Note  Patient: Marciano Jaquez  Age/sex: 82 y o  female  Medical Record #: [id number]      ASSESSMENT/PLAN:  Marciano Jaquez is seen and examined and management for following issues:    1  Left Intertrochanteric femur fx s/p IMN on 4/8/18 ([place]):  Pain control per primary team  WBAT  Has edema of left thigh = using thigh high SINGH on that leg and has improved    2  ABLA; s/p 2 units PRBC: steadily improving - will continue to watch; no sx  3   Chronic diastolic heart failure: Bumex had been on hold  Resumed Bumex 0 5mg daily 4/14/18; no dizziness/SOB    4   CAD hx Stent x 2 circumflex '11: continue ASA 81mg, Atenolol and Lipitor    5  HTN: stable on home regimen of Norvasc 5mg daily and Atenolol 25mg daily  6   Hyperthyroidism: continue Methimazole; Repeat TSH/T4 in 4 weeks  Follow up with endocrinology    7  Reactive airway disease: No signs of acute flare  Continue Allegra and Singulair    8  DVT prophylaxis: Lovenox 40mg daily    9  Hx chronic large granular lymphocytic leukemia:  Sees Dr Karina Crespo as OP; has not required tx    10  Hyperparathyroidism:  Calcium/Vit D; OP followup with Endocrine        Subjective: Patient seen and examined  She reports that her muscle pain is improving  She is eating and sleeping well  She denies any other complaints      ROS:   GI: denies abdominal pain, change bowel habits or reflux symptoms  Neuro: No new neurologic changes  Respiratory: No Cough, SOB  Cardiovascular: No CP, palpitations     Scheduled Meds:    Current Facility-Administered Medications:  acetaminophen 975 mg Oral Q8H [address] Lloyd Jarrell MD   amLODIPine 5 mg Oral Daily Lloyd Jarrell MD   aspirin 81 mg Oral Daily Lloyd Jarrell MD   atenolol 25 mg Oral Daily Lloyd Jarrell MD   atorvastatin 40 mg Oral After Shelby Mancia MD   bisacodyl 10 mg Rectal Daily PRN Lloyd Jarrell MD   bumetanide 0 5 mg Oral Daily Fer Puckett PA-C   calcium carbonate 1 tablet Oral Daily With Wendy Beck MD   cholecalciferol 2,000 Units Oral Daily [facility] [place], MD   docusate sodium 100 mg Oral BID [facility] [place], MD   DULoxetine 30 mg Oral Daily [facility] [place], MD   enoxaparin 40 mg Subcutaneous Daily Patricia [place], MD   methimazole 5 mg Oral Daily [facility] [place], MD   oxyCODONE 10 mg Oral Q4H PRN [facility] [place], MD   oxyCODONE 5 mg Oral Q4H PRN [facility] [place], MD   pantoprazole 40 mg Oral Early Morning [facility] [place], MD   polyethylene glycol 17 g Oral Daily PRN [facility] [place], MD       Labs:       Results from last 7 days  Lab Units 04/23/18  0621 04/19/18  0547   WBC Thousand/uL 4 26* 5 11   HEMOGLOBIN g/dL 9 4* 8 4*   HEMATOCRIT % 29 2* 26 0*   PLATELETS Thousands/uL 406* 375       Results from last 7 days  Lab Units 04/23/18  0621 04/19/18  0547   SODIUM mmol/L 137 138   POTASSIUM mmol/L 4 0 4 1   CHLORIDE mmol/L 103 105   CO2 mmol/L 29 28   BUN mg/dL 16 17   CREATININE mg/dL 0 65 0 56*   GLUCOSE RANDOM mg/dL 100 96   CALCIUM mg/dL 8 9 8 8                  Glucose (mg/dL)   Date Value   04/23/2018 100   04/19/2018 96   04/16/2018 97   04/13/2018 100       Labs reviewed    Physical Examination:  Vitals:   Vitals:    04/22/18 1427 04/22/18 2016 04/23/18 0552 04/23/18 0832   BP: 131/61 145/65 124/59 122/57   BP Location: Right arm Right arm Right arm Right arm   Pulse: 75 79 83 87   Resp: 18 18 18    Temp: 98 1 °F (36 7 °C) 98 2 °F (36 8 °C) 98 5 °F (36 9 °C)    TempSrc: Oral Oral Oral    SpO2: 100% 100% 98%    Weight:       Height:           GEN: NAD  HEENT: NC/AT  RESP: BBS w/o crackles/wheeze/rhonci; resp unlabored  CV: +S1 S2, regular rate, no rubs/murmurs  ABD: soft, NT, ND, normal BS   : no valle  EXT: no edema of left lower leg but + mild edema of left hip and thigh (steadily improving)  Skin: no rashes  Neuro: AAO      [ X ] Total time spent: 30 Mins and greater than 50% of this time was spent counseling/coordinating care        Yecenia Avila  Internal Medicine

## 2018-04-23 NOTE — PROGRESS NOTES
04/23/18 0700   Pain Assessment   Pain Assessment 0-10   Pain Score 7   Pain Type Acute pain;Surgical pain   Pain Location [facility]   Hospital Pain Intervention(s) Distraction; Emotional support  (nurse provided pain meds at beginning of session)   Response to Interventions tolerated with therapy   Restrictions/Precautions   Precautions Fall Risk;Pain   Weight Bearing Restrictions No   RUE Weight Bearing Per Order WBAT   LUE Weight Bearing Per Order WBAT   RLE Weight Bearing Per Order WBAT   LLE Weight Bearing Per Order WBAT   QI: Oral Hygiene   Assistance Needed Supervision   Assistance Provided by [place] No physical assistance   Oral Hygiene CARE Score 4   Grooming   Able To Initiate Tasks;Comb/Brush Hair;Wash/Dry Face;Brush/Clean Teeth;Wash/Dry Hands   Limitation Noted In Strength;Timeliness   Adaptive Equipment (RW)   Findings Pt participated in grooming in stance at sink with  Rw, requiring S to wash/dry face/hands and brush teeth  Pt demo G safety and use of RW for balance and support  Grooming (FIM) 5 - Patient requires supervision/monitoring   QI: Shower/Bathe Self   Assistance Needed Supervision   Assistance Provided by [place] No physical assistance   Shower/Bathe Self CARE Score 4   Bathing   Assessed Bath Style Shower   Anticipated D/C Bath Style Shower   Able to 4 the stars No   Able to Wash/Rinse/Dry (body part) Right Arm;Left Arm;L Upper Leg;R Upper Leg;L Lower Leg/Foot;R Lower Leg/Foot;Chest;Abdomen;Perineal Area; Buttocks   Limitations Noted in Balance; Coordination; Endurance   Positioning Seated;Standing   Findings  Pt participated bathing with use of tub bench and grab bars, requiring S  Pt demo ability to wash 10/10 parts, remaining seated to bathe Ub  Pt demo ability to bathe LB by bending to bathe lower Le/feet  Pt used grab bars to stand to bathe arden and buttocks      Bathing (FIM) 5 - Patient requires supervision/monitoring but completes 10/10 parts   Tub/Shower Transfer   Limitations Noted In Balance; Coordination; Endurance;ROM;UE Strength;LE Strength   Adaptive Equipment Grab Bars;Transfer Bench   Assessed Shower   Findings Pt participated shower transfer with use of Rw and tub bench, requiring S  Pt demo G safety  Shower Transfer (FIM) 5 - Patient requires supervision/monitoring   QI: Upper Body [address] Provided by [place] No physical assistance   Upper Body Dressing CARE Score 4   QI: Lower Body Dressing   Assistance Needed Supervision   Assistance Provided by [place] No physical assistance   Lower Body Dressing CARE Score 4   QI: Putting On/Taking Off [address] [place] [address] Provided by [place] No physical assistance   Putting On/Taking Off Footwear CARE Score 4   QI: Picking Up Object   Reason if not Attempted Safety concerns   Picking Up Object CARE Score 88   Dressing/Undressing Clothing   Remove UB Clothes Other  (hospital gown )   Remove LB Clothes Undergarment;Socks   Don UB Clothes Pullover Shirt;Bra   Don LB Clothes Pants; Undergarment;Socks; Shoes   Limitations Noted In Balance; Coordination; Endurance;Strength;Timeliness   Adaptive Equipment Reacher;Sock Aide   Positioning Supported Sit;Standing   Findings Pt participated in dressing seated in a chair, demoing ability to dress UB with S  Pt demo ability to thread LB clothing while seated, with use of RW to stand to don pants over hips  Pt demo ability to don TEDs with use of sockaid  Pt demo ability to don shoes with use of LH shoe horn      UB Dressing (FIM) 5 - Patient requires supervision/monitoring   LB Dressing (FIM) 5 - Patient requires supervision/monitoring   QI: Sit to Stand   Assistance Needed Supervision   Assistance Provided by [place] No physical assistance   Sit to Stand CARE Score 4   QI: Chair/Bed-to-Chair Transfer   Assistance Needed Supervision   Assistance Provided by [place] No physical assistance   Chair/Bed-to-Chair Transfer CARE Score 4   Transfer Bed/Chair/Wheelchair   Limitations Noted In Balance; Coordination; Endurance;Pain Management;UE Strength;LE Strength   Adaptive Equipment Roller Walker   Stand Pivot Supervision   Stand to Sit Supervision   Supine to Sit Supervision   Findings Pt completed multiple transfers throughout ADL session requiring S,    Bed, Chair, Wheelchair Transfer (FIM) 5 - Patient requires supervision/monitoring   QI: [address] Provided by [place] No physical assistance   Toileting Hygiene CARE Score 4   Toileting   Able to [address] down yes, up yes  Able to Manage Clothing Closures Yes   Manage Hygiene Bladder   Limitations Noted In Balance; Coordination;UE Strength;LE Strength   Adaptive Equipment Grab Bar; Other  (RW)   Findings pt demo ability to complete hygiene and CM with G safety   Toileting (FIM) 5 - Patient requires supervision/monitoring   QI: Toilet Transfer   Assistance Needed Supervision   Assistance Provided by [place] No physical assistance   Toilet Transfer CARE Score 4   Toilet Transfer   Surface Assessed Standard Commode   Transfer Technique Standard   Limitations Noted In Balance; Endurance;UE Strength;LE Strength   Adaptive Equipment Grab Bar  (RW)   Findings Pt complete std transfer with ryder of Rw to toilet, with use of grab abrs to lower, requiring S  Toilet Transfer (FIM) 5 - Patient requires supervision/monitoring   Kitchen Mobility   Kitchen-Mobility Level Walker   Kitchen Activity Retrieve items;Transport items; Other (Comment)   Kitchen Mobility Comments Pt participated kitchen mobility activity of making hot tea top simulate home environment/situation with use of Rw  Pt demo ability to retrieve items at varying heights and appropriately transport them across counter  Pt demo G safety retrieving items and appropriately using counter/walker for proximal support/ balance   Pt demo ability to make hot tea with use of microwave to simulate home situation, demoing G safety  Recommended use of hinged walker tray for d/c to increase safety and transport in home  Pt  able to complete kitchen mobility with support of RW mod I     Cognition   Overall Cognitive Status [facility]   Arousal/Participation Alert; Cooperative   Attention Within functional limits   Orientation Level Oriented X4   Memory Within functional limits   Following Commands Follows multistep commands with increased time or repetition   Assessment   Treatment Assessment Pt participated in skilled OT session with focus on ADLs and kitchen mobility to increase independence in ADLs  Pt tolerated session well  Pt participated in bathing, dressing, and grooming see details above  Pt performed ADL at S level  Pt participated in kitchen mobility activity at mod I level with use of Rw, see details above  Pt trialed for DS in room with use of Rw  Pt demo G safety with toilet transfer, CM , and hygiene during toileting  PT and nursing made aware  Pt continues to be limited bt decreased endurance, decreased activity tolerance, pain, decreased UE strength, decreased LE strength,and  decreased dynamic standing balance  Pt would benefit from continued skilled OT services with focus on above deficits per POC  Prognosis Good   Problem List Decreased strength;Decreased range of motion;Decreased endurance; Impaired balance;Decreased mobility;Pain;Orthopedic restrictions   Barriers to Discharge Decreased caregiver support; Inaccessible home environment   Plan   Treatment/Interventions ADL retraining;Functional transfer training;LE strengthening/ROM; Therapeutic exercise; Endurance training;Patient/family training;Equipment eval/education   Progress Progressing toward goals   Recommendation   OT Discharge Recommendation Home with daily checks   OT Therapy Minutes   OT Time In 0700   OT Time Out 0830   OT Total Time (minutes) 90   OT Mode of treatment - Individual (minutes) 90   OT Mode of treatment - Concurrent (minutes) 0   OT Mode of treatment - Group (minutes) 0   OT Mode of treatment - Co-treat (minutes) 0   OT Mode of Teatment - Total time(minutes) 90 minutes   Therapy Time missed   Time missed?  No

## 2018-04-24 PROCEDURE — 97530 THERAPEUTIC ACTIVITIES: CPT

## 2018-04-24 PROCEDURE — 97110 THERAPEUTIC EXERCISES: CPT

## 2018-04-24 PROCEDURE — 97116 GAIT TRAINING THERAPY: CPT

## 2018-04-24 PROCEDURE — 99232 SBSQ HOSP IP/OBS MODERATE 35: CPT | Performed by: PHYSICAL MEDICINE & REHABILITATION

## 2018-04-24 PROCEDURE — 97535 SELF CARE MNGMENT TRAINING: CPT

## 2018-04-24 RX ADMIN — METHIMAZOLE 5 MG: 5 TABLET ORAL at 08:25

## 2018-04-24 RX ADMIN — OXYCODONE HYDROCHLORIDE 10 MG: 10 TABLET ORAL at 12:39

## 2018-04-24 RX ADMIN — ATENOLOL 25 MG: 25 TABLET ORAL at 08:24

## 2018-04-24 RX ADMIN — OXYCODONE HYDROCHLORIDE 10 MG: 10 TABLET ORAL at 08:23

## 2018-04-24 RX ADMIN — VITAMIN D, TAB 1000IU (100/BT) 2000 UNITS: 25 TAB at 08:24

## 2018-04-24 RX ADMIN — Medication 1 TABLET: at 08:24

## 2018-04-24 RX ADMIN — ACETAMINOPHEN 975 MG: 325 TABLET, FILM COATED ORAL at 14:41

## 2018-04-24 RX ADMIN — ACETAMINOPHEN 975 MG: 325 TABLET, FILM COATED ORAL at 05:39

## 2018-04-24 RX ADMIN — ASPIRIN 81 MG 81 MG: 81 TABLET ORAL at 08:23

## 2018-04-24 RX ADMIN — ENOXAPARIN SODIUM 40 MG: 40 INJECTION SUBCUTANEOUS at 08:24

## 2018-04-24 RX ADMIN — ACETAMINOPHEN 975 MG: 325 TABLET, FILM COATED ORAL at 21:06

## 2018-04-24 RX ADMIN — POLYETHYLENE GLYCOL 3350 17 G: 17 POWDER, FOR SOLUTION ORAL at 17:15

## 2018-04-24 RX ADMIN — BUMETANIDE 0.5 MG: 1 TABLET ORAL at 08:25

## 2018-04-24 RX ADMIN — DOCUSATE SODIUM 100 MG: 100 CAPSULE, LIQUID FILLED ORAL at 08:24

## 2018-04-24 RX ADMIN — DOCUSATE SODIUM 100 MG: 100 CAPSULE, LIQUID FILLED ORAL at 17:10

## 2018-04-24 RX ADMIN — DULOXETINE HYDROCHLORIDE 30 MG: 30 CAPSULE, DELAYED RELEASE ORAL at 08:25

## 2018-04-24 RX ADMIN — PANTOPRAZOLE SODIUM 40 MG: 40 TABLET, DELAYED RELEASE ORAL at 05:39

## 2018-04-24 RX ADMIN — AMLODIPINE BESYLATE 5 MG: 5 TABLET ORAL at 08:24

## 2018-04-24 RX ADMIN — ATORVASTATIN CALCIUM 40 MG: 40 TABLET, FILM COATED ORAL at 17:10

## 2018-04-24 NOTE — PROGRESS NOTES
04/24/18 0830   Pain Assessment   Pain Assessment 0-10   Pain Score 5   Pain Type Acute pain;Surgical pain   Pain Location Hip   Pain Orientation Left   Hospital Pain Intervention(s) Medication (See MAR)   Restrictions/Precautions   Precautions Fall Risk;Pain   General   Change In Medical/Functional Status cont to be DS with RW in room   Subjective   Subjective pt c/o increase in pain this morning; pain meds given start of session   QI: Roll Left and Right   Reason if not Attempted Activity not applicable   Roll Left and Right CARE Score 9   QI: Sit to Lying   Reason if not Attempted Activity not applicable   Sit to Lying CARE Score 9   QI: Lying to Sitting on Side of Bed   Reason if not Attempted Activity not applicable   Lying to Sitting on Side of Bed CARE Score 9   QI: Sit to Stand   Assistance Needed Set-up / clean-up   Sit to Stand CARE Score 5   QI: Chair/Bed-to-Chair Transfer   Assistance Needed Set-up / clean-up; Adaptive equipment   Chair/Bed-to-Chair Transfer CARE Score 5   Transfer Bed/Chair/Wheelchair   Limitations Noted In Pain Management;LE Strength   Adaptive Equipment Roller Walker   All Transfer Supervision   Findings DS   Bed, Chair, Wheelchair Transfer (FIM) 5 - Patient requires supervision/monitoring   QI: Car Transfer   Reason if not Attempted Activity not applicable   Car Transfer CARE Score 9   QI: Walk 10 Feet   Assistance Needed Supervision; Adaptive equipment   Walk 10 Feet CARE Score 4   QI: Walk 50 Feet with Two Turns   Assistance Needed Supervision; Adaptive equipment   Walk 50 Feet with Two Turns CARE Score 4   QI: Walk 150 Feet   Reason if not Attempted Activity not applicable   Walk 716 Feet CARE Score 9   QI: Walking 10 Feet on Uneven Surfaces   Reason if not Attempted Activity not applicable   Walking 10 Feet on Uneven Surfaces CARE Score 9   Ambulation   Does the patient walk? 2   Yes   Primary Discharge Mode of Locomotion Walk   Walk Assist Level Supervision   Gait Pattern Antalgic; Inconsistant Geetha; Improper weight shift;Decreased L stance   Assist Device Roller Claire Sandoval Walked (feet) 100 ft   Limitations Noted In Endurance; Sequencing;Speed;Strength   Findings instructed start of gait training to improve step length and improve step through gait pattern   Walking (FIM) 2 - Patient ambulates between 50 - 149 feet regardless of assist/device/set up   QI: Wheel 50 Feet with Two Turns   Reason if not Attempted Activity not applicable   Wheel 50 Feet with Two Turns CARE Score 9   QI: Wheel 150 Feet   Reason if not Attempted Activity not applicable   Wheel 767 Feet CARE Score 9   Wheelchair mobility   QI: Does the patient use a wheelchair? 0  No   QI: 1 Step (Curb)   Reason if not Attempted Activity not applicable   1 Step (Curb) CARE Score 9   QI: 4 Steps   Reason if not Attempted Activity not applicable   4 Steps CARE Score 9   QI: 12 Steps   Reason if not Attempted Activity not applicable   12 Steps CARE Score 9   QI: Picking Up Object   Reason if not Attempted Activity not applicable   Picking Up Object CARE Score 9   QI: Toilet Transfer   Reason if not Attempted Activity not applicable   Toilet Transfer CARE Score 9   Therapeutic Interventions   Strengthening seated LAQ and hip flex x30 each, B/L   STS x10 for functional strengthening   Assessment   Treatment Assessment session cont to focus on LE strenghtening and activity tolerance to progress with functional mobility and safety  pt cont to be limited due to pain but after LE exs and stretching pt able to tolerate more  pt to cont focus on LE strenghtening/ROM, safety and activity tolerance to progress with functional mobility and Ind  Problem List Decreased strength;Decreased endurance; Impaired balance;Decreased mobility; Decreased safety awareness;Pain   Barriers to Discharge Inaccessible home environment;Decreased caregiver support   PT Barriers   Physical Impairment Decreased strength;Decreased endurance; Impaired balance;Decreased mobility; Decreased safety awareness;Pain   Functional Limitation Walking;Ramp negotiation   Plan   Treatment/Interventions Functional transfer training;LE strengthening/ROM; Endurance training;Gait training   Progress Progressing toward goals   Recommendation   Recommendation Home PT   PT Therapy Minutes   PT Time In 0830   PT Time Out 0900   PT Total Time (minutes) 30   PT Mode of treatment - Individual (minutes) 30   PT Mode of treatment - Concurrent (minutes) 0   PT Mode of treatment - Group (minutes) 0   PT Mode of treatment - Co-treat (minutes) 0   PT Mode of Teatment - Total time(minutes) 30 minutes   Therapy Time missed   Time missed?  No

## 2018-04-24 NOTE — PROGRESS NOTES
Internal Medicine Progress Note  Patient: Erendira Roberts  Age/sex: 82 y o  female  Medical Record #: [id number]      ASSESSMENT/PLAN:  Erendira Roberts is seen and examined and management for following issues:    1  Left Intertrochanteric femur fx s/p IMN on 4/8/18 ([place]):  Pain control per primary team  WBAT  Edema of left thigh = has improved    2  ABLA; s/p 2 units PRBC: steadily improving - will continue to watch; no sx  3   Chronic diastolic heart failure: Bumex had been on hold  Resumed Bumex 0 5mg daily 4/14/18; no dizziness/SOB    4   CAD hx Stent x 2 circumflex '11: continue ASA 81mg, Atenolol and Lipitor    5  HTN: stable on home regimen of Norvasc 5mg daily and Atenolol 25mg daily  6   Hyperthyroidism: continue Methimazole; Repeat TSH/T4 in 4 weeks  Follow up with endocrinology    7  Reactive airway disease: No signs of acute flare  Continue Allegra and Singulair    8  DVT prophylaxis: Lovenox 40mg daily    9  Hx chronic large granular lymphocytic leukemia:  Sees Dr Makayla Sauceda as OP; has not required tx    10  Hyperparathyroidism:  Calcium/Vit D; OP followup with Endocrine        Subjective: Patient seen and examined  She is eating and sleeping well  She denies any  complaints      ROS:   GI: denies abdominal pain, change bowel habits or reflux symptoms  Neuro: No new neurologic changes  Respiratory: No Cough, SOB  Cardiovascular: No CP, palpitations     Scheduled Meds:    Current Facility-Administered Medications:  acetaminophen 975 mg Oral Q8H Austin Rodney MD   amLODIPine 5 mg Oral Daily Sangeetha Melgoza MD   aspirin 81 mg Oral Daily Sangeetha Melgoza MD   atenolol 25 mg Oral Daily Sangeetha Melgoza MD   atorvastatin 40 mg Oral After Elicia Frank MD   bisacodyl 10 mg Rectal Daily PRN Sangeetha Melgoza MD   bumetanide 0 5 mg Oral Daily Criss Rowe PA-C   calcium carbonate 1 tablet Oral Daily With Cas Porter MD   cholecalciferol 2,000 Units Oral Daily Sangeetha Melgoza MD   docusate sodium 100 mg Oral BID Luis Manuel Class, MD   DULoxetine 30 mg Oral Daily Luis Manuel Class, MD   enoxaparin 40 mg Subcutaneous Daily Luis Manuel Class, MD   methimazole 5 mg Oral Daily Luis Manuel Class, MD   oxyCODONE 10 mg Oral Q4H PRN Luis Manuel Class, MD   oxyCODONE 5 mg Oral Q4H PRN Luis Manuel Class, MD   pantoprazole 40 mg Oral Early Morning Luis Manuel Class, MD   polyethylene glycol 17 g Oral Daily PRN Luis Manuel Class, MD       Labs:       Results from last 7 days  Lab Units 04/23/18  0621 04/19/18  0547   WBC Thousand/uL 4 26* 5 11   HEMOGLOBIN g/dL 9 4* 8 4*   HEMATOCRIT % 29 2* 26 0*   PLATELETS Thousands/uL 406* 375       Results from last 7 days  Lab Units 04/23/18  0621 04/19/18  0547   SODIUM mmol/L 137 138   POTASSIUM mmol/L 4 0 4 1   CHLORIDE mmol/L 103 105   CO2 mmol/L 29 28   BUN mg/dL 16 17   CREATININE mg/dL 0 65 0 56*   GLUCOSE RANDOM mg/dL 100 96   CALCIUM mg/dL 8 9 8 8                  Glucose (mg/dL)   Date Value   04/23/2018 100   04/19/2018 96   04/16/2018 97   04/13/2018 100       Labs reviewed    Physical Examination:  Vitals:   Vitals:    04/23/18 1322 04/23/18 2028 04/24/18 0629 04/24/18 0822   BP: 133/63 115/58 145/66 135/63   BP Location: Left arm Left arm Right arm Left arm   Pulse: 80 73 76 91   Resp: 18 19 18    Temp: 97 9 °F (36 6 °C) 98 °F (36 7 °C) 98 °F (36 7 °C)    TempSrc: Oral Oral Oral    SpO2: 100% 98% 100%    Weight:       Height:           GEN: NAD  HEENT: NC/AT  RESP: BBS w/o crackles/wheeze/rhonci; resp unlabored  CV: +S1 S2, regular rate, no rubs/murmurs  ABD: soft, NT, ND, normal BS   : no valle  EXT: no edema of left lower leg but + mild edema of left hip and thigh (steadily improving)  Skin: no rashes  Neuro: AAO      [ X ] Total time spent: 30 Mins and greater than 50% of this time was spent counseling/coordinating care        Dino Granados, [address]   Internal Medicine

## 2018-04-24 NOTE — PROGRESS NOTES
Progress Note - Alpha Erp 82 y o  female MRN: [id number]    Unit/Bed#: -01 Encounter: [id number]            Subjective:   Pt w/o complaint currently     Objective:     ROS  Gen: denies recent wt loss   Psych: denies mood change    Vitals: Blood pressure 131/58, pulse 74, temperature 98 4 °F (36 9 °C), temperature source Oral, resp  rate 18, height 5' 1" (1 549 m), weight 59 kg (130 lb 1 1 oz), SpO2 99 %      Physical Exam:     Gen:        NAD   Neck:   trachea midline  Lungs:  respirations unlabored   Heart:    + S1 and S2   Abdomen:    Soft, non-tender  Extremities: + B/L LE edema L> R, Rt 2nd toe--> no tenderness to palpation, no erythema, no swelling, no warmth, full pain free active and passive ROM (not currently painful at all per pt)   Psych: mood/affect appropriate  Neurologic: awake, alert, appropriately answering questions     Functional :  Mobility: CG-sup  Tx: CG  ADLs: min-sup        Current Facility-Administered Medications:  acetaminophen 975 mg Oral Q8H [address] Constantin Limon MD   amLODIPine 5 mg Oral Daily Constantin Limon MD   aspirin 81 mg Oral Daily Constantin Limon MD   atenolol 25 mg Oral Daily Constantin Limon MD   atorvastatin 40 mg Oral After Frederick Ambriz MD   bisacodyl 10 mg Rectal Daily PRN Constantin Limon MD   bumetanide 0 5 mg Oral Daily Lisa Nguyen PA-C   calcium carbonate 1 tablet Oral Daily With Magnus Quan MD   cholecalciferol 2,000 Units Oral Daily Constantin Limon MD   docusate sodium 100 mg Oral BID Constantin Limon MD   DULoxetine 30 mg Oral Daily Constantin Limon MD   enoxaparin 40 mg Subcutaneous Daily Constantin Limon MD   methimazole 5 mg Oral Daily Constantin Limon MD   oxyCODONE 10 mg Oral Q4H PRN Constantin Limon MD   oxyCODONE 5 mg Oral Q4H PRN Constantin Limon MD   pantoprazole 40 mg Oral Early Morning Constantin Limon MD   polyethylene glycol 17 g Oral Daily PRN Constantin Limon MD       bisacodyl    oxyCODONE    oxyCODONE    polyethylene glycol      Assessment:  Pt is 83 yo female with L IT fx s/p IM nail fixation by Dr Warren Roblero on 4/8    Plan:    Rehabilitation: cont PT/OT for ambulatory/ADL dysfxn    L IT fx: s/p IM nail fixation by Dr Warren Roblero on 4/8 (SPEP/UPEP, vit D level WNL); FU XR of 4/21 showed healing fx, intact hardware, & stable alignment         Hyperparathyroidism: on home vit D 2000 units QD, d/w endocrine and recommended starting pt on 1000 mg calcium carbonate qd (1250 mg formulation per tab is what is on formulary)      Hyperthyroidism: elevated thyroid microsomal antibody, elevated thyroid stimulating immunoglobulin, decreased TSH, elevated free T4, likely autoimmune etiology, seen by endocrine in acute care and started on tapazole and have recommended repeat free T4 in 4 wks and FU in 6 wks with endocrine     bladder CA: CT A/P of 3/26/18 did not reveal any findings to suggest recurrent or new urogenital neoplasm; follows with Dr Ana Cali     CLL: follows with Dr Lucy Funes who per last visit note felt pt was hematologically stable      CAD: s/p stents, on home regimen of ASA 81 mg qd, atenolol 25 mg qd, and lipitor 40 mg qPM; OP FU with Dr Foreign Milian     HTN: on home regimen of norvasc 5 mg qd and atenolol 25 mg qd      GERD: on home dose of protonix 40 mg qd (per pt will occasionally take BID if she feels it is very bad that day)      seasonal allergies: at home takes prn allegra and prn singulair; currently asymptomatic per pt     grade I DD w/chronic LE edema: per IM recs in acute care pt resumed by IM on home bumex 0 5 mg qd      Depression: on home cymbalta 30 mg qd      Osteoporosis: on home vitamin D 2000 units qd (vit D level of 4/9 WNL at 47 9), also on prolia as OP, managed by her rheumatologist, seen by endocrine and recommend FU with her rheumatologist for further management and DEXA scan at her rheumatologist discretion (SPEP/UPEP w/o monoclonal bands, vit D level WNL)     intermittent Rt 2nd toe pain: not currently painful at all per pt; no tenderness to palpation, no erythema, no swelling, no warmth, full pain free active and passive ROM; bedside evaluation/examination done by Dr Lobo Porras and felt no further imaging/intervention is warranted at this time (per Dr Hernandez Offer no need for formal consult order to be placed at this time but may do so if symptoms change or worsen)     Anemia: ABLA, Hg currently improved to 9 4  Thrombocytosis: mild at 406 (); likely reactive  Leukopenia: mild at 4 26 (LLN 4 31), diff WNL      DVT ppx: SCDs, lovenox x 28 days post-op per ortho  Dispo: reteam     Incidental findings of CT A/P of 3/26/18:  1) b/l renal cysts: per report unchanged compared to previous study of 9/25/17  2) rt renal nodules: per report unchanged compared to previous study of 9/25/17  3) hepatic cysts: per report unchanged compared to previous study of 9/25/17  4) pancreatic cysts: per report unchanged compared to previous study of 9/25/17  5) ventral hernia containing adipose: OP FU w/PCP     Other incidental findings:  6) PVCs: OP FU with Dr Felice Garcia (pt's cardiologist)  7) elevated peak PA pressure: OP FU with Dr Felice Garcia (pt's cardiologist)

## 2018-04-24 NOTE — PROGRESS NOTES
04/24/18 1000   Pain Assessment   Pain Assessment No/denies pain   Pain Score No Pain   Restrictions/Precautions   Precautions Fall Risk;Pain   Weight Bearing Restrictions No   ROM Restrictions No   QI: Sit to Stand   Assistance Needed Supervision   Assistance Provided by [place] No physical assistance   Comment Pt able to push up from surface with good carryover of hand placement on RW  Sit to Stand CARE Score 4   QI: Chair/Bed-to-Chair Transfer   Assistance Needed Supervision   Assistance Provided by Harbeson No physical assistance   Comment Pt able to transfer using RW at DS  Chair/Bed-to-Chair Transfer CARE Score 4   Transfer Bed/Chair/Wheelchair   Limitations Noted In Balance; Endurance;Pain Management;UE Strength;LE Strength   Adaptive Equipment Roller Walker   Bed, Chair, Wheelchair Transfer (FIM) 5 - Patient requires supervision/monitoring   QI: [address] Provided by Harbeson No physical assistance   Comment Pt able to complete arden care while seated on toilet  Toileting Hygiene CARE Score 4   Toileting   Able to [address] down yes, up yes  Able to Manage Clothing Closures Yes   Manage Hygiene Bladder   Limitations Noted In Balance;UE Strength;LE Strength   Adaptive Equipment Grab Bar   Findings Pt completed toileting task with support of RW/ grab bars  Toileting (FIM) 5 - Patient requires supervision/monitoring   QI: Toilet Transfer   Assistance Needed Supervision   Assistance Provided by Harbeson No physical assistance   Comment Pt utilized grab bars for support when descending to toilet surface  Toilet Transfer CARE Score 4   Toilet Transfer   Surface Assessed Standard Toilet   Transfer Technique Standard   Limitations Noted In Balance; Endurance;UE Strength;LE Strength   Adaptive Equipment Grab Bar   Toilet Transfer (FIM) 5 - Patient requires supervision/monitoring   Meal Prep   Meal Prep Level Walker   Meal Prep Level of Assistance Close supervision   Meal Preparation Pt completed multi step meal prep activity  See assessment for further details  Kitchen Mobility   Kitchen-Mobility Level Walker   Kitchen Activity Retrieve items;Transport items   Kitchen Mobility Comments See assessment for further details   Functional Standing Tolerance   Time Pt stood with RW throughout 90 minute session  Activity Pt completed kitchen mobility, shower transfer, and functional mobility pt room <> therapy gym  Exercise Tools   Other Exercise Tool 1 Pt completed 3x10 BUE ex with 2# medicine ball in all planes to promote strength/ ROM to increase independence with ADL tasks  Cognition   Overall Cognitive Status WFL   Arousal/Participation Alert; Cooperative   Attention Within functional limits   Orientation Level Oriented X4   Memory Within functional limits   Following Commands Follows all commands and directions without difficulty   Activity Tolerance   Activity Tolerance Patient tolerated treatment well   Assessment   Treatment Assessment Pt participated in skilled OT services to work on I/ADL tasks, functional mobility in a simulated home setting, strengthening, endurance, problem solving, and safety  Pt completed BUE exercise while seated on EOM to inc unsupported sitting balance  Pt stated that the UE ex" made her neck and shoulders feel better " Pt then completed multi step meal prep activity involving multiple ingredients and cooking on stove top  Pt was able to navigate kitchen, retrieve, and transport items required for meal prep  Therapist recommended to pt that rearranging her kitchen at home may be necessary in order to have items in safe reach  Also recommended sliding method on counter top when completing functional mobility with RW for safe transport and conserving energy  Pt was receptive to these recommendations and was able to complete meal task without noted LOB or safety concerns   Pt then engaged in simulated home shower transfer with 5" lip  Pt was able to complete transfer with use of grabs bars and steadying A from therapist  Noted dec balance during transfer, dec knee flexion to clear lip, and dec WB tolerance through LLE  Recommend that pt cont to work on functional transfers to maximize home safety, strengthening, endurance, and balance  Prognosis Good   Problem List Decreased strength;Decreased range of motion;Decreased endurance; Impaired balance;Pain   Barriers to Discharge Inaccessible home environment;Decreased caregiver support   Plan   Treatment/Interventions ADL retraining;Functional transfer training;LE strengthening/ROM; Therapeutic exercise; Endurance training;Equipment eval/education;Patient/family training; Compensatory technique education   Progress Progressing toward goals   Recommendation   OT Discharge Recommendation Home with family support   OT Therapy Minutes   OT Time In 1000   OT Time Out 1130   OT Total Time (minutes) 90   OT Mode of treatment - Individual (minutes) 90   OT Mode of treatment - Concurrent (minutes) 0   OT Mode of treatment - Group (minutes) 0   OT Mode of treatment - Co-treat (minutes) 0   OT Mode of Teatment - Total time(minutes) 90 minutes   Therapy Time missed   Time missed?  No

## 2018-04-25 PROCEDURE — 99233 SBSQ HOSP IP/OBS HIGH 50: CPT | Performed by: PHYSICAL MEDICINE & REHABILITATION

## 2018-04-25 PROCEDURE — 0HBRXZZ EXCISION OF TOE NAIL, EXTERNAL APPROACH: ICD-10-PCS | Performed by: PODIATRIST

## 2018-04-25 PROCEDURE — 97530 THERAPEUTIC ACTIVITIES: CPT

## 2018-04-25 PROCEDURE — 97110 THERAPEUTIC EXERCISES: CPT

## 2018-04-25 RX ADMIN — BUMETANIDE 0.5 MG: 1 TABLET ORAL at 08:14

## 2018-04-25 RX ADMIN — POLYETHYLENE GLYCOL 3350 17 G: 17 POWDER, FOR SOLUTION ORAL at 17:24

## 2018-04-25 RX ADMIN — ASPIRIN 81 MG 81 MG: 81 TABLET ORAL at 08:13

## 2018-04-25 RX ADMIN — METHIMAZOLE 5 MG: 5 TABLET ORAL at 08:14

## 2018-04-25 RX ADMIN — Medication 1 TABLET: at 08:13

## 2018-04-25 RX ADMIN — ATORVASTATIN CALCIUM 40 MG: 40 TABLET, FILM COATED ORAL at 17:21

## 2018-04-25 RX ADMIN — OXYCODONE HYDROCHLORIDE 5 MG: 5 TABLET ORAL at 22:08

## 2018-04-25 RX ADMIN — VITAMIN D, TAB 1000IU (100/BT) 2000 UNITS: 25 TAB at 08:13

## 2018-04-25 RX ADMIN — OXYCODONE HYDROCHLORIDE 10 MG: 10 TABLET ORAL at 08:13

## 2018-04-25 RX ADMIN — ENOXAPARIN SODIUM 40 MG: 40 INJECTION SUBCUTANEOUS at 08:13

## 2018-04-25 RX ADMIN — DOCUSATE SODIUM 100 MG: 100 CAPSULE, LIQUID FILLED ORAL at 17:21

## 2018-04-25 RX ADMIN — ACETAMINOPHEN 975 MG: 325 TABLET, FILM COATED ORAL at 22:05

## 2018-04-25 RX ADMIN — PANTOPRAZOLE SODIUM 40 MG: 40 TABLET, DELAYED RELEASE ORAL at 05:43

## 2018-04-25 RX ADMIN — AMLODIPINE BESYLATE 5 MG: 5 TABLET ORAL at 08:13

## 2018-04-25 RX ADMIN — DOCUSATE SODIUM 100 MG: 100 CAPSULE, LIQUID FILLED ORAL at 08:12

## 2018-04-25 RX ADMIN — ATENOLOL 25 MG: 25 TABLET ORAL at 08:13

## 2018-04-25 RX ADMIN — ACETAMINOPHEN 975 MG: 325 TABLET, FILM COATED ORAL at 05:42

## 2018-04-25 RX ADMIN — ACETAMINOPHEN 975 MG: 325 TABLET, FILM COATED ORAL at 14:25

## 2018-04-25 RX ADMIN — DULOXETINE HYDROCHLORIDE 30 MG: 30 CAPSULE, DELAYED RELEASE ORAL at 08:14

## 2018-04-25 NOTE — PROGRESS NOTES
Progress Note - Cory Booker 82 y o  female MRN: [id number]    Unit/Bed#: -01 Encounter: [id number]            Subjective:   D/W patient potential dc date and pt agreeable     Objective:     ROS  Gen: denies recent wt loss   Psych: denies mood change    Vitals: Blood pressure 122/57, pulse 78, temperature 98 °F (36 7 °C), temperature source Oral, resp  rate 20, height 5' 1" (1 549 m), weight 50 9 kg (112 lb 3 4 oz), SpO2 97 %      Physical Exam:     Gen:        NAD   Neck:   trachea midline  Lungs:  respirations unlabored   Heart:    + S1 and S2   Abdomen:    Soft, non-tender  Extremities: + B/L LE edema L> R, Rt 2nd toe--> no tenderness to palpation, no erythema, no swelling, no warmth, full pain free active and passive ROM (not currently painful at all per pt)   Psych: mood/affect appropriate  Neurologic: awake, alert, appropriately answering questions     Functional :  Mobility: sup  Tx: mod I  ADLs: sup        Current Facility-Administered Medications:  acetaminophen 975 mg Oral Q8H [facility] Alma Delia Owusu MD   amLODIPine 5 mg Oral Daily Alma Delia Owusu MD   aspirin 81 mg Oral Daily Alma Delia Owusu MD   atenolol 25 mg Oral Daily Alma Delia Owusu MD   atorvastatin 40 mg Oral After Sandra North MD   bisacodyl 10 mg Rectal Daily PRN Alma Delia Owusu MD   bumetanide 0 5 mg Oral Daily Millie Goodell, PA-C   calcium carbonate 1 tablet Oral Daily With Patel Adrian MD   cholecalciferol 2,000 Units Oral Daily Alma Delia Owusu MD   docusate sodium 100 mg Oral BID Alma Delia Owusu MD   DULoxetine 30 mg Oral Daily Alma Delia Owusu MD   enoxaparin 40 mg Subcutaneous Daily Alma Delia Owusu MD   methimazole 5 mg Oral Daily Alma Delia Owusu MD   oxyCODONE 10 mg Oral Q4H PRN Alma Delia Owusu MD   oxyCODONE 5 mg Oral Q4H PRN Alma Delia Owusu MD   pantoprazole 40 mg Oral Early Morning Alma Delia Owusu MD   polyethylene glycol 17 g Oral Daily PRN Alma Delia Owusu MD       bisacodyl    oxyCODONE    oxyCODONE    polyethylene glycol      Assessment:  Pt is 81 yo female with L IT fx s/p IM nail fixation by Dr Braulio Berrios on 4/8    Plan:    Rehabilitation: cont PT/OT for ambulatory/ADL dysfxn    L IT fx: s/p IM nail fixation by Dr Braulio Berrios on 4/8 (SPEP/UPEP, vit D level WNL); FU XR of 4/21 showed healing fx, intact hardware, & stable alignment         Hyperparathyroidism: on home vit D 2000 units QD, d/w endocrine and recommended starting pt on 1000 mg calcium carbonate qd (1250 mg formulation per tab is what is on formulary)      Hyperthyroidism: elevated thyroid microsomal antibody, elevated thyroid stimulating immunoglobulin, decreased TSH, elevated free T4, likely autoimmune etiology, seen by endocrine in acute care and started on tapazole and have recommended repeat free T4 in 4 wks and FU in 6 wks with endocrine     bladder CA: CT A/P of 3/26/18 did not reveal any findings to suggest recurrent or new urogenital neoplasm; follows with Dr Isa Estrada     CLL: follows with Dr Lokesh Molina who per last visit note felt pt was hematologically stable      CAD: s/p stents, on home regimen of ASA 81 mg qd, atenolol 25 mg qd, and lipitor 40 mg qPM; OP FU with Dr Molina Limon     HTN: on home regimen of norvasc 5 mg qd and atenolol 25 mg qd      GERD: on home dose of protonix 40 mg qd (per pt will occasionally take BID if she feels it is very bad that day)      seasonal allergies: at home takes prn allegra and prn singulair; currently asymptomatic per pt     grade I DD w/chronic LE edema: per IM recs in acute care pt resumed by IM on home bumex 0 5 mg qd      Depression: on home cymbalta 30 mg qd      Osteoporosis: on home vitamin D 2000 units qd (vit D level of 4/9 WNL at 47 9), also on prolia as OP, managed by her rheumatologist, seen by endocrine and recommend FU with her rheumatologist for further management and DEXA scan at her rheumatologist discretion (SPEP/UPEP w/o monoclonal bands, vit D level WNL)     intermittent Rt 2nd toe pain: not currently painful at all per pt; no tenderness to palpation, no erythema, no swelling, no warmth, full pain free active and passive ROM; bedside evaluation/examination done by Dr Magdalena Anne and felt no further imaging/intervention is warranted at this time (per Dr Holley Nieto no need for formal consult order to be placed at this time but may do so if symptoms change or worsen)     Anemia: ABLA, Hg currently improved to 9 4  Thrombocytosis: mild at 406 (); likely reactive  Leukopenia: mild at 4 26 (LLN 4 31), diff WNL      DVT ppx: SCDs, lovenox x 28 days post-op per ortho  Dispo: 4/27     Incidental findings of CT A/P of 3/26/18:  1) b/l renal cysts: per report unchanged compared to previous study of 9/25/17  2) rt renal nodules: per report unchanged compared to previous study of 9/25/17  3) hepatic cysts: per report unchanged compared to previous study of 9/25/17  4) pancreatic cysts: per report unchanged compared to previous study of 9/25/17  5) ventral hernia containing adipose: OP FU w/PCP     Other incidental findings:  6) PVCs: OP FU with Dr Mark Puente (pt's cardiologist)  7) elevated peak PA pressure: OP FU with Dr Mark Puente (pt's cardiologist)     This patient was discussed by the Interdisciplinary Team in weekly case conference today  The care of the patient was extensively discussed with all care providers and an appropriate rehabilitation plan was formulated unique for this patient  Barriers were identified preventing progression of therapy and appropriate interventions were discussed with each discipline  Please see the team note for input from all disciplines regarding barriers, intervention, and discharge planning      [ x ] Total time spent: 45 Mins, and greater than 50% of this time was spent counseling/coordinating care

## 2018-04-25 NOTE — CONSULTS
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  3  TMTJ arthritis      Plan:  - Nails debrided x10 without incidence utilizing a sharp nail nipper  - Patient has compression of her right deep peroneal nerve 2/2 exostosis on dorsal aspect of TMTJ  Laces on shoe rearranged to offload this area  Long discussion with patient about options for management  - All questions and concerns addressed  - Will discuss this plan with my attending  - Podiatry signing off, thank you for the consult  History of Present Illness     HPI:  Sean Xiao is a 82 y o  female who presents with elongated toenails and numbness and tingling to the right first interspace  States that their nails are painful, elongated  They have difficulty applying their socks and shoes  The pressure within their shoe gear is painful and they have been unable to cut their nails adequately  Consults  Review of Systems   Constitutional: Negative  HENT: Negative  Eyes: Negative  Respiratory: Negative  Cardiovascular: Negative  Gastrointestinal: Negative  Musculoskeletal: Negative   Skin: elongated thickened toenails  Neurological: Negative          Historical Information   Past Medical History:   Diagnosis Date    Bladder [facility] ([facility] [address] )     Coronary artery disease     S/P stent placement x 2 in 2011    GERD (gastroesophageal reflux disease)     Hepatitis     Hyperlipidemia     Hypertension     Kidney stones     Malignant neoplasm of urethra (HCC)     Pneumonia     Shingles      Past Surgical History:   Procedure Laterality Date    CORONARY ANGIOPLASTY WITH STENT PLACEMENT      stent x 2    CYSTOSCOPY      diagnostic - onset 4/4/17    HYSTERECTOMY      NE OPEN RX FEMUR FX+INTRAMED ALEJANDRO Left 4/8/2018    Procedure: INSERTION NAIL IM FEMUR ANTEGRADE (TROCHANTERIC); Surgeon: Amanda Bush MD;  Location: [facility];  Service: Orthopedics     Social History   History   Alcohol Use No     History   Drug Use No     History   Smoking Status    Never Smoker   Smokeless Tobacco    Never Used     Family History:   Family History   Problem Relation Age of Onset    Arthritis Mother     Osteoporosis Mother     Heart disease Father     Hypertension Father     Hypertension Brother        Meds/Allergies   Prescriptions Prior to Admission   Medication    acetaminophen (TYLENOL) 650 mg CR tablet    amLODIPine (NORVASC) 5 mg tablet    aspirin 81 MG tablet    atenolol (TENORMIN) 50 mg tablet    atorvastatin (LIPITOR) 40 mg tablet    bumetanide (BUMEX) 1 mg tablet    dicyclomine (BENTYL) 20 mg tablet    DULoxetine (CYMBALTA) 30 mg delayed release capsule    enoxaparin (LOVENOX) 40 mg/0 4 mL    fexofenadine (ALLEGRA) 180 MG tablet    meloxicam (MOBIC) 7 5 mg tablet    methylcellulose (CITRUCEL) oral powder    montelukast (SINGULAIR) 10 mg tablet    Multiple Vitamin ([facility] DAILY MULTIPLE PO)    oxyCODONE (ROXICODONE) 5 mg immediate release tablet    pantoprazole (PROTONIX) 40 mg tablet     Allergies   Allergen Reactions    Lactose      Other reaction(s): Indigestion/GI Upset    Other      Other reaction(s): Mental Status Change  After surgery   Whole blood  = passed out   historical allergy; verify with patient       Objective   First Vitals:   Blood Pressure: 123/55 (04/12/18 1417)  Pulse: 86 (04/12/18 1417)  Temperature: 98 2 °F (36 8 °C) (04/12/18 1417)  Temp Source: Oral (04/12/18 1417)  Respirations: 18 (04/12/18 1417)  Height: 5' 1" (154 9 cm) (04/12/18 1417)  Weight - Scale: 62 6 kg (138 lb) (04/12/18 1417)  SpO2: 100 % (04/12/18 1417)    Current Vitals:   Blood Pressure: 122/57 (04/25/18 1318)  Pulse: 78 (04/25/18 1318)  Temperature: 98 °F (36 7 °C) (04/25/18 1318)  Temp Source: Oral (04/25/18 1318)  Respirations: 20 (04/25/18 1318)  Height: 5' 1" (154 9 cm) (04/12/18 1417)  Weight - Scale: 50 9 kg (112 lb 3 4 oz) (04/25/18 0549)  SpO2: 97 % (04/25/18 1318)        /57 (BP Location: Right arm)   Pulse 78   Temp 98 °F (36 7 °C) (Oral)   Resp 20   Ht 5' 1" (1 549 m)   Wt 50 9 kg (112 lb 3 4 oz)   SpO2 97%   BMI 21 20 kg/m²     General Appearance:    Alert, cooperative, no distress   Head:    Normocephalic, without obvious abnormality, atraumatic   Eyes:    PERRL          Nose:   Moist mucous membranes, no drainage or sinus tenderness       Neck:   Supple, symmetrical       Lungs:     Respirations unlabored       Heart:    +s1/s2   Abdomen:    soft nonrtender           Extremities:   MMT is 5/5 to all compartments of the distal LE, +0/4 edema B/L, Digital ROM is intact  TTP overlying the exostosis on the dorsal aspect of the first TMTJ   Pulses:   R DP is +2/4, R PT is +2/4, L DP is +2/4, L PT is +2/4, CFT< 3sec to all digits   Skin:   Nails are thickened, elongated, TTP with notable subungual debris  No open Lesions  Skin of the LE is normal texture, turgor  Neurologic:   CNII-XII intact  Normal strength, sensation and reflexes       Throughout  Gross sensation is intact  Protective sensation is intact   There is a +tinels sign overlying the exostosis on the dorsal aspect of the 1st TMTJ           Lab Results:   Admission on 04/12/2018   Component Date Value    Sodium 04/13/2018 139     Potassium 04/13/2018 3 9     Chloride 04/13/2018 105     CO2 04/13/2018 30     Anion Gap 04/13/2018 4     BUN 04/13/2018 19     Creatinine 04/13/2018 0 60     Glucose 04/13/2018 100     Calcium 04/13/2018 8 4     eGFR 04/13/2018 86     WBC 04/13/2018 4 91     RBC 04/13/2018 2 45*    Hemoglobin 04/13/2018 7 5*    Hematocrit 04/13/2018 22 5*    MCV 04/13/2018 92     MCH 04/13/2018 30 6     MCHC 04/13/2018 33 3     RDW 04/13/2018 13 7     Platelets 17/84/8967 172     MPV 04/13/2018 9 9     WBC 04/13/2018 4 85     RBC 04/13/2018 2 49*    Hemoglobin 04/13/2018 7 5*    Hematocrit 04/13/2018 23 5*    MCV 04/13/2018 94     MCH 04/13/2018 30 1     MCHC 04/13/2018 31 9     RDW 04/13/2018 13 6     MPV 04/13/2018 9 1     Platelets 37/18/4370 166     nRBC 04/13/2018 0     Neutrophils Relative 04/13/2018 68     Lymphocytes Relative 04/13/2018 17     Monocytes Relative 04/13/2018 14*    Eosinophils Relative 04/13/2018 1     Basophils Relative 04/13/2018 0     Neutrophils Absolute 04/13/2018 3 26     Lymphocytes Absolute 04/13/2018 0 81     Monocytes Absolute 04/13/2018 0 69     Eosinophils Absolute 04/13/2018 0 05     Basophils Absolute 04/13/2018 0 02     WBC 04/16/2018 4 68     RBC 04/16/2018 2 52*    Hemoglobin 04/16/2018 7 7*    Hematocrit 04/16/2018 24 0*    MCV 04/16/2018 95     MCH 04/16/2018 30 6     MCHC 04/16/2018 32 1     RDW 04/16/2018 14 8     MPV 04/16/2018 9 1     Platelets 18/31/0371 295     nRBC 04/16/2018 0     Neutrophils Relative 04/16/2018 56     Lymphocytes Relative 04/16/2018 26     Monocytes Relative 04/16/2018 14*    Eosinophils Relative 04/16/2018 4     Basophils Relative 04/16/2018 0     Neutrophils Absolute 04/16/2018 2 55     Lymphocytes Absolute 04/16/2018 1 21     Monocytes Absolute 04/16/2018 0 64     Eosinophils Absolute 04/16/2018 0 20     Basophils Absolute 04/16/2018 0 02     Sodium 04/16/2018 138     Potassium 04/16/2018 4 3     Chloride 04/16/2018 103     CO2 04/16/2018 31     Anion Gap 04/16/2018 4     BUN 04/16/2018 19     Creatinine 04/16/2018 0 54*    Glucose 04/16/2018 97     Calcium 04/16/2018 8 7     eGFR 04/16/2018 89     WBC 04/19/2018 5 11     RBC 04/19/2018 2 67*    Hemoglobin 04/19/2018 8 4*    Hematocrit 04/19/2018 26 0*    MCV 04/19/2018 97     MCH 04/19/2018 31 5     MCHC 04/19/2018 32 3     RDW 04/19/2018 16 3*    MPV 04/19/2018 8 6*    Platelets 90/03/6960 375     nRBC 04/19/2018 0     Neutrophils Relative 04/19/2018 54     Lymphocytes Relative 04/19/2018 31     Monocytes Relative 04/19/2018 10     Eosinophils Relative 04/19/2018 5     Basophils Relative 04/19/2018 0     Neutrophils Absolute 04/19/2018 2 66     Lymphocytes Absolute 04/19/2018 1 56     Monocytes Absolute 04/19/2018 0 51     Eosinophils Absolute 04/19/2018 0 27     Basophils Absolute 04/19/2018 0 02     Sodium 04/19/2018 138     Potassium 04/19/2018 4 1     Chloride 04/19/2018 105     CO2 04/19/2018 28     Anion Gap 04/19/2018 5     BUN 04/19/2018 17     Creatinine 04/19/2018 0 56*    Glucose 04/19/2018 96     Calcium 04/19/2018 8 8     eGFR 04/19/2018 88     Sodium 04/23/2018 137     Potassium 04/23/2018 4 0     Chloride 04/23/2018 103     CO2 04/23/2018 29     Anion Gap 04/23/2018 5     BUN 04/23/2018 16     Creatinine 04/23/2018 0 65     Glucose 04/23/2018 100     Calcium 04/23/2018 8 9     eGFR 04/23/2018 84     WBC 04/23/2018 4 26*    RBC 04/23/2018 2 97*    Hemoglobin 04/23/2018 9 4*    Hematocrit 04/23/2018 29 2*    MCV 04/23/2018 98     MCH 04/23/2018 31 6     MCHC 04/23/2018 32 2     RDW 04/23/2018 16 5*    MPV 04/23/2018 8 9     Platelets 56/15/3021 406*    nRBC 04/23/2018 0     Neutrophils Relative 04/23/2018 59     Lymphocytes Relative 04/23/2018 28     Monocytes Relative 04/23/2018 9     Eosinophils Relative 04/23/2018 4     Basophils Relative 04/23/2018 0     Neutrophils Absolute 04/23/2018 2 44     Lymphocytes Absolute 04/23/2018 1 21     Monocytes Absolute 04/23/2018 0 40     Eosinophils Absolute 04/23/2018 0 18     Basophils Absolute 04/23/2018 0 01      Imaging: I have personally reviewed pertinent films in PACS  EKG, Pathology, and Other Studies: I have personally reviewed pertinent reports        Code Status: Level 1 - Full Code  Advance Directive and Living Will:      Power of :    POLST:

## 2018-04-25 NOTE — TEAM CONFERENCE
Acute RehabilitationTeam Conference Note  Date: 4/25/2018   Time: 11:46 AM       Patient Name:  Sohail Garcia       Medical Record Number: [id number]   YOB: 1935  Sex: Female          Room/Bed:  [facility]-01  Payor Info:  Payor: Sherren Larger / Plan: MEDICARE A AND B / Product Type: Medicare A & B Fee for Service /      Admitting Diagnosis: Displaced apophyseal fracture of left femur, initial encounter for closed fracture [S72 132A]   Admit Date/Time:  4/12/2018  1:58 PM  Admission Comments: No comment available     Primary Diagnosis:  Intertrochanteric fracture of left femur, closed, with routine healing, subsequent encounter  Principal Problem: Intertrochanteric fracture of left femur, closed, with routine healing, subsequent encounter    Patient Active Problem List    Diagnosis Date Noted    Intertrochanteric fracture of left femur, closed, with routine healing, subsequent encounter 04/12/2018    Hyperthyroidism 04/10/2018    Osteoporosis 04/10/2018    Acute blood loss anemia 04/09/2018    Chronic diastolic (congestive) heart failure ([facility] ) 04/08/2018    Closed left hip fracture ([facility] ) 04/07/2018    Closed fracture of left hip ([facility] ) 04/07/2018    Hyperlipidemia 04/07/2018    Coronary artery disease without angina pectoris - S/P stent placement x 2 (2011) 04/07/2018    Gastroesophageal reflux disease without esophagitis 04/07/2018    Essential hypertension 03/19/2018    Medication side effect, initial encounter 03/19/2018       Physical Therapy:    Weight Bearing Status: Weight Bearing as Tolerated  Transfers: Modified Independent  Bed Mobility: Modified Independent  Amulation Distance (ft): 100 feet  Ambulation: Supervision  Assistive Device for Ambulation: Roller Walker  Ramp: Supervision  Assistive Device for Ramp: Roller Walker  Discharge Recommendations: Home with:  [address] with[de-identified] Home Physical Therapy    Pt functioning at /CGA with RW    Pt cont to have decrease strength, safety and increase in pain limiting overall activity tolerance  Due to pt living alone, pt cont to need skilled PT to improve deficits to progress to mod I for safe d/c  Home  Pt will need RW for d/c home      Pt cont to show progress towards mod I goals with RW  Pt cont to be limited due to weakness and activity tolerance due to pain  Pt to cont with skilled PT to progress with functional mobility and Ind for safe d/c home  Occupational Therapy:  Eating: Supervision  Grooming: Supervision  Bathing: Supervision  Bathing: Supervision  Upper Body Dressing: Supervision  Lower Body Dressing: Supervision  Toileting: Supervision  Tub/Shower Transfer: Supervision  Toilet Transfer: Supervision  Cognition: Exceptions to WNL  Cognition: Decreased Memory  Orientation: Person, Place, Situation  Discharge Recommendations: Home with:  [address] with[de-identified] Family Support       Pt continues to make progress towards LTGs  She continues to present with dec standing tolerance, dec stand balance, dec endurance, and dec short term memory  Pt currently functioning at Supervision level for ADL tasks  Pt states she has a supportive family who can A with IADL tasks as needed  Pt will continue to benefit from skilled OT services with focus on noted deficits to reach Gloria goals  Speech Therapy:           No notes on file    Nursing Notes:  Appetite: Fair  Diet Type: Regular/House                      Diet Patient/Family Education Complete: No    Type of Wound (LDA): Incision           Incision 04/08/18 Hip Left (Active)   Incision Description Clean;Dry; Intact 4/24/2018  8:22 AM   Niyah-wound Assessment Clean;Dry; Intact 4/24/2018  8:22 AM   Closure Adhesive closure strips 4/24/2018  8:22 AM   Drainage Amount None 4/24/2018  8:22 AM   Drainage Description Serosanguineous 4/14/2018  9:07 AM   Treatments Cleansed;Site care 4/21/2018  8:36 AM   Dressing Open to air 4/24/2018  8:22 AM   Dressing Changed New dressing applied 4/14/2018  9:07 AM Patient Tolerance Tolerated well 4/24/2018  8:22 AM   Dressing Status Clean;Dry; Intact 4/22/2018  8:03 PM           Type of Wound Patient/Family Education: Yes  Bladder: 6 - Modified [place]     Bladder Patient/Family Education: No  Bowel: 6 - Modified [place]     Bowel Patient/Family Education: No  Pain Location: Hip, Leg, Rib cage  Pain Orientation: Bilateral, Left  Pain Score: 7 (while in therapy, at rest pain at 5)                       Hospital Pain Intervention(s): Medication (See MAR)  Pain Patient/Family Education: Yes  Medication Management/Safety  Injectable: Lovenox  Safe Administration: Yes  Medication Patient/Family Education Complete: Yes    Pt admit to [facility] due to fall at home in shower, pt had Left intertrochanteric hip fx s/p IM nail fixation by Dr Patti Maurice on 4/8  WBAT LLE; 3 Incisions- steri strips on one incisions, all DOMINIQUE, healing  Pain managed with tylenol and oxycodone  PMH: Hyperparathyroidism, hyperthyroidism, bladder CA, CLL, CAD, HTN, GERD, seasonal allergies, grade I DD w/chronic LE edema, depression, osteoporosis, hyponatremia, anemia, thrombocytopenia  Hyperthyroidism- managed with TAPAZOLE 5mg daily  HTN managed with Norvasc and Atenolol  C/O constipation- disimpacted by nursing and given bowel meds  This week we will continue to  monitor labs & VS  We will work on pain management, prevent skin breakdown and promote incisional healing  We will work on bowel management  We will work on safety with transfers and keep pt free from falls  Case Management:     Discharge Planning  Goal Length of Stay: 14  Living Arrangements: Alone  Support Systems: Children, Family members  Assistance Needed: TBD  Type of Current Residence: Private residence  Current Home Care Services: No  Pt is motivated and participating well with therapy  Pt expects to return home and is knowledgeable of both hhc and outpt services  Following to assist w/dc planning needs       Is the patient actively participating in therapies? yes  List any modifications to the treatment plan:     Barriers Interventions   Strength, endurance for iadls Family education on need for assist                     Is the patient making expected progress toward goals? yes  List any update or changes to goals:     Medical Goals: Patient will be medically stable for discharge to [facility] upon completion of rehab program and Patient will be able to manage medical conditions and comorbid conditions with medications and follow up upon completion of rehab program    Weekly Team Goals:   Rehab Team Goals  ADL Team Goal: Patient will be independent with ADLs with least restrictive device upon completion of rehab program  Transfer Team Goal: Patient will be independent with transfers with least restrictive device upon completion of rehab program  Locomotion Team Goal: Patient will be independent with locomotion with least restrictive device upon completion of rehab program  Cognitive Team Goal: Patient will return to premorbid level of cognitive activity upon completion of rehab program    Discussion: in attendance to review pts progress is rn pt ot cm and physician  Pt has new onset rib cage pain and tingling down her leg  Dr Court Cullen to address  Pt will need assist with iadl's family education has occurred  Pt is functioning at distant supervision with walking with a roller walker, adls' are supervision with roller walker  Recommendations are for contd [facility] rn pt and ot assmt       Anticipated Discharge Date:  4/27/18

## 2018-04-25 NOTE — PROGRESS NOTES
04/25/18 0830   Pain Assessment   Pain Assessment 0-10   Pain Score 3   Pain Type Acute pain   Pain Location Leg   Pain Orientation Left   Pain Descriptors Aching   Restrictions/Precautions   Precautions Fall Risk;Pain   LLE Weight Bearing Per Order WBAT   Cognition   Overall Cognitive Status WFL   Arousal/Participation Alert; Cooperative   Attention Within functional limits   Orientation Level Oriented X4   Memory Within functional limits   Following Commands Follows all commands and directions without difficulty   Subjective   Subjective reports discomfort in ribs from arthritis   QI: Sit to Stand   Assistance Needed Supervision   Comment DS   Sit to Stand CARE Score 4   QI: Chair/Bed-to-Chair Transfer   Assistance Needed Supervision   Comment DS   Chair/Bed-to-Chair Transfer CARE Score 4   Transfer Bed/Chair/Wheelchair   Limitations Noted In UE Strength;LE Strength   Adaptive Equipment Roller Walker   Stand Pivot Supervision   Sit to Stand Supervision   Stand to [employer] Transfer Supervision   Findings DS   Bed, Chair, Wheelchair Transfer (FIM) 5 - Patient requires supervision/monitoring   QI: Car Transfer   Assistance Needed Supervision   Car Transfer CARE Score 4   QI: Walk 10 Feet   Assistance Needed Supervision   Comment DS   Walk 10 Feet CARE Score 4   QI: Walk 50 Feet with Two Turns   Assistance Needed Supervision   Walk 50 Feet with Two Turns CARE Score 4   QI: Walk 150 Feet   Assistance Needed Supervision   Walk 150 Feet CARE Score 4   Ambulation   Does the patient walk? 2  Yes   Primary Discharge Mode of Locomotion Walk   Walk Assist Level Distant Supervision   Gait Pattern Antalgic; Inconsistant Geetha; Slow Geetha;Decreased foot clearance; Forward Flexion;Narrow MIRIAM; Poor UE WB;Step to; Step through; Decreased L stance; Improper weight shift   Assist Device Mis Sandoval Walked (feet) 150 ft  (x2)   Limitations Noted In Endurance; Heel Strike;Posture;Speed;Strength;Swing   Findings decreased step length   Walking (FIM) 5 - Patient requires verbal cues AND distance 150 feet or more, no rest   QI: 1 Step (Curb)   Assistance Needed Supervision   1 Step (Curb) CARE Score 4   QI: 4 Steps   Assistance Needed Supervision   4 Steps CARE Score 4   QI: 12 Steps   Assistance Needed Supervision   12 Steps CARE Score 4   Stairs   Type Stairs;Curb;Ramp   # of Steps 12   Weight Bearing Precautions LLE;WBAT   Assist Devices Bilateral Rail   Stairs (FIM) 5 - Patient requires supervision/monitoring AND goes up and down full flight (12- 14 stairs)   QI: Toilet Transfer   Assistance Needed Supervision   Comment DS   Toilet Transfer CARE Score 4   Toilet Transfer   Findings DS   Toilet Transfer (FIM) 6 - Patient requires assistive device/extra time/safety concerns but completes independently   Therapeutic Interventions   Strengthening 1# CW LLE 2# CW RLE 10x2 LAQ, standing marches; adduction yellow ball 10x2; abduction green TB 10x2; hamstring curls yellow TB 10x2   Flexibility hamstring and gastroc 60"x2   Equipment Use   NuStep 12 mins level 3   Assessment   Treatment Assessment session focused on increasing strength, endurance, and speed; pt needed Vc to increase step length with RLE due to pain in LLE; RW manually moved faster by therapist and pt was able to increase step lengths on BLE and they were equal and she was able to increase speed; one step at a time for 12 stairs with BHR and S; pt has god balance and safety awareness during session; pt demonstrates increased endurance and activity tolerance; continue POC as per PT   Problem List Decreased strength;Decreased mobility;Pain;Decreased range of motion;Decreased endurance   Barriers to Discharge Decreased caregiver support   PT Barriers   Physical Impairment Decreased strength;Decreased range of motion;Pain;Decreased endurance;Decreased mobility   Functional Limitation Walking;Stair negotiation   Plan   Treatment/Interventions LE strengthening/ROM; Elevations; Therapeutic exercise; Endurance training;Gait training   Progress Progressing toward goals   Recommendation   Recommendation Home PT   Equipment Recommended Walker   PT Therapy Minutes   PT Time In 0830   PT Time Out 1000   PT Total Time (minutes) 90   PT Mode of treatment - Individual (minutes) 90   PT Mode of treatment - Concurrent (minutes) 0   PT Mode of treatment - Group (minutes) 0   PT Mode of treatment - Co-treat (minutes) 0   PT Mode of Teatment - Total time(minutes) 90 minutes   Therapy Time missed   Time missed?  No

## 2018-04-25 NOTE — PROGRESS NOTES
04/25/18 1230   Pain Assessment   Pain Assessment 0-10   Pain Score 5   Pain Type Surgical pain   Restrictions/Precautions   Precautions Fall Risk;Pain   ROM Restrictions No   QI: Sit to Stand   Assistance Needed Supervision   Assistance Provided by [place] No physical assistance   Comment Pt DS with RW  Sit to Stand CARE Score 4   QI: Chair/Bed-to-Chair Transfer   Assistance Needed Incidental touching   Assistance Provided by [place] No physical assistance   Chair/Bed-to-Chair Transfer CARE Score 4   Transfer Bed/Chair/Wheelchair   Limitations Noted In Balance; Endurance;Pain Management;UE Strength;LE Strength   Adaptive Equipment Roller Walker   Findings DS   Bed, Chair, Wheelchair Transfer (FIM) 5 - Patient requires supervision/monitoring   Light Housekeeping   Light Housekeeping Level Walker   Light Housekeeping Level of Assistance Distant supervision   Light Housekeeping Pt able to complete folding/ putting away laundry in pt room with use of RW for support  See assessment for further details  Functional Standing Tolerance   Time Pt stood with support of RW throughout session  Activity Pt completed functional mobility pt room<> OT gym  Exercise Tools   Other Exercise Tool 1 Pt completed 3x10 resistive yellow tband ex with 1 5# dowel in all planes to promote BUE strength/ ROM to increase independence with ADL tasks  Cognition   Overall Cognitive Status WFL   Arousal/Participation Alert; Cooperative   Attention Within functional limits   Orientation Level Oriented X4   Memory Within functional limits   Following Commands Follows all commands and directions without difficulty   Activity Tolerance   Activity Tolerance Patient limited by fatigue;Patient limited by pain   Medical Staff Made Aware RN made aware     Assessment   Treatment Assessment Pt participated in skilled OT session to work on functional transfers, strengthening, endurance, weight bearing tolerance, safety with Rw, review of DME needs, and household laundry task  Once BUE ex completed, therapist reviewed DME order form with pt  Therapist educated pt on benefit of ordering BSC for safety with nighttime toileting/ episodes of inc pain/ fatigue at home  Pt was agreeable to order commode and Rw for home  Pt claimed to already own a shower chair  Pt stated when her son calls later this evening, she will request to have grab bars installed in her bathroom as soon as possible  Pt then engaged in weight bearing activity in stance with RW of stepping up on to approx 5" step  Pt able to complete with CGA and no LOB noted  Pt then participated in stepping over 5" bolster with use of RW to approach shower and transitioning to use of grab bars for support to simulate home shower transfer  Pt required CGA with simulated shower transfer due to inc pain in LLE  Pt was re-educated by therapist about being proactive with managing pain  Pt appeared to be receptive of this concept  Pt then participated in household task of folding and storing laundry at [facility] level  Pt's bed was elevated to height of judie sized bed at home  Pt folded clothing on bed in stance with RW and completed static stand pivot to store clothes once folded  Pt stated her dresser at home is directly next to her bed and said a stand pivot would be appropriate  Recommended by therapist that pt consider rearranging clothing drawers by keeping frequently worn clothing in closet or top drawers to limit need to reach for lower surfaces  Pt agreed with this recommendation  Recommend pt cont to work on standing tolerance, weight bearing tolerance, functional transfers, I/ADL tasks, and functional mobility with Rw to simulate at home scenarios  Prognosis Good   Problem List Decreased strength;Decreased range of motion;Decreased endurance; Impaired balance;Pain   Barriers to Discharge Decreased caregiver support; Inaccessible home environment   Plan   Treatment/Interventions ADL retraining;Functional transfer training;LE strengthening/ROM; Therapeutic exercise; Endurance training;Patient/family training;Equipment eval/education; Compensatory technique education   Progress Progressing toward goals   Recommendation   OT Discharge Recommendation Home with family support   OT Equipment ordered Standard commode   Date ordered 04/25/18   OT Therapy Minutes   OT Time In 1230   OT Time Out 1400   OT Total Time (minutes) 90   OT Mode of treatment - Individual (minutes) 0   OT Mode of treatment - Concurrent (minutes) 90   OT Mode of treatment - Group (minutes) 0   OT Mode of treatment - Co-treat (minutes) 0   OT Mode of Teatment - Total time(minutes) 90 minutes   Therapy Time missed   Time missed?  No

## 2018-04-25 NOTE — PROGRESS NOTES
Internal Medicine Progress Note  Patient: Rolando Harman  Age/sex: 82 y o  female  Medical Record #: [id number]      ASSESSMENT/PLAN:  Rolando Harman is seen and examined and management for following issues:    1  Left Intertrochanteric femur fx s/p IMN on 4/8/18 ([place]):  Pain control per primary team  WBAT  Edema of left thigh = has improved    2  ABLA; s/p 2 units PRBC: steadily improving - will continue to watch; no sx  3   Chronic diastolic heart failure: Bumex had been on hold  Resumed Bumex 0 5mg daily 4/14/18; no dizziness/SOB    4   CAD hx Stent x 2 circumflex '11: continue ASA 81mg, Atenolol and Lipitor    5  HTN: stable on home regimen of Norvasc 5mg daily and Atenolol 25mg daily  6   Hyperthyroidism: continue Methimazole; Repeat TSH/T4 in 4 weeks  Follow up with endocrinology    7  Reactive airway disease: No signs of acute flare  Continue Allegra and Singulair    8  DVT prophylaxis: Lovenox 40mg daily    9  Hx chronic large granular lymphocytic leukemia:  Sees Dr Sara Lai as OP; has not required tx    10  Hyperparathyroidism:  Calcium/Vit D; OP followup with Endocrine        Subjective: Patient seen and examined  She is eating and sleeping well  She denies any  complaints      ROS:   GI: denies abdominal pain, change bowel habits or reflux symptoms  Neuro: No new neurologic changes  Respiratory: No Cough, SOB  Cardiovascular: No CP, palpitations     Scheduled Meds:    Current Facility-Administered Medications:  acetaminophen 975 mg Oral Q8H Tennille Alejandre MD   amLODIPine 5 mg Oral Daily Luz Caldwell MD   aspirin 81 mg Oral Daily Luz Caldwell MD   atenolol 25 mg Oral Daily Luz Caldwell MD   atorvastatin 40 mg Oral After Madina Yao MD   bisacodyl 10 mg Rectal Daily PRN Luz Caldwell MD   bumetanide 0 5 mg Oral Daily Sergio Woodward PA-C   calcium carbonate 1 tablet Oral Daily With Divine Winter MD   cholecalciferol 2,000 Units Oral Daily Luz Caldwell MD   docusate sodium 100 mg Oral BID Lloyd Jarrell MD   DULoxetine 30 mg Oral Daily Lloyd Jarrell MD   enoxaparin 40 mg Subcutaneous Daily Lloyd Jarrell MD   methimazole 5 mg Oral Daily Lloyd Jarrell MD   oxyCODONE 10 mg Oral Q4H PRN Lloyd Jarrell MD   oxyCODONE 5 mg Oral Q4H PRN Lloyd Jarrell MD   pantoprazole 40 mg Oral Early Morning Lloyd Jarrell, MD   polyethylene glycol 17 g Oral Daily PRN Lloyd Jarrell MD       Labs:       Results from last 7 days  Lab Units 04/23/18  0621 04/19/18  0547   WBC Thousand/uL 4 26* 5 11   HEMOGLOBIN g/dL 9 4* 8 4*   HEMATOCRIT % 29 2* 26 0*   PLATELETS Thousands/uL 406* 375       Results from last 7 days  Lab Units 04/23/18  0621 04/19/18  0547   SODIUM mmol/L 137 138   POTASSIUM mmol/L 4 0 4 1   CHLORIDE mmol/L 103 105   CO2 mmol/L 29 28   BUN mg/dL 16 17   CREATININE mg/dL 0 65 0 56*   GLUCOSE RANDOM mg/dL 100 96   CALCIUM mg/dL 8 9 8 8                  Glucose (mg/dL)   Date Value   04/23/2018 100   04/19/2018 96   04/16/2018 97   04/13/2018 100       Labs reviewed    Physical Examination:  Vitals:   Vitals:    04/24/18 1331 04/24/18 2029 04/25/18 0549 04/25/18 0811   BP: 131/58 126/60 159/66 127/65   BP Location: Left arm Right arm Right arm Left arm   Pulse: 74 70 80 84   Resp: 18 18 20    Temp: 98 4 °F (36 9 °C) 97 7 °F (36 5 °C) 97 7 °F (36 5 °C)    TempSrc: Oral Oral Oral    SpO2: 99% 100% 98%    Weight:   50 9 kg (112 lb 3 4 oz)    Height:           GEN: NAD  HEENT: NC/AT  RESP: BBS w/o crackles/wheeze/rhonci; resp unlabored  CV: +S1 S2, regular rate, no rubs/murmurs  ABD: soft, NT, ND, normal BS   : no valle  EXT: no edema of left lower leg but + mild edema of left hip and thigh (steadily improving)  Skin: no rashes  Neuro: AAO      [ X ] Total time spent: 30 Mins and greater than 50% of this time was spent counseling/coordinating care        Danie Canada, [address]   Internal Medicine

## 2018-04-25 NOTE — SOCIAL WORK
Met w/pt and reviewed team update with dc for 4/27, pt in agreement with recommendations for [facility] services through [facility]  Pt would like to use [facility] for dc rx needs  Pt will notify family of dc arrangements and ask for them to pick her up between 11 and 1 on Friday  Referral made via ecin to [facility]

## 2018-04-26 PROCEDURE — 97530 THERAPEUTIC ACTIVITIES: CPT

## 2018-04-26 PROCEDURE — 97535 SELF CARE MNGMENT TRAINING: CPT

## 2018-04-26 PROCEDURE — 99232 SBSQ HOSP IP/OBS MODERATE 35: CPT | Performed by: PHYSICAL MEDICINE & REHABILITATION

## 2018-04-26 PROCEDURE — 97110 THERAPEUTIC EXERCISES: CPT

## 2018-04-26 RX ORDER — ATENOLOL 25 MG/1
25 TABLET ORAL DAILY
COMMUNITY
End: 2018-08-10 | Stop reason: SDUPTHER

## 2018-04-26 RX ORDER — PANTOPRAZOLE SODIUM 40 MG/1
40 TABLET, DELAYED RELEASE ORAL DAILY
COMMUNITY
End: 2018-07-06 | Stop reason: SDUPTHER

## 2018-04-26 RX ORDER — DULOXETIN HYDROCHLORIDE 30 MG/1
30 CAPSULE, DELAYED RELEASE ORAL DAILY
COMMUNITY
End: 2018-06-04 | Stop reason: SDUPTHER

## 2018-04-26 RX ORDER — BUMETANIDE 0.5 MG/1
0.5 TABLET ORAL DAILY
COMMUNITY
End: 2018-05-08

## 2018-04-26 RX ORDER — POLYETHYLENE GLYCOL 3350 17 G/17G
17 POWDER, FOR SOLUTION ORAL ONCE
Status: COMPLETED | OUTPATIENT
Start: 2018-04-26 | End: 2018-04-26

## 2018-04-26 RX ORDER — AMLODIPINE BESYLATE 5 MG/1
5 TABLET ORAL DAILY
COMMUNITY
End: 2018-10-09 | Stop reason: SDUPTHER

## 2018-04-26 RX ORDER — ATORVASTATIN CALCIUM 40 MG/1
40 TABLET, FILM COATED ORAL
COMMUNITY
End: 2018-06-05 | Stop reason: SDUPTHER

## 2018-04-26 RX ADMIN — METHIMAZOLE 5 MG: 5 TABLET ORAL at 08:20

## 2018-04-26 RX ADMIN — OXYCODONE HYDROCHLORIDE 10 MG: 10 TABLET ORAL at 08:19

## 2018-04-26 RX ADMIN — DULOXETINE HYDROCHLORIDE 30 MG: 30 CAPSULE, DELAYED RELEASE ORAL at 08:20

## 2018-04-26 RX ADMIN — DOCUSATE SODIUM 100 MG: 100 CAPSULE, LIQUID FILLED ORAL at 08:19

## 2018-04-26 RX ADMIN — BUMETANIDE 0.5 MG: 1 TABLET ORAL at 08:20

## 2018-04-26 RX ADMIN — ATENOLOL 25 MG: 25 TABLET ORAL at 08:19

## 2018-04-26 RX ADMIN — ACETAMINOPHEN 975 MG: 325 TABLET, FILM COATED ORAL at 05:24

## 2018-04-26 RX ADMIN — ENOXAPARIN SODIUM 40 MG: 40 INJECTION SUBCUTANEOUS at 08:19

## 2018-04-26 RX ADMIN — ATORVASTATIN CALCIUM 40 MG: 40 TABLET, FILM COATED ORAL at 17:21

## 2018-04-26 RX ADMIN — POLYETHYLENE GLYCOL 3350 17 G: 17 POWDER, FOR SOLUTION ORAL at 17:21

## 2018-04-26 RX ADMIN — OXYCODONE HYDROCHLORIDE 10 MG: 10 TABLET ORAL at 12:19

## 2018-04-26 RX ADMIN — OXYCODONE HYDROCHLORIDE 5 MG: 5 TABLET ORAL at 21:20

## 2018-04-26 RX ADMIN — ACETAMINOPHEN 975 MG: 325 TABLET, FILM COATED ORAL at 14:08

## 2018-04-26 RX ADMIN — Medication 1 TABLET: at 08:19

## 2018-04-26 RX ADMIN — AMLODIPINE BESYLATE 5 MG: 5 TABLET ORAL at 08:20

## 2018-04-26 RX ADMIN — ACETAMINOPHEN 975 MG: 325 TABLET, FILM COATED ORAL at 21:19

## 2018-04-26 RX ADMIN — VITAMIN D, TAB 1000IU (100/BT) 2000 UNITS: 25 TAB at 08:19

## 2018-04-26 RX ADMIN — PANTOPRAZOLE SODIUM 40 MG: 40 TABLET, DELAYED RELEASE ORAL at 05:24

## 2018-04-26 RX ADMIN — ASPIRIN 81 MG 81 MG: 81 TABLET ORAL at 08:20

## 2018-04-26 NOTE — DISCHARGE INSTRUCTIONS
Please have your blood work drawn the results will be sent to your doctors (please note your free T4 level should be drawn approximately 2 wks after discharge     Please note you are restricted from driving/operating a motorized vehicle/operating heavy machinery/etc until you are cleared by Dr Ike Keita    Please see your doctors listed in the follow up providers section of your discharge paperwork, and take the discharge paperwork with you to your appointments    Please note changes may have been made to your medications please refer to your discharge paperwork for your current medications and take this list with you to all your doctors appointments for your doctors to review    Please do not resume a home medication unless the medication reconciliation sheet indicates to do so, please do not assume that a medication that you were given a prescription for is the same as a medication you have at home based on both medications having the same name as dosages and frequency may have changed    Please do not combine any muscle relaxants (including but not limited to zanaflex, flexaril, soma, robaxin, etc) pain medications (including but not limited to tramadol/ultram, vicodin, norco, percocet, oxycodone, oxycontin, morphine, hydropmorphone, oxymorphone, fentanyl, hydrocodone, etc)  or sedatives/sleep aids/anti-anxiety medications (including but not limited to [place], trazodone, valium, xanax, klonipin, ativan, lorazepam, etc) that you may have been prescribed prior to admission with the pain medication you are being prescribed upon discharge from the rehab unit  Please do not drive or operate heavy machinery while using these medications  Do not drink alcohol while using these medications  You will be given a limited supply of pain medications on discharge; should you require additional refills/medications, your PCP and/or surgeon may prescribe at his/her discretion       Please check your blood pressure & heart rate/pulse prior to taking your blood pressure/heart rate medications and 1 hour after, please contact your family doctor or cardiologist immediately for a blood pressure below 100/60 and do not take the medications until speaking with them, please contact your family doctor or cardiologist immediately for blood pressure greater than 160/100; please contact your family doctor or cardiologist immediately for a heart rate/pulse lower than 60 and do not take the medications until speaking with them, please contact your family doctor or cardiologist immediately for heart rate/pulse greater than 100     Please note the following incidental findings were found during your recent hospitalization please discuss them with your doctors so that they may arrange any tests/referrals as they deem necessary:     1) renal cysts: per the CAT scan report of 3/26/18  this is an unchanged finding compared to previous CAT scan of 9/25/17 however please follow up your family doctor for further testing/treatment if any and/or specialist referral if any as they deem necessary     2) right renal nodules: per the CAT scan report of 3/26/18  this is an unchanged finding compared to previous CAT scan of 9/25/17 however please follow up your family doctor for further testing/treatment if any and/or specialist referral if any as they deem necessary     3) hepatic cysts: per the CAT scan report of 3/26/18  this is an unchanged finding compared to previous CAT scan of 9/25/17 however please follow up your family doctor for further testing/treatment if any and/or specialist referral if any as they deem necessary     4) pancreatic cysts: per the CAT scan report of 3/26/18  this is an unchanged finding compared to previous CAT scan of 9/25/17 however please follow up your family doctor for further testing/treatment if any and/or specialist referral if any as they deem necessary     5) ventral hernia : Please follow up your family doctor for further testing/treatment if any and/or specialist referral if any as they deem necessary     6) Premature ventricular contractions: Please follow up with Dr Rosalie Downs for further testing/treatment/monitoring if any as they deem necessary     7) elevated peak Pulmonary Artery pressure: Please follow up with Dr Rosalie Downs for further testing/treatment/monitoring if any as they deem necessary     8) Sacral spine cystic mass : this was noted on an MRI report from 6/2017 however please follow up your family doctor for further testing/treatment if any and/or specialist referral if any as they deem necessary     9) Left pulmonary nodule (3-4 mm) & Right pulmonary calcification: this was noted on a CAT scan of 12/2016 however please follow up with Dr Juan Jose Corado for further testing/treatment/monitoring if any as they deem necessary      10) Hyperparathyroidism: Please follow up with Dr Simone Palomares for further testing/treatment/monitoring if any as they deem necessary     11) Hyperthyroidism: please have your free T4 level drawn in 2 wks (the results will be sent to Dr Simone Palomares) and make an appointment to see Dr Simone Palomares in approximately 4 wks     12) Anemia: please have your blood work drawn to monitor your hemoglobin level the results will be sent to your family doctor and Dr Juan Jose Corado    13) Elevated platelet count: please have your blood work drawn to monitor your platelet count the results will be sent to your family doctor and Dr Juan Jose Corado    14) Low white blood cell count: please have your blood work drawn to monitor your white blood cell count the results will be sent to your family doctor and Dr Juan Jose Corado      Please avoid NSAID (including but not limited to advil, aleve, motrin, naproxen, ibuprofen, mobic, meloxicam, etc) medications as NSAID medications may delay bone healing    Please avoid NSAID (including but not limited to advil, aleve, motrin, naproxen, ibuprofen, mobic, meloxicam, etc) medications, anti platelet medications (including but not limited to plavix and plavix containing products), and any prescription blood thinners due to your already being on aspirin and when combined with these other medications they can increase your risk of bleeding; you may be cleared to use these medications in the future but do not do so unless advised to do so by your doctors      Please avoid NSAID (including but not limited to advil, aleve, motrin, naproxen, ibuprofen, mobic, meloxicam etc) medications, anti platelet medications (including but not limited to plavix and plavix containing products), and any prescription blood thinners due to your already being on lovenox (enoxaparin) for a total of a 28 day course (you have been provided with a prescription for the remaining days on your 28 day course)  You may be cleared by your doctors to use these medications after you have completed your 28 day course however please do not use them unless your are advised to do so by your doctors as the use of these medications in combination with lovenox (enoxaparin) can increase bleeding risk         Please complete the remainder of your 28 day course of lovenox (enoxaparin) as prescribed    Unless specifically noted in your medication list provided to you in your discharge paper work, please discuss with your family doctor prior to resuming any vitamins/minerals/supplements you may have been taking prior to your hospitalization     Please note a summary of your hospital stay with relevant information for your doctors has been sent to them, please confirm with your doctors at your follow up visits that they have received this summary and have them contact [address] [place] [address] if they have not received them along with any other medical records they may require

## 2018-04-26 NOTE — PROGRESS NOTES
04/26/18 0858   Pain Assessment   Pain Assessment 0-10   Pain Score 5   Pain Type Surgical pain   Pain Location Hip;Leg   Pain Orientation Left   Restrictions/Precautions   Precautions Fall Risk;Pain   Weight Bearing Restrictions No   LLE Weight Bearing Per Order WBAT   ROM Restrictions No   QI: Oral Hygiene   Assistance Needed Independent; Adaptive equipment   Assistance Provided by [place] No physical assistance   Comment In stance with RW at sink  Oral Hygiene CARE Score 6   Grooming   Able To Initiate Tasks; Acquire Items   Limitation Noted In Strength;Timeliness   Findings Pt able to complete grooming tasks while in stance at sink with use of RW for support  Pt demo's good carryover of safety with RW  Grooming (FIM) 6 - Patient requires assistive device/extra time/safety concerns but completes independently   QI: Shower/Bathe Self   Assistance Needed Independent; Adaptive equipment   Assistance Provided by [place] No physical assistance   Comment Pt completes bathing while seated and in stance with use of grab bars  Shower/Bathe Self CARE Score 6   Bathing   Assessed Bath Style Shower   Anticipated D/C Bath Style Shower   Able to [place] Yes   Able to [employer] Temperature Yes   Able to Wash/Rinse/Dry (body part) Left Arm;Right Arm;L Upper Leg;R Upper Leg;L Lower Leg/Foot;R Lower Leg/Foot;Chest;Abdomen;Perineal Area; Buttocks   Limitations Noted in Balance; Endurance;Strength   Positioning Seated;Standing   Adaptive Equipment Shower Seat;Hand Held Shower; Shower Family Dibbz  See assessment for further details  Bathing (FIM) 6 - Patient requires assistive device/extra time/safety concerns but completes independently   Tub/Shower Transfer   Limitations Noted In Balance; Endurance;UE Strength;LE Strength   Adaptive Equipment Grab Bars;Seat with out Back   Assessed Shower   Findings Pt requires steadying A with shower transfer in order to clear 5" bolster used to simulate 5" lip at entrance of home shower  Tub Transfer (FIM) 0 - Activity does not occur   Shower Transfer (FIM) 4 - Patient requires steadying assist or light touching   QI: Upper Body [place] [address] Provided by [place] No physical assistance   Comment Pt able to obtain clothing and complete UB dressing while seated  Upper Body Dressing CARE Score 6   QI: Lower Body Dressing   Assistance Needed Independent; Adaptive equipment   Assistance Provided by [place] No physical assistance   Comment Pt demo'd ability to don LB clothing while seated and in stance while unilaterally supported with RW  Lower Body Dressing CARE Score 6   QI: Putting On/Taking Off Footwear   Assistance Needed Adaptive equipment; Independent   Assistance Provided by [place] No physical assistance   Comment Pt able to don socks and shoes while seated with use of LHAE  Putting On/Taking Off Footwear CARE Score 6   Dressing/Undressing Clothing   Able to  Obtain Clothing;Store removed clothing   Remove UB Clothes Other   Remove LB Clothes Undergarment;Socks   Don UB Clothes Pullover Shirt;Bra   Don LB Clothes Pants; Undergarment;Socks;TEDs; Shoes   Limitations Noted In Balance; Endurance;Strength;Timeliness   Adaptive Equipment Reacher;LH Shoehorn;Sock Aide   Positioning Supported Sit;Standing   UB Dressing (FIM) 7 - No helper, safely, timely and completes all tasks independently   LB Dressing (FIM) 6 - Patient requires assistive device/extra time/safety concerns but completes independently   QI: Sit to [address] equipment; Independent   Assistance Provided by [place] No physical assistance   Comment Mod I   Sit to Stand CARE Score 6   QI: Chair/Bed-to-Chair Transfer   Assistance Needed Adaptive equipment; Independent   Assistance Provided by [place] No physical assistance   Comment Mod I   Chair/Bed-to-Chair Transfer CARE Score 6   Transfer Bed/Chair/Wheelchair   Adaptive Equipment Roller Walker   Findings Pt completes transfers at Mod I level with RW    Bed, Chair, Wheelchair Transfer (FIM) 6 - Patient requires assistive device/extra time/safety concerns but completes independently   QI: [id number]; Adaptive equipment   Assistance Provided by [place] No physical assistance   Comment Pt able to complete hygiene while seated on toilet  Toileting Hygiene CARE Score 6   Toileting   Able to Pull Clothing down yes, up yes  Able to Manage Clothing Closures Yes   Manage Hygiene Bladder   Limitations Noted In Balance;UE Strength;LE Strength   Adaptive Equipment Grab Bar   Findings Pt completed toileting task with good carryover of safety with use of grab bars for support to manage LB clothing over hips  Toileting (FIM) 6 - Patient requires assistive device/extra time/safety concerns but completes independently   QI: Toilet Transfer   Assistance Needed Independent; Adaptive equipment   Assistance Provided by [place] No physical assistance   Comment Mod I   Toilet Transfer CARE Score 6   Toilet Transfer   Surface Assessed Standard Commode   Transfer Technique Standard   Adaptive Equipment Grab Bar   Toilet Transfer (FIM) 6 - Patient requires assistive device/extra time/safety concerns but completes independently   Light Housekeeping   Light Housekeeping Level Walker   Light Housekeeping Level of Assistance Modified independent   Light Housekeeping Pt able to manage bed linens to organize/ make up bed at simulated home bed height  No LOB noted and pt was able to complete with good tolerance/ insight with safety  Functional Standing Tolerance   Time Pt stood throughout 90 minute session   Activity Pt completed functional mobility in pt room and <> pt room / shower room  Cognition   Overall Cognitive Status WFL   Arousal/Participation Alert; Cooperative   Attention Within functional limits   Orientation Level Oriented X4   Memory Within functional limits   Following Commands Follows all commands and directions without difficulty   Activity Tolerance   Activity Tolerance Patient tolerated treatment well   Assessment   Treatment Assessment Pt participated in skilled OT services to work on D/C ADL capabilities, functional mobility with RW, higher level I/ADL tasks, strength, endurance, and activity tolerance  Pt demo's ability to gather and transport items required for morning ADL by draping clothing over front of RW  Pt able to walk with RW to shower room at simulated house hold distance (pt's home bathroom is connected to pt's bedroom)  Pt set up shower transfer and required steadying A with entering shower to clear 5" bolster used to simulate 5" shower lip at home shower entrance  Pt could not clear raised edge without A  However pt demo's good carryover with placement of RW and transitioning to use of grab bars as practiced in previous session  Pt then completed all bathing while seated on shower chair without back to simulate home shower chair and in stance with use of grab bars  Pt demo'd ability to reach down to wash feet and lower legs while using opposite hand to support with grab bar  Once bathing was completed pt again required steadying A to clear BLE over 5# bolster to exit shower  With increased time, pt was able to step both legs over bolster with no noted contact with simulated edge or LOB  Pt expressed that she may sponge bathe upon initial D/C to home until she can be seen by home OT services to further review shower transfer  Pt then completed all dressing tasks while seated and in stance with RW for CM over hips  Pt initiated use of LHAE to don footwear and demo'd good carryover with use of AE to don socks and shoes  Upon return to pt's room, pt was able to store used clothing in laundry bag, and complete grooming in BR at sink with RW  Pt then engaged in light housekeeping task of organizing bed at simulated home bed height   Pt was able to complete bed making at RW level with good tolerance and insight with safety  Discussed DME need with pt  Pt feels as though all DME needs have been met  Recommend that pt be D/C'd home with home therapies  Prognosis Good   Problem List Decreased strength;Decreased endurance; Impaired balance;Pain   Barriers to Discharge Decreased caregiver support; Inaccessible home environment   Plan   Progress Progressing toward goals   Recommendation   OT Discharge Recommendation Home OT   OT - OK to Discharge Yes   OT Therapy Minutes   OT Time In 0830   OT Time Out 1000   OT Total Time (minutes) 90   OT Mode of treatment - Individual (minutes) 60   OT Mode of treatment - Concurrent (minutes) 30   OT Mode of treatment - Group (minutes) 0   OT Mode of treatment - Co-treat (minutes) 0   OT Mode of Teatment - Total time(minutes) 90 minutes   Therapy Time missed   Time missed?  No

## 2018-04-26 NOTE — PROGRESS NOTES
Internal Medicine Progress Note  Patient: Sean Xiao  Age/sex: 82 y o  female  Medical Record #: [id number]      ASSESSMENT/PLAN:  Sean Xiao is seen and examined and management for following issues:    1  Left Intertrochanteric femur fx s/p IMN on 4/8/18 ([place]):  Pain control per primary team  WBAT  Edema of left thigh = has improved    2  ABLA; s/p 2 units PRBC: steadily improving - will continue to watch; no sx  3   Chronic diastolic heart failure:  Resumed Bumex 0 5mg daily 4/14/18; no dizziness/SOB; labs and BPs stable    4  CAD hx Stent x 2 circumflex '11: continue ASA 81mg, Atenolol and Lipitor    5  HTN: stable on home regimen of Norvasc 5mg daily and Atenolol 25mg daily  6   Hyperthyroidism: continue Methimazole; Repeat TSH/T4 in 4 weeks  Follow up with endocrinology    7  Reactive airway disease: No signs of acute flare  Continue Allegra and Singulair    8  DVT prophylaxis: Lovenox 40mg daily    9  Hx chronic large granular lymphocytic leukemia:  Sees Dr Tom Cason as OP; has not required tx    10  Hyperparathyroidism:  Calcium/Vit D; OP followup with Endocrine        Subjective: Patient seen and examined  She is eating and sleeping well  She denies any  complaints      ROS:   GI: denies abdominal pain, change bowel habits or reflux symptoms  Neuro: No new neurologic changes  Respiratory: No Cough, SOB  Cardiovascular: No CP, palpitations     Scheduled Meds:    Current Facility-Administered Medications:  acetaminophen 975 mg Oral Q8H Jason Ramires MD   amLODIPine 5 mg Oral Daily Tima Cole MD   aspirin 81 mg Oral Daily Tima Cole MD   atenolol 25 mg Oral Daily Tima Cole MD   atorvastatin 40 mg Oral After Leonid Betancourt MD   bisacodyl 10 mg Rectal Daily PRN Tima Cole MD   bumetanide 0 5 mg Oral Daily Josey Freeman PA-C   calcium carbonate 1 tablet Oral Daily With Milena Dahl MD   cholecalciferol 2,000 Units Oral Daily Tima Cole MD   docusate sodium 100 mg Oral BID Jeramie Falcon MD   DULoxetine 30 mg Oral Daily Jeramie Falcon MD   enoxaparin 40 mg Subcutaneous Daily Jeramie Falcon MD   methimazole 5 mg Oral Daily Jeramie Falcon MD   oxyCODONE 10 mg Oral Q4H PRN Jeramie Falcon MD   oxyCODONE 5 mg Oral Q4H PRN Jeramie Falcon MD   pantoprazole 40 mg Oral Early Morning Jeramie Falcon MD   polyethylene glycol 17 g Oral Daily PRN Jeramie Falcon MD       Labs:       Results from last 7 days  Lab Units 04/23/18  0621   WBC Thousand/uL 4 26*   HEMOGLOBIN g/dL 9 4*   HEMATOCRIT % 29 2*   PLATELETS Thousands/uL 406*       Results from last 7 days  Lab Units 04/23/18  0621   SODIUM mmol/L 137   POTASSIUM mmol/L 4 0   CHLORIDE mmol/L 103   CO2 mmol/L 29   BUN mg/dL 16   CREATININE mg/dL 0 65   GLUCOSE RANDOM mg/dL 100   CALCIUM mg/dL 8 9                  Glucose (mg/dL)   Date Value   04/23/2018 100   04/19/2018 96   04/16/2018 97   04/13/2018 100       Labs reviewed    Physical Examination:  Vitals:   Vitals:    04/25/18 1318 04/25/18 2044 04/26/18 0500 04/26/18 0818   BP: 122/57 145/65 146/77 158/68   BP Location: Right arm Right arm Right arm Right arm   Pulse: 78 73 75 85   Resp: 20 18 18    Temp: 98 °F (36 7 °C) 98 1 °F (36 7 °C) 98 °F (36 7 °C)    TempSrc: Oral Oral Oral    SpO2: 97% 96% 99%    Weight:       Height:           GEN: NAD  HEENT: NC/AT  RESP: BBS w/o crackles/wheeze/rhonci; resp unlabored  CV: +S1 S2, regular rate, no rubs/murmurs  ABD: soft, NT, ND, normal BS   : no valle  EXT: no edema of left lower leg but + mild edema of left hip and thigh (steadily improving)  Skin: no rashes  Neuro: AAO      [ X ] Total time spent: 30 Mins and greater than 50% of this time was spent counseling/coordinating care        Kaity Kang, [facility]  Internal Medicine

## 2018-04-26 NOTE — PROGRESS NOTES
04/26/18 1030   Pain Assessment   Pain Assessment 0-10   Pain Score 3   Pain Location Leg   Pain Orientation Left   Restrictions/Precautions   Precautions Fall Risk   LLE Weight Bearing Per Order WBAT   General   Change In Medical/Functional Status mod I   Cognition   Overall Cognitive Status WFL   Arousal/Participation Alert; Cooperative   Attention Within functional limits   Orientation Level Oriented X4   Memory Within functional limits   Following Commands Follows all commands and directions without difficulty   Subjective   Subjective reports that she is overall feeling better   QI: Roll Left and Right   Assistance Needed Independent   Roll Left and Right CARE Score 6   QI: Sit to Lying   Assistance Needed Independent   Sit to Lying CARE Score 6   QI: Lying to Sitting on Side of Bed   Assistance Needed Independent   Lying to Sitting on Side of Bed CARE Score 6   QI: Sit to Stand   Assistance Needed Independent   Comment RW   Sit to Stand CARE Score 6   Bed Mobility   Able to Roll Left to Right;Right to Left;Scoot Up;Scoot Down   Findings mod I   QI: Chair/Bed-to-Chair Transfer   Assistance Needed Independent   Comment RW   Chair/Bed-to-Chair Transfer CARE Score 6   Transfer Bed/Chair/Wheelchair   Limitations Noted In UE Strength;LE Strength   Adaptive Equipment Roller Walker   Stand Pivot Independent   Sit to Stand Independent   Stand to Sit Independent   Supine to Sit Independent   Sit to Supine Independent   Car Transfer Independent   All Transfer Independent   Findings mod I with RW   Bed, Chair, Wheelchair Transfer (FIM) 6 - Patient requires assistive device/extra time/safety concerns but completes independently   QI: Car Transfer   Assistance Needed Independent   Comment RW   Car Transfer CARE Score 6   QI: Walk 10 Feet   Assistance Needed Independent   Walk 10 Feet CARE Score 6   QI: Walk 50 Feet with Two Turns   Assistance Needed Independent   Walk 50 Feet with Two Turns CARE Score 6   QI: Walk 150 Feet Assistance Needed Independent   Walk 150 Feet CARE Score 6   Ambulation   Does the patient walk? 2  Yes   Primary Discharge Mode of Locomotion Walk   Walk Assist Level Modified Independent   Gait Pattern Antalgic; Inconsistant Geetha; Slow Geetha;Decreased foot clearance;Narrow MIRIAM;Step through; Improper weight shift   Assist Device Mis Sandoval Walked (feet) 150 ft  (x4)   Limitations Noted In Endurance; Heel Strike;Speed;Strength;Swing   Findings segmented gait   Walking (FIM) 6 - Patient requires assistive device/extra time/safety concerns but completes independently AND distance 150 feet or more, no rest   QI: 1 Step (Curb)   Assistance Needed Supervision   1 Step (Curb) CARE Score 4   QI: 4 Steps   Assistance Needed Supervision   4 Steps CARE Score 4   QI: 12 Steps   Assistance Needed Supervision   12 Steps CARE Score 4   Stairs   Type Stairs;Curb;Ramp   # of Steps 12   Weight Bearing Precautions LLE;WBAT   Assist Devices Bilateral Rail;Roller Checo Boxer   Findings RW for curb and ramp   Stairs (FIM) 5 - Patient requires supervision/monitoring AND goes up and down full flight (12- 14 stairs)   QI: Toilet Transfer   Assistance Needed Independent   Comment mod I   Toilet Transfer CARE Score 6   Toilet Transfer   Surface Assessed Raised Toilet   Limitations Noted In UE Strength;LE Strength   Findings mod I   Toilet Transfer (FIM) 6 - Patient requires assistive device/extra time/safety concerns but completes independently   Therapeutic Interventions   Flexibility hamstring and gastroc 60"x2   Assessment   Treatment Assessment session focused on functional tasks and mobility for tentative d/c home 4/27; pt has good safety awareness and balance and is mod I with all activities besides staris which she needs S for but no physical assistance; pt uses non-reciprocal pattern for stairs up and down; continues to amb with segmented pattern, takes 2 steps and then moves walker; manual assistance to move walker faster to increase gait speed and increase WB in LLE for more normalized gait pattern, pt able to maintain 75% of the time with manual assistance, 50-75% without assistance; continune POC as per PT   Problem List Decreased strength;Decreased range of motion   Barriers to Discharge Decreased caregiver support   PT Barriers   Physical Impairment Decreased strength;Decreased range of motion   Functional Limitation Walking   Plan   Treatment/Interventions LE strengthening/ROM; Elevations; Therapeutic exercise; Endurance training;Gait training   Progress Progressing toward goals   Recommendation   Recommendation Home PT   Equipment Recommended Walker   PT Therapy Minutes   PT Time In 1030   PT Time Out 1130   PT Total Time (minutes) 60   PT Mode of treatment - Individual (minutes) 60   PT Mode of treatment - Concurrent (minutes) 0   PT Mode of treatment - Group (minutes) 0   PT Mode of treatment - Co-treat (minutes) 0   PT Mode of Teatment - Total time(minutes) 60 minutes   Therapy Time missed   Time missed?  No

## 2018-04-26 NOTE — INCIDENTAL FINDINGS
1) renal cysts: per the CAT scan report of 3/26/18  this is an unchanged finding compared to previous CAT scan of 9/25/17 however please follow up your family doctor for further testing/treatment if any and/or specialist referral if any as they deem necessary     2) right renal nodules: per the CAT scan report of 3/26/18  this is an unchanged finding compared to previous CAT scan of 9/25/17 however please follow up your family doctor for further testing/treatment if any and/or specialist referral if any as they deem necessary     3) hepatic cysts: per the CAT scan report of 3/26/18  this is an unchanged finding compared to previous CAT scan of 9/25/17 however please follow up your family doctor for further testing/treatment if any and/or specialist referral if any as they deem necessary     4) pancreatic cysts: per the CAT scan report of 3/26/18  this is an unchanged finding compared to previous CAT scan of 9/25/17 however please follow up your family doctor for further testing/treatment if any and/or specialist referral if any as they deem necessary     5) ventral hernia : Please follow up your family doctor for further testing/treatment if any and/or specialist referral if any as they deem necessary     6) Premature ventricular contractions: Please follow up with Dr Kaushal Cortes for further testing/treatment/monitoring if any as they deem necessary     7) elevated peak Pulmonary Artery pressure: Please follow up with Dr Kaushal Cortes for further testing/treatment/monitoring if any as they deem necessary     8) Sacral spine cystic mass : this was noted on an MRI report from 6/2017 however please follow up your family doctor for further testing/treatment if any and/or specialist referral if any as they deem necessary     9) Left pulmonary nodule (3-4 mm) & Right pulmonary calcification: this was noted on a CAT scan of 12/2016 however please follow up with Dr Tom Cason for further testing/treatment/monitoring if any as they deem necessary      10) Hyperparathyroidism: Please follow up with Dr Yessi Madison for further testing/treatment/monitoring if any as they deem necessary     11) Hyperthyroidism: please have your free T4 level drawn in 2 wks (the results will be sent to Dr Yessi Madison) and make an appointment to see Dr Yessi Madsion in approximately 4 wks     12) Anemia: please have your blood work drawn to monitor your hemoglobin level the results will be sent to your family doctor and Dr Sarah Stubbs    13) Elevated platelet count: please have your blood work drawn to monitor your platelet count the results will be sent to your family doctor and Dr Sarah Stubbs    14) Low white blood cell count: please have your blood work drawn to monitor your white blood cell count the results will be sent to your family doctor and Dr Marisol Moreau

## 2018-04-26 NOTE — PROGRESS NOTES
Progress Note - Olympia Bence 82 y o  female MRN: [id number]    Unit/Bed#: -01 Encounter: [id number]            Subjective:   Pt states she feels well and is looking forward to dc tomorrow     Objective:     ROS  Gen: denies recent wt loss   Psych: denies mood change      Physical Exam:     Gen:        NAD   Neck:   trachea midline  Lungs:  respirations unlabored   Heart:    + S1 and S2   Abdomen:    Soft, non-tender  Extremities: + B/L LE edema L> R, Rt 2nd toe--> no tenderness to palpation, no erythema, no swelling, no warmth, full pain free active and passive ROM (not currently painful at all per pt)   Psych: mood/affect appropriate  Neurologic: awake, alert, appropriately answering questions     Functional :  Mobility: mod I  Tx: mod I  ADLs: mod I         Current Facility-Administered Medications:  acetaminophen 975 mg Oral Q8H [facility] Monica Reyes MD   amLODIPine 5 mg Oral Daily Marene Copdeon, MD   aspirin 81 mg Oral Daily Monica Copdeon, MD   atenolol 25 mg Oral Daily Monica Copdeon, MD   atorvastatin 40 mg Oral After Amy Maria, MD   bisacodyl 10 mg Rectal Daily PRN Marene Copping, MD   bumetanide 0 5 mg Oral Daily Asa Marino PA-C   calcium carbonate 1 tablet Oral Daily With Breakfast Monica Reyes, MD   cholecalciferol 2,000 Units Oral Daily Mnoica Copdeon, MD   docusate sodium 100 mg Oral BID Monica Copping, MD   DULoxetine 30 mg Oral Daily Marene Copping, MD   enoxaparin 40 mg Subcutaneous Daily Monica Copdeon, MD   methimazole 5 mg Oral Daily Marene Copdeon, MD   oxyCODONE 10 mg Oral Q4H PRN Marene Copping, MD   oxyCODONE 5 mg Oral Q4H PRN Marene Copping, MD   pantoprazole 40 mg Oral Early Morning Monica Copping, MD   polyethylene glycol 17 g Oral Daily PRN Monica Copdeon, MD       bisacodyl    oxyCODONE    oxyCODONE    polyethylene glycol      Assessment:  Pt is 81 yo female with L IT fx s/p IM nail fixation by Dr Tonio Baeza on 4/8    Plan:    Rehabilitation: cont PT/OT for ambulatory/ADL dysfxn    L IT fx: s/p IM nail fixation by Dr Niecy Barnard on 4/8 (SPEP/UPEP, vit D level WNL); FU XR of 4/21 showed healing fx, intact hardware, & stable alignment         Hyperparathyroidism: on home vit D 2000 units QD, d/w endocrine and recommended starting pt on 1000 mg calcium carbonate qd (1250 mg formulation per tab is what is on formulary); FU with Dr Rolando White     Hyperthyroidism: elevated thyroid microsomal antibody, elevated thyroid stimulating immunoglobulin, decreased TSH, elevated free T4, likely autoimmune etiology, seen by endocrine in acute care and started on tapazole and have recommended repeat free T4 in 2 wks and FU in 4 wks with Dr Rolando White (scrip provided upon dc for free T4 with instructions to be drawn in approx 2 wks and with results to Dr Rolando White)     bladder CA: CT A/P of 3/26/18 did not reveal any findings to suggest recurrent or new urogenital neoplasm; follows with Dr Joe Dempsey     CLL: follows with Dr Carola Kapadia who per last visit note felt pt was hematologically stable      CAD: s/p stents, on home regimen of ASA 81 mg qd, atenolol 25 mg qd, and lipitor 40 mg qPM; OP FU with Dr Yon Perez     HTN: on home regimen of norvasc 5 mg qd and atenolol 25 mg qd      GERD: on home dose of protonix 40 mg qd (per pt will occasionally take BID if she feels it is very bad that day)      seasonal allergies: at home takes prn allegra and prn singulair; currently asymptomatic per pt     grade I DD w/chronic LE edema: per IM recs in acute care pt resumed by IM on home bumex 0 5 mg qd; OP FU with Dr Yon Perez (pt's cardiologist)     Depression: on home cymbalta 30 mg qd      Osteoporosis: on home vitamin D 2000 units qd (vit D level of 4/9 WNL at 47 9), also on prolia as OP, managed by her rheumatologist (Dr Rigoberto Smith), seen by endocrine and recommend FU with Dr Rigoberto Smith for further management and DEXA scan at Dr Rigoberto Smith discretion (SPEP/UPEP w/o monoclonal bands, vit D level WNL); OP FU with Dr Rigoberto Smith     intermittent Rt 2nd toe pain: not currently painful at all per pt; no tenderness to palpation, no erythema, no swelling, no warmth, full pain free active and passive ROM; bedside evaluation/examination done by Dr Glee Osler and felt no further imaging/intervention is warranted at this time (per Dr Bryan Grubbs no need for formal consult order to be placed at this time but may do so if symptoms change or worsen)     Anemia: ABLA, Hg currently improved to 9 4; scrip for CBC w/diff upon dc with results to PCP and Dr Bere Gannon   Thrombocytosis: mild at 406 (); likely reactive; scrip for CBC w/diff upon dc with results to PCP and Dr Bere Gannon   Leukopenia: mild at 4 26 (LLN 4 31), diff WNL; scrip for CBC w/diff upon dc with results to PCP and Dr Bere Gannon      DVT ppx: SCDs, lovenox x 28 days post-op per ortho  Dispo: 4/27     Incidental findings of CT A/P of 3/26/18:  1) b/l renal cysts: per report unchanged compared to previous study of 9/25/17  2) rt renal nodules: per report unchanged compared to previous study of 9/25/17  3) hepatic cysts: per report unchanged compared to previous study of 9/25/17  4) pancreatic cysts: per report unchanged compared to previous study of 9/25/17  5) ventral hernia containing adipose: OP FU w/PCP for further testing/treatment if any and/or specialist referral if any as they deem necessary        Other incidental findings:  6) PVCs: OP FU with Dr Vianey Otero (pt's cardiologist)  7) elevated peak PA pressure: OP FU with Dr Vianey Otero (pt's cardiologist)  8) 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 (per radiology report of 6/2017): OP FU with PCP for further testing/treatment if any and/or specialist referral if any as they deem necessary   9) L pulmonary nodule (3-4 mm) & R tiny calcification per radiology report of 12/2016: OP FU with Dr Bere Gannon        Note: This patient record was searched in the [place] PDMP and the risks/benefits of prescribing this patient medications with high abuse/dependence potential was assessed and determined to be medically appropriate at this time   (last controlled substance fill was for tramadol 50 mg 30 tabs no refills on 1/6/18 which patient was advised to dispose of as it did not help her and should not be combined with the oxycodone that she will be prescribed upon dc for pain)

## 2018-04-26 NOTE — SOCIAL WORK
In preparation for dc cm received order for a commode, order placed with [facility] to be delivered to pts room prior to dc

## 2018-04-26 NOTE — PROGRESS NOTES
04/26/18 1230   Pain Assessment   Pain Assessment 0-10   Pain Score 4   Pain Location Leg;Rib cage   Pain Orientation Left;Bilateral   Pain Descriptors Aching   Restrictions/Precautions   Precautions Fall Risk   LLE Weight Bearing Per Order WBAT   General   Change In Medical/Functional Status mod I   Cognition   Overall Cognitive Status WFL   Arousal/Participation Alert; Cooperative   Attention Within functional limits   Orientation Level Oriented X4   Memory Within functional limits   Following Commands Follows all commands and directions without difficulty   Subjective   Subjective reports her rib pain is an annoyance due to arthritis   QI: Roll Left and Right   Assistance Needed Independent   Roll Left and Right CARE Score 6   QI: Sit to Lying   Assistance Needed Independent   Sit to Lying CARE Score 6   QI: Lying to Sitting on Side of Bed   Assistance Needed Independent   Lying to Sitting on Side of Bed CARE Score 6   QI: Sit to Stand   Assistance Needed Independent   Sit to Stand CARE Score 6   QI: Chair/Bed-to-Chair Transfer   Assistance Needed Independent   Chair/Bed-to-Chair Transfer CARE Score 6   Transfer Bed/Chair/Wheelchair   Limitations Noted In UE Strength;LE Strength   Adaptive Equipment Roller Walker   Stand Pivot Independent   Sit to Stand Independent   Stand to Sit Independent   Supine to Sit Independent   Sit to Supine Independent   Findings mod I with RW   Bed, Chair, Wheelchair Transfer (FIM) 6 - Patient requires assistive device/extra time/safety concerns but completes independently   QI: Walk 10 Feet   Assistance Needed Independent   Walk 10 Feet CARE Score 6   QI: Walk 50 Feet with Two Turns   Assistance Needed Independent   Walk 50 Feet with Two Turns CARE Score 6   QI: Walk 150 Feet   Assistance Needed Independent   Walk 150 Feet CARE Score 6   Ambulation   Does the patient walk? 2   Yes   Primary Discharge Mode of Locomotion Walk   Walk Assist Level Modified Independent   Gait Pattern Antalgic; Inconsistant Geetha; Slow Geetha;Narrow MIRIAM;Step through; Improper weight shift   Assist Device Roller Claire Sandoval Walked (feet) 250 ft   Limitations Noted In Heel Strike;Speed;Strength;Swing   Findings improved from segmented gait   Walking (FIM) 6 - Patient requires assistive device/extra time/safety concerns but completes independently AND distance 150 feet or more, no rest   Toilet Transfer   Toilet Transfer (FIM) 6 - Patient requires assistive device/extra time/safety concerns but completes independently   Therapeutic Interventions   Strengthening LAQ, standing marches 2# CW RLE and 1# CW LLE 10x3; adduction yellow ball 10x3; abduction green TB 10x3; hamstring curls yellow TB 10x3   Equipment Use   [place] 12 mins level 2   Assessment   Treatment Assessment session focused on increasing strength and endurance; pt has good safety awareness for mobility with RW in and out of room; pt is able to demonstrate increased gait speed and more normalized with minimal pausing to move the walker, the walker is in more constant motion; continue POC as per PT   Problem List Decreased strength;Decreased range of motion   Barriers to Discharge Decreased caregiver support   PT Barriers   Physical Impairment Decreased strength;Decreased range of motion   Functional Limitation Walking   Plan   Treatment/Interventions LE strengthening/ROM; Elevations; Therapeutic exercise; Endurance training;Gait training   Progress Progressing toward goals   Recommendation   Recommendation Home PT   Equipment Recommended Walker   PT Therapy Minutes   PT Time In 1230   PT Time Out 1330   PT Total Time (minutes) 60   PT Mode of treatment - Individual (minutes) 60   PT Mode of treatment - Concurrent (minutes) 0   PT Mode of treatment - Group (minutes) 0   PT Mode of treatment - Co-treat (minutes) 0   PT Mode of Teatment - Total time(minutes) 60 minutes   Therapy Time missed   Time missed?  No

## 2018-04-27 VITALS
HEART RATE: 80 BPM | BODY MASS INDEX: 21.19 KG/M2 | OXYGEN SATURATION: 96 % | RESPIRATION RATE: 18 BRPM | HEIGHT: 61 IN | WEIGHT: 112.21 LBS | DIASTOLIC BLOOD PRESSURE: 80 MMHG | SYSTOLIC BLOOD PRESSURE: 145 MMHG | TEMPERATURE: 98.6 F

## 2018-04-27 PROCEDURE — 99239 HOSP IP/OBS DSCHRG MGMT >30: CPT | Performed by: PHYSICAL MEDICINE & REHABILITATION

## 2018-04-27 RX ORDER — METHIMAZOLE 5 MG/1
5 TABLET ORAL DAILY
Qty: 30 TABLET | Refills: 0 | Status: SHIPPED | OUTPATIENT
Start: 2018-04-28 | End: 2018-05-17 | Stop reason: SDUPTHER

## 2018-04-27 RX ORDER — OXYCODONE HYDROCHLORIDE 5 MG/1
5 TABLET ORAL EVERY 6 HOURS PRN
Qty: 20 TABLET | Refills: 0 | Status: SHIPPED | OUTPATIENT
Start: 2018-04-27 | End: 2018-05-23

## 2018-04-27 RX ORDER — CALCIUM CARBONATE 500(1250)
1 TABLET ORAL
Qty: 30 TABLET | Refills: 0 | Status: SHIPPED | OUTPATIENT
Start: 2018-04-28 | End: 2019-01-24 | Stop reason: SINTOL

## 2018-04-27 RX ORDER — CALCIUM CARBONATE 500(1250)
1 TABLET ORAL
Qty: 30 TABLET | Refills: 0 | Status: SHIPPED | OUTPATIENT
Start: 2018-04-27 | End: 2018-04-27

## 2018-04-27 RX ADMIN — DULOXETINE HYDROCHLORIDE 30 MG: 30 CAPSULE, DELAYED RELEASE ORAL at 08:51

## 2018-04-27 RX ADMIN — OXYCODONE HYDROCHLORIDE 5 MG: 5 TABLET ORAL at 13:35

## 2018-04-27 RX ADMIN — ATENOLOL 25 MG: 25 TABLET ORAL at 08:50

## 2018-04-27 RX ADMIN — ENOXAPARIN SODIUM 40 MG: 40 INJECTION SUBCUTANEOUS at 08:50

## 2018-04-27 RX ADMIN — ACETAMINOPHEN 975 MG: 325 TABLET, FILM COATED ORAL at 13:35

## 2018-04-27 RX ADMIN — AMLODIPINE BESYLATE 5 MG: 5 TABLET ORAL at 08:51

## 2018-04-27 RX ADMIN — ACETAMINOPHEN 975 MG: 325 TABLET, FILM COATED ORAL at 05:48

## 2018-04-27 RX ADMIN — BUMETANIDE 0.5 MG: 1 TABLET ORAL at 08:52

## 2018-04-27 RX ADMIN — METHIMAZOLE 5 MG: 5 TABLET ORAL at 08:52

## 2018-04-27 RX ADMIN — PANTOPRAZOLE SODIUM 40 MG: 40 TABLET, DELAYED RELEASE ORAL at 05:48

## 2018-04-27 RX ADMIN — VITAMIN D, TAB 1000IU (100/BT) 2000 UNITS: 25 TAB at 08:51

## 2018-04-27 RX ADMIN — Medication 1 TABLET: at 12:47

## 2018-04-27 RX ADMIN — ASPIRIN 81 MG 81 MG: 81 TABLET ORAL at 08:51

## 2018-04-27 NOTE — PROGRESS NOTES
Internal Medicine Progress Note  Patient: Sonia Lagunas  Age/sex: 82 y o  female  Medical Record #: [id number]      ASSESSMENT/PLAN:  Sonia Lagunas is seen and examined and management for following issues:    1  Left Intertrochanteric femur fx s/p IMN on 4/8/18 ([place]):  Pain control per primary team  WBAT  Edema of left thigh = has improved    2  ABLA; s/p 2 units PRBC: steadily improving - will continue to watch; no sx  3   Chronic diastolic heart failure:  Resumed Bumex 0 5mg daily 4/14/18; no dizziness/SOB; labs and BPs stable  4   CAD hx Stent x 2 circumflex '11: continue ASA 81mg, Atenolol and Lipitor    5  HTN: stable on home regimen of Norvasc 5mg daily and Atenolol 25mg daily  6   Hyperthyroidism: continue Methimazole; Repeat TSH/T4 in 4 weeks  Follow up with endocrinology    7  Reactive airway disease: No signs of acute flare  Continue Allegra and Singulair    8  DVT prophylaxis: Lovenox 40mg daily    9  Hx chronic large granular lymphocytic leukemia:  Sees Dr Jose Sierra as OP; has not required tx    10  Hyperparathyroidism:  Calcium/Vit D; OP followup with Endocrine        Subjective: Patient seen and examined  She is eating and sleeping well  She denies any  complaints      ROS:   GI: denies abdominal pain, change bowel habits or reflux symptoms  Neuro: No new neurologic changes  Respiratory: No Cough, SOB  Cardiovascular: No CP, palpitations     Scheduled Meds:    Current Facility-Administered Medications:  acetaminophen 975 mg Oral Q8H [facility] Lele Mccullough MD   amLODIPine 5 mg Oral Daily Lele Mccullough MD   aspirin 81 mg Oral Daily Lele Mccullough MD   atenolol 25 mg Oral Daily Lele Mccullough MD   atorvastatin 40 mg Oral After Rayshawn Real MD   bumetanide 0 5 mg Oral Daily Lianet Leal PA-C   calcium carbonate 1 tablet Oral Daily With Marcela Casillas MD   cholecalciferol 2,000 Units Oral Daily Lele Mccullough MD   DULoxetine 30 mg Oral Daily Lele Mccullough MD   enoxaparin 40 mg Subcutaneous Daily Mayra Beach MD   methimazole 5 mg Oral Daily Mayra Beach MD   oxyCODONE 10 mg Oral Q4H PRN Mayra Beach MD   oxyCODONE 5 mg Oral Q4H PRN Mayra Beach MD   pantoprazole 40 mg Oral Early Morning Mayra Beach MD       Labs:       Results from last 7 days  Lab Units 04/23/18  0621   WBC Thousand/uL 4 26*   HEMOGLOBIN g/dL 9 4*   HEMATOCRIT % 29 2*   PLATELETS Thousands/uL 406*       Results from last 7 days  Lab Units 04/23/18  0621   SODIUM mmol/L 137   POTASSIUM mmol/L 4 0   CHLORIDE mmol/L 103   CO2 mmol/L 29   BUN mg/dL 16   CREATININE mg/dL 0 65   GLUCOSE RANDOM mg/dL 100   CALCIUM mg/dL 8 9                  Glucose (mg/dL)   Date Value   04/23/2018 100   04/19/2018 96   04/16/2018 97   04/13/2018 100       Labs reviewed    Physical Examination:  Vitals:   Vitals:    04/26/18 1327 04/26/18 2015 04/27/18 0547 04/27/18 0850   BP: 134/63 150/67 140/81 145/80   BP Location: Right arm Right arm Right arm    Pulse: 78 75 77 80   Resp: 18 16 18    Temp: 97 9 °F (36 6 °C) 98 5 °F (36 9 °C) 98 6 °F (37 °C)    TempSrc: Oral Oral Oral    SpO2: 100% 100% 96%    Weight:       Height:           GEN: NAD  HEENT: NC/AT  RESP: BBS w/o crackles/wheeze/rhonci; resp unlabored  CV: +S1 S2, regular rate, no rubs/murmurs  ABD: soft, NT, ND, normal BS   : no valle  EXT: mild edema of LLE (steadily improving)  Skin: no rashes  Neuro: AAO      [ X ] Total time spent: 30 Mins and greater than 50% of this time was spent counseling/coordinating care        Christiana Loredo  Internal Medicine

## 2018-04-27 NOTE — DISCHARGE SUMMARY
Discharge Summary - PMR       Admission Date:    4/12/18  Discharge Date:   4/27/18  Diagnosis:   L IT fx    Comorbidities:   See below for medical details     Hospital Course:    The patient had an unremarkable rehab course with significant functional gains made, please see below for medical details:    Pt is 83 yo female with L IT fx s/p IM nail fixation by Dr Moses Nix on 4/8          L IT fx: s/p IM nail fixation by Dr Moses Nix on 4/8 (SPEP/UPEP, vit D level WNL); FU XR of 4/21 showed healing fx, intact hardware, & stable alignment         Hyperparathyroidism: on home vit D 2000 units QD, d/w endocrine and recommended starting pt on 1000 mg calcium carbonate qd (1250 mg formulation per tab is what is on formulary); FU with Dr Yuridia Tirado     Hyperthyroidism: elevated thyroid microsomal antibody, elevated thyroid stimulating immunoglobulin, decreased TSH, elevated free T4, likely autoimmune etiology, seen by endocrine in acute care and started on tapazole and have recommended repeat free T4 in 2 wks and FU in 4 wks with Dr Yuridia Tirado (scrip provided upon dc for free T4 with instructions to be drawn in approx 2 wks and with results to Dr Yuridia Tirado)      bladder CA: CT A/P of 3/26/18 did not reveal any findings to suggest recurrent or new urogenital neoplasm; follows with Dr Pilar Garrison     CLL: follows with Dr Jr Forte who per last visit note felt pt was hematologically stable      CAD: s/p stents, on home regimen of ASA 81 mg qd, atenolol 25 mg qd, and lipitor 40 mg qPM; OP FU with Dr Narayan Hernandez     HTN: on home regimen of norvasc 5 mg qd and atenolol 25 mg qd      GERD: on home dose of protonix 40 mg qd (per pt will occasionally take BID if she feels it is very bad that day)      seasonal allergies: at home takes prn allegra and prn singulair; currently asymptomatic per pt     grade I DD w/chronic LE edema: per IM recs in acute care pt resumed by IM on home bumex 0 5 mg qd; OP FU with Dr Narayan Hernandez (pt's cardiologist)     Depression: on home cymbalta 30 mg qd      Osteoporosis: on home vitamin D 2000 units qd (vit D level of 4/9 WNL at 47 9), also on prolia as OP, managed by her rheumatologist (Dr Kerri Moore), seen by endocrine and recommend FU with Dr Kerri Moore for further management and DEXA scan at Dr Kerri Moore discretion (SPEP/UPEP w/o monoclonal bands, vit D level WNL); OP FU with Dr Kerri Moore     intermittent Rt 2nd toe pain: not currently painful at all per pt; no tenderness to palpation, no erythema, no swelling, no warmth, full pain free active and passive ROM; bedside evaluation/examination done by Dr Lobo Porras and felt no further imaging/intervention is warranted at this time (per Dr Hernandez Offer no need for formal consult order to be placed at this time but may do so if symptoms change or worsen)      Anemia: ABLA, Hg currently improved to 9 4; scrip for CBC w/diff upon dc with results to PCP and Dr Jesse Melgar   Thrombocytosis: mild at 406 (); likely reactive; scrip for CBC w/diff upon dc with results to PCP and Dr Jesse Melgar   Leukopenia: mild at 4 26 (LLN 4 31), diff WNL; scrip for CBC w/diff upon dc with results to PCP and Dr Jesse Melgar      DVT ppx: SCDs, lovenox x 28 days post-op per ortho  Dispo: 4/27     Incidental findings of CT A/P of 3/26/18:  1) b/l renal cysts: per report unchanged compared to previous study of 9/25/17  2) rt renal nodules: per report unchanged compared to previous study of 9/25/17  3) hepatic cysts: per report unchanged compared to previous study of 9/25/17  4) pancreatic cysts: per report unchanged compared to previous study of 9/25/17  5) ventral hernia containing adipose: OP FU w/PCP for further testing/treatment if any and/or specialist referral if any as they deem necessary         Other incidental findings:  6) PVCs: OP FU with Dr Felice Garcia (pt's cardiologist)  7) elevated peak PA pressure: OP FU with Dr Felice Garcia (pt's cardiologist)  8) 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 (per radiology report of 6/2017): OP FU with PCP for further testing/treatment if any and/or specialist referral if any as they deem necessary   9) L pulmonary nodule (3-4 mm) & R tiny calcification per radiology report of 12/2016: OP FU with Dr Vincent Kayser         Note: This patient record was searched in the [place] PDMP and the risks/benefits of prescribing this patient medications with high abuse/dependence potential was assessed and determined to be medically appropriate at this time  (last controlled substance fill was for tramadol 50 mg 30 tabs no refills on 1/6/18 which patient was advised to dispose of as it did not help her and should not be combined with the oxycodone that she will be prescribed upon dc for pain)           Functional Status Upon Discharge: Mod I amb/tx/ADLs    Condition at Discharge: stable    Discharge instructions/Information to patient and family:   See after visit summary for information provided to patient and family  Provisions for Follow-Up Care:  See after visit summary for information related to follow-up care and any pertinent home health orders  Disposition: home     Planned Readmission: no        Discharge Medications:  See after visit summary for reconciled discharge medications provided to patient and family

## 2018-04-27 NOTE — NURSING NOTE
Pt d/c home with mod I level  Prescription and d/c document given to pt  No further question, no c/o pain and VS stable  Pt transport to a car by 2 volunteers by wheelchair

## 2018-04-27 NOTE — OCCUPATIONAL THERAPY NOTE
OT DISCHARGE SUMMARY    Pt  Progressed to mod I level with support of RW for all ADL tasks  Will have local family support to A with IADL upon d/c  Issued pt  Leg  to use upon d/c to A with bed mobility  Pt  Continues to present with decreased strength LLE and balance  To continue PT services to address  Pt  Met all LTG  Recommend home evaluation and OT assessment upon d/c for home safety and carryover of I with ADL tasks

## 2018-04-27 NOTE — PROGRESS NOTES
Pt was resting comfortably in the chair  Vital signs are stable  No complaints of pain  Will continue to monitor

## 2018-04-27 NOTE — SOCIAL WORK
Pt is also requiring a roller walker for dc, script updated and sent via ecin to [facility]  Roller walker to be delivered to pts room prior to d c  Scripts sent to [facility] pharmacy for filling prior to dc and delivered to pt room   Pt has a credit card for copay

## 2018-04-30 ENCOUNTER — TELEPHONE (OUTPATIENT)
Dept: UROLOGY | Facility: AMBULATORY SURGERY CENTER | Age: 83
End: 2018-04-30

## 2018-04-30 NOTE — TELEPHONE ENCOUNTER
Spoke with patient  Patient aware CT showed no abnormal findings or concern for recurrence of cancer   Patient states she is already aware of pancreatic cyst  Patient states she has seen a GI doctor regarding cyst

## 2018-04-30 NOTE — CASE MANAGEMENT
Team dc summary - pt made great progress and returned home w/contd hhc services through [facility] for rn pt and ot services  Pt received a commode through [facility]  Pt utilized [facility] pharmacy for [employer] rx needs  pts family present for dc instructions and aware of pts functional ability

## 2018-04-30 NOTE — TELEPHONE ENCOUNTER
CT scan reviewed and no abnormal findings or concern for recurrence of cancer  There is a pancreatic cyst that remains stable but radiologist recommends further surveillance  Patient should discuss with PCP if she has yet to do so

## 2018-04-30 NOTE — TELEPHONE ENCOUNTER
Patient of Dr Lion Collins who recently broke her L femur  She is sched to have cysto to f/u bladder cancer with Dr Lion Collins on 5/15/18, however, patient does not feel she is going to be able to get in the position needed for cysto  Rescheduled cysto for 6/22/18 and patient would like to know if she could get the results of her CT scan from March instead of waiting until 6/22/18  Please advise

## 2018-05-02 ENCOUNTER — TRANSITIONAL CARE MANAGEMENT (OUTPATIENT)
Dept: FAMILY MEDICINE CLINIC | Facility: CLINIC | Age: 83
End: 2018-05-02

## 2018-05-02 ENCOUNTER — TELEPHONE (OUTPATIENT)
Dept: FAMILY MEDICINE CLINIC | Facility: CLINIC | Age: 83
End: 2018-05-02

## 2018-05-02 NOTE — TELEPHONE ENCOUNTER
Patient called to schedule TCM  Patient stated she was in [facility] yung English/josemanuel 4/27/18  Thank you

## 2018-05-03 ENCOUNTER — TELEPHONE (OUTPATIENT)
Dept: FAMILY MEDICINE CLINIC | Facility: CLINIC | Age: 83
End: 2018-05-03

## 2018-05-03 ENCOUNTER — LAB (OUTPATIENT)
Dept: LAB | Facility: CLINIC | Age: 83
End: 2018-05-03
Payer: MEDICARE

## 2018-05-03 ENCOUNTER — TRANSCRIBE ORDERS (OUTPATIENT)
Dept: LAB | Facility: CLINIC | Age: 83
End: 2018-05-03

## 2018-05-03 DIAGNOSIS — E05.90 HYPERTHYROIDISM: ICD-10-CM

## 2018-05-03 DIAGNOSIS — D62 ACUTE BLOOD LOSS ANEMIA: ICD-10-CM

## 2018-05-03 DIAGNOSIS — D64.9 ANEMIA, UNSPECIFIED TYPE: Primary | ICD-10-CM

## 2018-05-03 DIAGNOSIS — D64.9 ANEMIA, UNSPECIFIED TYPE: ICD-10-CM

## 2018-05-03 LAB
BASOPHILS # BLD AUTO: 0.04 THOUSANDS/ΜL (ref 0–0.1)
BASOPHILS NFR BLD AUTO: 1 % (ref 0–1)
EOSINOPHIL # BLD AUTO: 0.14 THOUSAND/ΜL (ref 0–0.61)
EOSINOPHIL NFR BLD AUTO: 4 % (ref 0–6)
ERYTHROCYTE [DISTWIDTH] IN BLOOD BY AUTOMATED COUNT: 15.3 % (ref 11.6–15.1)
HCT VFR BLD AUTO: 34.9 % (ref 34.8–46.1)
HGB BLD-MCNC: 11.2 G/DL (ref 11.5–15.4)
LYMPHOCYTES # BLD AUTO: 1.13 THOUSANDS/ΜL (ref 0.6–4.47)
LYMPHOCYTES NFR BLD AUTO: 29 % (ref 14–44)
MCH RBC QN AUTO: 31.7 PG (ref 26.8–34.3)
MCHC RBC AUTO-ENTMCNC: 32.1 G/DL (ref 31.4–37.4)
MCV RBC AUTO: 99 FL (ref 82–98)
MONOCYTES # BLD AUTO: 0.71 THOUSAND/ΜL (ref 0.17–1.22)
MONOCYTES NFR BLD AUTO: 18 % (ref 4–12)
NEUTROPHILS # BLD AUTO: 1.89 THOUSANDS/ΜL (ref 1.85–7.62)
NEUTS SEG NFR BLD AUTO: 48 % (ref 43–75)
NRBC BLD AUTO-RTO: 0 /100 WBCS
PLATELET # BLD AUTO: 273 THOUSANDS/UL (ref 149–390)
PMV BLD AUTO: 9.8 FL (ref 8.9–12.7)
RBC # BLD AUTO: 3.53 MILLION/UL (ref 3.81–5.12)
T4 FREE SERPL-MCNC: 1.2 NG/DL (ref 0.76–1.46)
WBC # BLD AUTO: 3.93 THOUSAND/UL (ref 4.31–10.16)

## 2018-05-03 PROCEDURE — 85025 COMPLETE CBC W/AUTO DIFF WBC: CPT

## 2018-05-03 PROCEDURE — 36415 COLL VENOUS BLD VENIPUNCTURE: CPT

## 2018-05-03 PROCEDURE — 84439 ASSAY OF FREE THYROXINE: CPT

## 2018-05-03 NOTE — TELEPHONE ENCOUNTER
Annamaria calling from [address] [place], [place] [address] wanting to see if you'd approve PT for 5 weeks, two times a week?      She needs a verbal plan of care before they send a fax

## 2018-05-04 ENCOUNTER — TELEPHONE (OUTPATIENT)
Dept: ENDOCRINOLOGY | Facility: CLINIC | Age: 83
End: 2018-05-04

## 2018-05-04 NOTE — TELEPHONE ENCOUNTER
Left message that T4 was normal and anemia has improved  If she has any further questions to call us back

## 2018-05-05 NOTE — PHYSICAL THERAPY NOTE
PT D/C SUMMARY    Pt progressed to [place] level with use of RW  Pt was able to increase ambulation distance, balance, step through gait pattern, WBing into LLE and standing balance  Pt cont to have pain at times and limited WBing LLE, and therefore cont to recommend use of RW for safe dc home  Pt lives alone and recommending d/c home with family support and home services at this time  Pt able to dc home at this time

## 2018-05-08 DIAGNOSIS — R60.0 LOWER EXTREMITY EDEMA: Primary | ICD-10-CM

## 2018-05-08 RX ORDER — BUMETANIDE 1 MG/1
1 TABLET ORAL DAILY
Qty: 45 TABLET | Refills: 0 | Status: SHIPPED | OUTPATIENT
Start: 2018-05-08 | End: 2018-08-10 | Stop reason: SDUPTHER

## 2018-05-08 NOTE — TELEPHONE ENCOUNTER
Fatimah from [address]  FYI: discontinued nursing services for [facility]      She will continue PT and home health

## 2018-05-14 ENCOUNTER — PATIENT OUTREACH (OUTPATIENT)
Dept: FAMILY MEDICINE CLINIC | Facility: CLINIC | Age: 83
End: 2018-05-14

## 2018-05-14 NOTE — PROGRESS NOTES
Outpatient Care Management Note:Gets up at night and uses the bathroom periodically  She states that she becomes stiff if she would lay all night  She has Visiting nurses coming in- Pt states that most of her pain is after she gets PT in the home, which is 2x a week  Ambulates with a walker  She also has an aide coming and helping her shower  Her niece is visiting with her currently so she has someone to give her a ride to appts  She is still leary of using the shower because this was where her fall was  She feels as though she is progressing well and agrees to outreach  In [place] for hip/ major joint

## 2018-05-15 ENCOUNTER — OFFICE VISIT (OUTPATIENT)
Dept: FAMILY MEDICINE CLINIC | Facility: CLINIC | Age: 83
End: 2018-05-15
Payer: MEDICARE

## 2018-05-15 VITALS
WEIGHT: 120 LBS | TEMPERATURE: 97.6 F | SYSTOLIC BLOOD PRESSURE: 124 MMHG | OXYGEN SATURATION: 95 % | HEIGHT: 61 IN | DIASTOLIC BLOOD PRESSURE: 72 MMHG | HEART RATE: 78 BPM | RESPIRATION RATE: 18 BRPM | BODY MASS INDEX: 22.66 KG/M2

## 2018-05-15 DIAGNOSIS — I10 ESSENTIAL HYPERTENSION: ICD-10-CM

## 2018-05-15 DIAGNOSIS — M54.50 LOW BACK PAIN WITHOUT SCIATICA, UNSPECIFIED BACK PAIN LATERALITY, UNSPECIFIED CHRONICITY: ICD-10-CM

## 2018-05-15 DIAGNOSIS — E05.90 HYPERTHYROIDISM: ICD-10-CM

## 2018-05-15 DIAGNOSIS — S72.142D INTERTROCHANTERIC FRACTURE OF LEFT FEMUR, CLOSED, WITH ROUTINE HEALING, SUBSEQUENT ENCOUNTER: ICD-10-CM

## 2018-05-15 DIAGNOSIS — I50.32 CHRONIC DIASTOLIC (CONGESTIVE) HEART FAILURE (HCC): Chronic | ICD-10-CM

## 2018-05-15 DIAGNOSIS — Z60.2 ELDERLY PERSON LIVING ALONE: ICD-10-CM

## 2018-05-15 DIAGNOSIS — R26.2 AMBULATORY DYSFUNCTION: ICD-10-CM

## 2018-05-15 DIAGNOSIS — IMO0001 TRANSITION OF CARE PERFORMED WITH SHARING OF CLINICAL SUMMARY: Primary | ICD-10-CM

## 2018-05-15 DIAGNOSIS — M79.605 PAIN IN LEFT LEG: ICD-10-CM

## 2018-05-15 DIAGNOSIS — M81.0 OSTEOPOROSIS, UNSPECIFIED OSTEOPOROSIS TYPE, UNSPECIFIED PATHOLOGICAL FRACTURE PRESENCE: Chronic | ICD-10-CM

## 2018-05-15 PROBLEM — D62 ACUTE BLOOD LOSS ANEMIA: Status: RESOLVED | Noted: 2018-04-09 | Resolved: 2018-05-15

## 2018-05-15 PROBLEM — Z78.9 TRANSITION OF CARE PERFORMED WITH SHARING OF CLINICAL SUMMARY: Status: ACTIVE | Noted: 2018-05-15

## 2018-05-15 PROCEDURE — 99495 TRANSJ CARE MGMT MOD F2F 14D: CPT | Performed by: FAMILY MEDICINE

## 2018-05-15 SDOH — SOCIAL STABILITY - SOCIAL INSECURITY: PROBLEMS RELATED TO LIVING ALONE: Z60.2

## 2018-05-15 NOTE — PATIENT INSTRUCTIONS
Pt will get the shingrix that she states was all set to do at pharmacy before she fell and broke her hip

## 2018-05-15 NOTE — PROGRESS NOTES
Assessment/Plan:          Diagnoses and all orders for this visit:    Transition of care performed with sharing of clinical summary    Intertrochanteric fracture of left femur, closed, with routine healing, subsequent encounter    Hyperthyroidism  -     Ambulatory referral to Endocrinology; Future    Osteoporosis, unspecified osteoporosis type, unspecified pathological fracture presence    Essential hypertension    Chronic diastolic (congestive) heart failure (HCC)    Ambulatory dysfunction    Elderly person living alone    Pain in left leg    Low back pain without sciatica, unspecified back pain laterality, unspecified chronicity         Subjective:  Chief Complaint   Patient presents with    Transition of Care Management        Patient ID: Dillan Smith is a 82 y o  female  Per c/c and [facility] notes  Here today with her niece who is visiting  Receiving PT-OT-homecare nurse  c/o "Hurts in back and ribs due to not walking right, as well as left knee and side of leg"   Using tylenol for analgesia- 2 500mg tabs 3x a day, "trying not to take any oxycod, and also trying to cut back on tylenol- using a 500mg plus a 325mg 3x a day"  States a new medicine started at hospital for her thyroid "never had a thyroid problem"- review of hosp records reveals found to be hyperthyroid, seen by endo dr mcgowan, started on methimazole  "Do just about everything myself, dress myself, get my own meals, only thing I'm afraid is the shower because I fell in the shower, had 2 permanent bars put in, shower chair, commode which was set higher until yesterday, doing so well getting up, that PT/OT lowered it" over her toilet  Sees surgeon next week and expects to be given the ok to start driving, but she doesn't yet feel comfortable   Not sleeping, "just now starting to be able to turn on my side, which is usual sleeping position, have had to sleep on my back, and can't fall asleep on my back " uses tylenol PM maybe once a week   In hospital tried all different kinds of wedge pillows  Positioning pillows, no help  "Just tired from not sleeping, but I take as nap in the afternoon, I ice my hip and take a nap"  No constipation  Sore lower abd from heparin injections  "Still kind of weak in left leg"        Review of Systems   Constitutional: Negative for appetite change, chills, diaphoresis, fever and unexpected weight change  Per hpi   HENT: Negative  Eyes: Negative  Respiratory: Negative  Cardiovascular: Negative  Gastrointestinal: Negative  Endocrine: Negative for cold intolerance, heat intolerance, polydipsia, polyphagia and polyuria  Per hpi   Genitourinary: Negative  Musculoskeletal: Negative for joint swelling, neck pain and neck stiffness  Per hpi   Skin: Negative for rash and wound  Neurological: Negative  Hematological: Negative  Psychiatric/Behavioral: Negative  Objective:     Physical Exam   Constitutional: She is oriented to person, place, and time  She appears well-developed and well-nourished  She is cooperative  Non-toxic appearance  She does not have a sickly appearance  She does not appear ill  No distress  HENT:   Head: Normocephalic and atraumatic  Mouth/Throat: Uvula is midline, oropharynx is clear and moist and mucous membranes are normal    Neck: Trachea normal  Neck supple  No JVD present  No thyroid mass and no thyromegaly present  Cardiovascular: Normal rate, regular rhythm, normal heart sounds and normal pulses  Pulmonary/Chest: Effort normal and breath sounds normal    Abdominal: Soft  Bowel sounds are normal  There is no hepatosplenomegaly  There is no tenderness  There is no CVA tenderness  Lymphadenopathy:     She has no cervical adenopathy  Right: No supraclavicular adenopathy present  Left: No supraclavicular adenopathy present  Neurological: She is alert and oriented to person, place, and time  No cranial nerve deficit     Skin: Skin is warm and dry  She is not diaphoretic  No cyanosis  No pallor  Psychiatric: She has a normal mood and affect  Her behavior is normal  Judgment normal  Cognition and memory are normal    Nursing note and vitals reviewed  Vitals:    05/15/18 1127   BP: 124/72   Pulse: 78   Resp: 18   Temp: 97 6 °F (36 4 °C)   SpO2: 95%   Weight: 54 4 kg (120 lb)   Height: 5' 1" (1 549 m)       Transitional Care Management Review:  Nicole Sheets is a 82 y o  female here for TCM follow up  During the TCM phone call patient stated:    Date and time hospital follow up call was made:  5/2/2018  2:10 PM  Patient was hopsitalized at:  [facility], Other (comment)  Date of admission:  4/7/18 (Comment: Patient was admitted to [facility] on 4/7/2018  Had surgery on 4/08/2018  She was transferred to [facility] on 4/10/2018 )  Date of discharge:  4/27/18  Diagnosis:  fractured left femur  Were the patients medicaitons reviewed and updated:  Yes  Current symptoms:  Fatigue, Back pain - left side, Back pain - right side  Back pain, left side, severity:  Mild (Comment: bilateral rib pain)  Back pain, left side, onset:  Gradual  Back pain, right side, onset:  Gradual  Fatigue severity:  Mild  Post hospital issues:  Aftercare intervention  Should patient be enrolled in anticoag monitoring?:  No  Scheduled for follow up?:  Yes  Patients specialists:  Other (comment)  Other specialists Name:  Dr Ike Keita- ortho  Did you obtain your prescribed medications:  Yes  I have advised the patient to call PCP with any new or worsening symptoms (please type in name along with any credentials):  Raji Dickerson   [address] [place] [address],

## 2018-05-17 DIAGNOSIS — E05.90 HYPERTHYROIDISM: ICD-10-CM

## 2018-05-17 RX ORDER — METHIMAZOLE 5 MG/1
5 TABLET ORAL DAILY
Qty: 30 TABLET | Refills: 3 | Status: SHIPPED | OUTPATIENT
Start: 2018-05-17 | End: 2018-07-06 | Stop reason: SDUPTHER

## 2018-05-17 NOTE — TELEPHONE ENCOUNTER
Patient called requesting a script for Methinazole, 5 mg, be sent to [facility]  Patient has 5 pills left and did not get a prescription for refills when discharged from the hospital   Thank you

## 2018-05-21 DIAGNOSIS — C91.10 CHRONIC LYMPHOCYTIC LEUKEMIA OF B-CELL TYPE NOT HAVING ACHIEVED REMISSION (HCC): ICD-10-CM

## 2018-05-23 ENCOUNTER — OFFICE VISIT (OUTPATIENT)
Dept: OBGYN CLINIC | Facility: MEDICAL CENTER | Age: 83
End: 2018-05-23

## 2018-05-23 ENCOUNTER — APPOINTMENT (OUTPATIENT)
Dept: RADIOLOGY | Facility: MEDICAL CENTER | Age: 83
End: 2018-05-23
Payer: MEDICARE

## 2018-05-23 VITALS
BODY MASS INDEX: 22.96 KG/M2 | HEIGHT: 61 IN | WEIGHT: 121.6 LBS | DIASTOLIC BLOOD PRESSURE: 73 MMHG | SYSTOLIC BLOOD PRESSURE: 134 MMHG | HEART RATE: 73 BPM

## 2018-05-23 DIAGNOSIS — M89.8X5 PAIN OF LEFT FEMUR: ICD-10-CM

## 2018-05-23 DIAGNOSIS — Z47.89 AFTERCARE FOLLOWING SURGERY OF THE MUSCULOSKELETAL SYSTEM: Primary | ICD-10-CM

## 2018-05-23 DIAGNOSIS — S72.142D INTERTROCHANTERIC FRACTURE OF LEFT FEMUR, CLOSED, WITH ROUTINE HEALING, SUBSEQUENT ENCOUNTER: ICD-10-CM

## 2018-05-23 PROCEDURE — 73552 X-RAY EXAM OF FEMUR 2/>: CPT

## 2018-05-23 PROCEDURE — 99024 POSTOP FOLLOW-UP VISIT: CPT | Performed by: ORTHOPAEDIC SURGERY

## 2018-05-23 RX ORDER — ACETAMINOPHEN 500 MG
500 TABLET ORAL EVERY 6 HOURS PRN
COMMUNITY
End: 2021-10-12 | Stop reason: HOSPADM

## 2018-05-23 NOTE — PROGRESS NOTES
82 y o female presents in post-operative care after she sustained a left femur intertrochanteric fracture on 4/7/18 which underwent IMN fixation on 4/8/18  She had an unremarkable course in the [facility] for rehab and now has transitioned to home therapy  She is participating in mobility training and strengthening  She denies motor or sensory deficits  She only requires tylenol for pain  She ambulates with use of walker  Review of Systems  Review of systems negative unless otherwise specified in HPI    Past Medical History  Past Medical History:   Diagnosis Date    Bladder cancer ([address] )     Coronary artery disease     S/P stent placement x 2 in 2011    GERD (gastroesophageal reflux disease)     Hepatitis     Hyperlipidemia     Hypertension     Kidney stones     Malignant neoplasm of urethra (HCC)     Pneumonia     Shingles        Past Surgical History  Past Surgical History:   Procedure Laterality Date    CORONARY ANGIOPLASTY WITH STENT PLACEMENT      stent x 2    CYSTOSCOPY      diagnostic - onset 4/4/17    HYSTERECTOMY      MS OPEN RX FEMUR FX+INTRAMED ALEJANDRO Left 4/8/2018    Procedure: INSERTION NAIL IM FEMUR ANTEGRADE (TROCHANTERIC);   Surgeon: Des Salazar MD;  Location: [facility];  Service: Orthopedics       Current Medications  Current Outpatient Prescriptions on File Prior to Visit   Medication Sig Dispense Refill    amLODIPine (NORVASC) 5 mg tablet Take 5 mg by mouth daily Resume on 4/28      aspirin 81 MG tablet Take 81 mg by mouth daily Resume on 4/28       atenolol (TENORMIN) 25 mg tablet Take 25 mg by mouth daily Resume on 4/28      atorvastatin (LIPITOR) 40 mg tablet Take 40 mg by mouth daily after dinner Resume the evening of 4/27      bumetanide (BUMEX) 1 mg tablet Take 1 tablet (1 mg total) by mouth daily /take 1/2 tablet daily by mouth 45 tablet 0    Cholecalciferol 2000 units TABS Take 2,000 Units by mouth daily Resume on 4/28      DULoxetine (CYMBALTA) 30 mg delayed release capsule Take 30 mg by mouth daily Resume on 4/28      methimazole (TAPAZOLE) 5 mg tablet Take 1 tablet (5 mg total) by mouth daily 30 tablet 3    pantoprazole (PROTONIX) 40 mg tablet Take 40 mg by mouth daily Resume on 4/28      calcium carbonate (OYSTER SHELL,OSCAL) 500 mg Take 1 tablet by mouth daily with breakfast 30 tablet 0    enoxaparin (LOVENOX) 40 mg/0 4 mL Inject 0 4 mL (40 mg total) under the skin daily for 8 days 3 2 mL 0    [DISCONTINUED] oxyCODONE (ROXICODONE) 5 mg immediate release tablet Take 1 tablet (5 mg total) by mouth every 6 (six) hours as needed for severe pain Earliest Fill Date: 4/27/18 Max Daily Amount: 20 mg 20 tablet 0     No current facility-administered medications on file prior to visit          Recent Labs [facility])    0  Lab Value Date/Time   HCT 34 9 05/03/2018 1043   HGB 11 2 (L) 05/03/2018 1043   WBC 3 93 (L) 05/03/2018 1043   INR 0 99 04/08/2018 0203   ESR 28 (H) 04/09/2018 1829   CRP <3 0 05/23/2017 1129   GLUCOSE 100 04/23/2018 0621         Physical exam  General: Awake, Alert, Oriented  HEENT: No scleral injection, no evidence of facial trauma  Heart: Extremities warm and well perfused  Lungs: No audible wheezing  ·    left  Lower extremity  · Incisions at hip and knee well healed with no evidence of erythema, drainage, fluctuance, purulence  · No bony tenderness to palpation at hip  · 5/5 strength with hip flexion abduction, knee extension knee flexion  · Intact ankle dorsiflexion, plantar flexion EHL, FHL motor function  · Extremity warm well perfused  · Thigh and calf soft and compressible, no significant swelling    Imaging  Plain films of left femur reveal healing intertrochanteric femur fracture with intramedullary nail construct in satisfactory position with no evidence of failure or cut-out    System plan:  46-year-old female now just under 2 months status post intramedullary nail fixation of left femur intertrochanteric fracture, doing very well    Weightbear as tolerated bilateral lower extremities, continue to use walker as needed for ambulatory assistance  Continue home physical therapy for strengthening and gait training  Patient may wait until 6 months to begin driving as she currently does not feel comfortable the ability to get in and out of the car  May continue to take oral Tylenol as needed for pain  Patient will require shower bars to be installed in the home to prevent further injury which we will prescribe as durable medical equipment  Patient may follow up on as-needed basis    Trey Reynolds  05/23/18

## 2018-05-25 ENCOUNTER — TELEPHONE (OUTPATIENT)
Dept: FAMILY MEDICINE CLINIC | Facility: CLINIC | Age: 83
End: 2018-05-25

## 2018-05-25 DIAGNOSIS — Z60.2 ELDERLY PERSON LIVING ALONE: ICD-10-CM

## 2018-05-25 DIAGNOSIS — S72.142D INTERTROCHANTERIC FRACTURE OF LEFT FEMUR, CLOSED, WITH ROUTINE HEALING, SUBSEQUENT ENCOUNTER: Primary | ICD-10-CM

## 2018-05-25 DIAGNOSIS — Z87.81 S/P ORIF (OPEN REDUCTION INTERNAL FIXATION) FRACTURE: ICD-10-CM

## 2018-05-25 DIAGNOSIS — Z98.890 S/P ORIF (OPEN REDUCTION INTERNAL FIXATION) FRACTURE: ICD-10-CM

## 2018-05-25 SDOH — SOCIAL STABILITY - SOCIAL INSECURITY: PROBLEMS RELATED TO LIVING ALONE: Z60.2

## 2018-05-25 NOTE — TELEPHONE ENCOUNTER
Avel STANLEY called asking that home health aid be extended two times per week for two additional weeks to assist the patient with bathing  The patient is progressing, however, is not ready to be left alone yet  She can be reached at [phone] and will send orders over for your signature  Thank you

## 2018-05-31 ENCOUNTER — TELEPHONE (OUTPATIENT)
Dept: OBGYN CLINIC | Facility: HOSPITAL | Age: 83
End: 2018-05-31

## 2018-05-31 NOTE — TELEPHONE ENCOUNTER
[facility] is calling wondering if it would be okay for her to extend the physical therapy in home 2 times a week for 3 weeks and 1 time a week for one week until she's able to drive again, as patient lives alone and is hard to transport otherwise      [place] m[place]clure

## 2018-06-04 DIAGNOSIS — F32.A DEPRESSION, UNSPECIFIED DEPRESSION TYPE: Primary | ICD-10-CM

## 2018-06-05 DIAGNOSIS — E78.5 HYPERLIPIDEMIA, UNSPECIFIED HYPERLIPIDEMIA TYPE: Primary | ICD-10-CM

## 2018-06-06 RX ORDER — ATORVASTATIN CALCIUM 40 MG/1
40 TABLET, FILM COATED ORAL
Qty: 90 TABLET | Refills: 0 | Status: SHIPPED | OUTPATIENT
Start: 2018-06-06 | End: 2018-09-04 | Stop reason: SDUPTHER

## 2018-06-06 RX ORDER — DULOXETIN HYDROCHLORIDE 30 MG/1
30 CAPSULE, DELAYED RELEASE ORAL DAILY
Qty: 90 CAPSULE | Refills: 0 | Status: SHIPPED | OUTPATIENT
Start: 2018-06-06 | End: 2018-08-30 | Stop reason: SDUPTHER

## 2018-06-08 ENCOUNTER — OFFICE VISIT (OUTPATIENT)
Dept: HEMATOLOGY ONCOLOGY | Facility: CLINIC | Age: 83
End: 2018-06-08
Payer: MEDICARE

## 2018-06-08 VITALS
HEIGHT: 61 IN | BODY MASS INDEX: 23.22 KG/M2 | SYSTOLIC BLOOD PRESSURE: 146 MMHG | TEMPERATURE: 97.2 F | HEART RATE: 63 BPM | WEIGHT: 123 LBS | OXYGEN SATURATION: 99 % | DIASTOLIC BLOOD PRESSURE: 72 MMHG | RESPIRATION RATE: 16 BRPM

## 2018-06-08 DIAGNOSIS — C91.10 CHRONIC LARGE GRANULAR LYMPHOCYTIC LEUKEMIA (HCC): Primary | ICD-10-CM

## 2018-06-08 DIAGNOSIS — D64.9 ANEMIA, UNSPECIFIED TYPE: ICD-10-CM

## 2018-06-08 PROCEDURE — 99214 OFFICE O/P EST MOD 30 MIN: CPT | Performed by: INTERNAL MEDICINE

## 2018-06-08 NOTE — LETTER
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  that was diagnosed several years ago and patient has not required any therapy for this condition  Patient's condition and counts are being monitored  In April 2018 she fell in the bathroom and broke her left hip and had surgery  Post surgery hemoglobin dropped down to 7 5  She received 2 units of blood and that brought that up to 9  Now hemoglobin is 11 2, improving  Patient is recovering from left hip surgery  She ambulates with a walker  She has minimal residual discomfort from her left hip  Patient has tiredness    History of pancreatic cyst and IBS she follows with  Dr Dylon Arriola, gastroenterologist      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 and BCG and undergoes cystoscopies on regular basis  She has 2 stents in her heart  She has history of kidney stone  She has history of Raynaud's  She had herpes zoster in January 2016  History of osteopenia  She has been on vitamin D and prolia  She has appointment with her rheumatologist   History of hepatitis in 1993   History of ALONSO and BSO in 1980 for bleeding  No cancer  History of pulmonary embolism several years ago  History of stomach ulcer in 1970 of pneumonias in 2003 and 2004  of hypertension  She is sensitive to cold weather  History of arthritis in the neck and all over   She follows with a rheumatologist    Current Outpatient Prescriptions:     acetaminophen (TYLENOL) 500 mg tablet, Take 500 mg by mouth every 6 (six) hours as needed for mild pain, Disp: , Rfl:     amLODIPine (NORVASC) 5 mg tablet, Take 5 mg by mouth daily Resume on 4/28, Disp: , Rfl:     aspirin 81 MG tablet, Take 81 mg by mouth daily Resume on 4/28 , Disp: , Rfl:     atenolol (TENORMIN) 25 mg tablet, Take 25 mg by mouth daily Resume on 4/28, Disp: , Rfl:     atorvastatin (LIPITOR) 40 mg tablet, Take 1 tablet (40 mg total) by mouth daily after dinner Resume the evening of 4/27, Disp: 90 tablet, Rfl: 0    bumetanide (BUMEX) 1 mg tablet, Take 1 tablet (1 mg total) by mouth daily /take 1/2 tablet daily by mouth, Disp: 45 tablet, Rfl: 0    calcium carbonate (OYSTER SHELL,OSCAL) 500 mg, Take 1 tablet by mouth daily with breakfast, Disp: 30 tablet, Rfl: 0    Cholecalciferol 2000 units TABS, Take 2,000 Units by mouth daily Resume on 4/28, Disp: , Rfl:     DULoxetine (CYMBALTA) 30 mg delayed release capsule, Take 1 capsule (30 mg total) by mouth daily Resume on 4/28, Disp: 90 capsule, Rfl: 0    methimazole (TAPAZOLE) 5 mg tablet, Take 1 tablet (5 mg total) by mouth daily, Disp: 30 tablet, Rfl: 3    pantoprazole (PROTONIX) 40 mg tablet, Take 40 mg by mouth daily Resume on 4/28, Disp: , Rfl:     enoxaparin (LOVENOX) 40 mg/0 4 mL, Inject 0 4 mL (40 mg total) under the skin daily for 8 days, Disp: 3 2 mL, Rfl: 0    Allergies   Allergen Reactions    Lactose      Other reaction(s): Indigestion/GI Upset    Other      Other reaction(s): Mental Status Change  After surgery   Whole blood  = passed out   historical allergy; verify with patient       ROS:  06/08/18 Reviewed 13 systems:  Presently no headaches, seizures, dizziness, diplopia, dysphagia, hoarseness, chest pain, palpitations, shortness of breath, cough, hemoptysis, abdominal pain, nausea, vomiting,  melena, hematuria, fever, chills, bleeding, bone pains, skin rash, weight loss,   weakness, numbness,  claudication   No frequent infections  Not unusually sensitive to heat or cold  No swelling of the ankles  No swollen glands  Patient is anxious  No GYN symptoms Other symptoms are in HPI        /72 (BP Location: Left arm, Cuff Size: Adult)   Pulse 63   Temp (!) 97 2 °F (36 2 °C) (Tympanic)   Resp 16   Ht 5' 1" (1 549 m)   Wt 55 8 kg (123 lb)   SpO2 99%   BMI 23 24 kg/m²      Physical Exam:  Alert, oriented, not in distress, no icterus, no oral thrush, no palpable neck mass, clear lung fields, regular heart rate, abdomen  soft and non tender,  no calf tenderness, no focal neurological deficit, no skin rash, no palpable lymphadenopathy, good arterial pulses, no clubbing  Patient is anxious  Performance status 3   Patient ambulates with a walker    IMAGING:      LABS:  Results for orders placed or performed in visit on 05/03/18   CBC and differential   Result Value Ref Range    WBC 3 93 (L) 4 31 - 10 16 Thousand/uL    RBC 3 53 (L) 3 81 - 5 12 Million/uL    Hemoglobin 11 2 (L) 11 5 - 15 4 g/dL    Hematocrit 34 9 34 8 - 46 1 %    MCV 99 (H) 82 - 98 fL    MCH 31 7 26 8 - 34 3 pg    MCHC 32 1 31 4 - 37 4 g/dL    RDW 15 3 (H) 11 6 - 15 1 %    MPV 9 8 8 9 - 12 7 fL    Platelets 624 419 - 531 Thousands/uL    nRBC 0 /100 WBCs    Neutrophils Relative 48 43 - 75 %    Lymphocytes Relative 29 14 - 44 %    Monocytes Relative 18 (H) 4 - 12 %    Eosinophils Relative 4 0 - 6 %    Basophils Relative 1 0 - 1 %    Neutrophils Absolute 1 89 1 85 - 7 62 Thousands/µL    Lymphocytes Absolute 1 13 0 60 - 4 47 Thousands/µL    Monocytes Absolute 0 71 0 17 - 1 22 Thousand/µL    Eosinophils Absolute 0 14 0 00 - 0 61 Thousand/µL    Basophils Absolute 0 04 0 00 - 0 10 Thousands/µL   T4, free   Result Value Ref Range    Free T4 1 20 0 76 - 1 46 ng/dL     Labs, Imaging, & Other studies:   All pertinent labs and imaging studies were personally reviewed    Lab Results   Component Value Date  04/23/2018    K 4 0 04/23/2018     04/23/2018    CO2 29 04/23/2018    ANIONGAP 5 04/23/2018    BUN 16 04/23/2018    CREATININE 0 65 04/23/2018    GLUCOSE 100 04/23/2018    GLUF 86 07/07/2017    CALCIUM 8 9 04/23/2018    AST 16 11/20/2017    ALT 29 11/20/2017    ALKPHOS 76 11/20/2017    PROT 5 6 (L) 04/09/2018    BILITOT 0 62 11/20/2017    EGFR 84 04/23/2018     Lab Results   Component Value Date    WBC 3 93 (L) 05/03/2018    HGB 11 2 (L) 05/03/2018    HCT 34 9 05/03/2018    MCV 99 (H) 05/03/2018     05/03/2018   No results found for: SEDRATE    Reviewed and discussed with patient  Assessment and plan: 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  that was diagnosed several years ago and patient has not required any therapy for this condition  Patient's condition and counts are being monitored  In April 2018 she fell in the bathroom and broke her left hip and had surgery  Post surgery hemoglobin dropped down to 7 5  She received 2 units of blood and that brought that up to 9  Now hemoglobin is 11 2, improving  Patient is recovering from left hip surgery  She ambulates with a walker  She has minimal residual discomfort from her left hip  Patient has tiredness    History of pancreatic cyst and IBS she follows with  Dr Trae Lerner, gastroenterologist      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 and BCG and undergoes cystoscopies on regular basis  She has 2 stents in her heart  She has history of kidney stone  She has history of Raynaud's  She had herpes zoster in January 2016  History of osteopenia  She has been on vitamin D and prolia  She has appointment with her rheumatologist   History of hepatitis in 1993   History of ALONSO and BSO in 1980 for bleeding  No cancer  History of pulmonary embolism several years ago  History of stomach ulcer in 1970 of pneumonias in 2003 and 2004  of hypertension    She is sensitive to cold weather  History of arthritis in the neck and all over  She follows with a rheumatologist      Physical examination and test results are as recorded and discussed  Hopefully hemoglobin will continue to improve  Patient's condition and counts will be checked again in 4 months  She remains under surveillance for leukemia  Condition discussed and explained  Questions answered  Discussed the importance of self-breast examination, eating healthy foods and health screening tests  1  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HCC)    - CBC and differential; Future  - Comprehensive metabolic panel; Future  - LD,Blood; Future    2  Anemia, unspecified type  - Ferritin; Future        Patient voiced understanding and agreement in the discussion  Counseling / Coordination of Care   Greater than 50% of total time was spent with the patient and / or family counseling and / or coordination of care

## 2018-06-08 NOTE — PROGRESS NOTES
HPI:  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  that was diagnosed several years ago and patient has not required any therapy for this condition  Patient's condition and counts are being monitored  In April 2018 she fell in the bathroom and broke her left hip and had surgery  Post surgery hemoglobin dropped down to 7 5  She received 2 units of blood and that brought that up to 9  Now hemoglobin is 11 2, improving  Patient is recovering from left hip surgery  She ambulates with a walker  She has minimal residual discomfort from her left hip  Patient has tiredness    History of pancreatic cyst and IBS she follows with  Dr Yousif Desir, gastroenterologist      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 and BCG and undergoes cystoscopies on regular basis  She has 2 stents in her heart  She has history of kidney stone  She has history of Raynaud's  She had herpes zoster in January 2016  History of osteopenia  She has been on vitamin D and prolia  She has appointment with her rheumatologist   History of hepatitis in 1993   History of ALONSO and BSO in 1980 for bleeding  No cancer  History of pulmonary embolism several years ago  History of stomach ulcer in 1970 of pneumonias in 2003 and 2004  of hypertension  She is sensitive to cold weather  History of arthritis in the neck and all over   She follows with a rheumatologist    Current Outpatient Prescriptions:     acetaminophen (TYLENOL) 500 mg tablet, Take 500 mg by mouth every 6 (six) hours as needed for mild pain, Disp: , Rfl:     amLODIPine (NORVASC) 5 mg tablet, Take 5 mg by mouth daily Resume on 4/28, Disp: , Rfl:     aspirin 81 MG tablet, Take 81 mg by mouth daily Resume on 4/28 , Disp: , Rfl:     atenolol (TENORMIN) 25 mg tablet, Take 25 mg by mouth daily Resume on 4/28, Disp: , Rfl:     atorvastatin (LIPITOR) 40 mg tablet, Take 1 tablet (40 mg total) by mouth daily after dinner Resume the evening of 4/27, Disp: 90 tablet, Rfl: 0    bumetanide (BUMEX) 1 mg tablet, Take 1 tablet (1 mg total) by mouth daily /take 1/2 tablet daily by mouth, Disp: 45 tablet, Rfl: 0    calcium carbonate (OYSTER SHELL,OSCAL) 500 mg, Take 1 tablet by mouth daily with breakfast, Disp: 30 tablet, Rfl: 0    Cholecalciferol 2000 units TABS, Take 2,000 Units by mouth daily Resume on 4/28, Disp: , Rfl:     DULoxetine (CYMBALTA) 30 mg delayed release capsule, Take 1 capsule (30 mg total) by mouth daily Resume on 4/28, Disp: 90 capsule, Rfl: 0    methimazole (TAPAZOLE) 5 mg tablet, Take 1 tablet (5 mg total) by mouth daily, Disp: 30 tablet, Rfl: 3    pantoprazole (PROTONIX) 40 mg tablet, Take 40 mg by mouth daily Resume on 4/28, Disp: , Rfl:     enoxaparin (LOVENOX) 40 mg/0 4 mL, Inject 0 4 mL (40 mg total) under the skin daily for 8 days, Disp: 3 2 mL, Rfl: 0    Allergies   Allergen Reactions    Lactose      Other reaction(s): Indigestion/GI Upset    Other      Other reaction(s): Mental Status Change  After surgery  Whole blood  = passed out   historical allergy; verify with patient       ROS:  06/08/18 Reviewed 13 systems:  Presently no headaches, seizures, dizziness, diplopia, dysphagia, hoarseness, chest pain, palpitations, shortness of breath, cough, hemoptysis, abdominal pain, nausea, vomiting,  melena, hematuria, fever, chills, bleeding, bone pains, skin rash, weight loss,   weakness, numbness,  claudication   No frequent infections  Not unusually sensitive to heat or cold  No swelling of the ankles  No swollen glands  Patient is anxious    No GYN symptoms Other symptoms are in HPI        /72 (BP Location: Left arm, Cuff Size: Adult)   Pulse 63   Temp (!) 97 2 °F (36 2 °C) (Tympanic)   Resp 16   Ht 5' 1" (1 549 m)   Wt 55 8 kg (123 lb)   SpO2 99%   BMI 23 24 kg/m²     Physical Exam:  Alert, oriented, not in distress, no icterus, no oral thrush, no palpable neck mass, clear lung fields, regular heart rate, abdomen  soft and non tender,  no calf tenderness, no focal neurological deficit, no skin rash, no palpable lymphadenopathy, good arterial pulses, no clubbing  Patient is anxious  Performance status 3   Patient ambulates with a walker    IMAGING:      LABS:  Results for orders placed or performed in visit on 05/03/18   CBC and differential   Result Value Ref Range    WBC 3 93 (L) 4 31 - 10 16 Thousand/uL    RBC 3 53 (L) 3 81 - 5 12 Million/uL    Hemoglobin 11 2 (L) 11 5 - 15 4 g/dL    Hematocrit 34 9 34 8 - 46 1 %    MCV 99 (H) 82 - 98 fL    MCH 31 7 26 8 - 34 3 pg    MCHC 32 1 31 4 - 37 4 g/dL    RDW 15 3 (H) 11 6 - 15 1 %    MPV 9 8 8 9 - 12 7 fL    Platelets 662 574 - 042 Thousands/uL    nRBC 0 /100 WBCs    Neutrophils Relative 48 43 - 75 %    Lymphocytes Relative 29 14 - 44 %    Monocytes Relative 18 (H) 4 - 12 %    Eosinophils Relative 4 0 - 6 %    Basophils Relative 1 0 - 1 %    Neutrophils Absolute 1 89 1 85 - 7 62 Thousands/µL    Lymphocytes Absolute 1 13 0 60 - 4 47 Thousands/µL    Monocytes Absolute 0 71 0 17 - 1 22 Thousand/µL    Eosinophils Absolute 0 14 0 00 - 0 61 Thousand/µL    Basophils Absolute 0 04 0 00 - 0 10 Thousands/µL   T4, free   Result Value Ref Range    Free T4 1 20 0 76 - 1 46 ng/dL     Labs, Imaging, & Other studies:   All pertinent labs and imaging studies were personally reviewed    Lab Results   Component Value Date     04/23/2018    K 4 0 04/23/2018     04/23/2018    CO2 29 04/23/2018    ANIONGAP 5 04/23/2018    BUN 16 04/23/2018    CREATININE 0 65 04/23/2018    GLUCOSE 100 04/23/2018    GLUF 86 07/07/2017    CALCIUM 8 9 04/23/2018    AST 16 11/20/2017    ALT 29 11/20/2017    ALKPHOS 76 11/20/2017    PROT 5 6 (L) 04/09/2018    BILITOT 0 62 11/20/2017    EGFR 84 04/23/2018     Lab Results   Component Value Date    WBC 3 93 (L) 05/03/2018    HGB 11 2 (L) 05/03/2018    HCT 34 9 05/03/2018    MCV 99 (H) 05/03/2018     05/03/2018   No results found for: SEDRATE    Reviewed and discussed with patient  Assessment and plan: 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  that was diagnosed several years ago and patient has not required any therapy for this condition  Patient's condition and counts are being monitored  In April 2018 she fell in the bathroom and broke her left hip and had surgery  Post surgery hemoglobin dropped down to 7 5  She received 2 units of blood and that brought that up to 9  Now hemoglobin is 11 2, improving  Patient is recovering from left hip surgery  She ambulates with a walker  She has minimal residual discomfort from her left hip  Patient has tiredness    History of pancreatic cyst and IBS she follows with  Dr Trae Lerner, gastroenterologist      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 and BCG and undergoes cystoscopies on regular basis  She has 2 stents in her heart  She has history of kidney stone  She has history of Raynaud's  She had herpes zoster in January 2016  History of osteopenia  She has been on vitamin D and prolia  She has appointment with her rheumatologist   History of hepatitis in 1993   History of ALONSO and BSO in 1980 for bleeding  No cancer  History of pulmonary embolism several years ago  History of stomach ulcer in 1970 of pneumonias in 2003 and 2004  of hypertension  She is sensitive to cold weather  History of arthritis in the neck and all over  She follows with a rheumatologist      Physical examination and test results are as recorded and discussed  Hopefully hemoglobin will continue to improve  Patient's condition and counts will be checked again in 4 months  She remains under surveillance for leukemia  Condition discussed and explained  Questions answered  Discussed the importance of self-breast examination, eating healthy foods and health screening tests    1  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    - CBC and differential; Future  - Comprehensive metabolic panel; Future  - LD,Blood; Future    2  Anemia, unspecified type  - Ferritin; Future        Patient voiced understanding and agreement in the discussion  Counseling / Coordination of Care   Greater than 50% of total time was spent with the patient and / or family counseling and / or coordination of care

## 2018-06-19 NOTE — TELEPHONE ENCOUNTER
Patient is scheduled for cysto with Dr Joe Dempsey on 6/22/18 for f/u bladder cancer  She called office today to report she is still having issues with mobility and is not sure she will be able to assume the position for cysto  She prefers to reschedule if that is OK  Advised her that although she needs to have cysto done for surveillance, if she cannot assume the position to have cysto performed due to pain from broken femur, then her only option would be to wait another few weeks  Patient is willing to reschedule  She denies gross hematuria at this time and was advised to call office if she would experience any gross hematuria or other symptoms  Rescheduled for 7/27/18

## 2018-06-25 ENCOUNTER — TELEPHONE (OUTPATIENT)
Dept: OBGYN CLINIC | Facility: HOSPITAL | Age: 83
End: 2018-06-25

## 2018-06-25 NOTE — TELEPHONE ENCOUNTER
Patient is being discharged from home PT today and patient would like to go to outpatient physical therapy where she has before and fax is listed below     Fax- [phone]    [place] pt

## 2018-06-28 DIAGNOSIS — Z98.890 STATUS POST SURGERY: Primary | ICD-10-CM

## 2018-07-03 ENCOUNTER — EVALUATION (OUTPATIENT)
Dept: PHYSICAL THERAPY | Facility: REHABILITATION | Age: 83
End: 2018-07-03
Payer: MEDICARE

## 2018-07-03 DIAGNOSIS — S72.002D CLOSED FRACTURE OF LEFT HIP WITH ROUTINE HEALING, SUBSEQUENT ENCOUNTER: ICD-10-CM

## 2018-07-03 DIAGNOSIS — R26.2 AMBULATORY DYSFUNCTION: ICD-10-CM

## 2018-07-03 DIAGNOSIS — M25.552 LEFT HIP PAIN: Primary | ICD-10-CM

## 2018-07-03 PROCEDURE — 97162 PT EVAL MOD COMPLEX 30 MIN: CPT | Performed by: PHYSICAL THERAPIST

## 2018-07-03 PROCEDURE — 97110 THERAPEUTIC EXERCISES: CPT | Performed by: PHYSICAL THERAPIST

## 2018-07-03 PROCEDURE — G8978 MOBILITY CURRENT STATUS: HCPCS | Performed by: PHYSICAL THERAPIST

## 2018-07-03 PROCEDURE — G8979 MOBILITY GOAL STATUS: HCPCS | Performed by: PHYSICAL THERAPIST

## 2018-07-03 NOTE — PROGRESS NOTES
PT Evaluation     Today's date: 7/3/2018  Patient name: Sohail Garcia  : 1935  MRN: [id number]  Referring provider: Bharati Casanova PA-C  Dx:   Encounter Diagnosis     ICD-10-CM    1  Left hip pain M25 552                   Assessment  Impairments: abnormal gait, abnormal or restricted ROM, activity intolerance, impaired physical strength and weight-bearing intolerance  Patient presents with symptom irritability no  Assessment details: Pt is an 81 yo female who underwent ORIF of the left femur on  after a fall in the shower the previous day  She received in-patient PT f/b home PT  She currently presents with limited strength, ROM and functional mobility and would benefit from skilled PT to progress her exercises and improve her ability to ambulate and function independently in her home  Understanding of Dx/Px/POC: excellent  Goals  STG: increase strength 1/2 grade  Improve tolerance to weight bearing on the left LE   LTG: Increase strength to 4/5  Ambulate without an assistive device  IADLs without difficulty  Plan  Patient would benefit from: skilled physical therapy  Referral necessary: No  Planned modality interventions: thermotherapy: hydrocollator packs  Planned therapy interventions: manual therapy, patient education, strengthening, gait training and stretching  Frequency: 2x week  Duration in weeks: 12        Subjective Evaluation    History of Present Illness  Date of onset: 2018  Date of surgery: 2018  Mechanism of injury: Karla [place] getting into the shower  Pain  Current pain ratin  At best pain ratin  At worst pain ratin  Location: Pain over the greater trochanter and into the groin     Quality: dull ache  Relieving factors: rest  Aggravating factors: standing and walking    Social Support  Lives with: alone      Diagnostic Tests  X-ray: abnormal  Treatments  Previous treatment: physical therapy and occupational therapy  Discharged from (in last 30 days): inpatient hospitalization and skilled nursing facility  Patient Goals  Patient goals for therapy: decreased pain and increased strength  Patient goal: grocery shopping, walk without assistive device  go for walks        Objective     Observations     Additional Observation Details  Incision scars are well healed with mobile scar tissue  Palpation     Additional Palpation Details  Tender over the greater trochanter     Active Range of Motion     Right Hip   Flexion: 95 degrees   Abduction: 0 degrees   External rotation (90/90): 20 degrees   Internal rotation (90/90): 15 degrees     Additional Active Range of Motion Details  Slightly restricted knee flexion due to edema  Passive Range of Motion   Left Hip   Normal passive range of motion    Right Hip   Flexion: 100 degrees   Extension: 0 degrees   Abduction: 25 degrees   External rotation (90/90): 25 degrees   Internal rotation (90/90): 15 degrees     Additional Passive Range of Motion Details  Discomfort at end ranges    Strength/Myotome Testing     Left Hip   Planes of Motion   Flexion: 4  Extension: 4  Abduction: 4  Adduction: 4  External rotation: 4  Internal rotation: 4    Right Hip   Planes of Motion   Flexion: 3  Extension: 3  Abduction: 3-  Adduction: 3+  External rotation: 3  Internal rotation: 3+    Left Knee   Flexion: 5  Extension: 5    Right Knee   Flexion: 4  Extension: 4    Left Ankle/Foot   Dorsiflexion: 5  Plantar flexion: 5    Right Ankle/Foot   Dorsiflexion: 5  Plantar flexion: 5    Ambulation     Observational Gait     Additional Observational Gait Details  Pt ambulates with a SPC in the right hand with left LE slightly abducted    Stance time is grossly equal             Precautions: HTN    Daily Treatment Diary     Manual  7/2                                                                                 Exercise Diary  7/2            bike 5 min            minisquats 10            Standing abd 10 B            Standing ham curls 10 B Heel raises 20            Side steps             Step ups                                                    SLR 10            Clam shells 10                                                                                                                        Modalities              MH 10 min

## 2018-07-05 ENCOUNTER — TRANSCRIBE ORDERS (OUTPATIENT)
Dept: LAB | Facility: CLINIC | Age: 83
End: 2018-07-05

## 2018-07-05 ENCOUNTER — APPOINTMENT (OUTPATIENT)
Dept: LAB | Facility: CLINIC | Age: 83
End: 2018-07-05
Payer: MEDICARE

## 2018-07-05 DIAGNOSIS — E05.90 PRETIBIAL MYXEDEMA: ICD-10-CM

## 2018-07-05 DIAGNOSIS — E55.9 AVITAMINOSIS D: ICD-10-CM

## 2018-07-05 DIAGNOSIS — E21.3 HYPERPARATHYROIDISM, UNSPECIFIED (HCC): Primary | ICD-10-CM

## 2018-07-05 DIAGNOSIS — E21.3 HYPERPARATHYROIDISM, UNSPECIFIED (HCC): ICD-10-CM

## 2018-07-05 LAB
25(OH)D3 SERPL-MCNC: 36.2 NG/ML (ref 30–100)
ANION GAP SERPL CALCULATED.3IONS-SCNC: 6 MMOL/L (ref 4–13)
BUN SERPL-MCNC: 24 MG/DL (ref 5–25)
CALCIUM SERPL-MCNC: 9.7 MG/DL (ref 8.3–10.1)
CHLORIDE SERPL-SCNC: 101 MMOL/L (ref 100–108)
CO2 SERPL-SCNC: 31 MMOL/L (ref 21–32)
CREAT SERPL-MCNC: 0.84 MG/DL (ref 0.6–1.3)
GFR SERPL CREATININE-BSD FRML MDRD: 65 ML/MIN/1.73SQ M
GLUCOSE P FAST SERPL-MCNC: 80 MG/DL (ref 65–99)
PHOSPHATE SERPL-MCNC: 3 MG/DL (ref 2.3–4.1)
POTASSIUM SERPL-SCNC: 3.7 MMOL/L (ref 3.5–5.3)
PTH-INTACT SERPL-MCNC: 58.1 PG/ML (ref 18.4–80.1)
SODIUM SERPL-SCNC: 138 MMOL/L (ref 136–145)
T4 FREE SERPL-MCNC: 0.78 NG/DL (ref 0.76–1.46)
TSH SERPL DL<=0.05 MIU/L-ACNC: 5.1 UIU/ML (ref 0.36–3.74)

## 2018-07-05 PROCEDURE — 80048 BASIC METABOLIC PNL TOTAL CA: CPT

## 2018-07-05 PROCEDURE — 84443 ASSAY THYROID STIM HORMONE: CPT

## 2018-07-05 PROCEDURE — 84100 ASSAY OF PHOSPHORUS: CPT

## 2018-07-05 PROCEDURE — 83970 ASSAY OF PARATHORMONE: CPT

## 2018-07-05 PROCEDURE — 84439 ASSAY OF FREE THYROXINE: CPT

## 2018-07-05 PROCEDURE — 82306 VITAMIN D 25 HYDROXY: CPT

## 2018-07-05 PROCEDURE — 84445 ASSAY OF TSI GLOBULIN: CPT

## 2018-07-05 PROCEDURE — 36415 COLL VENOUS BLD VENIPUNCTURE: CPT

## 2018-07-06 ENCOUNTER — OFFICE VISIT (OUTPATIENT)
Dept: FAMILY MEDICINE CLINIC | Facility: CLINIC | Age: 83
End: 2018-07-06
Payer: MEDICARE

## 2018-07-06 VITALS
DIASTOLIC BLOOD PRESSURE: 78 MMHG | SYSTOLIC BLOOD PRESSURE: 132 MMHG | TEMPERATURE: 98.5 F | WEIGHT: 124 LBS | HEART RATE: 81 BPM | BODY MASS INDEX: 23.43 KG/M2 | RESPIRATION RATE: 16 BRPM | OXYGEN SATURATION: 98 %

## 2018-07-06 DIAGNOSIS — Z87.81 S/P ORIF (OPEN REDUCTION INTERNAL FIXATION) FRACTURE: Primary | ICD-10-CM

## 2018-07-06 DIAGNOSIS — M79.605 PAIN IN LEFT LEG: ICD-10-CM

## 2018-07-06 DIAGNOSIS — R26.2 AMBULATORY DYSFUNCTION: ICD-10-CM

## 2018-07-06 DIAGNOSIS — E05.90 HYPERTHYROIDISM: ICD-10-CM

## 2018-07-06 DIAGNOSIS — Z98.890 S/P ORIF (OPEN REDUCTION INTERNAL FIXATION) FRACTURE: Primary | ICD-10-CM

## 2018-07-06 DIAGNOSIS — K21.9 GASTROESOPHAGEAL REFLUX DISEASE, ESOPHAGITIS PRESENCE NOT SPECIFIED: ICD-10-CM

## 2018-07-06 DIAGNOSIS — I50.32 CHRONIC DIASTOLIC (CONGESTIVE) HEART FAILURE (HCC): Chronic | ICD-10-CM

## 2018-07-06 DIAGNOSIS — Z60.2 ELDERLY PERSON LIVING ALONE: ICD-10-CM

## 2018-07-06 DIAGNOSIS — R30.0 DYSURIA: ICD-10-CM

## 2018-07-06 PROBLEM — S72.002A CLOSED FRACTURE OF LEFT HIP (HCC): Status: RESOLVED | Noted: 2018-04-07 | Resolved: 2018-07-06

## 2018-07-06 PROBLEM — Z78.9 TRANSITION OF CARE PERFORMED WITH SHARING OF CLINICAL SUMMARY: Status: RESOLVED | Noted: 2018-05-15 | Resolved: 2018-07-06

## 2018-07-06 PROBLEM — IMO0001 TRANSITION OF CARE PERFORMED WITH SHARING OF CLINICAL SUMMARY: Status: RESOLVED | Noted: 2018-05-15 | Resolved: 2018-07-06

## 2018-07-06 PROBLEM — S72.002A CLOSED LEFT HIP FRACTURE (HCC): Status: RESOLVED | Noted: 2018-04-07 | Resolved: 2018-07-06

## 2018-07-06 LAB
SL AMB  POCT GLUCOSE, UA: ABNORMAL
SL AMB LEUKOCYTE ESTERASE,UA: ABNORMAL
SL AMB POCT BILIRUBIN,UA: ABNORMAL
SL AMB POCT BLOOD,UA: ABNORMAL
SL AMB POCT CLARITY,UA: ABNORMAL
SL AMB POCT COLOR,UA: YELLOW
SL AMB POCT KETONES,UA: 0.5
SL AMB POCT NITRITE,UA: ABNORMAL
SL AMB POCT PH,UA: 6.5
SL AMB POCT SPECIFIC GRAVITY,UA: 1.01
SL AMB POCT URINE PROTEIN: ABNORMAL
SL AMB POCT UROBILINOGEN: ABNORMAL
TSI SER-ACNC: 0.49 IU/L (ref 0–0.55)

## 2018-07-06 PROCEDURE — 81002 URINALYSIS NONAUTO W/O SCOPE: CPT | Performed by: FAMILY MEDICINE

## 2018-07-06 PROCEDURE — 87077 CULTURE AEROBIC IDENTIFY: CPT | Performed by: FAMILY MEDICINE

## 2018-07-06 PROCEDURE — 87086 URINE CULTURE/COLONY COUNT: CPT | Performed by: FAMILY MEDICINE

## 2018-07-06 PROCEDURE — 87186 SC STD MICRODIL/AGAR DIL: CPT | Performed by: FAMILY MEDICINE

## 2018-07-06 PROCEDURE — 99214 OFFICE O/P EST MOD 30 MIN: CPT | Performed by: FAMILY MEDICINE

## 2018-07-06 RX ORDER — DICYCLOMINE HYDROCHLORIDE 10 MG/1
10 CAPSULE ORAL AS NEEDED
COMMUNITY
End: 2022-06-01 | Stop reason: CLARIF

## 2018-07-06 RX ORDER — PANTOPRAZOLE SODIUM 40 MG/1
40 TABLET, DELAYED RELEASE ORAL DAILY
Qty: 90 TABLET | Refills: 1 | Status: SHIPPED | OUTPATIENT
Start: 2018-07-06 | End: 2019-01-17 | Stop reason: SDUPTHER

## 2018-07-06 RX ORDER — METHIMAZOLE 5 MG/1
5 TABLET ORAL DAILY
Qty: 30 TABLET | Refills: 1 | Status: SHIPPED | OUTPATIENT
Start: 2018-07-06 | End: 2019-01-24

## 2018-07-06 RX ORDER — SULFAMETHOXAZOLE AND TRIMETHOPRIM 800; 160 MG/1; MG/1
1 TABLET ORAL EVERY 12 HOURS SCHEDULED
Qty: 14 TABLET | Refills: 0 | Status: SHIPPED | OUTPATIENT
Start: 2018-07-06 | End: 2018-07-13

## 2018-07-06 SDOH — SOCIAL STABILITY - SOCIAL INSECURITY: PROBLEMS RELATED TO LIVING ALONE: Z60.2

## 2018-07-06 NOTE — PROGRESS NOTES
Assessment/Plan:         Diagnoses and all orders for this visit:    S/P ORIF (open reduction internal fixation) fracture    Ambulatory dysfunction  -     Urine culture; Future  -     Urine culture    Elderly person living alone    Pain in left leg    Chronic diastolic (congestive) heart failure (HCC)    Hyperthyroidism  -     methimazole (TAPAZOLE) 5 mg tablet; Take 1 tablet (5 mg total) by mouth daily    Gastroesophageal reflux disease, esophagitis presence not specified  -     pantoprazole (PROTONIX) 40 mg tablet; Take 1 tablet (40 mg total) by mouth daily Resume on 4/28    Dysuria  -     POCT urine dip  -     Urine culture; Future  -     Urine culture  -     sulfamethoxazole-trimethoprim (BACTRIM DS) 800-160 mg per tablet; Take 1 tablet by mouth every 12 (twelve) hours for 7 days    Other orders  -     dicyclomine (BENTYL) 10 mg capsule; Take 10 mg by mouth as needed          Subjective:   Chief Complaint   Patient presents with    Follow-up        Patient ID: Pierce Lea is a 82 y o  female  Per c/c reviewed by me   here for scheduled follow-up visit, but pt also reports, "I think I might have a urinary tract infection, just starting, slight burning, not bad yet "  Also, "another flare-up of shingles left shoulder"  States was advised in past to take whole pill of diuretic PRN for one day if swelling is worse, and she has needed to do that recently  "Pain today not too bad, 3 today, yesterday was a 5" of her hip pain s/p fx months ago        The following portions of the patient's history were reviewed and updated as appropriate: allergies, current medications, past family history, past medical history, past social history, past surgical history and problem list     Review of Systems   Constitutional: Negative  HENT: Negative  Respiratory: Negative  Cardiovascular: Negative for chest pain and palpitations  Gastrointestinal: Negative      Genitourinary: Negative for difficulty urinating, enuresis, flank pain, frequency and hematuria  Musculoskeletal: Negative for back pain, gait problem, joint swelling and myalgias  Per hpi   Skin: Negative for color change, pallor and wound  Neurological: Negative  Hematological: Negative  Objective:      /78   Pulse 81   Temp 98 5 °F (36 9 °C)   Resp 16   Wt 56 2 kg (124 lb)   SpO2 98%   BMI 23 43 kg/m²          Physical Exam   Constitutional: She is oriented to person, place, and time  She appears well-developed and well-nourished  She is cooperative  Non-toxic appearance  She does not have a sickly appearance  She does not appear ill  No distress  Appears younger than stated age   HENT:   Head: Normocephalic and atraumatic  Mouth/Throat: Uvula is midline, oropharynx is clear and moist and mucous membranes are normal    Eyes: Conjunctivae and lids are normal  Pupils are equal, round, and reactive to light  Neck: Trachea normal  Neck supple  No JVD present  Carotid bruit is not present  No thyroid mass and no thyromegaly present  Cardiovascular: Normal rate, regular rhythm, normal heart sounds and normal pulses  trace ankle edema b/l   Pulmonary/Chest: Effort normal and breath sounds normal    Abdominal: Soft  Bowel sounds are normal  She exhibits no distension, no abdominal bruit, no ascites and no mass  There is no hepatosplenomegaly  There is no tenderness  Lymphadenopathy:     She has no cervical adenopathy  Right: No supraclavicular adenopathy present  Left: No supraclavicular adenopathy present  Neurological: She is alert and oriented to person, place, and time  She has normal reflexes  Gait normal    Skin: Skin is warm and dry  Rash noted  Rash is papular  She is not diaphoretic  No pallor  Psychiatric: She has a normal mood and affect  Her speech is normal and behavior is normal    Nursing note and vitals reviewed

## 2018-07-08 LAB — BACTERIA UR CULT: ABNORMAL

## 2018-07-10 ENCOUNTER — OFFICE VISIT (OUTPATIENT)
Dept: PHYSICAL THERAPY | Facility: REHABILITATION | Age: 83
End: 2018-07-10
Payer: MEDICARE

## 2018-07-10 DIAGNOSIS — R26.2 AMBULATORY DYSFUNCTION: ICD-10-CM

## 2018-07-10 DIAGNOSIS — S72.002D CLOSED FRACTURE OF LEFT HIP WITH ROUTINE HEALING, SUBSEQUENT ENCOUNTER: ICD-10-CM

## 2018-07-10 DIAGNOSIS — M25.552 LEFT HIP PAIN: Primary | ICD-10-CM

## 2018-07-10 PROCEDURE — 97110 THERAPEUTIC EXERCISES: CPT | Performed by: PHYSICAL THERAPIST

## 2018-07-10 PROCEDURE — 97140 MANUAL THERAPY 1/> REGIONS: CPT | Performed by: PHYSICAL THERAPIST

## 2018-07-10 NOTE — PROGRESS NOTES
Daily Note     Today's date: 7/10/2018  Patient name: Melba Dickens  : 1935  MRN: [id number]  Referring provider: Elda Nelson PA-C  Dx:   Encounter Diagnosis     ICD-10-CM    1  Left hip pain M25 552    2  Ambulatory dysfunction R26 2    3  Closed fracture of left hip with routine healing, subsequent encounter [id number]                   Subjective: Pt reports continued pain  Proximal incision is very tender  Objective: See treatment diary below  Daily Treatment Diary     Manual  7/2 7/10                                                                                Exercise Diary  7/2 7/10           bike 5 min 6 min           minisquats 10 20           Standing abd 10 B 2x10           Standing ham curls 10 B 2x10           Heel raises 20 x           Side steps  7ft 3laps           Step ups                                                    SLR 10 x           Clam shells 10 x           bridges 10 x                                                                                                          Modalities              MH 10 min x                                     X=same as last visit      Assessment: Pt did well with standing exercises  Struggles a little yet with SLR  Soft tissue induration under and surrounding scar at greater trochanter  Plan: Continue per plan of care

## 2018-07-12 ENCOUNTER — OFFICE VISIT (OUTPATIENT)
Dept: CARDIOLOGY CLINIC | Facility: CLINIC | Age: 83
End: 2018-07-12
Payer: MEDICARE

## 2018-07-12 VITALS
SYSTOLIC BLOOD PRESSURE: 120 MMHG | BODY MASS INDEX: 23.45 KG/M2 | WEIGHT: 124.2 LBS | DIASTOLIC BLOOD PRESSURE: 62 MMHG | HEIGHT: 61 IN | HEART RATE: 67 BPM

## 2018-07-12 DIAGNOSIS — I25.10 CORONARY ARTERY DISEASE INVOLVING NATIVE HEART WITHOUT ANGINA PECTORIS, UNSPECIFIED VESSEL OR LESION TYPE: ICD-10-CM

## 2018-07-12 DIAGNOSIS — I10 ESSENTIAL HYPERTENSION: ICD-10-CM

## 2018-07-12 DIAGNOSIS — I50.32 CHRONIC DIASTOLIC (CONGESTIVE) HEART FAILURE (HCC): Primary | Chronic | ICD-10-CM

## 2018-07-12 DIAGNOSIS — E78.5 HYPERLIPIDEMIA, UNSPECIFIED HYPERLIPIDEMIA TYPE: ICD-10-CM

## 2018-07-12 PROCEDURE — 99214 OFFICE O/P EST MOD 30 MIN: CPT | Performed by: INTERNAL MEDICINE

## 2018-07-12 NOTE — PROGRESS NOTES
Cardiology Follow Up    Philip Painter  1935  [id number]  HEART & VASCULAR [facility]  [address] [place] [address] [address]    1  Chronic diastolic (congestive) heart failure ([address] )     2  Coronary artery disease involving native heart without angina pectoris, unspecified vessel or lesion type  Lipid panel   3  Essential hypertension     4  Hyperlipidemia, unspecified hyperlipidemia type         Discussion/Summary:Overall she has been doing well from a cardiac standpoint  Coronary artery disease stable without complaints of angina  Functional capacity has been limited mainly by the recent hip injury  Her diastolic heart failure  Has been well controlled and she is euvolemic  She occasionally needs a full tablet of Bumex  Blood pressure is controlled  She is due for a fasting lipid profile  No further cardiac testing I will follow up with her in 6 months  Interval History:   61-year-old female history of coronary artery disease status post stents in 6858, chronic diastolic congestive heart failure, hypertension, hyperlipidemia presents for routine scheduled follow-up visit  Unfortunately she fell coming out of the shower in April  She suffered a femur fracture and underwent surgical correction  She has been rehabilitating from this  His been no anginal symptoms  Breathing has been comfortable  She occasionally gets some lower extremity edema for which she takes an extra dose of Bumex and seems to correct the problem  Recent blood work was reviewed  She denies any palpitations, lightheadedness, dizziness, or syncope        Problem List     Intertrochanteric fracture of left femur, closed, with routine healing, subsequent encounter    Essential hypertension    Medication side effect, initial encounter    Hyperlipidemia    Coronary artery disease without angina pectoris - S/P stent placement x 2 (2011)    Gastroesophageal reflux disease without esophagitis    Chronic diastolic (congestive) heart failure (HCC) (Chronic)    Hyperthyroidism    Osteoporosis (Chronic)    Ambulatory dysfunction    Elderly person living alone    Pain in left leg    Low back pain without sciatica    S/P ORIF (open reduction internal fixation) fracture    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HCC)        Past Medical History:   Diagnosis Date    Bladder cancer ([facility] )     Coronary artery disease     S/P stent placement x 2 in 2011    GERD (gastroesophageal reflux disease)     Hepatitis     Hyperlipidemia     Hypertension     Kidney stones     Malignant neoplasm of urethra ([facility] )     Pneumonia     Shingles      Social History     Social History    Marital status:      Spouse name: N/A    Number of children: N/A    Years of education: N/A     Occupational History    Not on file  Social History Main Topics    Smoking status: Never Smoker    Smokeless tobacco: Never Used    Alcohol use No    Drug use: No    Sexual activity: No     Other Topics Concern    Not on file     Social History Narrative    Advance directives declined by parents    Always uses seat belt          Family History   Problem Relation Age of Onset    Arthritis Mother     Osteoporosis Mother     Heart disease Father     Hypertension Father     Hypertension Brother      Past Surgical History:   Procedure Laterality Date    CORONARY ANGIOPLASTY WITH STENT PLACEMENT      stent x 2    CYSTOSCOPY      diagnostic - onset 4/4/17    HYSTERECTOMY      SD OPEN RX FEMUR FX+INTRAMED ALEJANDRO Left 4/8/2018    Procedure: INSERTION NAIL IM FEMUR ANTEGRADE (TROCHANTERIC);   Surgeon: Kaylie Wolff MD;  Location: [facility];  Service: Orthopedics       Current Outpatient Prescriptions:     acetaminophen (TYLENOL) 500 mg tablet, Take 500 mg by mouth every 6 (six) hours as needed for mild pain, Disp: , Rfl:     amLODIPine (NORVASC) 5 mg tablet, Take 5 mg by mouth daily Resume on 4/28, Disp: , Rfl:     aspirin 81 MG tablet, Take 81 mg by mouth daily Resume on 4/28 , Disp: , Rfl:     atenolol (TENORMIN) 25 mg tablet, Take 25 mg by mouth daily Resume on 4/28, Disp: , Rfl:     atorvastatin (LIPITOR) 40 mg tablet, Take 1 tablet (40 mg total) by mouth daily after dinner Resume the evening of 4/27, Disp: 90 tablet, Rfl: 0    bumetanide (BUMEX) 1 mg tablet, Take 1 tablet (1 mg total) by mouth daily /take 1/2 tablet daily by mouth (Patient taking differently: Take 0 5 mg by mouth daily /take 1/2 tablet daily by mouth ), Disp: 45 tablet, Rfl: 0    calcium carbonate (OYSTER SHELL,OSCAL) 500 mg, Take 1 tablet by mouth daily with breakfast, Disp: 30 tablet, Rfl: 0    Cholecalciferol 2000 units TABS, Take 2,000 Units by mouth daily Resume on 4/28, Disp: , Rfl:     DULoxetine (CYMBALTA) 30 mg delayed release capsule, Take 1 capsule (30 mg total) by mouth daily Resume on 4/28, Disp: 90 capsule, Rfl: 0    methimazole (TAPAZOLE) 5 mg tablet, Take 1 tablet (5 mg total) by mouth daily, Disp: 30 tablet, Rfl: 1    pantoprazole (PROTONIX) 40 mg tablet, Take 1 tablet (40 mg total) by mouth daily Resume on 4/28, Disp: 90 tablet, Rfl: 1    sulfamethoxazole-trimethoprim (BACTRIM DS) 800-160 mg per tablet, Take 1 tablet by mouth every 12 (twelve) hours for 7 days, Disp: 14 tablet, Rfl: 0    dicyclomine (BENTYL) 10 mg capsule, Take 10 mg by mouth as needed, Disp: , Rfl:   Allergies   Allergen Reactions    Lactose      Other reaction(s): Indigestion/GI Upset    Other      Other reaction(s): Mental Status Change  After surgery   Whole blood  = passed out   historical allergy; verify with patient       Labs:     Chemistry        Component Value Date/Time     07/05/2018 0923    K 3 7 07/05/2018 0923     07/05/2018 0923    CO2 31 07/05/2018 0923    BUN 24 07/05/2018 0923    CREATININE 0 84 07/05/2018 0923        Component Value Date/Time    CALCIUM 9 7 07/05/2018 0923    ALKPHOS 76 11/20/2017 1023    AST 16 11/20/2017 1023    ALT 29 11/20/2017 1023    BILITOT 0 62 11/20/2017 1023            Lab Results   Component Value Date    CHOL 166 06/12/2017    CHOL 159 10/19/2016     Lab Results   Component Value Date    HDL 79 (H) 06/12/2017    HDL 59 10/19/2016     Lab Results   Component Value Date    LDLCALC 70 06/12/2017    LDLCALC 78 10/19/2016     Lab Results   Component Value Date    TRIG 86 06/12/2017    TRIG 108 10/19/2016     No components found for: CHOLHDL    Imaging: No results found  ECG:        Review of Systems   Constitution: Negative  HENT: Negative  Eyes: Negative  Cardiovascular: Negative  Respiratory: Negative  Endocrine: Negative  Hematologic/Lymphatic: Negative  Skin: Negative  Musculoskeletal: Negative  Gastrointestinal: Negative  Genitourinary: Negative  Neurological: Negative  Psychiatric/Behavioral: Negative  Vitals:    07/12/18 1253   BP: 120/62   Pulse: 67     Vitals:    07/12/18 1253   Weight: 56 3 kg (124 lb 3 2 oz)     Height: 5' 1" (154 9 cm)   Body mass index is 23 47 kg/m²      Physical Exam:   General appearance:  Appears stated age, alert, well appearing and in no distress  HEENT:  PERRLA, EOMI, no scleral icterus, no conjunctival pallor  NECK:  Supple, No elevated JVP, no thyromegaly, no carotid bruits  HEART:  Regular rate and rhythm, normal S1/S2, no S3/S4, no murmur or rub  LUNGS:  Clear to auscultation bilaterally, no wheezes rales or rhonchi  ABDOMEN:  Soft, non-tender, positive bowel sounds, no rebound or guarding, no organomegaly   EXTREMITIES:  No edema, normal range of motion  VASCULAR:  Normal pedal pulses, good pulse volume   SKIN: No lesions or rashes on exposed skin  NEURO:  CN II-XII intact, no focal deficits

## 2018-07-13 ENCOUNTER — OFFICE VISIT (OUTPATIENT)
Dept: PHYSICAL THERAPY | Facility: REHABILITATION | Age: 83
End: 2018-07-13
Payer: MEDICARE

## 2018-07-13 DIAGNOSIS — R26.2 AMBULATORY DYSFUNCTION: ICD-10-CM

## 2018-07-13 DIAGNOSIS — M25.552 LEFT HIP PAIN: Primary | ICD-10-CM

## 2018-07-13 DIAGNOSIS — S72.002D CLOSED FRACTURE OF LEFT HIP WITH ROUTINE HEALING, SUBSEQUENT ENCOUNTER: ICD-10-CM

## 2018-07-13 PROCEDURE — 97112 NEUROMUSCULAR REEDUCATION: CPT | Performed by: PHYSICAL THERAPIST

## 2018-07-13 PROCEDURE — 97110 THERAPEUTIC EXERCISES: CPT | Performed by: PHYSICAL THERAPIST

## 2018-07-13 NOTE — PROGRESS NOTES
Daily Note     Today's date: 2018  Patient name: Darlene Zaman  : 1935  MRN: [id number]  Referring provider: Derrick Manriquez PA-C  Dx: No diagnosis found  Subjective: Pt  Feels like she has reached a plateau in her hip strength       Assessment: Changed up exercises and pushed some of her hip exercises harder   She is to do them every other day to allow for recovery        Plan: Continue per plan of care          Objective: See treatment diary below  Daily Treatment Diary      Manual  7/2 7/10  7/13                                                                                                                                               Exercise Diary  7/2 7/10                   bike 5 min 6 min  7 min                 minisquats 10 20                   Standing abd 10 B 2x10  x                 Standing ham curls 10 B 2x10                   Heel raises 20 x  x                 Side steps   7ft 3laps  x                 Step ups      4"  20x                 Eccentric lower     4"   10x                 Marching on foam                                                                                                   SLR 10 x  3x20                 Clam shells 10 x  3x10                 bridges 10 x  3x10                  SLR abd     3x10                                                                                                                                                                       Modalities                        MH 10 min x                                                                   X=same as last visit

## 2018-07-16 ENCOUNTER — OFFICE VISIT (OUTPATIENT)
Dept: PHYSICAL THERAPY | Facility: REHABILITATION | Age: 83
End: 2018-07-16
Payer: MEDICARE

## 2018-07-16 DIAGNOSIS — S72.002D CLOSED FRACTURE OF LEFT HIP WITH ROUTINE HEALING, SUBSEQUENT ENCOUNTER: ICD-10-CM

## 2018-07-16 DIAGNOSIS — R26.2 AMBULATORY DYSFUNCTION: ICD-10-CM

## 2018-07-16 DIAGNOSIS — M25.552 LEFT HIP PAIN: Primary | ICD-10-CM

## 2018-07-16 PROCEDURE — 97110 THERAPEUTIC EXERCISES: CPT | Performed by: PHYSICAL THERAPIST

## 2018-07-16 PROCEDURE — 97112 NEUROMUSCULAR REEDUCATION: CPT | Performed by: PHYSICAL THERAPIST

## 2018-07-18 ENCOUNTER — APPOINTMENT (OUTPATIENT)
Dept: LAB | Facility: CLINIC | Age: 83
End: 2018-07-18
Payer: MEDICARE

## 2018-07-18 LAB
CHOLEST SERPL-MCNC: 168 MG/DL (ref 50–200)
HDLC SERPL-MCNC: 64 MG/DL (ref 40–60)
LDLC SERPL CALC-MCNC: 75 MG/DL (ref 0–100)
NONHDLC SERPL-MCNC: 104 MG/DL
TRIGL SERPL-MCNC: 146 MG/DL

## 2018-07-18 PROCEDURE — 36415 COLL VENOUS BLD VENIPUNCTURE: CPT | Performed by: INTERNAL MEDICINE

## 2018-07-18 PROCEDURE — 80061 LIPID PANEL: CPT | Performed by: INTERNAL MEDICINE

## 2018-07-19 ENCOUNTER — OFFICE VISIT (OUTPATIENT)
Dept: PHYSICAL THERAPY | Facility: REHABILITATION | Age: 83
End: 2018-07-19
Payer: MEDICARE

## 2018-07-19 DIAGNOSIS — R26.2 AMBULATORY DYSFUNCTION: ICD-10-CM

## 2018-07-19 DIAGNOSIS — M25.552 LEFT HIP PAIN: Primary | ICD-10-CM

## 2018-07-19 DIAGNOSIS — S72.002D CLOSED FRACTURE OF LEFT HIP WITH ROUTINE HEALING, SUBSEQUENT ENCOUNTER: ICD-10-CM

## 2018-07-19 PROCEDURE — G8978 MOBILITY CURRENT STATUS: HCPCS | Performed by: PHYSICAL THERAPIST

## 2018-07-19 PROCEDURE — 97112 NEUROMUSCULAR REEDUCATION: CPT | Performed by: PHYSICAL THERAPIST

## 2018-07-19 PROCEDURE — G8979 MOBILITY GOAL STATUS: HCPCS | Performed by: PHYSICAL THERAPIST

## 2018-07-19 PROCEDURE — 97110 THERAPEUTIC EXERCISES: CPT | Performed by: PHYSICAL THERAPIST

## 2018-07-19 NOTE — PROGRESS NOTES
Daily Note     Today's date: 2018  Patient name: Rian Gil  : 1935  MRN: [id number]  Referring provider: Андрей Castaneda PA-C  Dx:   Encounter Diagnosis     ICD-10-CM    1  Left hip pain M25 552    2  Ambulatory dysfunction R26 2    3  Closed fracture of left hip with routine healing, subsequent encounter S72 [id number]          Subjective: Having issues with IBS making exercise a little difficult  Was sore after her last visit but it was not near as bad  Could move around freely  Assessment: Difficulty standing on one foot  This improved after practice with foot up on ball     Would benefit from more activities in single leg standing       Plan: Continue per plan of care          Objective: See treatment diary below  Daily Treatment Diary      Manual  7/2 7/10  7/13  7/16  7/19                                                                                                                                           Exercise Diary  7/2 7/10    7/16  7/19             bike 5 min 6 min  7 min  10 min               minisquats 10 20    w/ball between knees 2x15  x             Standing abd 10 B 2x10  x  2# 2x10  3# 2x10             Standing ham curls 10 B 2x10    2# 2x10  3# 2x10             Heel raises 20 x  x  HEP               Side steps   7ft 3laps  x    red 5 laps             Step ups      4"  20x  x  6" 20x             Eccentric lower     4"   10x  20x 6" 20x             Marching on foam        2x20  x             LAQ    2x15 3# 2x15                                                                       SLR 10 x  3x20  4x10  2x20             Clam shells 10 x  3x10  2x30  x             bridges 10 x  3x10  30  x              SLR abd     3x10  x  20                                                                                                                                                                   Modalities                        MH 10 min x      x                                                            X=same as last visit

## 2018-07-23 ENCOUNTER — OFFICE VISIT (OUTPATIENT)
Dept: PHYSICAL THERAPY | Facility: REHABILITATION | Age: 83
End: 2018-07-23
Payer: MEDICARE

## 2018-07-23 DIAGNOSIS — S72.002D CLOSED FRACTURE OF LEFT HIP WITH ROUTINE HEALING, SUBSEQUENT ENCOUNTER: ICD-10-CM

## 2018-07-23 DIAGNOSIS — R26.2 AMBULATORY DYSFUNCTION: ICD-10-CM

## 2018-07-23 DIAGNOSIS — M25.552 LEFT HIP PAIN: Primary | ICD-10-CM

## 2018-07-23 PROCEDURE — 97112 NEUROMUSCULAR REEDUCATION: CPT | Performed by: PHYSICAL THERAPIST

## 2018-07-23 PROCEDURE — 97110 THERAPEUTIC EXERCISES: CPT | Performed by: PHYSICAL THERAPIST

## 2018-07-23 NOTE — PROGRESS NOTES
Daily Note     Today's date: 2018  Patient name: Sohail Garcia  : 1935  MRN: [id number]  Referring provider: Bharati Casanova PA-C  Dx:   Encounter Diagnosis     ICD-10-CM    1  Left hip pain M25 552    2  Ambulatory dysfunction R26 2    3  Closed fracture of left hip with routine healing, subsequent encounter S72 [id number]          Subjective: Pt reports that she is walking for short distances in her home without the cane but sticks to areas where she could grab on to furniture  Gets very stiff after sitting and when she gets up in the morning        Assessment: Improved gait both with cane and without       Plan: Continue per plan of care          Objective: See treatment diary below  Daily Treatment Diary                                                                                          Exercise Diary  7/2 7/10    7/16  7/19  7/23           bike 5 min 6 min  7 min  10 min  x  x           minisquats 10 20    w/ball between knees 2x15  x             Standing abd 10 B 2x10  x  2# 2x10  3# 2x10  x           Standing ham curls 10 B 2x10    2# 2x10  3# 2x10  x           Heel raises 20 x  x  HEP               Side steps   7ft 3laps  x    red 5 laps  3# 5 laps           Step ups      4"  20x  x  6" 20x  x           Eccentric lower     4"   10x  20x 6" 20x  x           Marching on foam        2x20  x  x           LAQ    2x15 3# 2x15 4# 2x15                                                                      SLR 10 x  3x20  4x10  2x20  x           Clam shells 10 x  3x10  2x30  x  red 3x10           bridges 10 x  3x10  30  x  x            SLR abd     3x10  x  20                                                                                                                                                                   Modalities                        MH 10 min x      x                                                            X=same as last visit

## 2018-07-26 ENCOUNTER — OFFICE VISIT (OUTPATIENT)
Dept: PHYSICAL THERAPY | Facility: REHABILITATION | Age: 83
End: 2018-07-26
Payer: MEDICARE

## 2018-07-26 DIAGNOSIS — R26.2 AMBULATORY DYSFUNCTION: ICD-10-CM

## 2018-07-26 DIAGNOSIS — S72.002D CLOSED FRACTURE OF LEFT HIP WITH ROUTINE HEALING, SUBSEQUENT ENCOUNTER: ICD-10-CM

## 2018-07-26 DIAGNOSIS — M25.552 LEFT HIP PAIN: Primary | ICD-10-CM

## 2018-07-26 PROCEDURE — 97112 NEUROMUSCULAR REEDUCATION: CPT | Performed by: PHYSICAL THERAPIST

## 2018-07-26 PROCEDURE — 97110 THERAPEUTIC EXERCISES: CPT | Performed by: PHYSICAL THERAPIST

## 2018-07-26 NOTE — PROGRESS NOTES
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  She denies any gross hematuria or changes in urinary pattern  CT scan shows no evidence of upper tract recurrence  A septated cyst is noted within the right kidney  Presents for surveillance cystoscopy  CT ABDOMEN AND PELVIS WITH AND WITHOUT IV CONTRAST     INDICATION:   C67 9: Malignant neoplasm of bladder, unspecified  Surveillance exam     COMPARISON:  9/25/2017     TECHNIQUE: CT of the kidneys was performed without intravenous contrast   Dynamic postcontrast CT evaluation of the abdomen and pelvis was performed in both nephrographic and delayed phases after the administration of intravenous contrast   Axial,   sagittal, and coronal 2D reformatted images were created from the source data and submitted for interpretation       Radiation dose length product (DLP) for this visit:  612 mGy-cm   This examination, like all CT scans performed in the [facility], was performed utilizing techniques to minimize radiation dose exposure, including the use of iterative   reconstruction and automated exposure control      IV Contrast:  100 mL of iohexol (OMNIPAQUE)  Enteric Contrast:  Enteric contrast was not administered      FINDINGS:     ABDOMEN     RIGHT KIDNEY AND URETER:  No solid renal mass  No detectable urothelial mass  No hydronephrosis or hydroureter  No urinary tract calculi  No perinephric collection  Right renal cysts are again seen, there has been no change in size or character since the previous examination  Additional subcentimeter hypodense nodules also remain stable    Right midpole scarring   as before      LEFT KIDNEY AND URETER:  No solid renal mass   No detectable urothelial mass  No hydronephrosis or hydroureter  No urinary tract calculi  No perinephric collection  Multiple left renal cysts for example upper pole exophytic 2 2 cm water density cyst, remain unchanged      URINARY BLADDER:  No bladder wall mass  No calculi         LOWER CHEST:  No clinically significant abnormality identified in the visualized lower chest      LIVER/BILIARY TREE:  Scattered liver cysts unchanged  No suspicion of metastatic disease     GALLBLADDER:  No calcified gallstones  No pericholecystic inflammatory change      SPLEEN:  Unremarkable      PANCREAS:  Pancreatic cysts are noted  No significant change in size since prior      ADRENAL GLANDS:  Unremarkable      STOMACH AND BOWEL:  Unremarkable      ABDOMINOPELVIC CAVITY:  No ascites  No free intraperitoneal air  No lymphadenopathy      VESSELS:  Unremarkable for patient's age      PELVIS     REPRODUCTIVE ORGANS:  Unremarkable for patient's age      APPENDIX: No findings to suggest appendicitis      ABDOMINAL WALL/INGUINAL REGIONS:  Ventral adipose-containing hernia     OSSEOUS STRUCTURES:  No acute fracture or destructive osseous lesion      IMPRESSION:        1  Unchanged appearance of the kidneys and urinary bladder  No findings to suggest recurrent or new urogenital neoplasm  2   No evidence of metastatic disease  3  Pancreatic cysts, without obvious change from prior study  Note made of prior imaging studies recommendations for continued surveillance with MRI MRCP        Workstation performed: [id number]      Imaging     CT abdomen pelvis w wo contrast (Order #[id number]) on 3/26/2018 - Imaging Information       PROCEDURE:  CYSTOSCOPY    With patient properly identified and informed consent obtained she was placed supine in the procedure suite  She was sterilely prepped and draped in the usual fashion  10 cc viscous lidocaine was administered per urethra    Flexible cystourethroscopy was done with the 16 Western Ximena scope  Inspection of the bladder revealed no significant findings  Ureteral orifices were normal in caliber and location  No evidence of bladder tumor  Urine sample was obtained for urinary cytology  Patient tolerated the procedure well  PLAN    Patient shows no evidence of recurrent transitional cell carcinoma at this time  CT scan results reviewed  She will return in 1 year for cystoscopy with CT and pelvis prior to visit

## 2018-07-26 NOTE — PROGRESS NOTES
Daily Note     Today's date: 2018  Patient name: Roberto Gorman  : 1935  MRN: [id number]  Referring provider: Gee Shabazz PA-C  Dx:   Encounter Diagnosis     ICD-10-CM    1  Left hip pain M25 552    2  Ambulatory dysfunction R26 2    3  Closed fracture of left hip with routine healing, subsequent encounter S72 [id number]          Subjective: Cleaned both bathroom floors and the kitchen floor yesterday and is more sore today  This reduced with riding the bike      Assessment: Hip hiking was challenging indicating continued weakness       Plan: Continue per plan of care          Objective: See treatment diary below  Daily Treatment Diary                                                                                          Exercise Diary  7/2 7/10    7/16  7/19  7/23  7/26         bike 5 min 6 min  7 min  10 min  x  x  x         minisquats 10 20    w/ball between knees 2x15  x  x  x         Standing abd 10 B 2x10  x  2# 2x10  3# 2x10  x  3x10         Standing ham curls 10 B 2x10    2# 2x10  3# 2x10  x  3x10         Heel raises 20 x  x  HEP               Side steps   7ft 3laps  x    red 5 laps  3# 5 laps  red TB on feet         Step ups      4"  20x  x  6" 20x  x  x30         Eccentric lower     4"   10x  20x 6" 20x  x  x30         Marching on foam        2x20  x  x           LAQ    2x15 3# 2x15 4# 2x15                                                                      SLR 10 x  3x20  4x10  2x20  x           Clam shells 10 x  3x10  2x30  x  red 3x10           bridges 10 x  3x10  30  x  x            SLR abd     3x10  x  20                                                                                                                                                                   Modalities                        MH 10 min x      x                                                            X=same as last visit

## 2018-07-27 ENCOUNTER — PROCEDURE VISIT (OUTPATIENT)
Dept: UROLOGY | Facility: CLINIC | Age: 83
End: 2018-07-27
Payer: MEDICARE

## 2018-07-27 ENCOUNTER — TELEPHONE (OUTPATIENT)
Dept: FAMILY MEDICINE CLINIC | Facility: CLINIC | Age: 83
End: 2018-07-27

## 2018-07-27 VITALS
HEART RATE: 66 BPM | SYSTOLIC BLOOD PRESSURE: 128 MMHG | WEIGHT: 125.4 LBS | DIASTOLIC BLOOD PRESSURE: 76 MMHG | BODY MASS INDEX: 23.68 KG/M2 | HEIGHT: 61 IN

## 2018-07-27 DIAGNOSIS — C67.9 MALIGNANT NEOPLASM OF URINARY BLADDER, UNSPECIFIED SITE (HCC): Primary | ICD-10-CM

## 2018-07-27 LAB
SL AMB  POCT GLUCOSE, UA: ABNORMAL
SL AMB LEUKOCYTE ESTERASE,UA: ABNORMAL
SL AMB POCT BILIRUBIN,UA: ABNORMAL
SL AMB POCT BLOOD,UA: 2
SL AMB POCT CLARITY,UA: CLEAR
SL AMB POCT COLOR,UA: YELLOW
SL AMB POCT KETONES,UA: ABNORMAL
SL AMB POCT NITRITE,UA: ABNORMAL
SL AMB POCT PH,UA: 5
SL AMB POCT SPECIFIC GRAVITY,UA: 1.01
SL AMB POCT URINE PROTEIN: ABNORMAL
SL AMB POCT UROBILINOGEN: ABNORMAL

## 2018-07-27 PROCEDURE — 52000 CYSTOURETHROSCOPY: CPT | Performed by: UROLOGY

## 2018-07-27 PROCEDURE — 81002 URINALYSIS NONAUTO W/O SCOPE: CPT | Performed by: UROLOGY

## 2018-08-01 ENCOUNTER — OFFICE VISIT (OUTPATIENT)
Dept: PHYSICAL THERAPY | Facility: REHABILITATION | Age: 83
End: 2018-08-01
Payer: MEDICARE

## 2018-08-01 DIAGNOSIS — R26.2 AMBULATORY DYSFUNCTION: ICD-10-CM

## 2018-08-01 DIAGNOSIS — M25.552 LEFT HIP PAIN: Primary | ICD-10-CM

## 2018-08-01 DIAGNOSIS — S72.002D CLOSED FRACTURE OF LEFT HIP WITH ROUTINE HEALING, SUBSEQUENT ENCOUNTER: ICD-10-CM

## 2018-08-01 PROCEDURE — G8978 MOBILITY CURRENT STATUS: HCPCS | Performed by: PHYSICAL THERAPIST

## 2018-08-01 PROCEDURE — G8979 MOBILITY GOAL STATUS: HCPCS | Performed by: PHYSICAL THERAPIST

## 2018-08-01 PROCEDURE — 97110 THERAPEUTIC EXERCISES: CPT | Performed by: PHYSICAL THERAPIST

## 2018-08-01 PROCEDURE — 97112 NEUROMUSCULAR REEDUCATION: CPT | Performed by: PHYSICAL THERAPIST

## 2018-08-01 NOTE — PROGRESS NOTES
Discharge Note    Today's date: 2018  Patient name: Rian Gil  : 1935  MRN: [id number]  Referring provider: Андрей Castaneda PA-C  Dx:   Encounter Diagnosis     ICD-10-CM    1  Left hip pain M25 552    2  Ambulatory dysfunction R26 2    3  Closed fracture of left hip with routine healing, subsequent encounter S72 002D          Subjective: Still gets quite achy at times  ADL's are going well  Lacks confidence to ambulate down stairs with reciprocal pattern  Assessment: Hip hiking was challenging indicating continued weakness       Plan: Continue per plan of care          Objective: See treatment diary below  Discussed continued HEP with patient     Daily Treatment Diary                                                                                          Exercise Diary  7/2 7/10    7/16  7/19  7/23  7/26  8/1       bike 5 min 6 min  7 min  10 min  x  x  x  x       minisquats 10 20    w/ball between knees 2x15  x  x  x  x       Standing abd 10 B 2x10  x  2# 2x10  3# 2x10  x  3x10  x       Standing ham curls 10 B 2x10    2# 2x10  3# 2x10  x  3x10  x       Heel raises 20 x  x  HEP               Side steps   7ft 3laps  x    red 5 laps  3# 5 laps  red TB on feet  green 5x       Step ups      4"  20x  x  6" 20x  x  x30  up and over       Eccentric lower     4"   10x  20x 6" 20x  x  x30         Marching on foam        2x20  x  x    x       LAQ    2x15 3# 2x15 4# 2x15  5#     Hip hiking       10 x                                                       SLR 10 x  3x20  4x10  2x20  x  x  x       Clam shells 10 x  3x10  2x30  x  red 3x10  x  green       bridges 10 x  3x10  30  x  x  x  x        SLR abd     3x10  x  20    x                                                                                                                                                               Modalities                        MH 10 min x      x                                                            X=same as last visit

## 2018-08-02 ENCOUNTER — OFFICE VISIT (OUTPATIENT)
Dept: FAMILY MEDICINE CLINIC | Facility: CLINIC | Age: 83
End: 2018-08-02
Payer: MEDICARE

## 2018-08-02 ENCOUNTER — APPOINTMENT (OUTPATIENT)
Dept: LAB | Facility: CLINIC | Age: 83
End: 2018-08-02
Payer: MEDICARE

## 2018-08-02 VITALS
HEART RATE: 78 BPM | RESPIRATION RATE: 17 BRPM | DIASTOLIC BLOOD PRESSURE: 60 MMHG | BODY MASS INDEX: 23.81 KG/M2 | TEMPERATURE: 98 F | SYSTOLIC BLOOD PRESSURE: 126 MMHG | WEIGHT: 126 LBS

## 2018-08-02 DIAGNOSIS — I50.32 CHRONIC DIASTOLIC (CONGESTIVE) HEART FAILURE (HCC): Chronic | ICD-10-CM

## 2018-08-02 DIAGNOSIS — R63.5 WEIGHT GAIN: ICD-10-CM

## 2018-08-02 DIAGNOSIS — E05.90 HYPERTHYROIDISM: ICD-10-CM

## 2018-08-02 DIAGNOSIS — R60.0 LOCALIZED EDEMA: Primary | ICD-10-CM

## 2018-08-02 DIAGNOSIS — R60.0 LOCALIZED EDEMA: ICD-10-CM

## 2018-08-02 PROBLEM — N39.41 URGE INCONTINENCE OF URINE: Status: ACTIVE | Noted: 2017-09-27

## 2018-08-02 PROBLEM — S62.172A: Status: ACTIVE | Noted: 2017-05-18

## 2018-08-02 PROBLEM — M25.511 CHRONIC RIGHT SHOULDER PAIN: Status: ACTIVE | Noted: 2017-09-14

## 2018-08-02 PROBLEM — I73.00 RAYNAUD'S DISEASE WITHOUT GANGRENE: Status: ACTIVE | Noted: 2017-12-05

## 2018-08-02 PROBLEM — M06.4 INFLAMMATORY POLYARTHROPATHIES (HCC): Status: ACTIVE | Noted: 2017-12-05

## 2018-08-02 PROBLEM — B02.9 HERPES ZOSTER INFECTION OF THORACIC REGION: Status: ACTIVE | Noted: 2017-12-05

## 2018-08-02 PROBLEM — G89.29 CHRONIC RIGHT SHOULDER PAIN: Status: ACTIVE | Noted: 2017-09-14

## 2018-08-02 PROBLEM — E78.2 MIXED HYPERLIPIDEMIA: Status: ACTIVE | Noted: 2018-04-07

## 2018-08-02 LAB
ANION GAP SERPL CALCULATED.3IONS-SCNC: 3 MMOL/L (ref 4–13)
BUN SERPL-MCNC: 24 MG/DL (ref 5–25)
CALCIUM SERPL-MCNC: 9.5 MG/DL (ref 8.3–10.1)
CHLORIDE SERPL-SCNC: 102 MMOL/L (ref 100–108)
CO2 SERPL-SCNC: 31 MMOL/L (ref 21–32)
CREAT SERPL-MCNC: 0.87 MG/DL (ref 0.6–1.3)
GFR SERPL CREATININE-BSD FRML MDRD: 63 ML/MIN/1.73SQ M
GLUCOSE SERPL-MCNC: 82 MG/DL (ref 65–140)
NT-PROBNP SERPL-MCNC: 114 PG/ML
POTASSIUM SERPL-SCNC: 4.2 MMOL/L (ref 3.5–5.3)
SODIUM SERPL-SCNC: 136 MMOL/L (ref 136–145)
T3 SERPL-MCNC: 1.1 NG/ML (ref 0.6–1.8)
T4 FREE SERPL-MCNC: 0.81 NG/DL (ref 0.76–1.46)
TSH SERPL DL<=0.05 MIU/L-ACNC: 5.37 UIU/ML (ref 0.36–3.74)

## 2018-08-02 PROCEDURE — 83880 ASSAY OF NATRIURETIC PEPTIDE: CPT

## 2018-08-02 PROCEDURE — 80048 BASIC METABOLIC PNL TOTAL CA: CPT

## 2018-08-02 PROCEDURE — 84480 ASSAY TRIIODOTHYRONINE (T3): CPT

## 2018-08-02 PROCEDURE — 99214 OFFICE O/P EST MOD 30 MIN: CPT | Performed by: PHYSICIAN ASSISTANT

## 2018-08-02 PROCEDURE — 84439 ASSAY OF FREE THYROXINE: CPT

## 2018-08-02 PROCEDURE — 84443 ASSAY THYROID STIM HORMONE: CPT

## 2018-08-02 PROCEDURE — 36415 COLL VENOUS BLD VENIPUNCTURE: CPT

## 2018-08-02 NOTE — PATIENT INSTRUCTIONS
Continue taking norvasc one 5 mg tablet and 25 mg of atenolol daily, which is the half of 50  Start taking full dose of bumex for 3-4 days and continue to weigh daily  If you notice a gain of 3 lbs in 2-3 days please call  If weight gain is persistent, please contact primary cardiology  Please go for blood work and we will call with results  Try to elevate your legs as able  Use compression stockings as able

## 2018-08-09 NOTE — PROGRESS NOTES
Routine Follow-up    Cory Booker 82 y o  female   Date:  8/9/2018      Assessment and Plan:    Reza bull was seen today for leg swelling and weight check  Diagnoses and all orders for this visit:    Localized edema  -     TSH, 3rd generation; Future  -     T4, free; Future  -     T3; Future  -     NT-BNP PRO; Future  -     Basic metabolic panel; Future  - does not appear fluid overloaded, appears she may have chronic L left swelling s/p fracture, wt fluctuation has not been recent over a few days   - trial full 1 mg bumex daily x 3-4 days     Weight gain  -     TSH, 3rd generation; Future  -     T4, free; Future  -     T3; Future  -     NT-BNP PRO; Future  -     Basic metabolic panel; Future  - ? If this is her new baseline around 125 as not as active since hip fracture     Chronic diastolic (congestive) heart failure (HCC)  -     NT-BNP PRO; Future  -     Basic metabolic panel; Future    Hyperthyroidism  -     TSH, 3rd generation; Future  -     T4, free; Future  -     T3; Future            HPI:  Chief Complaint   Patient presents with    Leg Swelling     in hospital took 2 norvasc and 1 atenol    Weight Check     pt feels like she is gaining weight     HPI   Patient is a 81 yo female with PMH below who presents with concern for weight gain  She reports always being 120 lbs prior to hip fracture  She has noticed that lately her weight has been fluctuating around 125-126, but has not have change of weight by 2-3 lbs in a few days  She does report that her L leg feels swollen but has been more swollen than R leg since fracture  She is ambulating well  Her legs are not pitting  Her norvasc and atenolol has been unchanged  She has had routine follow up with cardiology without any changes  She was told to take bumex half tablet daily but could take full tablet with swelling or wt gain but she has not done this  She denies chest pain, sob, orthopnea, aponte, dizziness, lightheadedness       ROS: Review of Systems Constitutional: Positive for unexpected weight change (5-6 lbs over past few months )  Negative for activity change, appetite change, diaphoresis and fever  Respiratory: Negative for cough, chest tightness, shortness of breath and wheezing  Cardiovascular: Positive for leg swelling  Negative for chest pain and palpitations  Genitourinary: Negative  Musculoskeletal: Positive for arthralgias and joint swelling  Negative for gait problem and myalgias  Neurological: Negative  Hematological: Negative          Past Medical History:   Diagnosis Date    Bladder cancer ([place] [address] )     Coronary artery disease     S/P stent placement x 2 in 2011    GERD (gastroesophageal reflux disease)     Hepatitis     Hyperlipidemia     Hypertension     Kidney stones     Malignant neoplasm of urethra (HCC)     Pneumonia     Shingles      Patient Active Problem List   Diagnosis    Essential hypertension    Mixed hyperlipidemia    Coronary artery disease without angina pectoris - S/P stent placement x 2 (2011)    Gastroesophageal reflux disease without esophagitis    Chronic diastolic (congestive) heart failure ([place] [address] )    Hyperthyroidism    Osteoporosis    Intertrochanteric fracture of left femur, closed, with routine healing, subsequent encounter    Ambulatory dysfunction    Elderly person living alone    Low back pain without sciatica    S/P ORIF (open reduction internal fixation) fracture    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HCC)    Bladder cancer ([place] [address] )    Age-related osteoporosis without current pathological fracture    Allergic rhinitis    Anemia due to vitamin B12 deficiency    Biliary dyskinesia    Cholelithiasis    Chronic right shoulder pain    Closed displaced fracture of left trapezium bone    Degenerative joint disease    Depression, controlled    Deviated nasal septum    Gastric reflux syndrome    Gastric ulcer    Heart valve disorder    Herpes zoster infection of thoracic region    IBS (irritable bowel syndrome)    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HCC)    Mild vitamin D deficiency    Mixed hearing loss, unilateral    Pancreatic cyst    Raynaud's disease without gangrene    S/P angioplasty with stent    Urge incontinence of urine       Past Surgical History:   Procedure Laterality Date    CORONARY ANGIOPLASTY WITH STENT PLACEMENT      stent x 2    CYSTOSCOPY      diagnostic - onset 4/4/17    HYSTERECTOMY      CO OPEN RX FEMUR FX+INTRAMED ALEJANDRO Left 4/8/2018    Procedure: INSERTION NAIL IM FEMUR ANTEGRADE (TROCHANTERIC); Surgeon: Saad Medina MD;  Location: [facility];  Service: Orthopedics       Social History     Social History    Marital status:      Spouse name: N/A    Number of children: N/A    Years of education: N/A     Social History Main Topics    Smoking status: Never Smoker    Smokeless tobacco: Never Used    Alcohol use No    Drug use: No    Sexual activity: No     Other Topics Concern    Not on file     Social History Narrative    Advance directives declined by parents    Always uses seat belt           Family History   Problem Relation Age of Onset    Arthritis Mother     Osteoporosis Mother     Heart disease Father     Hypertension Father     Hypertension Brother        Allergies   Allergen Reactions    Lactose      Other reaction(s): Indigestion/GI Upset    Other      Other reaction(s): Mental Status Change  After surgery   Whole blood  = passed out   historical allergy; verify with patient         Current Outpatient Prescriptions:     acetaminophen (TYLENOL) 500 mg tablet, Take 500 mg by mouth every 6 (six) hours as needed for mild pain, Disp: , Rfl:     amLODIPine (NORVASC) 5 mg tablet, Take 5 mg by mouth daily Resume on 4/28  , Disp: , Rfl:     aspirin 81 MG tablet, Take 81 mg by mouth daily Resume on 4/28 , Disp: , Rfl:     atenolol (TENORMIN) 25 mg tablet, Take 25 mg by mouth daily Resume on 4/28, Disp: , Rfl:    atorvastatin (LIPITOR) 40 mg tablet, Take 1 tablet (40 mg total) by mouth daily after dinner Resume the evening of 4/27, Disp: 90 tablet, Rfl: 0    bumetanide (BUMEX) 1 mg tablet, Take 1 tablet (1 mg total) by mouth daily /take 1/2 tablet daily by mouth (Patient taking differently: Take 0 5 mg by mouth daily  ), Disp: 45 tablet, Rfl: 0    calcium carbonate (OYSTER SHELL,OSCAL) 500 mg, Take 1 tablet by mouth daily with breakfast, Disp: 30 tablet, Rfl: 0    Cholecalciferol 2000 units TABS, Take 2,000 Units by mouth daily Resume on 4/28, Disp: , Rfl:     dicyclomine (BENTYL) 10 mg capsule, Take 10 mg by mouth as needed, Disp: , Rfl:     DULoxetine (CYMBALTA) 30 mg delayed release capsule, Take 1 capsule (30 mg total) by mouth daily Resume on 4/28, Disp: 90 capsule, Rfl: 0    methimazole (TAPAZOLE) 5 mg tablet, Take 1 tablet (5 mg total) by mouth daily, Disp: 30 tablet, Rfl: 1    pantoprazole (PROTONIX) 40 mg tablet, Take 1 tablet (40 mg total) by mouth daily Resume on 4/28, Disp: 90 tablet, Rfl: 1      Physical Exam:  /60   Pulse 78   Temp 98 °F (36 7 °C)   Resp 17   Wt 57 2 kg (126 lb)   BMI 23 81 kg/m²     Physical Exam   Constitutional: She is oriented to person, place, and time  She appears well-developed and well-nourished  No distress  HENT:   Head: Normocephalic and atraumatic  Right Ear: External ear normal    Left Ear: External ear normal    Mouth/Throat: Oropharynx is clear and moist  No oropharyngeal exudate  Eyes: Conjunctivae are normal  Pupils are equal, round, and reactive to light  Right eye exhibits no discharge  Left eye exhibits no discharge  Cardiovascular: Normal rate, regular rhythm, normal heart sounds and intact distal pulses  No murmur heard  Pulmonary/Chest: Effort normal and breath sounds normal  No respiratory distress  She has no wheezes  She has no rales  Abdominal: Bowel sounds are normal  She exhibits no distension     Musculoskeletal: She exhibits edema (L 1+ nonpitting edema of knee and thigh)  She exhibits no tenderness or deformity  Neurological: She is alert and oriented to person, place, and time  No cranial nerve deficit  Skin: Skin is warm and dry  No rash noted  Psychiatric: She has a normal mood and affect   Her behavior is normal          Labs:  Lab Results   Component Value Date    WBC 3 93 (L) 05/03/2018    HGB 11 2 (L) 05/03/2018    HCT 34 9 05/03/2018    MCV 99 (H) 05/03/2018     05/03/2018     Lab Results   Component Value Date     08/02/2018    K 4 2 08/02/2018     08/02/2018    CO2 31 08/02/2018    ANIONGAP 3 (L) 08/02/2018    BUN 24 08/02/2018    CREATININE 0 87 08/02/2018    GLUCOSE 82 08/02/2018    GLUF 80 07/05/2018    CALCIUM 9 5 08/02/2018    AST 16 11/20/2017    ALT 29 11/20/2017    ALKPHOS 76 11/20/2017    PROT 5 6 (L) 04/09/2018    BILITOT 0 62 11/20/2017    EGFR 63 08/02/2018

## 2018-08-10 DIAGNOSIS — I10 ESSENTIAL HYPERTENSION: Primary | ICD-10-CM

## 2018-08-10 DIAGNOSIS — R60.0 LOWER EXTREMITY EDEMA: ICD-10-CM

## 2018-08-10 RX ORDER — ATENOLOL 25 MG/1
25 TABLET ORAL DAILY
Qty: 90 TABLET | Refills: 0 | Status: SHIPPED | OUTPATIENT
Start: 2018-08-10 | End: 2018-11-14 | Stop reason: SDUPTHER

## 2018-08-10 RX ORDER — BUMETANIDE 1 MG/1
1 TABLET ORAL DAILY
Qty: 45 TABLET | Refills: 0 | Status: SHIPPED | OUTPATIENT
Start: 2018-08-10 | End: 2018-10-25 | Stop reason: SDUPTHER

## 2018-08-16 NOTE — PROGRESS NOTES
Juan Luis Agrawalp has been compliant with attending PT and home exercise program since initial eval   Laila Buchanan  has made improvements in objective data since initial evalulation and has achieved all goals  Patient reports having returned to their prior level or function  Patient provided with updated Home Exercise Program, all questions answered, verbalized understanding and agreement to plan of care   Thus it was mutually decided to discontinue this episode of care and transition to Home Exercise Program

## 2018-08-30 DIAGNOSIS — F32.A DEPRESSION, UNSPECIFIED DEPRESSION TYPE: ICD-10-CM

## 2018-08-30 RX ORDER — DULOXETIN HYDROCHLORIDE 30 MG/1
30 CAPSULE, DELAYED RELEASE ORAL DAILY
Qty: 90 CAPSULE | Refills: 0 | Status: SHIPPED | OUTPATIENT
Start: 2018-08-30 | End: 2018-11-27 | Stop reason: SDUPTHER

## 2018-09-04 DIAGNOSIS — E78.5 HYPERLIPIDEMIA, UNSPECIFIED HYPERLIPIDEMIA TYPE: ICD-10-CM

## 2018-09-05 ENCOUNTER — TRANSCRIBE ORDERS (OUTPATIENT)
Dept: LAB | Facility: CLINIC | Age: 83
End: 2018-09-05

## 2018-09-05 ENCOUNTER — APPOINTMENT (OUTPATIENT)
Dept: LAB | Facility: CLINIC | Age: 83
End: 2018-09-05
Payer: MEDICARE

## 2018-09-05 DIAGNOSIS — E05.90 PRETIBIAL MYXEDEMA: ICD-10-CM

## 2018-09-05 DIAGNOSIS — I10 ESSENTIAL HYPERTENSION, MALIGNANT: ICD-10-CM

## 2018-09-05 DIAGNOSIS — I10 ESSENTIAL HYPERTENSION, MALIGNANT: Primary | ICD-10-CM

## 2018-09-05 LAB
ANION GAP SERPL CALCULATED.3IONS-SCNC: 4 MMOL/L (ref 4–13)
BUN SERPL-MCNC: 23 MG/DL (ref 5–25)
CALCIUM SERPL-MCNC: 9.2 MG/DL (ref 8.3–10.1)
CHLORIDE SERPL-SCNC: 100 MMOL/L (ref 100–108)
CO2 SERPL-SCNC: 32 MMOL/L (ref 21–32)
CREAT SERPL-MCNC: 0.9 MG/DL (ref 0.6–1.3)
GFR SERPL CREATININE-BSD FRML MDRD: 60 ML/MIN/1.73SQ M
GLUCOSE P FAST SERPL-MCNC: 84 MG/DL (ref 65–99)
POTASSIUM SERPL-SCNC: 3.8 MMOL/L (ref 3.5–5.3)
SODIUM SERPL-SCNC: 136 MMOL/L (ref 136–145)
TSH SERPL DL<=0.05 MIU/L-ACNC: 5.1 UIU/ML (ref 0.36–3.74)

## 2018-09-05 PROCEDURE — 84443 ASSAY THYROID STIM HORMONE: CPT

## 2018-09-05 PROCEDURE — 80048 BASIC METABOLIC PNL TOTAL CA: CPT

## 2018-09-05 PROCEDURE — 36415 COLL VENOUS BLD VENIPUNCTURE: CPT

## 2018-09-05 RX ORDER — ATORVASTATIN CALCIUM 40 MG/1
40 TABLET, FILM COATED ORAL
Qty: 90 TABLET | Refills: 0 | Status: SHIPPED | OUTPATIENT
Start: 2018-09-05 | End: 2018-12-10 | Stop reason: SDUPTHER

## 2018-09-24 ENCOUNTER — APPOINTMENT (OUTPATIENT)
Dept: LAB | Facility: CLINIC | Age: 83
End: 2018-09-24
Payer: MEDICARE

## 2018-09-24 DIAGNOSIS — C91.10 CHRONIC LARGE GRANULAR LYMPHOCYTIC LEUKEMIA (HCC): ICD-10-CM

## 2018-09-24 DIAGNOSIS — D64.9 ANEMIA, UNSPECIFIED TYPE: ICD-10-CM

## 2018-09-24 LAB
ALBUMIN SERPL BCP-MCNC: 3.8 G/DL (ref 3.5–5)
ALP SERPL-CCNC: 105 U/L (ref 46–116)
ALT SERPL W P-5'-P-CCNC: 31 U/L (ref 12–78)
ANION GAP SERPL CALCULATED.3IONS-SCNC: 4 MMOL/L (ref 4–13)
AST SERPL W P-5'-P-CCNC: 22 U/L (ref 5–45)
BASOPHILS # BLD AUTO: 0.04 THOUSANDS/ΜL (ref 0–0.1)
BASOPHILS NFR BLD AUTO: 1 % (ref 0–1)
BILIRUB SERPL-MCNC: 0.83 MG/DL (ref 0.2–1)
BUN SERPL-MCNC: 23 MG/DL (ref 5–25)
CALCIUM SERPL-MCNC: 9.2 MG/DL (ref 8.3–10.1)
CHLORIDE SERPL-SCNC: 98 MMOL/L (ref 100–108)
CO2 SERPL-SCNC: 32 MMOL/L (ref 21–32)
CREAT SERPL-MCNC: 0.87 MG/DL (ref 0.6–1.3)
EOSINOPHIL # BLD AUTO: 0.15 THOUSAND/ΜL (ref 0–0.61)
EOSINOPHIL NFR BLD AUTO: 4 % (ref 0–6)
ERYTHROCYTE [DISTWIDTH] IN BLOOD BY AUTOMATED COUNT: 12.3 % (ref 11.6–15.1)
FERRITIN SERPL-MCNC: 90 NG/ML (ref 8–388)
GFR SERPL CREATININE-BSD FRML MDRD: 63 ML/MIN/1.73SQ M
GLUCOSE P FAST SERPL-MCNC: 78 MG/DL (ref 65–99)
HCT VFR BLD AUTO: 40 % (ref 34.8–46.1)
HGB BLD-MCNC: 12.9 G/DL (ref 11.5–15.4)
IMM GRANULOCYTES # BLD AUTO: 0 THOUSAND/UL (ref 0–0.2)
IMM GRANULOCYTES NFR BLD AUTO: 0 % (ref 0–2)
LDH SERPL-CCNC: 190 U/L (ref 81–234)
LYMPHOCYTES # BLD AUTO: 1.67 THOUSANDS/ΜL (ref 0.6–4.47)
LYMPHOCYTES NFR BLD AUTO: 43 % (ref 14–44)
MCH RBC QN AUTO: 32.2 PG (ref 26.8–34.3)
MCHC RBC AUTO-ENTMCNC: 32.3 G/DL (ref 31.4–37.4)
MCV RBC AUTO: 100 FL (ref 82–98)
MONOCYTES # BLD AUTO: 0.44 THOUSAND/ΜL (ref 0.17–1.22)
MONOCYTES NFR BLD AUTO: 11 % (ref 4–12)
NEUTROPHILS # BLD AUTO: 1.57 THOUSANDS/ΜL (ref 1.85–7.62)
NEUTS SEG NFR BLD AUTO: 41 % (ref 43–75)
NRBC BLD AUTO-RTO: 0 /100 WBCS
PLATELET # BLD AUTO: 155 THOUSANDS/UL (ref 149–390)
PMV BLD AUTO: 11.2 FL (ref 8.9–12.7)
POTASSIUM SERPL-SCNC: 3.9 MMOL/L (ref 3.5–5.3)
PROT SERPL-MCNC: 7.7 G/DL (ref 6.4–8.2)
RBC # BLD AUTO: 4.01 MILLION/UL (ref 3.81–5.12)
SODIUM SERPL-SCNC: 134 MMOL/L (ref 136–145)
WBC # BLD AUTO: 3.87 THOUSAND/UL (ref 4.31–10.16)

## 2018-09-24 PROCEDURE — 83615 LACTATE (LD) (LDH) ENZYME: CPT

## 2018-09-24 PROCEDURE — 80053 COMPREHEN METABOLIC PANEL: CPT

## 2018-09-24 PROCEDURE — 36415 COLL VENOUS BLD VENIPUNCTURE: CPT

## 2018-09-24 PROCEDURE — 82728 ASSAY OF FERRITIN: CPT

## 2018-09-24 PROCEDURE — 85025 COMPLETE CBC W/AUTO DIFF WBC: CPT

## 2018-10-09 DIAGNOSIS — I10 ESSENTIAL HYPERTENSION: Primary | ICD-10-CM

## 2018-10-09 RX ORDER — AMLODIPINE BESYLATE 5 MG/1
5 TABLET ORAL DAILY
Qty: 90 TABLET | Refills: 1 | Status: SHIPPED | OUTPATIENT
Start: 2018-10-09 | End: 2019-04-09 | Stop reason: SDUPTHER

## 2018-10-09 RX ORDER — AMLODIPINE BESYLATE 5 MG/1
5 TABLET ORAL DAILY
Refills: 0 | OUTPATIENT
Start: 2018-10-09

## 2018-10-09 NOTE — TELEPHONE ENCOUNTER
From what I see in [employer] Dr Mikki Painter sent a 9 month supply to the pharm in January, so the pt is due for a refill now      I only see Cardio pulled the info over

## 2018-10-11 ENCOUNTER — IMMUNIZATION (OUTPATIENT)
Dept: FAMILY MEDICINE CLINIC | Facility: CLINIC | Age: 83
End: 2018-10-11
Payer: MEDICARE

## 2018-10-11 DIAGNOSIS — Z23 FLU VACCINE NEED: Primary | ICD-10-CM

## 2018-10-11 PROCEDURE — 90662 IIV NO PRSV INCREASED AG IM: CPT

## 2018-10-11 PROCEDURE — G0008 ADMIN INFLUENZA VIRUS VAC: HCPCS

## 2018-10-12 ENCOUNTER — OFFICE VISIT (OUTPATIENT)
Dept: HEMATOLOGY ONCOLOGY | Facility: CLINIC | Age: 83
End: 2018-10-12
Payer: MEDICARE

## 2018-10-12 VITALS
HEART RATE: 67 BPM | RESPIRATION RATE: 18 BRPM | TEMPERATURE: 97.4 F | BODY MASS INDEX: 23.68 KG/M2 | OXYGEN SATURATION: 98 % | HEIGHT: 61 IN | WEIGHT: 125.4 LBS | DIASTOLIC BLOOD PRESSURE: 62 MMHG | SYSTOLIC BLOOD PRESSURE: 142 MMHG

## 2018-10-12 DIAGNOSIS — C91.10 CHRONIC LARGE GRANULAR LYMPHOCYTIC LEUKEMIA (HCC): Primary | ICD-10-CM

## 2018-10-12 PROCEDURE — 99214 OFFICE O/P EST MOD 30 MIN: CPT | Performed by: INTERNAL MEDICINE

## 2018-10-12 RX ORDER — FEXOFENADINE HCL 180 MG/1
180 TABLET ORAL DAILY PRN
COMMUNITY

## 2018-10-12 NOTE — LETTER
October 12, 2018     Monsuzan Laneisabel   [address] [facility]  [address]  [address] [place] [address]    Patient: Lisset Patterson   YOB: 1935   Date of Visit: 10/12/2018       Dear Dr Obrien Face: Thank you for referring Lisset Patterson to me for evaluation  Below are my notes for this consultation  If you have questions, please do not hesitate to call me  I look forward to following your patient along with you  Sincerely,        Diane Fischer MD        CC: No Recipients  Diane Fischer MD  10/12/2018 11:13 AM  Sign at close encounter  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 and she has not required any treatment for it  Hematological parameters are being monitored  She is not symptomatic from this condition  She has other medical problems as listed below  In April 2018 she fell in the bathroom and broke her left hip and had surgery  Post surgery hemoglobin dropped down to 7 5  She received 2 units of blood and that brought that up to 9  Now she is not anemic any more  She is walking independently without a cane and has recovered from fracture and surgery of left hip  Patient has tiredness and arthritic symptoms    History of pancreatic cyst and IBS she follows with  Dr Jeane Ferrera, gastroenterologist      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 and BCG and undergoes cystoscopies on regular basis  She has 2 stents in her heart  She has history of kidney stone  She has history of Raynaud's  She had herpes zoster in January 2016  History of osteopenia  She has been on vitamin D and prolia  She has appointment with her rheumatologist   History of hepatitis in 1993   History of ALONSO and BSO in 1980 for bleeding  No cancer  History of pulmonary embolism several years ago  History of stomach ulcer in 1970 of pneumonias in 2003 and 2004  of hypertension    She is sensitive to cold weather  History of arthritis in the neck and all over  She follows with a rheumatologist           Current Outpatient Prescriptions:     acetaminophen (TYLENOL) 500 mg tablet, Take 500 mg by mouth every 6 (six) hours as needed for mild pain, Disp: , Rfl:     amLODIPine (NORVASC) 5 mg tablet, Take 1 tablet (5 mg total) by mouth daily Resume on 4/28, Disp: 90 tablet, Rfl: 1    aspirin 81 MG tablet, Take 81 mg by mouth daily Resume on 4/28 , Disp: , Rfl:     atenolol (TENORMIN) 25 mg tablet, Take 1 tablet (25 mg total) by mouth daily Resume on 4/28, Disp: 90 tablet, Rfl: 0    atorvastatin (LIPITOR) 40 mg tablet, Take 1 tablet (40 mg total) by mouth daily after dinner Resume the evening of 4/27, Disp: 90 tablet, Rfl: 0    bumetanide (BUMEX) 1 mg tablet, Take 1 tablet (1 mg total) by mouth daily /take 1/2 tablet daily by mouth, Disp: 45 tablet, Rfl: 0    Cholecalciferol 2000 units TABS, Take 2,000 Units by mouth daily Resume on 4/28, Disp: , Rfl:     dicyclomine (BENTYL) 10 mg capsule, Take 10 mg by mouth as needed, Disp: , Rfl:     DULoxetine (CYMBALTA) 30 mg delayed release capsule, Take 1 capsule (30 mg total) by mouth daily Resume on 4/28, Disp: 90 capsule, Rfl: 0    fexofenadine (ALLEGRA) 180 MG tablet, Take 180 mg by mouth daily, Disp: , Rfl:     pantoprazole (PROTONIX) 40 mg tablet, Take 1 tablet (40 mg total) by mouth daily Resume on 4/28, Disp: 90 tablet, Rfl: 1    calcium carbonate (OYSTER SHELL,OSCAL) 500 mg, Take 1 tablet by mouth daily with breakfast (Patient not taking: Reported on 10/12/2018 ), Disp: 30 tablet, Rfl: 0    methimazole (TAPAZOLE) 5 mg tablet, Take 1 tablet (5 mg total) by mouth daily (Patient not taking: Reported on 10/12/2018 ), Disp: 30 tablet, Rfl: 1    Allergies   Allergen Reactions    Lactose      Other reaction(s): Indigestion/GI Upset    Other      Other reaction(s): Mental Status Change  After surgery   Whole blood  = passed out   historical allergy; verify with patient         ROS:  10/12/18 Reviewed 13 systems:  Presently no headaches, seizures, dizziness, diplopia, dysphagia, hoarseness, chest pain, palpitations, shortness of breath, cough, hemoptysis, abdominal pain, nausea, vomiting,  melena, hematuria, fever, chills, bleeding, bone pains, skin rash, weight loss,   weakness, numbness,  claudication and gait problem  No frequent infections  Not unusually sensitive to heat or cold  No swelling of the ankles  No swollen glands  Patient is anxious  Other symptoms are in HPI        /62 (BP Location: Right arm, Patient Position: Sitting, Cuff Size: Adult)   Pulse 67   Temp (!) 97 4 °F (36 3 °C)   Resp 18   Ht 5' 1" (1 549 m)   Wt 56 9 kg (125 lb 6 4 oz)   SpO2 98%   BMI 23 69 kg/m²      Physical Exam:  Alert, oriented, not in distress, no icterus, no oral thrush, no palpable neck mass, clear lung fields, regular heart rate, abdomen  soft and non tender, no palpable abdominal mass, no ascites, no edema of ankles, no calf tenderness, no focal neurological deficit, no skin rash, no palpable lymphadenopathy, good arterial pulses, no clubbing  Patient is anxious  Performance status 1      IMAGING:      LABS:  Results for orders placed or performed in visit on 09/24/18   CBC and differential   Result Value Ref Range    WBC 3 87 (L) 4 31 - 10 16 Thousand/uL    RBC 4 01 3 81 - 5 12 Million/uL    Hemoglobin 12 9 11 5 - 15 4 g/dL    Hematocrit 40 0 34 8 - 46 1 %     (H) 82 - 98 fL    MCH 32 2 26 8 - 34 3 pg    MCHC 32 3 31 4 - 37 4 g/dL    RDW 12 3 11 6 - 15 1 %    MPV 11 2 8 9 - 12 7 fL    Platelets 904 375 - 120 Thousands/uL    nRBC 0 /100 WBCs    Neutrophils Relative 41 (L) 43 - 75 %    Immat GRANS % 0 0 - 2 %    Lymphocytes Relative 43 14 - 44 %    Monocytes Relative 11 4 - 12 %    Eosinophils Relative 4 0 - 6 %    Basophils Relative 1 0 - 1 %    Neutrophils Absolute 1 57 (L) 1 85 - 7 62 Thousands/µL    Immature Grans Absolute 0 00 0 00 - 0 20 Thousand/uL    Lymphocytes Absolute 1 67 0 60 - 4 47 Thousands/µL    Monocytes Absolute 0 44 0 17 - 1 22 Thousand/µL    Eosinophils Absolute 0 15 0 00 - 0 61 Thousand/µL    Basophils Absolute 0 04 0 00 - 0 10 Thousands/µL   Comprehensive metabolic panel   Result Value Ref Range    Sodium 134 (L) 136 - 145 mmol/L    Potassium 3 9 3 5 - 5 3 mmol/L    Chloride 98 (L) 100 - 108 mmol/L    CO2 32 21 - 32 mmol/L    ANION GAP 4 4 - 13 mmol/L    BUN 23 5 - 25 mg/dL    Creatinine 0 87 0 60 - 1 30 mg/dL    Glucose, Fasting 78 65 - 99 mg/dL    Calcium 9 2 8 3 - 10 1 mg/dL    AST 22 5 - 45 U/L    ALT 31 12 - 78 U/L    Alkaline Phosphatase 105 46 - 116 U/L    Total Protein 7 7 6 4 - 8 2 g/dL    Albumin 3 8 3 5 - 5 0 g/dL    Total Bilirubin 0 83 0 20 - 1 00 mg/dL    eGFR 63 ml/min/1 73sq m   LD,Blood   Result Value Ref Range     81 - 234 U/L   Ferritin   Result Value Ref Range    Ferritin 90 8 - 388 ng/mL     Labs, Imaging, & Other studies:   All pertinent labs and imaging studies were personally reviewed      Reviewed and discussed with patient  Assessment and plan:  Patient is here with her son  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 and she has not required any treatment for it  Hematological parameters are being monitored  She is not symptomatic from this condition  She has other medical problems as listed below  In April 2018 she fell in the bathroom and broke her left hip and had surgery  Post surgery hemoglobin dropped down to 7 5  She received 2 units of blood and that brought that up to 9  Now she is not anemic any more  She is walking independently without a cane and has recovered from fracture and surgery of left hip  Patient has tiredness and arthritic symptoms    History of pancreatic cyst and IBS she follows with  Dr Anabel Buerger, gastroenterologist        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 and BCG and undergoes cystoscopies on regular basis  She has 2 stents in her heart  She has history of kidney stone  She has history of Raynaud's  She had herpes zoster in January 2016  History of osteopenia  She has been on vitamin D and prolia  She has appointment with her rheumatologist   History of hepatitis in 1993   History of ALONSO and BSO in 1980 for bleeding  No cancer  History of pulmonary embolism several years ago  History of stomach ulcer in 1970 of pneumonias in 2003 and 2004  of hypertension  She is sensitive to cold weather  History of arthritis in the neck and all over  She follows with a rheumatologist     Physical examination and test results are as recorded and discussed especially hematological parameters and they are stable  Plan is surveillance  Goal is monitoring of hematological parameters  She goes to her primary physician and other consultants for other medical problems  Condition discussed and explained  Questions answered  Discussed the importance of self-breast examination, eating healthy foods, staying active and health screening test   She is self-care  1  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HCC)    - CBC and differential; Future  - Comprehensive metabolic panel; Future  - LD,Blood; Future        Patient voiced understanding and agreement in the discussion  Counseling / Coordination of Care   Greater than 50% of total time was spent with the patient and / or family counseling and / or coordination of care

## 2018-10-12 NOTE — PROGRESS NOTES
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 and she has not required any treatment for it  Hematological parameters are being monitored  She is not symptomatic from this condition  She has other medical problems as listed below  In April 2018 she fell in the bathroom and broke her left hip and had surgery  Post surgery hemoglobin dropped down to 7 5  She received 2 units of blood and that brought that up to 9  Now she is not anemic any more  She is walking independently without a cane and has recovered from fracture and surgery of left hip  Patient has tiredness and arthritic symptoms    History of pancreatic cyst and IBS she follows with  Dr Shari Arnold, gastroenterologist      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 and BCG and undergoes cystoscopies on regular basis  She has 2 stents in her heart  She has history of kidney stone  She has history of Raynaud's  She had herpes zoster in January 2016  History of osteopenia  She has been on vitamin D and prolia  She has appointment with her rheumatologist   History of hepatitis in 1993   History of ALONSO and BSO in 1980 for bleeding  No cancer  History of pulmonary embolism several years ago  History of stomach ulcer in 1970 of pneumonias in 2003 and 2004  of hypertension  She is sensitive to cold weather  History of arthritis in the neck and all over   She follows with a rheumatologist           Current Outpatient Prescriptions:     acetaminophen (TYLENOL) 500 mg tablet, Take 500 mg by mouth every 6 (six) hours as needed for mild pain, Disp: , Rfl:     amLODIPine (NORVASC) 5 mg tablet, Take 1 tablet (5 mg total) by mouth daily Resume on 4/28, Disp: 90 tablet, Rfl: 1    aspirin 81 MG tablet, Take 81 mg by mouth daily Resume on 4/28 , Disp: , Rfl:     atenolol (TENORMIN) 25 mg tablet, Take 1 tablet (25 mg total) by mouth daily Resume on 4/28, Disp: 90 tablet, Rfl: 0    atorvastatin (LIPITOR) 40 mg tablet, Take 1 tablet (40 mg total) by mouth daily after dinner Resume the evening of 4/27, Disp: 90 tablet, Rfl: 0    bumetanide (BUMEX) 1 mg tablet, Take 1 tablet (1 mg total) by mouth daily /take 1/2 tablet daily by mouth, Disp: 45 tablet, Rfl: 0    Cholecalciferol 2000 units TABS, Take 2,000 Units by mouth daily Resume on 4/28, Disp: , Rfl:     dicyclomine (BENTYL) 10 mg capsule, Take 10 mg by mouth as needed, Disp: , Rfl:     DULoxetine (CYMBALTA) 30 mg delayed release capsule, Take 1 capsule (30 mg total) by mouth daily Resume on 4/28, Disp: 90 capsule, Rfl: 0    fexofenadine (ALLEGRA) 180 MG tablet, Take 180 mg by mouth daily, Disp: , Rfl:     pantoprazole (PROTONIX) 40 mg tablet, Take 1 tablet (40 mg total) by mouth daily Resume on 4/28, Disp: 90 tablet, Rfl: 1    calcium carbonate (OYSTER SHELL,OSCAL) 500 mg, Take 1 tablet by mouth daily with breakfast (Patient not taking: Reported on 10/12/2018 ), Disp: 30 tablet, Rfl: 0    methimazole (TAPAZOLE) 5 mg tablet, Take 1 tablet (5 mg total) by mouth daily (Patient not taking: Reported on 10/12/2018 ), Disp: 30 tablet, Rfl: 1    Allergies   Allergen Reactions    Lactose      Other reaction(s): Indigestion/GI Upset    Other      Other reaction(s): Mental Status Change  After surgery  Whole blood  = passed out   historical allergy; verify with patient         ROS:  10/12/18 Reviewed 13 systems:  Presently no headaches, seizures, dizziness, diplopia, dysphagia, hoarseness, chest pain, palpitations, shortness of breath, cough, hemoptysis, abdominal pain, nausea, vomiting,  melena, hematuria, fever, chills, bleeding, bone pains, skin rash, weight loss,   weakness, numbness,  claudication and gait problem  No frequent infections  Not unusually sensitive to heat or cold  No swelling of the ankles  No swollen glands  Patient is anxious   Other symptoms are in HPI        /62 (BP Location: Right arm, Patient Position: Sitting, Cuff Size: Adult)   Pulse 67   Temp (!) 97 4 °F (36 3 °C)   Resp 18   Ht 5' 1" (1 549 m)   Wt 56 9 kg (125 lb 6 4 oz)   SpO2 98%   BMI 23 69 kg/m²     Physical Exam:  Alert, oriented, not in distress, no icterus, no oral thrush, no palpable neck mass, clear lung fields, regular heart rate, abdomen  soft and non tender, no palpable abdominal mass, no ascites, no edema of ankles, no calf tenderness, no focal neurological deficit, no skin rash, no palpable lymphadenopathy, good arterial pulses, no clubbing  Patient is anxious  Performance status 1      IMAGING:      LABS:  Results for orders placed or performed in visit on 09/24/18   CBC and differential   Result Value Ref Range    WBC 3 87 (L) 4 31 - 10 16 Thousand/uL    RBC 4 01 3 81 - 5 12 Million/uL    Hemoglobin 12 9 11 5 - 15 4 g/dL    Hematocrit 40 0 34 8 - 46 1 %     (H) 82 - 98 fL    MCH 32 2 26 8 - 34 3 pg    MCHC 32 3 31 4 - 37 4 g/dL    RDW 12 3 11 6 - 15 1 %    MPV 11 2 8 9 - 12 7 fL    Platelets 040 163 - 123 Thousands/uL    nRBC 0 /100 WBCs    Neutrophils Relative 41 (L) 43 - 75 %    Immat GRANS % 0 0 - 2 %    Lymphocytes Relative 43 14 - 44 %    Monocytes Relative 11 4 - 12 %    Eosinophils Relative 4 0 - 6 %    Basophils Relative 1 0 - 1 %    Neutrophils Absolute 1 57 (L) 1 85 - 7 62 Thousands/µL    Immature Grans Absolute 0 00 0 00 - 0 20 Thousand/uL    Lymphocytes Absolute 1 67 0 60 - 4 47 Thousands/µL    Monocytes Absolute 0 44 0 17 - 1 22 Thousand/µL    Eosinophils Absolute 0 15 0 00 - 0 61 Thousand/µL    Basophils Absolute 0 04 0 00 - 0 10 Thousands/µL   Comprehensive metabolic panel   Result Value Ref Range    Sodium 134 (L) 136 - 145 mmol/L    Potassium 3 9 3 5 - 5 3 mmol/L    Chloride 98 (L) 100 - 108 mmol/L    CO2 32 21 - 32 mmol/L    ANION GAP 4 4 - 13 mmol/L    BUN 23 5 - 25 mg/dL    Creatinine 0 87 0 60 - 1 30 mg/dL    Glucose, Fasting 78 65 - 99 mg/dL    Calcium 9 2 8 3 - 10 1 mg/dL    AST 22 5 - 45 U/L    ALT 31 12 - 78 U/L    Alkaline Phosphatase 105 46 - 116 U/L    Total Protein 7 7 6 4 - 8 2 g/dL    Albumin 3 8 3 5 - 5 0 g/dL    Total Bilirubin 0 83 0 20 - 1 00 mg/dL    eGFR 63 ml/min/1 73sq m   LD,Blood   Result Value Ref Range     81 - 234 U/L   Ferritin   Result Value Ref Range    Ferritin 90 8 - 388 ng/mL     Labs, Imaging, & Other studies:   All pertinent labs and imaging studies were personally reviewed      Reviewed and discussed with patient  Assessment and plan:  Patient is here with her son  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 and she has not required any treatment for it  Hematological parameters are being monitored  She is not symptomatic from this condition  She has other medical problems as listed below  In April 2018 she fell in the bathroom and broke her left hip and had surgery  Post surgery hemoglobin dropped down to 7 5  She received 2 units of blood and that brought that up to 9  Now she is not anemic any more  She is walking independently without a cane and has recovered from fracture and surgery of left hip  Patient has tiredness and arthritic symptoms    History of pancreatic cyst and IBS she follows with  Dr Barbee Meckel, gastroenterologist      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 and BCG and undergoes cystoscopies on regular basis  She has 2 stents in her heart  She has history of kidney stone  She has history of Raynaud's  She had herpes zoster in January 2016  History of osteopenia  She has been on vitamin D and prolia  She has appointment with her rheumatologist   History of hepatitis in 1993   History of ALONSO and BSO in 1980 for bleeding  No cancer  History of pulmonary embolism several years ago  History of stomach ulcer in 1970 of pneumonias in 2003 and 2004  of hypertension  She is sensitive to cold weather    History of arthritis in the neck and all over  She follows with a rheumatologist     Physical examination and test results are as recorded and discussed especially hematological parameters and they are stable  Plan is surveillance  Goal is monitoring of hematological parameters  She goes to her primary physician and other consultants for other medical problems  Condition discussed and explained  Questions answered  Discussed the importance of self-breast examination, eating healthy foods, staying active and health screening test   She is self-care  1  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HCC)    - CBC and differential; Future  - Comprehensive metabolic panel; Future  - LD,Blood; Future        Patient voiced understanding and agreement in the discussion  Counseling / Coordination of Care   Greater than 50% of total time was spent with the patient and / or family counseling and / or coordination of care

## 2018-10-19 ENCOUNTER — OFFICE VISIT (OUTPATIENT)
Dept: FAMILY MEDICINE CLINIC | Facility: CLINIC | Age: 83
End: 2018-10-19
Payer: MEDICARE

## 2018-10-19 VITALS
DIASTOLIC BLOOD PRESSURE: 60 MMHG | SYSTOLIC BLOOD PRESSURE: 132 MMHG | OXYGEN SATURATION: 99 % | HEART RATE: 84 BPM | HEIGHT: 61 IN | BODY MASS INDEX: 23.71 KG/M2 | WEIGHT: 125.6 LBS | TEMPERATURE: 99.3 F

## 2018-10-19 DIAGNOSIS — H10.9 CONJUNCTIVITIS OF BOTH EYES, UNSPECIFIED CONJUNCTIVITIS TYPE: ICD-10-CM

## 2018-10-19 DIAGNOSIS — J01.90 ACUTE NON-RECURRENT SINUSITIS, UNSPECIFIED LOCATION: Primary | ICD-10-CM

## 2018-10-19 PROCEDURE — 99213 OFFICE O/P EST LOW 20 MIN: CPT | Performed by: FAMILY MEDICINE

## 2018-10-19 RX ORDER — GENTAMICIN SULFATE 3 MG/ML
1 SOLUTION/ DROPS OPHTHALMIC EVERY 4 HOURS
Qty: 5 ML | Refills: 0 | Status: SHIPPED | OUTPATIENT
Start: 2018-10-19 | End: 2019-01-24 | Stop reason: ALTCHOICE

## 2018-10-19 RX ORDER — AMOXICILLIN AND CLAVULANATE POTASSIUM 875; 125 MG/1; MG/1
1 TABLET, FILM COATED ORAL EVERY 12 HOURS SCHEDULED
Qty: 14 TABLET | Refills: 0 | Status: SHIPPED | OUTPATIENT
Start: 2018-10-19 | End: 2018-10-26

## 2018-10-19 NOTE — PROGRESS NOTES
Assessment/Plan:     Diagnoses and all orders for this visit:    Acute non-recurrent sinusitis, unspecified location  -     amoxicillin-clavulanate (AUGMENTIN) 875-125 mg per tablet; Take 1 tablet by mouth every 12 (twelve) hours for 7 days    Conjunctivitis of both eyes, unspecified conjunctivitis type  -     gentamicin (GARAMYCIN) 0 3 % ophthalmic solution; Administer 1 drop to both eyes every 4 (four) hours          Subjective:   Chief Complaint   Patient presents with    Conjunctivitis     symptoms of pink eye, hurts and red  facial pain        Patient ID: Elridge Eisenmenger is a 82 y o  female  Same day sick   "don't know if it's pink eye or what, been fighting something for a week and a half, and hurts all under the eyes, in cheeks, and top and back of head"   Eye sx started at same time  using tylenol and OTC eye drops          The following portions of the patient's history were reviewed and updated as appropriate: allergies, current medications, past family history, past medical history, past social history, past surgical history and problem list     Review of Systems   Constitutional: Negative  HENT: Positive for congestion, postnasal drip, sinus pain and sinus pressure  Negative for ear discharge, ear pain, facial swelling, hearing loss, mouth sores, nosebleeds, sore throat and trouble swallowing  Eyes: Negative for photophobia and visual disturbance  Per hpi   Respiratory: Negative  Neurological: Negative  Hematological: Negative  Objective:      /60   Pulse 84   Temp 99 3 °F (37 4 °C) (Tympanic)   Ht 5' 1" (1 549 m)   Wt 57 kg (125 lb 9 6 oz)   SpO2 99%   BMI 23 73 kg/m²          Physical Exam   Constitutional: She is oriented to person, place, and time  She appears well-developed and well-nourished  She is cooperative  Non-toxic appearance  She does not have a sickly appearance  She does not appear ill  No distress  HENT:   Head: Normocephalic and atraumatic  Right Ear: Tympanic membrane and ear canal normal    Left Ear: Tympanic membrane and ear canal normal    Nose: Mucosal edema and rhinorrhea present  Right sinus exhibits maxillary sinus tenderness and frontal sinus tenderness  Left sinus exhibits maxillary sinus tenderness and frontal sinus tenderness  Mouth/Throat: Uvula is midline, oropharynx is clear and moist and mucous membranes are normal    Eyes: Pupils are equal, round, and reactive to light  EOM are normal  Right eye exhibits discharge  Right eye exhibits no exudate  Left eye exhibits no discharge and no exudate  Right conjunctiva is injected  Right conjunctiva has no hemorrhage  Left conjunctiva is not injected  Left conjunctiva has no hemorrhage  Lymphadenopathy:     She has cervical adenopathy  Right cervical: Superficial cervical adenopathy present  No posterior cervical adenopathy present  Left cervical: Superficial cervical adenopathy present  No posterior cervical adenopathy present  Right: No supraclavicular adenopathy present  Left: No supraclavicular adenopathy present  Neurological: She is alert and oriented to person, place, and time  Gait normal    Nursing note and vitals reviewed

## 2018-10-25 DIAGNOSIS — R60.0 LOWER EXTREMITY EDEMA: ICD-10-CM

## 2018-10-25 RX ORDER — BUMETANIDE 1 MG/1
0.5 TABLET ORAL DAILY
Qty: 45 TABLET | Refills: 0 | Status: SHIPPED | OUTPATIENT
Start: 2018-10-25 | End: 2019-01-24 | Stop reason: SDUPTHER

## 2018-10-26 ENCOUNTER — TELEPHONE (OUTPATIENT)
Dept: FAMILY MEDICINE CLINIC | Facility: CLINIC | Age: 83
End: 2018-10-26

## 2018-10-26 NOTE — TELEPHONE ENCOUNTER
Pt called stating she went to  her medication at [facility] [place] and the directions stated take 0 5 tablet daily for 45 tablets  Pt stated she had to pay an extra $14 for this  Pt stated her last bottle said take 1 tablet daily and 0 5 PRN- pt said she knows she is suppose to take 0 5 tablet daily but she had it written take a full for 90 days since it was cheaper  Pt stated the pharm let her pay the cheaper price for the refill but pt needs a new rx sent to pharm for a 90 day supply stating- take 1 tablet daily and 0 5 tablet PRN      Pt uses [facility] [place]

## 2018-10-29 NOTE — TELEPHONE ENCOUNTER
I can rewrite for 0 5 tabs daily with additional 0 5 prn for 90 tablets; however I cannot incorrectly prescribe a medicine

## 2018-11-02 ENCOUNTER — APPOINTMENT (OUTPATIENT)
Dept: LAB | Facility: CLINIC | Age: 83
End: 2018-11-02
Payer: MEDICARE

## 2018-11-02 ENCOUNTER — TRANSCRIBE ORDERS (OUTPATIENT)
Dept: LAB | Facility: CLINIC | Age: 83
End: 2018-11-02

## 2018-11-02 DIAGNOSIS — E05.90 PRETIBIAL MYXEDEMA: ICD-10-CM

## 2018-11-02 DIAGNOSIS — E05.90 PRETIBIAL MYXEDEMA: Primary | ICD-10-CM

## 2018-11-02 LAB — TSH SERPL DL<=0.05 MIU/L-ACNC: 1.87 UIU/ML (ref 0.36–3.74)

## 2018-11-02 PROCEDURE — 84443 ASSAY THYROID STIM HORMONE: CPT

## 2018-11-02 PROCEDURE — 36415 COLL VENOUS BLD VENIPUNCTURE: CPT

## 2018-11-14 DIAGNOSIS — I10 ESSENTIAL HYPERTENSION: ICD-10-CM

## 2018-11-15 RX ORDER — ATENOLOL 25 MG/1
25 TABLET ORAL DAILY
Qty: 90 TABLET | Refills: 0 | Status: SHIPPED | OUTPATIENT
Start: 2018-11-15 | End: 2019-02-13 | Stop reason: SDUPTHER

## 2018-11-27 DIAGNOSIS — F32.A DEPRESSION, UNSPECIFIED DEPRESSION TYPE: ICD-10-CM

## 2018-11-27 RX ORDER — DULOXETIN HYDROCHLORIDE 30 MG/1
30 CAPSULE, DELAYED RELEASE ORAL DAILY
Qty: 90 CAPSULE | Refills: 1 | Status: SHIPPED | OUTPATIENT
Start: 2018-11-27 | End: 2019-05-28 | Stop reason: SDUPTHER

## 2018-12-06 ENCOUNTER — OFFICE VISIT (OUTPATIENT)
Dept: FAMILY MEDICINE CLINIC | Facility: CLINIC | Age: 83
End: 2018-12-06
Payer: MEDICARE

## 2018-12-06 VITALS
DIASTOLIC BLOOD PRESSURE: 72 MMHG | HEIGHT: 61 IN | TEMPERATURE: 97.6 F | WEIGHT: 125.4 LBS | SYSTOLIC BLOOD PRESSURE: 128 MMHG | OXYGEN SATURATION: 94 % | HEART RATE: 70 BPM | BODY MASS INDEX: 23.68 KG/M2

## 2018-12-06 DIAGNOSIS — R68.83 CHILLS: Primary | ICD-10-CM

## 2018-12-06 DIAGNOSIS — R53.83 OTHER FATIGUE: ICD-10-CM

## 2018-12-06 PROCEDURE — 99213 OFFICE O/P EST LOW 20 MIN: CPT | Performed by: PHYSICIAN ASSISTANT

## 2018-12-06 RX ORDER — TRAMADOL HYDROCHLORIDE 50 MG/1
25 TABLET ORAL
COMMUNITY
End: 2019-04-04 | Stop reason: ALTCHOICE

## 2018-12-06 RX ORDER — EZETIMIBE 10 MG/1
10 TABLET ORAL
COMMUNITY
End: 2019-01-24 | Stop reason: ALTCHOICE

## 2018-12-06 RX ORDER — TOBRAMYCIN AND DEXAMETHASONE 3; 1 MG/ML; MG/ML
SUSPENSION/ DROPS OPHTHALMIC
Refills: 0 | COMMUNITY
Start: 2018-10-24 | End: 2019-01-24 | Stop reason: ALTCHOICE

## 2018-12-06 RX ORDER — MONTELUKAST SODIUM 10 MG/1
10 TABLET ORAL AS NEEDED
COMMUNITY
End: 2019-09-25 | Stop reason: SDUPTHER

## 2018-12-06 NOTE — PROGRESS NOTES
Tristian Ridley 82 y o  female   Date:  12/6/2018      Assessment and Plan:    Tawny Raymond was seen today for fever  Diagnoses and all orders for this visit:    Chills  -     UA w Reflex to Microscopic w Reflex to Culture  -     CBC and differential; Future  - explained to patient that her temp is not a fever  - no associated symptoms, normal through exam   - ?trying to fight off a virus but patient reassured by normal exam   - if symptoms persist can pursue lab and urine studies to r/o infection     Other fatigue  -     UA w Reflex to Microscopic w Reflex to Culture  -     CBC and differential; Future        HPI:  Chief Complaint   Patient presents with    Fever     having fevers since the day after thanksgiving (low grade fever)     HPI   Patient is an 81 yo female who presents for sick visit  She reports that she "has fevers since Thanksgiving"  I feel run down and have chills  "I feel like a cold is about to come on but it never comes"  She has no other symptoms  She reports that "my normal temp is 97 and it has gone as high as 98 2, and highest was 99 0"  She was around family at [facility] who had URI sx  ROS: Review of Systems   Constitutional: Positive for chills and fatigue  Negative for fever and unexpected weight change  HENT: Negative for congestion, ear pain, hearing loss, nosebleeds, sore throat and trouble swallowing  Eyes: Negative for pain, discharge and visual disturbance  Respiratory: Negative for cough, shortness of breath and wheezing  Cardiovascular: Negative for chest pain, palpitations and leg swelling  Gastrointestinal: Negative for abdominal pain, blood in stool, constipation, diarrhea, nausea and vomiting  Endocrine: Negative for cold intolerance and heat intolerance  Genitourinary: Negative for difficulty urinating, dysuria and hematuria  Musculoskeletal: Negative for arthralgias, gait problem and myalgias  Skin: Negative for color change, rash and wound  Neurological: Negative for dizziness, syncope, weakness, light-headedness and headaches  Hematological: Negative for adenopathy  Does not bruise/bleed easily  Psychiatric/Behavioral: Negative for confusion and sleep disturbance  The patient is not nervous/anxious          Past Medical History:   Diagnosis Date    [facility] )     Coronary artery disease     S/P stent placement x 2 in 2011    GERD (gastroesophageal reflux disease)     Hepatitis     Hyperlipidemia     Hypertension     Kidney stones     Malignant neoplasm of urethra (HCC)     Pneumonia     Shingles      Patient Active Problem List   Diagnosis    Essential hypertension    Mixed hyperlipidemia    Coronary artery disease without angina pectoris - S/P stent placement x 2 (2011)    Gastroesophageal reflux disease without esophagitis    Chronic diastolic (congestive) heart failure ([place] [address] )    Hyperthyroidism    Osteoporosis    Intertrochanteric fracture of left femur, closed, with routine healing, subsequent encounter    Ambulatory dysfunction    Elderly person living alone    Low back pain without sciatica    S/P ORIF (open reduction internal fixation) fracture    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HCC)    Bladder cancer ([place] [address] )    Age-related osteoporosis without current pathological fracture    Allergic rhinitis    Anemia due to vitamin B12 deficiency    Biliary dyskinesia    Cholelithiasis    Chronic right shoulder pain    Closed displaced fracture of left trapezium bone    Degenerative joint disease    Depression, controlled    Deviated nasal septum    Gastric reflux syndrome    Gastric ulcer    Heart valve disorder    Herpes zoster infection of thoracic region    IBS (irritable bowel syndrome)    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HCC)    Mild vitamin D deficiency    Mixed hearing loss, unilateral    Pancreatic cyst    Raynaud's disease without gangrene    S/P angioplasty with stent    Urge incontinence of urine    Conjunctivitis of both eyes    Thrombocytopenia (HCC)       Past Surgical History:   Procedure Laterality Date    CORONARY ANGIOPLASTY WITH STENT PLACEMENT      stent x 2    CYSTOSCOPY      diagnostic - onset 4/4/17    HYSTERECTOMY      MN OPEN RX FEMUR FX+INTRAMED ALEJANDRO Left 4/8/2018    Procedure: INSERTION NAIL IM FEMUR ANTEGRADE (TROCHANTERIC); Surgeon: Luis Adames MD;  Location: [facility];  Service: Orthopedics       Social History     Social History    Marital status:      Spouse name: N/A    Number of children: N/A    Years of education: N/A     Social History Main Topics    Smoking status: Never Smoker    Smokeless tobacco: Never Used    Alcohol use No    Drug use: No    Sexual activity: No     Other Topics Concern    None     Social History Narrative    Advance directives declined by parents    Always uses seat belt           Family History   Problem Relation Age of Onset    Arthritis Mother     Osteoporosis Mother     Heart disease Father     Hypertension Father     Hypertension Brother        Allergies   Allergen Reactions    Lactose      Other reaction(s): Indigestion/GI Upset    Other      Other reaction(s): Mental Status Change  After surgery   Whole blood  = passed out   historical allergy; verify with patient         Current Outpatient Prescriptions:     acetaminophen (TYLENOL) 500 mg tablet, Take 500 mg by mouth every 6 (six) hours as needed for mild pain, Disp: , Rfl:     amLODIPine (NORVASC) 5 mg tablet, Take 1 tablet (5 mg total) by mouth daily Resume on 4/28, Disp: 90 tablet, Rfl: 1    aspirin 81 MG tablet, Take 81 mg by mouth daily Resume on 4/28 , Disp: , Rfl:     atenolol (TENORMIN) 25 mg tablet, Take 1 tablet (25 mg total) by mouth daily Resume on 4/28, Disp: 90 tablet, Rfl: 0    bumetanide (BUMEX) 1 mg tablet, Take 0 5 tablets (0 5 mg total) by mouth daily, Disp: 45 tablet, Rfl: 0    Cholecalciferol 2000 units TABS, Take 2,000 Units by mouth daily Resume on 4/28, Disp: , Rfl:     DULoxetine (CYMBALTA) 30 mg delayed release capsule, Take 1 capsule (30 mg total) by mouth daily Resume on 4/28, Disp: 90 capsule, Rfl: 1    fexofenadine (ALLEGRA) 180 MG tablet, Take 180 mg by mouth daily, Disp: , Rfl:     montelukast (SINGULAIR) 10 mg tablet, Take 10 mg by mouth, Disp: , Rfl:     MULTIPLE VITAMIN PO, Take by mouth, Disp: , Rfl:     pantoprazole (PROTONIX) 40 mg tablet, Take 1 tablet (40 mg total) by mouth daily Resume on 4/28, Disp: 90 tablet, Rfl: 1    atorvastatin (LIPITOR) 40 mg tablet, Take 1 tablet (40 mg total) by mouth daily after dinner Resume the evening of 4/27, Disp: 90 tablet, Rfl: 0    calcium carbonate (OYSTER SHELL,OSCAL) 500 mg, Take 1 tablet by mouth daily with breakfast (Patient not taking: Reported on 10/19/2018 ), Disp: 30 tablet, Rfl: 0    denosumab (PROLIA) 60 mg/mL, Inject 60 mg under the skin, Disp: , Rfl:     dicyclomine (BENTYL) 10 mg capsule, Take 10 mg by mouth as needed, Disp: , Rfl:     ezetimibe (ZETIA) 10 mg tablet, Take 10 mg by mouth, Disp: , Rfl:     gentamicin (GARAMYCIN) 0 3 % ophthalmic solution, Administer 1 drop to both eyes every 4 (four) hours (Patient not taking: Reported on 12/6/2018 ), Disp: 5 mL, Rfl: 0    methimazole (TAPAZOLE) 5 mg tablet, Take 1 tablet (5 mg total) by mouth daily (Patient not taking: Reported on 10/12/2018 ), Disp: 30 tablet, Rfl: 1    METHYLCELLULOSE, LAXATIVE, PO, Take by mouth, Disp: , Rfl:     tobramycin-dexamethasone (TOBRADEX) ophthalmic suspension, instill 1 drop into both eyes four times a day for 7 days, Disp: , Rfl: 0    traMADol (ULTRAM) 50 mg tablet, Take 25 mg by mouth, Disp: , Rfl:       Physical Exam:  /72 (BP Location: Left arm, Patient Position: Sitting, Cuff Size: Standard)   Pulse 70   Temp 97 6 °F (36 4 °C) (Tympanic)   Ht 5' 1" (1 549 m)   Wt 56 9 kg (125 lb 6 4 oz)   SpO2 94%   BMI 23 69 kg/m²     Physical Exam   Constitutional: She is oriented to person, place, and time  Vital signs are normal  She appears well-developed and well-nourished  No distress  HENT:   Head: Normocephalic and atraumatic  Right Ear: Tympanic membrane, external ear and ear canal normal    Left Ear: Tympanic membrane, external ear and ear canal normal    Nose: Nose normal    Mouth/Throat: Oropharynx is clear and moist    Eyes: Pupils are equal, round, and reactive to light  Conjunctivae and lids are normal    Neck: Trachea normal and normal range of motion  Neck supple  No thyromegaly present  Cardiovascular: Normal rate, regular rhythm, S1 normal, S2 normal and intact distal pulses  Exam reveals no gallop  No murmur heard  Pulmonary/Chest: Breath sounds normal  No respiratory distress  She has no wheezes  She has no rhonchi  She has no rales  Abdominal: Soft  Normal appearance and bowel sounds are normal  She exhibits no mass  There is no hepatosplenomegaly  There is no tenderness  Musculoskeletal: Normal range of motion  She exhibits no edema or deformity  Lymphadenopathy:     She has no cervical adenopathy  Neurological: She is alert and oriented to person, place, and time  She has normal reflexes  No cranial nerve deficit or sensory deficit  Skin: Skin is warm and dry  No rash noted  No cyanosis  No pallor  Nails show no clubbing  Psychiatric: She has a normal mood and affect   Her behavior is normal  Cognition and memory are normal          Labs:  Lab Results   Component Value Date    WBC 3 87 (L) 09/24/2018    HGB 12 9 09/24/2018    HCT 40 0 09/24/2018     (H) 09/24/2018     09/24/2018     Lab Results   Component Value Date    K 3 9 09/24/2018    CL 98 (L) 09/24/2018    CO2 32 09/24/2018    BUN 23 09/24/2018    CREATININE 0 87 09/24/2018    GLUF 78 09/24/2018    CALCIUM 9 2 09/24/2018    AST 22 09/24/2018    ALT 31 09/24/2018    ALKPHOS 105 09/24/2018    EGFR 63 09/24/2018

## 2018-12-10 DIAGNOSIS — E78.5 HYPERLIPIDEMIA, UNSPECIFIED HYPERLIPIDEMIA TYPE: ICD-10-CM

## 2018-12-10 RX ORDER — ATORVASTATIN CALCIUM 40 MG/1
40 TABLET, FILM COATED ORAL
Qty: 90 TABLET | Refills: 0 | Status: SHIPPED | OUTPATIENT
Start: 2018-12-10 | End: 2019-03-25 | Stop reason: SDUPTHER

## 2019-01-01 NOTE — PROGRESS NOTES
04/24/18 1500   Pain Assessment   Pain Assessment 0-10   Pain Score 4   Pain Location Hip   Pain Orientation Left   Restrictions/Precautions   Precautions Fall Risk;Pain   Subjective   Subjective no new complaints from pt this afternoon   QI: Roll Left and Right   Reason if not Attempted Activity not applicable   Roll Left and Right CARE Score 9   QI: Sit to Lying   Reason if not Attempted Activity not applicable   Sit to Lying CARE Score 9   QI: Lying to Sitting on Side of Bed   Reason if not Attempted Activity not applicable   Lying to Sitting on Side of Bed CARE Score 9   QI: Sit to Stand   Assistance Needed Set-up / clean-up   Sit to Stand CARE Score 5   QI: Chair/Bed-to-Chair Transfer   Assistance Needed Set-up / clean-up; Adaptive equipment   Chair/Bed-to-Chair Transfer CARE Score 5   Transfer Bed/Chair/Wheelchair   Limitations Noted In Pain Management;LE Strength   Adaptive Equipment Roller Walker   All Transfer Supervision   Findings DS   Bed, Chair, Wheelchair Transfer (FIM) 5 - Patient requires supervision/monitoring   QI: Car Transfer   Assistance Needed Supervision   Comment increase time to get LLE in    Car Transfer CARE Score 4   QI: Ilya Zuñiga / Alan Russell; Adaptive equipment   Walk 10 Feet CARE Score 5   QI: Walk 50 Feet with Two Turns   Assistance Needed Supervision; Adaptive equipment   Walk 50 Feet with Two Turns CARE Score 4   QI: Walk 150 Feet   Assistance Needed Supervision; Adaptive equipment   Walk 150 Feet CARE Score 4   QI: Walking 10 Feet on Uneven Surfaces   Reason if not Attempted Activity not applicable   Walking 10 Feet on Uneven Surfaces CARE Score 9   Ambulation   Does the patient walk? 2  Yes   Primary Discharge Mode of Locomotion Walk   Walk Assist Level Distant Supervision;Supervision   Gait Pattern Antalgic; Inconsistant Geetha;Decreased foot clearance; Step through; Improper weight shift;Decreased L stance   Assist Device Mis Browning (feet) 150 ft   Limitations Noted In Endurance; Heel Strike;Speed;Strength   Walking (FIM) 5 - Patient requires supervision/monitoring AND distance 150 feet or more, no rest   QI: Wheel 50 Feet with Two Turns   Reason if not Attempted Activity not applicable   Wheel 50 Feet with Two Turns CARE Score 9   QI: Wheel 150 Feet   Reason if not Attempted Activity not applicable   Wheel 071 Feet CARE Score 9   Wheelchair mobility   QI: Does the patient use a wheelchair? 0  No   QI: 1 Step (Curb)   Reason if not Attempted Activity not applicable   1 Step (Curb) CARE Score 9   QI: 4 Steps   Assistance Needed Physical assistance; Adaptive equipment   Assistance Provided by [place] Less than 25%   Comment B HR   4 Steps CARE Score 3   QI: 12 Steps   Reason if not Attempted Activity not applicable   12 Steps CARE Score 9   Stairs   Type Stairs   # of Steps 8   Assist Devices Bilateral Rail   Findings descend backwards   Stairs (FIM) 2 - Patient goes up and down 4 - 11 stairs regardless of assist/device/setup   QI: Picking Up Object   Reason if not Attempted Activity not applicable   Picking Up Object CARE Score 9   QI: Toilet Transfer   Reason if not Attempted Activity not applicable   Toilet Transfer CARE Score 9   Equipment Use   NuStep [id number]   Assessment   Treatment Assessment pt able to improve gait distances this afternoon, but cont to need VCs to improve step through gait pattern and step length on R   pt performed 6" steps, but had more difficulty today due to increase pain  pt will benefit from cont therapy to improve strength, pain management and safety to progress with functional mobility and Ind  Problem List Decreased strength;Decreased endurance; Impaired balance;Decreased mobility;Pain   Barriers to Discharge Decreased caregiver support; Inaccessible home environment   PT Barriers   Physical Impairment Decreased strength;Decreased endurance; Impaired balance;Decreased mobility;Pain   Functional Limitation Walking;Stair negotiation;Ramp negotiation   Plan   Treatment/Interventions Functional transfer training;LE strengthening/ROM; Endurance training;Gait training   Progress Progressing toward goals   Recommendation   Recommendation Home PT   PT Therapy Minutes   PT Time In 1300   PT Time Out 1400   PT Total Time (minutes) 60   PT Mode of treatment - Individual (minutes) 30   PT Mode of treatment - Concurrent (minutes) 30   PT Mode of treatment - Group (minutes) 0   PT Mode of treatment - Co-treat (minutes) 0   PT Mode of Teatment - Total time(minutes) 60 minutes   Therapy Time missed   Time missed?  No Yes

## 2019-01-07 ENCOUNTER — OFFICE VISIT (OUTPATIENT)
Dept: FAMILY MEDICINE CLINIC | Facility: CLINIC | Age: 84
End: 2019-01-07
Payer: MEDICARE

## 2019-01-07 VITALS
BODY MASS INDEX: 23.79 KG/M2 | DIASTOLIC BLOOD PRESSURE: 78 MMHG | WEIGHT: 126 LBS | OXYGEN SATURATION: 97 % | HEART RATE: 68 BPM | SYSTOLIC BLOOD PRESSURE: 144 MMHG | TEMPERATURE: 97.3 F | HEIGHT: 61 IN | RESPIRATION RATE: 17 BRPM

## 2019-01-07 DIAGNOSIS — Z00.00 MEDICARE ANNUAL WELLNESS VISIT, SUBSEQUENT: Primary | ICD-10-CM

## 2019-01-07 DIAGNOSIS — Z23 NEED FOR PNEUMOCOCCAL VACCINATION: ICD-10-CM

## 2019-01-07 DIAGNOSIS — Z12.31 BREAST CANCER SCREENING BY MAMMOGRAM: ICD-10-CM

## 2019-01-07 DIAGNOSIS — I25.10 CORONARY ARTERY DISEASE INVOLVING NATIVE HEART WITHOUT ANGINA PECTORIS, UNSPECIFIED VESSEL OR LESION TYPE: ICD-10-CM

## 2019-01-07 DIAGNOSIS — I10 ESSENTIAL HYPERTENSION: ICD-10-CM

## 2019-01-07 DIAGNOSIS — M81.0 AGE RELATED OSTEOPOROSIS, UNSPECIFIED PATHOLOGICAL FRACTURE PRESENCE: ICD-10-CM

## 2019-01-07 PROCEDURE — G0439 PPPS, SUBSEQ VISIT: HCPCS | Performed by: FAMILY MEDICINE

## 2019-01-07 PROCEDURE — 99214 OFFICE O/P EST MOD 30 MIN: CPT | Performed by: FAMILY MEDICINE

## 2019-01-07 PROCEDURE — G0009 ADMIN PNEUMOCOCCAL VACCINE: HCPCS

## 2019-01-07 PROCEDURE — 90732 PPSV23 VACC 2 YRS+ SUBQ/IM: CPT

## 2019-01-07 NOTE — PROGRESS NOTES
Assessment and Plan:    Problem List Items Addressed This Visit        Cardiovascular and Mediastinum    Essential hypertension    Coronary artery disease without angina pectoris - S/P stent placement x 2 (2011)       Musculoskeletal and Integument    Osteoporosis (Chronic)    Relevant Orders    DXA bone density spine hip and pelvis       Other    Medicare annual wellness visit, subsequent - Primary      Other Visit Diagnoses     Need for pneumococcal vaccination        Relevant Orders    PNEUMOCOCCAL POLYSACCHARIDE VACCINE 23-VALENT =>3YO SQ IM (Completed)    Breast cancer screening by mammogram        Relevant Orders    Mammo screening bilateral w 3d & cad        Health Maintenance Due   Topic Date Due    Medicare Annual Wellness Visit (AWV)  1935    DTaP,Tdap,and Td Vaccines (1 - Tdap) 03/27/1958         HPI:  Litzy Carroll is a 82 y o  female here for her Subsequent Wellness Visit      Patient Active Problem List   Diagnosis    Essential hypertension    Mixed hyperlipidemia    Coronary artery disease without angina pectoris - S/P stent placement x 2 (2011)    Gastroesophageal reflux disease without esophagitis    Chronic diastolic (congestive) heart failure ([address] )    Hyperthyroidism    Osteoporosis    Intertrochanteric fracture of left femur, closed, with routine healing, subsequent encounter    Ambulatory dysfunction    Elderly person living alone    Low back pain without sciatica    S/P ORIF (open reduction internal fixation) fracture    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HCC)    Bladder cancer ([address] )    Age-related osteoporosis without current pathological fracture    Allergic rhinitis    Anemia due to vitamin B12 deficiency    Biliary dyskinesia    Cholelithiasis    Chronic right shoulder pain    Closed displaced fracture of left trapezium bone    Degenerative joint disease    Depression, controlled    Deviated nasal septum    Gastric reflux syndrome    Gastric ulcer  Heart valve disorder    Herpes zoster infection of thoracic region    IBS (irritable bowel syndrome)    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HCC)    Mild vitamin D deficiency    Mixed hearing loss, unilateral    Pancreatic cyst    Raynaud's disease without gangrene    S/P angioplasty with stent    Urge incontinence of urine    Conjunctivitis of both eyes    Thrombocytopenia ([facility] [address]    Medicare annual wellness visit, subsequent     Past Medical History:   Diagnosis Date    [facility]     Coronary artery disease     S/P stent placement x 2 in 2011    GERD (gastroesophageal reflux disease)     Hepatitis     Hyperlipidemia     Hypertension     Kidney stones     Malignant neoplasm of urethra ([facility]     Pneumonia     Shingles      Past Surgical History:   Procedure Laterality Date    CORONARY ANGIOPLASTY WITH STENT PLACEMENT      stent x 2    CYSTOSCOPY      diagnostic - onset 4/4/17    HYSTERECTOMY      DE OPEN RX FEMUR FX+INTRAMED ALEJANDRO Left 4/8/2018    Procedure: INSERTION NAIL IM FEMUR ANTEGRADE (TROCHANTERIC);   Surgeon: Vicki Guerra MD;  Location: [facility];  Service: Orthopedics     Family History   Problem Relation Age of Onset    Arthritis Mother     Osteoporosis Mother     Heart disease Father     Hypertension Father     Hypertension Brother      History   Smoking Status    Never Smoker   Smokeless Tobacco    Never Used     History   Alcohol Use No      History   Drug Use No       Current Outpatient Prescriptions   Medication Sig Dispense Refill    acetaminophen (TYLENOL) 500 mg tablet Take 500 mg by mouth every 6 (six) hours as needed for mild pain      amLODIPine (NORVASC) 5 mg tablet Take 1 tablet (5 mg total) by mouth daily Resume on 4/28 90 tablet 1    aspirin 81 MG tablet Take 81 mg by mouth daily Resume on 4/28       atenolol (TENORMIN) 25 mg tablet Take 1 tablet (25 mg total) by mouth daily Resume on 4/28 90 tablet 0    atorvastatin (LIPITOR) 40 mg tablet Take 1 tablet (40 mg total) by mouth daily after dinner Resume the evening of 4/27 90 tablet 0    bumetanide (BUMEX) 1 mg tablet Take 0 5 tablets (0 5 mg total) by mouth daily 45 tablet 0    Cholecalciferol 2000 units TABS Take 2,000 Units by mouth daily Resume on 4/28      dicyclomine (BENTYL) 10 mg capsule Take 10 mg by mouth as needed      DULoxetine (CYMBALTA) 30 mg delayed release capsule Take 1 capsule (30 mg total) by mouth daily Resume on 4/28 90 capsule 1    fexofenadine (ALLEGRA) 180 MG tablet Take 180 mg by mouth daily      METHYLCELLULOSE, LAXATIVE, PO Take by mouth      montelukast (SINGULAIR) 10 mg tablet Take 10 mg by mouth      MULTIPLE VITAMIN PO Take by mouth      pantoprazole (PROTONIX) 40 mg tablet Take 1 tablet (40 mg total) by mouth daily Resume on 4/28 90 tablet 1    calcium carbonate (OYSTER SHELL,OSCAL) 500 mg Take 1 tablet by mouth daily with breakfast (Patient not taking: Reported on 10/19/2018 ) 30 tablet 0    denosumab (PROLIA) 60 mg/mL Inject 60 mg under the skin      ezetimibe (ZETIA) 10 mg tablet Take 10 mg by mouth      gentamicin (GARAMYCIN) 0 3 % ophthalmic solution Administer 1 drop to both eyes every 4 (four) hours (Patient not taking: Reported on 12/6/2018 ) 5 mL 0    methimazole (TAPAZOLE) 5 mg tablet Take 1 tablet (5 mg total) by mouth daily (Patient not taking: Reported on 10/12/2018 ) 30 tablet 1    tobramycin-dexamethasone (TOBRADEX) ophthalmic suspension instill 1 drop into both eyes four times a day for 7 days  0    traMADol (ULTRAM) 50 mg tablet Take 25 mg by mouth       No current facility-administered medications for this visit  Allergies   Allergen Reactions    Lactose      Other reaction(s): Indigestion/GI Upset    Other      Other reaction(s): Mental Status Change  After surgery   Whole blood  = passed out   historical allergy; verify with patient     Immunization History   Administered Date(s) Administered    Influenza Split High Dose Preservative Free IM 10/04/2016, 10/05/2017    Influenza, high dose seasonal 0 5 mL 10/11/2018    Pneumococcal Conjugate 13-Valent 07/24/2017    Pneumococcal Polysaccharide PPV23 01/07/2019    Zoster 01/01/2016       Patient Care Team:  Aba Webster DO as PCP - General (Family Medicine)  MD Aba Donis DO Laroy Edouard, MD Esmeralda Hash, DO  [facility], DO    Medicare Screening Tests and Risk Assessments:      Health Risk Assessment:  Patient rates overall health as fair  Patient feels that their physical health rating is Slightly worse  Eyesight was rated as Same  Hearing was rated as Same  Patient feels that their emotional and mental health rating is Same  Pain experienced by patient in the last 7 days has been A lot  Patient's pain rating has been 6/10  Patient states that she has experienced no weight loss or gain in last 6 months  Emotional/Mental Health:  Patient has been feeling nervous/anxious  PHQ-9 Depression Screening:    Frequency of the following problems over the past two weeks:      1  Little interest or pleasure in doing things: 0 - not at all      2  Feeling down, depressed, or hopeless: 0 - not at all  PHQ-2 Score: 0          Broken Bones/Falls: Fall Risk Assessment:    In the past year, patient has experienced: History of falling in past year     Number of falls: 1  Patient feels unsteady standing  Patient is not taking medication that can cause feelings of lightheadedness or tiredness  Patient often has no need to rush to the toilet  Bladder/Bowel:  Patient has not leaked urine accidently in the last six months  Patient reports no loss of bowel control  Immunizations:  Patient has had a flu vaccination within the last year  Patient has received a pneumonia shot  Patient has received a shingles shot  Home Safety:  Patient does not have trouble with stairs inside or outside of their home     Patient currently reports that there are no safety hazards present in home, working smoke alarms, working carbon monoxide detectors  Preventative Screenings:   Breast cancer screening performed, no colon cancer screen completed, cholesterol screen completed, glaucoma eye exam completed, (Additional Comments: Breast cancer screen July 2018  Cholesterol screen December 2018  Glaucoma eye exam November 2018)    Nutrition:  Current diet: Low Cholesterol, Low Saturated Fat and No Added Salt with servings of the following:    Medications:  Patient is currently taking over-the-counter supplements  Patient is able to manage medications  Lifestyle Choices:  Patient reports no tobacco use  Patient has not smoked or used tobacco in the past   Patient reports no alcohol use  Patient drives a vehicle  Patient wears seat belt  Current level of exercise of physical activity described by patient as: Walking one to two miles a day   Activities of Daily Living:  Can get out of bed by his or her self, able to dress self, able to make own meals, able to do own shopping, able to bathe self, can do own laundry/housekeeping, can manage own money, pay bills and track expenses    Previous Hospitalizations:  Hospitalization or ED visit in past 12 months  Number of hospitalizations within the last year: 1-2        Advanced Directives:  Patient has decided on a power of   Patient has spoken to designated power of   Patient has completed advanced directive          Preventative Screening/Counseling:      Cardiovascular:      General: Screening Current          Diabetes:      General: Screening Current          Colorectal Cancer:      General: Screening Current          Breast Cancer:      General: Screening Current          Cervical Cancer:      General: Screening Not Indicated          Osteoporosis:      General: Screening Current          AAA:      General: Screening Not Indicated          Glaucoma:      General: Screening Current          HIV: General: Screening Not Indicated          Hepatitis C:      General: Screening Not Indicated

## 2019-01-07 NOTE — PROGRESS NOTES
Assessment/Plan:       Diagnoses and all orders for this visit:    Medicare annual wellness visit, subsequent    Age related osteoporosis, unspecified pathological fracture presence  -     DXA bone density spine hip and pelvis; Future    Need for pneumococcal vaccination  -     PNEUMOCOCCAL POLYSACCHARIDE VACCINE 23-VALENT =>3YO SQ IM    Breast cancer screening by mammogram  -     Mammo screening bilateral w 3d & cad; Future    Coronary artery disease involving native heart without angina pectoris, unspecified vessel or lesion type    Essential hypertension          Subjective:   Chief Complaint   Patient presents with   [facility] Wellness Visit     needs order for mammogram         Patient ID: Gardenia Nunn is a 82 y o  female  Here for routine f/u and medicare wellness visit  States spoke to her pharmacist about shingles vaccine, still hadn't been 6 months from her last outbreak, and states had another outbreak "little bit of it" about 3 months ago, so has to wait longer        The following portions of the patient's history were reviewed and updated as appropriate: allergies, current medications, past family history, past medical history, past social history, past surgical history and problem list     Review of Systems   Constitutional: Negative  HENT: Negative for mouth sores, nosebleeds and trouble swallowing  Eyes: Negative  Respiratory: Negative  Cardiovascular: Negative  Gastrointestinal: Negative  Endocrine: Negative  Genitourinary: Negative for decreased urine volume, difficulty urinating, dysuria and hematuria  Skin: Negative  Neurological: Negative  Hematological: Negative            Objective:      /78 (BP Location: Left arm, Patient Position: Sitting, Cuff Size: Adult)   Pulse 68   Temp (!) 97 3 °F (36 3 °C) (Tympanic)   Resp 17   Ht 5' 1" (1 549 m)   Wt 57 2 kg (126 lb)   SpO2 97%   BMI 23 81 kg/m²          Physical Exam   Constitutional: She is oriented to person, place, and time  She appears well-developed  She is cooperative  Non-toxic appearance  She does not have a sickly appearance  She does not appear ill  No distress  Appears much younger than stated age   HENT:   Head: Normocephalic and atraumatic  Mouth/Throat: Uvula is midline, oropharynx is clear and moist and mucous membranes are normal    Eyes: Pupils are equal, round, and reactive to light  Conjunctivae and lids are normal    Neck: Trachea normal  Neck supple  No JVD present  No thyroid mass and no thyromegaly present  Cardiovascular: Normal rate, regular rhythm and normal heart sounds  Pulmonary/Chest: Effort normal and breath sounds normal    Abdominal: Soft  Bowel sounds are normal  She exhibits no distension and no mass  There is no hepatosplenomegaly  There is no tenderness  Lymphadenopathy:     She has no cervical adenopathy  Right: No supraclavicular adenopathy present  Left: No supraclavicular adenopathy present  Neurological: She is alert and oriented to person, place, and time  She has normal strength and normal reflexes  She displays no tremor  No cranial nerve deficit or sensory deficit  Gait normal    Skin: Skin is warm and dry  She is not diaphoretic  No cyanosis  No pallor  Psychiatric: She has a normal mood and affect  Her behavior is normal    Nursing note and vitals reviewed

## 2019-01-17 DIAGNOSIS — K21.9 GASTROESOPHAGEAL REFLUX DISEASE, ESOPHAGITIS PRESENCE NOT SPECIFIED: ICD-10-CM

## 2019-01-17 RX ORDER — PANTOPRAZOLE SODIUM 40 MG/1
40 TABLET, DELAYED RELEASE ORAL DAILY
Qty: 90 TABLET | Refills: 0 | Status: SHIPPED | OUTPATIENT
Start: 2019-01-17 | End: 2019-04-09 | Stop reason: SDUPTHER

## 2019-01-24 ENCOUNTER — OFFICE VISIT (OUTPATIENT)
Dept: CARDIOLOGY CLINIC | Facility: CLINIC | Age: 84
End: 2019-01-24
Payer: MEDICARE

## 2019-01-24 VITALS
WEIGHT: 126.7 LBS | DIASTOLIC BLOOD PRESSURE: 72 MMHG | SYSTOLIC BLOOD PRESSURE: 140 MMHG | HEIGHT: 61 IN | BODY MASS INDEX: 23.92 KG/M2 | HEART RATE: 68 BPM

## 2019-01-24 DIAGNOSIS — Z95.820 S/P ANGIOPLASTY WITH STENT: ICD-10-CM

## 2019-01-24 DIAGNOSIS — I25.10 CORONARY ARTERY DISEASE INVOLVING NATIVE CORONARY ARTERY OF NATIVE HEART WITHOUT ANGINA PECTORIS: Primary | ICD-10-CM

## 2019-01-24 DIAGNOSIS — I50.32 CHRONIC DIASTOLIC (CONGESTIVE) HEART FAILURE (HCC): Chronic | ICD-10-CM

## 2019-01-24 DIAGNOSIS — E78.2 MIXED HYPERLIPIDEMIA: ICD-10-CM

## 2019-01-24 DIAGNOSIS — R60.0 LOWER EXTREMITY EDEMA: ICD-10-CM

## 2019-01-24 DIAGNOSIS — I10 ESSENTIAL HYPERTENSION: ICD-10-CM

## 2019-01-24 PROCEDURE — 99214 OFFICE O/P EST MOD 30 MIN: CPT | Performed by: INTERNAL MEDICINE

## 2019-01-24 RX ORDER — BUMETANIDE 1 MG/1
1 TABLET ORAL DAILY
Qty: 90 TABLET | Refills: 3 | Status: SHIPPED | OUTPATIENT
Start: 2019-01-24 | End: 2020-01-10

## 2019-01-24 NOTE — PROGRESS NOTES
Cardiology Follow Up    Amado Ruth  1935  [id number]  HEART & VASCULAR [facility]  [address] [place] [address] [address]    1  Coronary artery disease involving native coronary artery of native heart without angina pectoris  Echo complete with contrast if indicated   2  Chronic diastolic (congestive) heart failure (HCC)  Echo complete with contrast if indicated   3  Essential hypertension  Echo complete with contrast if indicated   4  S/P angioplasty with stent     5  Mixed hyperlipidemia     6  Lower extremity edema  bumetanide (BUMEX) 1 mg tablet       Discussion/Summary:  Overall she has been doing well from a cardiac standpoint  Coronary disease stable without angina  Recent echocardiogram was reviewed from 2017, she has had increased lower extremity edema since then I have ordered a follow-up echo to evaluate RV as well as filling pressures  Blood pressure is well controlled  Lipids have been doing well  I have ordered no further testing other than the echocardiogram I will see her back in about 8-9 months  Interval History:   81-year-old female history of coronary artery disease status post stents in 5021, chronic diastolic congestive heart failure, hypertension, hyperlipidemia presents for routine scheduled follow-up visit  Unfortunately she fell coming out of the shower in April  She suffered a femur fracture and underwent surgical correction  Since her last visit she has been doing well  Her activity level has increased as she has recovered from the fracture  He denies any exertional chest pain or discomfort  Breathing has been stable  There has been no palpitations, lightheadedness, dizziness, or syncope      Problem List     Intertrochanteric fracture of left femur, closed, with routine healing, subsequent encounter    Essential hypertension    Medication side effect, initial encounter    Hyperlipidemia Coronary artery disease without angina pectoris - S/P stent placement x 2 (2011)    Gastroesophageal reflux disease without esophagitis    Chronic diastolic (congestive) heart failure (HCC) (Chronic)    Hyperthyroidism    Osteoporosis (Chronic)    Ambulatory dysfunction    Elderly person living alone    Pain in left leg    Low back pain without sciatica    S/P ORIF (open reduction internal fixation) fracture    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HCC)        Past Medical History:   Diagnosis Date    Bladder cancer ([place] [address] )     Coronary artery disease     S/P stent placement x 2 in 2011    GERD (gastroesophageal reflux disease)     Hepatitis     Hyperlipidemia     Hypertension     Kidney stones     Malignant neoplasm of urethra ([place] [address] )     Pneumonia     Shingles      Social History     Social History    Marital status:      Spouse name: N/A    Number of children: N/A    Years of education: N/A     Occupational History    Not on file  Social History Main Topics    Smoking status: Never Smoker    Smokeless tobacco: Never Used    Alcohol use No    Drug use: No    Sexual activity: No     Other Topics Concern    Not on file     Social History Narrative    Advance directives declined by parents    Always uses seat belt          Family History   Problem Relation Age of Onset    Arthritis Mother     Osteoporosis Mother     Heart disease Father     Hypertension Father     Hypertension Brother      Past Surgical History:   Procedure Laterality Date    CORONARY ANGIOPLASTY WITH STENT PLACEMENT      stent x 2    CYSTOSCOPY      diagnostic - onset 4/4/17    HYSTERECTOMY      TX OPEN RX FEMUR FX+INTRAMED ALEJANDRO Left 4/8/2018    Procedure: INSERTION NAIL IM FEMUR ANTEGRADE (TROCHANTERIC);   Surgeon: Gwen Jauregui MD;  Location: [facility];  Service: Orthopedics       Current Outpatient Prescriptions:     acetaminophen (TYLENOL) 500 mg tablet, Take 500 mg by mouth every 6 (six) hours as needed for mild pain, Disp: , Rfl:     amLODIPine (NORVASC) 5 mg tablet, Take 1 tablet (5 mg total) by mouth daily Resume on 4/28, Disp: 90 tablet, Rfl: 1    aspirin 81 MG tablet, Take 81 mg by mouth daily Resume on 4/28 , Disp: , Rfl:     atenolol (TENORMIN) 25 mg tablet, Take 1 tablet (25 mg total) by mouth daily Resume on 4/28, Disp: 90 tablet, Rfl: 0    atorvastatin (LIPITOR) 40 mg tablet, Take 1 tablet (40 mg total) by mouth daily after dinner Resume the evening of 4/27, Disp: 90 tablet, Rfl: 0    bumetanide (BUMEX) 1 mg tablet, Take 1 tablet (1 mg total) by mouth daily, Disp: 90 tablet, Rfl: 3    Cholecalciferol 2000 units TABS, Take 2,000 Units by mouth daily Resume on 4/28, Disp: , Rfl:     dicyclomine (BENTYL) 10 mg capsule, Take 10 mg by mouth as needed, Disp: , Rfl:     DULoxetine (CYMBALTA) 30 mg delayed release capsule, Take 1 capsule (30 mg total) by mouth daily Resume on 4/28, Disp: 90 capsule, Rfl: 1    fexofenadine (ALLEGRA) 180 MG tablet, Take 180 mg by mouth daily, Disp: , Rfl:     METHYLCELLULOSE, LAXATIVE, PO, Take by mouth, Disp: , Rfl:     montelukast (SINGULAIR) 10 mg tablet, Take 10 mg by mouth as needed  , Disp: , Rfl:     MULTIPLE VITAMIN PO, Take by mouth, Disp: , Rfl:     pantoprazole (PROTONIX) 40 mg tablet, Take 1 tablet (40 mg total) by mouth daily Resume on 4/28, Disp: 90 tablet, Rfl: 0    denosumab (PROLIA) 60 mg/mL, Inject 60 mg under the skin, Disp: , Rfl:     traMADol (ULTRAM) 50 mg tablet, Take 25 mg by mouth, Disp: , Rfl:   Allergies   Allergen Reactions    Lactose      Other reaction(s): Indigestion/GI Upset    Other      Other reaction(s): Mental Status Change  After surgery   Whole blood  = passed out   historical allergy; verify with patient       Labs:     Chemistry        Component Value Date/Time    K 3 9 09/24/2018 0935    CL 98 (L) 09/24/2018 0935    CO2 32 09/24/2018 0935    BUN 23 09/24/2018 0935    CREATININE 0 87 09/24/2018 0935        Component Value Date/Time    CALCIUM 9 2 09/24/2018 0935    ALKPHOS 105 09/24/2018 0935    AST 22 09/24/2018 0935    ALT 31 09/24/2018 0935            No results found for: CHOL  Lab Results   Component Value Date    HDL 64 (H) 07/18/2018    HDL 79 (H) 06/12/2017    HDL 59 10/19/2016     Lab Results   Component Value Date    LDLCALC 75 07/18/2018    LDLCALC 70 06/12/2017    LDLCALC 78 10/19/2016     Lab Results   Component Value Date    TRIG 146 07/18/2018    TRIG 86 06/12/2017    TRIG 108 10/19/2016     No results found for: CHOLHDL    Imaging: No results found  ECG:        Review of Systems   Constitution: Negative  HENT: Negative  Eyes: Negative  Cardiovascular: Negative  Respiratory: Negative  Endocrine: Negative  Hematologic/Lymphatic: Negative  Skin: Negative  Musculoskeletal: Negative  Gastrointestinal: Negative  Genitourinary: Negative  Neurological: Negative  Psychiatric/Behavioral: Negative  Vitals:    01/24/19 1326   BP: 140/72   Pulse: 68     Vitals:    01/24/19 1326   Weight: 57 5 kg (126 lb 11 2 oz)     Height: 5' 1" (154 9 cm)   Body mass index is 23 94 kg/m²      Physical Exam:   General appearance:  Appears stated age, alert, well appearing and in no distress  HEENT:  PERRLA, EOMI, no scleral icterus, no conjunctival pallor  NECK:  Supple, No elevated JVP, no thyromegaly, no carotid bruits  HEART:  Regular rate and rhythm, normal S1/S2, no S3/S4, no murmur or rub  LUNGS:  Clear to auscultation bilaterally, no wheezes rales or rhonchi  ABDOMEN:  Soft, non-tender, positive bowel sounds, no rebound or guarding, no organomegaly   EXTREMITIES:  No edema, normal range of motion  VASCULAR:  Normal pedal pulses, good pulse volume   SKIN: No lesions or rashes on exposed skin  NEURO:  CN II-XII intact, no focal deficits

## 2019-02-01 ENCOUNTER — HOSPITAL ENCOUNTER (OUTPATIENT)
Dept: RADIOLOGY | Facility: MEDICAL CENTER | Age: 84
Discharge: HOME | End: 2019-02-01
Payer: MEDICARE

## 2019-02-01 VITALS — BODY MASS INDEX: 23.79 KG/M2 | HEIGHT: 61 IN | WEIGHT: 126 LBS

## 2019-02-01 DIAGNOSIS — Z12.31 BREAST CANCER SCREENING BY MAMMOGRAM: ICD-10-CM

## 2019-02-01 PROCEDURE — 77063 BREAST TOMOSYNTHESIS BI: CPT

## 2019-02-01 PROCEDURE — 77067 SCR MAMMO BI INCL CAD: CPT

## 2019-02-13 DIAGNOSIS — I10 ESSENTIAL HYPERTENSION: ICD-10-CM

## 2019-02-14 RX ORDER — ATENOLOL 25 MG/1
25 TABLET ORAL DAILY
Qty: 90 TABLET | Refills: 0 | Status: SHIPPED | OUTPATIENT
Start: 2019-02-14 | End: 2019-05-14 | Stop reason: SDUPTHER

## 2019-02-22 ENCOUNTER — TELEPHONE (OUTPATIENT)
Dept: HEMATOLOGY ONCOLOGY | Facility: CLINIC | Age: 84
End: 2019-02-22

## 2019-02-22 NOTE — TELEPHONE ENCOUNTER
Spoke to patient and informed her that her appt  scheduled on 03/15/19 needs to be R/S  Her new appt  is 03/22/19 at 11:00 am with Dr Estuardo Naranjo at the [facility]  Patient is aware of appt  date, time, and provider

## 2019-02-28 ENCOUNTER — HOSPITAL ENCOUNTER (OUTPATIENT)
Dept: NON INVASIVE DIAGNOSTICS | Facility: MEDICAL CENTER | Age: 84
Discharge: HOME | End: 2019-02-28
Payer: MEDICARE

## 2019-02-28 DIAGNOSIS — I25.10 CORONARY ARTERY DISEASE INVOLVING NATIVE CORONARY ARTERY OF NATIVE HEART WITHOUT ANGINA PECTORIS: ICD-10-CM

## 2019-02-28 DIAGNOSIS — I10 ESSENTIAL HYPERTENSION: ICD-10-CM

## 2019-02-28 DIAGNOSIS — I50.32 CHRONIC DIASTOLIC (CONGESTIVE) HEART FAILURE (HCC): Chronic | ICD-10-CM

## 2019-02-28 PROCEDURE — 93306 TTE W/DOPPLER COMPLETE: CPT

## 2019-02-28 PROCEDURE — 93306 TTE W/DOPPLER COMPLETE: CPT | Performed by: INTERNAL MEDICINE

## 2019-03-18 ENCOUNTER — APPOINTMENT (OUTPATIENT)
Dept: LAB | Facility: CLINIC | Age: 84
End: 2019-03-18
Payer: MEDICARE

## 2019-03-18 ENCOUNTER — OFFICE VISIT (OUTPATIENT)
Dept: FAMILY MEDICINE CLINIC | Facility: CLINIC | Age: 84
End: 2019-03-18
Payer: MEDICARE

## 2019-03-18 VITALS
HEIGHT: 61 IN | RESPIRATION RATE: 16 BRPM | DIASTOLIC BLOOD PRESSURE: 78 MMHG | HEART RATE: 63 BPM | OXYGEN SATURATION: 97 % | BODY MASS INDEX: 23.98 KG/M2 | TEMPERATURE: 98.3 F | WEIGHT: 127 LBS | SYSTOLIC BLOOD PRESSURE: 150 MMHG

## 2019-03-18 DIAGNOSIS — J01.90 ACUTE NON-RECURRENT SINUSITIS, UNSPECIFIED LOCATION: Primary | ICD-10-CM

## 2019-03-18 DIAGNOSIS — C91.10 CHRONIC LARGE GRANULAR LYMPHOCYTIC LEUKEMIA (HCC): ICD-10-CM

## 2019-03-18 DIAGNOSIS — R05.9 COUGH: ICD-10-CM

## 2019-03-18 LAB
ALBUMIN SERPL BCP-MCNC: 3.9 G/DL (ref 3.5–5)
ALP SERPL-CCNC: 84 U/L (ref 46–116)
ALT SERPL W P-5'-P-CCNC: 30 U/L (ref 12–78)
ANION GAP SERPL CALCULATED.3IONS-SCNC: 4 MMOL/L (ref 4–13)
AST SERPL W P-5'-P-CCNC: 25 U/L (ref 5–45)
BASOPHILS # BLD AUTO: 0.02 THOUSANDS/ΜL (ref 0–0.1)
BASOPHILS NFR BLD AUTO: 1 % (ref 0–1)
BILIRUB SERPL-MCNC: 0.45 MG/DL (ref 0.2–1)
BUN SERPL-MCNC: 13 MG/DL (ref 5–25)
CALCIUM SERPL-MCNC: 9.1 MG/DL (ref 8.3–10.1)
CHLORIDE SERPL-SCNC: 97 MMOL/L (ref 100–108)
CO2 SERPL-SCNC: 31 MMOL/L (ref 21–32)
CREAT SERPL-MCNC: 0.67 MG/DL (ref 0.6–1.3)
EOSINOPHIL # BLD AUTO: 0.08 THOUSAND/ΜL (ref 0–0.61)
EOSINOPHIL NFR BLD AUTO: 2 % (ref 0–6)
ERYTHROCYTE [DISTWIDTH] IN BLOOD BY AUTOMATED COUNT: 11.8 % (ref 11.6–15.1)
GFR SERPL CREATININE-BSD FRML MDRD: 83 ML/MIN/1.73SQ M
GLUCOSE SERPL-MCNC: 90 MG/DL (ref 65–140)
HCT VFR BLD AUTO: 39.2 % (ref 34.8–46.1)
HGB BLD-MCNC: 13 G/DL (ref 11.5–15.4)
IMM GRANULOCYTES # BLD AUTO: 0.01 THOUSAND/UL (ref 0–0.2)
IMM GRANULOCYTES NFR BLD AUTO: 0 % (ref 0–2)
LDH SERPL-CCNC: 177 U/L (ref 81–234)
LYMPHOCYTES # BLD AUTO: 1.57 THOUSANDS/ΜL (ref 0.6–4.47)
LYMPHOCYTES NFR BLD AUTO: 45 % (ref 14–44)
MCH RBC QN AUTO: 32 PG (ref 26.8–34.3)
MCHC RBC AUTO-ENTMCNC: 33.2 G/DL (ref 31.4–37.4)
MCV RBC AUTO: 97 FL (ref 82–98)
MONOCYTES # BLD AUTO: 0.56 THOUSAND/ΜL (ref 0.17–1.22)
MONOCYTES NFR BLD AUTO: 16 % (ref 4–12)
NEUTROPHILS # BLD AUTO: 1.29 THOUSANDS/ΜL (ref 1.85–7.62)
NEUTS SEG NFR BLD AUTO: 36 % (ref 43–75)
NRBC BLD AUTO-RTO: 0 /100 WBCS
PMV BLD AUTO: 11.3 FL (ref 8.9–12.7)
POTASSIUM SERPL-SCNC: 3.8 MMOL/L (ref 3.5–5.3)
PROT SERPL-MCNC: 7.5 G/DL (ref 6.4–8.2)
RBC # BLD AUTO: 4.06 MILLION/UL (ref 3.81–5.12)
SODIUM SERPL-SCNC: 132 MMOL/L (ref 136–145)
WBC # BLD AUTO: 3.53 THOUSAND/UL (ref 4.31–10.16)

## 2019-03-18 PROCEDURE — 85025 COMPLETE CBC W/AUTO DIFF WBC: CPT

## 2019-03-18 PROCEDURE — 80053 COMPREHEN METABOLIC PANEL: CPT

## 2019-03-18 PROCEDURE — 83615 LACTATE (LD) (LDH) ENZYME: CPT

## 2019-03-18 PROCEDURE — 36415 COLL VENOUS BLD VENIPUNCTURE: CPT

## 2019-03-18 PROCEDURE — 99213 OFFICE O/P EST LOW 20 MIN: CPT | Performed by: FAMILY MEDICINE

## 2019-03-18 RX ORDER — AMOXICILLIN AND CLAVULANATE POTASSIUM 875; 125 MG/1; MG/1
1 TABLET, FILM COATED ORAL EVERY 12 HOURS SCHEDULED
Qty: 14 TABLET | Refills: 0 | Status: SHIPPED | OUTPATIENT
Start: 2019-03-18 | End: 2019-03-25

## 2019-03-18 NOTE — PROGRESS NOTES
Assessment/Plan:         Diagnoses and all orders for this visit:    Acute non-recurrent sinusitis, unspecified location  -     amoxicillin-clavulanate (AUGMENTIN) 875-125 mg per tablet; Take 1 tablet by mouth every 12 (twelve) hours for 7 days    Cough          Subjective:   Chief Complaint   Patient presents with    Nasal Congestion     Started a little over a week ago  Has taking Dayquil and Nyquil    Cough    Headache        Patient ID: Litzy Carroll is a 82 y o  female  Same day sick appt  No known ill contacts or recent travel  Low grade fever at home 99  Cough loose, but mainly nonproductive        The following portions of the patient's history were reviewed and updated as appropriate: allergies, current medications, past family history, past medical history, past social history, past surgical history and problem list     Review of Systems   Constitutional: Negative for activity change, appetite change, chills, diaphoresis and unexpected weight change  Per hpi   HENT: Positive for congestion and sinus pressure  Negative for ear pain, mouth sores, nosebleeds, sore throat, trouble swallowing and voice change  Eyes: Negative  Respiratory: Positive for cough (per hpi)  Negative for apnea, choking, chest tightness, shortness of breath, wheezing and stridor  Cardiovascular: Negative  Skin: Negative  Neurological: Negative for dizziness and light-headedness  Objective:      /78 (BP Location: Left arm, Patient Position: Sitting, Cuff Size: Adult)   Pulse 63   Temp 98 3 °F (36 8 °C) (Tympanic)   Resp 16   Ht 5' 1" (1 549 m)   Wt 57 6 kg (127 lb)   SpO2 97%   BMI 24 00 kg/m²          Physical Exam   HENT:   Head: Normocephalic and atraumatic  Right Ear: Tympanic membrane and ear canal normal    Left Ear: Tympanic membrane normal    Nose: Mucosal edema and rhinorrhea present  Right sinus exhibits maxillary sinus tenderness and frontal sinus tenderness   Left sinus exhibits no maxillary sinus tenderness and no frontal sinus tenderness  Mouth/Throat: Uvula is midline, oropharynx is clear and moist and mucous membranes are normal  Tonsils are 0 on the right  Tonsils are 0 on the left  Left TM dull   Eyes: Conjunctivae and lids are normal    Neck: Trachea normal  Neck supple  Cardiovascular: Normal rate, regular rhythm and normal heart sounds  Pulmonary/Chest: Effort normal and breath sounds normal    Lymphadenopathy:     She has cervical adenopathy  Right cervical: No superficial cervical adenopathy present  Left cervical: Superficial cervical adenopathy present  Right: No supraclavicular adenopathy present  Left: No supraclavicular adenopathy present  Skin: Skin is warm and dry  Capillary refill takes less than 2 seconds  No rash noted  She is not diaphoretic  No pallor  Nursing note and vitals reviewed

## 2019-03-25 DIAGNOSIS — E78.5 HYPERLIPIDEMIA, UNSPECIFIED HYPERLIPIDEMIA TYPE: ICD-10-CM

## 2019-03-25 RX ORDER — ATORVASTATIN CALCIUM 40 MG/1
40 TABLET, FILM COATED ORAL
Qty: 90 TABLET | Refills: 1 | Status: SHIPPED | OUTPATIENT
Start: 2019-03-25 | End: 2019-09-20 | Stop reason: SDUPTHER

## 2019-04-04 ENCOUNTER — OFFICE VISIT (OUTPATIENT)
Dept: HEMATOLOGY ONCOLOGY | Facility: CLINIC | Age: 84
End: 2019-04-04
Payer: MEDICARE

## 2019-04-04 VITALS
BODY MASS INDEX: 24.55 KG/M2 | DIASTOLIC BLOOD PRESSURE: 80 MMHG | OXYGEN SATURATION: 97 % | HEIGHT: 61 IN | HEART RATE: 68 BPM | RESPIRATION RATE: 18 BRPM | WEIGHT: 130 LBS | SYSTOLIC BLOOD PRESSURE: 126 MMHG | TEMPERATURE: 96.8 F

## 2019-04-04 DIAGNOSIS — R10.30 LOWER ABDOMINAL PAIN: ICD-10-CM

## 2019-04-04 DIAGNOSIS — C91.10 CHRONIC LARGE GRANULAR LYMPHOCYTIC LEUKEMIA (HCC): Primary | ICD-10-CM

## 2019-04-04 PROCEDURE — 99214 OFFICE O/P EST MOD 30 MIN: CPT | Performed by: PHYSICIAN ASSISTANT

## 2019-04-05 ENCOUNTER — APPOINTMENT (OUTPATIENT)
Dept: LAB | Facility: CLINIC | Age: 84
End: 2019-04-05
Payer: MEDICARE

## 2019-04-05 DIAGNOSIS — C91.10 CHRONIC LARGE GRANULAR LYMPHOCYTIC LEUKEMIA (HCC): ICD-10-CM

## 2019-04-05 LAB
BACTERIA UR QL AUTO: ABNORMAL /HPF
BASOPHILS # BLD AUTO: 0.04 THOUSANDS/ΜL (ref 0–0.1)
BASOPHILS NFR BLD AUTO: 1 % (ref 0–1)
BILIRUB UR QL STRIP: NEGATIVE
CLARITY UR: ABNORMAL
COLOR UR: YELLOW
EOSINOPHIL # BLD AUTO: 0.24 THOUSAND/ΜL (ref 0–0.61)
EOSINOPHIL NFR BLD AUTO: 4 % (ref 0–6)
ERYTHROCYTE [DISTWIDTH] IN BLOOD BY AUTOMATED COUNT: 12.1 % (ref 11.6–15.1)
GLUCOSE UR STRIP-MCNC: NEGATIVE MG/DL
HCT VFR BLD AUTO: 39.7 % (ref 34.8–46.1)
HGB BLD-MCNC: 12.7 G/DL (ref 11.5–15.4)
HGB UR QL STRIP.AUTO: ABNORMAL
HYALINE CASTS #/AREA URNS LPF: ABNORMAL /LPF
IGA SERPL-MCNC: 82 MG/DL (ref 70–400)
IGG SERPL-MCNC: 1360 MG/DL (ref 700–1600)
IGM SERPL-MCNC: 93 MG/DL (ref 40–230)
IMM GRANULOCYTES # BLD AUTO: 0.02 THOUSAND/UL (ref 0–0.2)
IMM GRANULOCYTES NFR BLD AUTO: 0 % (ref 0–2)
KETONES UR STRIP-MCNC: NEGATIVE MG/DL
LEUKOCYTE ESTERASE UR QL STRIP: ABNORMAL
LYMPHOCYTES # BLD AUTO: 2.3 THOUSANDS/ΜL (ref 0.6–4.47)
LYMPHOCYTES NFR BLD AUTO: 34 % (ref 14–44)
MCH RBC QN AUTO: 31.9 PG (ref 26.8–34.3)
MCHC RBC AUTO-ENTMCNC: 32 G/DL (ref 31.4–37.4)
MCV RBC AUTO: 100 FL (ref 82–98)
MONOCYTES # BLD AUTO: 0.67 THOUSAND/ΜL (ref 0.17–1.22)
MONOCYTES NFR BLD AUTO: 10 % (ref 4–12)
NEUTROPHILS # BLD AUTO: 3.53 THOUSANDS/ΜL (ref 1.85–7.62)
NEUTS SEG NFR BLD AUTO: 51 % (ref 43–75)
NITRITE UR QL STRIP: NEGATIVE
NON-SQ EPI CELLS URNS QL MICRO: ABNORMAL /HPF
NRBC BLD AUTO-RTO: 0 /100 WBCS
PH UR STRIP.AUTO: 8 [PH]
PLATELET # BLD AUTO: 171 THOUSANDS/UL (ref 149–390)
PMV BLD AUTO: 11.1 FL (ref 8.9–12.7)
PROT UR STRIP-MCNC: ABNORMAL MG/DL
RBC # BLD AUTO: 3.98 MILLION/UL (ref 3.81–5.12)
RBC #/AREA URNS AUTO: ABNORMAL /HPF
SP GR UR STRIP.AUTO: 1.01 (ref 1–1.03)
UROBILINOGEN UR QL STRIP.AUTO: 0.2 E.U./DL
WBC # BLD AUTO: 6.8 THOUSAND/UL (ref 4.31–10.16)
WBC #/AREA URNS AUTO: ABNORMAL /HPF

## 2019-04-05 PROCEDURE — 87086 URINE CULTURE/COLONY COUNT: CPT | Performed by: PHYSICIAN ASSISTANT

## 2019-04-05 PROCEDURE — 81001 URINALYSIS AUTO W/SCOPE: CPT | Performed by: PHYSICIAN ASSISTANT

## 2019-04-05 PROCEDURE — 85025 COMPLETE CBC W/AUTO DIFF WBC: CPT | Performed by: PHYSICIAN ASSISTANT

## 2019-04-05 PROCEDURE — 87077 CULTURE AEROBIC IDENTIFY: CPT | Performed by: PHYSICIAN ASSISTANT

## 2019-04-05 PROCEDURE — 82784 ASSAY IGA/IGD/IGG/IGM EACH: CPT

## 2019-04-05 PROCEDURE — 36415 COLL VENOUS BLD VENIPUNCTURE: CPT | Performed by: PHYSICIAN ASSISTANT

## 2019-04-05 PROCEDURE — 87186 SC STD MICRODIL/AGAR DIL: CPT | Performed by: PHYSICIAN ASSISTANT

## 2019-04-07 ENCOUNTER — TELEPHONE (OUTPATIENT)
Dept: HEMATOLOGY ONCOLOGY | Facility: CLINIC | Age: 84
End: 2019-04-07

## 2019-04-07 DIAGNOSIS — N30.00 ACUTE CYSTITIS WITHOUT HEMATURIA: Primary | ICD-10-CM

## 2019-04-07 RX ORDER — LEVOFLOXACIN 500 MG/1
500 TABLET, FILM COATED ORAL EVERY 24 HOURS
Qty: 7 TABLET | Refills: 0 | Status: SHIPPED | OUTPATIENT
Start: 2019-04-07 | End: 2019-04-14

## 2019-04-08 LAB — BACTERIA UR CULT: ABNORMAL

## 2019-04-09 ENCOUNTER — OFFICE VISIT (OUTPATIENT)
Dept: FAMILY MEDICINE CLINIC | Facility: CLINIC | Age: 84
End: 2019-04-09
Payer: MEDICARE

## 2019-04-09 VITALS
SYSTOLIC BLOOD PRESSURE: 140 MMHG | WEIGHT: 127 LBS | OXYGEN SATURATION: 99 % | TEMPERATURE: 97.6 F | HEART RATE: 56 BPM | BODY MASS INDEX: 24 KG/M2 | DIASTOLIC BLOOD PRESSURE: 80 MMHG | RESPIRATION RATE: 16 BRPM

## 2019-04-09 DIAGNOSIS — M06.4 INFLAMMATORY POLYARTHROPATHIES (HCC): ICD-10-CM

## 2019-04-09 DIAGNOSIS — I50.32 CHRONIC DIASTOLIC (CONGESTIVE) HEART FAILURE (HCC): Chronic | ICD-10-CM

## 2019-04-09 DIAGNOSIS — I73.00 RAYNAUD'S DISEASE WITHOUT GANGRENE: Primary | ICD-10-CM

## 2019-04-09 DIAGNOSIS — I10 ESSENTIAL HYPERTENSION: ICD-10-CM

## 2019-04-09 DIAGNOSIS — K21.9 GASTROESOPHAGEAL REFLUX DISEASE WITHOUT ESOPHAGITIS: ICD-10-CM

## 2019-04-09 DIAGNOSIS — C91.10 CHRONIC LARGE GRANULAR LYMPHOCYTIC LEUKEMIA (HCC): ICD-10-CM

## 2019-04-09 PROBLEM — J01.90 ACUTE NON-RECURRENT SINUSITIS: Status: RESOLVED | Noted: 2019-03-18 | Resolved: 2019-04-09

## 2019-04-09 PROCEDURE — 99214 OFFICE O/P EST MOD 30 MIN: CPT | Performed by: FAMILY MEDICINE

## 2019-04-09 RX ORDER — PANTOPRAZOLE SODIUM 40 MG/1
40 TABLET, DELAYED RELEASE ORAL DAILY
Qty: 90 TABLET | Refills: 0 | Status: SHIPPED | OUTPATIENT
Start: 2019-04-09 | End: 2019-07-15 | Stop reason: SDUPTHER

## 2019-04-09 RX ORDER — AMLODIPINE BESYLATE 5 MG/1
5 TABLET ORAL DAILY
Qty: 90 TABLET | Refills: 1 | Status: SHIPPED | OUTPATIENT
Start: 2019-04-09 | End: 2019-09-20 | Stop reason: SDUPTHER

## 2019-05-14 DIAGNOSIS — I10 ESSENTIAL HYPERTENSION: ICD-10-CM

## 2019-05-15 RX ORDER — ATENOLOL 25 MG/1
25 TABLET ORAL DAILY
Qty: 90 TABLET | Refills: 0 | Status: SHIPPED | OUTPATIENT
Start: 2019-05-15 | End: 2019-08-12 | Stop reason: SDUPTHER

## 2019-05-28 ENCOUNTER — HOSPITAL ENCOUNTER (OUTPATIENT)
Dept: RADIOLOGY | Facility: MEDICAL CENTER | Age: 84
Discharge: HOME | End: 2019-05-28
Payer: MEDICARE

## 2019-05-28 DIAGNOSIS — M81.0 AGE RELATED OSTEOPOROSIS, UNSPECIFIED PATHOLOGICAL FRACTURE PRESENCE: ICD-10-CM

## 2019-05-28 DIAGNOSIS — F32.A DEPRESSION, UNSPECIFIED DEPRESSION TYPE: ICD-10-CM

## 2019-05-28 PROCEDURE — 77080 DXA BONE DENSITY AXIAL: CPT

## 2019-05-29 RX ORDER — DULOXETIN HYDROCHLORIDE 30 MG/1
30 CAPSULE, DELAYED RELEASE ORAL DAILY
Qty: 90 CAPSULE | Refills: 1 | Status: SHIPPED | OUTPATIENT
Start: 2019-05-29 | End: 2019-09-25 | Stop reason: SDUPTHER

## 2019-07-15 DIAGNOSIS — K21.9 GASTROESOPHAGEAL REFLUX DISEASE WITHOUT ESOPHAGITIS: ICD-10-CM

## 2019-07-15 RX ORDER — PANTOPRAZOLE SODIUM 40 MG/1
40 TABLET, DELAYED RELEASE ORAL DAILY
Qty: 90 TABLET | Refills: 0 | Status: SHIPPED | OUTPATIENT
Start: 2019-07-15 | End: 2019-09-20 | Stop reason: SDUPTHER

## 2019-07-29 ENCOUNTER — HOSPITAL ENCOUNTER (OUTPATIENT)
Dept: RADIOLOGY | Facility: MEDICAL CENTER | Age: 84
Discharge: HOME | End: 2019-07-29
Payer: MEDICARE

## 2019-07-29 DIAGNOSIS — C67.9 MALIGNANT NEOPLASM OF URINARY BLADDER, UNSPECIFIED SITE (HCC): ICD-10-CM

## 2019-07-29 PROCEDURE — 74178 CT ABD&PLV WO CNTR FLWD CNTR: CPT

## 2019-07-29 RX ADMIN — IOHEXOL 85 ML: 350 INJECTION, SOLUTION INTRAVENOUS at 11:51

## 2019-08-02 ENCOUNTER — TELEPHONE (OUTPATIENT)
Dept: UROLOGY | Facility: CLINIC | Age: 84
End: 2019-08-02

## 2019-08-02 ENCOUNTER — TELEPHONE (OUTPATIENT)
Dept: UROLOGY | Facility: MEDICAL CENTER | Age: 84
End: 2019-08-02

## 2019-08-02 DIAGNOSIS — K86.2 PANCREATIC CYST: Primary | ICD-10-CM

## 2019-08-02 NOTE — TELEPHONE ENCOUNTER
[facility] patient, managed by Dr Chino Alvarez  History of bladder cancer  Scheduled for one year cystoscopy on 8/20/19 with Dr Chino Alvarez

## 2019-08-02 NOTE — TELEPHONE ENCOUNTER
Patient of Dr Suyapa Felder seen in [facility] from Radiology calling to advise significant findings  Please review Epic

## 2019-08-02 NOTE — TELEPHONE ENCOUNTER
I called patient to review her pancreatic cyst enlargement noted on recent CT  Patient will be scheduled for an MRI of the abdomen with and without contrast MRCP  We will place a referral for surgical Oncology for further evaluation  Patient verbalized understanding  Please contact the patient to help coordinate  Thank you

## 2019-08-05 ENCOUNTER — TELEPHONE (OUTPATIENT)
Dept: SURGICAL ONCOLOGY | Facility: CLINIC | Age: 84
End: 2019-08-05

## 2019-08-05 NOTE — TELEPHONE ENCOUNTER
Spoke to pt and explained that we can schedule her to see dr Dale Sorto or Dr Mere Erickson after she has her MRI scheduled so that we can set her up after that MRI  She will call us when the MRI is scheduled

## 2019-08-12 ENCOUNTER — TRANSCRIBE ORDERS (OUTPATIENT)
Dept: LAB | Facility: CLINIC | Age: 84
End: 2019-08-12

## 2019-08-12 ENCOUNTER — APPOINTMENT (OUTPATIENT)
Dept: LAB | Facility: CLINIC | Age: 84
End: 2019-08-12
Payer: MEDICARE

## 2019-08-12 DIAGNOSIS — C67.9 MALIGNANT NEOPLASM OF URINARY BLADDER, UNSPECIFIED SITE (HCC): Primary | ICD-10-CM

## 2019-08-12 DIAGNOSIS — C67.9 MALIGNANT NEOPLASM OF URINARY BLADDER, UNSPECIFIED SITE (HCC): ICD-10-CM

## 2019-08-12 DIAGNOSIS — I10 ESSENTIAL HYPERTENSION: ICD-10-CM

## 2019-08-12 LAB
ANION GAP SERPL CALCULATED.3IONS-SCNC: 7 MMOL/L (ref 4–13)
BUN SERPL-MCNC: 18 MG/DL (ref 5–25)
CALCIUM SERPL-MCNC: 9.8 MG/DL (ref 8.3–10.1)
CHLORIDE SERPL-SCNC: 101 MMOL/L (ref 100–108)
CO2 SERPL-SCNC: 30 MMOL/L (ref 21–32)
CREAT SERPL-MCNC: 0.88 MG/DL (ref 0.6–1.3)
GFR SERPL CREATININE-BSD FRML MDRD: 61 ML/MIN/1.73SQ M
GLUCOSE P FAST SERPL-MCNC: 87 MG/DL (ref 65–99)
POTASSIUM SERPL-SCNC: 3.8 MMOL/L (ref 3.5–5.3)
SODIUM SERPL-SCNC: 138 MMOL/L (ref 136–145)

## 2019-08-12 PROCEDURE — 80048 BASIC METABOLIC PNL TOTAL CA: CPT

## 2019-08-12 PROCEDURE — 36415 COLL VENOUS BLD VENIPUNCTURE: CPT

## 2019-08-12 NOTE — TELEPHONE ENCOUNTER
Patient left message requesting a refill of Atenolol   Pharmacy is [facility] in [facility]

## 2019-08-14 RX ORDER — ATENOLOL 25 MG/1
25 TABLET ORAL DAILY
Qty: 90 TABLET | Refills: 0 | Status: SHIPPED | OUTPATIENT
Start: 2019-08-14 | End: 2019-09-20 | Stop reason: SDUPTHER

## 2019-08-15 ENCOUNTER — OFFICE VISIT (OUTPATIENT)
Dept: FAMILY MEDICINE CLINIC | Facility: CLINIC | Age: 84
End: 2019-08-15
Payer: MEDICARE

## 2019-08-15 VITALS
OXYGEN SATURATION: 99 % | BODY MASS INDEX: 23.98 KG/M2 | TEMPERATURE: 97.8 F | HEART RATE: 65 BPM | SYSTOLIC BLOOD PRESSURE: 138 MMHG | HEIGHT: 61 IN | DIASTOLIC BLOOD PRESSURE: 82 MMHG | RESPIRATION RATE: 16 BRPM | WEIGHT: 127 LBS

## 2019-08-15 DIAGNOSIS — H25.9 AGE-RELATED CATARACT OF BOTH EYES, UNSPECIFIED AGE-RELATED CATARACT TYPE: ICD-10-CM

## 2019-08-15 DIAGNOSIS — C91.10 CHRONIC LARGE GRANULAR LYMPHOCYTIC LEUKEMIA (HCC): ICD-10-CM

## 2019-08-15 DIAGNOSIS — Z01.818 PREOP GENERAL PHYSICAL EXAM: Primary | ICD-10-CM

## 2019-08-15 DIAGNOSIS — I50.32 CHRONIC DIASTOLIC (CONGESTIVE) HEART FAILURE (HCC): Chronic | ICD-10-CM

## 2019-08-15 DIAGNOSIS — I10 ESSENTIAL HYPERTENSION: ICD-10-CM

## 2019-08-15 DIAGNOSIS — I73.00 RAYNAUD'S DISEASE WITHOUT GANGRENE: ICD-10-CM

## 2019-08-15 DIAGNOSIS — Z86.711 HISTORY OF PULMONARY EMBOLUS (PE): ICD-10-CM

## 2019-08-15 DIAGNOSIS — E21.3 HYPERPARATHYROIDISM, UNSPECIFIED (HCC): ICD-10-CM

## 2019-08-15 DIAGNOSIS — I25.10 CORONARY ARTERY DISEASE INVOLVING NATIVE CORONARY ARTERY OF NATIVE HEART WITHOUT ANGINA PECTORIS: ICD-10-CM

## 2019-08-15 DIAGNOSIS — D69.6 THROMBOCYTOPENIA (HCC): ICD-10-CM

## 2019-08-15 PROBLEM — R05.9 COUGH: Status: RESOLVED | Noted: 2019-03-18 | Resolved: 2019-08-15

## 2019-08-15 PROBLEM — N30.00 ACUTE CYSTITIS WITHOUT HEMATURIA: Status: RESOLVED | Noted: 2019-04-07 | Resolved: 2019-08-15

## 2019-08-15 PROCEDURE — 99214 OFFICE O/P EST MOD 30 MIN: CPT | Performed by: FAMILY MEDICINE

## 2019-08-15 NOTE — PROGRESS NOTES
PRE-OPERATIVE EVALUATION  FAMILY PRACTICE AT [facility]    NAME: Callum Madison  AGE: 82 y o  SEX: female  : 1935     DATE: 2019    Internal Medicine Pre-Operative Evaluation      Chief Complaint: Pre-operative Evaluation     Surgery: cataract removal b/l  Anticipated Date of Surgery: right eye 2019 and left eye 2019  Referring Provider: Dr Brittni Zayas      History of Present Illness:     Callum Madison is a 82 y o  female who presents to the office today for a preoperative consultation at the request of surgeon, Dr Donaldo Brooks, who plans on performing right and then left cataract removal, on 2019 and then 2019  Planned anesthesia is MAC/topical  Patient has a bleeding risk of: no recent abnormal bleeding, no remote history of abnormal bleeding and use of Ca-channel blockers (see med list)  Patient does not have objections to receiving blood products if needed, but had intolerance in past to whole blood transfusion (no problem with PRBC transfusions ) Current anti-platelet/anti-coagulation medications that the patient is prescribed includes: Aspirin        Assessment of Chronic Conditions:   - Coronary Artery Disease: stable  - Congestive Heart Failure: stable  - Hypertension: stable     Assessment of Cardiac Risk:  · Denies unstable or severe angina or MI in the last 6 weeks or history of stent placement in the last year   · Denies decompensated heart failure (e g  New onset heart failure, NYHA functional class IV heart failure, or worsening existing heart failure)  · Denies significant arrhythmias such as high grade AV block, symptomatic ventricular arrhythmia, newly recognized ventricular tachycardia, supraventricular tachycardia with resting heart rate >100, or symptomatic bradycardia  · Denies severe heart valve disease including aortic stenosis or symptomatic mitral stenosis     Exercise Capacity:  · Able to walk 4 blocks without symptoms?: Yes  · Able to walk 2 flights without symptoms?: Yes    Prior Anesthesia Reactions: No     Personal history of venous thromboembolic disease? Yes (In her 19's due to being on birth control pill, none after OCP stopped, and no problem during pregnancy)    History of steroid use for >2 weeks within last year? No    STOP-BANG Sleep Apnea Screening Questionnaire:   no snoring or witnessed apnea       Review of Systems:     Review of Systems   Constitutional: Negative  HENT: Negative for congestion, ear pain, mouth sores, nosebleeds, sore throat and trouble swallowing  Eyes: Negative  Respiratory: Negative  Cardiovascular: Negative  Gastrointestinal: Negative  Endocrine: Negative  Genitourinary: Negative  Skin: Negative  Neurological: Negative  Hematological: Negative          Current Problem List:     Patient Active Problem List   Diagnosis    Essential hypertension    Mixed hyperlipidemia    Coronary artery disease without angina pectoris - S/P stent placement x 2 (2011)    Gastroesophageal reflux disease without esophagitis    [facility]    Hyperthyroidism    Osteoporosis    Intertrochanteric fracture of left femur, closed, with routine healing, subsequent encounter    Ambulatory dysfunction    Elderly person living alone    Low back pain without sciatica    S/P ORIF (open reduction internal fixation) fracture    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HCC)    Bl[facility] cancer [facility]    Age-related osteoporosis without current pathological fracture    Allergic rhinitis    Anemia due to vitamin B12 deficiency    Biliary dyskinesia    Cholelithiasis    Chronic right shoulder pain    Closed displaced fracture of left trapezium bone    Degenerative joint disease    Depression, controlled    Deviated nasal septum    Gastric reflux syndrome    Gastric ulcer    Heart valve disorder    Herpes zoster infection of thoracic region    IBS (irritable bowel syndrome)    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HCC)    Mild vitamin D deficiency    Mixed hearing loss, unilateral    Pancreatic cyst    Raynaud's disease without gangrene    S/P angioplasty with stent    Urge incontinence of urine    Conjunctivitis of both eyes    Thrombocytopenia ([facility]    Medicare annual wellness visit, subsequent    History of pulmonary embolus (PE)    Age-related cataract of both eyes    Hyperparathyroidism, unspecified ([facility] )       Allergies: Allergies   Allergen Reactions    Lactose      Other reaction(s): Indigestion/GI Upset    Other      Other reaction(s): Mental Status Change  After surgery   Whole blood transfusion  = passed out   historical intolerance; verified with patient; had no reaction to PRBC transfusions afterward       Physical Exam:     Vitals:    08/15/19 1040   BP: 138/82   BP Location: Left arm   Patient Position: Sitting   Cuff Size: Adult   Pulse: 65   Resp: 16   Temp: 97 8 °F (36 6 °C)   TempSrc: Tympanic   SpO2: 99%   Weight: 57 6 kg (127 lb)   Height: 5' 1" (1 549 m)           Current Outpatient Medications:     acetaminophen (TYLENOL) 500 mg tablet, Take 500 mg by mouth every 6 (six) hours as needed for mild pain, Disp: , Rfl:     amLODIPine (NORVASC) 5 mg tablet, Take 1 tablet (5 mg total) by mouth daily Resume on 4/28, Disp: 90 tablet, Rfl: 1    aspirin 81 MG tablet, Take 81 mg by mouth daily Resume on 4/28 , Disp: , Rfl:     atenolol (TENORMIN) 25 mg tablet, Take 1 tablet (25 mg total) by mouth daily Resume on 4/28, Disp: 90 tablet, Rfl: 0    atorvastatin (LIPITOR) 40 mg tablet, Take 1 tablet (40 mg total) by mouth daily after dinner Resume the evening of 4/27, Disp: 90 tablet, Rfl: 1    bumetanide (BUMEX) 1 mg tablet, Take 1 tablet (1 mg total) by mouth daily, Disp: 90 tablet, Rfl: 3    Cholecalciferol 2000 units TABS, Take 2,000 Units by mouth daily Resume on 4/28, Disp: , Rfl:     dicyclomine (BENTYL) 10 mg capsule, Take 10 mg by mouth as needed, Disp: , Rfl:     DULoxetine (CYMBALTA) 30 mg delayed release capsule, Take 1 capsule (30 mg total) by mouth daily Resume on 4/28, Disp: 90 capsule, Rfl: 1    fexofenadine (ALLEGRA) 180 MG tablet, Take 180 mg by mouth daily, Disp: , Rfl:     montelukast (SINGULAIR) 10 mg tablet, Take 10 mg by mouth as needed  , Disp: , Rfl:     MULTIPLE VITAMIN PO, Take by mouth, Disp: , Rfl:     pantoprazole (PROTONIX) 40 mg tablet, Take 1 tablet (40 mg total) by mouth daily Resume on 4/28, Disp: 90 tablet, Rfl: 0    METHYLCELLULOSE, LAXATIVE, PO, Take by mouth, Disp: , Rfl:     Past Medical History:       Past Medical History:   Diagnosis Date    [facility]     Coronary artery disease     S/P stent placement x 2 in 2011    GERD (gastroesophageal reflux disease)     Hepatitis     Hyperlipidemia     Hypertension     Kidney stones     Malignant ne[facility]ra [facility]     Pneumonia     Shingles         Past Surgical History:   Procedure Laterality Date    CORONARY ANGIOPLASTY WITH STENT PLACEMENT      stent x 2    CYSTOSCOPY      diagnostic - onset 4/4/17    HYSTERECTOMY      OOPHORECTOMY      MO OPEN RX FEMUR FX+INTRAMED ALEJANDRO Left 4/8/2018    Procedure: INSERTION NAIL IM FEMUR ANTEGRADE (TROCHANTERIC); Surgeon: Jose Scott MD;  [facility]: [facility];  Service: Orthopedics        Family History   Problem Relation Age of Onset    Arthritis Mother     Osteoporosis Mother     Heart disease Father     Hypertension Father     Hypertension Brother     Prostate cancer Brother     Breast cancer Daughter 48    Prostate cancer Son         Social History     Socioeconomic History    Marital status:       Spouse name: Not on file    Number of children: Not on file    Years of education: Not on file    Highest education level: Not on file   Occupational History    Not on file   Social Needs    Financial resource strain: Not on file    Food insecurity:     Worry: Not on file Inability: Not on file    Transportation needs:     Medical: Not on file     Non-medical: Not on file   Tobacco Use    Smoking status: Never Smoker    Smokeless tobacco: Never Used   Substance and Sexual Activity    Alcohol use: No    Drug use: No    Sexual activity: Never   Lifestyle    Physical activity:     Days per week: Not on file     Minutes per session: Not on file    Stress: Not on file   Relationships    Social connections:     Talks on phone: Not on file     Gets together: Not on file     Attends Hindu service: Not on file     Active member of club or organization: Not on file     Attends meetings of clubs or organizations: Not on file     Relationship status: Not on file    Intimate partner violence:     Fear of current or ex partner: Not on file     Emotionally abused: Not on file     Physically abused: Not on file     Forced sexual activity: Not on file   Other Topics Concern    Not on file   Social History Narrative    Advance directives declined by parents    Always uses seat belt        Physical Exam:     Physical Exam   Constitutional: She is oriented to person, place, and time  She appears well-developed  She is cooperative  Non-toxic appearance  She does not have a sickly appearance  She does not appear ill  No distress  HENT:   Head: Normocephalic and atraumatic  Mouth/Throat: Uvula is midline, oropharynx is clear and moist and mucous membranes are normal    Eyes: Pupils are equal, round, and reactive to light  Conjunctivae, EOM and lids are normal    Neck: Trachea normal  Neck supple  No JVD present  No thyroid mass and no thyromegaly present  Cardiovascular: Normal rate, regular rhythm, normal heart sounds and normal pulses  Pulmonary/Chest: Effort normal and breath sounds normal    Abdominal: Soft  Bowel sounds are normal  She exhibits no distension and no mass  There is no hepatosplenomegaly  There is no tenderness     Lymphadenopathy:     She has no cervical adenopathy  Right: No supraclavicular adenopathy present  Left: No supraclavicular adenopathy present  Neurological: She is alert and oriented to person, place, and time  She has normal strength and normal reflexes  She displays no atrophy and no tremor  No cranial nerve deficit or sensory deficit  She exhibits normal muscle tone  Gait normal    Skin: Skin is warm and dry  Capillary refill takes less than 2 seconds  She is not diaphoretic  No cyanosis  No pallor  Psychiatric: She has a normal mood and affect  Her behavior is normal    Nursing note and vitals reviewed  Data:     Pre-operative work-up    Laboratory Results: I have personally reviewed the pertinent laboratory results/reports     EKG: last was 04/2018, sees cardiologist routinely     Chest x-ray: I have personally reviewed pertinent reports  Previous cardiopulmonary studies within the past year:  · Echocardiogram: not needed prior to surgery  · Cardiac Catheterization: not needed   · Stress Test: not needed  · Pulmonary Function Testing: not needed      Assessment & Recommendations:     1  Preop general physical exam     2  Age-related cataract of both eyes, unspecified age-related cataract type     3  Chronic diastolic (congestive) heart failure (HCC)  ECG 12 lead    stable, compensated, cpm   4  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HCC)      stable, asymptomatic, cpm   5  Coronary artery disease involving native coronary artery of native heart without angina pectoris  ECG 12 lead    s/p stent placement x2 2011; stable, asymptomatic   6  Raynaud's disease without gangrene      stable, cpm   7  Thrombocytopenia (HCC)      stable, no need for transfusions in past or currently, cpm   8  History of pulmonary embolus (PE)      In her 19's due to being on birth control pill, none after OCP stopped, and no problem during pregnancy     9  Hyperparathyroidism, unspecified ([address] )      stable, cpm   10   Essential hypertension Pre-Op Evaluation Assessment  82 y o  female with planned surgery: cataract removals  Known risk factors for perioperative complications: Coronary disease  Congestive heart failure  Cardiac Risk Estimation: per the Revised Cardiac Risk Index (Circ  100:1043, 1999), the patient's risk factors for cardiac complications include history of congestive heart failure, putting her in: RCI RISK CLASS II (1 risk factor, risk of major cardiac compl  appr  1 3%)  Current medications which may produce withdrawal symptoms if withheld perioperatively: none  Pre-Op Evaluation Plan  1  Further preoperative workup as follows:   - None; no further preoperative work-up is required    2  Medication Management/Recommendations:   - Regarding anti-platelet agents: hold Aspirin for 7 days prior to first surgery, resume following second surgery  3  Prophylaxis for cardiac events with perioperative beta-blockers: should be considered, specific regimen per anesthesia  4  Patient requires further consultation with: None    Clearance  Patient is CLEARED for surgery without any additional cardiac testing       Geno Lyles DO  FAMILY PRACTICE AT [facility]  [address] [facility] [address]  Phone#  [phone]  Fax#  [phone]

## 2019-08-15 NOTE — PATIENT INSTRUCTIONS
- Regarding anti-platelet agents: hold Aspirin for 7 days prior to first surgery, resume following second surgery

## 2019-08-18 PROCEDURE — 52000 CYSTOURETHROSCOPY: CPT | Performed by: UROLOGY

## 2019-08-18 NOTE — PROGRESS NOTES
Cystoscopy  Date/Time: 8/18/2019 12:23 PM  Performed by: Ligia Das MD  Authorized by: Ligia Das MD     Procedure details: cystoscopy    Patient tolerance: Patient tolerated the procedure well with no immediate complications        Patient is seen for a 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  She denies any gross hematuria or changes in urinary pattern  CT scan shows no evidence of upper tract recurrence  A septated cyst is noted within the right kidney  Presents for surveillance cystoscopy  CT ABDOMEN AND PELVIS WITH AND WITHOUT IV CONTRAST     INDICATION:   C67 9: Malignant neoplasm of bladder, unspecified      COMPARISON:  CT abdomen and pelvis 3/26/2018, 12/6/2016 and MRI abdomen 6/14/2017     TECHNIQUE: Initial CT of the abdomen and pelvis was performed without intravenous contrast   Subsequent dynamic CT evaluation of the abdomen and pelvis was performed after the administration of intravenous contrast in both nephrographic and delayed   phases after the administration of intravenous contrast   Axial, sagittal, and coronal 2D reformatted images were created from the source data and submitted for interpretation       Radiation dose length product (DLP) for this visit:  939 mGy-cm   This examination, like all CT scans performed in the [facility], was performed utilizing techniques to minimize radiation dose exposure, including the use of iterative   reconstruction and automated exposure control      IV Contrast:  85 mL of iohexol (OMNIPAQUE)  350  Enteric Contrast:  Enteric contrast was not administered      FINDINGS:     ABDOMEN     RIGHT KIDNEY AND URETER:  No solid renal mass  No detectable urothelial mass  Stable 1 2 cm simple cyst   Stable subcentimeter low-density nodules too small to characterize further accurately by CT statistically represent simple cysts  Stable minimal cortical scarring  No hydronephrosis or hydroureter    No urinary tract calculi  No perinephric collection      LEFT KIDNEY AND URETER:  No solid renal mass  No detectable urothelial mass  2 8 cm exophytic simple cyst lower pole minimally enlarged previously measuring 2 3 cm  This is not an unexpected or significant change given this period of time  Additional simple cyst in the upper pole unchanged  Stable subcentimeter low-density nodules too small to characterize further accurately by CT statistically represent simple cysts  Stable minimal cortical scarring  No hydronephrosis or hydroureter  No urinary tract calculi  No perinephric collection      URINARY BLADDER:  No bladder wall mass  No calculi         LOWER CHEST:  Coronary artery calcification      LIVER/BILIARY TREE:  Stable subcentimeter sharply circumscribed low-density hepatic lesion(s) are noted, too small to accurately characterize, but statistically most likely to represent subcentimeter hepatic cysts  No suspicious solid hepatic lesion is   identified  Hepatic contours are normal   No biliary dilatation      GALLBLADDER:  No calcified gallstones  No pericholecystic inflammatory change      SPLEEN:  Unremarkable      PANCREAS:  Several cysts in the pancreas  2 3 cm cyst in the proximal body image 37 series 3 previously 1 5 cm   2 1 cm cyst in the tail previously 1 8 cm when measured in the same fashion  No peripancreatic stranding or fluid      ADRENAL GLANDS:  Unremarkable      STOMACH AND BOWEL:  Moderate stool in the proximal and mid colon  Tortuous redundant low lying transverse colon  No bowel obstruction      ABDOMINOPELVIC CAVITY:  No ascites  No free intraperitoneal air  No lymphadenopathy      VESSELS:  Aortoiliac calcification  No aneurysm      PELVIS     REPRODUCTIVE ORGANS:  Post hysterectomy      APPENDIX: No findings to suggest appendicitis      ABDOMINAL WALL/INGUINAL REGIONS:  Small fat-containing periumbilical hernia  Trace fluid within the hernia sac    Otherwise no significant interval change      OSSEOUS STRUCTURES:  No acute fracture or osseous destructive lesion identified  Degenerative changes of the spine, pubic symphysis, and multiple joints  Moderate levoconvex lumbar scoliosis  Stable minimal left lateral listhesis of L3 on L4  Partially visualized left hip orthopedic hardware  Stable 2 3 x 2 1 cm left S2 nodule with punctate calcifications and smooth chronic widening of the S2 foramen  Mild postcontrast enhancement with pre and postcontrast density of 36 and 57 mean   Hounsfield units  No significant interval change as far back as December 2016  This is attributed to a small schwannoma      IMPRESSION:     No solid renal or detectable urothelial mass      Stable bilateral renal simple cysts and subcentimeter low-density nodules      Several pancreatic cysts, the largest measures 2 3 cm in the proximal body previously 1 5 cm  For simple cyst(s) that have demonstrated significant interval growth (>20% in longest dimension) and because cyst now >2 0 cm, recommend gastroenterology   and/or surgical oncology consult  EUS is likely now warranted  Preferred imaging modality: abdomen MRI and MRCP with and without IV contrast, or triple phase abdomen CT with IV contrast, or abdomen MRI and MRCP without IV contrast      The recommendations regarding pancreatic findings assumes that patient does not have family history of pancreatic cancer nor have any symptoms potentially attributable to pancreatic cystic lesions (hyperamylasemia, recent-onset diabetes, severe   epigastric pain, weight loss, steatorrhea, or jaundice ) If these conditions are not true, then management should be deferred to judgement of specialists such as gastroenterologists or oncologic surgeons   Recommendations are based on recent consensus   statements on management of pancreatic cystic lesions from [address] [place] [address] [employer]astro[employer] Association, [employer] of [employer], the journal Pancreatology, and our own institutional consensus      No other significant interval change      The examination demonstrates a significant  finding and was documented as such in [facility] for liaison and referring practitioner notification                   Workstation performed: [id number]      Imaging     CT abdomen pelvis w wo contrast (Order: [id number]) - 7/29/2019       PROCEDURE:  CYSTOSCOPY    With patient properly identified and informed consent obtained she was placed supine in the procedure suite  She was sterilely prepped and draped in the usual fashion  10 cc viscous lidocaine was administered per urethra  Flexible cystourethroscopy was done with the 16 Western Ximena scope  Inspection of the bladder revealed no significant findings  Ureteral orifices were normal in caliber and location  No evidence of bladder tumor  Urine sample was obtained for urinary cytology  Patient tolerated the procedure well  PLAN      CT scan results are reviewed with patient which demonstrates incidental pancreatic cysts  Her brother apparently has had a history of pancreatic cyst decortication  She is pending MRI evaluation of this  No upper tract abnormalities  Cystoscopy was otherwise normal   She will return in 1 year for cystoscopy surveillance  All questions answered at this time

## 2019-08-19 ENCOUNTER — CLINICAL SUPPORT (OUTPATIENT)
Dept: URGENT CARE | Facility: MEDICAL CENTER | Age: 84
End: 2019-08-19
Payer: MEDICARE

## 2019-08-19 DIAGNOSIS — I50.32 CHRONIC DIASTOLIC (CONGESTIVE) HEART FAILURE (HCC): Chronic | ICD-10-CM

## 2019-08-19 DIAGNOSIS — I25.10 CORONARY ARTERY DISEASE INVOLVING NATIVE CORONARY ARTERY OF NATIVE HEART WITHOUT ANGINA PECTORIS: ICD-10-CM

## 2019-08-19 LAB
ATRIAL RATE: 67 BPM
P AXIS: 68 DEGREES
PR INTERVAL: 160 MS
QRS AXIS: 44 DEGREES
QRSD INTERVAL: 82 MS
QT INTERVAL: 402 MS
QTC INTERVAL: 424 MS
T WAVE AXIS: 37 DEGREES
VENTRICULAR RATE: 67 BPM

## 2019-08-19 PROCEDURE — 93005 ELECTROCARDIOGRAM TRACING: CPT

## 2019-08-19 PROCEDURE — 93010 ELECTROCARDIOGRAM REPORT: CPT | Performed by: INTERNAL MEDICINE

## 2019-08-20 ENCOUNTER — PROCEDURE VISIT (OUTPATIENT)
Dept: UROLOGY | Facility: AMBULATORY SURGERY CENTER | Age: 84
End: 2019-08-20
Payer: MEDICARE

## 2019-08-20 VITALS
HEART RATE: 64 BPM | DIASTOLIC BLOOD PRESSURE: 64 MMHG | BODY MASS INDEX: 23.71 KG/M2 | HEIGHT: 61 IN | SYSTOLIC BLOOD PRESSURE: 140 MMHG | WEIGHT: 125.6 LBS

## 2019-08-20 DIAGNOSIS — C67.9 MALIGNANT NEOPLASM OF URINARY BLADDER, UNSPECIFIED SITE (HCC): Primary | ICD-10-CM

## 2019-08-20 LAB
SL AMB  POCT GLUCOSE, UA: NORMAL
SL AMB LEUKOCYTE ESTERASE,UA: NORMAL
SL AMB POCT BILIRUBIN,UA: NORMAL
SL AMB POCT BLOOD,UA: NORMAL
SL AMB POCT CLARITY,UA: CLEAR
SL AMB POCT COLOR,UA: YELLOW
SL AMB POCT KETONES,UA: NORMAL
SL AMB POCT NITRITE,UA: NORMAL
SL AMB POCT PH,UA: 7
SL AMB POCT SPECIFIC GRAVITY,UA: 1
SL AMB POCT URINE PROTEIN: NORMAL
SL AMB POCT UROBILINOGEN: 0.2

## 2019-08-20 PROCEDURE — 88112 CYTOPATH CELL ENHANCE TECH: CPT | Performed by: PATHOLOGY

## 2019-08-20 PROCEDURE — 81002 URINALYSIS NONAUTO W/O SCOPE: CPT | Performed by: UROLOGY

## 2019-09-03 ENCOUNTER — OFFICE VISIT (OUTPATIENT)
Dept: FAMILY MEDICINE CLINIC | Facility: CLINIC | Age: 84
End: 2019-09-03
Payer: MEDICARE

## 2019-09-03 VITALS
HEIGHT: 61 IN | SYSTOLIC BLOOD PRESSURE: 140 MMHG | RESPIRATION RATE: 17 BRPM | TEMPERATURE: 97 F | OXYGEN SATURATION: 99 % | DIASTOLIC BLOOD PRESSURE: 86 MMHG | BODY MASS INDEX: 23.79 KG/M2 | WEIGHT: 126 LBS | HEART RATE: 68 BPM

## 2019-09-03 DIAGNOSIS — L28.2 PRURITIC RASH: Primary | ICD-10-CM

## 2019-09-03 PROCEDURE — 99213 OFFICE O/P EST LOW 20 MIN: CPT | Performed by: FAMILY MEDICINE

## 2019-09-03 RX ORDER — TRIPROLIDINE/PSEUDOEPHEDRINE 2.5MG-60MG
TABLET ORAL
COMMUNITY
Start: 2019-08-14 | End: 2020-02-05 | Stop reason: ALTCHOICE

## 2019-09-03 RX ORDER — GATIFLOXACIN 5 MG/ML
SOLUTION/ DROPS OPHTHALMIC
COMMUNITY
Start: 2019-08-14 | End: 2020-02-05 | Stop reason: ALTCHOICE

## 2019-09-03 RX ORDER — NEPAFENAC 0.3 %
SUSPENSION, DROPS(FINAL DOSAGE FORM)(ML) OPHTHALMIC (EYE)
COMMUNITY
Start: 2019-08-14 | End: 2020-02-05 | Stop reason: ALTCHOICE

## 2019-09-03 NOTE — PROGRESS NOTES
Assessment/Plan:       Diagnoses and all orders for this visit:    Pruritic rash  Comments:  etiol TBD, appearance today looks like eczema, advised aquafor or eucerin and derm eval ordered  Orders:  -     Ambulatory referral to Dermatology; Future          Subjective:   Chief Complaint   Patient presents with    Herpes Zoster     Possible shingles on right side   Medication Management     Having issues with steriod eye drops  Patient ID: Sean Xiao is a 82 y o  female  Same day sick appt  C/o thinks had shingles again  Started about a week before the eye surgery she had 08/04/2019, states rash/lesions and pruritis stayed in right mid-axillary/mid back area, has been applying anti-itch cream with some benefit  "It's just about gone now"  Has not ever seen a dermatologist        The following portions of the patient's history were reviewed and updated as appropriate: allergies, current medications, past family history, past medical history, past social history, past surgical history and problem list     Review of Systems   Constitutional: Negative  HENT: Negative  Eyes:        Has steroid eye drops following eye surgery, and c/o "When put the steroid in my eye, my face flushes"   Respiratory: Negative  Skin:        Per hpi   Hematological: Negative  Objective:      /86 (BP Location: Left arm, Patient Position: Sitting, Cuff Size: Adult)   Pulse 68   Temp (!) 97 °F (36 1 °C) (Tympanic)   Resp 17   Ht 5' 1" (1 549 m)   Wt 57 2 kg (126 lb)   SpO2 99%   BMI 23 81 kg/m²          Physical Exam   Constitutional: She appears well-developed and well-nourished  She is cooperative  Non-toxic appearance  She does not have a sickly appearance  She does not appear ill  No distress  Appears much younger than stated age   Pulmonary/Chest: Effort normal    Lymphadenopathy:     She has no cervical adenopathy  She has no axillary adenopathy  Neurological: She is alert     Skin: Skin is warm and dry  Capillary refill takes less than 2 seconds  She is not diaphoretic  No pallor  Linear excoriations and dry slightly erythematous patch right lower and lateral scapular area   Nursing note and vitals reviewed

## 2019-09-10 ENCOUNTER — TELEPHONE (OUTPATIENT)
Dept: FAMILY MEDICINE CLINIC | Facility: CLINIC | Age: 84
End: 2019-09-10

## 2019-09-16 ENCOUNTER — TELEPHONE (OUTPATIENT)
Dept: HEMATOLOGY ONCOLOGY | Facility: CLINIC | Age: 84
End: 2019-09-16

## 2019-09-16 DIAGNOSIS — C91.10 CHRONIC LARGE GRANULAR LYMPHOCYTIC LEUKEMIA (HCC): ICD-10-CM

## 2019-09-16 DIAGNOSIS — D64.9 ANEMIA, UNSPECIFIED TYPE: Primary | ICD-10-CM

## 2019-09-16 NOTE — TELEPHONE ENCOUNTER
Patient has a appt on Sept 27th with Dr Nupur Simmons and she needs labs put in Epic before her appt   She will go to [facility]

## 2019-09-20 ENCOUNTER — APPOINTMENT (OUTPATIENT)
Dept: LAB | Facility: CLINIC | Age: 84
End: 2019-09-20
Payer: MEDICARE

## 2019-09-20 DIAGNOSIS — I10 ESSENTIAL HYPERTENSION: ICD-10-CM

## 2019-09-20 DIAGNOSIS — D64.9 ANEMIA, UNSPECIFIED TYPE: ICD-10-CM

## 2019-09-20 DIAGNOSIS — C91.10 CHRONIC LARGE GRANULAR LYMPHOCYTIC LEUKEMIA (HCC): ICD-10-CM

## 2019-09-20 DIAGNOSIS — K21.9 GASTROESOPHAGEAL REFLUX DISEASE WITHOUT ESOPHAGITIS: ICD-10-CM

## 2019-09-20 DIAGNOSIS — E78.5 HYPERLIPIDEMIA, UNSPECIFIED HYPERLIPIDEMIA TYPE: ICD-10-CM

## 2019-09-20 LAB
BACTERIA UR QL AUTO: ABNORMAL /HPF
BASOPHILS # BLD AUTO: 0.04 THOUSANDS/ΜL (ref 0–0.1)
BASOPHILS NFR BLD AUTO: 1 % (ref 0–1)
BILIRUB UR QL STRIP: NEGATIVE
CLARITY UR: CLEAR
COLOR UR: YELLOW
EOSINOPHIL # BLD AUTO: 0.22 THOUSAND/ΜL (ref 0–0.61)
EOSINOPHIL NFR BLD AUTO: 4 % (ref 0–6)
ERYTHROCYTE [DISTWIDTH] IN BLOOD BY AUTOMATED COUNT: 12.2 % (ref 11.6–15.1)
GLUCOSE UR STRIP-MCNC: NEGATIVE MG/DL
HCT VFR BLD AUTO: 40.4 % (ref 34.8–46.1)
HGB BLD-MCNC: 13.3 G/DL (ref 11.5–15.4)
HGB UR QL STRIP.AUTO: ABNORMAL
HYALINE CASTS #/AREA URNS LPF: ABNORMAL /LPF
IGA SERPL-MCNC: 79 MG/DL (ref 70–400)
IGG SERPL-MCNC: 1260 MG/DL (ref 700–1600)
IGM SERPL-MCNC: 95 MG/DL (ref 40–230)
IMM GRANULOCYTES # BLD AUTO: 0.01 THOUSAND/UL (ref 0–0.2)
IMM GRANULOCYTES NFR BLD AUTO: 0 % (ref 0–2)
KETONES UR STRIP-MCNC: NEGATIVE MG/DL
LEUKOCYTE ESTERASE UR QL STRIP: NEGATIVE
LYMPHOCYTES # BLD AUTO: 2.1 THOUSANDS/ΜL (ref 0.6–4.47)
LYMPHOCYTES NFR BLD AUTO: 38 % (ref 14–44)
MCH RBC QN AUTO: 32.4 PG (ref 26.8–34.3)
MCHC RBC AUTO-ENTMCNC: 32.9 G/DL (ref 31.4–37.4)
MCV RBC AUTO: 98 FL (ref 82–98)
MONOCYTES # BLD AUTO: 0.49 THOUSAND/ΜL (ref 0.17–1.22)
MONOCYTES NFR BLD AUTO: 9 % (ref 4–12)
NEUTROPHILS # BLD AUTO: 2.63 THOUSANDS/ΜL (ref 1.85–7.62)
NEUTS SEG NFR BLD AUTO: 48 % (ref 43–75)
NITRITE UR QL STRIP: NEGATIVE
NON-SQ EPI CELLS URNS QL MICRO: ABNORMAL /HPF
NRBC BLD AUTO-RTO: 0 /100 WBCS
PH UR STRIP.AUTO: 6.5 [PH]
PLATELET # BLD AUTO: 154 THOUSANDS/UL (ref 149–390)
PMV BLD AUTO: 11.3 FL (ref 8.9–12.7)
PROT UR STRIP-MCNC: NEGATIVE MG/DL
RBC # BLD AUTO: 4.11 MILLION/UL (ref 3.81–5.12)
RBC #/AREA URNS AUTO: ABNORMAL /HPF
SP GR UR STRIP.AUTO: 1.01 (ref 1–1.03)
UROBILINOGEN UR QL STRIP.AUTO: 0.2 E.U./DL
WBC # BLD AUTO: 5.49 THOUSAND/UL (ref 4.31–10.16)
WBC #/AREA URNS AUTO: ABNORMAL /HPF

## 2019-09-20 PROCEDURE — 36415 COLL VENOUS BLD VENIPUNCTURE: CPT

## 2019-09-20 PROCEDURE — 82784 ASSAY IGA/IGD/IGG/IGM EACH: CPT

## 2019-09-20 PROCEDURE — 87086 URINE CULTURE/COLONY COUNT: CPT

## 2019-09-20 PROCEDURE — 81001 URINALYSIS AUTO W/SCOPE: CPT

## 2019-09-20 PROCEDURE — 85025 COMPLETE CBC W/AUTO DIFF WBC: CPT

## 2019-09-20 NOTE — TELEPHONE ENCOUNTER
Patient called to state that Mylene Roberto is awaiting the prescriptions  I did advise that it could take three business days for the scripts to be received by the pharmacy  Thank you

## 2019-09-21 LAB — BACTERIA UR CULT: NORMAL

## 2019-09-21 RX ORDER — AMLODIPINE BESYLATE 5 MG/1
5 TABLET ORAL DAILY
Qty: 90 TABLET | Refills: 1 | Status: SHIPPED | OUTPATIENT
Start: 2019-09-21 | End: 2020-01-11

## 2019-09-21 RX ORDER — ATORVASTATIN CALCIUM 40 MG/1
40 TABLET, FILM COATED ORAL
Qty: 90 TABLET | Refills: 1 | Status: SHIPPED | OUTPATIENT
Start: 2019-09-21 | End: 2020-01-11

## 2019-09-21 RX ORDER — PANTOPRAZOLE SODIUM 40 MG/1
40 TABLET, DELAYED RELEASE ORAL DAILY
Qty: 90 TABLET | Refills: 1 | Status: SHIPPED | OUTPATIENT
Start: 2019-09-21 | End: 2020-01-11

## 2019-09-21 RX ORDER — ATENOLOL 25 MG/1
25 TABLET ORAL DAILY
Qty: 90 TABLET | Refills: 1 | Status: SHIPPED | OUTPATIENT
Start: 2019-09-21 | End: 2020-03-28

## 2019-09-25 DIAGNOSIS — J30.2 SEASONAL ALLERGIC RHINITIS, UNSPECIFIED TRIGGER: Primary | ICD-10-CM

## 2019-09-25 DIAGNOSIS — F32.A DEPRESSION, UNSPECIFIED DEPRESSION TYPE: ICD-10-CM

## 2019-09-27 ENCOUNTER — OFFICE VISIT (OUTPATIENT)
Dept: HEMATOLOGY ONCOLOGY | Facility: CLINIC | Age: 84
End: 2019-09-27
Payer: MEDICARE

## 2019-09-27 VITALS
WEIGHT: 128 LBS | HEIGHT: 61 IN | HEART RATE: 71 BPM | BODY MASS INDEX: 24.17 KG/M2 | RESPIRATION RATE: 14 BRPM | OXYGEN SATURATION: 100 % | TEMPERATURE: 97.6 F

## 2019-09-27 DIAGNOSIS — C91.10 CHRONIC LARGE GRANULAR LYMPHOCYTIC LEUKEMIA (HCC): Primary | ICD-10-CM

## 2019-09-27 PROCEDURE — 99213 OFFICE O/P EST LOW 20 MIN: CPT | Performed by: INTERNAL MEDICINE

## 2019-09-27 NOTE — PROGRESS NOTES
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 and BCG and undergoes cystoscopies on regular basis  No history of hematuria  She has 2 stents in her heart  She has history of kidney stone  She has history of Raynaud's  She had herpes zoster in January 2016  History of osteopenia  She has been on vitamin D and prolia  She has appointment with her rheumatologist   History of hepatitis in 1993   History of ALONSO and BSO in 1980 for bleeding  No cancer  History of pulmonary embolism several years ago  History of stomach ulcer in 1970 of pneumonias in 2003 and 2004  of hypertension  She is sensitive to cold weather  History of arthritis in the neck and all over   She follows with a rheumatologist       Current Outpatient Medications:     acetaminophen (TYLENOL) 500 mg tablet, Take 500 mg by mouth every 6 (six) hours as needed for mild pain, Disp: , Rfl:     amLODIPine (NORVASC) 5 mg tablet, Take 1 tablet (5 mg total) by mouth daily Resume on 4/28, Disp: 90 tablet, Rfl: 1    aspirin 81 MG tablet, Take 81 mg by mouth daily Resume on 4/28 , Disp: , Rfl:     atenolol (TENORMIN) 25 mg tablet, Take 1 tablet (25 mg total) by mouth daily Resume on 4/28, Disp: 90 tablet, Rfl: 1    atorvastatin (LIPITOR) 40 mg tablet, Take 1 tablet (40 mg total) by mouth daily after dinner Resume the evening of 4/27, Disp: 90 tablet, Rfl: 1    bumetanide (BUMEX) 1 mg tablet, Take 1 tablet (1 mg total) by mouth daily, Disp: 90 tablet, Rfl: 3    Cholecalciferol 2000 units TABS, Take 2,000 Units by mouth daily Resume on 4/28, Disp: , Rfl:     dicyclomine (BENTYL) 10 mg capsule, Take 10 mg by mouth as needed, Disp: , Rfl:     DULoxetine (CYMBALTA) 30 mg delayed release capsule, Take 1 capsule (30 mg total) by mouth daily Resume on 4/28, Disp: 90 capsule, Rfl: 1    DUREZOL 0 05 % EMUL, , Disp: , Rfl:     fexofenadine (ALLEGRA) 180 MG tablet, Take 180 mg by mouth daily, Disp: , Rfl:     gatifloxacin (ZYMAXID) 0 5 %, , Disp: , Rfl:     ILEVRO 0 3 % SUSP, , Disp: , Rfl:     METHYLCELLULOSE, LAXATIVE, PO, Take by mouth, Disp: , Rfl:     montelukast (SINGULAIR) 10 mg tablet, Take 10 mg by mouth as needed  , Disp: , Rfl:     MULTIPLE VITAMIN PO, Take by mouth, Disp: , Rfl:     pantoprazole (PROTONIX) 40 mg tablet, Take 1 tablet (40 mg total) by mouth daily Resume on 4/28, Disp: 90 tablet, Rfl: 1    Allergies   Allergen Reactions    Lactose      Other reaction(s): Indigestion/GI Upset    Other      Other reaction(s): Mental Status Change  After surgery  Whole blood transfusion  = passed out   historical intolerance; verified with patient; had no reaction to PRBC transfusions afterward       Oncology History    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  that was diagnosed several years ago and patient has not required any therapy for this condition  Patient's condition and counts are being monitored  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[address] )     Chemotherapy       2018: No treatment  Surveillance only  ROS:  09/27/19 Reviewed 13 systems:  Presently no other neurological, cardiac, pulmonary, GI and  symptoms  No other symptoms like   fever, chills, bleeding, bone pains, skin rash, weight loss,   weakness, numbness,  claudication and gait problem  No frequent infections  No swelling of the ankles  No swollen glands  Patient is anxious   Other symptoms are in HPI        Pulse 71   Temp 97 6 °F (36 4 °C) (Tympanic) Resp 14   Ht 5' 1" (1 549 m)   Wt 58 1 kg (128 lb)   SpO2 100%   BMI 24 19 kg/m²     Physical Exam:    Patient is alert and oriented  Patient is not in distress  Vital signs are as above  There is no scleral icterus,   no oral thrush, no palpable neck mass, clear lung fields, regular heart rate, abdomen  soft and non tender, no palpable abdominal mass, no ascites, no edema of ankles, no calf tenderness, no focal neurological deficit, no skin rash, no palpable lymphadenopathy, good arterial pulses, no clubbing  Patient is anxious  Performance status 1      IMAGING:      LABS:  Results for orders placed or performed in visit on 09/20/19   Urine culture   Result Value Ref Range    Urine Culture <10,000 cfu/ml     CBC and differential   Result Value Ref Range    WBC 5 49 4 31 - 10 16 Thousand/uL    RBC 4 11 3 81 - 5 12 Million/uL    Hemoglobin 13 3 11 5 - 15 4 g/dL    Hematocrit 40 4 34 8 - 46 1 %    MCV 98 82 - 98 fL    MCH 32 4 26 8 - 34 3 pg    MCHC 32 9 31 4 - 37 4 g/dL    RDW 12 2 11 6 - 15 1 %    MPV 11 3 8 9 - 12 7 fL    Platelets 226 140 - 579 Thousands/uL    nRBC 0 /100 WBCs    Neutrophils Relative 48 43 - 75 %    Immat GRANS % 0 0 - 2 %    Lymphocytes Relative 38 14 - 44 %    Monocytes Relative 9 4 - 12 %    Eosinophils Relative 4 0 - 6 %    Basophils Relative 1 0 - 1 %    Neutrophils Absolute 2 63 1 85 - 7 62 Thousands/µL    Immature Grans Absolute 0 01 0 00 - 0 20 Thousand/uL    Lymphocytes Absolute 2 10 0 60 - 4 47 Thousands/µL    Monocytes Absolute 0 49 0 17 - 1 22 Thousand/µL    Eosinophils Absolute 0 22 0 00 - 0 61 Thousand/µL    Basophils Absolute 0 04 0 00 - 0 10 Thousands/µL   IgG, IgA, IgM   Result Value Ref Range    IGA 79 0 70 0 - 400 0 mg/dL    IGG 1,260 0 700 0-1,600 0 mg/dL    IGM 95 0 40 0 - 230 0 mg/dL   Contains abnormal data Comprehensive metabolic panel   Order: [id number]   Status:  Final result   Visible to patient:  Yes (MyChart) Next appt:  10/15/2019 at 09:30 AM in [facility] ([facility] [place]) Dx:  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le      (important suggestion)  Newer results are available  Click to view them now  Ref Range & Units 3/18/19 11:04 AM 9/24/18  9:35 AM 9/5/18  8:52 AM   Sodium 136 - 145 mmol/L 132Low   134Low   136    Potassium 3 5 - 5 3 mmol/L 3 8  3 9  3 8    Chloride 100 - 108 mmol/L 97Low   98Low   100    CO2 21 - 32 mmol/L 31  32  32    ANION GAP 4 - 13 mmol/L 4  4  4    BUN 5 - 25 mg/dL 13  23  23    Creatinine 0 60 - 1 30 mg/dL 0 67  0 87 CM 0 90 CM   Comment: Standardized to IDMS reference method   Glucose 65 - 140 mg/dL 90      Comment:    If the patient is fasting, the ADA then defines impaired fasting glucose as > 100 mg/dL and diabetes as > or equal to 123 mg/dL  Specimen collection should occur prior to Sulfasalazine administration due to the potential for falsely depressed results  Specimen collection should occur prior to Sulfapyridine administration due to the potential for falsely elevated results  Calcium 8 3 - 10 1 mg/dL 9 1  9 2  9 2    AST 5 - 45 U/L 25  22 CM    Comment:    Specimen collection should occur prior to Sulfasalazine administration due to the potential for falsely depressed results  ALT 12 - 78 U/L 30  31 CM    Comment:    Specimen collection should occur prior to Sulfasalazine and/or Sulfapyridine administration due to the potential for falsely depressed results  Alkaline Phosphatase 46 - 116 U/L 84  105     Total Protein 6 4 - 8 2 g/dL 7 5  7 7     Albumin 3 5 - 5 0 g/dL 3 9  3 8     Total Bilirubin 0 20 - 1 00 mg/dL 0 45  0 83     eGFR ml/min/1 73sq m 83  63  60       Narrative     National Kidney Disease Education Program recommendations are as follows:  GFR calculation is accurate only with a steady state creatinine  Chronic Kidney disease less than 60 ml/min/1 73 sq  meters  Kidney failure less than 15 ml/min/1 73 sq  meters        Specimen Collected: 03/18/19 11:04 AM Last Resulted: 03/18/19  4:28 PM Labs, Imaging, & Other studies:   All pertinent labs and imaging studies were personally reviewed      Reviewed and discussed with patient  Assessment and plan:   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   Patient has not required therapy   She is not symptomatic from this condition   She has some tiredness  Has arthritic symptoms  Other problems are listed below    History of pancreatic cyst and IBS she follows with  Dr Tamara Domínguez, gastroenterologist   She does not have GI symptoms at present     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 and BCG and undergoes cystoscopies on regular basis  No history of hematuria  She has 2 stents in her heart  She has history of kidney stone  She has history of Raynaud's  She had herpes zoster in January 2016  History of osteopenia  She has been on vitamin D and prolia  She has appointment with her rheumatologist   History of hepatitis in 1993   History of ALONSO and BSO in 1980 for bleeding  No cancer  History of pulmonary embolism several years ago  History of stomach ulcer in 1970 of pneumonias in 2003 and 2004  of hypertension  She is sensitive to cold weather  History of arthritis in the neck and all over  She follows with a rheumatologist      Physical examination and test results are as recorded and discussed  No more anemia  Hematologically stable  Plan is surveillance  Condition discussed and explained  Questions answered  Discussed the importance of self-breast examination, eating healthy foods, staying active and health screening tests     Patient is capable of self-care    1  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HCC)    - CBC and differential; Future  - Comprehensive metabolic panel; Future  - LD,Blood; Future          Patient voiced understanding and agreement in the discussion         Counseling / Coordination of Care   Greater than 50% of total time was spent with the patient and / or family counseling and / or coordination of care

## 2019-09-28 RX ORDER — DULOXETIN HYDROCHLORIDE 30 MG/1
30 CAPSULE, DELAYED RELEASE ORAL DAILY
Qty: 90 CAPSULE | Refills: 1 | Status: SHIPPED | OUTPATIENT
Start: 2019-09-28 | End: 2020-03-28

## 2019-09-28 RX ORDER — MONTELUKAST SODIUM 10 MG/1
10 TABLET ORAL DAILY PRN
Qty: 90 TABLET | Refills: 1 | Status: SHIPPED | OUTPATIENT
Start: 2019-09-28 | End: 2020-01-11

## 2019-10-15 ENCOUNTER — IMMUNIZATIONS (OUTPATIENT)
Dept: FAMILY MEDICINE CLINIC | Facility: CLINIC | Age: 84
End: 2019-10-15
Payer: MEDICARE

## 2019-10-15 DIAGNOSIS — Z23 NEED FOR INFLUENZA VACCINATION: Primary | ICD-10-CM

## 2019-10-15 PROCEDURE — G0008 ADMIN INFLUENZA VIRUS VAC: HCPCS

## 2019-10-15 PROCEDURE — 90662 IIV NO PRSV INCREASED AG IM: CPT

## 2019-11-08 ENCOUNTER — OFFICE VISIT (OUTPATIENT)
Dept: FAMILY MEDICINE CLINIC | Facility: CLINIC | Age: 84
End: 2019-11-08
Payer: MEDICARE

## 2019-11-08 VITALS
OXYGEN SATURATION: 99 % | BODY MASS INDEX: 24 KG/M2 | RESPIRATION RATE: 17 BRPM | WEIGHT: 127 LBS | DIASTOLIC BLOOD PRESSURE: 80 MMHG | TEMPERATURE: 97.5 F | HEART RATE: 75 BPM | SYSTOLIC BLOOD PRESSURE: 144 MMHG

## 2019-11-08 DIAGNOSIS — N30.01 ACUTE CYSTITIS WITH HEMATURIA: Primary | ICD-10-CM

## 2019-11-08 DIAGNOSIS — R35.0 FREQUENCY OF URINATION: ICD-10-CM

## 2019-11-08 LAB
SL AMB  POCT GLUCOSE, UA: ABNORMAL
SL AMB LEUKOCYTE ESTERASE,UA: ABNORMAL
SL AMB POCT BILIRUBIN,UA: ABNORMAL
SL AMB POCT BLOOD,UA: ABNORMAL
SL AMB POCT CLARITY,UA: ABNORMAL
SL AMB POCT COLOR,UA: YELLOW
SL AMB POCT KETONES,UA: ABNORMAL
SL AMB POCT NITRITE,UA: ABNORMAL
SL AMB POCT PH,UA: 6.5
SL AMB POCT SPECIFIC GRAVITY,UA: 1
SL AMB POCT URINE PROTEIN: ABNORMAL
SL AMB POCT UROBILINOGEN: ABNORMAL

## 2019-11-08 PROCEDURE — 87086 URINE CULTURE/COLONY COUNT: CPT | Performed by: PHYSICIAN ASSISTANT

## 2019-11-08 PROCEDURE — 81002 URINALYSIS NONAUTO W/O SCOPE: CPT | Performed by: PHYSICIAN ASSISTANT

## 2019-11-08 PROCEDURE — 87077 CULTURE AEROBIC IDENTIFY: CPT | Performed by: PHYSICIAN ASSISTANT

## 2019-11-08 PROCEDURE — 87186 SC STD MICRODIL/AGAR DIL: CPT | Performed by: PHYSICIAN ASSISTANT

## 2019-11-08 PROCEDURE — 99213 OFFICE O/P EST LOW 20 MIN: CPT | Performed by: PHYSICIAN ASSISTANT

## 2019-11-08 RX ORDER — SULFAMETHOXAZOLE AND TRIMETHOPRIM 800; 160 MG/1; MG/1
1 TABLET ORAL 2 TIMES DAILY
Qty: 6 TABLET | Refills: 0 | Status: SHIPPED | OUTPATIENT
Start: 2019-11-08 | End: 2019-11-11

## 2019-11-08 NOTE — PROGRESS NOTES
Assessment/Plan:      Diagnoses and all orders for this visit:    Acute cystitis with hematuria  -     sulfamethoxazole-trimethoprim (BACTRIM DS) 800-160 mg per tablet; Take 1 tablet by mouth 2 (two) times a day for 3 days    Frequency of urination  -     POCT urine dip  -     Urine culture; Future  -     Urine culture          Subjective:     Patient ID: Jazmine Check is a 82 y o  female  Chief Complaint   Patient presents with    Urinary Frequency     burning x 1 week      HPI   Patient is a 83 yo female who presents with concern for UTI  She started with frequency about 1 week ago on and off  The past few days she then started noticing suprapubic pressure and dysuria  No visible blood in urine but has chronic hematuria due to hx of bladder cancer  No fever, chills, flank pain, n/v, change in appetite or mental status  Review of Systems   Constitutional: Negative for appetite change, chills, diaphoresis and fever  Respiratory: Negative  Cardiovascular: Negative  Gastrointestinal: Negative  Genitourinary: Positive for dysuria and frequency  Negative for decreased urine volume and hematuria  Skin: Negative  Objective:  Vitals:    11/08/19 1314   BP: 144/80   Pulse: 75   Resp: 17   Temp: 97 5 °F (36 4 °C)   SpO2: 99%        Physical Exam   Constitutional: She is oriented to person, place, and time  She appears well-developed and well-nourished  No distress  Cardiovascular: Normal rate and regular rhythm  Pulmonary/Chest: Effort normal and breath sounds normal  No respiratory distress  Abdominal: Soft  Normal appearance and bowel sounds are normal  There is tenderness in the suprapubic area  There is no rigidity, no rebound, no guarding and no CVA tenderness  Musculoskeletal: She exhibits no edema  Neurological: She is alert and oriented to person, place, and time  Skin: Skin is warm and dry  No pallor  Psychiatric: She has a normal mood and affect   Her behavior is normal

## 2019-11-10 LAB — BACTERIA UR CULT: ABNORMAL

## 2019-12-23 ENCOUNTER — OFFICE VISIT (OUTPATIENT)
Dept: FAMILY MEDICINE CLINIC | Facility: CLINIC | Age: 84
End: 2019-12-23
Payer: MEDICARE

## 2019-12-23 VITALS
WEIGHT: 128 LBS | OXYGEN SATURATION: 94 % | BODY MASS INDEX: 24.19 KG/M2 | HEART RATE: 67 BPM | RESPIRATION RATE: 16 BRPM | TEMPERATURE: 97.3 F | DIASTOLIC BLOOD PRESSURE: 86 MMHG | SYSTOLIC BLOOD PRESSURE: 134 MMHG

## 2019-12-23 DIAGNOSIS — Z85.51 HISTORY OF MALIGNANT NEOPLASM OF BLADDER: ICD-10-CM

## 2019-12-23 DIAGNOSIS — R31.29 MICROSCOPIC HEMATURIA: ICD-10-CM

## 2019-12-23 DIAGNOSIS — R30.9 PAINFUL URINATION: Primary | ICD-10-CM

## 2019-12-23 DIAGNOSIS — R10.2 SUPRAPUBIC PAIN: ICD-10-CM

## 2019-12-23 DIAGNOSIS — M54.50 LUMBAR BACK PAIN: ICD-10-CM

## 2019-12-23 LAB
SL AMB  POCT GLUCOSE, UA: ABNORMAL
SL AMB LEUKOCYTE ESTERASE,UA: ABNORMAL
SL AMB POCT BILIRUBIN,UA: ABNORMAL
SL AMB POCT BLOOD,UA: ABNORMAL
SL AMB POCT CLARITY,UA: CLEAR
SL AMB POCT COLOR,UA: ABNORMAL
SL AMB POCT KETONES,UA: ABNORMAL
SL AMB POCT NITRITE,UA: ABNORMAL
SL AMB POCT PH,UA: 6.5
SL AMB POCT SPECIFIC GRAVITY,UA: 1
SL AMB POCT URINE PROTEIN: ABNORMAL
SL AMB POCT UROBILINOGEN: ABNORMAL

## 2019-12-23 PROCEDURE — 81002 URINALYSIS NONAUTO W/O SCOPE: CPT | Performed by: PHYSICIAN ASSISTANT

## 2019-12-23 PROCEDURE — 99213 OFFICE O/P EST LOW 20 MIN: CPT | Performed by: PHYSICIAN ASSISTANT

## 2019-12-23 PROCEDURE — 87086 URINE CULTURE/COLONY COUNT: CPT | Performed by: PHYSICIAN ASSISTANT

## 2019-12-23 RX ORDER — NITROFURANTOIN 25; 75 MG/1; MG/1
100 CAPSULE ORAL 2 TIMES DAILY
Qty: 10 CAPSULE | Refills: 0 | Status: SHIPPED | OUTPATIENT
Start: 2019-12-23 | End: 2019-12-28

## 2019-12-23 NOTE — LETTER
December 23, 2019     Carl Najera MD  [address] [address]    Patient: See Arreola   YOB: 1935   Date of Visit: 12/23/2019       Dear Dr Jean-Claude Milian:     See Arreola was a prior patient of Dr Nhung Heck and has routine follow up in Aug  She was recommended to follow up with your office sooner due to concerns below  If you have questions, please do not hesitate to call me  I look forward to following your patient along with you  Sincerely,        JIMENEZ Escoto PA-C  12/23/2019 10:58 AM  Sign at close encounter  Assessment/Plan:      Diagnoses and all orders for this visit:    Painful urination  -     POCT urine dip  -     Urine culture; Future  -     Urine culture  -     nitrofurantoin (MACROBID) 100 mg capsule; Take 1 capsule (100 mg total) by mouth 2 (two) times a day for 5 days    History of malignant neoplasm of bladder  -     nitrofurantoin (MACROBID) 100 mg capsule; Take 1 capsule (100 mg total) by mouth 2 (two) times a day for 5 days  - would call urology office to arrange an earlier follow up due to recurrent UTI  - recent CT abd and cystoscope in Aug with urology and follows yearly     Suprapubic pain  -     nitrofurantoin (MACROBID) 100 mg capsule; Take 1 capsule (100 mg total) by mouth 2 (two) times a day for 5 days  - if no improvement with tx of abx, may need to consider repeat ct abd or cystoscope  - recent E  Coli UTI treated last month with bactrim, was feeling better but now symptoms returned     Lumbar back pain  - chronic with arthritis, no bony tenderness    Microscopic hematuria  -     nitrofurantoin (MACROBID) 100 mg capsule; Take 1 capsule (100 mg total) by mouth 2 (two) times a day for 5 days          Subjective:     Patient ID: See Arreola is a 82 y o  female    Chief Complaint   Patient presents with    Abdominal Pain    Back Pain    Painful Urination     burning        HPI   Patient is a 83 yo female who presents for same day sick appt due to concern for UTI  She was just treated last month for E coli UTI with bactrim  She felt better after the antibiotics but also feels some symptoms have lingered  She suprapubic pressure and it wraps around to lumbar back  "My back gives me trouble already due to arthritis" so she normal chalks it up to that  No injury or fall  She has slight burning with urination and more frequency over night  She is feeling nauseous off and on  She gets flushed as if hot flash  She "doesn't feel so good"  No change in BM, does have IBS but has learned to control her diet  She sees urology yearly due to hx of bladder cancer and has always have blood in urine  She recently had CT abd and cystoscope done in Aug without concerns and will be following up again next aug  Review of Systems   Constitutional: Negative for chills, diaphoresis and fever  Respiratory: Negative  Cardiovascular: Negative  Gastrointestinal: Positive for abdominal pain and nausea  Negative for blood in stool and vomiting  IBS controlled currently     Genitourinary: Positive for dysuria and frequency  Negative for decreased urine volume, difficulty urinating and flank pain  Musculoskeletal: Positive for back pain  Negative for gait problem and joint swelling  Skin: Negative  Neurological: Negative for dizziness, syncope and weakness  Hematological: Negative  Objective:  Vitals:    12/23/19 1022   BP: 134/86   Pulse: 67   Resp: 16   Temp: (!) 97 3 °F (36 3 °C)   SpO2: 94%        Physical Exam   Constitutional: She is oriented to person, place, and time  She appears well-developed and well-nourished  She does not appear ill  No distress  HENT:   Head: Normocephalic and atraumatic  Mouth/Throat: Oropharynx is clear and moist    Eyes: Pupils are equal, round, and reactive to light  Conjunctivae are normal    Neck: Normal range of motion  Neck supple     Cardiovascular: Normal rate and regular rhythm  Pulmonary/Chest: Effort normal and breath sounds normal    Abdominal: Normal appearance and bowel sounds are normal  There is tenderness in the suprapubic area and left lower quadrant  There is no rigidity, no rebound, no guarding and no CVA tenderness  Musculoskeletal: She exhibits no edema or deformity  No midline lumbar tenderness or paraspinal tenderness   She points to pain wrapping around hips    Neurological: She is alert and oriented to person, place, and time  Skin: Skin is warm and dry  No rash noted  No pallor  Psychiatric: She has a normal mood and affect   Her behavior is normal

## 2019-12-23 NOTE — PROGRESS NOTES
Assessment/Plan:      Diagnoses and all orders for this visit:    Painful urination  -     POCT urine dip  -     Urine culture; Future  -     Urine culture  -     nitrofurantoin (MACROBID) 100 mg capsule; Take 1 capsule (100 mg total) by mouth 2 (two) times a day for 5 days    History of malignant neoplasm of bladder  -     nitrofurantoin (MACROBID) 100 mg capsule; Take 1 capsule (100 mg total) by mouth 2 (two) times a day for 5 days  - would call urology office to arrange an earlier follow up due to recurrent UTI  - recent CT abd and cystoscope in Aug with urology and follows yearly     Suprapubic pain  -     nitrofurantoin (MACROBID) 100 mg capsule; Take 1 capsule (100 mg total) by mouth 2 (two) times a day for 5 days  - if no improvement with tx of abx, may need to consider repeat ct abd or cystoscope  - recent E  Coli UTI treated last month with bactrim, was feeling better but now symptoms returned     Lumbar back pain  - chronic with arthritis, no bony tenderness    Microscopic hematuria  -     nitrofurantoin (MACROBID) 100 mg capsule; Take 1 capsule (100 mg total) by mouth 2 (two) times a day for 5 days          Subjective:     Patient ID: Maria Elena Mitchell is a 82 y o  female  Chief Complaint   Patient presents with    Abdominal Pain    Back Pain    Painful Urination     burning        HPI   Patient is a 83 yo female who presents for same day sick appt due to concern for UTI  She was just treated last month for E coli UTI with bactrim  She felt better after the antibiotics but also feels some symptoms have lingered  She suprapubic pressure and it wraps around to lumbar back  "My back gives me trouble already due to arthritis" so she normal chalks it up to that  No injury or fall  She has slight burning with urination and more frequency over night  She is feeling nauseous off and on  She gets flushed as if hot flash  She "doesn't feel so good"  No change in BM, does have IBS but has learned to control her diet  She sees urology yearly due to hx of bladder cancer and has always have blood in urine  She recently had CT abd and cystoscope done in Aug without concerns and will be following up again next aug  Review of Systems   Constitutional: Negative for chills, diaphoresis and fever  Respiratory: Negative  Cardiovascular: Negative  Gastrointestinal: Positive for abdominal pain and nausea  Negative for blood in stool and vomiting  IBS controlled currently     Genitourinary: Positive for dysuria and frequency  Negative for decreased urine volume, difficulty urinating and flank pain  Musculoskeletal: Positive for back pain  Negative for gait problem and joint swelling  Skin: Negative  Neurological: Negative for dizziness, syncope and weakness  Hematological: Negative  Objective:  Vitals:    12/23/19 1022   BP: 134/86   Pulse: 67   Resp: 16   Temp: (!) 97 3 °F (36 3 °C)   SpO2: 94%        Physical Exam   Constitutional: She is oriented to person, place, and time  She appears well-developed and well-nourished  She does not appear ill  No distress  HENT:   Head: Normocephalic and atraumatic  Mouth/Throat: Oropharynx is clear and moist    Eyes: Pupils are equal, round, and reactive to light  Conjunctivae are normal    Neck: Normal range of motion  Neck supple  Cardiovascular: Normal rate and regular rhythm  Pulmonary/Chest: Effort normal and breath sounds normal    Abdominal: Normal appearance and bowel sounds are normal  There is tenderness in the suprapubic area and left lower quadrant  There is no rigidity, no rebound, no guarding and no CVA tenderness  Musculoskeletal: She exhibits no edema or deformity  No midline lumbar tenderness or paraspinal tenderness   She points to pain wrapping around hips    Neurological: She is alert and oriented to person, place, and time  Skin: Skin is warm and dry  No rash noted  No pallor  Psychiatric: She has a normal mood and affect   Her behavior is normal

## 2019-12-24 DIAGNOSIS — R10.2 SUPRAPUBIC PAIN: ICD-10-CM

## 2019-12-24 DIAGNOSIS — R30.9 PAINFUL URINATION: Primary | ICD-10-CM

## 2019-12-24 DIAGNOSIS — R31.29 MICROSCOPIC HEMATURIA: ICD-10-CM

## 2019-12-24 DIAGNOSIS — M54.50 LUMBAR BACK PAIN: ICD-10-CM

## 2019-12-24 DIAGNOSIS — Z85.51 HISTORY OF MALIGNANT NEOPLASM OF BLADDER: ICD-10-CM

## 2019-12-24 LAB — BACTERIA UR CULT: NORMAL

## 2019-12-26 ENCOUNTER — TRANSCRIBE ORDERS (OUTPATIENT)
Dept: LAB | Facility: CLINIC | Age: 84
End: 2019-12-26

## 2019-12-26 ENCOUNTER — APPOINTMENT (OUTPATIENT)
Dept: LAB | Facility: CLINIC | Age: 84
End: 2019-12-26
Payer: MEDICARE

## 2019-12-26 DIAGNOSIS — I10 ESSENTIAL HYPERTENSION: ICD-10-CM

## 2019-12-26 DIAGNOSIS — I10 ESSENTIAL HYPERTENSION: Primary | ICD-10-CM

## 2019-12-26 LAB
ANION GAP SERPL CALCULATED.3IONS-SCNC: 5 MMOL/L (ref 4–13)
BUN SERPL-MCNC: 22 MG/DL (ref 5–25)
CALCIUM SERPL-MCNC: 9.6 MG/DL (ref 8.3–10.1)
CHLORIDE SERPL-SCNC: 103 MMOL/L (ref 100–108)
CO2 SERPL-SCNC: 32 MMOL/L (ref 21–32)
CREAT SERPL-MCNC: 0.86 MG/DL (ref 0.6–1.3)
GFR SERPL CREATININE-BSD FRML MDRD: 63 ML/MIN/1.73SQ M
GLUCOSE SERPL-MCNC: 97 MG/DL (ref 65–140)
POTASSIUM SERPL-SCNC: 3.7 MMOL/L (ref 3.5–5.3)
SODIUM SERPL-SCNC: 140 MMOL/L (ref 136–145)

## 2019-12-26 PROCEDURE — 80048 BASIC METABOLIC PNL TOTAL CA: CPT

## 2019-12-26 PROCEDURE — 36415 COLL VENOUS BLD VENIPUNCTURE: CPT

## 2019-12-30 ENCOUNTER — HOSPITAL ENCOUNTER (OUTPATIENT)
Dept: RADIOLOGY | Facility: MEDICAL CENTER | Age: 84
Discharge: HOME | End: 2019-12-30
Payer: MEDICARE

## 2019-12-30 DIAGNOSIS — R10.2 SUPRAPUBIC PAIN: ICD-10-CM

## 2019-12-30 DIAGNOSIS — M54.50 LUMBAR BACK PAIN: ICD-10-CM

## 2019-12-30 DIAGNOSIS — R30.9 PAINFUL URINATION: ICD-10-CM

## 2019-12-30 DIAGNOSIS — Z85.51 HISTORY OF MALIGNANT NEOPLASM OF BLADDER: ICD-10-CM

## 2019-12-30 DIAGNOSIS — R31.29 MICROSCOPIC HEMATURIA: ICD-10-CM

## 2019-12-30 PROCEDURE — 74177 CT ABD & PELVIS W/CONTRAST: CPT

## 2019-12-30 RX ADMIN — IOHEXOL 85 ML: 350 INJECTION, SOLUTION INTRAVENOUS at 14:22

## 2020-01-07 DIAGNOSIS — R93.5 ABNORMAL CT OF THE ABDOMEN: ICD-10-CM

## 2020-01-07 DIAGNOSIS — K31.89 GASTRIC WALL THICKENING: Primary | ICD-10-CM

## 2020-01-10 DIAGNOSIS — R60.0 LOWER EXTREMITY EDEMA: ICD-10-CM

## 2020-01-10 DIAGNOSIS — I10 ESSENTIAL HYPERTENSION: ICD-10-CM

## 2020-01-10 DIAGNOSIS — K21.9 GASTROESOPHAGEAL REFLUX DISEASE WITHOUT ESOPHAGITIS: ICD-10-CM

## 2020-01-10 DIAGNOSIS — J30.2 SEASONAL ALLERGIC RHINITIS, UNSPECIFIED TRIGGER: ICD-10-CM

## 2020-01-10 DIAGNOSIS — E78.5 HYPERLIPIDEMIA, UNSPECIFIED HYPERLIPIDEMIA TYPE: ICD-10-CM

## 2020-01-10 RX ORDER — BUMETANIDE 1 MG/1
TABLET ORAL
Qty: 90 TABLET | Refills: 0 | Status: SHIPPED | OUTPATIENT
Start: 2020-01-10 | End: 2020-06-23

## 2020-01-11 RX ORDER — MONTELUKAST SODIUM 10 MG/1
TABLET ORAL
Qty: 90 TABLET | Refills: 0 | Status: SHIPPED | OUTPATIENT
Start: 2020-01-11 | End: 2020-06-22

## 2020-01-11 RX ORDER — AMLODIPINE BESYLATE 5 MG/1
TABLET ORAL
Qty: 90 TABLET | Refills: 0 | Status: SHIPPED | OUTPATIENT
Start: 2020-01-11 | End: 2020-05-29

## 2020-01-11 RX ORDER — ATORVASTATIN CALCIUM 40 MG/1
TABLET, FILM COATED ORAL
Qty: 90 TABLET | Refills: 0 | Status: SHIPPED | OUTPATIENT
Start: 2020-01-11 | End: 2020-05-29

## 2020-01-11 RX ORDER — PANTOPRAZOLE SODIUM 40 MG/1
TABLET, DELAYED RELEASE ORAL
Qty: 90 TABLET | Refills: 0 | Status: SHIPPED | OUTPATIENT
Start: 2020-01-11 | End: 2020-02-05 | Stop reason: ALTCHOICE

## 2020-01-13 ENCOUNTER — OFFICE VISIT (OUTPATIENT)
Dept: GASTROENTEROLOGY | Facility: CLINIC | Age: 85
End: 2020-01-13
Payer: MEDICARE

## 2020-01-13 VITALS
HEART RATE: 66 BPM | DIASTOLIC BLOOD PRESSURE: 82 MMHG | SYSTOLIC BLOOD PRESSURE: 188 MMHG | HEIGHT: 61 IN | BODY MASS INDEX: 23.6 KG/M2 | TEMPERATURE: 97.8 F | WEIGHT: 125 LBS

## 2020-01-13 DIAGNOSIS — K31.89 GASTRIC WALL THICKENING: ICD-10-CM

## 2020-01-13 DIAGNOSIS — K21.9 CHRONIC GERD: ICD-10-CM

## 2020-01-13 DIAGNOSIS — K86.2 PANCREATIC CYST: ICD-10-CM

## 2020-01-13 DIAGNOSIS — R10.84 GENERALIZED ABDOMINAL PAIN: Primary | ICD-10-CM

## 2020-01-13 PROCEDURE — 99214 OFFICE O/P EST MOD 30 MIN: CPT | Performed by: PHYSICIAN ASSISTANT

## 2020-01-13 RX ORDER — OMEPRAZOLE 40 MG/1
40 CAPSULE, DELAYED RELEASE ORAL
Qty: 90 CAPSULE | Refills: 3 | Status: SHIPPED | OUTPATIENT
Start: 2020-01-13 | End: 2020-10-12

## 2020-01-13 NOTE — PROGRESS NOTES
[facility] - Outpatient Follow-up Note  Rekha Hutchison 82 y o  female MRN: [id number]  Encounter: [id number]          ASSESSMENT AND PLAN:      1  Generalized abdominal pain  2  Gastric wall thickening  3  Chronic GERD  She reports persistent burning abdominal pain associated with reflux symptoms and nausea for the past 2-3 months  Recent CT abdomen pelvis with contrast from 12/30/19 significant for diffuse thickening of the stomach and proximal duodenum, differential includes gastritis/duodenitis, ulceration, neoplasm  We will schedule EGD for further evaluation  Since Protonix is ineffective, I will prescribe omeprazole 40 mg daily instead  We could add Carafate if omeprazole does not provide enough relief  - EGD; Future  - omeprazole (PriLOSEC) 40 MG capsule; Take 1 capsule (40 mg total) by mouth daily before breakfast  Dispense: 90 capsule; Refill: 3    4  Pancreatic cyst  She reports longstanding history of pancreatic cysts  CT scan from 12/30/19 shows stable cystic lesions within the pancreatic neck and tail measuring 2 6 x 2 0 x 1 8 cm and 2 0 x 1 2 x 2 1 cm respectively  We discussed continuing to monitor the cysts with imaging vs performing EUS with possible FNA of the pancreatic cysts  Since we will be doing EGD anyway, she elects to have EUS for further evaluation      - Endoscopic ultrasonography, GI (Upper) Linear; Future    Follow-up with physician after procedures  ______________________________________________________________________    SUBJECTIVE:  68-year-old female with history of bladder cancer, leukemia, GERD, IBS, pancreatic cyst presenting for follow-up of abnormal imaging  She reports persistent abdominal burning pain for the past 2-3  The pain is generalized but located around her umbilicus  The pain is constant and fluctuates in intensity  Nothing seems to make the burning better or worse  She is taking pantoprazole 40 mg daily without relief    She has associated nausea as well as heartburn and belching  She denies dysphagia and odynophagia  She denies abnormal weight loss  She denies NSAID use  She takes Tylenol as needed for pain  She denies changes in bowel habits and bleeding  Her PCP ordered a CT scan which showed diffuse thickening of the gastric wall and proximal duodenum  She had an EGD 10-15 years ago - she cannot recall the findings  REVIEW OF SYSTEMS IS OTHERWISE NEGATIVE  Historical Information   Past Medical History:   Diagnosis Date    Bladder cancer ([address] )     Coronary artery disease     S/P stent placement x 2 in 2011    GERD (gastroesophageal reflux disease)     Hepatitis     Hyperlipidemia     Hypertension     Kidney stones     Malignant neoplasm of urethra (HCC)     Pneumonia     Shingles      Past Surgical History:   Procedure Laterality Date    CATARACT EXTRACTION      CORONARY ANGIOPLASTY WITH STENT PLACEMENT      stent x 2    CYSTOSCOPY      diagnostic - onset 4/4/17    EYE SURGERY      HYSTERECTOMY      LEG SURGERY      OOPHORECTOMY      [place] OPEN RX FEMUR FX+INTRAMED ALEJANDRO Left 4/8/2018    Procedure: INSERTION NAIL IM FEMUR ANTEGRADE (TROCHANTERIC);   Surgeon: El Barnes MD;  [facility];  Service: Orthopedics     Social History   Social History     Substance and Sexual Activity   Alcohol Use No     Social History     Substance and Sexual Activity   Drug Use No     Social History     Tobacco Use   Smoking Status Never Smoker   Smokeless Tobacco Never Used     Family History   Problem Relation Age of Onset    Arthritis Mother     Osteoporosis Mother     Heart disease Father     Hypertension Father     Hypertension Brother     Prostate cancer Brother     Breast cancer Daughter 48    Prostate cancer Son        Meds/Allergies       Current Outpatient Medications:     acetaminophen (TYLENOL) 500 mg tablet    amLODIPine (NORVASC) 5 mg tablet    aspirin 81 MG tablet    atenolol (TENORMIN) 25 mg tablet    atorvastatin (LIPITOR) 40 mg tablet    bumetanide (BUMEX) 1 mg tablet    Cholecalciferol 2000 units TABS    dicyclomine (BENTYL) 10 mg capsule    DULoxetine (CYMBALTA) 30 mg delayed release capsule    fexofenadine (ALLEGRA) 180 MG tablet    METHYLCELLULOSE, LAXATIVE, PO    montelukast (SINGULAIR) 10 mg tablet    MULTIPLE VITAMIN PO    pantoprazole (PROTONIX) 40 mg tablet    DUREZOL 0 05 % EMUL    gatifloxacin (ZYMAXID) 0 5 %    ILEVRO 0 3 % SUSP    omeprazole (PriLOSEC) 40 MG capsule    Allergies   Allergen Reactions    Lactose      Other reaction(s): Indigestion/GI Upset    Other      Other reaction(s): Mental Status Change  After surgery  Whole blood transfusion  = passed out   historical intolerance; verified with patient; had no reaction to PRBC transfusions afterward           Objective     Blood pressure (!) 188/82, pulse 66, temperature 97 8 °F (36 6 °C), temperature source Tympanic, height 5' 1" (1 549 m), weight 56 7 kg (125 lb)  Body mass index is 23 62 kg/m²  PHYSICAL EXAM:      General Appearance:   Alert, cooperative, no distress   HEENT:   Normocephalic, atraumatic, anicteric      Neck:  Supple, symmetrical, trachea midline   Lungs:   Clear to auscultation bilaterally; no rales, rhonchi or wheezing; respirations unlabored    Heart[de-identified]   Regular rate and rhythm; no murmur, rub, or gallop  Abdomen:   Soft, non-tender, non-distended; normal bowel sounds; no masses, no organomegaly    Genitalia:   Deferred    Rectal:   Deferred    Extremities:  No cyanosis, clubbing or edema    Pulses:  2+ and symmetric    Skin:  No jaundice, rashes, or lesions    Lymph nodes:  No palpable cervical lymphadenopathy        Lab Results:   No visits with results within 1 Day(s) from this visit     Latest known visit with results is:   Appointment on 12/26/2019   Component Date Value    Sodium 12/26/2019 140     Potassium 12/26/2019 3 7     Chloride 12/26/2019 103     CO2 12/26/2019 32  ANION GAP 12/26/2019 5     BUN 12/26/2019 22     Creatinine 12/26/2019 0 86     Glucose 12/26/2019 97     Calcium 12/26/2019 9 6     eGFR 12/26/2019 63          Radiology Results:   Ct Abdomen Pelvis W Contrast    Result Date: 1/5/2020  Narrative: CT ABDOMEN AND PELVIS WITH IV CONTRAST INDICATION: Abdominal pain, 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 treated with laser therapy and BCG  COMPARISON:  CT abdomen and pelvis study of July 29, 2019  TECHNIQUE:  CT examination of the abdomen and pelvis was performed  Axial, sagittal, and coronal 2D reformatted images were created from the source data and submitted for interpretation  Radiation dose length product (DLP) for this visit:  492 mGy-cm   This examination, like all CT scans performed in the [facility], was performed utilizing techniques to minimize radiation dose exposure, including the use of iterative reconstruction and automated exposure control  IV Contrast:  85 mL of iohexol (OMNIPAQUE) Enteric Contrast:  Enteric contrast was not administered  FINDINGS: ABDOMEN LOWER CHEST:  No clinically significant abnormality identified in the visualized lower chest   Coronary vascular calcifications and coronary stents  LIVER/BILIARY TREE:  Stable subcentimeter hypodensities within the left right hepatic lobes  GALLBLADDER:  Punctate calcified densities in the gallbladder neck region measuring 3 to 4 mm  No intra or extrahepatic ductal dilatation  The common bile duct measures approximately 5 mm  SPLEEN:  Unremarkable  PANCREAS:  Redemonstrated cystic lesions within the pancreatic neck and tail, not significantly changed in size or contour  The pancreatic head lesion again measures 2 6 x 2 0 x 1 8 cm while the pancreatic tail lesion measures approximately 2 0 x 1 2 x 2 1 cm  ADRENAL GLANDS:  Unremarkable   KIDNEYS/URETERS:  Numerous hypodense lesions within both kidneys, not significantly changed when compared to the prior study  No obstructive uropathy  STOMACH AND BOWEL:  Interval development of diffuse gastric wall thickening extending to the proximal duodenum where a duodenal diverticulum is also suspected  The transverse colon is redundant and low-lying  APPENDIX:  No findings to suggest appendicitis  ABDOMINOPELVIC CAVITY:  No ascites or free intraperitoneal air  No lymphadenopathy  VESSELS:  Heavy atheromatous calcification of the abdominal aorta and its branches without aneurysmal dilatation  PELVIS REPRODUCTIVE ORGANS:  Hysterectomy and bilateral oophorectomy  URINARY BLADDER:  Unremarkable  ABDOMINAL WALL/INGUINAL REGIONS:  Redemonstrated supraumbilical fat-containing hernia without acute inflammatory changes  A small amount of fluid is noted in the dependent portion of the hernia, unchanged from the prior study  OSSEOUS STRUCTURES:  Severe osteoporosis, multilevel chronic compression deformities, scoliosis and multilevel spondylosis with severe lower lumbar facet arthropathy there is moderate to severe spinal stenosis at L4-L5  Degenerative changes of the SI joints and pubic symphysis  Left intertrochanteric trochanteric fixation emmy  Impression: Diffuse thickening of the gastric wall and proximal duodenum correlate for gastritis and duodenitis  Other etiologies not excluded, particularly given the patient's history of neoplastic disease  Follow-up with gastroenterology is recommended  Consider EGD correlation  Stable pancreatic lesions as discussed above and previously  Again, follow-up with gastroenterology is recommended  Again, the recommendations regarding pancreatic findings assumes that the patient does not have a family history of pancreatic cancer nor have any symptoms potentially attributable to pancreatic cystic lesions    These conditions are not true, then the management should be deferred to judgment of specialist such as gastroenterologist and oncologic surgeons  Recommendations are based on  recent consistent statements on management of pancreatic cystic lesions from the [address] [place] [address] gastroenterology Association, [employer] of radiology, the Journal pancreatic pathology, and are on institutional consensus  MRI imaging with and without contrast could be utilized for further interrogation of the pancreatic lesions  Cholelithiasis without cholecystitis  Stable hypodense hepatic and renal lesions  I received this study for interpretation on January 5, 2020 at 11:30 AM   Findings were marked as significant in Epic at 11:49 AM on January 5, 2020 and will now be related to the ordering physician/referring clinician by the radiology liaison   Workstation performed: [id number]

## 2020-01-13 NOTE — LETTER
January 13, 2020     Wallace Adrian, DO  [address]  [address]  [address] [place] [id number]    Patient: Zelda Valladares   YOB: 1935   Date of Visit: 1/13/2020       Dear Dr Ana Mendenhall: Thank you for referring Zelda Valladares to me for evaluation  Below are my notes for this consultation  If you have questions, please do not hesitate to call me  I look forward to following your patient along with you  Sincerely,        Delroy Prado PA-C        CC: No Recipients  Delroy Prado PA-C  1/13/2020 11:31 AM  Sign at close encounter  [facility] - Outpatient Follow-up Note  Zelda Valladares 82 y o  female MRN: [id number]  Encounter: [id number]          ASSESSMENT AND PLAN:      1  Generalized abdominal pain  2  Gastric wall thickening  3  Chronic GERD  She reports persistent burning abdominal pain associated with reflux symptoms and nausea for the past 2-3 months  Recent CT abdomen pelvis with contrast from 12/30/19 significant for diffuse thickening of the stomach and proximal duodenum, differential includes gastritis/duodenitis, ulceration, neoplasm  We will schedule EGD for further evaluation  Since Protonix is ineffective, I will prescribe omeprazole 40 mg daily instead  We could add Carafate if omeprazole does not provide enough relief  - EGD; Future  - omeprazole (PriLOSEC) 40 MG capsule; Take 1 capsule (40 mg total) by mouth daily before breakfast  Dispense: 90 capsule; Refill: 3    4  Pancreatic cyst  She reports longstanding history of pancreatic cysts  CT scan from 12/30/19 shows stable cystic lesions within the pancreatic neck and tail measuring 2 6 x 2 0 x 1 8 cm and 2 0 x 1 2 x 2 1 cm respectively  We discussed continuing to monitor the cysts with imaging vs performing EUS with possible FNA of the pancreatic cysts   Since we will be doing EGD anyway, she elects to have EUS for further evaluation      - Endoscopic ultrasonography, GI (Upper) Linear; Future    Follow-up with physician after procedures  ______________________________________________________________________    SUBJECTIVE:  65-year-old female with history of bladder cancer, leukemia, GERD, IBS, pancreatic cyst presenting for follow-up of abnormal imaging  She reports persistent abdominal burning pain for the past 2-3  The pain is generalized but located around her umbilicus  The pain is constant and fluctuates in intensity  Nothing seems to make the burning better or worse  She is taking pantoprazole 40 mg daily without relief  She has associated nausea as well as heartburn and belching  She denies dysphagia and odynophagia  She denies abnormal weight loss  She denies NSAID use  She takes Tylenol as needed for pain  She denies changes in bowel habits and bleeding  Her PCP ordered a CT scan which showed diffuse thickening of the gastric wall and proximal duodenum  She had an EGD 10-15 years ago - she cannot recall the findings  REVIEW OF SYSTEMS IS OTHERWISE NEGATIVE  Historical Information   Past Medical History:   Diagnosis Date    Bladder cancer ([address] )     Coronary artery disease     S/P stent placement x 2 in 2011    GERD (gastroesophageal reflux disease)     Hepatitis     Hyperlipidemia     Hypertension     Kidney stones     Malignant neoplasm of urethra (HCC)     Pneumonia     Shingles      Past Surgical History:   Procedure Laterality Date    CATARACT EXTRACTION      CORONARY ANGIOPLASTY WITH STENT PLACEMENT      stent x 2    CYSTOSCOPY      diagnostic - onset 4/4/17    EYE SURGERY      HYSTERECTOMY      LEG SURGERY      OOPHORECTOMY      [place] OPEN RX FEMUR FX+INTRAMED ALEJANDRO Left 4/8/2018    Procedure: INSERTION NAIL IM FEMUR ANTEGRADE (TROCHANTERIC);   Surgeon: Dg Richard MD;  [facility];  Service: Orthopedics     Social History   Social History     Substance and Sexual Activity   Alcohol Use No     Social History     Substance and Sexual Activity   Drug Use No     Social History     Tobacco Use   Smoking Status Never Smoker   Smokeless Tobacco Never Used     Family History   Problem Relation Age of Onset    Arthritis Mother     Osteoporosis Mother     Heart disease Father     Hypertension Father     Hypertension Brother     Prostate cancer Brother     Breast cancer Daughter 48    Prostate cancer Son        Meds/Allergies       Current Outpatient Medications:     acetaminophen (TYLENOL) 500 mg tablet    amLODIPine (NORVASC) 5 mg tablet    aspirin 81 MG tablet    atenolol (TENORMIN) 25 mg tablet    atorvastatin (LIPITOR) 40 mg tablet    bumetanide (BUMEX) 1 mg tablet    Cholecalciferol 2000 units TABS    dicyclomine (BENTYL) 10 mg capsule    DULoxetine (CYMBALTA) 30 mg delayed release capsule    fexofenadine (ALLEGRA) 180 MG tablet    METHYLCELLULOSE, LAXATIVE, PO    montelukast (SINGULAIR) 10 mg tablet    MULTIPLE VITAMIN PO    pantoprazole (PROTONIX) 40 mg tablet    DUREZOL 0 05 % EMUL    gatifloxacin (ZYMAXID) 0 5 %    ILEVRO 0 3 % SUSP    omeprazole (PriLOSEC) 40 MG capsule    Allergies   Allergen Reactions    Lactose      Other reaction(s): Indigestion/GI Upset    Other      Other reaction(s): Mental Status Change  After surgery  Whole blood transfusion  = passed out   historical intolerance; verified with patient; had no reaction to PRBC transfusions afterward           Objective     Blood pressure (!) 188/82, pulse 66, temperature 97 8 °F (36 6 °C), temperature source Tympanic, height 5' 1" (1 549 m), weight 56 7 kg (125 lb)  Body mass index is 23 62 kg/m²  PHYSICAL EXAM:      General Appearance:   Alert, cooperative, no distress   HEENT:   Normocephalic, atraumatic, anicteric      Neck:  Supple, symmetrical, trachea midline   Lungs:   Clear to auscultation bilaterally; no rales, rhonchi or wheezing; respirations unlabored    Heart[de-identified]   Regular rate and rhythm; no murmur, rub, or gallop     Abdomen:   Soft, non-tender, non-distended; normal bowel sounds; no masses, no organomegaly    Genitalia:   Deferred    Rectal:   Deferred    Extremities:  No cyanosis, clubbing or edema    Pulses:  2+ and symmetric    Skin:  No jaundice, rashes, or lesions    Lymph nodes:  No palpable cervical lymphadenopathy        Lab Results:   No visits with results within 1 Day(s) from this visit  Latest known visit with results is:   Appointment on 12/26/2019   Component Date Value    Sodium 12/26/2019 140     Potassium 12/26/2019 3 7     Chloride 12/26/2019 103     CO2 12/26/2019 32     ANION GAP 12/26/2019 5     BUN 12/26/2019 22     Creatinine 12/26/2019 0 86     Glucose 12/26/2019 97     Calcium 12/26/2019 9 6     eGFR 12/26/2019 63          Radiology Results:   Ct Abdomen Pelvis W Contrast    Result Date: 1/5/2020  Narrative: CT ABDOMEN AND PELVIS WITH IV CONTRAST INDICATION: Abdominal pain, 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 treated with laser therapy and BCG  COMPARISON:  CT abdomen and pelvis study of July 29, 2019  TECHNIQUE:  CT examination of the abdomen and pelvis was performed  Axial, sagittal, and coronal 2D reformatted images were created from the source data and submitted for interpretation  Radiation dose length product (DLP) for this visit:  492 mGy-cm   This examination, like all CT scans performed in the [facility], was performed utilizing techniques to minimize radiation dose exposure, including the use of iterative reconstruction and automated exposure control  IV Contrast:  85 mL of iohexol (OMNIPAQUE) Enteric Contrast:  Enteric contrast was not administered  FINDINGS: ABDOMEN LOWER CHEST:  No clinically significant abnormality identified in the visualized lower chest   Coronary vascular calcifications and coronary stents  LIVER/BILIARY TREE:  Stable subcentimeter hypodensities within the left right hepatic lobes  GALLBLADDER:  Punctate calcified densities in the gallbladder neck region measuring 3 to 4 mm  No intra or extrahepatic ductal dilatation  The common bile duct measures approximately 5 mm  SPLEEN:  Unremarkable  PANCREAS:  Redemonstrated cystic lesions within the pancreatic neck and tail, not significantly changed in size or contour  The pancreatic head lesion again measures 2 6 x 2 0 x 1 8 cm while the pancreatic tail lesion measures approximately 2 0 x 1 2 x 2 1 cm  ADRENAL GLANDS:  Unremarkable  KIDNEYS/URETERS:  Numerous hypodense lesions within both kidneys, not significantly changed when compared to the prior study  No obstructive uropathy  STOMACH AND BOWEL:  Interval development of diffuse gastric wall thickening extending to the proximal duodenum where a duodenal diverticulum is also suspected  The transverse colon is redundant and low-lying  APPENDIX:  No findings to suggest appendicitis  ABDOMINOPELVIC CAVITY:  No ascites or free intraperitoneal air  No lymphadenopathy  VESSELS:  Heavy atheromatous calcification of the abdominal aorta and its branches without aneurysmal dilatation  PELVIS REPRODUCTIVE ORGANS:  Hysterectomy and bilateral oophorectomy  URINARY BLADDER:  Unremarkable  ABDOMINAL WALL/INGUINAL REGIONS:  Redemonstrated supraumbilical fat-containing hernia without acute inflammatory changes  A small amount of fluid is noted in the dependent portion of the hernia, unchanged from the prior study  OSSEOUS STRUCTURES:  Severe osteoporosis, multilevel chronic compression deformities, scoliosis and multilevel spondylosis with severe lower lumbar facet arthropathy there is moderate to severe spinal stenosis at L4-L5  Degenerative changes of the SI joints and pubic symphysis  Left intertrochanteric trochanteric fixation emmy  Impression: Diffuse thickening of the gastric wall and proximal duodenum correlate for gastritis and duodenitis    Other etiologies not excluded, particularly given the patient's history of neoplastic disease  Follow-up with gastroenterology is recommended  Consider EGD correlation  Stable pancreatic lesions as discussed above and previously  Again, follow-up with gastroenterology is recommended  Again, the recommendations regarding pancreatic findings assumes that the patient does not have a family history of pancreatic cancer nor have any symptoms potentially attributable to pancreatic cystic lesions  These conditions are not true, then the management should be deferred to judgment of specialist such as gastroenterologist and oncologic surgeons  Recommendations are based on  recent consistent statements on management of pancreatic cystic lesions from the [address] [place] [address] gastroenterology Association, [employer] of radiology, the Journal pancreatic pathology, and are on institutional consensus  MRI imaging with and without contrast could be utilized for further interrogation of the pancreatic lesions  Cholelithiasis without cholecystitis  Stable hypodense hepatic and renal lesions  I received this study for interpretation on January 5, 2020 at 11:30 AM   Findings were marked as significant in Epic at 11:49 AM on January 5, 2020 and will now be related to the ordering physician/referring clinician by the radiology liaison   Workstation performed: [id number]

## 2020-01-13 NOTE — PATIENT INSTRUCTIONS
EGD/EUS scheduled with Dr Britt in [place] on 2/20/20  Hali Taylor gave patient verbal instructions/paper work given

## 2020-01-14 ENCOUNTER — OFFICE VISIT (OUTPATIENT)
Dept: FAMILY MEDICINE CLINIC | Facility: CLINIC | Age: 85
End: 2020-01-14
Payer: MEDICARE

## 2020-01-14 ENCOUNTER — APPOINTMENT (OUTPATIENT)
Dept: LAB | Facility: CLINIC | Age: 85
End: 2020-01-14
Payer: MEDICARE

## 2020-01-14 VITALS
WEIGHT: 126 LBS | HEIGHT: 60 IN | RESPIRATION RATE: 17 BRPM | DIASTOLIC BLOOD PRESSURE: 80 MMHG | TEMPERATURE: 97 F | SYSTOLIC BLOOD PRESSURE: 144 MMHG | HEART RATE: 68 BPM | OXYGEN SATURATION: 98 % | BODY MASS INDEX: 24.74 KG/M2

## 2020-01-14 DIAGNOSIS — I10 ESSENTIAL HYPERTENSION: Primary | ICD-10-CM

## 2020-01-14 DIAGNOSIS — C91.10 CHRONIC LARGE GRANULAR LYMPHOCYTIC LEUKEMIA (HCC): ICD-10-CM

## 2020-01-14 DIAGNOSIS — Z87.81 HISTORY OF FEMUR FRACTURE: ICD-10-CM

## 2020-01-14 DIAGNOSIS — M06.4 INFLAMMATORY POLYARTHROPATHIES (HCC): ICD-10-CM

## 2020-01-14 DIAGNOSIS — I10 ESSENTIAL HYPERTENSION: ICD-10-CM

## 2020-01-14 DIAGNOSIS — R23.2 FLUSHING: ICD-10-CM

## 2020-01-14 DIAGNOSIS — Z78.9 DIFFICULTY NAVIGATING STAIRS: ICD-10-CM

## 2020-01-14 DIAGNOSIS — I50.32 CHRONIC DIASTOLIC (CONGESTIVE) HEART FAILURE (HCC): Chronic | ICD-10-CM

## 2020-01-14 DIAGNOSIS — R26.89 BALANCE PROBLEMS: ICD-10-CM

## 2020-01-14 DIAGNOSIS — R10.30 LOWER ABDOMINAL PAIN, UNSPECIFIED: ICD-10-CM

## 2020-01-14 DIAGNOSIS — Z98.890 S/P ORIF (OPEN REDUCTION INTERNAL FIXATION) FRACTURE: ICD-10-CM

## 2020-01-14 DIAGNOSIS — D69.6 THROMBOCYTOPENIA (HCC): ICD-10-CM

## 2020-01-14 DIAGNOSIS — C67.9 MALIGNANT NEOPLASM OF URINARY BLADDER, UNSPECIFIED SITE (HCC): ICD-10-CM

## 2020-01-14 DIAGNOSIS — E21.3 HYPERPARATHYROIDISM, UNSPECIFIED (HCC): ICD-10-CM

## 2020-01-14 DIAGNOSIS — Z86.711 HISTORY OF PULMONARY EMBOLUS (PE): ICD-10-CM

## 2020-01-14 DIAGNOSIS — F40.298 FEAR OF FALLING: ICD-10-CM

## 2020-01-14 DIAGNOSIS — Z87.81 S/P ORIF (OPEN REDUCTION INTERNAL FIXATION) FRACTURE: ICD-10-CM

## 2020-01-14 DIAGNOSIS — S72.142D INTERTROCHANTERIC FRACTURE OF LEFT FEMUR, CLOSED, WITH ROUTINE HEALING, SUBSEQUENT ENCOUNTER: ICD-10-CM

## 2020-01-14 LAB
ALBUMIN SERPL BCP-MCNC: 4.2 G/DL (ref 3.5–5)
ALP SERPL-CCNC: 78 U/L (ref 46–116)
ALT SERPL W P-5'-P-CCNC: 41 U/L (ref 12–78)
ANION GAP SERPL CALCULATED.3IONS-SCNC: 1 MMOL/L (ref 4–13)
AST SERPL W P-5'-P-CCNC: 27 U/L (ref 5–45)
BASOPHILS # BLD AUTO: 0.04 THOUSANDS/ΜL (ref 0–0.1)
BASOPHILS NFR BLD AUTO: 1 % (ref 0–1)
BILIRUB SERPL-MCNC: 0.81 MG/DL (ref 0.2–1)
BUN SERPL-MCNC: 20 MG/DL (ref 5–25)
CALCIUM SERPL-MCNC: 9.9 MG/DL (ref 8.3–10.1)
CHLORIDE SERPL-SCNC: 101 MMOL/L (ref 100–108)
CO2 SERPL-SCNC: 33 MMOL/L (ref 21–32)
CREAT SERPL-MCNC: 0.92 MG/DL (ref 0.6–1.3)
EOSINOPHIL # BLD AUTO: 0.09 THOUSAND/ΜL (ref 0–0.61)
EOSINOPHIL NFR BLD AUTO: 2 % (ref 0–6)
ERYTHROCYTE [DISTWIDTH] IN BLOOD BY AUTOMATED COUNT: 12.2 % (ref 11.6–15.1)
GFR SERPL CREATININE-BSD FRML MDRD: 58 ML/MIN/1.73SQ M
GLUCOSE SERPL-MCNC: 83 MG/DL (ref 65–140)
HCT VFR BLD AUTO: 41.9 % (ref 34.8–46.1)
HGB BLD-MCNC: 13.7 G/DL (ref 11.5–15.4)
IMM GRANULOCYTES # BLD AUTO: 0.01 THOUSAND/UL (ref 0–0.2)
IMM GRANULOCYTES NFR BLD AUTO: 0 % (ref 0–2)
LYMPHOCYTES # BLD AUTO: 1.78 THOUSANDS/ΜL (ref 0.6–4.47)
LYMPHOCYTES NFR BLD AUTO: 38 % (ref 14–44)
MCH RBC QN AUTO: 31.9 PG (ref 26.8–34.3)
MCHC RBC AUTO-ENTMCNC: 32.7 G/DL (ref 31.4–37.4)
MCV RBC AUTO: 98 FL (ref 82–98)
MONOCYTES # BLD AUTO: 0.47 THOUSAND/ΜL (ref 0.17–1.22)
MONOCYTES NFR BLD AUTO: 10 % (ref 4–12)
NEUTROPHILS # BLD AUTO: 2.35 THOUSANDS/ΜL (ref 1.85–7.62)
NEUTS SEG NFR BLD AUTO: 49 % (ref 43–75)
NRBC BLD AUTO-RTO: 0 /100 WBCS
PLATELET # BLD AUTO: 169 THOUSANDS/UL (ref 149–390)
PMV BLD AUTO: 10.5 FL (ref 8.9–12.7)
POTASSIUM SERPL-SCNC: 3.8 MMOL/L (ref 3.5–5.3)
PROT SERPL-MCNC: 8.3 G/DL (ref 6.4–8.2)
PTH-INTACT SERPL-MCNC: 87.1 PG/ML (ref 18.4–80.1)
RBC # BLD AUTO: 4.29 MILLION/UL (ref 3.81–5.12)
SODIUM SERPL-SCNC: 135 MMOL/L (ref 136–145)
TSH SERPL DL<=0.05 MIU/L-ACNC: 3.32 UIU/ML (ref 0.36–3.74)
WBC # BLD AUTO: 4.74 THOUSAND/UL (ref 4.31–10.16)

## 2020-01-14 PROCEDURE — 85025 COMPLETE CBC W/AUTO DIFF WBC: CPT

## 2020-01-14 PROCEDURE — 83970 ASSAY OF PARATHORMONE: CPT

## 2020-01-14 PROCEDURE — 99214 OFFICE O/P EST MOD 30 MIN: CPT | Performed by: FAMILY MEDICINE

## 2020-01-14 PROCEDURE — 80053 COMPREHEN METABOLIC PANEL: CPT

## 2020-01-14 PROCEDURE — 84443 ASSAY THYROID STIM HORMONE: CPT

## 2020-01-14 PROCEDURE — 1123F ACP DISCUSS/DSCN MKR DOCD: CPT | Performed by: FAMILY MEDICINE

## 2020-01-14 PROCEDURE — 36415 COLL VENOUS BLD VENIPUNCTURE: CPT

## 2020-01-14 PROCEDURE — G0439 PPPS, SUBSEQ VISIT: HCPCS | Performed by: FAMILY MEDICINE

## 2020-01-14 NOTE — PROGRESS NOTES
Assessment and Plan:     Problem List Items Addressed This Visit        Cardiovascular and Mediastinum    Essential hypertension - Primary           Preventive health issues were discussed with patient, and age appropriate screening tests were ordered as noted in patient's After Visit Summary  Personalized health advice and appropriate referrals for health education or preventive services given if needed, as noted in patient's After Visit Summary       History of Present Illness:     Patient presents for Medicare Annual Wellness visit    Patient Care Team:  Macario Fraire DO as PCP - General (Family Medicine)  MD Macario Carlton DO Clifton Fujisawa, MD Nohemi Rosebush, DO Letitia Gallant, DO     Problem List:     Patient Active Problem List   Diagnosis    Essential hypertension    Mixed hyperlipidemia    Coronary artery disease without angina pectoris - S/P stent placement x 2 (2011)    Gastroesophageal reflux disease without esophagitis    Chronic diastolic (congestive) heart failure ([address] )    Hyperthyroidism    Osteoporosis    Intertrochanteric fracture of left femur, closed, with routine healing, subsequent encounter    Ambulatory dysfunction    Elderly person living alone    Low back pain without sciatica    S/P ORIF (open reduction internal fixation) fracture    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    Bladder cancer ([facility]    Age-related osteoporosis without current pathological fracture    Allergic rhinitis    Anemia due to vitamin B12 deficiency    Biliary dyskinesia    Cholelithiasis    Chronic right shoulder pain    Closed displaced fracture of left trapezium bone    Degenerative joint disease    Depression, controlled    Deviated nasal septum    Gastric reflux syndrome    Gastric ulcer    Heart valve disorder    Herpes zoster infection of thoracic region    IBS (irritable bowel syndrome)    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HCC)    Mild vitamin D deficiency    Mixed hearing loss, unilateral    Pancreatic cyst    Raynaud's disease without gangrene    S/P angioplasty with stent    Urge incontinence of urine    Conjunctivitis of both eyes    Thrombocytopenia ([facility]    Medicare annual wellness visit, subsequent    History of pulmonary embolus (PE)    Age-related cataract of both eyes    [facility], unspecified [facility] )    Pruritic rash      Past Medical and Surgical History:     Past Medical History:   Diagnosis Date    Bladder cancer ([facility] )     Coronary artery disease     S/P stent placement x 2 in 2011    GERD (gastroesophageal reflux disease)     Hepatitis     Hyperlipidemia     Hypertension     Kidney stones     Malignant neoplasm of urethra (HCC)     Pneumonia     Shingles      Past Surgical History:   Procedure Laterality Date    CATARACT EXTRACTION      CORONARY ANGIOPLASTY WITH STENT PLACEMENT      stent x 2    CYSTOSCOPY      diagnostic - onset 4/4/17    EYE SURGERY      HYSTERECTOMY      LEG SURGERY      OOPHORECTOMY      VT OPEN RX FEMUR FX+INTRAMED ALEJANDRO Left 4/8/2018    Procedure: INSERTION NAIL IM FEMUR ANTEGRADE (TROCHANTERIC); Surgeon: Luh Bartlett MD;  Location: [facility];  Service: Orthopedics      Family History:     Family History   Problem Relation Age of Onset    Arthritis Mother     Osteoporosis Mother     Heart disease Father     Hypertension Father     Hypertension Brother     Prostate cancer Brother     Breast cancer Daughter 48    Prostate cancer Son       Social History:     Social History     Socioeconomic History    Marital status:       Spouse name: Not on file    Number of children: Not on file    Years of education: Not on file    Highest education level: Not on file   Occupational History    Not on file   Social Needs    Financial resource strain: Not on file    Food insecurity:     Worry: Not on file     Inability: Not on file    Transportation needs: Medical: Not on file     Non-medical: Not on file   Tobacco Use    Smoking status: Never Smoker    Smokeless tobacco: Never Used   Substance and Sexual Activity    Alcohol use: No    Drug use: No    Sexual activity: Never   Lifestyle    Physical activity:     Days per week: Not on file     Minutes per session: Not on file    Stress: Not on file   Relationships    Social connections:     Talks on phone: Not on file     Gets together: Not on file     Attends Yazdanism service: Not on file     Active member of club or organization: Not on file     Attends meetings of clubs or organizations: Not on file     Relationship status: Not on file    Intimate partner violence:     Fear of current or ex partner: Not on file     Emotionally abused: Not on file     Physically abused: Not on file     Forced sexual activity: Not on file   Other Topics Concern    Not on file   Social History Narrative    Advance directives declined by parents    Always uses seat belt       Medications and Allergies:     Current Outpatient Medications   Medication Sig Dispense Refill    acetaminophen (TYLENOL) 500 mg tablet Take 500 mg by mouth every 6 (six) hours as needed for mild pain      amLODIPine (NORVASC) 5 mg tablet TAKE 1 TABLET BY MOUTH  DAILY 90 tablet 0    aspirin 81 MG tablet Take 81 mg by mouth daily Resume on 4/28       atenolol (TENORMIN) 25 mg tablet Take 1 tablet (25 mg total) by mouth daily Resume on 4/28 90 tablet 1    atorvastatin (LIPITOR) 40 mg tablet TAKE 1 TABLET BY MOUTH  DAILY AFTER DINNER 90 tablet 0    bumetanide (BUMEX) 1 mg tablet TAKE 1 TABLET BY MOUTH  DAILY 90 tablet 0    Cholecalciferol 2000 units TABS Take 2,000 Units by mouth daily Resume on 4/28      dicyclomine (BENTYL) 10 mg capsule Take 10 mg by mouth as needed      DULoxetine (CYMBALTA) 30 mg delayed release capsule Take 1 capsule (30 mg total) by mouth daily Resume on 4/28 90 capsule 1    DUREZOL 0 05 % EMUL       fexofenadine (ALLEGRA) 180 MG tablet Take 180 mg by mouth daily      gatifloxacin (ZYMAXID) 0 5 %       ILEVRO 0 3 % SUSP       METHYLCELLULOSE, LAXATIVE, PO Take by mouth      montelukast (SINGULAIR) 10 mg tablet TAKE 1 TABLET BY MOUTH  DAILY AS NEEDED FOR ALLERGY SYMPTOMS 90 tablet 0    MULTIPLE VITAMIN PO Take by mouth      omeprazole (PriLOSEC) 40 MG capsule Take 1 capsule (40 mg total) by mouth daily before breakfast 90 capsule 3    pantoprazole (PROTONIX) 40 mg tablet TAKE 1 TABLET BY MOUTH  DAILY 90 tablet 0     No current facility-administered medications for this visit  Allergies   Allergen Reactions    Lactose      Other reaction(s): Indigestion/GI Upset    Other      Other reaction(s): Mental Status Change  After surgery  Whole blood transfusion  = passed out   historical intolerance; verified with patient; had no reaction to PRBC transfusions afterward      Immunizations:     Immunization History   Administered Date(s) Administered    Influenza Split High Dose Preservative Free IM 10/04/2016, 10/05/2017    Influenza, high dose seasonal 0 5 mL 10/11/2018, 10/15/2019    Pneumococcal Conjugate 13-Valent 07/24/2017    Pneumococcal Polysaccharide PPV23 01/07/2019    Zoster 01/01/2016      Health Maintenance: There are no preventive care reminders to display for this patient  Topic Date Due    DTaP,Tdap,and Td Vaccines (1 - Tdap) 03/27/1946      Medicare Health Risk Assessment:     There were no vitals taken for this visit  Alisson Sharlene is here for her Subsequent Wellness visit  Health Risk Assessment:   Patient rates overall health as fair  Patient feels that their physical health rating is slightly worse  Eyesight was rated as same  Hearing was rated as same  Patient feels that their emotional and mental health rating is same  Pain experienced in the last 7 days has been a lot  Patient's pain rating has been 7/10  Patient states that she has experienced no weight loss or gain in last 6 months  Depression Screening:   PHQ-2 Score: 0  PHQ-9 Score: 2      Fall Risk Screening: In the past year, patient has experienced: no history of falling in past year      Urinary Incontinence Screening:   Patient has not leaked urine accidently in the last six months  Home Safety:  Patient does not have trouble with stairs inside or outside of their home  Patient has working smoke alarms and has working carbon monoxide detector  Home safety hazards include: none  Nutrition:   Current diet is No Added Salt, Low Saturated Fat, Low Carb and Low Cholesterol  Medications:   Patient is currently taking over-the-counter supplements  OTC medications include: see medication list  Patient is able to manage medications  Activities of Daily Living (ADLs)/Instrumental Activities of Daily Living (IADLs):   Walk and transfer into and out of bed and chair?: Yes  Dress and groom yourself?: Yes    Bathe or shower yourself?: Yes    Feed yourself?  Yes  Do your laundry/housekeeping?: Yes  Manage your money, pay your bills and track your expenses?: Yes  Make your own meals?: Yes    Do your own shopping?: Yes    Previous Hospitalizations:   Any hospitalizations or ED visits within the last 12 months?: Yes    How many hospitalizations have you had in the last year?: 1-2    Advance Care Planning:     Advanced directive counseling given: Yes    Five wishes given: Yes      Cognitive Screening:   Provider or family/friend/caregiver concerned regarding cognition?: No    PREVENTIVE SCREENINGS      Cardiovascular Screening:    General: Screening Not Indicated and History Lipid Disorder      Diabetes Screening:     General: Screening Current      Breast Cancer Screening:     General: Screening Current      Cervical Cancer Screening:    General: Screening Not Indicated      Osteoporosis Screening:    General: Screening Not Indicated and History Osteoporosis      Marcin Grant DO

## 2020-01-14 NOTE — PATIENT INSTRUCTIONS
Medicare Preventive Visit Patient Instructions  Thank you for completing your Welcome to Medicare Visit or Medicare Annual Wellness Visit today  Your next wellness visit will be due in one year (1/14/2021)  The screening/preventive services that you may require over the next 5-10 years are detailed below  Some tests may not apply to you based off risk factors and/or age  Screening tests ordered at today's visit but not completed yet may show as past due  Also, please note that scanned in results may not display below  Preventive Screenings:  Service Recommendations Previous Testing/Comments   Colorectal Cancer Screening  * Colonoscopy    * Fecal Occult Blood Test (FOBT)/Fecal Immunochemical Test (FIT)  * Fecal DNA/Cologuard Test  * Flexible Sigmoidoscopy Age: 54-65 years old   Colonoscopy: every 10 years (may be performed more frequently if at higher risk)  OR  FOBT/FIT: every 1 year  OR  Cologuard: every 3 years  OR  Sigmoidoscopy: every 5 years  Screening may be recommended earlier than age 48 if at higher risk for colorectal cancer  Also, an individualized decision between you and your healthcare provider will decide whether screening between the ages of 74-80 would be appropriate  Colonoscopy: Not on file  FOBT/FIT: Not on file  Cologuard: Not on file  Sigmoidoscopy: Not on file         Breast Cancer Screening Age: 36 years old  Frequency: every 1-2 years  Not required if history of left and right mastectomy Mammogram: 02/01/2019    Screening Current   Cervical Cancer Screening Between the ages of 21-29, pap smear recommended once every 3 years  Between the ages of 33-67, can perform pap smear with HPV co-testing every 5 years     Recommendations may differ for women with a history of total hysterectomy, cervical cancer, or abnormal pap smears in past  Pap Smear: Not on file    Screening Not Indicated   Hepatitis C Screening Once for adults born between 1945 and 1965  More frequently in patients at high risk for Hepatitis C Hep C Antibody: Not on file       Diabetes Screening 1-2 times per year if you're at risk for diabetes or have pre-diabetes Fasting glucose: 87 mg/dL   A1C: No results in last 5 years    Screening Current   Cholesterol Screening Once every 5 years if you don't have a lipid disorder  May order more often based on risk factors  Lipid panel: 07/18/2018    Screening Not Indicated  History Lipid Disorder     Other Preventive Screenings Covered by Medicare:  1  Abdominal Aortic Aneurysm (AAA) Screening: covered once if your at risk  You're considered to be at risk if you have a family history of AAA  2  Lung Cancer Screening: covers low dose CT scan once per year if you meet all of the following conditions: (1) Age 50-69; (2) No signs or symptoms of lung cancer; (3) Current smoker or have quit smoking within the last 15 years; (4) You have a tobacco smoking history of at least 30 pack years (packs per day multiplied by number of years you smoked); (5) You get a written order from a healthcare provider  3  Glaucoma Screening: covered annually if you're considered high risk: (1) You have diabetes OR (2) Family history of glaucoma OR (3)  aged 48 and older OR (3)  American aged 72 and older  3  Osteoporosis Screening: covered every 2 years if you meet one of the following conditions: (1) You're estrogen deficient and at risk for osteoporosis based off medical history and other findings; (2) Have a vertebral abnormality; (3) On glucocorticoid therapy for more than 3 months; (4) Have primary hyperparathyroidism; (5) On osteoporosis medications and need to assess response to drug therapy  · Last bone density test (DXA Scan): 05/28/2019   5  HIV Screening: covered annually if you're between the age of 15-65  Also covered annually if you are younger than 13 and older than 72 with risk factors for HIV infection   For pregnant patients, it is covered up to 3 times per pregnancy  Immunizations:  Immunization Recommendations   Influenza Vaccine Annual influenza vaccination during flu season is recommended for all persons aged >= 6 months who do not have contraindications   Pneumococcal Vaccine (Prevnar and Pneumovax)  * Prevnar = PCV13  * Pneumovax = PPSV23   Adults 25-60 years old: 1-3 doses may be recommended based on certain risk factors  Adults 72 years old: Prevnar (PCV13) vaccine recommended followed by Pneumovax (PPSV23) vaccine  If already received PPSV23 since turning 65, then PCV13 recommended at least one year after PPSV23 dose  Hepatitis B Vaccine 3 dose series if at intermediate or high risk (ex: diabetes, end stage renal disease, liver disease)   Tetanus (Td) Vaccine - COST NOT COVERED BY MEDICARE PART B Following completion of primary series, a booster dose should be given every 10 years to maintain immunity against tetanus  Td may also be given as tetanus wound prophylaxis  Tdap Vaccine - COST NOT COVERED BY MEDICARE PART B Recommended at least once for all adults  For pregnant patients, recommended with each pregnancy  Shingles Vaccine (Shingrix) - COST NOT COVERED BY MEDICARE PART B  2 shot series recommended in those aged 48 and above     Health Maintenance Due:  There are no preventive care reminders to display for this patient  Immunizations Due:      Topic Date Due    DTaP,Tdap,and Td Vaccines (1 - Tdap) 03/27/1946     Advance Directives   What are advance directives? Advance directives are legal documents that state your wishes and plans for medical care  These plans are made ahead of time in case you lose your ability to make decisions for yourself  Advance directives can apply to any medical decision, such as the treatments you want, and if you want to donate organs  What are the types of advance directives? There are many types of advance directives, and each state has rules about how to use them   You may choose a combination of any of the following:  · Living will: This is a written record of the treatment you want  You can also choose which treatments you do not want, which to limit, and which to stop at a certain time  This includes surgery, medicine, IV fluid, and tube feedings  · Durable power of  for healthcare [place] [facility]): This is a written record that states who you want to make healthcare choices for you when you are unable to make them for yourself  This person, called a proxy, is usually a family member or a friend  You may choose more than 1 proxy  · Do not resuscitate (DNR) order:  A DNR order is used in case your heart stops beating or you stop breathing  It is a request not to have certain forms of treatment, such as CPR  A DNR order may be included in other types of advance directives  · Medical directive: This covers the care that you want if you are in a coma, near death, or unable to make decisions for yourself  You can list the treatments you want for each condition  Treatment may include pain medicine, surgery, blood transfusions, dialysis, IV or tube feedings, and a ventilator (breathing machine)  · Values history: This document has questions about your views, beliefs, and how you feel and think about life  This information can help others choose the care that you would choose  Why are advance directives important? An advance directive helps you control your care  Although spoken wishes may be used, it is better to have your wishes written down  Spoken wishes can be misunderstood, or not followed  Treatments may be given even if you do not want them  An advance directive may make it easier for your family to make difficult choices about your care  © Copyright [employer] 2018 Information is for End User's use only and may not be sold, redistributed or otherwise used for commercial purposes   All illustrations and images included in CareNotes® are the copyrighted property of A D A M , Inc  or [facility]

## 2020-01-14 NOTE — PROGRESS NOTES
Assessment/Plan:       Diagnoses and all orders for this visit:    Essential hypertension  -     Comprehensive metabolic panel; Future    Chronic diastolic (congestive) heart failure (HCC)  -     TSH, 3rd generation with Free T4 reflex; Future  -     Comprehensive metabolic panel; Future  -     CBC and differential; Future  -     Ambulatory referral to Physical Therapy; Future    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HCC)    Malignant neoplasm of urinary bladder, unspecified site [facility])  Comments:  has August appt with her urologist for routine surveillance    Thrombocytopenia ([facility] )    History of pulmonary embolus (PE)    Lower abdominal pain, unspecified  Comments:  advised keep appt with urol next month  Orders:  -     Comprehensive metabolic panel; Future  -     CBC and differential; Future    Flushing  -     TSH, 3rd generation with Free T4 reflex; Future  -     Comprehensive metabolic panel; Future  -     CBC and differential; Future    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[place] [address] )  -     Ambulatory referral to Physical Therapy; Future    Hyperparathyroidism, unspecified ([place] [address] )  -     PTH, intact; Future    Fear of falling  -     Ambulatory referral to Physical Therapy; Future    Balance problems  -     Ambulatory referral to Physical Therapy; Future    Difficulty navigating stairs  -     Ambulatory referral to Physical Therapy; Future    Intertrochanteric fracture of left femur, closed, with routine healing, subsequent encounter    S/P ORIF (open reduction internal fixation) fracture  -     Ambulatory referral to Physical Therapy; Future    History of femur fracture  -     Ambulatory referral to Physical Therapy; Future    Other orders  -     Cancel: Lipid panel; Future          Subjective:   Chief Complaint   Patient presents with    Medicare Wellness Visit    Follow-up        Patient ID: Dayan Hernandez is a 82 y o  female      Medicare wellness and f/u   reports bp at GI yest was high and when she got home continued above normal, though decreasing through the day and evening  Home bp's had been very good- 120-130/60-65 - until yesterday, "now they're not, as of today and yesterday"- this morning 145/69 at home per pt; and bp had been ok at sick visit here around [place]  "But the last several days having this headache and a flushed feeling"  No CP, SOB, dizziness  Last routine cardiol f/u was January 2019, pt states has f/u coming up "not sure when"  "having trouble with the fluid, weight used to hang around 122, now it's 125-7," was addressed by cardiol at January visit- bumex extra dose for 2 days was advised, "I've done that several times, sometimes it helped, sometimes it didn't help, haven't done the whole one in awhile"  Has scop sched for February 20th      The following portions of the patient's history were reviewed and updated as appropriate: allergies, current medications, past family history, past medical history, past social history, past surgical history and problem list     Review of Systems   All other systems reviewed and are negative  Objective:      /80   Pulse 68   Temp (!) 97 °F (36 1 °C)   Resp 17   Ht 5' (1 524 m)   Wt 57 2 kg (126 lb)   SpO2 98%   BMI 24 61 kg/m²          Physical Exam   Constitutional: She is oriented to person, place, and time  She appears well-developed  She is cooperative  Non-toxic appearance  She does not have a sickly appearance  She does not appear ill  No distress  Appears much younger than stated age   HENT:   Head: Normocephalic and atraumatic  Mouth/Throat: Uvula is midline, oropharynx is clear and moist and mucous membranes are normal    Eyes: Pupils are equal, round, and reactive to light  Conjunctivae and lids are normal    Neck: Trachea normal  Neck supple  No JVD present  No thyroid mass and no thyromegaly present  Cardiovascular: Normal rate, regular rhythm, normal heart sounds and normal pulses     Pulmonary/Chest: Effort normal and breath sounds normal    Abdominal: Soft  Bowel sounds are normal  She exhibits no distension and no mass  There is no hepatosplenomegaly  There is no tenderness  Lymphadenopathy:     She has no cervical adenopathy  Right: No supraclavicular adenopathy present  Left: No supraclavicular adenopathy present  Neurological: She is alert and oriented to person, place, and time  She has normal reflexes  Gait normal    Skin: Skin is warm and dry  Capillary refill takes less than 2 seconds  She is not diaphoretic  No cyanosis  No pallor  Psychiatric: She has a normal mood and affect  Her behavior is normal    Nursing note and vitals reviewed

## 2020-01-17 ENCOUNTER — TELEPHONE (OUTPATIENT)
Dept: GASTROENTEROLOGY | Facility: AMBULARY SURGERY CENTER | Age: 85
End: 2020-01-17

## 2020-01-17 NOTE — TELEPHONE ENCOUNTER
Received call from patient's pharmacy [facility]  Confirmed from 1/13/20 office note discontinue pantoprazole- omeprazole 40 mg prescribed

## 2020-01-21 PROBLEM — R26.89 BALANCE PROBLEMS: Status: ACTIVE | Noted: 2020-01-21

## 2020-01-21 PROBLEM — F40.298 FEAR OF FALLING: Status: ACTIVE | Noted: 2020-01-21

## 2020-01-21 PROBLEM — Z78.9 DIFFICULTY NAVIGATING STAIRS: Status: ACTIVE | Noted: 2020-01-21

## 2020-01-21 PROBLEM — R10.30 LOWER ABDOMINAL PAIN, UNSPECIFIED: Status: ACTIVE | Noted: 2020-01-21

## 2020-01-23 ENCOUNTER — OFFICE VISIT (OUTPATIENT)
Dept: UROLOGY | Facility: AMBULATORY SURGERY CENTER | Age: 85
End: 2020-01-23
Payer: MEDICARE

## 2020-01-23 VITALS
BODY MASS INDEX: 24.54 KG/M2 | DIASTOLIC BLOOD PRESSURE: 82 MMHG | HEART RATE: 62 BPM | HEIGHT: 60 IN | SYSTOLIC BLOOD PRESSURE: 140 MMHG | WEIGHT: 125 LBS

## 2020-01-23 DIAGNOSIS — C67.9 MALIGNANT NEOPLASM OF URINARY BLADDER, UNSPECIFIED SITE (HCC): Primary | ICD-10-CM

## 2020-01-23 DIAGNOSIS — N39.41 URGE INCONTINENCE OF URINE: ICD-10-CM

## 2020-01-23 PROCEDURE — 99213 OFFICE O/P EST LOW 20 MIN: CPT | Performed by: NURSE PRACTITIONER

## 2020-01-23 NOTE — PROGRESS NOTES
1/23/2020    Virginia Dendron  1935  [id number]        Assessment  -TCC of bladder  -Recurrent urinary tract symptoms  -Right renal cyst    Discussion/Plan  Nicholas Hernandez is a 82 y o  female being managed by our office    -We discussed patient's reported symptoms of lower abdominal discomfort associated with nausea and consumption of carbonated beverages  I do not feel this is urologic in etiology  She is following with gastroenterology and pending an upcoming EGD  I encouraged patient to keep appointment  We did discuss avoiding bladder irritants  Patient will follow-up as scheduled on 8/25/2020 with Dr Lydia Grant for next surveillance cystoscopy  She was instructed to call with any issues  -All questions answered, patients agree with plan     History of Present Illness  82 y o  female with a history of TCC of bladder, recurrent UTI, and right renal cyst presents today for follow up  Patient previously known to Dr Kendall Mcnulty  She has remote history of TCC of bladder  She underwent laser ablation of ureteral tumor by former urologist   Annual surveillance cystoscopies have been negative for any disease recurrence  She presents today with reports of lower abdominal discomfort radiating to the low back  Patient describes as a burning sensation  She states it is accompanied with nausea and exacerbated with carbonated beverages  Symptoms initially started in December 2019  She was evaluated by PCP who ordered urine culture and CT scan  Urine culture was negative for infection, and CT scan unremarkable from renal standpoint  She was noted to have gastric wall and duodenal thickening, and is pending EGD  Patient is following with gastroenterology  She has history of ulcer and gastric reflux  Patient denies any lower urinary tract symptoms, gross hematuria, or dysuria  Review of Systems  Review of Systems   Constitutional: Negative  HENT: Negative  Respiratory: Negative  Cardiovascular: Negative  Gastrointestinal: Negative  Genitourinary: Negative for decreased urine volume, difficulty urinating, dysuria, flank pain, frequency, hematuria and urgency  Musculoskeletal: Negative  Skin: Negative  Neurological: Negative  Psychiatric/Behavioral: Negative  Past Medical History  Past Medical History:   Diagnosis Date    Bladder cancer ([address] )     Coronary artery disease     S/P stent placement x 2 in 2011    GERD (gastroesophageal reflux disease)     Hepatitis     Hyperlipidemia     Hypertension     Kidney stones     Malignant neoplasm of urethra (HCC)     Pneumonia     Shingles        Past Social History  Past Surgical History:   Procedure Laterality Date    CATARACT EXTRACTION      CORONARY ANGIOPLASTY WITH STENT PLACEMENT      stent x 2    CYSTOSCOPY      diagnostic - onset 4/4/17    EYE SURGERY      HYSTERECTOMY      LEG SURGERY      OOPHORECTOMY      CT OPEN RX FEMUR FX+INTRAMED ALEJANDRO Left 4/8/2018    Procedure: INSERTION NAIL IM FEMUR ANTEGRADE (TROCHANTERIC); Surgeon: Capri Ashton MD;  [facility]: [facility];  Service: Orthopedics       Past Family History  Family History   Problem Relation Age of Onset    Arthritis Mother     Osteoporosis Mother     Heart disease Father     Hypertension Father     Hypertension Brother     Prostate cancer Brother     Breast cancer Daughter 48    Prostate cancer Son        Past Social history  Social History     Socioeconomic History    Marital status:       Spouse name: Not on file    Number of children: Not on file    Years of education: Not on file    Highest education level: Not on file   Occupational History    Not on file   Social Needs    Financial resource strain: Not on file    Food insecurity:     Worry: Not on file     Inability: Not on file    Transportation needs:     Medical: Not on file     Non-medical: Not on file   Tobacco Use    Smoking status: Never Smoker    Smokeless tobacco: Never Used Substance and Sexual Activity    Alcohol use: No    Drug use: No    Sexual activity: Never   Lifestyle    Physical activity:     Days per week: Not on file     Minutes per session: Not on file    Stress: Not on file   Relationships    Social connections:     Talks on phone: Not on file     Gets together: Not on file     Attends Bahai service: Not on file     Active member of club or organization: Not on file     Attends meetings of clubs or organizations: Not on file     Relationship status: Not on file    Intimate partner violence:     Fear of current or ex partner: Not on file     Emotionally abused: Not on file     Physically abused: Not on file     Forced sexual activity: Not on file   Other Topics Concern    Not on file   Social History Narrative    Advance directives declined by parents    Always uses seat belt       Current Medications  Current Outpatient Medications   Medication Sig Dispense Refill    acetaminophen (TYLENOL) 500 mg tablet Take 500 mg by mouth every 6 (six) hours as needed for mild pain      amLODIPine (NORVASC) 5 mg tablet TAKE 1 TABLET BY MOUTH  DAILY 90 tablet 0    aspirin 81 MG tablet Take 81 mg by mouth daily Resume on 4/28       atenolol (TENORMIN) 25 mg tablet Take 1 tablet (25 mg total) by mouth daily Resume on 4/28 90 tablet 1    atorvastatin (LIPITOR) 40 mg tablet TAKE 1 TABLET BY MOUTH  DAILY AFTER DINNER 90 tablet 0    bumetanide (BUMEX) 1 mg tablet TAKE 1 TABLET BY MOUTH  DAILY 90 tablet 0    Cholecalciferol 2000 units TABS Take 2,000 Units by mouth daily Resume on 4/28      dicyclomine (BENTYL) 10 mg capsule Take 10 mg by mouth as needed      DULoxetine (CYMBALTA) 30 mg delayed release capsule Take 1 capsule (30 mg total) by mouth daily Resume on 4/28 90 capsule 1    DUREZOL 0 05 % EMUL       fexofenadine (ALLEGRA) 180 MG tablet Take 180 mg by mouth daily      gatifloxacin (ZYMAXID) 0 5 %       ILEVRO 0 3 % SUSP       METHYLCELLULOSE, LAXATIVE, PO Take by mouth      montelukast (SINGULAIR) 10 mg tablet TAKE 1 TABLET BY MOUTH  DAILY AS NEEDED FOR ALLERGY SYMPTOMS 90 tablet 0    MULTIPLE VITAMIN PO Take by mouth      omeprazole (PriLOSEC) 40 MG capsule Take 1 capsule (40 mg total) by mouth daily before breakfast 90 capsule 3    pantoprazole (PROTONIX) 40 mg tablet TAKE 1 TABLET BY MOUTH  DAILY (Patient not taking: Reported on 1/14/2020) 90 tablet 0     No current facility-administered medications for this visit  Allergies  Allergies   Allergen Reactions    Lactose      Other reaction(s): Indigestion/GI Upset    Other      Other reaction(s): Mental Status Change  After surgery  Whole blood transfusion  = passed out   historical intolerance; verified with patient; had no reaction to PRBC transfusions afterward       Past medical history, social history, family history, medications and allergies were reviewed  Vitals  There were no vitals filed for this visit  Physical Exam  Physical Exam   Constitutional: She is oriented to person, place, and time  She appears well-developed and well-nourished  HENT:   Head: Normocephalic  Eyes: Pupils are equal, round, and reactive to light  Neck: Normal range of motion  Cardiovascular: Normal rate and regular rhythm  Pulmonary/Chest: Effort normal    Abdominal: Soft  Normal appearance  She exhibits no distension  There is no tenderness  There is no CVA tenderness  Genitourinary:   Genitourinary Comments: Negative CVA tenderness  No suprapubic discomfort or distention   Musculoskeletal: Normal range of motion  Neurological: She is alert and oriented to person, place, and time  Skin: Skin is warm and dry  Psychiatric: She has a normal mood and affect   Her behavior is normal  Judgment and thought content normal        Results    I have personally reviewed all pertinent lab results and reviewed with patient  Lab Results   Component Value Date    CALCIUM 9 9 01/14/2020    K 3 8 01/14/2020    CO2 33 (H) 01/14/2020     01/14/2020    BUN 20 01/14/2020    CREATININE 0 92 01/14/2020     Lab Results   Component Value Date    WBC 4 74 01/14/2020    HGB 13 7 01/14/2020    HCT 41 9 01/14/2020    MCV 98 01/14/2020     01/14/2020     No results found for this or any previous visit (from the past 1 hour(s))

## 2020-01-29 ENCOUNTER — OFFICE VISIT (OUTPATIENT)
Dept: FAMILY MEDICINE CLINIC | Facility: CLINIC | Age: 85
End: 2020-01-29
Payer: MEDICARE

## 2020-01-29 VITALS
DIASTOLIC BLOOD PRESSURE: 90 MMHG | SYSTOLIC BLOOD PRESSURE: 140 MMHG | HEART RATE: 69 BPM | BODY MASS INDEX: 25 KG/M2 | OXYGEN SATURATION: 98 % | WEIGHT: 128 LBS | TEMPERATURE: 97.9 F | RESPIRATION RATE: 16 BRPM

## 2020-01-29 DIAGNOSIS — M80.00XS AGE-RELATED OSTEOPOROSIS WITH CURRENT PATHOLOGICAL FRACTURE, SEQUELA: Primary | ICD-10-CM

## 2020-01-29 DIAGNOSIS — R26.89 BALANCE PROBLEMS: ICD-10-CM

## 2020-01-29 DIAGNOSIS — Z60.2 ELDERLY PERSON LIVING ALONE: ICD-10-CM

## 2020-01-29 DIAGNOSIS — Z91.81 HISTORY OF FALLING: ICD-10-CM

## 2020-01-29 PROBLEM — M80.00XA AGE-RELATED OSTEOPOROSIS WITH CURRENT PATHOLOGICAL FRACTURE: Status: ACTIVE | Noted: 2018-04-10

## 2020-01-29 PROCEDURE — 1160F RVW MEDS BY RX/DR IN RCRD: CPT | Performed by: FAMILY MEDICINE

## 2020-01-29 PROCEDURE — 99213 OFFICE O/P EST LOW 20 MIN: CPT | Performed by: FAMILY MEDICINE

## 2020-01-29 PROCEDURE — 3078F DIAST BP <80 MM HG: CPT | Performed by: FAMILY MEDICINE

## 2020-01-29 PROCEDURE — 1170F FXNL STATUS ASSESSED: CPT | Performed by: FAMILY MEDICINE

## 2020-01-29 PROCEDURE — 4040F PNEUMOC VAC/ADMIN/RCVD: CPT | Performed by: FAMILY MEDICINE

## 2020-01-29 PROCEDURE — 3008F BODY MASS INDEX DOCD: CPT | Performed by: FAMILY MEDICINE

## 2020-01-29 PROCEDURE — 1036F TOBACCO NON-USER: CPT | Performed by: FAMILY MEDICINE

## 2020-01-29 PROCEDURE — 3077F SYST BP >= 140 MM HG: CPT | Performed by: FAMILY MEDICINE

## 2020-01-29 SDOH — SOCIAL STABILITY - SOCIAL INSECURITY: PROBLEMS RELATED TO LIVING ALONE: Z60.2

## 2020-01-29 NOTE — PROGRESS NOTES
Assessment/Plan:         Diagnoses and all orders for this visit:    Age-related osteoporosis with current pathological fracture, sequela  Comments:  I recommend homecare nursing for medication mngment w/ regard to pt possibly initiating prolia w/risk of orthostatic hypotension and pt w/ high risk of falls      Balance problems    Elderly person living alone    History of falling          Subjective:   Chief Complaint   Patient presents with    Follow-up     pt saw rheum and would like to discuss injection they want her to do         Patient ID: Shravan Burton is a 82 y o  female  Here to discuss possibly starting prolia injections, to be rx'd by her rheum who manages her osteoporosis- has several osteoporotic compression fractures of the spine, and no benefit with other therapies  Pt will have to learn how to give the injections herself at home; she lives alone, has balance problems at baseline, and has had a few falls past year to 2 yrs, resulting in injuries  The prolia carries a fairly high risk of orthostatic hypotension as side effect, and so this brings up a concern that pt may have more falls if she were to have the side effect of orthost hypotension and become dizzy or have syncope      The following portions of the patient's history were reviewed and updated as appropriate: allergies, current medications, past family history, past medical history, past social history, past surgical history and problem list     Review of Systems      Objective:      /90   Pulse 69   Temp 97 9 °F (36 6 °C)   Resp 16   Wt 58 1 kg (128 lb)   SpO2 98%   BMI 25 00 kg/m²          Physical Exam   Constitutional: She is oriented to person, place, and time  She appears well-developed and well-nourished  She is cooperative  Non-toxic appearance  She does not have a sickly appearance  She does not appear ill  No distress     Petite, appears vibrant as ever and much younger than stated age as usual   Cardiovascular: Normal rate, regular rhythm, S1 normal, S2 normal and normal pulses  Exam reveals no gallop and no friction rub  No murmur heard  Pulmonary/Chest: Effort normal    Neurological: She is alert and oriented to person, place, and time  She has normal reflexes  Skin: Skin is warm and dry  Capillary refill takes less than 2 seconds  She is not diaphoretic  No pallor  Psychiatric: She has a normal mood and affect   Her behavior is normal

## 2020-01-30 ENCOUNTER — TELEPHONE (OUTPATIENT)
Dept: HEMATOLOGY ONCOLOGY | Facility: CLINIC | Age: 85
End: 2020-01-30

## 2020-01-30 NOTE — TELEPHONE ENCOUNTER
Spoke to patient and informed her that her appt on 3/27/20 needs to be R/S due to Dr Kwesi Cortez no longer seeing patients on Friday at the [facility] office  Her new appt is 3/26/20 at 10:00 am at the [facility] location with Dr Kwesi Cortez  Patient was fine with the appt change

## 2020-02-03 ENCOUNTER — TELEPHONE (OUTPATIENT)
Dept: GASTROENTEROLOGY | Facility: CLINIC | Age: 85
End: 2020-02-03

## 2020-02-03 NOTE — TELEPHONE ENCOUNTER
----- Message from Ventura Wolff MD sent at 2/1/2020  9:50 AM EST -----  Regarding: RE: Wednesday 2/5  Sure  Okay to order     ----- Message -----  From: Kumar Augustin RN  Sent: 1/31/2020   2:51 PM EST  To: Ventura Wolff MD  Subject: Wednesday 2/5                                    Please check to see if you can do this patient in the pm on 2/5

## 2020-02-04 ENCOUNTER — ANESTHESIA EVENT (OUTPATIENT)
Dept: GASTROENTEROLOGY | Facility: HOSPITAL | Age: 85
End: 2020-02-04

## 2020-02-05 ENCOUNTER — ANESTHESIA (OUTPATIENT)
Dept: GASTROENTEROLOGY | Facility: HOSPITAL | Age: 85
End: 2020-02-05

## 2020-02-05 ENCOUNTER — HOSPITAL ENCOUNTER (OUTPATIENT)
Dept: GASTROENTEROLOGY | Facility: HOSPITAL | Age: 85
Setting detail: OUTPATIENT SURGERY
Discharge: HOME | End: 2020-02-05
Admitting: INTERNAL MEDICINE
Payer: MEDICARE

## 2020-02-05 VITALS
TEMPERATURE: 98.1 F | BODY MASS INDEX: 25.13 KG/M2 | WEIGHT: 128 LBS | HEART RATE: 64 BPM | DIASTOLIC BLOOD PRESSURE: 72 MMHG | RESPIRATION RATE: 18 BRPM | SYSTOLIC BLOOD PRESSURE: 141 MMHG | HEIGHT: 60 IN | OXYGEN SATURATION: 100 %

## 2020-02-05 DIAGNOSIS — K86.2 PANCREATIC CYST: ICD-10-CM

## 2020-02-05 DIAGNOSIS — K31.89 GASTRIC WALL THICKENING: ICD-10-CM

## 2020-02-05 PROCEDURE — 88173 CYTOPATH EVAL FNA REPORT: CPT

## 2020-02-05 PROCEDURE — 43239 EGD BIOPSY SINGLE/MULTIPLE: CPT | Performed by: INTERNAL MEDICINE

## 2020-02-05 PROCEDURE — 88305 TISSUE EXAM BY PATHOLOGIST: CPT | Performed by: PATHOLOGY

## 2020-02-05 PROCEDURE — 43238 EGD US FINE NEEDLE BX/ASPIR: CPT | Performed by: INTERNAL MEDICINE

## 2020-02-05 PROCEDURE — 82150 ASSAY OF AMYLASE: CPT

## 2020-02-05 PROCEDURE — 82378 CARCINOEMBRYONIC ANTIGEN: CPT

## 2020-02-05 RX ORDER — SODIUM CHLORIDE 9 MG/ML
INJECTION, SOLUTION INTRAVENOUS CONTINUOUS PRN
Status: DISCONTINUED | OUTPATIENT
Start: 2020-02-05 | End: 2020-02-05 | Stop reason: SURG

## 2020-02-05 RX ORDER — PROPOFOL 10 MG/ML
INJECTION, EMULSION INTRAVENOUS AS NEEDED
Status: DISCONTINUED | OUTPATIENT
Start: 2020-02-05 | End: 2020-02-05 | Stop reason: SURG

## 2020-02-05 RX ORDER — LIDOCAINE HYDROCHLORIDE 10 MG/ML
INJECTION, SOLUTION EPIDURAL; INFILTRATION; INTRACAUDAL; PERINEURAL AS NEEDED
Status: DISCONTINUED | OUTPATIENT
Start: 2020-02-05 | End: 2020-02-05 | Stop reason: SURG

## 2020-02-05 RX ORDER — LIDOCAINE HYDROCHLORIDE 10 MG/ML
0.5 INJECTION, SOLUTION EPIDURAL; INFILTRATION; INTRACAUDAL; PERINEURAL ONCE AS NEEDED
Status: DISCONTINUED | OUTPATIENT
Start: 2020-02-05 | End: 2020-02-09 | Stop reason: HOSPADM

## 2020-02-05 RX ORDER — CIPROFLOXACIN 2 MG/ML
INJECTION, SOLUTION INTRAVENOUS CONTINUOUS PRN
Status: DISCONTINUED | OUTPATIENT
Start: 2020-02-05 | End: 2020-02-05 | Stop reason: SURG

## 2020-02-05 RX ORDER — CIPROFLOXACIN 2 MG/ML
400 INJECTION, SOLUTION INTRAVENOUS ONCE
OUTPATIENT
Start: 2020-02-05 | End: 2020-02-05

## 2020-02-05 RX ORDER — SODIUM CHLORIDE, SODIUM LACTATE, POTASSIUM CHLORIDE, CALCIUM CHLORIDE 600; 310; 30; 20 MG/100ML; MG/100ML; MG/100ML; MG/100ML
125 INJECTION, SOLUTION INTRAVENOUS CONTINUOUS
Status: DISCONTINUED | OUTPATIENT
Start: 2020-02-05 | End: 2020-02-09 | Stop reason: HOSPADM

## 2020-02-05 RX ORDER — CIPROFLOXACIN 500 MG/1
500 TABLET, FILM COATED ORAL EVERY 12 HOURS SCHEDULED
Qty: 6 TABLET | Refills: 0 | Status: SHIPPED | OUTPATIENT
Start: 2020-02-05 | End: 2020-02-08

## 2020-02-05 RX ORDER — PROPOFOL 10 MG/ML
INJECTION, EMULSION INTRAVENOUS CONTINUOUS PRN
Status: DISCONTINUED | OUTPATIENT
Start: 2020-02-05 | End: 2020-02-05 | Stop reason: SURG

## 2020-02-05 RX ADMIN — PROPOFOL 60 MG: 10 INJECTION, EMULSION INTRAVENOUS at 14:16

## 2020-02-05 RX ADMIN — SODIUM CHLORIDE, SODIUM LACTATE, POTASSIUM CHLORIDE, AND CALCIUM CHLORIDE 125 ML/HR: .6; .31; .03; .02 INJECTION, SOLUTION INTRAVENOUS at 12:29

## 2020-02-05 RX ADMIN — CIPROFLOXACIN: 2 INJECTION, SOLUTION INTRAVENOUS at 14:21

## 2020-02-05 RX ADMIN — PROPOFOL 120 MCG/KG/MIN: 10 INJECTION, EMULSION INTRAVENOUS at 14:15

## 2020-02-05 RX ADMIN — SODIUM CHLORIDE: 9 INJECTION, SOLUTION INTRAVENOUS at 14:03

## 2020-02-05 RX ADMIN — LIDOCAINE HYDROCHLORIDE 6 ML: 10 INJECTION, SOLUTION EPIDURAL; INFILTRATION; INTRACAUDAL; PERINEURAL at 14:16

## 2020-02-05 RX ADMIN — PROPOFOL 40 MG: 10 INJECTION, EMULSION INTRAVENOUS at 14:33

## 2020-02-05 NOTE — H&P
History and Physical - [facility]  Khanh Luna 82 y o  female MRN: [id number]    HPI: Khanh Luna is a 82y o  year old female who presents with pancreatic cyst/ gastritis  Review of Systems    Historical Information   Past Medical History:   Diagnosis Date    Bl[address] [address]     Coronary artery disease     S/P stent placement x 2 in 2011    GERD (gastroesophageal reflux disease)     Hepatitis     got this from food back in 1970s, has not had a problem since    Hyperlipidemia     Hypertension     Kidney stones     Malignant ne[address] of [address]ra ([facility] [address] )     Pneumonia     [facility] 1970s    Shingles      Past Surgical History:   Procedure Laterality Date    CATARACT EXTRACTION      CORONARY ANGIOPLASTY WITH STENT PLACEMENT      stent x 2    CYSTOSCOPY      diagnostic - onset 4/4/17    EYE SURGERY      HYSTERECTOMY      LEG SURGERY      OOPHORECTOMY      VT OPEN RX FEMUR FX+INTRAMED ALEJANDRO Left 4/8/2018    Procedure: INSERTION NAIL IM FEMUR ANTEGRADE (TROCHANTERIC); Surgeon: Paulette Guzman MD;  Location[facility];  Service: Orthopedics    TONSILLECTOMY       Social History   Social History     Substance and Sexual Activity   Alcohol Use No     Social History     Substance and Sexual Activity   Drug Use No     Social History     Tobacco Use   Smoking Status Never Smoker   Smokeless Tobacco Never Used     Family History   Problem Relation Age of Onset    Arthritis Mother     Osteoporosis Mother     Heart disease Father    [facility] Hypertension Father     Hypertension Brother     Prostate cancer Brother     Breast cancer Daughter 48    Prostate cancer Son        Meds/Allergies       (Not in a hospital admission)    Allergies   Allergen Reactions    Lactose      Other reaction(s): Indigestion/GI Upset    Other      Other reaction(s): Mental Status Change  After surgery   Whole blood transfusion  = passed out   historical intolerance; verified with patient; had no reaction to PRBC transfusions afterward       Objective     BP (!) 175/79   Pulse 69   Temp 98 1 °F (36 7 °C) (Tympanic)   Resp 18   Ht 5' (1 524 m)   Wt 58 1 kg (128 lb)   SpO2 100%   BMI 25 00 kg/m²       PHYSICAL EXAM    Gen: NAD  CV: RRR  CHEST: Clear  ABD: soft, NT/ND  EXT: no edema  Neuro: AAO      ASSESSMENT/PLAN:  This is a 82y o  year old female here for pancreatic cysts and gastritis  PLAN:   Procedure: EUS/ EGD

## 2020-02-05 NOTE — ANESTHESIA POSTPROCEDURE EVALUATION
Post-Op Assessment Note    CV Status:  Stable  Pain Score: 0    Pain management: adequate     Mental Status:  Sleepy   Hydration Status:  Stable   PONV Controlled:  None   Airway Patency:  Patent   Post Op Vitals Reviewed: Yes      Staff: Anesthesiologist, CRNA           BP  144/67   Temp      Pulse  61   Resp   17   SpO2   99

## 2020-02-05 NOTE — ANESTHESIA PREPROCEDURE EVALUATION
Review of Systems/Medical History  Patient summary reviewed  Chart reviewed  No history of anesthetic complications     Cardiovascular  Hyperlipidemia, Hypertension , CAD , Cardiac stents  No angina , CHF ,   Comment: Echo 55%EF, mild pulmo HTN, Pulmonary hypertension Pulmonary       GI/Hepatic    PUD, GERD , Hepatitis , Pancreatic problem,        Kidney stones, Genitourinary malignancy Bladder cancer,        Endo/Other  History of thyroid disease , hyperthyroidism,      GYN       Hematology  Anemia ,     Musculoskeletal    Arthritis     Neurology   Psychology   Depression ,              Physical Exam    Airway    Mallampati score: II  TM Distance: >3 FB  Neck ROM: full     Dental   No notable dental hx     Cardiovascular      Pulmonary      Other Findings        Anesthesia Plan  ASA Score- 2     Anesthesia Type- IV sedation with anesthesia with ASA Monitors  Additional Monitors:   Airway Plan:         Plan Factors-  Patient did not smoke on day of surgery  Induction-     Postoperative Plan-     Informed Consent- Anesthetic plan and risks discussed with patient  I personally reviewed this patient with the CRNA  Discussed and agreed on the Anesthesia Plan with the CRNA  Lilibeth Brice

## 2020-02-10 ENCOUNTER — TELEPHONE (OUTPATIENT)
Dept: CARDIOLOGY CLINIC | Facility: CLINIC | Age: 85
End: 2020-02-10

## 2020-02-10 NOTE — TELEPHONE ENCOUNTER
Pt states her SBP has been in the 150-160's/ 70's  She does have a HA  Also states her wt is 127 lbs for the last several weeks and she normally is 120-122 Lbs  She denies CHF symptoms  Has been taking bumex 0 5 mg qd and for 2 days /week 1 mg bumex  It takes off 1 lb  Labs: 1/14/20  CR 0 92  K-3 8    Would you like her to see NP? You do not have anything until end of March, unless we can use same day?       Taking: amlodipine 5 mg qd               Atenolol 25 mg qd      Please advise

## 2020-02-11 ENCOUNTER — OFFICE VISIT (OUTPATIENT)
Dept: CARDIOLOGY CLINIC | Facility: CLINIC | Age: 85
End: 2020-02-11
Payer: MEDICARE

## 2020-02-11 VITALS
DIASTOLIC BLOOD PRESSURE: 68 MMHG | HEIGHT: 61 IN | WEIGHT: 129.1 LBS | SYSTOLIC BLOOD PRESSURE: 158 MMHG | HEART RATE: 67 BPM | BODY MASS INDEX: 24.37 KG/M2 | OXYGEN SATURATION: 97 %

## 2020-02-11 DIAGNOSIS — I10 ESSENTIAL HYPERTENSION: ICD-10-CM

## 2020-02-11 DIAGNOSIS — I50.32 CHRONIC DIASTOLIC (CONGESTIVE) HEART FAILURE (HCC): Primary | Chronic | ICD-10-CM

## 2020-02-11 DIAGNOSIS — E78.5 HYPERLIPIDEMIA, UNSPECIFIED HYPERLIPIDEMIA TYPE: ICD-10-CM

## 2020-02-11 PROBLEM — R60.0 LOWER EXTREMITY EDEMA: Status: ACTIVE | Noted: 2020-02-11

## 2020-02-11 PROCEDURE — 3078F DIAST BP <80 MM HG: CPT | Performed by: NURSE PRACTITIONER

## 2020-02-11 PROCEDURE — 3077F SYST BP >= 140 MM HG: CPT | Performed by: NURSE PRACTITIONER

## 2020-02-11 PROCEDURE — 4040F PNEUMOC VAC/ADMIN/RCVD: CPT | Performed by: NURSE PRACTITIONER

## 2020-02-11 PROCEDURE — 1170F FXNL STATUS ASSESSED: CPT | Performed by: NURSE PRACTITIONER

## 2020-02-11 PROCEDURE — 1036F TOBACCO NON-USER: CPT | Performed by: NURSE PRACTITIONER

## 2020-02-11 PROCEDURE — 3008F BODY MASS INDEX DOCD: CPT | Performed by: NURSE PRACTITIONER

## 2020-02-11 PROCEDURE — 1160F RVW MEDS BY RX/DR IN RCRD: CPT | Performed by: NURSE PRACTITIONER

## 2020-02-11 PROCEDURE — 99214 OFFICE O/P EST MOD 30 MIN: CPT | Performed by: NURSE PRACTITIONER

## 2020-02-11 RX ORDER — LISINOPRIL 5 MG/1
5 TABLET ORAL
Qty: 30 TABLET | Refills: 3 | Status: SHIPPED | OUTPATIENT
Start: 2020-02-11 | End: 2020-02-11 | Stop reason: SDUPTHER

## 2020-02-11 RX ORDER — LISINOPRIL 5 MG/1
5 TABLET ORAL
Qty: 3 TABLET | Refills: 3 | Status: SHIPPED | OUTPATIENT
Start: 2020-02-11 | End: 2020-02-14 | Stop reason: SDUPTHER

## 2020-02-11 NOTE — PROGRESS NOTES
Cardiology Follow Up    Debby Cook  1935  [id number]  [facility] Karina Umana [address] [address] [phone]    Office visit for weight gain and high blood pressure     Interval History:   Ms Debby Cook called our office on 2/10/20 with complaints of weight gain from 122 pounds to 127 pounds and elevated -160  She took Bumex 1mg daily for 2 days without improvement in LE edema  Ms Debby Cook  Presents to our office for a follow-up visit due to high blood pressure and ankle edema  Nehal Trevino denies change in her diet or consuming more calories  She admits to bilateral ankle edema and a headache            HPI:  2/28/19 LVEF 55%, grade 1 DD,   CAD  2011 sp Stents x 2  HTN  HLD  Chronic Diastolic Heart Failure   Patient Active Problem List   Diagnosis    Essential hypertension    Mixed hyperlipidemia    Coronary artery disease without angina pectoris - S/P stent placement x 2 (2011)    Gastroesophageal reflux disease without esophagitis    Chronic diastolic (congestive) heart failure ([address] )    Hyperthyroidism    Age-related osteoporosis with current pathological fracture    Intertrochanteric fracture of left femur, closed, with routine healing, subsequent encounter    Ambulatory dysfunction    Elderly person living alone    Low back pain without sciatica    S/P ORIF (open reduction internal fixation) fracture    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HCC)    Bladder cancer ([address] )    Allergic rhinitis    Anemia due to vitamin B12 deficiency    Biliary dyskinesia    Cholelithiasis    Chronic right shoulder pain    Closed displaced fracture of left trapezium bone    Degenerative joint disease    Depression, controlled    Deviated nasal septum    Gastric reflux syndrome    Gastric ulcer    Heart valve disorder    Herpes zoster infection of thoracic region    IBS (irritable bowel syndrome)    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HCC)    Mild vitamin D deficiency    Mixed hearing loss, unilateral    Pancreatic cyst    Raynaud's disease without gangrene    S/P angioplasty with stent    Urge incontinence of urine    Conjunctivitis of both eyes    Thrombocytopenia ([facility] )    Medicare annual wellness visit, subsequent    History of pulmonary embolus (PE)    Age-related cataract of both eyes    Hyperparathyroidism, unspecified (HCC)    Pruritic rash    Fear of falling    Balance problems    Difficulty navigating stairs    Lower abdominal pain, unspecified    History of falling     Past Medical History:   Diagnosis Date    Bladder cancer ([facility] )     Coronary artery disease     S/P stent placement x 2 in 2011    GERD (gastroesophageal reflux disease)     Hepatitis     got this from food back in 1970s, has not had a problem since    Hyperlipidemia     Hypertension     Kidney stones     Malignant neoplasm of urethra ([place] [address] )     Pneumonia     Pulmonary emboli ([place] [address] ) 1970s    Shingles      Social History     Socioeconomic History    Marital status:       Spouse name: Not on file    Number of children: Not on file    Years of education: Not on file    Highest education level: Not on file   Occupational History    Not on file   Social Needs    Financial resource strain: Not on file    Food insecurity:     Worry: Not on file     Inability: Not on file    Transportation needs:     Medical: Not on file     Non-medical: Not on file   Tobacco Use    Smoking status: Never Smoker    Smokeless tobacco: Never Used   Substance and Sexual Activity    Alcohol use: No    Drug use: No    Sexual activity: Never   Lifestyle    Physical activity:     Days per week: Not on file     Minutes per session: Not on file    Stress: Not on file   Relationships    Social connections:     Talks on phone: Not on file     Gets together: Not on file     Attends Sikh service: Not on file     Active member of club or organization: Not on file     Attends meetings of clubs or organizations: Not on file     Relationship status: Not on file    Intimate partner violence:     Fear of current or ex partner: Not on file     Emotionally abused: Not on file     Physically abused: Not on file     Forced sexual activity: Not on file   Other Topics Concern    Not on file   Social History Narrative    Advance directives declined by parents    Always uses seat belt      Family History   Problem Relation Age of Onset    Arthritis Mother     Osteoporosis Mother     Heart disease Father     Hypertension Father     Hypertension Brother     Prostate cancer Brother     Breast cancer Daughter 48    Prostate cancer Son      Past Surgical History:   Procedure Laterality Date    CATARACT EXTRACTION      CORONARY ANGIOPLASTY WITH STENT PLACEMENT      stent x 2    CYSTOSCOPY      diagnostic - onset 4/4/17    EYE SURGERY      HYSTERECTOMY      LEG SURGERY      OOPHORECTOMY      ID OPEN RX FEMUR FX+INTRAMED ALEJANDRO Left 4/8/2018    Procedure: INSERTION NAIL IM FEMUR ANTEGRADE (TROCHANTERIC);   Surgeon: Nikki Adames MD;  Location: [facility];  Service: Orthopedics    TONSILLECTOMY         Current Outpatient Medications:     acetaminophen (TYLENOL) 500 mg tablet, Take 500 mg by mouth every 6 (six) hours as needed for mild pain, Disp: , Rfl:     amLODIPine (NORVASC) 5 mg tablet, TAKE 1 TABLET BY MOUTH  DAILY, Disp: 90 tablet, Rfl: 0    aspirin 81 MG tablet, Take 81 mg by mouth daily Resume on 4/28 , Disp: , Rfl:     atenolol (TENORMIN) 25 mg tablet, Take 1 tablet (25 mg total) by mouth daily Resume on 4/28, Disp: 90 tablet, Rfl: 1    atorvastatin (LIPITOR) 40 mg tablet, TAKE 1 TABLET BY MOUTH  DAILY AFTER DINNER, Disp: 90 tablet, Rfl: 0    bumetanide (BUMEX) 1 mg tablet, TAKE 1 TABLET BY MOUTH  DAILY, Disp: 90 tablet, Rfl: 0    Cholecalciferol 2000 units TABS, Take 2,000 Units by mouth daily Resume on 4/28, Disp: , Rfl:     dicyclomine (BENTYL) 10 mg capsule, Take 10 mg by mouth as needed, Disp: , Rfl:     DULoxetine (CYMBALTA) 30 mg delayed release capsule, Take 1 capsule (30 mg total) by mouth daily Resume on 4/28, Disp: 90 capsule, Rfl: 1    fexofenadine (ALLEGRA) 180 MG tablet, Take 180 mg by mouth daily, Disp: , Rfl:     MULTIPLE VITAMIN PO, Take by mouth, Disp: , Rfl:     omeprazole (PriLOSEC) 40 MG capsule, Take 1 capsule (40 mg total) by mouth daily before breakfast, Disp: 90 capsule, Rfl: 3    METHYLCELLULOSE, LAXATIVE, PO, Take by mouth, Disp: , Rfl:     montelukast (SINGULAIR) 10 mg tablet, TAKE 1 TABLET BY MOUTH  DAILY AS NEEDED FOR ALLERGY SYMPTOMS (Patient not taking: Reported on 2/11/2020), Disp: 90 tablet, Rfl: 0  Allergies   Allergen Reactions    Lactose      Other reaction(s): Indigestion/GI Upset    Other      Other reaction(s): Mental Status Change  After surgery   Whole blood transfusion  = passed out   historical intolerance; verified with patient; had no reaction to PRBC transfusions afterward       Labs:  Appointment on 01/14/2020   Component Date Value    TSH 3RD GENERATON 01/14/2020 3 320     Sodium 01/14/2020 135*    Potassium 01/14/2020 3 8     Chloride 01/14/2020 101     CO2 01/14/2020 33*    ANION GAP 01/14/2020 1*    BUN 01/14/2020 20     Creatinine 01/14/2020 0 92     Glucose 01/14/2020 83     Calcium 01/14/2020 9 9     AST 01/14/2020 27     ALT 01/14/2020 41     Alkaline Phosphatase 01/14/2020 78     Total Protein 01/14/2020 8 3*    Albumin 01/14/2020 4 2     Total Bilirubin 01/14/2020 0 81     eGFR 01/14/2020 58     WBC 01/14/2020 4 74     RBC 01/14/2020 4 29     Hemoglobin 01/14/2020 13 7     Hematocrit 01/14/2020 41 9     MCV 01/14/2020 98     MCH 01/14/2020 31 9     MCHC 01/14/2020 32 7     RDW 01/14/2020 12 2     MPV 01/14/2020 10 5     Platelets 23/07/1847 169     nRBC 01/14/2020 0     Neutrophils Relative 01/14/2020 49     Immat GRANS % 01/14/2020 0     Lymphocytes Relative 01/14/2020 38     Monocytes Relative 01/14/2020 10     Eosinophils Relative 01/14/2020 2     Basophils Relative 01/14/2020 1     Neutrophils Absolute 01/14/2020 2 35     Immature Grans Absolute 01/14/2020 0 01     Lymphocytes Absolute 01/14/2020 1 78     Monocytes Absolute 01/14/2020 0 47     Eosinophils Absolute 01/14/2020 0 09     Basophils Absolute 01/14/2020 0 04     PTH 01/14/2020 87 1*   Appointment on 12/26/2019   Component Date Value    Sodium 12/26/2019 140     Potassium 12/26/2019 3 7     Chloride 12/26/2019 103     CO2 12/26/2019 32     ANION GAP 12/26/2019 5     BUN 12/26/2019 22     Creatinine 12/26/2019 0 86     Glucose 12/26/2019 97     Calcium 12/26/2019 9 6     eGFR 12/26/2019 63    Office Visit on 12/23/2019   Component Date Value    LEUKOCYTE ESTERASE,UA 12/23/2019 neg     NITRITE,UA 12/23/2019 neg     SL AMB POCT UROBILINOGEN 12/23/2019 neg     POCT URINE PROTEIN 12/23/2019 neg      PH,UA 12/23/2019 6 5     BLOOD,UA 12/23/2019 50 CAMERON/UL     SPECIFIC GRAVITY,UA 12/23/2019 1 005     KETONES,UA 12/23/2019 neg     BILIRUBIN,UA 12/23/2019 neg     GLUCOSE, UA 12/23/2019 neg      COLOR,UA 12/23/2019 light yellow     CLARITY,UA 12/23/2019 clear     Urine Culture 12/23/2019 No Growth <1000 cfu/mL      Imaging: No results found  Review of Systems:  Review of Systems   Cardiovascular: Positive for leg swelling  Neurological: Positive for headaches  Physical Exam:  Physical Exam   Constitutional: She is oriented to person, place, and time  She appears well-developed and well-nourished  HENT:   Head: Normocephalic  Eyes: Pupils are equal, round, and reactive to light  EOM are normal    Neck: Normal range of motion  Cardiovascular: Normal rate, regular rhythm and normal heart sounds  Pulmonary/Chest: Effort normal and breath sounds normal    Abdominal: Soft   Bowel sounds are normal    Musculoskeletal: Normal range of motion  She exhibits edema  Trace ankle edema   Neurological: She is alert and oriented to person, place, and time  Skin: Skin is warm and dry  Capillary refill takes less than 2 seconds  Psychiatric: She has a normal mood and affect  Vitals reviewed  Discussion/Summary:  1  Chronic Diastolic Heart Failure NYHA class II stage C- slightly decompensated  Due to elevated blood pressure  Weight 129 pounds in the office and 127 pounds at home  She presents with trace bilateral ankle edema  Increased Bumex 1 mg daily for 5 days in decreased to 0 5 mg daily  continue on atenolol 25 mg daily, Norvasc 5 mg daily start lisinopril 5 mg daily at bedtime for BP control  Maintain a 2 gram daily sodium diet and 1500 ml daily fluid restriction  Check daily weights  If you gained 3 pounds in one day, 5 pounds in one week, or experience worsening shortness of breath or increasing lower leg swelling  call the heart failure office at [phone]  Please bring a  list of your current medications and daily weights to the office visit  2  Hypertension-  RUE sitting 150/70, Goal , start Lisinopril 5mg daily at bedtime, continue on Norvasc 5mg daily, Atenolol 25mg daily  BMP in one week  2gm sodium diet  Follow up in our office in one week     3  Hyperlipidemia- 7/18/18  Cholesterol 160, triglycerides 146, HDL 64, LDL 75, continue on Lipitor 40 mg daily  4   Hx of CAD with remote stenting x 2 in 2011-  Continue aspirin 81 mg daily, atenolol 25 mg daily, Lipitor 40 mg daily

## 2020-02-11 NOTE — PATIENT INSTRUCTIONS
2gm sodium low fat low cholesterol diet, eating fresh is best, fresh fruit, fresh vegetable, lean protein       Bumex 1mg daily for 5 days  Lisinopril 5mg daily at bedtime  BMP next week

## 2020-02-13 DIAGNOSIS — I10 ESSENTIAL HYPERTENSION: ICD-10-CM

## 2020-02-13 DIAGNOSIS — I50.32 CHRONIC DIASTOLIC (CONGESTIVE) HEART FAILURE (HCC): Chronic | ICD-10-CM

## 2020-02-13 RX ORDER — LISINOPRIL 5 MG/1
5 TABLET ORAL
Qty: 90 TABLET | Refills: 3 | Status: CANCELLED | OUTPATIENT
Start: 2020-02-13

## 2020-02-14 DIAGNOSIS — I50.32 CHRONIC DIASTOLIC (CONGESTIVE) HEART FAILURE (HCC): Chronic | ICD-10-CM

## 2020-02-14 DIAGNOSIS — I10 ESSENTIAL HYPERTENSION: ICD-10-CM

## 2020-02-14 RX ORDER — LISINOPRIL 5 MG/1
5 TABLET ORAL
Qty: 90 TABLET | Refills: 3 | Status: SHIPPED | OUTPATIENT
Start: 2020-02-14 | End: 2020-02-17 | Stop reason: SDUPTHER

## 2020-02-14 RX ORDER — LISINOPRIL 5 MG/1
5 TABLET ORAL
Qty: 15 TABLET | Refills: 0 | Status: CANCELLED | OUTPATIENT
Start: 2020-02-14

## 2020-02-17 ENCOUNTER — TELEPHONE (OUTPATIENT)
Dept: CARDIOLOGY CLINIC | Facility: CLINIC | Age: 85
End: 2020-02-17

## 2020-02-17 DIAGNOSIS — I10 ESSENTIAL HYPERTENSION: ICD-10-CM

## 2020-02-17 DIAGNOSIS — R60.9 EDEMA, UNSPECIFIED TYPE: Primary | ICD-10-CM

## 2020-02-17 DIAGNOSIS — I50.32 CHRONIC DIASTOLIC (CONGESTIVE) HEART FAILURE (HCC): Chronic | ICD-10-CM

## 2020-02-17 RX ORDER — METOLAZONE 2.5 MG/1
TABLET ORAL
Qty: 1 TABLET | Refills: 0 | Status: SHIPPED | OUTPATIENT
Start: 2020-02-17 | End: 2020-02-25 | Stop reason: CLARIF

## 2020-02-17 RX ORDER — POTASSIUM CHLORIDE 20 MEQ/1
TABLET, EXTENDED RELEASE ORAL
Qty: 1 TABLET | Refills: 0 | Status: SHIPPED | OUTPATIENT
Start: 2020-02-17 | End: 2020-02-25 | Stop reason: CLARIF

## 2020-02-17 RX ORDER — LISINOPRIL 5 MG/1
5 TABLET ORAL
Qty: 7 TABLET | Refills: 0 | OUTPATIENT
Start: 2020-02-17 | End: 2020-02-21 | Stop reason: SDUPTHER

## 2020-02-17 NOTE — PROGRESS NOTES
5 day increase of bumex without good response  Metolazone 2 5mg one dose with K dur 20meq    Follow up with me next week

## 2020-02-17 NOTE — TELEPHONE ENCOUNTER
Metolazone 2 5mg once 30 minutes prior to Bumex dose one time only  Take K dur 20meq one dose  I will order this  Follow up with me on 2/25

## 2020-02-17 NOTE — TELEPHONE ENCOUNTER
P/C'd, states she is not feeling well and will have to cancel tomorrow's appt  Did make new appt for next week  She had taken 1 mg bumex for 5 days as instructed and started lisinopril 5 mg at bedtime  The wt did not come off, however pt is sleeping better at night and SBP is improving  117-130's  Labs 1/14 CR- 0 92  K-3 8  Na- 135  Has not gotten repeat BMP yet  Was going to do it today, but sick      Taking:Norvasc 5 mg qd              Atenolol 25 mg qd              Lisinopril 5 mg bedtime               Bumex 0 5 mg qd      Please advise

## 2020-02-21 ENCOUNTER — TELEPHONE (OUTPATIENT)
Dept: GASTROENTEROLOGY | Facility: MEDICAL CENTER | Age: 85
End: 2020-02-21

## 2020-02-21 ENCOUNTER — TELEPHONE (OUTPATIENT)
Dept: CARDIOLOGY CLINIC | Facility: CLINIC | Age: 85
End: 2020-02-21

## 2020-02-21 DIAGNOSIS — R60.9 EDEMA, UNSPECIFIED TYPE: ICD-10-CM

## 2020-02-21 DIAGNOSIS — I10 ESSENTIAL HYPERTENSION: ICD-10-CM

## 2020-02-21 DIAGNOSIS — I50.32 CHRONIC DIASTOLIC (CONGESTIVE) HEART FAILURE (HCC): Chronic | ICD-10-CM

## 2020-02-21 RX ORDER — POTASSIUM CHLORIDE 20 MEQ/1
TABLET, EXTENDED RELEASE ORAL
Qty: 1 TABLET | Refills: 0 | Status: CANCELLED | OUTPATIENT
Start: 2020-02-21

## 2020-02-21 RX ORDER — LISINOPRIL 5 MG/1
5 TABLET ORAL
Qty: 90 TABLET | Refills: 2 | OUTPATIENT
Start: 2020-02-21 | End: 2020-02-25 | Stop reason: SDUPTHER

## 2020-02-21 RX ORDER — METOLAZONE 2.5 MG/1
TABLET ORAL
Qty: 1 TABLET | Refills: 0 | Status: CANCELLED | OUTPATIENT
Start: 2020-02-21

## 2020-02-21 NOTE — TELEPHONE ENCOUNTER
Some how pt Lisinopril was Cx at Mail [facility] 2-17    Reordered Lisinopril & they will overnight delivery for saturday

## 2020-02-21 NOTE — TELEPHONE ENCOUNTER
Spoke with pt today and wt 126lb naked at home  She feels better on has a cough from her cold last week  She never got the metolazone dose because it was sent to mail order  She will see you Tuesday for F/U  Just told her to wait and see you first to see if you still need it        Thank you

## 2020-02-21 NOTE — TELEPHONE ENCOUNTER
----- Message from Alfonzo Shaikh MD sent at 2/21/2020  8:24 AM EST -----  Please call patient :  Stomach biopsies were unremarkable  So far the pancreatic cyst appears to be benign but precancerous  We are waiting for the results and I will call her once those are back

## 2020-02-24 ENCOUNTER — APPOINTMENT (OUTPATIENT)
Dept: LAB | Facility: CLINIC | Age: 85
End: 2020-02-24
Payer: MEDICARE

## 2020-02-24 LAB
ANION GAP SERPL CALCULATED.3IONS-SCNC: 5 MMOL/L (ref 4–13)
BUN SERPL-MCNC: 21 MG/DL (ref 5–25)
CALCIUM SERPL-MCNC: 9.5 MG/DL (ref 8.3–10.1)
CHLORIDE SERPL-SCNC: 102 MMOL/L (ref 100–108)
CO2 SERPL-SCNC: 31 MMOL/L (ref 21–32)
CREAT SERPL-MCNC: 0.9 MG/DL (ref 0.6–1.3)
GFR SERPL CREATININE-BSD FRML MDRD: 60 ML/MIN/1.73SQ M
GLUCOSE P FAST SERPL-MCNC: 76 MG/DL (ref 65–99)
POTASSIUM SERPL-SCNC: 3.9 MMOL/L (ref 3.5–5.3)
SODIUM SERPL-SCNC: 138 MMOL/L (ref 136–145)

## 2020-02-24 PROCEDURE — 36415 COLL VENOUS BLD VENIPUNCTURE: CPT | Performed by: NURSE PRACTITIONER

## 2020-02-24 PROCEDURE — 80048 BASIC METABOLIC PNL TOTAL CA: CPT | Performed by: NURSE PRACTITIONER

## 2020-02-25 ENCOUNTER — OFFICE VISIT (OUTPATIENT)
Dept: CARDIOLOGY CLINIC | Facility: CLINIC | Age: 85
End: 2020-02-25
Payer: MEDICARE

## 2020-02-25 VITALS
WEIGHT: 127.9 LBS | HEART RATE: 64 BPM | BODY MASS INDEX: 24.15 KG/M2 | HEIGHT: 61 IN | OXYGEN SATURATION: 99 % | SYSTOLIC BLOOD PRESSURE: 158 MMHG | DIASTOLIC BLOOD PRESSURE: 70 MMHG

## 2020-02-25 DIAGNOSIS — E78.5 HYPERLIPIDEMIA, UNSPECIFIED HYPERLIPIDEMIA TYPE: ICD-10-CM

## 2020-02-25 DIAGNOSIS — I10 HYPERTENSION, UNSPECIFIED TYPE: ICD-10-CM

## 2020-02-25 DIAGNOSIS — I50.32 CHRONIC DIASTOLIC (CONGESTIVE) HEART FAILURE (HCC): Primary | Chronic | ICD-10-CM

## 2020-02-25 PROCEDURE — 1036F TOBACCO NON-USER: CPT | Performed by: NURSE PRACTITIONER

## 2020-02-25 PROCEDURE — 99214 OFFICE O/P EST MOD 30 MIN: CPT | Performed by: NURSE PRACTITIONER

## 2020-02-25 PROCEDURE — 3008F BODY MASS INDEX DOCD: CPT | Performed by: NURSE PRACTITIONER

## 2020-02-25 PROCEDURE — 3078F DIAST BP <80 MM HG: CPT | Performed by: NURSE PRACTITIONER

## 2020-02-25 PROCEDURE — 4040F PNEUMOC VAC/ADMIN/RCVD: CPT | Performed by: NURSE PRACTITIONER

## 2020-02-25 PROCEDURE — 1160F RVW MEDS BY RX/DR IN RCRD: CPT | Performed by: NURSE PRACTITIONER

## 2020-02-25 PROCEDURE — 3077F SYST BP >= 140 MM HG: CPT | Performed by: NURSE PRACTITIONER

## 2020-02-25 RX ORDER — LISINOPRIL 5 MG/1
5 TABLET ORAL 2 TIMES DAILY
Qty: 180 TABLET | Refills: 2 | Status: SHIPPED | OUTPATIENT
Start: 2020-02-25 | End: 2020-02-28 | Stop reason: SDUPTHER

## 2020-02-25 NOTE — PROGRESS NOTES
Cardiology Follow Up    Joseph Miller  1935  [id number]  [facility]  [address] [address] [phone]     Office follow up visit     Interval History:   Ms Joseph Miller called our office on 2/10/20 with complaints of weight gain from 122 pounds to 127 pounds and elevated -160  She took Bumex 1mg daily for 2 days without improvement in LE edema        On 2/11/20 Ms Joseph Miller  was seen in our office due to high blood pressure and ankle edema  Kasandra Pascual denied change in her diet or consuming more calories  She admitted to bilateral ankle edema and a headache  Weight up from 127 pounds to 129 pounds  /70, Bumex was increased to 1mg daily for 5 days, Metolazone 2 5mg one dose in am,  Lisinopril 5mg daily at bedtime started  BNP in one week  Ms Joseph Miller presents to our office for a follow up visit  Home /69 - 162/82  BP in office 144/70  She admits to a dry cough  Denies fever or chills  Ms Sutton Number a home upper arm cuff  She is unable to use it due to arthritis in her thumbs    2/24/20 Sodium 138, potassium 3 9, BUN 21, creatinine 0 90, GFR 60        HPI:  2/28/19 LVEF 55%, grade 1 DD,   CAD  2011 sp Stents x 2  HTN  HLD  Chronic Diastolic Heart Failure   Patient Active Problem List   Diagnosis    Essential hypertension    Mixed hyperlipidemia    Coronary artery disease without angina pectoris - S/P stent placement x 2 (2011)    Gastroesophageal reflux disease without esophagitis    Chronic diastolic (congestive) heart failure ([facility] )    Hyperthyroidism    Age-related osteoporosis with current pathological fracture    Intertrochanteric fracture of left femur, closed, with routine healing, subsequent encounter    Ambulatory dysfunction    Elderly person living alone    Low back pain without sciatica    S/P ORIF (open reduction internal fixation) fracture    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HCC)    Bladder cancer (HCC)    Allergic rhinitis    Anemia due to vitamin B12 deficiency    Biliary dyskinesia    Cholelithiasis    Chronic right shoulder pain    Closed displaced fracture of left trapezium bone    Degenerative joint disease    Depression, controlled    Deviated nasal septum    Gastric reflux syndrome    Gastric ulcer    Heart valve disorder    Herpes zoster infection of thoracic region    IBS (irritable bowel syndrome)    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HCC)    Mild vitamin D deficiency    Mixed hearing loss, unilateral    Pancreatic cyst    Raynaud's disease without gangrene    S/P angioplasty with stent    Urge incontinence of urine    Conjunctivitis of both eyes    Thrombocytopenia ([facility] )    Medicare annual wellness visit, subsequent    History of pulmonary embolus (PE)    Age-related cataract of both eyes    Hyperparathyroidism, unspecified (HCC)    Pruritic rash    Fear of falling    Balance problems    Difficulty navigating stairs    Lower abdominal pain, unspecified    History of falling    Lower extremity edema     Past Medical History:   Diagnosis Date    Bladder [facility]     Coronary artery disease     S/P stent placement x 2 in 2011    GERD (gastroesophageal reflux disease)     Hepatitis     got this from food back in 1970s, has not had a problem since    Hyperlipidemia     Hypertension     Kidney stones     Malignant neoplasm of urethra ([facility] )     Pneumonia     Pulmonary emboli ([facility] ) 1970s    Shingles      Social History     Socioeconomic History    Marital status:       Spouse name: Not on file    Number of children: Not on file    Years of education: Not on file    Highest education level: Not on file   Occupational History    Not on file   Social Needs    Financial resource strain: Not on file    Food insecurity:     Worry: Not on file     Inability: Not on file   [place] Transportation needs:     Medical: Not on file     Non-medical: Not on file   Tobacco Use    Smoking status: Never Smoker    Smokeless tobacco: Never Used   Substance and Sexual Activity    Alcohol use: No    Drug use: No    Sexual activity: Never   Lifestyle    Physical activity:     Days per week: Not on file     Minutes per session: Not on file    Stress: Not on file   Relationships    Social connections:     Talks on phone: Not on file     Gets together: Not on file     Attends [employer] witness service: Not on file     Active member of club or organization: Not on file     Attends meetings of clubs or organizations: Not on file     Relationship status: Not on file    Intimate partner violence:     Fear of current or ex partner: Not on file     Emotionally abused: Not on file     Physically abused: Not on file     Forced sexual activity: Not on file   Other Topics Concern    Not on file   Social History Narrative    Advance directives declined by parents    Always uses seat belt      Family History   Problem Relation Age of Onset    Arthritis Mother     Osteoporosis Mother     Heart disease Father     Hypertension Father     Hypertension Brother     Prostate cancer Brother     Breast cancer Daughter 48    Prostate cancer Son      Past Surgical History:   Procedure Laterality Date    CATARACT EXTRACTION      CORONARY ANGIOPLASTY WITH STENT PLACEMENT      stent x 2    CYSTOSCOPY      diagnostic - onset 4/4/17    EYE SURGERY      HYSTERECTOMY      LEG SURGERY      OOPHORECTOMY      RI OPEN RX FEMUR FX+INTRAMED ALEJANDRO Left 4/8/2018    Procedure: INSERTION NAIL IM FEMUR ANTEGRADE (TROCHANTERIC);   Surgeon: Ilda Goss MD;  [facility];  Service: Orthopedics    TONSILLECTOMY         Current Outpatient Medications:     acetaminophen (TYLENOL) 500 mg tablet, Take 500 mg by mouth every 6 (six) hours as needed for mild pain, Disp: , Rfl:     amLODIPine (NORVASC) 5 mg tablet, TAKE 1 TABLET BY MOUTH  DAILY, Disp: 90 tablet, Rfl: 0    aspirin 81 MG tablet, Take 81 mg by mouth daily Resume on 4/28 , Disp: , Rfl:     atenolol (TENORMIN) 25 mg tablet, Take 1 tablet (25 mg total) by mouth daily Resume on 4/28, Disp: 90 tablet, Rfl: 1    atorvastatin (LIPITOR) 40 mg tablet, TAKE 1 TABLET BY MOUTH  DAILY AFTER DINNER, Disp: 90 tablet, Rfl: 0    bumetanide (BUMEX) 1 mg tablet, TAKE 1 TABLET BY MOUTH  DAILY, Disp: 90 tablet, Rfl: 0    Cholecalciferol 2000 units TABS, Take 2,000 Units by mouth daily Resume on 4/28, Disp: , Rfl:     DULoxetine (CYMBALTA) 30 mg delayed release capsule, Take 1 capsule (30 mg total) by mouth daily Resume on 4/28, Disp: 90 capsule, Rfl: 1    fexofenadine (ALLEGRA) 180 MG tablet, Take 180 mg by mouth daily, Disp: , Rfl:     lisinopril (ZESTRIL) 5 mg tablet, Take 1 tablet (5 mg total) by mouth daily at bedtime, Disp: 90 tablet, Rfl: 2    MULTIPLE VITAMIN PO, Take by mouth, Disp: , Rfl:     omeprazole (PriLOSEC) 40 MG capsule, Take 1 capsule (40 mg total) by mouth daily before breakfast, Disp: 90 capsule, Rfl: 3    dicyclomine (BENTYL) 10 mg capsule, Take 10 mg by mouth as needed, Disp: , Rfl:     metolazone (ZAROXOLYN) 2 5 mg tablet, Take one tab once 30 minutes prior to Bumex dose in am  (Patient not taking: Reported on 2/25/2020), Disp: 1 tablet, Rfl: 0    montelukast (SINGULAIR) 10 mg tablet, TAKE 1 TABLET BY MOUTH  DAILY AS NEEDED FOR ALLERGY SYMPTOMS (Patient not taking: Reported on 2/11/2020), Disp: 90 tablet, Rfl: 0    potassium chloride (K-DUR,KLOR-CON) 20 mEq tablet, Take one tab on  The day you take Metolazone (Patient not taking: Reported on 2/25/2020), Disp: 1 tablet, Rfl: 0  Allergies   Allergen Reactions    Lactose      Other reaction(s): Indigestion/GI Upset    Other      Other reaction(s): Mental Status Change  After surgery   Whole blood transfusion  = passed out   historical intolerance; verified with patient; had no reaction to PRBC transfusions afterward       Labs:  Office Visit on 02/11/2020   Component Date Value    Sodium 02/24/2020 138     Potassium 02/24/2020 3 9     Chloride 02/24/2020 102     CO2 02/24/2020 31     ANION GAP 02/24/2020 5     BUN 02/24/2020 21     Creatinine 02/24/2020 0 90     Glucose, Fasting 02/24/2020 76     Calcium 02/24/2020 9 5     eGFR 02/24/2020 60    Hospital Outpatient Visit on 02/05/2020   Component Date Value    Case Report 02/05/2020                      Value:Surgical Pathology Report                         Case: [id number]                                   Authorizing Provider:  Amaris Reagan MD             Collected:           02/05/2020 1442              Ordering Location:     [address] [place] [address]      Received:            02/05/2020 [facility]                                                           Pathologist:           Maddi Nicole MD                                                                Specimens:   A) - Stomach, antrum                                                                                B) - Stomach, body                                                                                  C) - Polyp, Stomach/Small Intestine                                                        Final Diagnosis 02/05/2020                      Value: This result contains rich text formatting which cannot be displayed here   Note 02/05/2020                      Value: This result contains rich text formatting which cannot be displayed here   Additional Information 02/05/2020                      Value: This result contains rich text formatting which cannot be displayed here  [facility] Description 02/05/2020                      Value: This result contains rich text formatting which cannot be displayed here   Clinical Information 02/05/2020                      Value:Pancreatic cyst   Gastric wall thickening     Appointment on 01/14/2020   Component Date Value    TSH 3RD GENERATON 01/14/2020 3 320     Sodium 01/14/2020 135*    Potassium 01/14/2020 3 8     Chloride 01/14/2020 101     CO2 01/14/2020 33*    ANION GAP 01/14/2020 1*    BUN 01/14/2020 20     Creatinine 01/14/2020 0 92     Glucose 01/14/2020 83     Calcium 01/14/2020 9 9     AST 01/14/2020 27     ALT 01/14/2020 41     Alkaline Phosphatase 01/14/2020 78     Total Protein 01/14/2020 8 3*    Albumin 01/14/2020 4 2     Total Bilirubin 01/14/2020 0 81     eGFR 01/14/2020 58     WBC 01/14/2020 4 74     RBC 01/14/2020 4 29     Hemoglobin 01/14/2020 13 7     Hematocrit 01/14/2020 41 9     MCV 01/14/2020 98     MCH 01/14/2020 31 9     MCHC 01/14/2020 32 7     RDW 01/14/2020 12 2     MPV 01/14/2020 10 5     Platelets 90/42/9414 169     nRBC 01/14/2020 0     Neutrophils Relative 01/14/2020 49     Immat GRANS % 01/14/2020 0     Lymphocytes Relative 01/14/2020 38     Monocytes Relative 01/14/2020 10     Eosinophils Relative 01/14/2020 2     Basophils Relative 01/14/2020 1     Neutrophils Absolute 01/14/2020 2 35     Immature Grans Absolute 01/14/2020 0 01     Lymphocytes Absolute 01/14/2020 1 78     Monocytes Absolute 01/14/2020 0 47     Eosinophils Absolute 01/14/2020 0 09     Basophils Absolute 01/14/2020 0 04     PTH 01/14/2020 87 1*   Appointment on 12/26/2019   Component Date Value    Sodium 12/26/2019 140     Potassium 12/26/2019 3 7     Chloride 12/26/2019 103     CO2 12/26/2019 32     ANION GAP 12/26/2019 5     BUN 12/26/2019 22     Creatinine 12/26/2019 0 86     Glucose 12/26/2019 97     Calcium 12/26/2019 9 6     eGFR 12/26/2019 63      Imaging: No results found  Review of Systems:  Review of Systems   Respiratory: Positive for cough  Dry   Musculoskeletal: Positive for arthralgias  All other systems reviewed and are negative  Physical Exam:  Physical Exam   Constitutional: She is oriented to person, place, and time   She appears well-developed and well-nourished  HENT:   Head: Normocephalic  Eyes: Pupils are equal, round, and reactive to light  Neck: Normal range of motion  Neck supple  Cardiovascular: Normal rate, regular rhythm and normal heart sounds  Pulmonary/Chest: Effort normal and breath sounds normal    Abdominal: Soft  Bowel sounds are normal    Musculoskeletal: Normal range of motion  Neurological: She is alert and oriented to person, place, and time  Skin: Skin is warm and dry  Capillary refill takes less than 2 seconds  Psychiatric: She has a normal mood and affect  Vitals reviewed  Discussion/Summary:  1  Chronic diastolic heart failure NYHA class II stage C- on PE appears eu volemic and compensated, HR controlled,  SBP goal 130, increase Lisinopril to 5mg BID, if home BP reading low can consider stopping Norvasc  Continue on  Atenolol 25 mg daily, Norvasc 5 mg daily,  Bumex 0 5 mg daily,   Maintain a 2 gram daily sodium diet and 1500 ml daily fluid restriction  Check daily weights  If you gain 3 pounds in one day, 5 pounds in one week, or experience worsening shortness of breath or increasing lower leg swelling  Please call the heart failure office at [phone]  Please bring a  list of your current medications and daily weights to the office visit  2  Hypertension- RUE sitting 144/70, increase Lisinopril to 5mg BID, continue with home BP monitoring  I have instructed Mustbin to call our office if she becomes lightheaded dizzy or her blood pressure is in the low 100  3  HLD 7/18/18  Cholesterol 160, triglycerides 146, HDL 64, LDL 75 continue on Lipitor 40mg daily   3   Hx of CAD sp remote stenting x 2 in 2011, continue on ASA 81mg daily, Atenolol 25mg daily and Lipitor 40mg daily

## 2020-02-25 NOTE — PATIENT INSTRUCTIONS
2gm sodium low fat low cholesterol diet, eating fresh is best, fresh fruit, fresh vegetables, lean protein    Increase Lisinopril from 5mg daily to twice a day, take one in am and one in pm    Call our office if home BP too low systolic 204

## 2020-02-26 ENCOUNTER — TELEPHONE (OUTPATIENT)
Dept: CARDIOLOGY CLINIC | Facility: CLINIC | Age: 85
End: 2020-02-26

## 2020-02-26 NOTE — TELEPHONE ENCOUNTER
[facility] is requesting refill of Potassium 20 meq daily & Metolazone 2 5mg  Is patient no longer on this? I do not see that you d/c'd this during o/v yesterday

## 2020-02-28 DIAGNOSIS — E78.5 HYPERLIPIDEMIA, UNSPECIFIED HYPERLIPIDEMIA TYPE: ICD-10-CM

## 2020-02-28 DIAGNOSIS — I50.32 CHRONIC DIASTOLIC (CONGESTIVE) HEART FAILURE (HCC): Chronic | ICD-10-CM

## 2020-03-02 RX ORDER — LISINOPRIL 5 MG/1
5 TABLET ORAL 2 TIMES DAILY
Qty: 180 TABLET | Refills: 2 | Status: SHIPPED | OUTPATIENT
Start: 2020-03-02 | End: 2020-03-05

## 2020-03-03 ENCOUNTER — OFFICE VISIT (OUTPATIENT)
Dept: URGENT CARE | Facility: MEDICAL CENTER | Age: 85
End: 2020-03-03
Payer: MEDICARE

## 2020-03-03 ENCOUNTER — TELEPHONE (OUTPATIENT)
Dept: FAMILY MEDICINE CLINIC | Facility: CLINIC | Age: 85
End: 2020-03-03

## 2020-03-03 VITALS
SYSTOLIC BLOOD PRESSURE: 140 MMHG | RESPIRATION RATE: 20 BRPM | TEMPERATURE: 98.2 F | DIASTOLIC BLOOD PRESSURE: 80 MMHG | HEART RATE: 78 BPM | OXYGEN SATURATION: 99 %

## 2020-03-03 DIAGNOSIS — J01.10 ACUTE FRONTAL SINUSITIS, RECURRENCE NOT SPECIFIED: Primary | ICD-10-CM

## 2020-03-03 PROCEDURE — G0463 HOSPITAL OUTPT CLINIC VISIT: HCPCS | Performed by: PHYSICIAN ASSISTANT

## 2020-03-03 PROCEDURE — 99213 OFFICE O/P EST LOW 20 MIN: CPT | Performed by: PHYSICIAN ASSISTANT

## 2020-03-03 RX ORDER — ABALOPARATIDE 2000 UG/ML
INJECTION, SOLUTION SUBCUTANEOUS
COMMUNITY
Start: 2020-02-26 | End: 2020-07-30 | Stop reason: ALTCHOICE

## 2020-03-03 RX ORDER — AZITHROMYCIN 250 MG/1
TABLET, FILM COATED ORAL
Qty: 6 TABLET | Refills: 0 | Status: SHIPPED | OUTPATIENT
Start: 2020-03-03 | End: 2020-03-07

## 2020-03-03 NOTE — TELEPHONE ENCOUNTER
Patient called asking for a sick appointment  She has had cough and congestion in her throat for about three weeks  As we are fully booked patient was referred to urgent care  Also, if anyone cancels for the office here, we will call to see if she wants to come in if she did not go to urgent care  Patient understood and was thankful

## 2020-03-03 NOTE — PROGRESS NOTES
[facility] [facility]        NAME: Jaky Joy is a 82 y o  female  : 1935    MRN: [id number]  DATE: March 3, 2020  TIME: 1:25 PM    Assessment and Plan   Acute frontal sinusitis, recurrence not specified [J01 10]  1  Acute frontal sinusitis, recurrence not specified  azithromycin (ZITHROMAX) 250 mg tablet         Patient Instructions     Sinusitis   zithromax as directed  Follow up with PCP in 3-5 days  Proceed to  ER if symptoms worsen  Chief Complaint     Chief Complaint   Patient presents with    Sinusitis     x 3 weeks , headache and sinus pressure  History of Present Illness       81 y/o female presents c/o nasal congestion, sinus pressure/ pain and non productive cough x 3 weeks  States she has taken over the counter medications with no relief  Denies fever, chills, nausea, vomiting      Review of Systems   Review of Systems   Constitutional: Negative for activity change, appetite change, chills, diaphoresis, fatigue and fever  HENT: Positive for congestion and rhinorrhea  Negative for ear discharge, ear pain, facial swelling, hearing loss, mouth sores, nosebleeds, postnasal drip, sinus pressure, sinus pain, sneezing, sore throat and voice change  Respiratory: Positive for cough  Negative for apnea, choking, chest tightness, shortness of breath, wheezing and stridor  Cardiovascular: Negative            Current Medications       Current Outpatient Medications:     acetaminophen (TYLENOL) 500 mg tablet, Take 500 mg by mouth every 6 (six) hours as needed for mild pain, Disp: , Rfl:     amLODIPine (NORVASC) 5 mg tablet, TAKE 1 TABLET BY MOUTH  DAILY, Disp: 90 tablet, Rfl: 0    aspirin 81 MG tablet, Take 81 mg by mouth daily Resume on  , Disp: , Rfl:     atenolol (TENORMIN) 25 mg tablet, Take 1 tablet (25 mg total) by mouth daily Resume on , Disp: 90 tablet, Rfl: 1    atorvastatin (LIPITOR) 40 mg tablet, TAKE 1 TABLET BY MOUTH  DAILY AFTER DINNER, Disp: 90 tablet, Rfl: 0    azithromycin (ZITHROMAX) 250 mg tablet, Take 2 tablets today then 1 tablet daily x 4 days, Disp: 6 tablet, Rfl: 0    bumetanide (BUMEX) 1 mg tablet, TAKE 1 TABLET BY MOUTH  DAILY, Disp: 90 tablet, Rfl: 0    Cholecalciferol 2000 units TABS, Take 2,000 Units by mouth daily Resume on 4/28, Disp: , Rfl:     dicyclomine (BENTYL) 10 mg capsule, Take 10 mg by mouth as needed, Disp: , Rfl:     DULoxetine (CYMBALTA) 30 mg delayed release capsule, Take 1 capsule (30 mg total) by mouth daily Resume on 4/28, Disp: 90 capsule, Rfl: 1    fexofenadine (ALLEGRA) 180 MG tablet, Take 180 mg by mouth daily, Disp: , Rfl:     lisinopril (ZESTRIL) 5 mg tablet, Take 1 tablet (5 mg total) by mouth 2 (two) times a day, Disp: 180 tablet, Rfl: 2    montelukast (SINGULAIR) 10 mg tablet, TAKE 1 TABLET BY MOUTH  DAILY AS NEEDED FOR ALLERGY SYMPTOMS (Patient not taking: Reported on 2/11/2020), Disp: 90 tablet, Rfl: 0    MULTIPLE VITAMIN PO, Take by mouth, Disp: , Rfl:     omeprazole (PriLOSEC) 40 MG capsule, Take 1 capsule (40 mg total) by mouth daily before breakfast, Disp: 90 capsule, Rfl: 3    TYMLOS 3120 MCG/1 56ML SOPN, , Disp: , Rfl:     Current Allergies     Allergies as of 03/03/2020 - Reviewed 03/03/2020   Allergen Reaction Noted    Lactose  11/16/2009    Other  11/16/2009            The following portions of the patient's history were reviewed and updated as appropriate: allergies, current medications, past family history, past medical history, past social history, past surgical history and problem list      Past Medical History:   Diagnosis Date    Bladder cancer ([facility]     Coronary artery disease     S/P stent placement x 2 in 2011    GERD (gastroesophageal reflux disease)     Hepatitis     got this from food back in 1970s, has not had a problem since    Hyperlipidemia     Hypertension     Kidney stones     Malignant neoplasm of urethra ([facility] )     Pneumonia     [facility] ) 1970[facility]    Shingles Past Surgical History:   Procedure Laterality Date    CATARACT EXTRACTION      CORONARY ANGIOPLASTY WITH STENT PLACEMENT      stent x 2    CYSTOSCOPY      diagnostic - onset 4/4/17    EYE SURGERY      HYSTERECTOMY      LEG SURGERY      OOPHORECTOMY      [place] OPEN RX FEMUR FX+INTRAMED ALEJANDRO Left 4/8/2018    Procedure: INSERTION NAIL IM FEMUR ANTEGRADE (TROCHANTERIC); Surgeon: Caffie Meckel, MD;  [facility];  Service: Orthopedics    TONSILLECTOMY         Family History   Problem Relation Age of Onset    Arthritis Mother     Osteoporosis Mother     Heart disease Father     Hypertension Father     Hypertension Brother     Prostate cancer Brother     Breast cancer Daughter 48    Prostate cancer Son          Medications have been verified  Objective   /80   Pulse 78   Temp 98 2 °F (36 8 °C)   Resp 20   SpO2 99%        Physical Exam     Physical Exam   Constitutional: She appears well-developed and well-nourished  No distress  HENT:   Head: Normocephalic and atraumatic  Right Ear: Hearing, tympanic membrane, external ear and ear canal normal    Left Ear: Hearing, tympanic membrane, external ear and ear canal normal    Nose: Rhinorrhea present  Right sinus exhibits maxillary sinus tenderness  Left sinus exhibits maxillary sinus tenderness  Mouth/Throat: Uvula is midline, oropharynx is clear and moist and mucous membranes are normal    Neck: Normal range of motion  Neck supple  Cardiovascular: Normal rate, regular rhythm, normal heart sounds and intact distal pulses  Pulmonary/Chest: Effort normal and breath sounds normal  No stridor  No respiratory distress  She has no wheezes  She has no rales  She exhibits no tenderness  Lymphadenopathy:     She has cervical adenopathy  Skin: She is not diaphoretic

## 2020-03-03 NOTE — PATIENT INSTRUCTIONS
Sinusitis   zithromax as directed  Follow up with PCP in 3-5 days  Proceed to  ER if symptoms worsen  Rhinosinusitis   WHAT YOU NEED TO KNOW:   Rhinosinusitis (RS) is inflammation of your nose and sinuses  It commonly begins as a virus, often as a common cold  Viruses usually last 7 to 10 days and do not need treatment  When the virus does not get better on its own, you may have bacterial RS  This means that bacteria have begun to grow inside your sinuses  Acute RS lasts less than 4 weeks  Chronic RS lasts 12 weeks or more  Recurrent RS is when you have 4 or more episodes of RS in one year  DISCHARGE INSTRUCTIONS:   Return to the emergency department if:   · Your eye and eyelid are red, swollen, and painful  · You cannot open your eye  · You have double vision or you cannot see  · Your eyeball bulges out or you cannot move your eye  · You are more sleepy than normal or you notice changes in your ability to think, move, or talk  · You have a stiff neck, a fever, or a bad headache  · You have swelling of your forehead or scalp  Contact your healthcare provider if:   · Your symptoms are worse or do not improve after 3 to 5 days of treatment  · You have questions or concerns about your condition or care  Medicines: You may need any of the following:  · Acetaminophen  decreases pain and fever  It is available without a doctor's order  Ask how much to take and how often to take it  Follow directions  Acetaminophen can cause liver damage if not taken correctly  · NSAIDs , such as ibuprofen, help decrease swelling, pain, and fever  This medicine is available with or without a doctor's order  NSAIDs can cause stomach bleeding or kidney problems in certain people  If you take blood thinner medicine, always ask your healthcare provider if NSAIDs are safe for you  Always read the medicine label and follow directions      · Nasal steroid sprays  decrease inflammation in your nose and sinuses  · Decongestants  reduce swelling and drain mucus in the nose and sinuses  They may help you breathe easier  · Antihistamines  dry mucus in the nose and relieve sneezing  · Antibiotics  treat a bacterial infection and may be needed if your symptoms do not improve or they get worse  · Take your medicine as directed  Contact your healthcare provider if you think your medicine is not helping or if you have side effects  Tell him or her if you are allergic to any medicine  Keep a list of the medicines, vitamins, and herbs you take  Include the amounts, and when and why you take them  Bring the list or the pill bottles to follow-up visits  Carry your medicine list with you in case of an emergency  Self-care:   · Rinse your sinuses  Use a sinus rinse device to rinse your nasal passages with a saline (salt water) solution  This will help thin the mucus in your nose and rinse away pollen and dirt  It will also help reduce swelling so you can breathe normally  Ask your healthcare provider how often to do this  · Breathe in steam   Heat a bowl of water until you see steam  Lean over the bowl and make a tent over your head with a large towel  Breathe deeply for about 20 minutes  Be careful not to get too close to the steam or burn yourself  Do this 3 times a day  You can also breathe deeply when you take a hot shower  · Sleep with your head elevated  Place an extra pillow under your head before you go to sleep to help your sinuses drain  · Drink liquids as directed  Ask your healthcare provider how much liquid to drink each day and which liquids are best for you  Liquids will thin the mucus in your nose and help it drain  Avoid drinks that contain alcohol or caffeine  · Do not smoke, and avoid secondhand smoke  Nicotine and other chemicals in cigarettes and cigars can make your symptoms worse  Ask your healthcare provider for information if you currently smoke and need help to quit  E-cigarettes or smokeless tobacco still contain nicotine  Talk to your healthcare provider before you use these products  Follow up with your healthcare provider as directed: Follow up if your symptoms are worse or not better after 3 to 5 days of treatment  Write down your questions so you remember to ask them during your visits  © 2017 [address] [employer] Information is for End User's use only and may not be sold, redistributed or otherwise used for commercial purposes  All illustrations and images included in CareNotes® are the copyrighted property of [employer] , [employer]  or Luis E Lyon  The above information is an  only  It is not intended as medical advice for individual conditions or treatments  Talk to your doctor, nurse or pharmacist before following any medical regimen to see if it is safe and effective for you

## 2020-03-05 DIAGNOSIS — I50.32 CHRONIC DIASTOLIC (CONGESTIVE) HEART FAILURE (HCC): Chronic | ICD-10-CM

## 2020-03-05 DIAGNOSIS — E78.5 HYPERLIPIDEMIA, UNSPECIFIED HYPERLIPIDEMIA TYPE: ICD-10-CM

## 2020-03-05 RX ORDER — LISINOPRIL 5 MG/1
5 TABLET ORAL 2 TIMES DAILY
Qty: 180 TABLET | Refills: 2 | Status: SHIPPED | OUTPATIENT
Start: 2020-03-05 | End: 2020-03-10 | Stop reason: SINTOL

## 2020-03-10 ENCOUNTER — OFFICE VISIT (OUTPATIENT)
Dept: FAMILY MEDICINE CLINIC | Facility: CLINIC | Age: 85
End: 2020-03-10
Payer: MEDICARE

## 2020-03-10 VITALS
HEART RATE: 72 BPM | RESPIRATION RATE: 16 BRPM | OXYGEN SATURATION: 98 % | WEIGHT: 124 LBS | TEMPERATURE: 97.3 F | SYSTOLIC BLOOD PRESSURE: 140 MMHG | DIASTOLIC BLOOD PRESSURE: 80 MMHG | BODY MASS INDEX: 23.43 KG/M2

## 2020-03-10 DIAGNOSIS — R05.8 COUGH DUE TO ACE INHIBITOR: ICD-10-CM

## 2020-03-10 DIAGNOSIS — I10 ESSENTIAL HYPERTENSION: ICD-10-CM

## 2020-03-10 DIAGNOSIS — R05.3 PERSISTENT COUGH FOR 3 WEEKS OR LONGER: Primary | ICD-10-CM

## 2020-03-10 DIAGNOSIS — T46.4X5A COUGH DUE TO ACE INHIBITOR: ICD-10-CM

## 2020-03-10 PROCEDURE — 3079F DIAST BP 80-89 MM HG: CPT | Performed by: FAMILY MEDICINE

## 2020-03-10 PROCEDURE — 3077F SYST BP >= 140 MM HG: CPT | Performed by: FAMILY MEDICINE

## 2020-03-10 PROCEDURE — 1160F RVW MEDS BY RX/DR IN RCRD: CPT | Performed by: FAMILY MEDICINE

## 2020-03-10 PROCEDURE — 4040F PNEUMOC VAC/ADMIN/RCVD: CPT | Performed by: FAMILY MEDICINE

## 2020-03-10 PROCEDURE — 99214 OFFICE O/P EST MOD 30 MIN: CPT | Performed by: FAMILY MEDICINE

## 2020-03-10 PROCEDURE — 1036F TOBACCO NON-USER: CPT | Performed by: FAMILY MEDICINE

## 2020-03-10 RX ORDER — LOSARTAN POTASSIUM 50 MG/1
50 TABLET ORAL DAILY
Qty: 30 TABLET | Refills: 1 | Status: SHIPPED | OUTPATIENT
Start: 2020-03-10 | End: 2020-04-28 | Stop reason: SDUPTHER

## 2020-03-10 NOTE — PROGRESS NOTES
Assessment/Plan:         Diagnoses and all orders for this visit:    Persistent cough for 3 weeks or longer    Essential hypertension  -     losartan (COZAAR) 50 mg tablet; Take 1 tablet (50 mg total) by mouth daily    Cough due to ACE inhibitor          Subjective:   Chief Complaint   Patient presents with    Headache     was seen at [facility] and given meds and took it but no better    Cough     was seen at [facility] and given meds and took it but no better        Patient ID: Carlos Bucio is a 82 y o  female  Same day sick appt, but reports ongoing problem since last month  Reports that had seen her cardiologist last month and lisinopril was started once a day for better bp control (5mg qd at visit 02/11) then recheck at cardiologist 02/25 bp still not controlled, so lisinopril increased to 5mg BID   Pt states that sx started when lisinopril was first started, "just very slightly, then when she went to 2 a day got really bad"- cough and generalized malaise "the cough is just won't go away, and I just don't feel good, my head feels tipsy turvey"  She also describes one episode of profound fatigue preceded all other sx  She did go to [facility] last week for sx and dx sinusitis rx'd zpak, which she completed, but sx only "somewhat" better  No fever at any time, pt states at home very low grade 99 4 before [facility]ic[facility] visit, but she had normal temp there, has been taking tylenol      The following portions of the patient's history were reviewed and updated as appropriate: allergies, current medications, past family history, past medical history, past social history, past surgical history and problem list     Review of Systems   All other systems reviewed and are negative  Objective:      /80   Pulse 72   Temp (!) 97 3 °F (36 3 °C)   Resp 16   Wt 56 2 kg (124 lb)   SpO2 98%   BMI 23 43 kg/m²          Physical Exam   Constitutional: She is oriented to person, place, and time  She appears well-developed   She is cooperative  Non-toxic appearance  She does not have a sickly appearance  She does not appear ill  No distress  HENT:   Head: Normocephalic and atraumatic  Right Ear: Tympanic membrane and ear canal normal    Left Ear: Tympanic membrane and ear canal normal    Nose: Nose normal    Mouth/Throat: Uvula is midline, oropharynx is clear and moist and mucous membranes are normal    Eyes: Pupils are equal, round, and reactive to light  Conjunctivae and lids are normal    Neck: Trachea normal  Neck supple  No JVD present  No thyroid mass and no thyromegaly present  Cardiovascular: Normal rate, regular rhythm, S1 normal, S2 normal and normal pulses  Exam reveals no gallop and no friction rub  No edema   Pulmonary/Chest: Effort normal and breath sounds normal    Abdominal: Soft  There is no hepatosplenomegaly  There is no tenderness  Lymphadenopathy:     She has no cervical adenopathy  Right: No supraclavicular adenopathy present  Left: No supraclavicular adenopathy present  Neurological: She is alert and oriented to person, place, and time  Skin: Skin is warm and dry  She is not diaphoretic  No cyanosis  No pallor  Psychiatric: She has a normal mood and affect  Her behavior is normal    Nursing note and vitals reviewed

## 2020-03-20 ENCOUNTER — APPOINTMENT (OUTPATIENT)
Dept: LAB | Facility: CLINIC | Age: 85
End: 2020-03-20
Payer: MEDICARE

## 2020-03-20 DIAGNOSIS — C91.10 CHRONIC LARGE GRANULAR LYMPHOCYTIC LEUKEMIA (HCC): ICD-10-CM

## 2020-03-20 LAB
ALBUMIN SERPL BCP-MCNC: 4.1 G/DL (ref 3.5–5)
ALP SERPL-CCNC: 78 U/L (ref 46–116)
ALT SERPL W P-5'-P-CCNC: 42 U/L (ref 12–78)
ANION GAP SERPL CALCULATED.3IONS-SCNC: 1 MMOL/L (ref 4–13)
AST SERPL W P-5'-P-CCNC: 27 U/L (ref 5–45)
BASOPHILS # BLD AUTO: 0.04 THOUSANDS/ΜL (ref 0–0.1)
BASOPHILS NFR BLD AUTO: 1 % (ref 0–1)
BILIRUB SERPL-MCNC: 0.54 MG/DL (ref 0.2–1)
BUN SERPL-MCNC: 24 MG/DL (ref 5–25)
CALCIUM SERPL-MCNC: 9.5 MG/DL (ref 8.3–10.1)
CHLORIDE SERPL-SCNC: 102 MMOL/L (ref 100–108)
CO2 SERPL-SCNC: 33 MMOL/L (ref 21–32)
CREAT SERPL-MCNC: 0.88 MG/DL (ref 0.6–1.3)
EOSINOPHIL # BLD AUTO: 0.19 THOUSAND/ΜL (ref 0–0.61)
EOSINOPHIL NFR BLD AUTO: 5 % (ref 0–6)
ERYTHROCYTE [DISTWIDTH] IN BLOOD BY AUTOMATED COUNT: 11.7 % (ref 11.6–15.1)
GFR SERPL CREATININE-BSD FRML MDRD: 61 ML/MIN/1.73SQ M
GLUCOSE P FAST SERPL-MCNC: 75 MG/DL (ref 65–99)
HCT VFR BLD AUTO: 41 % (ref 34.8–46.1)
HGB BLD-MCNC: 13.5 G/DL (ref 11.5–15.4)
IMM GRANULOCYTES # BLD AUTO: 0.01 THOUSAND/UL (ref 0–0.2)
IMM GRANULOCYTES NFR BLD AUTO: 0 % (ref 0–2)
LDH SERPL-CCNC: 195 U/L (ref 81–234)
LYMPHOCYTES # BLD AUTO: 2 THOUSANDS/ΜL (ref 0.6–4.47)
LYMPHOCYTES NFR BLD AUTO: 47 % (ref 14–44)
MCH RBC QN AUTO: 31.9 PG (ref 26.8–34.3)
MCHC RBC AUTO-ENTMCNC: 32.9 G/DL (ref 31.4–37.4)
MCV RBC AUTO: 97 FL (ref 82–98)
MONOCYTES # BLD AUTO: 0.44 THOUSAND/ΜL (ref 0.17–1.22)
MONOCYTES NFR BLD AUTO: 11 % (ref 4–12)
NEUTROPHILS # BLD AUTO: 1.5 THOUSANDS/ΜL (ref 1.85–7.62)
NEUTS SEG NFR BLD AUTO: 36 % (ref 43–75)
NRBC BLD AUTO-RTO: 0 /100 WBCS
PLATELET # BLD AUTO: 151 THOUSANDS/UL (ref 149–390)
PMV BLD AUTO: 10.5 FL (ref 8.9–12.7)
POTASSIUM SERPL-SCNC: 3.8 MMOL/L (ref 3.5–5.3)
PROT SERPL-MCNC: 7.7 G/DL (ref 6.4–8.2)
RBC # BLD AUTO: 4.23 MILLION/UL (ref 3.81–5.12)
SODIUM SERPL-SCNC: 136 MMOL/L (ref 136–145)
WBC # BLD AUTO: 4.18 THOUSAND/UL (ref 4.31–10.16)

## 2020-03-20 PROCEDURE — 80053 COMPREHEN METABOLIC PANEL: CPT

## 2020-03-20 PROCEDURE — 85025 COMPLETE CBC W/AUTO DIFF WBC: CPT

## 2020-03-20 PROCEDURE — 36415 COLL VENOUS BLD VENIPUNCTURE: CPT

## 2020-03-20 PROCEDURE — 83615 LACTATE (LD) (LDH) ENZYME: CPT

## 2020-03-25 DIAGNOSIS — F32.A DEPRESSION, UNSPECIFIED DEPRESSION TYPE: ICD-10-CM

## 2020-03-26 ENCOUNTER — TELEMEDICINE (OUTPATIENT)
Dept: HEMATOLOGY ONCOLOGY | Facility: CLINIC | Age: 85
End: 2020-03-26
Payer: MEDICARE

## 2020-03-26 DIAGNOSIS — C67.9 MALIGNANT NEOPLASM OF URINARY BLADDER, UNSPECIFIED SITE (HCC): ICD-10-CM

## 2020-03-26 DIAGNOSIS — K58.9 IRRITABLE BOWEL SYNDROME, UNSPECIFIED TYPE: ICD-10-CM

## 2020-03-26 DIAGNOSIS — K21.9 GASTROESOPHAGEAL REFLUX DISEASE WITHOUT ESOPHAGITIS: ICD-10-CM

## 2020-03-26 DIAGNOSIS — C91.10 CHRONIC LARGE GRANULAR LYMPHOCYTIC LEUKEMIA (HCC): Primary | ICD-10-CM

## 2020-03-26 DIAGNOSIS — K86.2 PANCREATIC CYST: ICD-10-CM

## 2020-03-26 DIAGNOSIS — I10 ESSENTIAL HYPERTENSION: ICD-10-CM

## 2020-03-26 PROCEDURE — 99214 OFFICE O/P EST MOD 30 MIN: CPT | Performed by: INTERNAL MEDICINE

## 2020-03-27 DIAGNOSIS — F32.A DEPRESSION, UNSPECIFIED DEPRESSION TYPE: ICD-10-CM

## 2020-03-27 NOTE — PROGRESS NOTES
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    Relevant Orders    CBC and differential    Comprehensive metabolic panel    LD,Blood               Reason for visit :    Patient gave me her name and date of birth before we started the telephone visit  This is a telephone visit because of corona virus  Patient is being followed for large granular cell lymphocytic leukemia that was diagnosed several years ago and patient has not required therapy and she remains under surveillance    She has other medical problems described in history  She was having cough lately and that was thought to be secondary to lisinopril  She is off lisinopril and now there is some problem controlling her blood pressure  History:  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   Patient has not required therapy   She is not symptomatic from this condition   She has some tiredness  Has arthritic symptoms  Other problems are listed below  Patient had cataract surgeries recently  [facility] of pancreatic cyst and IBS she follows with  Dr Any Carreno, gastroenterologist   She does not have GI symptoms at present     [facility] of urinary bladder and ureter and had laser therapy and BCG and undergoes cystoscopies on regular basis  No history of hematuria  She has 2 stents in her heart  She has history of kidney stone  She has history of Raynaud's  She had herpes zoster in January 2016  History of osteopenia  She has been on vitamin D and prolia  She has appointment with her rheumatologist   History of hepatitis in 1993   History of ALONSO and BSO in 1980 for bleeding  No cancer    History of pulmonary embolism several years ago  History of stomach ulcer in 1970 of pneumonias in 2003 and 2004  of hypertension  She is sensitive to cold weather  History of arthritis in the neck and all over  She follows with a rheumatologist         Encounter provider Anna Miller MD    Provider located at [address] [address] [phone]      Recent Visits  No visits were found meeting these conditions  Showing recent visits within past 7 days and meeting all other requirements     Today's Visits  Date Type Provider Dept   03/26/20 Telemedicine Anna Miller MD [facility]   Showing today's visits and meeting all other requirements     Future Appointments  Date Type Provider Dept   03/26/20 Telemedicine Anna Miller MD [facility]   Showing future appointments within next 150 days and meeting all other requirements        After connecting through [employer], the patient was identified by name and date of birth  Franci Ewing was informed that this is a telemedicine visit and that the visit is being conducted through [employer] and patient was informed that this is not a secure, HIPAA-complaint platform  she agrees to proceed  which may not be secure and therefore, might not be HIPAA-compliant  My office door was closed  No one else was in the room  She acknowledged consent and understanding of privacy and security of the video platform  The patient has agreed to participate and understands they can discontinue the visit at any time  Subjective  Franci Ewing is a 82 y o  female   No more cough  She has some tiredness and arthritic symptoms        Past Medical History:   Diagnosis Date    Bladder cancer ([address] )     Coronary artery disease     S/P stent placement x 2 in 2011    GERD (gastroesophageal reflux disease)     Hepatitis     got this from food back in 1970s, has not had a problem since    Hyperlipidemia     Hypertension     Kidney stones     Malignant neoplasm of urethra (HCC)     Pneumonia     Pulmonary emboli ([address] ) 1970s    Shingles        Past Surgical History:   Procedure Laterality Date    CATARACT EXTRACTION      CORONARY ANGIOPLASTY WITH STENT PLACEMENT      stent x 2    CYSTOSCOPY      diagnostic - onset 4/4/17    EYE SURGERY      HYSTERECTOMY      LEG SURGERY      OOPHORECTOMY      CT OPEN RX FEMUR FX+INTRAMED ALEJANDRO Left 4/8/2018    Procedure: INSERTION NAIL IM FEMUR ANTEGRADE (TROCHANTERIC);   Surgeon: Jesse Clemens MD;  Location: [facility];  Service: Orthopedics    TONSILLECTOMY         Current Outpatient Medications   Medication Sig Dispense Refill    acetaminophen (TYLENOL) 500 mg tablet Take 500 mg by mouth every 6 (six) hours as needed for mild pain      amLODIPine (NORVASC) 5 mg tablet TAKE 1 TABLET BY MOUTH  DAILY 90 tablet 0    aspirin 81 MG tablet Take 81 mg by mouth daily Resume on 4/28       atenolol (TENORMIN) 25 mg tablet Take 1 tablet (25 mg total) by mouth daily Resume on 4/28 90 tablet 1    atorvastatin (LIPITOR) 40 mg tablet TAKE 1 TABLET BY MOUTH  DAILY AFTER DINNER 90 tablet 0    bumetanide (BUMEX) 1 mg tablet TAKE 1 TABLET BY MOUTH  DAILY 90 tablet 0    Cholecalciferol 2000 units TABS Take 2,000 Units by mouth daily Resume on 4/28      dicyclomine (BENTYL) 10 mg capsule Take 10 mg by mouth as needed      DULoxetine (CYMBALTA) 30 mg delayed release capsule Take 1 capsule (30 mg total) by mouth daily Resume on 4/28 90 capsule 1    fexofenadine (ALLEGRA) 180 MG tablet Take 180 mg by mouth daily      losartan (COZAAR) 50 mg tablet Take 1 tablet (50 mg total) by mouth daily 30 tablet 1    montelukast (SINGULAIR) 10 mg tablet TAKE 1 TABLET BY MOUTH  DAILY AS NEEDED FOR ALLERGY SYMPTOMS (Patient not taking: Reported on 2/11/2020) 90 tablet 0    MULTIPLE VITAMIN PO Take by mouth      omeprazole (PriLOSEC) 40 MG capsule Take 1 capsule (40 mg total) by mouth daily before breakfast 90 capsule 3    TYMLOS 3120 MCG/1 56ML SOPN        No current facility-administered medications for this visit  Allergies   Allergen Reactions    Lactose      Other reaction(s): Indigestion/GI Upset    Other      Other reaction(s): Mental Status Change  After surgery  Whole blood transfusion  = passed out   historical intolerance; verified with patient; had no reaction to PRBC transfusions afterward       Review of Systems:  No other neurological, cardiac, pulmonary, GI and  symptoms other than mentioned above  No other symptoms like fevers, chills, bleeding, night sweats, bone pains, skin rash, weight loss, swollen glands and swollen legs  No pain in the calf areas  Assessment and Plan on the phone:  I reviewed patient's symptoms and condition and lab results  Patient is hematological stable  Plan is surveillance and she will have blood tests prior to next visit in 4 months  For other medical problems she will continue to follow with her primary physician and other consultants     Also discussed the importance of self-breast examination, eating healthy foods and staying active as tolerated  Goal is surveillance  I spent 25 minutes with the patient during this visit  I reviewed the chart,  reviewed lab results, interviewed the patient, discussed and made recommendations

## 2020-03-28 RX ORDER — DULOXETIN HYDROCHLORIDE 30 MG/1
CAPSULE, DELAYED RELEASE ORAL
Qty: 90 CAPSULE | Refills: 1 | Status: SHIPPED | OUTPATIENT
Start: 2020-03-28 | End: 2020-12-13

## 2020-03-28 RX ORDER — ATENOLOL 25 MG/1
TABLET ORAL
Qty: 90 TABLET | Refills: 1 | Status: SHIPPED | OUTPATIENT
Start: 2020-03-28 | End: 2020-07-30

## 2020-03-29 RX ORDER — DULOXETIN HYDROCHLORIDE 30 MG/1
30 CAPSULE, DELAYED RELEASE ORAL DAILY
Qty: 90 CAPSULE | Refills: 1 | OUTPATIENT
Start: 2020-03-29

## 2020-04-17 ENCOUNTER — TELEMEDICINE (OUTPATIENT)
Dept: GASTROENTEROLOGY | Facility: CLINIC | Age: 85
End: 2020-04-17
Payer: MEDICARE

## 2020-04-17 DIAGNOSIS — K86.2 PANCREATIC CYST: ICD-10-CM

## 2020-04-17 DIAGNOSIS — K58.9 IRRITABLE BOWEL SYNDROME, UNSPECIFIED TYPE: ICD-10-CM

## 2020-04-17 DIAGNOSIS — K21.9 GASTROESOPHAGEAL REFLUX DISEASE WITHOUT ESOPHAGITIS: Primary | ICD-10-CM

## 2020-04-17 PROCEDURE — 99213 OFFICE O/P EST LOW 20 MIN: CPT | Performed by: INTERNAL MEDICINE

## 2020-04-23 RX ORDER — TRAMADOL HYDROCHLORIDE 50 MG/1
TABLET ORAL
COMMUNITY
End: 2021-05-10

## 2020-04-28 ENCOUNTER — TELEMEDICINE (OUTPATIENT)
Dept: FAMILY MEDICINE CLINIC | Facility: CLINIC | Age: 85
End: 2020-04-28
Payer: MEDICARE

## 2020-04-28 VITALS — BODY MASS INDEX: 23.41 KG/M2 | HEIGHT: 61 IN | WEIGHT: 124 LBS

## 2020-04-28 DIAGNOSIS — I10 ESSENTIAL HYPERTENSION: ICD-10-CM

## 2020-04-28 PROCEDURE — 99214 OFFICE O/P EST MOD 30 MIN: CPT | Performed by: FAMILY MEDICINE

## 2020-04-28 RX ORDER — LOSARTAN POTASSIUM 100 MG/1
100 TABLET ORAL DAILY
Qty: 90 TABLET | Refills: 1 | Status: SHIPPED | OUTPATIENT
Start: 2020-04-28 | End: 2020-10-12 | Stop reason: SDUPTHER

## 2020-05-08 ENCOUNTER — TELEPHONE (OUTPATIENT)
Dept: FAMILY MEDICINE CLINIC | Facility: CLINIC | Age: 85
End: 2020-05-08

## 2020-05-28 DIAGNOSIS — E78.5 HYPERLIPIDEMIA, UNSPECIFIED HYPERLIPIDEMIA TYPE: ICD-10-CM

## 2020-05-28 DIAGNOSIS — I10 ESSENTIAL HYPERTENSION: ICD-10-CM

## 2020-05-29 RX ORDER — AMLODIPINE BESYLATE 5 MG/1
TABLET ORAL
Qty: 90 TABLET | Refills: 0 | Status: SHIPPED | OUTPATIENT
Start: 2020-05-29 | End: 2020-08-13

## 2020-05-29 RX ORDER — ATORVASTATIN CALCIUM 40 MG/1
TABLET, FILM COATED ORAL
Qty: 90 TABLET | Refills: 0 | Status: SHIPPED | OUTPATIENT
Start: 2020-05-29 | End: 2020-08-13

## 2020-06-22 DIAGNOSIS — R60.0 LOWER EXTREMITY EDEMA: ICD-10-CM

## 2020-06-22 DIAGNOSIS — J30.2 SEASONAL ALLERGIC RHINITIS, UNSPECIFIED TRIGGER: ICD-10-CM

## 2020-06-22 RX ORDER — MONTELUKAST SODIUM 10 MG/1
TABLET ORAL
Qty: 90 TABLET | Refills: 0 | Status: SHIPPED | OUTPATIENT
Start: 2020-06-22 | End: 2020-07-30 | Stop reason: ALTCHOICE

## 2020-06-23 RX ORDER — BUMETANIDE 1 MG/1
TABLET ORAL
Qty: 90 TABLET | Refills: 0 | Status: SHIPPED | OUTPATIENT
Start: 2020-06-23 | End: 2020-09-01

## 2020-07-28 ENCOUNTER — OFFICE VISIT (OUTPATIENT)
Dept: FAMILY MEDICINE CLINIC | Facility: CLINIC | Age: 85
End: 2020-07-28
Payer: MEDICARE

## 2020-07-28 ENCOUNTER — TELEPHONE (OUTPATIENT)
Dept: HEMATOLOGY ONCOLOGY | Facility: CLINIC | Age: 85
End: 2020-07-28

## 2020-07-28 VITALS
HEART RATE: 66 BPM | DIASTOLIC BLOOD PRESSURE: 86 MMHG | HEIGHT: 61 IN | WEIGHT: 125 LBS | SYSTOLIC BLOOD PRESSURE: 136 MMHG | TEMPERATURE: 96.9 F | OXYGEN SATURATION: 100 % | RESPIRATION RATE: 17 BRPM | BODY MASS INDEX: 23.6 KG/M2

## 2020-07-28 DIAGNOSIS — I10 ESSENTIAL HYPERTENSION: ICD-10-CM

## 2020-07-28 DIAGNOSIS — I25.10 CORONARY ARTERY DISEASE INVOLVING NATIVE CORONARY ARTERY OF NATIVE HEART WITHOUT ANGINA PECTORIS: ICD-10-CM

## 2020-07-28 DIAGNOSIS — H93.8X2 PLUGGED FEELING IN EAR, LEFT: ICD-10-CM

## 2020-07-28 DIAGNOSIS — H73.892 RETRACTED TYMPANIC MEMBRANE, LEFT: ICD-10-CM

## 2020-07-28 DIAGNOSIS — K43.9 VENTRAL HERNIA WITHOUT OBSTRUCTION OR GANGRENE: ICD-10-CM

## 2020-07-28 DIAGNOSIS — R51.9 NEW ONSET OF HEADACHES: Primary | ICD-10-CM

## 2020-07-28 PROCEDURE — 1160F RVW MEDS BY RX/DR IN RCRD: CPT | Performed by: FAMILY MEDICINE

## 2020-07-28 PROCEDURE — 3079F DIAST BP 80-89 MM HG: CPT | Performed by: FAMILY MEDICINE

## 2020-07-28 PROCEDURE — 1036F TOBACCO NON-USER: CPT | Performed by: FAMILY MEDICINE

## 2020-07-28 PROCEDURE — 3077F SYST BP >= 140 MM HG: CPT | Performed by: FAMILY MEDICINE

## 2020-07-28 PROCEDURE — 4040F PNEUMOC VAC/ADMIN/RCVD: CPT | Performed by: FAMILY MEDICINE

## 2020-07-28 PROCEDURE — 99214 OFFICE O/P EST MOD 30 MIN: CPT | Performed by: FAMILY MEDICINE

## 2020-07-28 NOTE — PROGRESS NOTES
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  -     Ambulatory referral to General Surgery; Future    Plugged feeling in ear, left  -     Ambulatory Referral to Otolaryngology; Future    Retracted tympanic membrane, left  -     Ambulatory Referral to Otolaryngology; Future          Subjective:   Chief Complaint   Patient presents with    Follow-up    Headache     On and off for about a month  Also has low grade fever  Patient ID: Faraz Dahl is a 82 y o  female      C/o new problem past month most days develops HA every afternoon around 3-4 o'clock, takes tylenol for her arthritis, and this helps the headache, doesn't happen every day, but most days gets sx, no worsening of HA sx over time  Left ear feels somewhat clogged, otherwise no assoc signs or sx  Also states left ear clogging feels worse if she takes and allergy pill  Reports elevated temp from her usual, but no true fever  June cardiol f/u had to be cancelled due to [place], now and again get some tightness, unchanged, no difficulty wearing mask  Upper abd hernia bothering her now and then- has not seen surgeon for this yet  States was supposed to have f/u with her hem/onc tomorrow, but states they just called her and cancelled, said they had to change it to October   Bigger on exam and pt admits bigger since the last time      The following portions of the patient's history were reviewed and updated as appropriate: allergies, current medications, past family history, past medical history, past social history, past surgical history and problem list     Review of Systems      Objective:      /86 (BP Location: Left arm, Patient Position: Sitting)   Pulse 66   Temp (!) 96 9 °F (36 1 °C)   Resp 17   Ht 5' 1" (1 549 m)   Wt 56 7 kg (125 lb)   SpO2 100%   BMI 23 62 kg/m²          Physical Exam

## 2020-07-30 ENCOUNTER — OFFICE VISIT (OUTPATIENT)
Dept: CARDIOLOGY CLINIC | Facility: CLINIC | Age: 85
End: 2020-07-30
Payer: MEDICARE

## 2020-07-30 VITALS
HEIGHT: 61 IN | HEART RATE: 66 BPM | WEIGHT: 124.5 LBS | TEMPERATURE: 97.3 F | SYSTOLIC BLOOD PRESSURE: 158 MMHG | DIASTOLIC BLOOD PRESSURE: 86 MMHG | BODY MASS INDEX: 23.5 KG/M2

## 2020-07-30 DIAGNOSIS — D69.6 THROMBOCYTOPENIA (HCC): ICD-10-CM

## 2020-07-30 DIAGNOSIS — I65.23 CAROTID ARTERY STENOSIS, ASYMPTOMATIC, BILATERAL: ICD-10-CM

## 2020-07-30 DIAGNOSIS — I10 ESSENTIAL HYPERTENSION: ICD-10-CM

## 2020-07-30 DIAGNOSIS — Z95.820 S/P ANGIOPLASTY WITH STENT: ICD-10-CM

## 2020-07-30 DIAGNOSIS — E78.2 MIXED HYPERLIPIDEMIA: ICD-10-CM

## 2020-07-30 DIAGNOSIS — R60.0 LOWER EXTREMITY EDEMA: ICD-10-CM

## 2020-07-30 DIAGNOSIS — I25.10 CORONARY ARTERY DISEASE INVOLVING NATIVE CORONARY ARTERY OF NATIVE HEART WITHOUT ANGINA PECTORIS: Primary | ICD-10-CM

## 2020-07-30 DIAGNOSIS — I50.32 CHRONIC DIASTOLIC (CONGESTIVE) HEART FAILURE (HCC): Chronic | ICD-10-CM

## 2020-07-30 PROCEDURE — 1036F TOBACCO NON-USER: CPT | Performed by: INTERNAL MEDICINE

## 2020-07-30 PROCEDURE — 3077F SYST BP >= 140 MM HG: CPT | Performed by: INTERNAL MEDICINE

## 2020-07-30 PROCEDURE — 99214 OFFICE O/P EST MOD 30 MIN: CPT | Performed by: INTERNAL MEDICINE

## 2020-07-30 PROCEDURE — 93000 ELECTROCARDIOGRAM COMPLETE: CPT | Performed by: INTERNAL MEDICINE

## 2020-07-30 PROCEDURE — 3079F DIAST BP 80-89 MM HG: CPT | Performed by: INTERNAL MEDICINE

## 2020-07-30 PROCEDURE — 1160F RVW MEDS BY RX/DR IN RCRD: CPT | Performed by: INTERNAL MEDICINE

## 2020-07-30 PROCEDURE — 3008F BODY MASS INDEX DOCD: CPT | Performed by: INTERNAL MEDICINE

## 2020-07-30 PROCEDURE — 4040F PNEUMOC VAC/ADMIN/RCVD: CPT | Performed by: INTERNAL MEDICINE

## 2020-07-30 RX ORDER — CARVEDILOL 6.25 MG/1
6.25 TABLET ORAL 2 TIMES DAILY WITH MEALS
Qty: 180 TABLET | Refills: 3 | Status: SHIPPED | OUTPATIENT
Start: 2020-07-30 | End: 2021-05-23 | Stop reason: SDUPTHER

## 2020-07-30 NOTE — PROGRESS NOTES
Cardiology Follow Up    Ever Ibrahim  1935  [id number]  [facility]  [address] [place] [address] [address]    1  Coronary artery disease involving native coronary artery of native heart without angina pectoris  POCT ECG    carvedilol (COREG) 6 25 mg tablet   2  Essential hypertension  POCT ECG    carvedilol (COREG) 6 25 mg tablet   3  Chronic diastolic (congestive) heart failure ([address] )     4  Lower extremity edema     5  Mixed hyperlipidemia     6  S/P angioplasty with stent     7  Thrombocytopenia ([facility] )     8  Carotid artery stenosis, asymptomatic, bilateral  VAS carotid complete study       Discussion/Summary:  Coronary artery disease stable with no complaints of angina  Blood pressures been running a little high have discontinued atenolol and will change her to carvedilol 6 25 mg b i d  and up titrate  From a heart failure standpoint she appears euvolemic continue current dose of Bumex  She has mild carotid stenosis seen a few years ago will check a carotid Doppler for follow-up  Continue with aggressive risk factor modification off follow-up with her in 6 months  Interval History:   82-year-old female history of coronary artery disease status post stents in 0763, chronic diastolic congestive heart failure, hypertension, hyperlipidemia presents for routine scheduled follow-up visit  Unfortunately she fell coming out of the shower in April  She suffered a femur fracture and underwent surgical correction  Overall she has been doing well since her last visit  She remains active at home during the pandemic and has been doing a fair amount of walking  She is up to about 3 miles a day  She denies any chest pain, shortness of breath, palpitations, lightheadedness, dizziness, or syncope  She did have some trouble with lower extremity swelling and Bumex dose was adjusted    She has been watching her sodium closely  Blood pressures been running a little high  Problem List     Intertrochanteric fracture of left femur, closed, with routine healing, subsequent encounter    Essential hypertension    Medication side effect, initial encounter    Hyperlipidemia    Coronary artery disease without angina pectoris - S/P stent placement x 2 (2011)    Gastroesophageal reflux disease without esophagitis    Chronic diastolic (congestive) heart failure (HCC) (Chronic)    Hyperthyroidism    Osteoporosis (Chronic)    Ambulatory dysfunction    Elderly person living alone    Pain in left leg    Low back pain without sciatica    S/P ORIF (open reduction internal fixation) fracture    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HCC)        Past Medical History:   Diagnosis Date    Bladder cancer ([facility] )     Coronary artery disease     S/P stent placement x 2 in 2011    GERD (gastroesophageal reflux disease)     Hepatitis     got this from food back in 1970s, has not had a problem since    Hyperlipidemia     Hypertension     Kidney stones     Malignant neoplasm of urethra ([facility] )     Pneumonia     Pulmonary emboli ([place] [address] ) 1970s    Shingles      Social History     Socioeconomic History    Marital status:       Spouse name: Not on file    Number of children: Not on file    Years of education: Not on file    Highest education level: Not on file   Occupational History    Not on file   Social Needs    Financial resource strain: Not on file    Food insecurity:     Worry: Not on file     Inability: Not on file    Transportation needs:     Medical: No     Non-medical: No   Tobacco Use    Smoking status: Never Smoker    Smokeless tobacco: Never Used   Substance and Sexual Activity    Alcohol use: No    Drug use: No    Sexual activity: Never   Lifestyle    Physical activity:     Days per week: Not on file     Minutes per session: Not on file    Stress: Not on file   Relationships    Social connections: Talks on phone: Not on file     Gets together: Not on file     Attends Buddhism service: Not on file     Active member of club or organization: Not on file     Attends meetings of clubs or organizations: Not on file     Relationship status: Not on file    Intimate partner violence:     Fear of current or ex partner: Not on file     Emotionally abused: Not on file     Physically abused: Not on file     Forced sexual activity: Not on file   Other Topics Concern    Not on file   Social History Narrative    Advance directives declined by parents    Always uses seat belt      Family History   Problem Relation Age of Onset    Arthritis Mother     Osteoporosis Mother     Heart disease Father     Hypertension Father     Hypertension Brother     Prostate cancer Brother     Breast cancer Daughter 48    Prostate cancer Son      Past Surgical History:   Procedure Laterality Date    CATARACT EXTRACTION      CORONARY ANGIOPLASTY WITH STENT PLACEMENT      stent x 2    CYSTOSCOPY      diagnostic - onset 4/4/17    EYE SURGERY      HYSTERECTOMY      LEG SURGERY      OOPHORECTOMY      [facility] OPEN RX FEMUR FX+INTRAMED ALEJANDRO Left 4/8/2018    Procedure: INSERTION NAIL IM FEMUR ANTEGRADE (TROCHANTERIC); Surgeon: Beryl Mcnulty MD;  [facility]: [facility];  Service: Orthopedics    TONSILLECTOMY         Current Outpatient Medications:     acetaminophen (TYLENOL) 500 mg tablet, Take 500 mg by mouth every 6 (six) hours as needed for mild pain, Disp: , Rfl:     amLODIPine (NORVASC) 5 mg tablet, TAKE 1 TABLET BY MOUTH  DAILY, Disp: 90 tablet, Rfl: 0    aspirin 81 MG tablet, Take 81 mg by mouth daily Resume on 4/28 , Disp: , Rfl:     atorvastatin (LIPITOR) 40 mg tablet, TAKE 1 TABLET BY MOUTH  DAILY AFTER DINNER, Disp: 90 tablet, Rfl: 0    bumetanide (BUMEX) 1 mg tablet, TAKE 1 TABLET BY MOUTH  DAILY (Patient taking differently: Half a tablet daily   Whole tablet if needed), Disp: 90 tablet, Rfl: 0    Cholecalciferol 2000 units TABS, Take 2,000 Units by mouth daily Resume on 4/28, Disp: , Rfl:     dicyclomine (BENTYL) 10 mg capsule, Take 10 mg by mouth as needed, Disp: , Rfl:     DULoxetine (CYMBALTA) 30 mg delayed release capsule, TAKE 1 CAPSULE BY MOUTH  DAILY, Disp: 90 capsule, Rfl: 1    fexofenadine (ALLEGRA) 180 MG tablet, Take 180 mg by mouth daily, Disp: , Rfl:     losartan (COZAAR) 100 MG tablet, Take 1 tablet (100 mg total) by mouth daily, Disp: 90 tablet, Rfl: 1    MULTIPLE VITAMIN PO, Take by mouth, Disp: , Rfl:     omeprazole (PriLOSEC) 40 MG capsule, Take 1 capsule (40 mg total) by mouth daily before breakfast, Disp: 90 capsule, Rfl: 3    traMADol (ULTRAM) 50 mg tablet, tramadol 50 mg tablet  take 1 tablet by mouth once daily, Disp: , Rfl:     carvedilol (COREG) 6 25 mg tablet, Take 1 tablet (6 25 mg total) by mouth 2 (two) times a day with meals, Disp: 180 tablet, Rfl: 3  Allergies   Allergen Reactions    Lactose      Other reaction(s): Indigestion/GI Upset    Other      Other reaction(s): Mental Status Change  After surgery   Whole blood transfusion  = passed out   historical intolerance; verified with patient; had no reaction to PRBC transfusions afterward       Labs:     Chemistry        Component Value Date/Time    K 3 8 03/20/2020 0924     03/20/2020 0924    CO2 33 (H) 03/20/2020 0924    BUN 24 03/20/2020 0924    CREATININE 0 88 03/20/2020 0924        Component Value Date/Time    CALCIUM 9 5 03/20/2020 0924    ALKPHOS 78 03/20/2020 0924    AST 27 03/20/2020 0924    ALT 42 03/20/2020 0924            No results found for: CHOL  Lab Results   Component Value Date    HDL 64 (H) 07/18/2018    HDL 79 (H) 06/12/2017    HDL 59 10/19/2016     Lab Results   Component Value Date    LDLCALC 75 07/18/2018    LDLCALC 70 06/12/2017    LDLCALC 78 10/19/2016     Lab Results   Component Value Date    TRIG 146 07/18/2018    TRIG 86 06/12/2017    TRIG 108 10/19/2016     No results found for: CHOLHDL    Imaging: No results found  ECG:  NSR      Review of Systems   Constitution: Negative  HENT: Negative  Eyes: Negative  Cardiovascular: Negative  Respiratory: Negative  Endocrine: Negative  Hematologic/Lymphatic: Negative  Skin: Negative  Musculoskeletal: Negative  Gastrointestinal: Negative  Genitourinary: Negative  Neurological: Negative  Psychiatric/Behavioral: Negative  Vitals:    07/30/20 1018   BP: 158/86   Pulse: 66   Temp: (!) 97 3 °F (36 3 °C)     Vitals:    07/30/20 1018   Weight: 56 5 kg (124 lb 8 oz)     Height: 5' 1" (154 9 cm)   Body mass index is 23 52 kg/m²  Physical Exam:  Vital signs reviewed  General:  Alert and cooperative, appears stated age, no acute distress  HEENT:  PERRLA, EOMI, no scleral icterus, no conjunctival pallor  Neck:  No lymphadenopathy, no thyromegaly, no carotid bruits, no elevated JVP  Heart:  Regular rate and rhythm, normal S1/S2, no S3/S4, no murmur, rubs or gallops  PMI nondisplaced  Lungs:  Clear to auscultation bilaterally, no wheezes rales or rhonchi  Abdomen:  Soft, non-tender, positive bowel sounds, no rebound or guarding,   no organomegaly   Extremities:  Normal range of motion    No clubbing, cyanosis or edema   Vascular:  2+ pedal pulses  Skin:  No rashes or lesions on exposed skin  Neurologic:  Cranial nerves II-XII grossly intact without focal deficits  Psych:  Normal mood and affect

## 2020-08-03 ENCOUNTER — HOSPITAL ENCOUNTER (OUTPATIENT)
Dept: RADIOLOGY | Facility: MEDICAL CENTER | Age: 85
Discharge: HOME | End: 2020-08-03
Payer: MEDICARE

## 2020-08-03 DIAGNOSIS — I65.23 CAROTID ARTERY STENOSIS, ASYMPTOMATIC, BILATERAL: ICD-10-CM

## 2020-08-03 PROCEDURE — 93880 EXTRACRANIAL BILAT STUDY: CPT | Performed by: SURGERY

## 2020-08-03 PROCEDURE — 93880 EXTRACRANIAL BILAT STUDY: CPT

## 2020-08-12 ENCOUNTER — APPOINTMENT (OUTPATIENT)
Dept: LAB | Facility: MEDICAL CENTER | Age: 85
End: 2020-08-12
Payer: MEDICARE

## 2020-08-12 DIAGNOSIS — I10 ESSENTIAL HYPERTENSION: ICD-10-CM

## 2020-08-12 DIAGNOSIS — E78.5 HYPERLIPIDEMIA, UNSPECIFIED HYPERLIPIDEMIA TYPE: ICD-10-CM

## 2020-08-12 DIAGNOSIS — C91.10 CHRONIC LARGE GRANULAR LYMPHOCYTIC LEUKEMIA (HCC): ICD-10-CM

## 2020-08-12 LAB
ALBUMIN SERPL BCP-MCNC: 3.9 G/DL (ref 3.5–5)
ALP SERPL-CCNC: 71 U/L (ref 46–116)
ALT SERPL W P-5'-P-CCNC: 39 U/L (ref 12–78)
ANION GAP SERPL CALCULATED.3IONS-SCNC: 5 MMOL/L (ref 4–13)
AST SERPL W P-5'-P-CCNC: 23 U/L (ref 5–45)
BASOPHILS # BLD AUTO: 0.05 THOUSANDS/ΜL (ref 0–0.1)
BASOPHILS NFR BLD AUTO: 1 % (ref 0–1)
BILIRUB SERPL-MCNC: 0.65 MG/DL (ref 0.2–1)
BUN SERPL-MCNC: 26 MG/DL (ref 5–25)
CALCIUM SERPL-MCNC: 9.6 MG/DL (ref 8.3–10.1)
CHLORIDE SERPL-SCNC: 104 MMOL/L (ref 100–108)
CO2 SERPL-SCNC: 31 MMOL/L (ref 21–32)
CREAT SERPL-MCNC: 0.83 MG/DL (ref 0.6–1.3)
EOSINOPHIL # BLD AUTO: 0.18 THOUSAND/ΜL (ref 0–0.61)
EOSINOPHIL NFR BLD AUTO: 4 % (ref 0–6)
ERYTHROCYTE [DISTWIDTH] IN BLOOD BY AUTOMATED COUNT: 12.2 % (ref 11.6–15.1)
GFR SERPL CREATININE-BSD FRML MDRD: 65 ML/MIN/1.73SQ M
GLUCOSE P FAST SERPL-MCNC: 91 MG/DL (ref 65–99)
HCT VFR BLD AUTO: 38.6 % (ref 34.8–46.1)
HGB BLD-MCNC: 12.4 G/DL (ref 11.5–15.4)
IMM GRANULOCYTES # BLD AUTO: 0.01 THOUSAND/UL (ref 0–0.2)
IMM GRANULOCYTES NFR BLD AUTO: 0 % (ref 0–2)
LDH SERPL-CCNC: 178 U/L (ref 81–234)
LYMPHOCYTES # BLD AUTO: 1.78 THOUSANDS/ΜL (ref 0.6–4.47)
LYMPHOCYTES NFR BLD AUTO: 39 % (ref 14–44)
MCH RBC QN AUTO: 31.8 PG (ref 26.8–34.3)
MCHC RBC AUTO-ENTMCNC: 32.1 G/DL (ref 31.4–37.4)
MCV RBC AUTO: 99 FL (ref 82–98)
MONOCYTES # BLD AUTO: 0.41 THOUSAND/ΜL (ref 0.17–1.22)
MONOCYTES NFR BLD AUTO: 9 % (ref 4–12)
NEUTROPHILS # BLD AUTO: 2.14 THOUSANDS/ΜL (ref 1.85–7.62)
NEUTS SEG NFR BLD AUTO: 47 % (ref 43–75)
NRBC BLD AUTO-RTO: 0 /100 WBCS
PLATELET # BLD AUTO: 159 THOUSANDS/UL (ref 149–390)
PMV BLD AUTO: 10.4 FL (ref 8.9–12.7)
POTASSIUM SERPL-SCNC: 3.8 MMOL/L (ref 3.5–5.3)
PROT SERPL-MCNC: 7.6 G/DL (ref 6.4–8.2)
RBC # BLD AUTO: 3.9 MILLION/UL (ref 3.81–5.12)
SODIUM SERPL-SCNC: 140 MMOL/L (ref 136–145)
WBC # BLD AUTO: 4.57 THOUSAND/UL (ref 4.31–10.16)

## 2020-08-12 PROCEDURE — 80053 COMPREHEN METABOLIC PANEL: CPT

## 2020-08-12 PROCEDURE — 36415 COLL VENOUS BLD VENIPUNCTURE: CPT

## 2020-08-12 PROCEDURE — 83615 LACTATE (LD) (LDH) ENZYME: CPT

## 2020-08-12 PROCEDURE — 85025 COMPLETE CBC W/AUTO DIFF WBC: CPT

## 2020-08-13 RX ORDER — ATORVASTATIN CALCIUM 40 MG/1
TABLET, FILM COATED ORAL
Qty: 90 TABLET | Refills: 3 | Status: SHIPPED | OUTPATIENT
Start: 2020-08-13 | End: 2021-07-18

## 2020-08-13 RX ORDER — AMLODIPINE BESYLATE 5 MG/1
TABLET ORAL
Qty: 90 TABLET | Refills: 3 | Status: SHIPPED | OUTPATIENT
Start: 2020-08-13 | End: 2021-07-08

## 2020-08-20 ENCOUNTER — TELEPHONE (OUTPATIENT)
Dept: UROLOGY | Facility: AMBULATORY SURGERY CENTER | Age: 85
End: 2020-08-20

## 2020-08-21 ENCOUNTER — TELEPHONE (OUTPATIENT)
Dept: HEMATOLOGY ONCOLOGY | Facility: CLINIC | Age: 85
End: 2020-08-21

## 2020-08-21 NOTE — TELEPHONE ENCOUNTER
Patient called to reschedule her 8/24 follow up with Dr David Chatman to 10/26/ 2:00 pm at the [facility]

## 2020-08-24 ENCOUNTER — TELEPHONE (OUTPATIENT)
Dept: HEMATOLOGY ONCOLOGY | Facility: CLINIC | Age: 85
End: 2020-08-24

## 2020-08-24 NOTE — TELEPHONE ENCOUNTER
Appointment Confirmation     Appointment with  [facility]    Appointment date & time 10-26-20 @ 2:00pm    Location [place]   Patient verbilized Understanding  Yes

## 2020-08-25 ENCOUNTER — PROCEDURE VISIT (OUTPATIENT)
Dept: UROLOGY | Facility: AMBULATORY SURGERY CENTER | Age: 85
End: 2020-08-25
Payer: MEDICARE

## 2020-08-25 VITALS
WEIGHT: 124 LBS | SYSTOLIC BLOOD PRESSURE: 150 MMHG | HEART RATE: 84 BPM | HEIGHT: 61 IN | DIASTOLIC BLOOD PRESSURE: 90 MMHG | BODY MASS INDEX: 23.41 KG/M2 | TEMPERATURE: 97 F

## 2020-08-25 DIAGNOSIS — C67.9 MALIGNANT NEOPLASM OF URINARY BLADDER, UNSPECIFIED SITE (HCC): Primary | ICD-10-CM

## 2020-08-25 LAB
SL AMB  POCT GLUCOSE, UA: NORMAL
SL AMB LEUKOCYTE ESTERASE,UA: NORMAL
SL AMB POCT BILIRUBIN,UA: NORMAL
SL AMB POCT BLOOD,UA: NORMAL
SL AMB POCT CLARITY,UA: CLEAR
SL AMB POCT COLOR,UA: YELLOW
SL AMB POCT KETONES,UA: NORMAL
SL AMB POCT NITRITE,UA: NORMAL
SL AMB POCT PH,UA: 6
SL AMB POCT SPECIFIC GRAVITY,UA: 1.01
SL AMB POCT URINE PROTEIN: NORMAL
SL AMB POCT UROBILINOGEN: 0.2

## 2020-08-25 PROCEDURE — 88112 CYTOPATH CELL ENHANCE TECH: CPT | Performed by: PATHOLOGY

## 2020-08-25 PROCEDURE — 81002 URINALYSIS NONAUTO W/O SCOPE: CPT | Performed by: UROLOGY

## 2020-08-25 PROCEDURE — 52000 CYSTOURETHROSCOPY: CPT | Performed by: UROLOGY

## 2020-08-25 NOTE — PROGRESS NOTES
Cystoscopy    Date/Time: 8/25/2020 1:58 PM  Performed by: Marc Ramirez MD  Authorized by: Marc Ramirez MD     Procedure details: cystoscopy        Davi Brock is an 27-year-old female known to Dr Mery Rojas  She has a history of urothelial carcinoma the bladder  Her last upper tract imaging was a CT scan of the abdomen and pelvis performed in December 2019  Gastric wall and duodenal wall thickening were noted  Pancreas lesion was also appreciated  She had an endoscopic biopsy which was negative for malignancy  She states that she is scheduled to see a surgical oncologist just for an opinion regarding the pancreas  No upper tract findings were noted  Urine cytology was not performed prior to today's office visit  She denies seeing any gross hematuria  She reports that her bladder tumor was in 2015 and she believes in 2011 she had a left ureteral tumor which was ablated  Cystoscopy Procedure note    Risk and benefits of flexible cystoscopy were discussed  Informed consent was obtained  A urine dip is adequate for cystoscopy  The patient was placed into the modified supine position  Her genitalia was prepped and draped in a sterile fashion  Viscous lidocaine was instilled into the urethra  Flexible cystoscopy was then performed  The bladder was thoroughly inspected  Both ureteral orifices were visualized with clear efflux of urine  The bladder mucosa was thoroughly inspected  There was no evidence of mucosal abnormalities, stones, or lesions  Retroflexion was normal   Overall this was a negative cystoscopy  A female office staff member was present throughout the entire procedure  Impression:  History of urothelial carcinoma the bladder without recurrence, distant history of left upper tract urothelial carcinoma  Plan:  urine cytology was sent from the office today    We discussed evaluation of her upper tract with cystoscopy, bilateral ureteral washings, bilateral retrograde pyelograms, and possible ureteroscopy if there is any abnormality identified  The patient is not interested in operative intervention at 82years of age  We discussed that as her cystoscopy today was within normal limits that we would only proceed with retrograde pyelography and possible ureteroscopy if she has abnormal cytology concerning for malignancy  She will likely continue to have upper abdominal imaging for her pancreas and this would potentially cover the upper urinary tracts  The patient is amenable with this plan    Her follow-up will be in 6 months with cytology or sooner if her current cytology is abnormal

## 2020-08-31 DIAGNOSIS — R60.0 LOWER EXTREMITY EDEMA: ICD-10-CM

## 2020-09-01 RX ORDER — BUMETANIDE 1 MG/1
TABLET ORAL
Qty: 90 TABLET | Refills: 3 | Status: SHIPPED | OUTPATIENT
Start: 2020-09-01 | End: 2021-07-19 | Stop reason: SDUPTHER

## 2020-09-08 ENCOUNTER — CONSULT (OUTPATIENT)
Dept: SURGERY | Facility: CLINIC | Age: 85
End: 2020-09-08
Payer: MEDICARE

## 2020-09-08 VITALS
TEMPERATURE: 96.7 F | HEART RATE: 76 BPM | WEIGHT: 124.3 LBS | DIASTOLIC BLOOD PRESSURE: 70 MMHG | RESPIRATION RATE: 16 BRPM | SYSTOLIC BLOOD PRESSURE: 140 MMHG | HEIGHT: 61 IN | BODY MASS INDEX: 23.47 KG/M2

## 2020-09-08 DIAGNOSIS — K43.9 VENTRAL HERNIA WITHOUT OBSTRUCTION OR GANGRENE: Primary | ICD-10-CM

## 2020-09-08 PROCEDURE — 99203 OFFICE O/P NEW LOW 30 MIN: CPT | Performed by: SURGERY

## 2020-09-08 NOTE — PROGRESS NOTES
Assessment/Plan:    Ventral hernia without obstruction or gangrene    Ms Steve Portillo has a fat containing ventral hernia that is not reducible  I explained that is likely due to a very small fascial defect that has been filled with preperitoneal fatty tissue  Explained that she is at very minimal risk of any complications of this due to its small size  I will check an ultrasound just to evaluate the actual fascial defect size as a reference point  This also helped for long-term follow-up  Explained if it becomes painful or continues to increase in size rapidly that point would recommend repair however this point she has had it for over 40 years and I believe continued observation is reasonable  Diagnoses and all orders for this visit:    Ventral hernia without obstruction or gangrene  -     US abdomen limited; Future          Subjective:      Patient ID: Irvin Moulton is a 82 y o  female  Ms Steve Portillo   Is an 29-year-old female that is here for evaluation of a supraumbilical ventral hernia  Patient states that she has had it since the birth of her children  It is increased slightly in size so she is referred for evaluation  She denies pain  Denies nausea vomiting  She states that it is always there and never changes  The following portions of the patient's history were reviewed and updated as appropriate: allergies, current medications, past family history, past medical history, past social history, past surgical history and problem list     Review of Systems   Constitutional: Negative for chills, fever and unexpected weight change  HENT: Negative for congestion, sore throat and trouble swallowing  Eyes: Negative for discharge and itching  Respiratory: Negative for cough, shortness of breath and wheezing  Cardiovascular: Negative for chest pain and leg swelling  Gastrointestinal: Negative for abdominal distention and abdominal pain     Endocrine: Negative for cold intolerance and heat intolerance  Musculoskeletal: Negative for arthralgias, gait problem and joint swelling  Skin: Negative for color change and wound  Neurological: Negative for dizziness, numbness and headaches  Psychiatric/Behavioral: Negative for agitation and confusion  The patient is not nervous/anxious  Objective:      /70   Pulse 76   Temp (!) 96 7 °F (35 9 °C)   Resp 16   Ht 5' 1" (1 549 m)   Wt 56 4 kg (124 lb 4 8 oz)   BMI 23 49 kg/m²          Physical Exam  Vitals signs and nursing note reviewed  Constitutional:       General: She is not in acute distress  Appearance: She is well-developed  She is not diaphoretic  HENT:      Head: Normocephalic and atraumatic  Eyes:      Pupils: Pupils are equal, round, and reactive to light  Neck:      Musculoskeletal: Normal range of motion and neck supple  Cardiovascular:      Rate and Rhythm: Normal rate and regular rhythm  Heart sounds: Normal heart sounds  No murmur  Pulmonary:      Effort: Pulmonary effort is normal  No respiratory distress  Breath sounds: Normal breath sounds  No wheezing  Abdominal:      General: Bowel sounds are normal  There is no distension  Palpations: Abdomen is soft  Tenderness: There is no abdominal tenderness  There is no guarding  Hernia: A hernia is present  Comments:   Supraumbilical ventral hernia  The hernia contents measure approximately to 3 cm across  But the fascial defect is unable to be palpated as is likely less than 1 cm  Musculoskeletal: Normal range of motion  Skin:     General: Skin is warm and dry  Neurological:      Mental Status: She is alert and oriented to person, place, and time     Psychiatric:         Behavior: Behavior normal

## 2020-09-08 NOTE — ASSESSMENT & PLAN NOTE
Ms Mark William has a fat containing ventral hernia that is not reducible  I explained that is likely due to a very small fascial defect that has been filled with preperitoneal fatty tissue  Explained that she is at very minimal risk of any complications of this due to its small size  I will check an ultrasound just to evaluate the actual fascial defect size as a reference point  This also helped for long-term follow-up  Explained if it becomes painful or continues to increase in size rapidly that point would recommend repair however this point she has had it for over 40 years and I believe continued observation is reasonable

## 2020-09-14 ENCOUNTER — HOSPITAL ENCOUNTER (OUTPATIENT)
Dept: RADIOLOGY | Facility: MEDICAL CENTER | Age: 85
Discharge: HOME | End: 2020-09-14
Payer: MEDICARE

## 2020-09-14 DIAGNOSIS — K43.9 VENTRAL HERNIA WITHOUT OBSTRUCTION OR GANGRENE: ICD-10-CM

## 2020-09-14 PROCEDURE — 76705 ECHO EXAM OF ABDOMEN: CPT

## 2020-10-08 DIAGNOSIS — K21.9 CHRONIC GERD: ICD-10-CM

## 2020-10-08 DIAGNOSIS — K31.89 GASTRIC WALL THICKENING: ICD-10-CM

## 2020-10-09 DIAGNOSIS — I10 ESSENTIAL HYPERTENSION: ICD-10-CM

## 2020-10-12 DIAGNOSIS — I10 ESSENTIAL HYPERTENSION: ICD-10-CM

## 2020-10-12 RX ORDER — LOSARTAN POTASSIUM 100 MG/1
100 TABLET ORAL DAILY
Qty: 90 TABLET | Refills: 1 | Status: SHIPPED | OUTPATIENT
Start: 2020-10-12 | End: 2021-01-20

## 2020-10-12 RX ORDER — LOSARTAN POTASSIUM 100 MG/1
100 TABLET ORAL DAILY
Qty: 90 TABLET | Refills: 1 | Status: CANCELLED | OUTPATIENT
Start: 2020-10-12

## 2020-10-12 RX ORDER — OMEPRAZOLE 40 MG/1
CAPSULE, DELAYED RELEASE ORAL
Qty: 90 CAPSULE | Refills: 3 | Status: SHIPPED | OUTPATIENT
Start: 2020-10-12 | End: 2021-10-14 | Stop reason: ALTCHOICE

## 2020-10-26 ENCOUNTER — OFFICE VISIT (OUTPATIENT)
Dept: HEMATOLOGY ONCOLOGY | Facility: CLINIC | Age: 85
End: 2020-10-26
Payer: MEDICARE

## 2020-10-26 VITALS
BODY MASS INDEX: 23.98 KG/M2 | TEMPERATURE: 98 F | SYSTOLIC BLOOD PRESSURE: 128 MMHG | DIASTOLIC BLOOD PRESSURE: 78 MMHG | OXYGEN SATURATION: 99 % | WEIGHT: 127 LBS | RESPIRATION RATE: 18 BRPM | HEART RATE: 85 BPM | HEIGHT: 61 IN

## 2020-10-26 DIAGNOSIS — K43.9 VENTRAL HERNIA WITHOUT OBSTRUCTION OR GANGRENE: ICD-10-CM

## 2020-10-26 DIAGNOSIS — C91.10 CHRONIC LARGE GRANULAR LYMPHOCYTIC LEUKEMIA (HCC): Primary | ICD-10-CM

## 2020-10-26 DIAGNOSIS — C67.9 MALIGNANT NEOPLASM OF URINARY BLADDER, UNSPECIFIED SITE (HCC): ICD-10-CM

## 2020-10-26 DIAGNOSIS — K86.2 PANCREATIC CYST: ICD-10-CM

## 2020-10-26 PROCEDURE — 99214 OFFICE O/P EST MOD 30 MIN: CPT | Performed by: INTERNAL MEDICINE

## 2020-10-29 ENCOUNTER — OFFICE VISIT (OUTPATIENT)
Dept: FAMILY MEDICINE CLINIC | Facility: CLINIC | Age: 85
End: 2020-10-29
Payer: MEDICARE

## 2020-10-29 ENCOUNTER — APPOINTMENT (OUTPATIENT)
Dept: LAB | Facility: CLINIC | Age: 85
End: 2020-10-29
Payer: MEDICARE

## 2020-10-29 ENCOUNTER — TELEPHONE (OUTPATIENT)
Dept: FAMILY MEDICINE CLINIC | Facility: CLINIC | Age: 85
End: 2020-10-29

## 2020-10-29 VITALS
TEMPERATURE: 96.4 F | HEART RATE: 78 BPM | DIASTOLIC BLOOD PRESSURE: 80 MMHG | WEIGHT: 126 LBS | BODY MASS INDEX: 23.79 KG/M2 | HEIGHT: 61 IN | SYSTOLIC BLOOD PRESSURE: 140 MMHG | OXYGEN SATURATION: 99 %

## 2020-10-29 DIAGNOSIS — I25.10 CORONARY ARTERY DISEASE INVOLVING NATIVE CORONARY ARTERY OF NATIVE HEART WITHOUT ANGINA PECTORIS: ICD-10-CM

## 2020-10-29 DIAGNOSIS — R61 SWEATING INCREASE: ICD-10-CM

## 2020-10-29 DIAGNOSIS — I10 ESSENTIAL HYPERTENSION: Primary | ICD-10-CM

## 2020-10-29 DIAGNOSIS — R42 DIZZINESS: ICD-10-CM

## 2020-10-29 DIAGNOSIS — I10 ESSENTIAL HYPERTENSION: ICD-10-CM

## 2020-10-29 DIAGNOSIS — E21.3 HYPERPARATHYROIDISM, UNSPECIFIED (HCC): ICD-10-CM

## 2020-10-29 DIAGNOSIS — R51.9 NEW ONSET OF HEADACHES: ICD-10-CM

## 2020-10-29 DIAGNOSIS — I65.23 CAROTID ARTERY STENOSIS, ASYMPTOMATIC, BILATERAL: ICD-10-CM

## 2020-10-29 DIAGNOSIS — Z23 NEED FOR INFLUENZA VACCINATION: ICD-10-CM

## 2020-10-29 LAB
ANION GAP SERPL CALCULATED.3IONS-SCNC: 1 MMOL/L (ref 4–13)
BASOPHILS # BLD AUTO: 0.04 THOUSANDS/ΜL (ref 0–0.1)
BASOPHILS NFR BLD AUTO: 1 % (ref 0–1)
BUN SERPL-MCNC: 27 MG/DL (ref 5–25)
CALCIUM SERPL-MCNC: 9.1 MG/DL (ref 8.3–10.1)
CHLORIDE SERPL-SCNC: 103 MMOL/L (ref 100–108)
CO2 SERPL-SCNC: 32 MMOL/L (ref 21–32)
CREAT SERPL-MCNC: 0.9 MG/DL (ref 0.6–1.3)
CRP SERPL QL: <3 MG/L
EOSINOPHIL # BLD AUTO: 0.18 THOUSAND/ΜL (ref 0–0.61)
EOSINOPHIL NFR BLD AUTO: 4 % (ref 0–6)
ERYTHROCYTE [DISTWIDTH] IN BLOOD BY AUTOMATED COUNT: 12.1 % (ref 11.6–15.1)
GFR SERPL CREATININE-BSD FRML MDRD: 59 ML/MIN/1.73SQ M
GLUCOSE SERPL-MCNC: 96 MG/DL (ref 65–140)
HCT VFR BLD AUTO: 39.7 % (ref 34.8–46.1)
HGB BLD-MCNC: 12.8 G/DL (ref 11.5–15.4)
IMM GRANULOCYTES # BLD AUTO: 0.01 THOUSAND/UL (ref 0–0.2)
IMM GRANULOCYTES NFR BLD AUTO: 0 % (ref 0–2)
LYMPHOCYTES # BLD AUTO: 1.68 THOUSANDS/ΜL (ref 0.6–4.47)
LYMPHOCYTES NFR BLD AUTO: 34 % (ref 14–44)
MCH RBC QN AUTO: 31.9 PG (ref 26.8–34.3)
MCHC RBC AUTO-ENTMCNC: 32.2 G/DL (ref 31.4–37.4)
MCV RBC AUTO: 99 FL (ref 82–98)
MONOCYTES # BLD AUTO: 0.39 THOUSAND/ΜL (ref 0.17–1.22)
MONOCYTES NFR BLD AUTO: 8 % (ref 4–12)
NEUTROPHILS # BLD AUTO: 2.65 THOUSANDS/ΜL (ref 1.85–7.62)
NEUTS SEG NFR BLD AUTO: 53 % (ref 43–75)
NRBC BLD AUTO-RTO: 0 /100 WBCS
PLATELET # BLD AUTO: 165 THOUSANDS/UL (ref 149–390)
PMV BLD AUTO: 10.6 FL (ref 8.9–12.7)
POTASSIUM SERPL-SCNC: 3.8 MMOL/L (ref 3.5–5.3)
RBC # BLD AUTO: 4.01 MILLION/UL (ref 3.81–5.12)
SODIUM SERPL-SCNC: 136 MMOL/L (ref 136–145)
TSH SERPL DL<=0.05 MIU/L-ACNC: 2.75 UIU/ML (ref 0.36–3.74)
WBC # BLD AUTO: 4.95 THOUSAND/UL (ref 4.31–10.16)

## 2020-10-29 PROCEDURE — G0008 ADMIN INFLUENZA VIRUS VAC: HCPCS

## 2020-10-29 PROCEDURE — 86140 C-REACTIVE PROTEIN: CPT

## 2020-10-29 PROCEDURE — 84443 ASSAY THYROID STIM HORMONE: CPT

## 2020-10-29 PROCEDURE — 36415 COLL VENOUS BLD VENIPUNCTURE: CPT

## 2020-10-29 PROCEDURE — 90662 IIV NO PRSV INCREASED AG IM: CPT

## 2020-10-29 PROCEDURE — 99214 OFFICE O/P EST MOD 30 MIN: CPT | Performed by: FAMILY MEDICINE

## 2020-10-29 PROCEDURE — 80048 BASIC METABOLIC PNL TOTAL CA: CPT

## 2020-10-29 PROCEDURE — 85025 COMPLETE CBC W/AUTO DIFF WBC: CPT

## 2020-11-09 ENCOUNTER — HOSPITAL ENCOUNTER (OUTPATIENT)
Dept: RADIOLOGY | Facility: MEDICAL CENTER | Age: 85
Discharge: HOME | End: 2020-11-09
Payer: MEDICARE

## 2020-11-09 DIAGNOSIS — R51.9 NEW ONSET OF HEADACHES: ICD-10-CM

## 2020-11-09 DIAGNOSIS — I10 ESSENTIAL HYPERTENSION: ICD-10-CM

## 2020-11-09 DIAGNOSIS — R42 DIZZINESS: ICD-10-CM

## 2020-11-09 DIAGNOSIS — E21.3 HYPERPARATHYROIDISM, UNSPECIFIED (HCC): ICD-10-CM

## 2020-11-09 DIAGNOSIS — I65.23 CAROTID ARTERY STENOSIS, ASYMPTOMATIC, BILATERAL: ICD-10-CM

## 2020-11-09 PROCEDURE — G1004 CDSM NDSC: HCPCS

## 2020-11-09 PROCEDURE — 70496 CT ANGIOGRAPHY HEAD: CPT

## 2020-11-09 PROCEDURE — 70498 CT ANGIOGRAPHY NECK: CPT

## 2020-11-09 RX ADMIN — IOHEXOL 85 ML: 350 INJECTION, SOLUTION INTRAVENOUS at 13:11

## 2020-11-12 DIAGNOSIS — I67.1 ANEURYSM OF INTRACRANIAL PORTION OF INTERNAL CAROTID ARTERY: Primary | ICD-10-CM

## 2020-11-19 ENCOUNTER — TELEMEDICINE (OUTPATIENT)
Dept: FAMILY MEDICINE CLINIC | Facility: CLINIC | Age: 85
End: 2020-11-19
Payer: MEDICARE

## 2020-11-19 DIAGNOSIS — I10 ESSENTIAL HYPERTENSION: Primary | ICD-10-CM

## 2020-11-19 PROCEDURE — 99442 PR PHYS/QHP TELEPHONE EVALUATION 11-20 MIN: CPT | Performed by: FAMILY MEDICINE

## 2020-11-19 RX ORDER — CARVEDILOL 3.12 MG/1
3.12 TABLET ORAL
Qty: 30 TABLET | Refills: 1 | Status: SHIPPED | OUTPATIENT
Start: 2020-11-19 | End: 2021-01-20 | Stop reason: SDUPTHER

## 2020-12-09 ENCOUNTER — TELEPHONE (OUTPATIENT)
Dept: VASCULAR SURGERY | Facility: CLINIC | Age: 85
End: 2020-12-09

## 2020-12-09 DIAGNOSIS — F32.A DEPRESSION, UNSPECIFIED DEPRESSION TYPE: ICD-10-CM

## 2020-12-10 ENCOUNTER — CONSULT (OUTPATIENT)
Dept: VASCULAR SURGERY | Facility: CLINIC | Age: 85
End: 2020-12-10
Payer: MEDICARE

## 2020-12-10 VITALS
HEART RATE: 73 BPM | SYSTOLIC BLOOD PRESSURE: 142 MMHG | BODY MASS INDEX: 24.02 KG/M2 | WEIGHT: 127.2 LBS | HEIGHT: 61 IN | DIASTOLIC BLOOD PRESSURE: 84 MMHG | TEMPERATURE: 98 F

## 2020-12-10 DIAGNOSIS — I73.00 RAYNAUD'S DISEASE WITHOUT GANGRENE: ICD-10-CM

## 2020-12-10 DIAGNOSIS — I65.23 CAROTID ARTERY STENOSIS, ASYMPTOMATIC, BILATERAL: Primary | ICD-10-CM

## 2020-12-10 DIAGNOSIS — I67.1 ANEURYSM OF INTRACRANIAL PORTION OF INTERNAL CAROTID ARTERY: ICD-10-CM

## 2020-12-10 PROCEDURE — 99204 OFFICE O/P NEW MOD 45 MIN: CPT | Performed by: SURGERY

## 2020-12-13 RX ORDER — DULOXETIN HYDROCHLORIDE 30 MG/1
CAPSULE, DELAYED RELEASE ORAL
Qty: 90 CAPSULE | Refills: 3 | Status: SHIPPED | OUTPATIENT
Start: 2020-12-13 | End: 2021-10-26

## 2021-01-20 ENCOUNTER — OFFICE VISIT (OUTPATIENT)
Dept: FAMILY MEDICINE CLINIC | Facility: CLINIC | Age: 86
End: 2021-01-20
Payer: MEDICARE

## 2021-01-20 VITALS
HEART RATE: 77 BPM | OXYGEN SATURATION: 99 % | DIASTOLIC BLOOD PRESSURE: 82 MMHG | HEIGHT: 61 IN | BODY MASS INDEX: 23.79 KG/M2 | SYSTOLIC BLOOD PRESSURE: 160 MMHG | WEIGHT: 126 LBS | RESPIRATION RATE: 17 BRPM | TEMPERATURE: 97.3 F

## 2021-01-20 DIAGNOSIS — E21.3 HYPERPARATHYROIDISM, UNSPECIFIED (HCC): ICD-10-CM

## 2021-01-20 DIAGNOSIS — I10 ESSENTIAL HYPERTENSION: ICD-10-CM

## 2021-01-20 DIAGNOSIS — C91.10 CHRONIC LARGE GRANULAR LYMPHOCYTIC LEUKEMIA (HCC): ICD-10-CM

## 2021-01-20 DIAGNOSIS — F33.9 DEPRESSION, RECURRENT (HCC): ICD-10-CM

## 2021-01-20 DIAGNOSIS — Z00.00 MEDICARE ANNUAL WELLNESS VISIT, SUBSEQUENT: Primary | ICD-10-CM

## 2021-01-20 DIAGNOSIS — M06.4 INFLAMMATORY POLYARTHROPATHIES (HCC): ICD-10-CM

## 2021-01-20 DIAGNOSIS — I50.32 CHRONIC DIASTOLIC (CONGESTIVE) HEART FAILURE (HCC): ICD-10-CM

## 2021-01-20 DIAGNOSIS — F41.9 ANXIETY: ICD-10-CM

## 2021-01-20 DIAGNOSIS — F32.A DEPRESSION, CONTROLLED: ICD-10-CM

## 2021-01-20 DIAGNOSIS — Z86.2 HISTORY OF THROMBOCYTOPENIA: ICD-10-CM

## 2021-01-20 PROCEDURE — G0439 PPPS, SUBSEQ VISIT: HCPCS | Performed by: FAMILY MEDICINE

## 2021-01-20 PROCEDURE — 99214 OFFICE O/P EST MOD 30 MIN: CPT | Performed by: FAMILY MEDICINE

## 2021-01-20 RX ORDER — LOSARTAN POTASSIUM 100 MG/1
TABLET ORAL
Qty: 90 TABLET | Refills: 1 | Status: SHIPPED | OUTPATIENT
Start: 2021-01-20 | End: 2021-09-22

## 2021-01-20 RX ORDER — CARVEDILOL 3.12 MG/1
3.12 TABLET ORAL
Qty: 30 TABLET | Refills: 5 | Status: SHIPPED | OUTPATIENT
Start: 2021-01-20 | End: 2021-07-10

## 2021-01-20 NOTE — PROGRESS NOTES
Assessment and Plan:     Problem List Items Addressed This Visit        Endocrine    Hyperparathyroidism, unspecified ([facility] )       Cardiovascular and Mediastinum    Essential hypertension    Relevant Medications    carvedilol (Coreg) 3 125 mg tablet    Chronic diastolic (congestive) heart failure (HCC) (Chronic)    Relevant Medications    carvedilol (Coreg) 3 125 mg tablet       Musculoskeletal and Integument    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HCC)       Other    Thrombocytopenia ([facility] )    Medicare annual wellness visit, subsequent - Primary    Depression, controlled    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[address] )      Other Visit Diagnoses     Depression, recurrent ([place] [address] )        Anxiety               Preventive health issues were discussed with patient, and age appropriate screening tests were ordered as noted in patient's After Visit Summary  Personalized health advice and appropriate referrals for health education or preventive services given if needed, as noted in patient's After Visit Summary       History of Present Illness:     Patient presents for Medicare Annual Wellness visit    Patient Care Team:  Selma Hicks DO as PCP - General (Family Medicine)  MD Selma Cortés DO Terressa Prima, MD Clemencia Distance, DO Carlena Calix, DO     Problem List:     Patient Active Problem List   Diagnosis    Essential hypertension    Mixed hyperlipidemia    Coronary artery disease without angina pectoris - S/P stent placement x 2 (2011)    Gastroesophageal reflux disease without esophagitis    Chronic diastolic (congestive) heart failure ([facility] )    Hyperthyroidism    Age-related osteoporosis with current pathological fracture    Intertrochanteric fracture of left femur, closed, with routine healing, subsequent encounter    Ambulatory dysfunction    Elderly person living alone    Low back pain without sciatica    S/P ORIF (open reduction internal fixation) fracture    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HCC)    Bladder cancer (HCC)    Allergic rhinitis    Anemia due to vitamin B12 deficiency    Biliary dyskinesia    Cholelithiasis    Chronic right shoulder pain    Closed displaced fracture of left trapezium bone    Degenerative joint disease    Depression, controlled    Deviated nasal septum    Gastric reflux syndrome    Heart valve disorder    Herpes zoster infection of thoracic region    IBS (irritable bowel syndrome)    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HCC)    Mild vitamin D deficiency    Mixed hearing loss, unilateral    Pancreatic cyst    Raynaud's disease without gangrene    S/P angioplasty with stent    Urge incontinence of urine    Conjunctivitis of both eyes    Thrombocytopenia ([facility] )    Medicare annual wellness visit, subsequent    History of pulmonary embolus (PE)    Age-related cataract of both eyes    Hyperparathyroidism, unspecified (HCC)    Pruritic rash    Fear of falling    Balance problems    Difficulty navigating stairs    Lower abdominal pain, unspecified    History of falling    Lower extremity edema    Cough due to ACE inhibitor    Persistent cough for 3 weeks or longer    New onset of headaches    Carotid artery stenosis, asymptomatic, bilateral    Ventral hernia without obstruction or gangrene      Past Medical and Surgical History:     Past Medical History:   Diagnosis Date    Bladder cancer ([facility] )     Coronary artery disease     S/P stent placement x 2 in 2011    GERD (gastroesophageal reflux disease)     Hepatitis     got this from food back in 1970s, has not had a problem since    Hyperlipidemia     Hypertension     Kidney stones     Malignant neoplasm of urethra ([facility] )     Pneumonia     Pulmonary emboli ([facility] ) 1970s    Raynaud disease     Shingles      Past Surgical History:   Procedure Laterality Date    CATARACT EXTRACTION      CORONARY ANGIOPLASTY WITH STENT PLACEMENT      stent x 2    CYSTOSCOPY diagnostic - onset 4/4/17    EYE SURGERY      HYSTERECTOMY      LEG SURGERY      OOPHORECTOMY      PA OPEN RX FEMUR FX+INTRAMED ALEJANDRO Left 4/8/2018    Procedure: INSERTION NAIL IM FEMUR ANTEGRADE (TROCHANTERIC); Surgeon: Can Elliott MD;  [facility]: [facility];  Service: Orthopedics    TONSILLECTOMY        Family History:     Family History   Problem Relation Age of Onset    Arthritis Mother     Osteoporosis Mother     Heart disease Father     Hypertension Father     Hypertension Brother     Prostate cancer Brother     Breast cancer Daughter 48    Prostate cancer Son       Social History:     E-Cigarette/Vaping    E-Cigarette Use Never User      E-Cigarette/Vaping Substances    Nicotine No     THC No     CBD No     Flavoring No     Other No     Unknown No      Social History     Socioeconomic History    Marital status:       Spouse name: None    Number of children: None    Years of education: None    Highest education level: None   Occupational History    None   Social Needs    Financial resource strain: None    Food insecurity     Worry: None     Inability: None    Transportation needs     Medical: No     Non-medical: No   Tobacco Use    Smoking status: Never Smoker    Smokeless tobacco: Never Used   Substance and Sexual Activity    Alcohol use: No    Drug use: No    Sexual activity: Never   Lifestyle    Physical activity     Days per week: None     Minutes per session: None    Stress: None   Relationships    Social connections     Talks on phone: None     Gets together: None     Attends [employer] service: None     Active member of club or organization: None     Attends meetings of clubs or organizations: None     Relationship status: None    Intimate partner violence     Fear of current or ex partner: None     Emotionally abused: None     Physically abused: None     Forced sexual activity: None   Other Topics Concern    None   Social History Narrative    Advance directives declined by parents    Always uses seat belt      Medications and Allergies:     Current Outpatient Medications   Medication Sig Dispense Refill    acetaminophen (TYLENOL) 500 mg tablet Take 500 mg by mouth every 6 (six) hours as needed for mild pain      amLODIPine (NORVASC) 5 mg tablet TAKE 1 TABLET BY MOUTH  DAILY 90 tablet 3    aspirin 81 MG tablet Take 81 mg by mouth daily Resume on 4/28       atorvastatin (LIPITOR) 40 mg tablet TAKE 1 TABLET BY MOUTH  DAILY AFTER DINNER 90 tablet 3    bumetanide (BUMEX) 1 mg tablet Half a tablet daily  Whole tablet if needed 90 tablet 3    carvedilol (Coreg) 3 125 mg tablet Take 1 tablet (3 125 mg total) by mouth daily with dinner Take with the 6 25mg tablet at dinner 30 tablet 5    carvedilol (COREG) 6 25 mg tablet Take 1 tablet (6 25 mg total) by mouth 2 (two) times a day with meals 180 tablet 3    Cholecalciferol 2000 units TABS Take 2,000 Units by mouth daily Resume on 4/28      DULoxetine (CYMBALTA) 30 mg delayed release capsule TAKE 1 CAPSULE BY MOUTH  DAILY 90 capsule 3    fexofenadine (ALLEGRA) 180 MG tablet Take 180 mg by mouth daily      losartan (COZAAR) 100 MG tablet Take 1 tablet (100 mg total) by mouth daily 90 tablet 1    MULTIPLE VITAMIN PO Take by mouth      omeprazole (PriLOSEC) 40 MG capsule TAKE 1 CAPSULE BY MOUTH  DAILY BEFORE BREAKFAST 90 capsule 3    dicyclomine (BENTYL) 10 mg capsule Take 10 mg by mouth as needed      traMADol (ULTRAM) 50 mg tablet tramadol 50 mg tablet   take 1 tablet by mouth once daily       No current facility-administered medications for this visit  Allergies   Allergen Reactions    Lactose      Other reaction(s): Indigestion/GI Upset    Other      Other reaction(s): Mental Status Change  After surgery   Whole blood transfusion  = passed out   historical intolerance; verified with patient; had no reaction to PRBC transfusions afterward      Immunizations:     Immunization History   Administered Date(s) Administered    Influenza Split High Dose Preservative Free IM 10/04/2016, 10/05/2017    Influenza, high dose seasonal 0 7 mL 10/11/2018, 10/15/2019, 10/29/2020    Pneumococcal Conjugate 13-Valent 07/24/2017    Pneumococcal Polysaccharide PPV23 01/07/2019    Zoster 01/01/2016      Health Maintenance: There are no preventive care reminders to display for this patient  Topic Date Due    DTaP,Tdap,and Td Vaccines (1 - Tdap) 03/27/1956      Medicare Health Risk Assessment:     /82 (BP Location: Left arm, Patient Position: Sitting)   Pulse 77   Temp (!) 97 3 °F (36 3 °C)   Resp 17   Ht 5' 0 5" (1 537 m)   Wt 57 2 kg (126 lb)   SpO2 99%   BMI 24 20 kg/m²      Jerry Rasheed is here for her Subsequent Wellness visit  Health Risk Assessment:   Patient rates overall health as fair  Patient feels that their physical health rating is slightly worse  Eyesight was rated as same  Hearing was rated as same  Patient feels that their emotional and mental health rating is slightly worse  Pain experienced in the last 7 days has been some  Patient's pain rating has been 6/10  Patient states that she has experienced no weight loss or gain in last 6 months  Depression Screening:   PHQ-2 Score: 1  PHQ-9 Score: 7      Fall Risk Screening: In the past year, patient has experienced: no history of falling in past year      Urinary Incontinence Screening:   Patient has not leaked urine accidently in the last six months  Home Safety:  Patient does not have trouble with stairs inside or outside of their home  Patient has working smoke alarms and has working carbon monoxide detector  Home safety hazards include: none  Nutrition:   Current diet is Other (please comment)  Follows diet for IBS     Medications:   Patient is currently taking over-the-counter supplements  OTC medications include: see medication list  Patient is able to manage medications       Activities of Daily Living (ADLs)/Instrumental Activities of Daily Living (IADLs):   Walk and transfer into and out of bed and chair?: Yes  Dress and groom yourself?: Yes    Bathe or shower yourself?: Yes    Feed yourself? Yes  Do your laundry/housekeeping?: Yes  Manage your money, pay your bills and track your expenses?: Yes  Make your own meals?: Yes    Do your own shopping?: Yes    Previous Hospitalizations:   Any hospitalizations or ED visits within the last 12 months?: No      Advance Care Planning:   Living will: Yes    Durable POA for healthcare:  Yes    Advanced directive: Yes      Cognitive Screening:   Provider or family/friend/caregiver concerned regarding cognition?: No    PREVENTIVE SCREENINGS      Cardiovascular Screening:    General: Screening Not Indicated and History Lipid Disorder      Diabetes Screening:     General: Screening Current      Colorectal Cancer Screening:     General: Screening Not Indicated      Breast Cancer Screening:     General: Screening Current      Cervical Cancer Screening:    General: Screening Not Indicated      Osteoporosis Screening:    General: Screening Not Indicated and History Osteoporosis      Abdominal Aortic Aneurysm (AAA) Screening:        General: Screening Not Indicated      Lung Cancer Screening:     General: Screening Not Indicated      Hepatitis C Screening:    General: Screening Not Indicated      Jack Cummings DO

## 2021-01-20 NOTE — PATIENT INSTRUCTIONS
Try VALERIAN ROOT EXTRACT as needed for anxiety      Medicare Preventive Visit Patient Instructions  Thank you for completing your Welcome to Medicare Visit or Medicare Annual Wellness Visit today  Your next wellness visit will be due in one year (1/20/2022)  The screening/preventive services that you may require over the next 5-10 years are detailed below  Some tests may not apply to you based off risk factors and/or age  Screening tests ordered at today's visit but not completed yet may show as past due  Also, please note that scanned in results may not display below  Preventive Screenings:  Service Recommendations Previous Testing/Comments   Colorectal Cancer Screening  * Colonoscopy    * Fecal Occult Blood Test (FOBT)/Fecal Immunochemical Test (FIT)  * Fecal DNA/Cologuard Test  * Flexible Sigmoidoscopy Age: 54-65 years old   Colonoscopy: every 10 years (may be performed more frequently if at higher risk)  OR  FOBT/FIT: every 1 year  OR  Cologuard: every 3 years  OR  Sigmoidoscopy: every 5 years  Screening may be recommended earlier than age 48 if at higher risk for colorectal cancer  Also, an individualized decision between you and your healthcare provider will decide whether screening between the ages of 74-80 would be appropriate  Colonoscopy: Not on file  FOBT/FIT: Not on file  Cologuard: Not on file  Sigmoidoscopy: Not on file         Breast Cancer Screening Age: 36 years old  Frequency: every 1-2 years  Not required if history of left and right mastectomy Mammogram: 02/01/2019    Screening Current   Cervical Cancer Screening Between the ages of 21-29, pap smear recommended once every 3 years  Between the ages of 33-67, can perform pap smear with HPV co-testing every 5 years     Recommendations may differ for women with a history of total hysterectomy, cervical cancer, or abnormal pap smears in past  Pap Smear: Not on file    Screening Not Indicated   Hepatitis C Screening Once for adults born between 1945 and 1965  More frequently in patients at high risk for Hepatitis C Hep C Antibody: Not on file       Diabetes Screening 1-2 times per year if you're at risk for diabetes or have pre-diabetes Fasting glucose: 91 mg/dL   A1C: No results in last 5 years    Screening Current   Cholesterol Screening Once every 5 years if you don't have a lipid disorder  May order more often based on risk factors  Lipid panel: 07/18/2018    Screening Not Indicated  History Lipid Disorder     Other Preventive Screenings Covered by Medicare:  1  Abdominal Aortic Aneurysm (AAA) Screening: covered once if your at risk  You're considered to be at risk if you have a family history of AAA  2  Lung Cancer Screening: covers low dose CT scan once per year if you meet all of the following conditions: (1) Age 50-69; (2) No signs or symptoms of lung cancer; (3) Current smoker or have quit smoking within the last 15 years; (4) You have a tobacco smoking history of at least 30 pack years (packs per day multiplied by number of years you smoked); (5) You get a written order from a healthcare provider  3  Glaucoma Screening: covered annually if you're considered high risk: (1) You have diabetes OR (2) Family history of glaucoma OR (3)  aged 48 and older OR (3)  American aged 72 and older  3  Osteoporosis Screening: covered every 2 years if you meet one of the following conditions: (1) You're estrogen deficient and at risk for osteoporosis based off medical history and other findings; (2) Have a vertebral abnormality; (3) On glucocorticoid therapy for more than 3 months; (4) Have primary hyperparathyroidism; (5) On osteoporosis medications and need to assess response to drug therapy  · Last bone density test (DXA Scan): 05/28/2019   5  HIV Screening: covered annually if you're between the age of 15-65  Also covered annually if you are younger than 13 and older than 72 with risk factors for HIV infection   For pregnant patients, it is covered up to 3 times per pregnancy  Immunizations:  Immunization Recommendations   Influenza Vaccine Annual influenza vaccination during flu season is recommended for all persons aged >= 6 months who do not have contraindications   Pneumococcal Vaccine (Prevnar and Pneumovax)  * Prevnar = PCV13  * Pneumovax = PPSV23   Adults 25-60 years old: 1-3 doses may be recommended based on certain risk factors  Adults 72 years old: Prevnar (PCV13) vaccine recommended followed by Pneumovax (PPSV23) vaccine  If already received PPSV23 since turning 65, then PCV13 recommended at least one year after PPSV23 dose  Hepatitis B Vaccine 3 dose series if at intermediate or high risk (ex: diabetes, end stage renal disease, liver disease)   Tetanus (Td) Vaccine - COST NOT COVERED BY MEDICARE PART B Following completion of primary series, a booster dose should be given every 10 years to maintain immunity against tetanus  Td may also be given as tetanus wound prophylaxis  Tdap Vaccine - COST NOT COVERED BY MEDICARE PART B Recommended at least once for all adults  For pregnant patients, recommended with each pregnancy  Shingles Vaccine (Shingrix) - COST NOT COVERED BY MEDICARE PART B  2 shot series recommended in those aged 48 and above     Health Maintenance Due:  There are no preventive care reminders to display for this patient  Immunizations Due:      Topic Date Due    DTaP,Tdap,and Td Vaccines (1 - Tdap) 03/27/1956     Advance Directives   What are advance directives? Advance directives are legal documents that state your wishes and plans for medical care  These plans are made ahead of time in case you lose your ability to make decisions for yourself  Advance directives can apply to any medical decision, such as the treatments you want, and if you want to donate organs  What are the types of advance directives? There are many types of advance directives, and each state has rules about how to use them  You may choose a combination of any of the following:  · Living will: This is a written record of the treatment you want  You can also choose which treatments you do not want, which to limit, and which to stop at a certain time  This includes surgery, medicine, IV fluid, and tube feedings  · Durable power of  for healthcare [facility]): This is a written record that states who you want to make healthcare choices for you when you are unable to make them for yourself  This person, called a proxy, is usually a family member or a friend  You may choose more than 1 proxy  · Do not resuscitate (DNR) order:  A DNR order is used in case your heart stops beating or you stop breathing  It is a request not to have certain forms of treatment, such as CPR  A DNR order may be included in other types of advance directives  · Medical directive: This covers the care that you want if you are in a coma, near death, or unable to make decisions for yourself  You can list the treatments you want for each condition  Treatment may include pain medicine, surgery, blood transfusions, dialysis, IV or tube feedings, and a ventilator (breathing machine)  · Values history: This document has questions about your views, beliefs, and how you feel and think about life  This information can help others choose the care that you would choose  Why are advance directives important? An advance directive helps you control your care  Although spoken wishes may be used, it is better to have your wishes written down  Spoken wishes can be misunderstood, or not followed  Treatments may be given even if you do not want them  An advance directive may make it easier for your family to make difficult choices about your care  © Copyright [employer] 2018 Information is for End User's use only and may not be sold, redistributed or otherwise used for commercial purposes   All illustrations and images included in CareNotes® are the copyrighted property of A D A M , [employer]  or [address] [place] [address]

## 2021-01-20 NOTE — PROGRESS NOTES
Assessment/Plan:         Diagnoses and all orders for this visit:    Medicare annual wellness visit, subsequent    Essential hypertension  Comments:  had been out of her coreg for 2 days, so bp went up, had been controlled, pt will resume coreg  Orders:  -     carvedilol (Coreg) 3 125 mg tablet; Take 1 tablet (3 125 mg total) by mouth daily with dinner Take with the 6 25mg tablet at dinner    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[facility] )  Comments:  sees oncology routinely, cpm    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[facility] )  Comments:  sees [facility]umatologist, Dr Sharon Li, routinely, cpm    Chronic diastolic (congestive) heart failure ([facility] )  Comments:  stable, sees cardiology routinely, cpm    Depression, recurrent ([facility] )  Comments:  controlled on cymbalta, cpm    Depression, controlled    Hyperparathyroidism, unspecified ([facility] )  Comments:  used to see endo at [facility], but appears last appt was in 2018- will review chart and order re-eval by endo if that is the case    History of thrombocytopenia    Anxiety          Subjective:   Chief Complaint   Patient presents with    Medicare Wellness Visit    Follow-up     Blood pressure still elevated in the morning        Patient ID: Brian Estrada is a 82 y o  female      Medicare wellness and f/u appt  "For 2 days, ran out of the carvedlol, and bp climbed up to 180/90, but dont want to take anymore, when was on higher dose in past and "felt crummy"  Last cardiol visit was in July- 6mo f/u was advised, "they didn't have way to set up the appt then, said they'd call, me, but haven't heard anything yet"  "A lot going on with family- can feel it going up, excited a little bit"  "On the cymbalta- was supposedly given to help with the arthritis, but think it helps keep me down a little bit, not so anxious"      The following portions of the patient's history were reviewed and updated as appropriate: allergies, current medications, past family history, past medical history, past social history, past surgical history and problem list     Review of Systems   All other systems reviewed and are negative  Objective:      /82 (BP Location: Left arm, Patient Position: Sitting)   Pulse 77   Temp (!) 97 3 °F (36 3 °C)   Resp 17   Ht 5' 0 5" (1 537 m)   Wt 57 2 kg (126 lb)   SpO2 99%   BMI 24 20 kg/m²          Physical Exam  Vitals signs and nursing note reviewed  Constitutional:       General: She is not in acute distress  Appearance: She is well-developed  She is not ill-appearing, toxic-appearing or diaphoretic  HENT:      Head: Normocephalic and atraumatic  Eyes:      General: Lids are normal       Conjunctiva/sclera: Conjunctivae normal       Pupils: Pupils are equal, round, and reactive to light  Neck:      Musculoskeletal: Neck supple  Thyroid: No thyroid mass or thyromegaly  Vascular: No JVD  Trachea: Trachea normal    Cardiovascular:      Rate and Rhythm: Normal rate and regular rhythm  Pulses: Normal pulses  Heart sounds: S1 normal and S2 normal  No friction rub  No gallop  Comments: No edema  Pulmonary:      Effort: Pulmonary effort is normal       Breath sounds: Normal breath sounds  Abdominal:      General: Bowel sounds are normal  There is no distension  Palpations: Abdomen is soft  There is no hepatomegaly, splenomegaly or mass  Tenderness: There is no abdominal tenderness  Lymphadenopathy:      Cervical: No cervical adenopathy  Upper Body:      Right upper body: No supraclavicular adenopathy  Left upper body: No supraclavicular adenopathy  Skin:     General: Skin is warm and dry  Coloration: Skin is not pale  Neurological:      Mental Status: She is alert and oriented to person, place, and time  Gait: Gait normal    Psychiatric:         Behavior: Behavior normal  Behavior is cooperative

## 2021-01-23 ENCOUNTER — IMMUNIZATIONS (OUTPATIENT)
Dept: FAMILY MEDICINE CLINIC | Facility: HOSPITAL | Age: 86
End: 2021-01-23

## 2021-01-23 DIAGNOSIS — Z23 ENCOUNTER FOR IMMUNIZATION: Primary | ICD-10-CM

## 2021-01-23 PROCEDURE — 0011A SARS-COV-2 / COVID-19 MRNA VACCINE (MODERNA) 100 MCG: CPT

## 2021-01-23 PROCEDURE — 91301 SARS-COV-2 / COVID-19 MRNA VACCINE (MODERNA) 100 MCG: CPT

## 2021-01-26 ENCOUNTER — TELEPHONE (OUTPATIENT)
Dept: FAMILY MEDICINE CLINIC | Facility: CLINIC | Age: 86
End: 2021-01-26

## 2021-01-26 NOTE — TELEPHONE ENCOUNTER
Patient called in to report:    Received Moderna shot Saturday 01/23/21  About 1 pm Sunday 01/24/2021 extreme tiredness overwhelmed her - she could have fallen if she did not sit immediately  Chills were on and off  All total symptoms lasted about four hours  She feels fine

## 2021-02-18 ENCOUNTER — TELEPHONE (OUTPATIENT)
Dept: UROLOGY | Facility: HOSPITAL | Age: 86
End: 2021-02-18

## 2021-02-18 ENCOUNTER — TELEPHONE (OUTPATIENT)
Dept: UROLOGY | Facility: AMBULATORY SURGERY CENTER | Age: 86
End: 2021-02-18

## 2021-02-18 ENCOUNTER — IMMUNIZATIONS (OUTPATIENT)
Dept: FAMILY MEDICINE CLINIC | Facility: HOSPITAL | Age: 86
End: 2021-02-18

## 2021-02-18 DIAGNOSIS — Z23 ENCOUNTER FOR IMMUNIZATION: Primary | ICD-10-CM

## 2021-02-18 PROCEDURE — 91301 SARS-COV-2 / COVID-19 MRNA VACCINE (MODERNA) 100 MCG: CPT

## 2021-02-18 PROCEDURE — 0012A SARS-COV-2 / COVID-19 MRNA VACCINE (MODERNA) 100 MCG: CPT

## 2021-02-18 NOTE — TELEPHONE ENCOUNTER
Placed call to pt, no answer at home, lvm on cell notifying her appt is cancelled on 2/25 with Dr Lolis Gomez and we will call with cytology

## 2021-02-22 NOTE — TELEPHONE ENCOUNTER
Spoke to Patient and confirmed awareness of cancellation of appointment  Knows to obtain cytology and office with call back after resulted to reschedule as per provider recommendation

## 2021-02-25 ENCOUNTER — APPOINTMENT (OUTPATIENT)
Dept: LAB | Facility: CLINIC | Age: 86
End: 2021-02-25
Payer: MEDICARE

## 2021-02-25 DIAGNOSIS — C67.9 MALIGNANT NEOPLASM OF URINARY BLADDER, UNSPECIFIED SITE (HCC): ICD-10-CM

## 2021-02-25 PROCEDURE — 88112 CYTOPATH CELL ENHANCE TECH: CPT | Performed by: PATHOLOGY

## 2021-02-25 NOTE — TELEPHONE ENCOUNTER
Patient called back and she said she received a phone call about blood work  I see she has cytology urine test and she said she was told to get blood work and do not see a script for that  Patient goes to [facility] labs  If the patient does need blood work please call her back at [phone] and if she does not need blood work then she does not need a call back

## 2021-03-08 ENCOUNTER — TELEPHONE (OUTPATIENT)
Dept: HEMATOLOGY ONCOLOGY | Facility: CLINIC | Age: 86
End: 2021-03-08

## 2021-04-01 ENCOUNTER — OFFICE VISIT (OUTPATIENT)
Dept: CARDIOLOGY CLINIC | Facility: CLINIC | Age: 86
End: 2021-04-01
Payer: MEDICARE

## 2021-04-01 VITALS
BODY MASS INDEX: 24.03 KG/M2 | SYSTOLIC BLOOD PRESSURE: 140 MMHG | OXYGEN SATURATION: 97 % | HEART RATE: 78 BPM | HEIGHT: 61 IN | WEIGHT: 127.3 LBS | DIASTOLIC BLOOD PRESSURE: 76 MMHG

## 2021-04-01 DIAGNOSIS — I65.23 CAROTID ARTERY STENOSIS, ASYMPTOMATIC, BILATERAL: ICD-10-CM

## 2021-04-01 DIAGNOSIS — I10 ESSENTIAL HYPERTENSION: ICD-10-CM

## 2021-04-01 DIAGNOSIS — I73.00 RAYNAUD'S DISEASE WITHOUT GANGRENE: ICD-10-CM

## 2021-04-01 DIAGNOSIS — I50.32 CHRONIC DIASTOLIC (CONGESTIVE) HEART FAILURE (HCC): Chronic | ICD-10-CM

## 2021-04-01 DIAGNOSIS — I25.10 CORONARY ARTERY DISEASE INVOLVING NATIVE CORONARY ARTERY OF NATIVE HEART WITHOUT ANGINA PECTORIS: Primary | ICD-10-CM

## 2021-04-01 PROCEDURE — 99214 OFFICE O/P EST MOD 30 MIN: CPT | Performed by: INTERNAL MEDICINE

## 2021-04-01 NOTE — PROGRESS NOTES
Cardiology Follow Up    Orlando Sierra  1935  [id number]  HEART & VASCULAR [facility]  [address] [place] [address] [address]    1  Coronary artery disease involving native coronary artery of native heart without angina pectoris     2  Essential hypertension     3  Chronic diastolic (congestive) heart failure (HCC)     4  Carotid artery stenosis, asymptomatic, bilateral     5  Raynaud's disease without gangrene         Discussion/Summary:   Overall she has been doing well with no symptoms  Coronary disease stable no complaints of angina  Blood pressure has been higher in the morning will move amlodipine dose to the evening and see if this balance out the numbers during the day  Lipids have been stable  She is euvolemic on exam   Raynaud's has been doing well  She is not due for any further cardiac testing  Continue current dose Bumex  Interval History:   60-year-old female history of coronary artery disease status post stents in 7935, chronic diastolic congestive heart failure, hypertension, hyperlipidemia presents for routine scheduled follow-up visit  Unfortunately she fell coming out of the shower in April  She suffered a femur fracture and underwent surgical correction  Overall she has been feeling well since her last visit  She is functionally quite active denies any chest pain, shortness of breath, palpitations, lightheadedness, dizziness, or syncope  There has been no lower extremity edema, PND, orthopnea  She remains hydrated  She has been taking all medications as prescribed      Problem List     Intertrochanteric fracture of left femur, closed, with routine healing, subsequent encounter    Essential hypertension    Medication side effect, initial encounter    Hyperlipidemia    Coronary artery disease without angina pectoris - S/P stent placement x 2 (2011)    Gastroesophageal reflux disease without esophagitis    Chronic diastolic (congestive) heart failure (HCC) (Chronic)    Hyperthyroidism    Osteoporosis (Chronic)    Ambulatory dysfunction    Elderly person living alone    Pain in left leg    Low back pain without sciatica    S/P ORIF (open reduction internal fixation) fracture    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HCC)        Past Medical History:   Diagnosis Date    Bladder cancer ([place] [address] )     Coronary artery disease     S/P stent placement x 2 in 2011    GERD (gastroesophageal reflux disease)     Hepatitis     got this from food back in 1970s, has not had a problem since    Hyperlipidemia     Hypertension     Kidney stones     Malignant neoplasm of urethra ([place] [address] )     Pneumonia     Pulmonary emboli ([place] [address] ) 1970s    Raynaud disease     Shingles      Social History     Socioeconomic History    Marital status:       Spouse name: Not on file    Number of children: Not on file    Years of education: Not on file    Highest education level: Not on file   Occupational History    Not on file   Social Needs    Financial resource strain: Not on file    Food insecurity     Worry: Not on file     Inability: Not on file    Transportation needs     Medical: No     Non-medical: No   Tobacco Use    Smoking status: Never Smoker    Smokeless tobacco: Never Used   Substance and Sexual Activity    Alcohol use: No    Drug use: No    Sexual activity: Never   Lifestyle    Physical activity     Days per week: Not on file     Minutes per session: Not on file    Stress: Not on file   Relationships    Social connections     Talks on phone: Not on file     Gets together: Not on file     Attends [employer] service: Not on file     Active member of club or organization: Not on file     Attends meetings of clubs or organizations: Not on file     Relationship status: Not on file    Intimate partner violence     Fear of current or ex partner: Not on file     Emotionally abused: Not on file Physically abused: Not on file     Forced sexual activity: Not on file   Other Topics Concern    Not on file   Social History Narrative    Advance directives declined by parents    Always uses seat belt      Family History   Problem Relation Age of Onset    Arthritis Mother     Osteoporosis Mother     Heart disease Father     Hypertension Father     Hypertension Brother     Prostate cancer Brother     Breast cancer Daughter 48    Prostate cancer Son      Past Surgical History:   Procedure Laterality Date    CATARACT EXTRACTION      CORONARY ANGIOPLASTY WITH STENT PLACEMENT      stent x 2    CYSTOSCOPY      diagnostic - onset 4/4/17    EYE SURGERY      HYSTERECTOMY      LEG SURGERY      OOPHORECTOMY      CO OPEN RX FEMUR FX+INTRAMED ALEJANDRO Left 4/8/2018    Procedure: INSERTION NAIL IM FEMUR ANTEGRADE (TROCHANTERIC); Surgeon: Rusty Moss MD;  [facility];  Service: Orthopedics    TONSILLECTOMY         Current Outpatient Medications:     amLODIPine (NORVASC) 5 mg tablet, TAKE 1 TABLET BY MOUTH  DAILY, Disp: 90 tablet, Rfl: 3    aspirin 81 MG tablet, Take 81 mg by mouth daily Resume on 4/28 , Disp: , Rfl:     atorvastatin (LIPITOR) 40 mg tablet, TAKE 1 TABLET BY MOUTH  DAILY AFTER DINNER, Disp: 90 tablet, Rfl: 3    bumetanide (BUMEX) 1 mg tablet, Half a tablet daily   Whole tablet if needed, Disp: 90 tablet, Rfl: 3    carvedilol (Coreg) 3 125 mg tablet, Take 1 tablet (3 125 mg total) by mouth daily with dinner Take with the 6 25mg tablet at dinner, Disp: 30 tablet, Rfl: 5    carvedilol (COREG) 6 25 mg tablet, Take 1 tablet (6 25 mg total) by mouth 2 (two) times a day with meals, Disp: 180 tablet, Rfl: 3    Cholecalciferol 2000 units TABS, Take 2,000 Units by mouth daily Resume on 4/28, Disp: , Rfl:     dicyclomine (BENTYL) 10 mg capsule, Take 10 mg by mouth as needed, Disp: , Rfl:     DULoxetine (CYMBALTA) 30 mg delayed release capsule, TAKE 1 CAPSULE BY MOUTH  DAILY, Disp: 90 capsule, Rfl: 3    fexofenadine (ALLEGRA) 180 MG tablet, Take 180 mg by mouth daily, Disp: , Rfl:     losartan (COZAAR) 100 MG tablet, TAKE 1 TABLET BY MOUTH  DAILY, Disp: 90 tablet, Rfl: 1    MULTIPLE VITAMIN PO, Take by mouth, Disp: , Rfl:     omeprazole (PriLOSEC) 40 MG capsule, TAKE 1 CAPSULE BY MOUTH  DAILY BEFORE BREAKFAST, Disp: 90 capsule, Rfl: 3    acetaminophen (TYLENOL) 500 mg tablet, Take 500 mg by mouth every 6 (six) hours as needed for mild pain, Disp: , Rfl:     traMADol (ULTRAM) 50 mg tablet, tramadol 50 mg tablet  take 1 tablet by mouth once daily, Disp: , Rfl:   Allergies   Allergen Reactions    Lactose - Food Allergy      Other reaction(s): Indigestion/GI Upset    Other      Other reaction(s): Mental Status Change  After surgery  Whole blood transfusion  = passed out   historical intolerance; verified with patient; had no reaction to PRBC transfusions afterward       Labs:     Chemistry        Component Value Date/Time    K 3 8 10/29/2020 1114     10/29/2020 1114    CO2 32 10/29/2020 1114    BUN 27 (H) 10/29/2020 1114    CREATININE 0 90 10/29/2020 1114        Component Value Date/Time    CALCIUM 9 1 10/29/2020 1114    ALKPHOS 71 08/12/2020 1024    AST 23 08/12/2020 1024    ALT 39 08/12/2020 1024            No results found for: CHOL  Lab Results   Component Value Date    HDL 64 (H) 07/18/2018    HDL 79 (H) 06/12/2017    HDL 59 10/19/2016     Lab Results   Component Value Date    LDLCALC 75 07/18/2018    LDLCALC 70 06/12/2017    LDLCALC 78 10/19/2016     Lab Results   Component Value Date    TRIG 146 07/18/2018    TRIG 86 06/12/2017    TRIG 108 10/19/2016     No results found for: CHOLHDL    Imaging: No results found  ECG:  NSR      Review of Systems   Constitution: Negative  HENT: Negative  Eyes: Negative  Cardiovascular: Negative  Respiratory: Negative  Endocrine: Negative  Hematologic/Lymphatic: Negative  Skin: Negative  Musculoskeletal: Negative  Gastrointestinal: Negative  Genitourinary: Negative  Neurological: Negative  Psychiatric/Behavioral: Negative  Vitals:    04/01/21 1132   BP: 140/76   Pulse: 78   SpO2: 97%     Vitals:    04/01/21 1132   Weight: 57 7 kg (127 lb 4 8 oz)     Height: 5' 1" (154 9 cm)   Body mass index is 24 05 kg/m²  Physical Exam:  Vital signs reviewed  General:  Alert and cooperative, appears stated age, no acute distress  HEENT:  PERRLA, EOMI, no scleral icterus, no conjunctival pallor  Neck:  No lymphadenopathy, no thyromegaly, no carotid bruits, no elevated JVP  Heart:  Regular rate and rhythm, normal S1/S2, no S3/S4, no murmur, rubs or gallops  PMI nondisplaced  Lungs:  Clear to auscultation bilaterally, no wheezes rales or rhonchi  Abdomen:  Soft, non-tender, positive bowel sounds, no rebound or guarding,   no organomegaly   Extremities:  Normal range of motion    No clubbing, cyanosis or edema   Vascular:  2+ pedal pulses  Skin:  No rashes or lesions on exposed skin  Neurologic:  Cranial nerves II-XII grossly intact without focal deficits  Psych:  Normal mood and affect

## 2021-04-14 ENCOUNTER — OFFICE VISIT (OUTPATIENT)
Dept: FAMILY MEDICINE CLINIC | Facility: CLINIC | Age: 86
End: 2021-04-14
Payer: MEDICARE

## 2021-04-14 ENCOUNTER — APPOINTMENT (OUTPATIENT)
Dept: LAB | Facility: CLINIC | Age: 86
End: 2021-04-14
Payer: MEDICARE

## 2021-04-14 VITALS
SYSTOLIC BLOOD PRESSURE: 138 MMHG | DIASTOLIC BLOOD PRESSURE: 72 MMHG | OXYGEN SATURATION: 98 % | WEIGHT: 130 LBS | HEIGHT: 60 IN | HEART RATE: 82 BPM | BODY MASS INDEX: 25.52 KG/M2 | TEMPERATURE: 97 F

## 2021-04-14 DIAGNOSIS — R30.0 DYSURIA: Primary | ICD-10-CM

## 2021-04-14 DIAGNOSIS — R35.0 URINARY FREQUENCY: ICD-10-CM

## 2021-04-14 DIAGNOSIS — R39.9 UTI SYMPTOMS: ICD-10-CM

## 2021-04-14 DIAGNOSIS — D69.6 THROMBOCYTOPENIA (HCC): ICD-10-CM

## 2021-04-14 DIAGNOSIS — K43.9 VENTRAL HERNIA WITHOUT OBSTRUCTION OR GANGRENE: ICD-10-CM

## 2021-04-14 DIAGNOSIS — C91.10 CHRONIC LARGE GRANULAR LYMPHOCYTIC LEUKEMIA (HCC): ICD-10-CM

## 2021-04-14 LAB
ALBUMIN SERPL BCP-MCNC: 3.7 G/DL (ref 3.5–5)
ALP SERPL-CCNC: 80 U/L (ref 46–116)
ALT SERPL W P-5'-P-CCNC: 40 U/L (ref 12–78)
ANION GAP SERPL CALCULATED.3IONS-SCNC: 3 MMOL/L (ref 4–13)
AST SERPL W P-5'-P-CCNC: 21 U/L (ref 5–45)
BASOPHILS # BLD AUTO: 0.04 THOUSANDS/ΜL (ref 0–0.1)
BASOPHILS NFR BLD AUTO: 1 % (ref 0–1)
BILIRUB SERPL-MCNC: 0.65 MG/DL (ref 0.2–1)
BUN SERPL-MCNC: 24 MG/DL (ref 5–25)
CALCIUM SERPL-MCNC: 9.1 MG/DL (ref 8.3–10.1)
CHLORIDE SERPL-SCNC: 106 MMOL/L (ref 100–108)
CO2 SERPL-SCNC: 31 MMOL/L (ref 21–32)
CREAT SERPL-MCNC: 0.85 MG/DL (ref 0.6–1.3)
EOSINOPHIL # BLD AUTO: 0.16 THOUSAND/ΜL (ref 0–0.61)
EOSINOPHIL NFR BLD AUTO: 2 % (ref 0–6)
ERYTHROCYTE [DISTWIDTH] IN BLOOD BY AUTOMATED COUNT: 12.1 % (ref 11.6–15.1)
GFR SERPL CREATININE-BSD FRML MDRD: 63 ML/MIN/1.73SQ M
GLUCOSE P FAST SERPL-MCNC: 86 MG/DL (ref 65–99)
HCT VFR BLD AUTO: 39.2 % (ref 34.8–46.1)
HGB BLD-MCNC: 12.6 G/DL (ref 11.5–15.4)
IMM GRANULOCYTES # BLD AUTO: 0.03 THOUSAND/UL (ref 0–0.2)
IMM GRANULOCYTES NFR BLD AUTO: 0 % (ref 0–2)
LDH SERPL-CCNC: 166 U/L (ref 81–234)
LYMPHOCYTES # BLD AUTO: 1.83 THOUSANDS/ΜL (ref 0.6–4.47)
LYMPHOCYTES NFR BLD AUTO: 22 % (ref 14–44)
MCH RBC QN AUTO: 31.3 PG (ref 26.8–34.3)
MCHC RBC AUTO-ENTMCNC: 32.1 G/DL (ref 31.4–37.4)
MCV RBC AUTO: 98 FL (ref 82–98)
MONOCYTES # BLD AUTO: 0.6 THOUSAND/ΜL (ref 0.17–1.22)
MONOCYTES NFR BLD AUTO: 7 % (ref 4–12)
NEUTROPHILS # BLD AUTO: 5.61 THOUSANDS/ΜL (ref 1.85–7.62)
NEUTS SEG NFR BLD AUTO: 68 % (ref 43–75)
NRBC BLD AUTO-RTO: 0 /100 WBCS
PLATELET # BLD AUTO: 162 THOUSANDS/UL (ref 149–390)
PMV BLD AUTO: 10.8 FL (ref 8.9–12.7)
POTASSIUM SERPL-SCNC: 3.6 MMOL/L (ref 3.5–5.3)
PROT SERPL-MCNC: 7.5 G/DL (ref 6.4–8.2)
RBC # BLD AUTO: 4.02 MILLION/UL (ref 3.81–5.12)
SL AMB  POCT GLUCOSE, UA: NEGATIVE
SL AMB LEUKOCYTE ESTERASE,UA: ABNORMAL
SL AMB POCT BILIRUBIN,UA: NEGATIVE
SL AMB POCT BLOOD,UA: ABNORMAL
SL AMB POCT CLARITY,UA: CLEAR
SL AMB POCT COLOR,UA: YELLOW
SL AMB POCT KETONES,UA: NEGATIVE
SL AMB POCT NITRITE,UA: NEGATIVE
SL AMB POCT PH,UA: 5
SL AMB POCT SPECIFIC GRAVITY,UA: 1.01
SL AMB POCT URINE PROTEIN: ABNORMAL
SL AMB POCT UROBILINOGEN: NEGATIVE
SODIUM SERPL-SCNC: 140 MMOL/L (ref 136–145)
WBC # BLD AUTO: 8.27 THOUSAND/UL (ref 4.31–10.16)

## 2021-04-14 PROCEDURE — 87086 URINE CULTURE/COLONY COUNT: CPT | Performed by: FAMILY MEDICINE

## 2021-04-14 PROCEDURE — 81002 URINALYSIS NONAUTO W/O SCOPE: CPT | Performed by: FAMILY MEDICINE

## 2021-04-14 PROCEDURE — 83615 LACTATE (LD) (LDH) ENZYME: CPT

## 2021-04-14 PROCEDURE — 36415 COLL VENOUS BLD VENIPUNCTURE: CPT

## 2021-04-14 PROCEDURE — 87077 CULTURE AEROBIC IDENTIFY: CPT | Performed by: FAMILY MEDICINE

## 2021-04-14 PROCEDURE — 80053 COMPREHEN METABOLIC PANEL: CPT

## 2021-04-14 PROCEDURE — 99214 OFFICE O/P EST MOD 30 MIN: CPT | Performed by: FAMILY MEDICINE

## 2021-04-14 PROCEDURE — 85025 COMPLETE CBC W/AUTO DIFF WBC: CPT

## 2021-04-14 PROCEDURE — 87186 SC STD MICRODIL/AGAR DIL: CPT | Performed by: FAMILY MEDICINE

## 2021-04-14 RX ORDER — NITROFURANTOIN 25; 75 MG/1; MG/1
100 CAPSULE ORAL 2 TIMES DAILY
Qty: 10 CAPSULE | Refills: 0 | Status: SHIPPED | OUTPATIENT
Start: 2021-04-14 | End: 2021-04-19

## 2021-04-14 NOTE — PROGRESS NOTES
Assessment/Plan:         Diagnoses and all orders for this visit:    Dysuria  -     nitrofurantoin (MACROBID) 100 mg capsule; Take 1 capsule (100 mg total) by mouth 2 (two) times a day for 5 days    Urinary frequency  -     nitrofurantoin (MACROBID) 100 mg capsule; Take 1 capsule (100 mg total) by mouth 2 (two) times a day for 5 days    UTI symptoms  -     POCT urine dip  -     Urine culture    Ventral hernia without obstruction or gangrene  -     Ambulatory referral to General Surgery; Future    Thrombocytopenia (HCC)  Comments:  sees Hem/onc, continue current management          Subjective:   Chief Complaint   Patient presents with    Urinary Tract Infection     Symptoms started 1 week ago and has now started to burn and has pressure today  Patient ID: Orlando Sierra is a 82 y o  female  Same day sick appt  About a week ago started frequency and urgency and then burning and pressure, getting worse  No fever, chills, N/V, dizziness  "Also have a hernia that they're watching"  appetite fine   "It's definitely a little bigger, never bothered me until you pushed on it"- and pt does admit "actually now that I think about it, I am getting a little nauseous" and has some bowel changes      9/8/2020  [address]  Milagro Espana MD  General Surgery Ventral hernia without obstruction or gangrene  Dx  Progress Note  Assessment/Plan:  Ventral hernia without obstruction or gangrene    Ms Eddie Lester has a fat containing ventral hernia that is not reducible  I explained that is likely due to a very small fascial defect that has been filled with preperitoneal fatty tissue  Explained that she is at very minimal risk of any complications of this due to its small size  I will check an ultrasound just to evaluate the actual fascial defect size as a reference point  This also helped for long-term follow-up    Explained if it becomes painful or continues to increase in size rapidly that point would recommend repair however this point she has had it for over 40 years and I believe continued observation is reasonable          ABDOMINAL WALL ULTRASOUND  INDICATION:   K43 9: Ventral hernia without obstruction or gangrene  COMPARISON:  None  TECHNIQUE:   Real-time ultrasound of the ventral midline supraumbilical abdominal wall was performed with a linear transducer with both volumetric sweeps and still imaging techniques  FINDINGS:  Targeted imaging of the ventral suprapubic umbilical abdominal wall over the area of concern indicated by the patient demonstrates small fat-containing hernia measuring approximately 3 3 x 1 2 x 4 cm  The underlying abdominal wall defect   measures approximately 1 6 cm wide x 0 8 cm craniocaudal   IMPRESSION:  Small fat-containing ventral suprapubic abdominal wall hernia measuring up to 4 cm with underlying abdominal wall defect measuring approximately 1 6 cm wide x 0 8 cm craniocaudal   Workstation performed: [id number]  Imaging  US abdomen limited (Order: [id number]) - 9/14/2020        The following portions of the patient's history were reviewed and updated as appropriate: allergies, current medications, past family history, past medical history, past social history, past surgical history and problem list     Review of Systems   All other systems reviewed and are negative  Objective:      /72 (BP Location: Left arm, Patient Position: Sitting, Cuff Size: Standard)   Pulse 82   Temp (!) 97 °F (36 1 °C) (Tympanic)   Ht 5' (1 524 m)   Wt 59 kg (130 lb)   SpO2 98%   BMI 25 39 kg/m²          Physical Exam  Vitals signs and nursing note reviewed  Constitutional:       General: She is not in acute distress  Appearance: She is well-developed  She is not ill-appearing, toxic-appearing or diaphoretic  Comments: Appears much younger than stated age   HENT:      Head: Normocephalic and atraumatic        Mouth/Throat:      Mouth: Mucous membranes are moist  Pharynx: Oropharynx is clear  Eyes:      General: Lids are normal       Conjunctiva/sclera: Conjunctivae normal       Pupils: Pupils are equal, round, and reactive to light  Neck:      Musculoskeletal: Neck supple  Thyroid: No thyroid mass or thyromegaly  Vascular: No JVD  Trachea: Trachea normal    Cardiovascular:      Rate and Rhythm: Normal rate and regular rhythm  Pulses: Normal pulses  Heart sounds: S1 normal and S2 normal  No friction rub  No gallop  Pulmonary:      Effort: Pulmonary effort is normal       Breath sounds: Normal breath sounds  Abdominal:      General: Bowel sounds are normal  There is no distension  Palpations: Abdomen is soft  There is no hepatomegaly, splenomegaly or mass  Tenderness: There is no abdominal tenderness (no other abd tenderness)  Hernia: A hernia is present  Hernia is present in the ventral area  Musculoskeletal:      Right lower leg: No edema  Left lower leg: No edema  Lymphadenopathy:      Cervical: No cervical adenopathy  Upper Body:      Right upper body: No supraclavicular adenopathy  Left upper body: No supraclavicular adenopathy  Skin:     General: Skin is warm and dry  Coloration: Skin is not pale  Neurological:      Mental Status: She is alert and oriented to person, place, and time  Gait: Gait normal    Psychiatric:         Mood and Affect: Mood normal          Behavior: Behavior normal  Behavior is cooperative

## 2021-04-16 LAB — BACTERIA UR CULT: ABNORMAL

## 2021-04-26 ENCOUNTER — OFFICE VISIT (OUTPATIENT)
Dept: HEMATOLOGY ONCOLOGY | Facility: CLINIC | Age: 86
End: 2021-04-26
Payer: MEDICARE

## 2021-04-26 VITALS
TEMPERATURE: 96.6 F | OXYGEN SATURATION: 98 % | RESPIRATION RATE: 14 BRPM | DIASTOLIC BLOOD PRESSURE: 76 MMHG | HEIGHT: 60 IN | WEIGHT: 128.5 LBS | SYSTOLIC BLOOD PRESSURE: 138 MMHG | BODY MASS INDEX: 25.23 KG/M2 | HEART RATE: 80 BPM

## 2021-04-26 DIAGNOSIS — C91.10 CHRONIC LARGE GRANULAR LYMPHOCYTIC LEUKEMIA (HCC): Primary | ICD-10-CM

## 2021-04-26 PROCEDURE — 99213 OFFICE O/P EST LOW 20 MIN: CPT | Performed by: PHYSICIAN ASSISTANT

## 2021-04-26 NOTE — PROGRESS NOTES
Hematology/Oncology Outpatient Follow-up  Saira Emerson 82 y o  female 1935 [id number]    Date:  4/26/2021      Assessment and Plan:  1  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leukemia [facility])   82-year-old female presents for follow-up regarding chronic large granular lymphocytic leukemia  This remains in observation status  No treatment is required  Reviewed that since patient has been observation every 6 months for 10 years we can monitor every year at this point  She is in agreement  Follow-up 1 year labs  - CBC and differential; Future  - Comprehensive metabolic panel; Future  - LD,Blood; Future    HPI:  Oncology History Overview Note   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  that was diagnosed several years ago and patient has not required any therapy for this condition  Patient's condition and counts are being monitored  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[facility] )    Chemotherapy      2018: No treatment  Surveillance only  Interval history: chronic GERD, on protonix   When she takes BP med in the AM she becomes lightheaded; prescribing dr is aware     ROS: Review of Systems   Constitutional: Negative for appetite change, chills, fatigue, fever and unexpected weight change  Respiratory: Negative for cough and shortness of breath  Cardiovascular: Negative for chest pain, palpitations and leg swelling  Gastrointestinal: Negative for abdominal pain, blood in stool, constipation, diarrhea, nausea and vomiting  Genitourinary: Negative for difficulty urinating, dysuria and hematuria  Musculoskeletal: Positive for arthralgias (diffuse; chronic; unchanged)  Skin: Negative  Neurological: Positive for numbness (of hands 2/2 carpel tunnel )  Negative for dizziness, weakness, light-headedness and headaches  Hematological: Negative  Psychiatric/Behavioral: Negative          Past Medical History:   Diagnosis Date    [facility] ([facility] )     Coronary artery disease     S/P stent placement x 2 in 2011    GERD (gastroesophageal reflux disease)     Hepatitis     got this from food back in 1970s, has not had a problem since    Hyperlipidemia     Hypertension     Kidney stones     Malignant neoplasm of urethra (HCC)     Pneumonia     Pulmonary emboli ([address] ) 1970s    Raynaud disease     Shingles        Past Surgical History:   Procedure Laterality Date    CATARACT EXTRACTION      CORONARY ANGIOPLASTY WITH STENT PLACEMENT      stent x 2    CYSTOSCOPY      diagnostic - onset 4/4/17    EYE SURGERY      HYSTERECTOMY      LEG SURGERY      OOPHORECTOMY      IN OPEN RX FEMUR FX+INTRAMED ALEJANDRO Left 4/8/2018    Procedure: INSERTION NAIL IM FEMUR ANTEGRADE (TROCHANTERIC); Surgeon: Rusty Moss MD;  [facility];  Service: Orthopedics    TONSILLECTOMY         Social History     Socioeconomic History    Marital status:       Spouse name: None    Number of children: None    Years of education: None    Highest education level: None   Occupational History    None   Social Needs    Financial resource strain: None    Food insecurity     Worry: None     Inability: None    Transportation needs     Medical: No     Non-medical: No   Tobacco Use    Smoking status: Never Smoker    Smokeless tobacco: Never Used   Substance and Sexual Activity    Alcohol use: No    Drug use: No    Sexual activity: Never   Lifestyle    Physical activity     Days per week: None     Minutes per session: None    Stress: None   Relationships    Social connections     Talks on phone: None     Gets together: None     Attends [employer] service: None     Active member of club or organization: None     Attends meetings of clubs or organizations: None     Relationship status: None    Intimate partner violence     Fear of current or ex partner: None     Emotionally abused: None     Physically abused: None     Forced sexual activity: None   Other Topics Concern    None   Social History Narrative    Advance directives declined by parents    Always uses seat belt       Family History   Problem Relation Age of Onset    Arthritis Mother     Osteoporosis Mother     Heart disease Father     Hypertension Father     Hypertension Brother     Prostate cancer Brother     Breast cancer Daughter 48    Prostate cancer Son        Allergies   Allergen Reactions    Lactose - Food Allergy      Other reaction(s): Indigestion/GI Upset    Other      Other reaction(s): Mental Status Change  After surgery  Whole blood transfusion  = passed out   historical intolerance; verified with patient; had no reaction to PRBC transfusions afterward         Current Outpatient Medications:     acetaminophen (TYLENOL) 500 mg tablet, Take 500 mg by mouth every 6 (six) hours as needed for mild pain, Disp: , Rfl:     amLODIPine (NORVASC) 5 mg tablet, TAKE 1 TABLET BY MOUTH  DAILY, Disp: 90 tablet, Rfl: 3    aspirin 81 MG tablet, Take 81 mg by mouth daily Resume on 4/28 , Disp: , Rfl:     atorvastatin (LIPITOR) 40 mg tablet, TAKE 1 TABLET BY MOUTH  DAILY AFTER DINNER, Disp: 90 tablet, Rfl: 3    bumetanide (BUMEX) 1 mg tablet, Half a tablet daily   Whole tablet if needed, Disp: 90 tablet, Rfl: 3    carvedilol (Coreg) 3 125 mg tablet, Take 1 tablet (3 125 mg total) by mouth daily with dinner Take with the 6 25mg tablet at dinner, Disp: 30 tablet, Rfl: 5    carvedilol (COREG) 6 25 mg tablet, Take 1 tablet (6 25 mg total) by mouth 2 (two) times a day with meals, Disp: 180 tablet, Rfl: 3    Cholecalciferol 2000 units TABS, Take 2,000 Units by mouth daily Resume on 4/28, Disp: , Rfl:     dicyclomine (BENTYL) 10 mg capsule, Take 10 mg by mouth as needed, Disp: , Rfl:     DULoxetine (CYMBALTA) 30 mg delayed release capsule, TAKE 1 CAPSULE BY MOUTH  DAILY, Disp: 90 capsule, Rfl: 3    fexofenadine (ALLEGRA) 180 MG tablet, Take 180 mg by mouth daily, Disp: , Rfl:     losartan (COZAAR) 100 MG tablet, TAKE 1 TABLET BY MOUTH  DAILY, Disp: 90 tablet, Rfl: 1    MULTIPLE VITAMIN PO, Take by mouth, Disp: , Rfl:     omeprazole (PriLOSEC) 40 MG capsule, TAKE 1 CAPSULE BY MOUTH  DAILY BEFORE BREAKFAST, Disp: 90 capsule, Rfl: 3    traMADol (ULTRAM) 50 mg tablet, tramadol 50 mg tablet  take 1 tablet by mouth once daily, Disp: , Rfl:       Physical Exam:  /76 (BP Location: Left arm, Patient Position: Sitting, Cuff Size: Adult)   Pulse 80   Temp (!) 96 6 °F (35 9 °C) (Tympanic)   Resp 14   Ht 5' (1 524 m)   Wt 58 3 kg (128 lb 8 oz)   SpO2 98%   BMI 25 10 kg/m²     Physical Exam  Vitals signs reviewed  Constitutional:       General: She is not in acute distress  Appearance: She is well-developed  She is not ill-appearing  HENT:      Head: Normocephalic and atraumatic  Eyes:      General: No scleral icterus  Conjunctiva/sclera: Conjunctivae normal    Neck:      Musculoskeletal: Normal range of motion and neck supple  Cardiovascular:      Rate and Rhythm: Normal rate and regular rhythm  Heart sounds: Normal heart sounds  No murmur  Pulmonary:      Effort: Pulmonary effort is normal  No respiratory distress  Breath sounds: Normal breath sounds  Abdominal:      Palpations: Abdomen is soft  Tenderness: There is no abdominal tenderness  Musculoskeletal: Normal range of motion  General: No tenderness  Right lower leg: No edema  Left lower leg: No edema  Lymphadenopathy:      Cervical: No cervical adenopathy  Upper Body:      Right upper body: No supraclavicular adenopathy  Left upper body: No supraclavicular adenopathy  Skin:     General: Skin is warm and dry  Neurological:      Mental Status: She is alert and oriented to person, place, and time  Cranial Nerves: No cranial nerve deficit     Psychiatric:         Mood and Affect: Mood normal          Behavior: Behavior normal        Labs:  Lab Results   Component Value Date    WBC 8 27 04/14/2021    HGB 12 6 04/14/2021    HCT 39 2 04/14/2021    MCV 98 04/14/2021     04/14/2021     Lab Results   Component Value Date    K 3 6 04/14/2021     04/14/2021    CO2 31 04/14/2021    BUN 24 04/14/2021    CREATININE 0 85 04/14/2021    GLUF 86 04/14/2021    CALCIUM 9 1 04/14/2021    AST 21 04/14/2021    ALT 40 04/14/2021    ALKPHOS 80 04/14/2021    EGFR 63 04/14/2021     Patient voiced understanding and agreement in the above discussion  Aware to contact our office with questions/symptoms in the interim  This note has been generated by voice recognition software system  Therefore, there may be spelling, grammar, and or syntax errors  Please contact if questions arise

## 2021-05-03 ENCOUNTER — CONSULT (OUTPATIENT)
Dept: SURGERY | Facility: CLINIC | Age: 86
End: 2021-05-03
Payer: MEDICARE

## 2021-05-03 ENCOUNTER — PREP FOR PROCEDURE (OUTPATIENT)
Dept: SURGERY | Facility: CLINIC | Age: 86
End: 2021-05-03

## 2021-05-03 DIAGNOSIS — K43.9 VENTRAL HERNIA: Primary | ICD-10-CM

## 2021-05-03 DIAGNOSIS — K43.9 VENTRAL HERNIA WITHOUT OBSTRUCTION OR GANGRENE: Primary | ICD-10-CM

## 2021-05-03 PROCEDURE — 99214 OFFICE O/P EST MOD 30 MIN: CPT | Performed by: SURGERY

## 2021-05-03 NOTE — PROGRESS NOTES
Assessment/Plan:  Patient presents with a ventral hernia above the umbilicus  This is been present over the last 3 years  It has enlarged in size  Is presently symptomatic  Does not produce symptoms  Examination reveals a 5 cm hernia sac above the umbilicus  This is not reducible  Minimal tenderness is noted to palpation  I recommended operative repair as it continues to enlarge and is now not reducible  Questions were answered  Risks and benefits discussed in detail  She is agreeable  Diagnoses and all orders for this visit:    Ventral hernia without obstruction or gangrene        Subjective:      Patient ID: Janie Walker is a 82 y o  female  Presents for hernia consult  Bulge above umbilicus, present approx  3 years but is becoming larger  History of IBS, unsure if pain in abdomen from IBS or hernia  Denies any limitations to activity  The following portions of the patient's history were reviewed and updated as appropriate:     She  has a past medical history of Bladder cancer ([facility] [address] ), Coronary artery disease, GERD (gastroesophageal reflux disease), Hepatitis, Hyperlipidemia, Hypertension, Kidney stones, Malignant neoplasm of urethra ([facility] [address] ), Pneumonia, Pulmonary emboli ([facility] [address] ) (1970s), Raynaud disease, and Shingles  She  has a past surgical history that includes Coronary angioplasty with stent; Hysterectomy; Cystoscopy; pr open rx femur fx+intramed emmy (Left, 4/8/2018); Oophorectomy; Cataract extraction; Eye surgery; Leg Surgery; and Tonsillectomy  Her family history includes Arthritis in her mother; Breast cancer (age of onset: 48) in her daughter; Heart disease in her father; Hypertension in her brother and father; Osteoporosis in her mother; Prostate cancer in her brother and son  She  reports that she has never smoked  She has never used smokeless tobacco  She reports that she does not drink alcohol or use drugs    Current Outpatient Medications   Medication Sig Dispense Refill    acetaminophen (TYLENOL) 500 mg tablet Take 500 mg by mouth every 6 (six) hours as needed for mild pain      amLODIPine (NORVASC) 5 mg tablet TAKE 1 TABLET BY MOUTH  DAILY 90 tablet 3    aspirin 81 MG tablet Take 81 mg by mouth daily Resume on 4/28       atorvastatin (LIPITOR) 40 mg tablet TAKE 1 TABLET BY MOUTH  DAILY AFTER DINNER 90 tablet 3    bumetanide (BUMEX) 1 mg tablet Half a tablet daily  Whole tablet if needed 90 tablet 3    carvedilol (Coreg) 3 125 mg tablet Take 1 tablet (3 125 mg total) by mouth daily with dinner Take with the 6 25mg tablet at dinner 30 tablet 5    carvedilol (COREG) 6 25 mg tablet Take 1 tablet (6 25 mg total) by mouth 2 (two) times a day with meals 180 tablet 3    Cholecalciferol 2000 units TABS Take 2,000 Units by mouth daily Resume on 4/28      dicyclomine (BENTYL) 10 mg capsule Take 10 mg by mouth as needed      DULoxetine (CYMBALTA) 30 mg delayed release capsule TAKE 1 CAPSULE BY MOUTH  DAILY 90 capsule 3    fexofenadine (ALLEGRA) 180 MG tablet Take 180 mg by mouth daily      losartan (COZAAR) 100 MG tablet TAKE 1 TABLET BY MOUTH  DAILY 90 tablet 1    MULTIPLE VITAMIN PO Take by mouth      omeprazole (PriLOSEC) 40 MG capsule TAKE 1 CAPSULE BY MOUTH  DAILY BEFORE BREAKFAST 90 capsule 3    traMADol (ULTRAM) 50 mg tablet tramadol 50 mg tablet   take 1 tablet by mouth once daily       No current facility-administered medications for this visit  She is allergic to lactose - food allergy and other       Review of Systems   Constitutional: Negative  Negative for activity change  HENT: Negative  Eyes: Negative  Respiratory: Negative  Cardiovascular: Positive for leg swelling  Gastrointestinal: Positive for constipation and diarrhea  Endocrine: Positive for cold intolerance  Genitourinary: Negative  Musculoskeletal: Positive for back pain, neck pain and neck stiffness  Skin: Negative  Allergic/Immunologic: Positive for food allergies  Neurological: Positive for numbness  Hematological: Negative  Psychiatric/Behavioral: Negative  Negative for agitation, behavioral problems and confusion  The patient is not nervous/anxious  Objective: There were no vitals taken for this visit  Physical Exam  Constitutional:       Appearance: Normal appearance  She is well-developed  HENT:      Head: Normocephalic and atraumatic  Nose: Nose normal    Eyes:      Extraocular Movements: Extraocular movements intact  Conjunctiva/sclera: Conjunctivae normal    Neck:      Musculoskeletal: Normal range of motion  Cardiovascular:      Rate and Rhythm: Normal rate and regular rhythm  Heart sounds: Normal heart sounds  Pulmonary:      Effort: Pulmonary effort is normal       Breath sounds: Normal breath sounds  Abdominal:      General: Abdomen is flat  Hernia: A hernia (Ventral hernias present above the umbilicus  This is not reducible  Mild tenderness to palpation) is present  Musculoskeletal: Normal range of motion  Skin:     General: Skin is warm and dry  Neurological:      Mental Status: She is alert and oriented to person, place, and time     Psychiatric:         Mood and Affect: Mood normal

## 2021-05-03 NOTE — H&P (VIEW-ONLY)
Assessment/Plan:  Patient presents with a ventral hernia above the umbilicus  This is been present over the last 3 years  It has enlarged in size  Is presently symptomatic  Does not produce symptoms  Examination reveals a 5 cm hernia sac above the umbilicus  This is not reducible  Minimal tenderness is noted to palpation  I recommended operative repair as it continues to enlarge and is now not reducible  Questions were answered  Risks and benefits discussed in detail  She is agreeable  Diagnoses and all orders for this visit:    Ventral hernia without obstruction or gangrene        Subjective:      Patient ID: David Guy is a 82 y o  female  Presents for hernia consult  Bulge above umbilicus, present approx  3 years but is becoming larger  History of IBS, unsure if pain in abdomen from IBS or hernia  Denies any limitations to activity  The following portions of the patient's history were reviewed and updated as appropriate:     She  has a past medical history of Bladder cancer ([address] ), Coronary artery disease, GERD (gastroesophageal reflux disease), Hepatitis, Hyperlipidemia, Hypertension, Kidney stones, Malignant neoplasm of urethra ([address] ), Pneumonia, Pulmonary emboli ([address] ) (1970s), Raynaud disease, and Shingles  She  has a past surgical history that includes Coronary angioplasty with stent; Hysterectomy; Cystoscopy; pr open rx femur fx+intramed emmy (Left, 4/8/2018); Oophorectomy; Cataract extraction; Eye surgery; Leg Surgery; and Tonsillectomy  Her family history includes Arthritis in her mother; Breast cancer (age of onset: 48) in her daughter; Heart disease in her father; Hypertension in her brother and father; Osteoporosis in her mother; Prostate cancer in her brother and son  She  reports that she has never smoked  She has never used smokeless tobacco  She reports that she does not drink alcohol or use drugs    Current Outpatient Medications   Medication Sig Dispense Refill    acetaminophen (TYLENOL) 500 mg tablet Take 500 mg by mouth every 6 (six) hours as needed for mild pain      amLODIPine (NORVASC) 5 mg tablet TAKE 1 TABLET BY MOUTH  DAILY 90 tablet 3    aspirin 81 MG tablet Take 81 mg by mouth daily Resume on 4/28       atorvastatin (LIPITOR) 40 mg tablet TAKE 1 TABLET BY MOUTH  DAILY AFTER DINNER 90 tablet 3    bumetanide (BUMEX) 1 mg tablet Half a tablet daily  Whole tablet if needed 90 tablet 3    carvedilol (Coreg) 3 125 mg tablet Take 1 tablet (3 125 mg total) by mouth daily with dinner Take with the 6 25mg tablet at dinner 30 tablet 5    carvedilol (COREG) 6 25 mg tablet Take 1 tablet (6 25 mg total) by mouth 2 (two) times a day with meals 180 tablet 3    Cholecalciferol 2000 units TABS Take 2,000 Units by mouth daily Resume on 4/28      dicyclomine (BENTYL) 10 mg capsule Take 10 mg by mouth as needed      DULoxetine (CYMBALTA) 30 mg delayed release capsule TAKE 1 CAPSULE BY MOUTH  DAILY 90 capsule 3    fexofenadine (ALLEGRA) 180 MG tablet Take 180 mg by mouth daily      losartan (COZAAR) 100 MG tablet TAKE 1 TABLET BY MOUTH  DAILY 90 tablet 1    MULTIPLE VITAMIN PO Take by mouth      omeprazole (PriLOSEC) 40 MG capsule TAKE 1 CAPSULE BY MOUTH  DAILY BEFORE BREAKFAST 90 capsule 3    traMADol (ULTRAM) 50 mg tablet tramadol 50 mg tablet   take 1 tablet by mouth once daily       No current facility-administered medications for this visit  She is allergic to lactose - food allergy and other       Review of Systems   Constitutional: Negative  Negative for activity change  HENT: Negative  Eyes: Negative  Respiratory: Negative  Cardiovascular: Positive for leg swelling  Gastrointestinal: Positive for constipation and diarrhea  Endocrine: Positive for cold intolerance  Genitourinary: Negative  Musculoskeletal: Positive for back pain, neck pain and neck stiffness  Skin: Negative  Allergic/Immunologic: Positive for food allergies  Neurological: Positive for numbness  Hematological: Negative  Psychiatric/Behavioral: Negative  Negative for agitation, behavioral problems and confusion  The patient is not nervous/anxious  Objective: There were no vitals taken for this visit  Physical Exam  Constitutional:       Appearance: Normal appearance  She is well-developed  HENT:      Head: Normocephalic and atraumatic  Nose: Nose normal    Eyes:      Extraocular Movements: Extraocular movements intact  Conjunctiva/sclera: Conjunctivae normal    Neck:      Musculoskeletal: Normal range of motion  Cardiovascular:      Rate and Rhythm: Normal rate and regular rhythm  Heart sounds: Normal heart sounds  Pulmonary:      Effort: Pulmonary effort is normal       Breath sounds: Normal breath sounds  Abdominal:      General: Abdomen is flat  Hernia: A hernia (Ventral hernias present above the umbilicus  This is not reducible  Mild tenderness to palpation) is present  Musculoskeletal: Normal range of motion  Skin:     General: Skin is warm and dry  Neurological:      Mental Status: She is alert and oriented to person, place, and time     Psychiatric:         Mood and Affect: Mood normal

## 2021-05-06 ENCOUNTER — APPOINTMENT (OUTPATIENT)
Dept: LAB | Facility: MEDICAL CENTER | Age: 86
End: 2021-05-06
Payer: MEDICARE

## 2021-05-06 ENCOUNTER — CLINICAL SUPPORT (OUTPATIENT)
Dept: URGENT CARE | Facility: MEDICAL CENTER | Age: 86
End: 2021-05-06
Payer: MEDICARE

## 2021-05-06 VITALS — HEIGHT: 60 IN | BODY MASS INDEX: 25.1 KG/M2

## 2021-05-06 DIAGNOSIS — K43.9 VENTRAL HERNIA: ICD-10-CM

## 2021-05-06 LAB
ALBUMIN SERPL BCP-MCNC: 3.8 G/DL (ref 3.5–5)
ALP SERPL-CCNC: 85 U/L (ref 46–116)
ALT SERPL W P-5'-P-CCNC: 36 U/L (ref 12–78)
ANION GAP SERPL CALCULATED.3IONS-SCNC: 2 MMOL/L (ref 4–13)
AST SERPL W P-5'-P-CCNC: 16 U/L (ref 5–45)
ATRIAL RATE: 75 BPM
BILIRUB SERPL-MCNC: 0.64 MG/DL (ref 0.2–1)
BUN SERPL-MCNC: 33 MG/DL (ref 5–25)
CALCIUM SERPL-MCNC: 9.7 MG/DL (ref 8.3–10.1)
CHLORIDE SERPL-SCNC: 108 MMOL/L (ref 100–108)
CO2 SERPL-SCNC: 29 MMOL/L (ref 21–32)
CREAT SERPL-MCNC: 0.95 MG/DL (ref 0.6–1.3)
ERYTHROCYTE [DISTWIDTH] IN BLOOD BY AUTOMATED COUNT: 12.2 % (ref 11.6–15.1)
GFR SERPL CREATININE-BSD FRML MDRD: 55 ML/MIN/1.73SQ M
GLUCOSE SERPL-MCNC: 81 MG/DL (ref 65–140)
HCT VFR BLD AUTO: 39.6 % (ref 34.8–46.1)
HGB BLD-MCNC: 12.8 G/DL (ref 11.5–15.4)
MCH RBC QN AUTO: 32.2 PG (ref 26.8–34.3)
MCHC RBC AUTO-ENTMCNC: 32.3 G/DL (ref 31.4–37.4)
MCV RBC AUTO: 100 FL (ref 82–98)
P AXIS: -10 DEGREES
PLATELET # BLD AUTO: 195 THOUSANDS/UL (ref 149–390)
PMV BLD AUTO: 10.3 FL (ref 8.9–12.7)
POTASSIUM SERPL-SCNC: 4.5 MMOL/L (ref 3.5–5.3)
PR INTERVAL: 152 MS
PROT SERPL-MCNC: 7.7 G/DL (ref 6.4–8.2)
QRS AXIS: 58 DEGREES
QRSD INTERVAL: 82 MS
QT INTERVAL: 388 MS
QTC INTERVAL: 433 MS
RBC # BLD AUTO: 3.98 MILLION/UL (ref 3.81–5.12)
SODIUM SERPL-SCNC: 139 MMOL/L (ref 136–145)
T WAVE AXIS: 41 DEGREES
VENTRICULAR RATE: 75 BPM
WBC # BLD AUTO: 5.4 THOUSAND/UL (ref 4.31–10.16)

## 2021-05-06 PROCEDURE — 80053 COMPREHEN METABOLIC PANEL: CPT

## 2021-05-06 PROCEDURE — 85027 COMPLETE CBC AUTOMATED: CPT

## 2021-05-06 PROCEDURE — 36415 COLL VENOUS BLD VENIPUNCTURE: CPT

## 2021-05-06 PROCEDURE — 93005 ELECTROCARDIOGRAM TRACING: CPT

## 2021-05-06 PROCEDURE — 93010 ELECTROCARDIOGRAM REPORT: CPT

## 2021-05-09 NOTE — DISCHARGE INSTRUCTIONS
Please call the office when you leave to schedule an appointment for 2 weeks  This can be a virtual / telephone consultation or it can be in person  The choice is yours  Please call [phone]   [address], [id number] [id number]  [address] of [address] between [facility] and [facility]  Activity:    May lift 10 lb as many times as desired the 1st week,       20 lb in 2 weeks,       30 lb in 3 weeks  Walking is encouraged  Normal daily activities including climbing steps are okay  Do not engage in strenuous activity ( sit-ups or crutches) or contact sports for 4-6 weeks post-operatively    Return to Work:   Okay to return to work when you feel well if you desire  Diet:   You may return to your normal healthy diet  Wound Care: Your wound is closed with dissolvable stitches and glue  It is okay to shower  Wash incision gently with soap and water and pat dry  Do not soak incisions in bath water or swim for two weeks  Do not apply any creams or ointments  Pain Medication:   Please take as directed if needed  May use Advil or Motrin in addition  Recall, the pain medicine and anesthesia is associated with constipation  No driving while taking narcotic pain medications  Other: It is normal to developed a healing ridge / firm incision after surgery  This is your body making scar tissue  It is a good sign  Constipation is very common after general anesthesia  Please use milk of magnesia as needed in order to help prevent constipation  It is normal to get bruising after surgery  If you have questions after discharge please call the office      If you have increased pain, fever >101 5, increased drainage, redness or a bad smell at your surgery site, please call us immediately or come directly to the Emergency Room

## 2021-05-10 NOTE — PRE-PROCEDURE INSTRUCTIONS
Pre-Surgery Instructions:   Medication Instructions    acetaminophen (TYLENOL) 500 mg tablet Instructed patient to continue taking as prescribed up to and including DOS with sips of water   amLODIPine (NORVASC) 5 mg tablet Instructed patient to continue taking as prescribed up to and including DOS with sips of water   aspirin 81 MG tablet Pt was instructed to hold aspirin as of 5/5/21 per Dr Kelley Arguelles   atorvastatin (LIPITOR) 40 mg tablet Instructed patient to continue taking as prescribed up to and including DOS with sips of water   bumetanide (BUMEX) 1 mg tablet Instructed patient to continue taking as prescribed up to but NOT including DOS   carvedilol (Coreg) 3 125 mg tablet Instructed patient to continue taking as prescribed up to and including DOS with sips of water   carvedilol (COREG) 6 25 mg tablet Instructed patient to continue taking as prescribed up to and including DOS with sips of water   Cholecalciferol 2000 units TABS Instructed patient per Anesthesia Guidelines   dicyclomine (BENTYL) 10 mg capsule Instructed patient to continue taking as prescribed up to but NOT including DOS   DULoxetine (CYMBALTA) 30 mg delayed release capsule Instructed patient to continue taking as prescribed up to and including DOS with sips of water   fexofenadine (ALLEGRA) 180 MG tablet Instructed patient to continue taking as prescribed up to and including DOS with sips of water   losartan (COZAAR) 100 MG tablet Instructed patient to continue taking as prescribed up to but NOT including DOS   MULTIPLE VITAMIN PO Instructed patient per Anesthesia Guidelines   omeprazole (PriLOSEC) 40 MG capsule Instructed patient to continue taking as prescribed up to and including DOS with sips of water  Med list reviewed as above  Per anesthesia guidelines pt will stop vitamins preop   Also instructed pt not to start any new vitamins/supplements preoperatively and to avoid NSAID's  3 days prior to surgery  Tylenol is acceptable if needed  Pt has and/or is getting CHG surgical soap and verbalizes understanding of preoperative showering protocol  Reviewed [facility] current covid visitor restriction policy and pt understands that it may change at any time  Pt fully vaccinated for covid as of 2/18/21  All information within "My Surgical Experience" pamphlet reviewed and patient verbalizes understanding and compliance  All questions answered

## 2021-05-12 ENCOUNTER — ANESTHESIA EVENT (OUTPATIENT)
Dept: PERIOP | Facility: HOSPITAL | Age: 86
End: 2021-05-12
Payer: MEDICARE

## 2021-05-12 ENCOUNTER — ANESTHESIA (OUTPATIENT)
Dept: PERIOP | Facility: HOSPITAL | Age: 86
End: 2021-05-12
Payer: MEDICARE

## 2021-05-12 ENCOUNTER — HOSPITAL ENCOUNTER (OUTPATIENT)
Facility: HOSPITAL | Age: 86
Setting detail: OUTPATIENT SURGERY
Discharge: HOME | End: 2021-05-13
Attending: SURGERY | Admitting: SURGERY
Payer: MEDICARE

## 2021-05-12 DIAGNOSIS — K43.9 VENTRAL HERNIA WITHOUT OBSTRUCTION OR GANGRENE: Primary | ICD-10-CM

## 2021-05-12 PROBLEM — I67.1 CEREBRAL ANEURYSM WITHOUT RUPTURE: Status: ACTIVE | Noted: 2021-05-12

## 2021-05-12 LAB
ANION GAP SERPL CALCULATED.3IONS-SCNC: 5 MMOL/L (ref 4–13)
BASOPHILS # BLD AUTO: 0.02 THOUSANDS/ΜL (ref 0–0.1)
BASOPHILS NFR BLD AUTO: 0 % (ref 0–1)
BUN SERPL-MCNC: 19 MG/DL (ref 5–25)
CALCIUM SERPL-MCNC: 9 MG/DL (ref 8.3–10.1)
CHLORIDE SERPL-SCNC: 104 MMOL/L (ref 100–108)
CO2 SERPL-SCNC: 28 MMOL/L (ref 21–32)
CREAT SERPL-MCNC: 0.67 MG/DL (ref 0.6–1.3)
EOSINOPHIL # BLD AUTO: 0 THOUSAND/ΜL (ref 0–0.61)
EOSINOPHIL NFR BLD AUTO: 0 % (ref 0–6)
ERYTHROCYTE [DISTWIDTH] IN BLOOD BY AUTOMATED COUNT: 11.9 % (ref 11.6–15.1)
GFR SERPL CREATININE-BSD FRML MDRD: 81 ML/MIN/1.73SQ M
GLUCOSE P FAST SERPL-MCNC: 146 MG/DL (ref 65–99)
GLUCOSE SERPL-MCNC: 146 MG/DL (ref 65–140)
HCT VFR BLD AUTO: 37.1 % (ref 34.8–46.1)
HGB BLD-MCNC: 12.3 G/DL (ref 11.5–15.4)
IMM GRANULOCYTES # BLD AUTO: 0.04 THOUSAND/UL (ref 0–0.2)
IMM GRANULOCYTES NFR BLD AUTO: 0 % (ref 0–2)
LYMPHOCYTES # BLD AUTO: 1.05 THOUSANDS/ΜL (ref 0.6–4.47)
LYMPHOCYTES NFR BLD AUTO: 11 % (ref 14–44)
MAGNESIUM SERPL-MCNC: 1.8 MG/DL (ref 1.6–2.6)
MCH RBC QN AUTO: 31.1 PG (ref 26.8–34.3)
MCHC RBC AUTO-ENTMCNC: 33.2 G/DL (ref 31.4–37.4)
MCV RBC AUTO: 94 FL (ref 82–98)
MONOCYTES # BLD AUTO: 0.66 THOUSAND/ΜL (ref 0.17–1.22)
MONOCYTES NFR BLD AUTO: 7 % (ref 4–12)
NEUTROPHILS # BLD AUTO: 7.82 THOUSANDS/ΜL (ref 1.85–7.62)
NEUTS SEG NFR BLD AUTO: 82 % (ref 43–75)
NRBC BLD AUTO-RTO: 0 /100 WBCS
PLATELET # BLD AUTO: 145 THOUSANDS/UL (ref 149–390)
PMV BLD AUTO: 10 FL (ref 8.9–12.7)
POTASSIUM SERPL-SCNC: 3.5 MMOL/L (ref 3.5–5.3)
RBC # BLD AUTO: 3.95 MILLION/UL (ref 3.81–5.12)
SODIUM SERPL-SCNC: 137 MMOL/L (ref 136–145)
WBC # BLD AUTO: 9.59 THOUSAND/UL (ref 4.31–10.16)

## 2021-05-12 PROCEDURE — 49560 PR REPAIR INCISIONAL HERNIA,REDUCIBLE: CPT | Performed by: SURGERY

## 2021-05-12 PROCEDURE — 83735 ASSAY OF MAGNESIUM: CPT | Performed by: SURGERY

## 2021-05-12 PROCEDURE — 85025 COMPLETE CBC W/AUTO DIFF WBC: CPT | Performed by: SURGERY

## 2021-05-12 PROCEDURE — NC001 PR NO CHARGE: Performed by: SURGERY

## 2021-05-12 PROCEDURE — 49568 PR IMPLANT MESH HERNIA REPAIR/DEBRIDEMENT CLOSURE: CPT | Performed by: SURGERY

## 2021-05-12 PROCEDURE — C1781 MESH (IMPLANTABLE): HCPCS | Performed by: SURGERY

## 2021-05-12 PROCEDURE — 80048 BASIC METABOLIC PNL TOTAL CA: CPT | Performed by: SURGERY

## 2021-05-12 DEVICE — VENTRALEX ST HERNIA PATCH
Type: IMPLANTABLE DEVICE | Site: ABDOMEN | Status: FUNCTIONAL
Brand: VENTRALEX ST HERNIA PATCH

## 2021-05-12 RX ORDER — PROPOFOL 10 MG/ML
INJECTION, EMULSION INTRAVENOUS CONTINUOUS PRN
Status: DISCONTINUED | OUTPATIENT
Start: 2021-05-12 | End: 2021-05-12

## 2021-05-12 RX ORDER — HYDROMORPHONE HCL/PF 1 MG/ML
0.2 SYRINGE (ML) INJECTION
Status: DISCONTINUED | OUTPATIENT
Start: 2021-05-12 | End: 2021-05-13 | Stop reason: HOSPADM

## 2021-05-12 RX ORDER — PROMETHAZINE HYDROCHLORIDE 25 MG/ML
6.25 INJECTION, SOLUTION INTRAMUSCULAR; INTRAVENOUS ONCE
Status: COMPLETED | OUTPATIENT
Start: 2021-05-12 | End: 2021-05-12

## 2021-05-12 RX ORDER — METOPROLOL TARTRATE 5 MG/5ML
INJECTION INTRAVENOUS AS NEEDED
Status: DISCONTINUED | OUTPATIENT
Start: 2021-05-12 | End: 2021-05-12

## 2021-05-12 RX ORDER — OXYCODONE HYDROCHLORIDE 5 MG/1
5 TABLET ORAL EVERY 4 HOURS PRN
Status: DISCONTINUED | OUTPATIENT
Start: 2021-05-12 | End: 2021-05-13 | Stop reason: HOSPADM

## 2021-05-12 RX ORDER — OXYCODONE HYDROCHLORIDE AND ACETAMINOPHEN 5; 325 MG/1; MG/1
1 TABLET ORAL EVERY 4 HOURS PRN
Qty: 10 TABLET | Refills: 0 | Status: SHIPPED | OUTPATIENT
Start: 2021-05-12 | End: 2021-07-08 | Stop reason: ALTCHOICE

## 2021-05-12 RX ORDER — OXYCODONE HYDROCHLORIDE AND ACETAMINOPHEN 5; 325 MG/1; MG/1
1 TABLET ORAL EVERY 4 HOURS PRN
Status: DISCONTINUED | OUTPATIENT
Start: 2021-05-12 | End: 2021-05-13 | Stop reason: HOSPADM

## 2021-05-12 RX ORDER — CARVEDILOL 3.12 MG/1
3.12 TABLET ORAL
Status: DISCONTINUED | OUTPATIENT
Start: 2021-05-13 | End: 2021-05-13 | Stop reason: HOSPADM

## 2021-05-12 RX ORDER — LIDOCAINE HYDROCHLORIDE 10 MG/ML
INJECTION, SOLUTION EPIDURAL; INFILTRATION; INTRACAUDAL; PERINEURAL AS NEEDED
Status: DISCONTINUED | OUTPATIENT
Start: 2021-05-12 | End: 2021-05-12

## 2021-05-12 RX ORDER — CARVEDILOL 6.25 MG/1
6.25 TABLET ORAL 2 TIMES DAILY WITH MEALS
Status: DISCONTINUED | OUTPATIENT
Start: 2021-05-13 | End: 2021-05-13 | Stop reason: HOSPADM

## 2021-05-12 RX ORDER — LIDOCAINE HYDROCHLORIDE 10 MG/ML
0.5 INJECTION, SOLUTION EPIDURAL; INFILTRATION; INTRACAUDAL; PERINEURAL ONCE AS NEEDED
Status: DISCONTINUED | OUTPATIENT
Start: 2021-05-12 | End: 2021-05-12 | Stop reason: HOSPADM

## 2021-05-12 RX ORDER — FENTANYL CITRATE/PF 50 MCG/ML
25 SYRINGE (ML) INJECTION
Status: COMPLETED | OUTPATIENT
Start: 2021-05-12 | End: 2021-05-12

## 2021-05-12 RX ORDER — ATORVASTATIN CALCIUM 40 MG/1
40 TABLET, FILM COATED ORAL
Status: DISCONTINUED | OUTPATIENT
Start: 2021-05-12 | End: 2021-05-13 | Stop reason: HOSPADM

## 2021-05-12 RX ORDER — ONDANSETRON 2 MG/ML
4 INJECTION INTRAMUSCULAR; INTRAVENOUS EVERY 4 HOURS PRN
Status: DISCONTINUED | OUTPATIENT
Start: 2021-05-12 | End: 2021-05-13 | Stop reason: HOSPADM

## 2021-05-12 RX ORDER — ACETAMINOPHEN 325 MG/1
975 TABLET ORAL EVERY 6 HOURS SCHEDULED
Status: DISCONTINUED | OUTPATIENT
Start: 2021-05-13 | End: 2021-05-13 | Stop reason: HOSPADM

## 2021-05-12 RX ORDER — CEFAZOLIN SODIUM 1 G/50ML
1000 SOLUTION INTRAVENOUS ONCE
Status: COMPLETED | OUTPATIENT
Start: 2021-05-12 | End: 2021-05-12

## 2021-05-12 RX ORDER — LABETALOL 20 MG/4 ML (5 MG/ML) INTRAVENOUS SYRINGE
AS NEEDED
Status: DISCONTINUED | OUTPATIENT
Start: 2021-05-12 | End: 2021-05-12

## 2021-05-12 RX ORDER — METHOCARBAMOL 500 MG/1
500 TABLET, FILM COATED ORAL EVERY 6 HOURS SCHEDULED
Status: DISCONTINUED | OUTPATIENT
Start: 2021-05-13 | End: 2021-05-13 | Stop reason: HOSPADM

## 2021-05-12 RX ORDER — DEXMEDETOMIDINE HYDROCHLORIDE 100 UG/ML
INJECTION, SOLUTION INTRAVENOUS AS NEEDED
Status: DISCONTINUED | OUTPATIENT
Start: 2021-05-12 | End: 2021-05-12

## 2021-05-12 RX ORDER — ACETAMINOPHEN 325 MG/1
650 TABLET ORAL EVERY 4 HOURS PRN
Status: DISCONTINUED | OUTPATIENT
Start: 2021-05-12 | End: 2021-05-13 | Stop reason: HOSPADM

## 2021-05-12 RX ORDER — BUPIVACAINE HYDROCHLORIDE AND EPINEPHRINE 2.5; 5 MG/ML; UG/ML
INJECTION, SOLUTION EPIDURAL; INFILTRATION; INTRACAUDAL; PERINEURAL AS NEEDED
Status: DISCONTINUED | OUTPATIENT
Start: 2021-05-12 | End: 2021-05-12 | Stop reason: HOSPADM

## 2021-05-12 RX ORDER — POTASSIUM CHLORIDE 20 MEQ/1
40 TABLET, EXTENDED RELEASE ORAL ONCE
Status: COMPLETED | OUTPATIENT
Start: 2021-05-12 | End: 2021-05-12

## 2021-05-12 RX ORDER — DULOXETIN HYDROCHLORIDE 30 MG/1
30 CAPSULE, DELAYED RELEASE ORAL DAILY
Status: DISCONTINUED | OUTPATIENT
Start: 2021-05-13 | End: 2021-05-13 | Stop reason: HOSPADM

## 2021-05-12 RX ORDER — ONDANSETRON 2 MG/ML
INJECTION INTRAMUSCULAR; INTRAVENOUS AS NEEDED
Status: DISCONTINUED | OUTPATIENT
Start: 2021-05-12 | End: 2021-05-12

## 2021-05-12 RX ORDER — MAGNESIUM SULFATE HEPTAHYDRATE 40 MG/ML
2 INJECTION, SOLUTION INTRAVENOUS ONCE
Status: COMPLETED | OUTPATIENT
Start: 2021-05-12 | End: 2021-05-13

## 2021-05-12 RX ORDER — HYDROMORPHONE HCL IN WATER/PF 6 MG/30 ML
0.2 PATIENT CONTROLLED ANALGESIA SYRINGE INTRAVENOUS
Status: DISCONTINUED | OUTPATIENT
Start: 2021-05-12 | End: 2021-05-12 | Stop reason: HOSPADM

## 2021-05-12 RX ORDER — PROPOFOL 10 MG/ML
INJECTION, EMULSION INTRAVENOUS AS NEEDED
Status: DISCONTINUED | OUTPATIENT
Start: 2021-05-12 | End: 2021-05-12

## 2021-05-12 RX ORDER — SODIUM CHLORIDE, SODIUM LACTATE, POTASSIUM CHLORIDE, CALCIUM CHLORIDE 600; 310; 30; 20 MG/100ML; MG/100ML; MG/100ML; MG/100ML
75 INJECTION, SOLUTION INTRAVENOUS CONTINUOUS
Status: DISCONTINUED | OUTPATIENT
Start: 2021-05-12 | End: 2021-05-13

## 2021-05-12 RX ORDER — AMLODIPINE BESYLATE 5 MG/1
5 TABLET ORAL DAILY
Status: DISCONTINUED | OUTPATIENT
Start: 2021-05-13 | End: 2021-05-13 | Stop reason: HOSPADM

## 2021-05-12 RX ORDER — ONDANSETRON 2 MG/ML
4 INJECTION INTRAMUSCULAR; INTRAVENOUS ONCE AS NEEDED
Status: DISCONTINUED | OUTPATIENT
Start: 2021-05-12 | End: 2021-05-12 | Stop reason: HOSPADM

## 2021-05-12 RX ADMIN — ONDANSETRON 4 MG: 2 INJECTION INTRAMUSCULAR; INTRAVENOUS at 12:42

## 2021-05-12 RX ADMIN — Medication 25 MCG: at 13:27

## 2021-05-12 RX ADMIN — ACETAMINOPHEN 975 MG: 325 TABLET, FILM COATED ORAL at 23:32

## 2021-05-12 RX ADMIN — PROMETHAZINE HYDROCHLORIDE 6.25 MG: 25 INJECTION INTRAMUSCULAR; INTRAVENOUS at 17:49

## 2021-05-12 RX ADMIN — DEXMEDETOMIDINE HCL 4 MCG: 100 INJECTION INTRAVENOUS at 12:49

## 2021-05-12 RX ADMIN — METOROPROLOL TARTRATE 2 MG: 5 INJECTION, SOLUTION INTRAVENOUS at 12:49

## 2021-05-12 RX ADMIN — LIDOCAINE HYDROCHLORIDE 50 MG: 10 INJECTION, SOLUTION EPIDURAL; INFILTRATION; INTRACAUDAL; PERINEURAL at 12:29

## 2021-05-12 RX ADMIN — PROPOFOL 30 MG: 10 INJECTION, EMULSION INTRAVENOUS at 12:31

## 2021-05-12 RX ADMIN — Medication 25 MCG: at 13:49

## 2021-05-12 RX ADMIN — PROPOFOL 100 MCG/KG/MIN: 10 INJECTION, EMULSION INTRAVENOUS at 12:35

## 2021-05-12 RX ADMIN — MAGNESIUM SULFATE HEPTAHYDRATE 2 G: 40 INJECTION, SOLUTION INTRAVENOUS at 23:33

## 2021-05-12 RX ADMIN — DEXMEDETOMIDINE HCL 4 MCG: 100 INJECTION INTRAVENOUS at 12:51

## 2021-05-12 RX ADMIN — METHOCARBAMOL 500 MG: 500 TABLET ORAL at 23:32

## 2021-05-12 RX ADMIN — LABETALOL 20 MG/4 ML (5 MG/ML) INTRAVENOUS SYRINGE 5 MG: at 13:00

## 2021-05-12 RX ADMIN — PROPOFOL 100 MG: 10 INJECTION, EMULSION INTRAVENOUS at 12:29

## 2021-05-12 RX ADMIN — DEXMEDETOMIDINE HCL 4 MCG: 100 INJECTION INTRAVENOUS at 12:58

## 2021-05-12 RX ADMIN — ATORVASTATIN CALCIUM 40 MG: 40 TABLET, FILM COATED ORAL at 21:23

## 2021-05-12 RX ADMIN — DEXMEDETOMIDINE HCL 4 MCG: 100 INJECTION INTRAVENOUS at 12:46

## 2021-05-12 RX ADMIN — HYDROMORPHONE HYDROCHLORIDE 0.2 MG: 1 INJECTION, SOLUTION INTRAMUSCULAR; INTRAVENOUS; SUBCUTANEOUS at 21:22

## 2021-05-12 RX ADMIN — ONDANSETRON 4 MG: 2 INJECTION INTRAMUSCULAR; INTRAVENOUS at 15:23

## 2021-05-12 RX ADMIN — METOROPROLOL TARTRATE 3 MG: 5 INJECTION, SOLUTION INTRAVENOUS at 12:54

## 2021-05-12 RX ADMIN — CEFAZOLIN SODIUM 1000 MG: 1 SOLUTION INTRAVENOUS at 12:24

## 2021-05-12 RX ADMIN — SODIUM CHLORIDE, SODIUM LACTATE, POTASSIUM CHLORIDE, AND CALCIUM CHLORIDE 75 ML/HR: .6; .31; .03; .02 INJECTION, SOLUTION INTRAVENOUS at 11:05

## 2021-05-12 RX ADMIN — Medication 25 MCG: at 13:33

## 2021-05-12 RX ADMIN — POTASSIUM CHLORIDE 40 MEQ: 1500 TABLET, EXTENDED RELEASE ORAL at 23:32

## 2021-05-12 RX ADMIN — Medication 25 MCG: at 13:41

## 2021-05-12 RX ADMIN — DEXMEDETOMIDINE HCL 4 MCG: 100 INJECTION INTRAVENOUS at 12:29

## 2021-05-12 RX ADMIN — OXYCODONE HYDROCHLORIDE AND ACETAMINOPHEN 1 TABLET: 5; 325 TABLET ORAL at 14:37

## 2021-05-12 RX ADMIN — HYDROMORPHONE HYDROCHLORIDE 0.2 MG: 1 INJECTION, SOLUTION INTRAMUSCULAR; INTRAVENOUS; SUBCUTANEOUS at 15:11

## 2021-05-12 RX ADMIN — SODIUM CHLORIDE, SODIUM LACTATE, POTASSIUM CHLORIDE, AND CALCIUM CHLORIDE: .6; .31; .03; .02 INJECTION, SOLUTION INTRAVENOUS at 12:25

## 2021-05-12 NOTE — ANESTHESIA PREPROCEDURE EVALUATION
Procedure:  REPAIR HERNIA VENTRAL (N/A Abdomen)    Relevant Problems   CARDIO   (+) Carotid artery stenosis, asymptomatic, bilateral   (+) Cerebral aneurysm without rupture   (+) Coronary artery disease without angina pectoris - S/P stent placement x 2 (2011)   (+) Essential hypertension   (+) Mixed hyperlipidemia      ENDO   (+) Hyperparathyroidism, unspecified (HCC)      GI/HEPATIC   (+) Gastroesophageal reflux disease without esophagitis   (+) Pancreatic cyst      HEMATOLOGY   (+) Anemia due to vitamin B12 deficiency   (+) Thrombocytopenia (HCC)      MUSCULOSKELETAL   (+) Degenerative joint disease   (+) 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HCC)   (+) Low back pain without sciatica      NEURO/PSYCH   (+) Chronic right shoulder pain   (+) Depression, controlled   (+) History of pulmonary embolus (PE)   (+) New onset of headaches      Other   (+) Ambulatory dysfunction   (+) Chronic diastolic (congestive) heart failure (HCC)   (+) 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HCC)   (+) S/P angioplasty with stent      TTE 2019 shows normal biventricular function, mild pulmonary HTN    Physical Exam    Airway    Mallampati score: II  TM Distance: >3 FB  Neck ROM: full     Dental       Cardiovascular      Pulmonary      Other Findings        Anesthesia Plan  ASA Score- 3     Anesthesia Type- general with ASA Monitors  Additional Monitors:   Airway Plan: ETT  Plan Factors-Exercise tolerance (METS): >4 METS  Chart reviewed  Patient is not a current smoker  Patient did not smoke on day of surgery  Obstructive sleep apnea risk education given perioperatively  Induction- intravenous  Postoperative Plan- Plan for postoperative opioid use  Planned trial extubation    Informed Consent- Anesthetic plan and risks discussed with patient  I personally reviewed this patient with the CRNA  Discussed and agreed on the Anesthesia Plan with the CRNA  Leanna Barrios

## 2021-05-12 NOTE — INTERVAL H&P NOTE
H&P reviewed  After examining the patient I find no changes in the patients condition since the H&P had been written      Vitals:    05/12/21 1042   BP: (!) 178/87   Pulse: 83   Resp: 18   Temp: (!) 96 7 °F (35 9 °C)   SpO2: 99%

## 2021-05-12 NOTE — ANESTHESIA POSTPROCEDURE EVALUATION
Post-Op Assessment Note    CV Status:  Stable  Pain Score: 0    Pain management: adequate     Mental Status:  Alert and awake   Hydration Status:  Euvolemic   PONV Controlled:  Controlled   Airway Patency:  Patent      Post Op Vitals Reviewed: Yes      Staff: Anesthesiologist, CRNA         No complications documented      /68 (05/12/21 1321)    Temp (!) 97 °F (36 1 °C) (05/12/21 1321)    Pulse 66 (05/12/21 1321)   Resp 20 (05/12/21 1321)    SpO2 100 % (05/12/21 1321)

## 2021-05-12 NOTE — OP NOTE
OPERATIVE REPORT  PATIENT NAME: Beto Ramirez    :  1935  MRN: [id number]  Pt Location: [facility] OR ROOM 07    SURGERY DATE: 2021    Surgeon(s) and Role:     * Al Mckoy MD - Primary     * Dallis Councilman, MD - Assisting    Preop Diagnosis:  Ventral hernia [K43 9]    Post-Op Diagnosis Codes: * Ventral hernia [K43 9]    Procedure(s) (LRB):  REPAIR HERNIA VENTRAL (N/A) with mesh    Specimen(s):  * No specimens in log *    Estimated Blood Loss:   Minimal    Drains:  * No LDAs found *    Anesthesia Type:   General    Operative Indications:  Ventral hernia [K43 9]      Operative Findings:  Independent, nonsmoker, ASA 3, wound class 1, BMI 34, weight 125, height 61  (+) Carotid artery stenosis, asymptomatic, bilateral   (+) Cerebral aneurysm without rupture   (+) Coronary artery disease without angina pectoris - S/P stent placement x 2 ()   (+) Essential hypertension   (+) Mixed hyperlipidemia       ENDO   (+) Hyperparathyroidism, unspecified (HCC)       GI/HEPATIC   (+) Gastroesophageal reflux disease without esophagitis   (+) Pancreatic cyst       HEMATOLOGY   (+) Anemia due to vitamin B12 deficiency   (+) Thrombocytopenia (HCC)       MUSCULOSKELETAL   (+) Degenerative joint disease   (+) 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HCC)   (+) Low back pain without sciatica       NEURO/PSYCH   (+) Chronic right shoulder pain   (+) Depression, controlled   (+) History of pulmonary embolus (PE)   (+) New onset of headaches       Other   (+) Ambulatory dysfunction   (+) Chronic diastolic (congestive) heart failure (HCC)   (+) 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HCC)   (+) S/P angioplasty with stent                     Complications:   None    Procedure and Technique:  Patient was identified visually and via armband  Placed in the supine position  After general anesthesia the abdomen was prepped and draped in a sterile fashion  0 25% Marcaine with epinephrine was utilized throughout the procedure      Incision was made above the umbilicus  This carried down through skin subcutaneous tissue  Hernia sac was dissected free  This was then placed into the abdomen  A preperitoneal plane was then created  A composite polypropylene mesh was then inserted  This was sewn in place with interrupted figure-of-eight 1  Vicryl suture  The wound was then closed with 3 0 Vicryl subcutaneous followed by 4 Monocryl sutures  Dermabond was applied  The patient tolerated this procedure well  Sponge instrument count correct x2     I was present for the entire procedure    Patient Disposition:  PACU     SIGNATURE: Tamara Collins MD  DATE: May 12, 2021  TIME: 1:00 PM

## 2021-05-13 VITALS
BODY MASS INDEX: 23.6 KG/M2 | WEIGHT: 125 LBS | SYSTOLIC BLOOD PRESSURE: 130 MMHG | DIASTOLIC BLOOD PRESSURE: 78 MMHG | RESPIRATION RATE: 18 BRPM | HEART RATE: 77 BPM | HEIGHT: 61 IN | TEMPERATURE: 98 F | OXYGEN SATURATION: 96 %

## 2021-05-13 PROCEDURE — 99024 POSTOP FOLLOW-UP VISIT: CPT | Performed by: SURGERY

## 2021-05-13 PROCEDURE — NC001 PR NO CHARGE: Performed by: SURGERY

## 2021-05-13 RX ORDER — METHOCARBAMOL 500 MG/1
500 TABLET, FILM COATED ORAL 4 TIMES DAILY
Qty: 20 TABLET | Refills: 0 | Status: SHIPPED | OUTPATIENT
Start: 2021-05-13 | End: 2021-07-08 | Stop reason: ALTCHOICE

## 2021-05-13 RX ADMIN — METHOCARBAMOL 500 MG: 500 TABLET ORAL at 06:42

## 2021-05-13 RX ADMIN — OXYCODONE HYDROCHLORIDE AND ACETAMINOPHEN 1 TABLET: 5; 325 TABLET ORAL at 09:03

## 2021-05-13 RX ADMIN — AMLODIPINE BESYLATE 5 MG: 5 TABLET ORAL at 08:46

## 2021-05-13 RX ADMIN — ACETAMINOPHEN 975 MG: 325 TABLET, FILM COATED ORAL at 06:43

## 2021-05-13 RX ADMIN — OXYCODONE HYDROCHLORIDE 5 MG: 5 TABLET ORAL at 11:12

## 2021-05-13 RX ADMIN — CARVEDILOL 6.25 MG: 6.25 TABLET, FILM COATED ORAL at 08:46

## 2021-05-13 RX ADMIN — DULOXETINE 30 MG: 30 CAPSULE, DELAYED RELEASE ORAL at 08:47

## 2021-05-13 NOTE — DISCHARGE SUMMARY
Discharge Summary - Pippa Munoz 82 y o  female MRN: [id number]    [facility] Encounter: [id number]    Admission Date:     Admitting Diagnosis: Ventral hernia [K43 9]    HPI: Pippa Munoz is a 82 y o  female      Presents for hernia consult  Bulge above umbilicus, present approx  3 years but is becoming larger  History of IBS, unsure if pain in abdomen from IBS or hernia  Denies any limitations to activity  Procedures Performed: No orders of the defined types were placed in this encounter  Summary of Hospital Course: Patient had uncomplicated ventral hernia repair with mesh on 5/12  Admitted post operatively for pain control  Started on robaxin with some relief  On 5/13, patient's pain had improved and she was deemed appropriate for discharge home  Significant Findings, Care, Treatment and Services Provided:  5/12: Ventral hernia repair with mesh    Complications: None    Discharge Diagnosis: Ventral hernia    Resolved Problems  Date Reviewed: 5/3/2021    None          Condition at Discharge: good         Discharge instructions/Information to patient and family:   See after visit summary for information provided to patient and family  Provisions for Follow-Up Care:  See after visit summary for information related to follow-up care and any pertinent home health orders  PCP: Timur Patton DO    Disposition: Home    Planned Readmission: No      Discharge Statement   I spent 30 minutes discharging the patient  This time was spent on the day of discharge  I had direct contact with the patient on the day of discharge  Additional documentation is required if more than 30 minutes were spent on discharge  Discharge Medications:  See after visit summary for reconciled discharge medications provided to patient and family

## 2021-05-13 NOTE — PROGRESS NOTES
Postoperative Progress Note - General Surgery   Angelique Vivar 82 y o  female MRN: [id number]  Unit/Bed#: [facility] 927-01 Encounter: [id number]    Assessment:  The patient is s/p ventral hernia repair earlier today on 5/12, remained inpatient for postoperative pain  Plan:  Clear liquid diet  Pain control  Labs to be checked  Ambulate, IS       Subjective/Objective   Chief Complaint: s/p ventral hernia repair 5/12    Subjective: c/o incisional pain    Objective:     Blood pressure 132/76, pulse 80, temperature 98 2 °F (36 8 °C), resp  rate 17, height 5' 1" (1 549 m), weight 56 7 kg (125 lb), SpO2 97 %  ,Body mass index is 23 62 kg/m²        Intake/Output Summary (Last 24 hours) at 5/12/2021 2211  Last data filed at 5/12/2021 1530  Gross per 24 hour   Intake 800 ml   Output 202 ml   Net 598 ml       Invasive Devices     Peripheral Intravenous Line            Peripheral IV 05/12/21 Left Forearm less than 1 day              Physical Exam:   GENERAL APPEARANCE: in no acute distress  NEURO: stable  HEENT: normocephalic, atraumatic  CV: regular rate and rhythm, no tachycardia,   LUNGS: clear to auscultation bilaterally  GI: soft, tender in all quadrants with voluntary guarding, nondistended  Incision clean  : no abnormality detected  MSK: no abnormality detected   SKIN: no abnormality detected

## 2021-05-13 NOTE — PROGRESS NOTES
Progress Note - General Surgery   Pippa Munoz 82 y o  female MRN: [id number]  Unit/Bed#: [facility] 927-01 Encounter: [id number]    Assessment:  82 y o  F history of a ventral hernia, status post open repair on 5/12  Patient remained inpatient postoperatively for uncontrolled pain  Afebrile  VSS    Plan:  Advance to surgical soft diet   D/c IVFs  Prn analgesia  OOB/Ambulate   DVT ppx  D/c home with oxycodone and robaxin      Subjective/Objective     Subjective: No acute events overnight  Pain is better controlled on the current regimen  Robaxin was added overnight and she states that she was able to sleep  No fevers, chills  Objective:     Blood pressure 129/64, pulse 83, temperature 98 2 °F (36 8 °C), resp  rate 16, height 5' 1" (1 549 m), weight 56 7 kg (125 lb), SpO2 97 %  ,Body mass index is 23 62 kg/m²  Intake/Output Summary (Last 24 hours) at 5/13/2021 0531  Last data filed at 5/13/2021 0250  Gross per 24 hour   Intake 1760 ml   Output 202 ml   Net 1558 ml       Invasive Devices     Peripheral Intravenous Line            Peripheral IV 05/12/21 Left Forearm less than 1 day                Physical Exam:  NAD, alert and oriented x3  Normocephalic, atraumatic  MMM, EOMI, PERRLA  Norm resp effort on RA  RRR  Abd soft, non-distended, tender in the RUQ, incision is c/d/i  No rebound or rigidity or guarding     No calf tenderness or peripheral edema  Motor/sensation intact in distal extremities  CN grossly intact  -rash/lesions      Lab, Imaging and other studies:  CBC:   Lab Results   Component Value Date    WBC 9 59 05/12/2021    HGB 12 3 05/12/2021    HCT 37 1 05/12/2021    MCV 94 05/12/2021     (L) 05/12/2021    MCH 31 1 05/12/2021    MCHC 33 2 05/12/2021    RDW 11 9 05/12/2021    MPV 10 0 05/12/2021    NRBC 0 05/12/2021   , CMP:   Lab Results   Component Value Date    SODIUM 137 05/12/2021    K 3 5 05/12/2021     05/12/2021    CO2 28 05/12/2021    BUN 19 05/12/2021    CREATININE 0 67 05/12/2021    CALCIUM 9 0 05/12/2021    EGFR 81 05/12/2021     VTE Pharmacologic Prophylaxis: Sequential compression device (Venodyne)   VTE Mechanical Prophylaxis: sequential compression device

## 2021-05-20 DIAGNOSIS — I10 ESSENTIAL HYPERTENSION: ICD-10-CM

## 2021-05-20 DIAGNOSIS — I25.10 CORONARY ARTERY DISEASE INVOLVING NATIVE CORONARY ARTERY OF NATIVE HEART WITHOUT ANGINA PECTORIS: ICD-10-CM

## 2021-05-23 RX ORDER — CARVEDILOL 6.25 MG/1
TABLET ORAL
Qty: 180 TABLET | Refills: 3 | Status: SHIPPED | OUTPATIENT
Start: 2021-05-23 | End: 2021-11-03

## 2021-05-25 ENCOUNTER — OFFICE VISIT (OUTPATIENT)
Dept: SURGERY | Facility: CLINIC | Age: 86
End: 2021-05-25

## 2021-05-25 DIAGNOSIS — K43.9 VENTRAL HERNIA: Primary | ICD-10-CM

## 2021-05-25 PROCEDURE — 1124F ACP DISCUSS-NO DSCNMKR DOCD: CPT | Performed by: SURGERY

## 2021-05-25 PROCEDURE — 99024 POSTOP FOLLOW-UP VISIT: CPT | Performed by: SURGERY

## 2021-05-25 NOTE — PROGRESS NOTES
Assessment/Plan: patient is status post ventral hernia repair  She feels well  She has no complaints  The wound is clean and healing well  Activity instructions provided  All questions answered  Diagnoses and all orders for this visit:    Ventral hernia        Pathology: None sent      Postoperative restrictions reviewed  All questions answered  ______________________________________________________  HPI: Patient presents post operatively  Ventral hernia repair 5/12/2021  ROS:  General ROS: negative for - chills, fatigue, fever or night sweats, weight loss  Respiratory ROS: no cough, shortness of breath, or wheezing  Cardiovascular ROS: no chest pain or dyspnea on exertion  Genito-Urinary ROS: no dysuria, trouble voiding, or hematuria  Musculoskeletal ROS: negative for - gait disturbance, joint pain or muscle pain  Neurological ROS: no TIA or stroke symptoms  GI ROS: see HPI  Skin ROS: no new rashes or lesions   Lymphatic ROS: no new adenopathy noted by pt     GYN ROS: see HPI, no new GYN history or bleeding noted  Psy ROS: no new mental or behavioral disturbances         Patient Active Problem List   Diagnosis    Essential hypertension    Mixed hyperlipidemia    Coronary artery disease without angina pectoris - S/P stent placement x 2 (2011)    Gastroesophageal reflux disease without esophagitis    Chronic diastolic (congestive) heart failure (HCC)    Hyperthyroidism    Age-related osteoporosis with current pathological fracture    Intertrochanteric fracture of left femur, closed, with routine healing, subsequent encounter    Ambulatory dysfunction    Elderly person living alone    Low back pain without sciatica    S/P ORIF (open reduction internal fixation) fracture    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HCC)    Bladder cancer (HCC)    Allergic rhinitis    Anemia due to vitamin B12 deficiency    Biliary dyskinesia    Cholelithiasis    Chronic right shoulder pain    Closed displaced fracture of left trapezium bone    Degenerative joint disease    Depression, controlled    Deviated nasal septum    Gastric reflux syndrome    Heart valve disorder    Herpes zoster infection of thoracic region    IBS (irritable bowel syndrome)    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HCC)    Mild vitamin D deficiency    Mixed hearing loss, unilateral    Pancreatic cyst    Raynaud's disease without gangrene    S/P angioplasty with stent    Urge incontinence of urine    Conjunctivitis of both eyes    Thrombocytopenia ([address] )    Medicare annual wellness visit, subsequent    History of pulmonary embolus (PE)    Age-related cataract of both eyes    Hyperparathyroidism, unspecified (HCC)    Pruritic rash    Fear of falling    Balance problems    Difficulty navigating stairs    Lower abdominal pain, unspecified    History of falling    Lower extremity edema    Cough due to ACE inhibitor    Persistent cough for 3 weeks or longer    New onset of headaches    Carotid artery stenosis, asymptomatic, bilateral    Ventral hernia    Cerebral aneurysm without rupture       Allergies:  Lactose - food allergy      Current Outpatient Medications:     acetaminophen (TYLENOL) 500 mg tablet, Take 500 mg by mouth every 6 (six) hours as needed for mild pain, Disp: , Rfl:     amLODIPine (NORVASC) 5 mg tablet, TAKE 1 TABLET BY MOUTH  DAILY, Disp: 90 tablet, Rfl: 3    aspirin 81 MG tablet, Take 81 mg by mouth daily Resume on 4/28 , Disp: , Rfl:     atorvastatin (LIPITOR) 40 mg tablet, TAKE 1 TABLET BY MOUTH  DAILY AFTER DINNER, Disp: 90 tablet, Rfl: 3    bumetanide (BUMEX) 1 mg tablet, Half a tablet daily   Whole tablet if needed, Disp: 90 tablet, Rfl: 3    carvedilol (Coreg) 3 125 mg tablet, Take 1 tablet (3 125 mg total) by mouth daily with dinner Take with the 6 25mg tablet at dinner, Disp: 30 tablet, Rfl: 5    carvedilol (COREG) 6 25 mg tablet, TAKE 1 TABLET BY MOUTH  TWICE DAILY WITH MEALS, Disp: 180 tablet, Rfl: 3    Cholecalciferol 2000 units TABS, Take 2,000 Units by mouth daily Resume on 4/28, Disp: , Rfl:     dicyclomine (BENTYL) 10 mg capsule, Take 10 mg by mouth as needed, Disp: , Rfl:     DULoxetine (CYMBALTA) 30 mg delayed release capsule, TAKE 1 CAPSULE BY MOUTH  DAILY, Disp: 90 capsule, Rfl: 3    fexofenadine (ALLEGRA) 180 MG tablet, Take 180 mg by mouth daily, Disp: , Rfl:     losartan (COZAAR) 100 MG tablet, TAKE 1 TABLET BY MOUTH  DAILY, Disp: 90 tablet, Rfl: 1    methocarbamol (ROBAXIN) 500 mg tablet, Take 1 tablet (500 mg total) by mouth 4 (four) times a day for 5 days, Disp: 20 tablet, Rfl: 0    MULTIPLE VITAMIN PO, Take by mouth daily , Disp: , Rfl:     omeprazole (PriLOSEC) 40 MG capsule, TAKE 1 CAPSULE BY MOUTH  DAILY BEFORE BREAKFAST, Disp: 90 capsule, Rfl: 3    oxyCODONE-acetaminophen (PERCOCET) 5-325 mg per tablet, Take 1 tablet by mouth every 4 (four) hours as needed for moderate pain for up to 10 dosesMax Daily Amount: 6 tablets, Disp: 10 tablet, Rfl: 0    Past Medical History:   Diagnosis Date    Bladder cancer ([facility] )     had BCG treatments    Bladder cancer ([facility] )     Coronary artery disease     S/P stent placement x 2 in 2011    GERD (gastroesophageal reflux disease)     Hepatitis     got this from food back in 1970s, has not had a problem since    History of leukemia     in remission    History of stomach ulcers 1970    History of transfusion 1970    Hyperlipidemia     Hypertension     Irritable bowel syndrome     Kidney stone     Kidney stones     Pneumonia     Pt had in 2003 and 2004    Pulmonary emboli (HCC) 1970s    Raynaud disease     Shingles     Sleep apnea        Past Surgical History:   Procedure Laterality Date    CATARACT EXTRACTION      COLONOSCOPY      CORONARY ANGIOPLASTY WITH STENT PLACEMENT      stent x 2    CYSTOSCOPY      diagnostic - onset 4/4/17    EGD      EYE SURGERY      FRACTURE SURGERY      HYSTERECTOMY  LEG SURGERY      OOPHORECTOMY      MI OPEN RX FEMUR FX+INTRAMED ALEJANDRO Left 4/8/2018    Procedure: INSERTION NAIL IM FEMUR ANTEGRADE (TROCHANTERIC); Surgeon: Kasey Machuca MD;  [facility]: [facility];  Service: Orthopedics    MI REPAIR INCISIONAL HERNIA,REDUCIBLE N/A 5/12/2021    Procedure: REPAIR HERNIA VENTRAL;  Surgeon: Elvis Enriquez MD;  [facility];  Service: General    TONSILLECTOMY         Family History   Problem Relation Age of Onset    Arthritis Mother     Osteoporosis Mother     Heart disease Father     Hypertension Father     Hypertension Brother     Prostate cancer Brother     Breast cancer Daughter 48    Prostate cancer Son         reports that she has never smoked  She has never used smokeless tobacco  She reports that she does not drink alcohol or use drugs  PHYSICAL EXAM    There were no vitals taken for this visit      General: normal, cooperative, no distress  Abdominal: soft, nondistended or nontender  Incision: clean, dry, and intact and healing well      Elvis Enriquez MD    Date: 5/25/2021 Time: 2:23 PM

## 2021-06-24 ENCOUNTER — HOSPITAL ENCOUNTER (OUTPATIENT)
Dept: NON INVASIVE DIAGNOSTICS | Facility: CLINIC | Age: 86
Discharge: HOME | End: 2021-06-24
Payer: MEDICARE

## 2021-06-24 DIAGNOSIS — I65.23 CAROTID ARTERY STENOSIS, ASYMPTOMATIC, BILATERAL: ICD-10-CM

## 2021-06-24 PROCEDURE — 93880 EXTRACRANIAL BILAT STUDY: CPT

## 2021-06-25 PROCEDURE — 93880 EXTRACRANIAL BILAT STUDY: CPT | Performed by: SURGERY

## 2021-07-06 DIAGNOSIS — I10 ESSENTIAL HYPERTENSION: ICD-10-CM

## 2021-07-07 DIAGNOSIS — I10 ESSENTIAL HYPERTENSION: ICD-10-CM

## 2021-07-08 ENCOUNTER — OFFICE VISIT (OUTPATIENT)
Dept: FAMILY MEDICINE CLINIC | Facility: CLINIC | Age: 86
End: 2021-07-08
Payer: MEDICARE

## 2021-07-08 VITALS
WEIGHT: 128 LBS | OXYGEN SATURATION: 98 % | TEMPERATURE: 97.2 F | HEIGHT: 60 IN | HEART RATE: 82 BPM | SYSTOLIC BLOOD PRESSURE: 156 MMHG | DIASTOLIC BLOOD PRESSURE: 82 MMHG | BODY MASS INDEX: 25.13 KG/M2 | RESPIRATION RATE: 17 BRPM

## 2021-07-08 DIAGNOSIS — M79.643 INTERMITTENT PAIN AND SWELLING OF HAND: Primary | ICD-10-CM

## 2021-07-08 DIAGNOSIS — R20.2 PARESTHESIAS IN RIGHT HAND: ICD-10-CM

## 2021-07-08 DIAGNOSIS — M79.89 INTERMITTENT PAIN AND SWELLING OF HAND: Primary | ICD-10-CM

## 2021-07-08 PROCEDURE — 99213 OFFICE O/P EST LOW 20 MIN: CPT | Performed by: FAMILY MEDICINE

## 2021-07-08 RX ORDER — AMLODIPINE BESYLATE 5 MG/1
TABLET ORAL
Qty: 90 TABLET | Refills: 3 | Status: SHIPPED | OUTPATIENT
Start: 2021-07-08 | End: 2021-07-23

## 2021-07-08 RX ORDER — CYCLOSPORINE 0.5 MG/ML
EMULSION OPHTHALMIC
COMMUNITY
Start: 2021-06-29 | End: 2021-09-27

## 2021-07-08 NOTE — PROGRESS NOTES
Assessment/Plan:       Diagnoses and all orders for this visit:    Intermittent pain and swelling of hand  Comments:  continue current home remedies until eval  Orders:  -     Ambulatory referral to Hand Surgery; Future    Paresthesias in right hand  -     Ambulatory referral to Hand Surgery; Future    Other orders  -     Restasis 0 05 % ophthalmic emulsion          Subjective:   Chief Complaint   Patient presents with    Hand Pain     Right hand pain for a couple months  Now experencing swelling, numbness, pins and needles feeling that has been worsening for the past couple weeks         Patient ID: Glenn Nix is a 82 y o  female  New problem- couple of months right hand pain, "started off not so bad, just very slightly, then kept getting worse and worse, past week really started bad, started swelling up at night and pain worse, can't even sleep at night, the pain like electric shock and pins and needles, seems to get better when walk, when up and moving around"  "And of course I'm righty"  No prior similar problem  No history of injury or trauma  No neck pains associated  Has wrist splint at home-  "tried it at night and didn't help"  Uses tylenol for her arthritis, not helping the hand pain  Does have hx leukemia, has not noticed any lymph node swelling recently      6/25/2021  [facility] REPORT  CLINICAL:  Indications:  Yearly surveillance of carotid artery disease  Patient is asymptomatic at this  time  Operative History:  Cardiac cath with stent placement  Risk Factors  The patient has history of HTN and HLD  She has no history of Diabetes  Clinical  Right Pressure: 150/80 mm Hg, Left Pressure: 150/80 mm Hg  CONCLUSION:  Impression  RIGHT:  There is <50% stenosis noted in the internal carotid artery  Plaque is  heterogenous/calcified  Vertebral artery flow is antegrade  There is no significant subclavian artery  disease  LEFT:  There is <50% stenosis noted in the internal carotid artery  Plaque is  heterogenous/calcified  Vertebral artery flow is antegrade  There is no significant subclavian artery  disease  Compared to previous study on 08/03/2020, there is no significant change  SIGNATURE:  Electronically Signed by: Bozena Ventura on 2021-06-25 08:54:37 AM          4/26/2021  [facility] [place]  HPI:  Hematology follow-up  Patient has been getting the diagnosis of large granular cell lymphocytic leukemia but last several years and patient has not been symptomatic from this condition and has not required therapy for this condition and this condition is being monitored by examination and lab tests  She has other problems like pancreatic cyst and she follows with her gastroenterologist   History of bladder cancer and she follows with a urologist   History of ventral hernia in the lower epigastric area with fatty tissue and she follows with her surgeon  Again patient is not symptomatic from these conditions   She has 2 stents in her heart  She has history of kidney stone  She has history of Raynaud's  She had herpes zoster in January 2016  History of osteopenia  She has been on vitamin D and prolia  She follows with her rheumatologist   History of hepatitis in 1993   History of ALONSO and BSO in 1980 for bleeding  No cancer  History of pulmonary embolism several years ago  History of stomach ulcer in 1970, pneumonias in 2003 and 2004 and hypertension  She is sensitive to cold weather  History of arthritis in the neck and all over  She follows with a rheumatologist     Has tiredness  Assessment and Plan:  1  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leukemia [facility])   24-year-old female presents for follow-up regarding chronic large granular lymphocytic leukemia  This remains in observation status  No treatment is required  Reviewed that since patient has been observation every 6 months for 10 years we can monitor every year at this point  She is in agreement  Follow-up 1 year labs  - CBC and differential; Future  - Comprehensive metabolic panel; Future  - LD,Blood; Future  HPI:  Oncology History Overview Note   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  that was diagnosed several years ago and patient has not required any therapy for this condition  Patient's condition and counts are being monitored  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[facility] [address] )     Chemotherapy       2018: No treatment  Surveillance only             The following portions of the patient's history were reviewed and updated as appropriate: allergies, current medications, past family history, past medical history, past social history, past surgical history and problem list     Review of Systems   All other systems reviewed and are negative  Objective:      /82 (BP Location: Left arm, Patient Position: Sitting)   Pulse 82   Temp (!) 97 2 °F (36 2 °C)   Resp 17   Ht 5' (1 524 m)   Wt 58 1 kg (128 lb)   SpO2 98%   BMI 25 00 kg/m²          Physical Exam  Constitutional:       General: She is not in acute distress  Appearance: She is well-developed  She is not ill-appearing, toxic-appearing or diaphoretic  HENT:      Head: Normocephalic and atraumatic  Mouth/Throat:      Pharynx: Uvula midline  Neck:      Trachea: Phonation normal    Cardiovascular:      Rate and Rhythm: Normal rate and regular rhythm  Heart sounds: S1 normal and S2 normal  No friction rub  No gallop  Comments: No lower extrem edema  Pulmonary:      Effort: Pulmonary effort is normal       Breath sounds: Normal breath sounds  Musculoskeletal:      Right hand: Swelling and tenderness present  No deformity or bony tenderness  Normal range of motion  Normal strength  Normal sensation  Normal capillary refill  Normal pulse  Left hand: Normal         Arms:    Skin:     General: Skin is warm and dry        Capillary Refill: Capillary refill takes less than 2 seconds  Coloration: Skin is not pale  Neurological:      Mental Status: She is alert and oriented to person, place, and time  Gait: Gait normal    Psychiatric:         Mood and Affect: Mood normal          Behavior: Behavior is cooperative

## 2021-07-10 RX ORDER — CARVEDILOL 3.12 MG/1
TABLET ORAL
Qty: 30 TABLET | Refills: 5 | Status: SHIPPED | OUTPATIENT
Start: 2021-07-10 | End: 2021-11-03

## 2021-07-17 DIAGNOSIS — R60.0 LOWER EXTREMITY EDEMA: ICD-10-CM

## 2021-07-17 DIAGNOSIS — E78.5 HYPERLIPIDEMIA, UNSPECIFIED HYPERLIPIDEMIA TYPE: ICD-10-CM

## 2021-07-18 RX ORDER — ATORVASTATIN CALCIUM 40 MG/1
TABLET, FILM COATED ORAL
Qty: 90 TABLET | Refills: 3 | Status: SHIPPED | OUTPATIENT
Start: 2021-07-18 | End: 2022-08-08

## 2021-07-19 RX ORDER — BUMETANIDE 1 MG/1
TABLET ORAL
Qty: 90 TABLET | Refills: 3 | Status: SHIPPED | OUTPATIENT
Start: 2021-07-19

## 2021-07-21 ENCOUNTER — OFFICE VISIT (OUTPATIENT)
Dept: FAMILY MEDICINE CLINIC | Facility: CLINIC | Age: 86
End: 2021-07-21
Payer: MEDICARE

## 2021-07-21 ENCOUNTER — APPOINTMENT (OUTPATIENT)
Dept: RADIOLOGY | Facility: MEDICAL CENTER | Age: 86
End: 2021-07-21
Payer: MEDICARE

## 2021-07-21 ENCOUNTER — APPOINTMENT (OUTPATIENT)
Dept: LAB | Facility: CLINIC | Age: 86
End: 2021-07-21
Payer: MEDICARE

## 2021-07-21 VITALS
OXYGEN SATURATION: 100 % | WEIGHT: 126 LBS | BODY MASS INDEX: 23.79 KG/M2 | HEART RATE: 76 BPM | SYSTOLIC BLOOD PRESSURE: 128 MMHG | DIASTOLIC BLOOD PRESSURE: 74 MMHG | TEMPERATURE: 98.4 F | HEIGHT: 61 IN

## 2021-07-21 DIAGNOSIS — Z13.0 SCREENING FOR DEFICIENCY ANEMIA: ICD-10-CM

## 2021-07-21 DIAGNOSIS — R20.2 PARESTHESIAS IN RIGHT HAND: ICD-10-CM

## 2021-07-21 DIAGNOSIS — M79.643 INTERMITTENT PAIN AND SWELLING OF HAND: ICD-10-CM

## 2021-07-21 DIAGNOSIS — M79.89 INTERMITTENT PAIN AND SWELLING OF HAND: ICD-10-CM

## 2021-07-21 DIAGNOSIS — M79.643 INTERMITTENT PAIN AND SWELLING OF HAND: Primary | ICD-10-CM

## 2021-07-21 DIAGNOSIS — M79.89 INTERMITTENT PAIN AND SWELLING OF HAND: Primary | ICD-10-CM

## 2021-07-21 LAB
ANION GAP SERPL CALCULATED.3IONS-SCNC: 6 MMOL/L (ref 4–13)
BASOPHILS # BLD AUTO: 0.04 THOUSANDS/ΜL (ref 0–0.1)
BASOPHILS NFR BLD AUTO: 1 % (ref 0–1)
BUN SERPL-MCNC: 26 MG/DL (ref 5–25)
CALCIUM SERPL-MCNC: 10 MG/DL (ref 8.3–10.1)
CHLORIDE SERPL-SCNC: 101 MMOL/L (ref 100–108)
CO2 SERPL-SCNC: 30 MMOL/L (ref 21–32)
CREAT SERPL-MCNC: 0.84 MG/DL (ref 0.6–1.3)
CRP SERPL QL: <3 MG/L
EOSINOPHIL # BLD AUTO: 0.15 THOUSAND/ΜL (ref 0–0.61)
EOSINOPHIL NFR BLD AUTO: 2 % (ref 0–6)
ERYTHROCYTE [DISTWIDTH] IN BLOOD BY AUTOMATED COUNT: 12.1 % (ref 11.6–15.1)
GFR SERPL CREATININE-BSD FRML MDRD: 64 ML/MIN/1.73SQ M
GLUCOSE SERPL-MCNC: 98 MG/DL (ref 65–140)
HCT VFR BLD AUTO: 39.7 % (ref 34.8–46.1)
HGB BLD-MCNC: 12.9 G/DL (ref 11.5–15.4)
IMM GRANULOCYTES # BLD AUTO: 0.01 THOUSAND/UL (ref 0–0.2)
IMM GRANULOCYTES NFR BLD AUTO: 0 % (ref 0–2)
LYMPHOCYTES # BLD AUTO: 1.77 THOUSANDS/ΜL (ref 0.6–4.47)
LYMPHOCYTES NFR BLD AUTO: 27 % (ref 14–44)
MCH RBC QN AUTO: 31.9 PG (ref 26.8–34.3)
MCHC RBC AUTO-ENTMCNC: 32.5 G/DL (ref 31.4–37.4)
MCV RBC AUTO: 98 FL (ref 82–98)
MONOCYTES # BLD AUTO: 0.5 THOUSAND/ΜL (ref 0.17–1.22)
MONOCYTES NFR BLD AUTO: 8 % (ref 4–12)
NEUTROPHILS # BLD AUTO: 4.13 THOUSANDS/ΜL (ref 1.85–7.62)
NEUTS SEG NFR BLD AUTO: 62 % (ref 43–75)
NRBC BLD AUTO-RTO: 0 /100 WBCS
PLATELET # BLD AUTO: 185 THOUSANDS/UL (ref 149–390)
PMV BLD AUTO: 10.7 FL (ref 8.9–12.7)
POTASSIUM SERPL-SCNC: 4 MMOL/L (ref 3.5–5.3)
RBC # BLD AUTO: 4.05 MILLION/UL (ref 3.81–5.12)
SODIUM SERPL-SCNC: 137 MMOL/L (ref 136–145)
WBC # BLD AUTO: 6.6 THOUSAND/UL (ref 4.31–10.16)

## 2021-07-21 PROCEDURE — 99214 OFFICE O/P EST MOD 30 MIN: CPT | Performed by: FAMILY MEDICINE

## 2021-07-21 PROCEDURE — 86430 RHEUMATOID FACTOR TEST QUAL: CPT

## 2021-07-21 PROCEDURE — 73130 X-RAY EXAM OF HAND: CPT

## 2021-07-21 PROCEDURE — 86431 RHEUMATOID FACTOR QUANT: CPT

## 2021-07-21 PROCEDURE — 86038 ANTINUCLEAR ANTIBODIES: CPT

## 2021-07-21 PROCEDURE — 86140 C-REACTIVE PROTEIN: CPT

## 2021-07-21 PROCEDURE — 36415 COLL VENOUS BLD VENIPUNCTURE: CPT

## 2021-07-21 PROCEDURE — 85025 COMPLETE CBC W/AUTO DIFF WBC: CPT

## 2021-07-21 PROCEDURE — 80048 BASIC METABOLIC PNL TOTAL CA: CPT

## 2021-07-21 NOTE — PROGRESS NOTES
Assessment/Plan:         Diagnoses and all orders for this visit:    Intermittent pain and swelling of hand  -     CBC and differential; Future  -     Basic metabolic panel; Future  -     C-reactive protein; Future  -     JORGE Screen w/ Reflex to Titer/Pattern; Future  -     RF Screen w/ Reflex to Titer; Future  -     XR hand 3+ vw right; Future    Paresthesias in right hand  -     CBC and differential; Future  -     Basic metabolic panel; Future  -     C-reactive protein; Future  -     JORGE Screen w/ Reflex to Titer/Pattern; Future  -     RF Screen w/ Reflex to Titer; Future  -     XR hand 3+ vw right; Future    BMI 23 0-23 9, adult    Screening for deficiency anemia  -     CBC and differential; Future    Other orders  -     Methylcellulose, Laxative, (CITRUCEL PO); Take by mouth  -     Docusate Sodium (COLACE PO); Take by mouth          Subjective:   Chief Complaint   Patient presents with    Follow-up     6 month check  No refills needed   Edema     Right hand swelling        Patient ID: Janie Or is a 82 y o  female  Here for scheduled f/u  C/o Right hand still swollen, was able to get appt sooner with ortho hand specialist  Pt asks, "Can it be the raynauds?  The pinching I've had for years, but not the swelling; doesn't make any sense, when I'm up and moving around, can tolerate it"  "Gets short period of relief at night past 3 nights with tylenol PM- can sleep for 3 hours"  States, "Rheumatologist gave me tramadol, cut it in half and it helped me, but after a few days I was so constipated, any of that stuff just constipates me" - still has some tramadol at home, last saw rheum about 3 months ago  Using citrucel, stool softener and prunes currently   Weight loss 2 # since last visit this month- "makes me nauseous from the pain"      The following portions of the patient's history were reviewed and updated as appropriate: allergies, current medications, past family history, past medical history, past social history, past surgical history and problem list     Review of Systems   All other systems reviewed and are negative  Objective:      /74 (BP Location: Left arm, Patient Position: Sitting, Cuff Size: Standard)   Pulse 76   Temp 98 4 °F (36 9 °C) (Tympanic)   Ht 5' 1" (1 549 m)   Wt 57 2 kg (126 lb)   SpO2 100%   BMI 23 81 kg/m²          Physical Exam  Constitutional:       General: She is not in acute distress  Appearance: She is well-developed  She is not ill-appearing, toxic-appearing or diaphoretic  HENT:      Head: Normocephalic and atraumatic  Neck:      Thyroid: No thyroid mass or thyromegaly  Trachea: Trachea and phonation normal    Cardiovascular:      Rate and Rhythm: Normal rate and regular rhythm  Heart sounds: Normal heart sounds  Pulmonary:      Effort: Pulmonary effort is normal       Breath sounds: Normal breath sounds  Abdominal:      General: Bowel sounds are normal  There is no distension  Palpations: Abdomen is soft  There is no hepatomegaly, splenomegaly or mass  Tenderness: There is no abdominal tenderness  Musculoskeletal:      Cervical back: Neck supple  Right lower leg: No edema  Left lower leg: No edema  Comments: Still with right hand swelling, though decreased compared to last visit, still generally tender   Lymphadenopathy:      Cervical: No cervical adenopathy  Skin:     General: Skin is warm and dry  Coloration: Skin is not pale  Neurological:      Mental Status: She is alert and oriented to person, place, and time  Gait: Gait normal    Psychiatric:         Mood and Affect: Mood normal          Behavior: Behavior is cooperative  BMI Counseling: Body mass index is 23 81 kg/m²  The BMI is below normal  Dietary education for weight gain was provided to the patient today

## 2021-07-23 LAB
CRYOGLOB RF SER-ACNC: ABNORMAL [IU]/ML
RHEUMATOID FACT SER QL LA: POSITIVE
RYE IGE QN: NEGATIVE

## 2021-08-05 ENCOUNTER — APPOINTMENT (OUTPATIENT)
Dept: LAB | Facility: CLINIC | Age: 86
End: 2021-08-05
Payer: MEDICARE

## 2021-08-05 DIAGNOSIS — M06.4 INFLAMMATORY POLYARTHROPATHY (HCC): ICD-10-CM

## 2021-08-05 DIAGNOSIS — M35.01 SICCA SYNDROME WITH KERATOCONJUNCTIVITIS (HCC): ICD-10-CM

## 2021-08-05 DIAGNOSIS — M35.00 SJOGREN'S SYNDROME, WITH UNSPECIFIED ORGAN INVOLVEMENT (HCC): ICD-10-CM

## 2021-08-05 LAB
CRP SERPL QL: <3 MG/L
URATE SERPL-MCNC: 3.5 MG/DL (ref 2–6.8)

## 2021-08-05 PROCEDURE — 86235 NUCLEAR ANTIGEN ANTIBODY: CPT

## 2021-08-05 PROCEDURE — 86038 ANTINUCLEAR ANTIBODIES: CPT

## 2021-08-05 PROCEDURE — 86140 C-REACTIVE PROTEIN: CPT

## 2021-08-05 PROCEDURE — 86200 CCP ANTIBODY: CPT

## 2021-08-05 PROCEDURE — 84550 ASSAY OF BLOOD/URIC ACID: CPT

## 2021-08-05 PROCEDURE — 36415 COLL VENOUS BLD VENIPUNCTURE: CPT

## 2021-08-06 LAB
CCP AB SER IA-ACNC: 1.3
ENA SS-A AB SER-ACNC: <0.2 AI (ref 0–0.9)
ENA SS-B AB SER-ACNC: <0.2 AI (ref 0–0.9)
RYE IGE QN: NEGATIVE

## 2021-08-13 ENCOUNTER — CONSULT (OUTPATIENT)
Dept: OBGYN CLINIC | Facility: HOSPITAL | Age: 86
End: 2021-08-13
Payer: MEDICARE

## 2021-08-13 VITALS
DIASTOLIC BLOOD PRESSURE: 94 MMHG | HEIGHT: 60 IN | SYSTOLIC BLOOD PRESSURE: 182 MMHG | WEIGHT: 127.6 LBS | HEART RATE: 80 BPM | BODY MASS INDEX: 25.05 KG/M2

## 2021-08-13 DIAGNOSIS — G56.01 CARPAL TUNNEL SYNDROME OF RIGHT WRIST: ICD-10-CM

## 2021-08-13 DIAGNOSIS — M79.89 INTERMITTENT PAIN AND SWELLING OF HAND: ICD-10-CM

## 2021-08-13 DIAGNOSIS — M79.643 INTERMITTENT PAIN AND SWELLING OF HAND: ICD-10-CM

## 2021-08-13 PROCEDURE — 99203 OFFICE O/P NEW LOW 30 MIN: CPT | Performed by: ORTHOPAEDIC SURGERY

## 2021-08-13 RX ORDER — PREDNISONE 1 MG/1
TABLET ORAL
COMMUNITY
End: 2021-09-27

## 2021-08-13 RX ORDER — TRAMADOL HYDROCHLORIDE 50 MG/1
50 TABLET ORAL EVERY 6 HOURS PRN
COMMUNITY
End: 2021-10-12 | Stop reason: HOSPADM

## 2021-08-13 NOTE — PROGRESS NOTES
ASSESSMENT/PLAN:    Assessment:   Carpal Tunnel Syndrome  right    Plan: We will order a US of the patients right wrist to evaluate for CTS  She was advised to continue bracing  She expressed understanding  Follow Up: After Testing    To Do Next Visit:       General Discussions:     Carpal Tunnel Syndrome: The anatomy and physiology of carpal tunnel syndrome was discussed with the patient today  Increase pressure localized under the transverse carpal ligament can cause pain, numbness, tingling, or dysesthesias within the median nerve distribution as well as feelings of fatigue, clumsiness, or awkwardness  These symptoms typically occur at night and worse in the morning upon waking  Eventually, untreated carpal tunnel syndrome can result in weakness and permanent loss of muscle within the thenar compartment of the hand  Treatment options were discussed with the patient  Conservative treatment includes nocturnal resting splints to keep the nerve in a neutral position, ergonomic changes within the work or home environment, activity modification, and tendon gliding exercises  Steroid injections within the carpal canal can help a majority of patients, however this is often self-limited in a majority of patients  Surgical intervention to divide the transverse carpal ligament typically results in a long-lasting relief of the patient's complaints, with the recurrence rate of less than 1%  Operative Discussions:       _____________________________________________________  CHIEF COMPLAINT:  Chief Complaint   Patient presents with    Right Hand - Pain, Swelling, Numbness         SUBJECTIVE:  Ashlee Nunez is a 82 y o  female who presents with Numbness and swelling to the right hand  This started  4 month(s) ago as Insidious  Patient states that the numbness does affect her at night time  She describes a positive flick sign  She reports difficulties using her right hand due to the pain and swelling   She states that she is not dropping items  Patient is a referral from her PCP  Radiation: Yes to the  hand and wrist   Previous Treatments: prednisone and bracing with only partial relief  Patient reports that the brace bothers her at night time and notices that she tends to wears it during the day  Associated symptoms: No Complaints    PAST MEDICAL HISTORY:  Past Medical History:   Diagnosis Date    Bladder cancer [facility])     had BCG treatments    Bladder cancer ([address] )     Coronary artery disease     S/P stent placement x 2 in 2011    GERD (gastroesophageal reflux disease)     Hepatitis     got this from food back in 1970s, has not had a problem since    History of leukemia     in remission    History of stomach ulcers 1970    History of transfusion 1970    Hyperlipidemia     Hypertension     Irritable bowel syndrome     Kidney stone     Kidney stones     Pneumonia     Pt had in 2003 and 2004    Pulmonary emboli (HCC) 1970s    Raynaud disease     Shingles     Sleep apnea        PAST SURGICAL HISTORY:  Past Surgical History:   Procedure Laterality Date    CATARACT EXTRACTION      COLONOSCOPY      CORONARY ANGIOPLASTY WITH STENT PLACEMENT      stent x 2    CYSTOSCOPY      diagnostic - onset 4/4/17    EGD      EYE SURGERY      FRACTURE SURGERY      HERNIA REPAIR      HYSTERECTOMY      LEG SURGERY      OOPHORECTOMY      CT OPEN RX FEMUR FX+INTRAMED ALEJANDRO Left 4/8/2018    Procedure: INSERTION NAIL IM FEMUR ANTEGRADE (TROCHANTERIC);   Surgeon: Leif Villanueva MD;  [facility];  Service: Orthopedics    [address] [place] [address] N/A 5/12/2021    Procedure: REPAIR HERNIA VENTRAL;  Surgeon: Lenka Cr MD;  [facility];  Service: General    TONSILLECTOMY         FAMILY HISTORY:  Family History   Problem Relation Age of Onset    Arthritis Mother     Osteoporosis Mother     Heart disease Father     Hypertension Father     Hypertension Brother     Prostate cancer Brother     Breast cancer Daughter 48    Prostate cancer Son        SOCIAL HISTORY:  Social History     Tobacco Use    Smoking status: Never Smoker    Smokeless tobacco: Never Used   Vaping Use    Vaping Use: Never used   Substance Use Topics    Alcohol use: No    Drug use: No       MEDICATIONS:    Current Outpatient Medications:     acetaminophen (TYLENOL) 500 mg tablet, Take 500 mg by mouth every 6 (six) hours as needed for mild pain, Disp: , Rfl:     amLODIPine (NORVASC) 5 mg tablet, TAKE 1 TABLET BY MOUTH  DAILY, Disp: 90 tablet, Rfl: 1    aspirin 81 MG tablet, Take 81 mg by mouth daily Resume on 4/28 , Disp: , Rfl:     atorvastatin (LIPITOR) 40 mg tablet, TAKE 1 TABLET BY MOUTH  DAILY AFTER DINNER, Disp: 90 tablet, Rfl: 3    bumetanide (BUMEX) 1 mg tablet, TAKE 1/2 A TABLET BY MOUTH  DAILY TAKE A WHOLE TABLET IF  NEEDED, Disp: 90 tablet, Rfl: 3    carvedilol (COREG) 3 125 mg tablet, take 1 tablet by mouth daily with dinner (TAKE WITH THE 6 25 MG TABLET AT DINNER), Disp: 30 tablet, Rfl: 5    carvedilol (COREG) 6 25 mg tablet, TAKE 1 TABLET BY MOUTH  TWICE DAILY WITH MEALS, Disp: 180 tablet, Rfl: 3    Cholecalciferol 2000 units TABS, Take 2,000 Units by mouth daily Resume on 4/28, Disp: , Rfl:     dicyclomine (BENTYL) 10 mg capsule, Take 10 mg by mouth as needed, Disp: , Rfl:     Docusate Sodium (COLACE PO), Take by mouth, Disp: , Rfl:     DULoxetine (CYMBALTA) 30 mg delayed release capsule, TAKE 1 CAPSULE BY MOUTH  DAILY, Disp: 90 capsule, Rfl: 3    fexofenadine (ALLEGRA) 180 MG tablet, Take 180 mg by mouth daily as needed , Disp: , Rfl:     losartan (COZAAR) 100 MG tablet, TAKE 1 TABLET BY MOUTH  DAILY, Disp: 90 tablet, Rfl: 1    Methylcellulose, Laxative, (CITRUCEL PO), Take by mouth, Disp: , Rfl:     MULTIPLE VITAMIN PO, Take by mouth daily , Disp: , Rfl:     omeprazole (PriLOSEC) 40 MG capsule, TAKE 1 CAPSULE BY MOUTH  DAILY BEFORE BREAKFAST, Disp: 90 capsule, Rfl: 3    predniSONE 5 mg tablet, prednisone 5 mg tablet  4 tablets once a day in the morning with food for 3 days the 3 tablets a day for 4 days then 2 tablets a day for 5 days then stay on 1 tablet a day, Disp: , Rfl:     Restasis 0 05 % ophthalmic emulsion, , Disp: , Rfl:     traMADol (ULTRAM) 50 mg tablet, Take 50 mg by mouth every 6 (six) hours as needed for moderate pain, Disp: , Rfl:     ALLERGIES:  Allergies   Allergen Reactions    Lactose - Food Allergy GI Intolerance       REVIEW OF SYSTEMS:  Pertinent items are noted in HPI      LABS:  HgA1c: No results found for: HGBA1C  BMP:   Lab Results   Component Value Date    CALCIUM 10 0 07/21/2021    K 4 0 07/21/2021    CO2 30 07/21/2021     07/21/2021    BUN 26 (H) 07/21/2021    CREATININE 0 84 07/21/2021         _____________________________________________________  PHYSICAL EXAMINATION:  Vital signs: BP (!) 178/82   Pulse 84   Ht 5' (1 524 m)   Wt 57 9 kg (127 lb 9 6 oz)   BMI 24 92 kg/m²   General: well developed and well nourished, alert, oriented times 3 and appears comfortable  Psychiatric: Normal  HEENT: Trachea Midline, No torticollis  Cardiovascular: No discernable arrhythmia  Pulmonary: No wheezing or stridor  Abdomen: No rebound or guarding  Extremities: No peripheral edema  Skin: No masses, erythema, lacerations, fluctation, ulcerations  Neurovascular: Pulses Intact    MUSCULOSKELETAL EXAMINATION:  RIGHT SIDE:  Carpal tunnel:  Weakness APB, Atrophy to thenar muscles and Postive Tinel's atrophy to thenar muscles,  deltoid 5/5, biceps 5/5, triceps 5/5, wrist flexion 5/5, wrist extension 5/5, full elbow and wrist ROM, AIN 5/5, intrinsic 4+/5, can oppose to ring, apb 4/5      _____________________________________________________  STUDIES REVIEWED:  No Studies to review      PROCEDURES PERFORMED:  Procedures  No Procedures performed today   Scribe Attestation    I,:  Collin Sauceda am acting as a scribe while in the presence of the attending physician :       I,: Ashlie Henriquez MD personally performed the services described in this documentation    as scribed in my presence :

## 2021-08-16 ENCOUNTER — TELEPHONE (OUTPATIENT)
Dept: OBGYN CLINIC | Facility: HOSPITAL | Age: 86
End: 2021-08-16

## 2021-08-31 ENCOUNTER — HOSPITAL ENCOUNTER (OUTPATIENT)
Dept: RADIOLOGY | Facility: HOSPITAL | Age: 86
Discharge: HOME | End: 2021-08-31
Attending: ORTHOPAEDIC SURGERY
Payer: MEDICARE

## 2021-08-31 DIAGNOSIS — G56.01 CARPAL TUNNEL SYNDROME OF RIGHT WRIST: ICD-10-CM

## 2021-08-31 PROCEDURE — 76882 US LMTD JT/FCL EVL NVASC XTR: CPT

## 2021-09-08 ENCOUNTER — OFFICE VISIT (OUTPATIENT)
Dept: OBGYN CLINIC | Facility: HOSPITAL | Age: 86
End: 2021-09-08
Payer: MEDICARE

## 2021-09-08 VITALS
WEIGHT: 128.8 LBS | HEART RATE: 82 BPM | BODY MASS INDEX: 25.29 KG/M2 | HEIGHT: 60 IN | SYSTOLIC BLOOD PRESSURE: 163 MMHG | DIASTOLIC BLOOD PRESSURE: 72 MMHG

## 2021-09-08 DIAGNOSIS — G56.01 CARPAL TUNNEL SYNDROME OF RIGHT WRIST: Primary | ICD-10-CM

## 2021-09-08 PROCEDURE — 99214 OFFICE O/P EST MOD 30 MIN: CPT | Performed by: ORTHOPAEDIC SURGERY

## 2021-09-08 RX ORDER — LIDOCAINE HYDROCHLORIDE AND EPINEPHRINE 10; 10 MG/ML; UG/ML
20 INJECTION, SOLUTION INFILTRATION; PERINEURAL ONCE
Status: CANCELLED | OUTPATIENT
Start: 2021-09-08 | End: 2021-09-08

## 2021-09-08 NOTE — PROGRESS NOTES
ASSESSMENT/PLAN:    Assessment:   Carpal Tunnel Syndrome  Right  CMC osteoarthritis Bilateral    Plan:   Endoscopic Carpal Tunnel Release  Right (under local)  We will hold on B/L CMC injections today  We will consider injections at a later date after surgery  Follow Up: After Surgery    To Do Next Visit:  Sutures out    General Discussions:     [facility] Arthritis: The anatomy and physiology of carpometacarpal joint arthritis was discussed with the patient today in the office  Deterioration of the articular cartilage eventually leads to hypermobility at the thumb [facility] joint, resulting in joint subluxation, osteophyte formation, cystic changes within the trapezium and base of the first metacarpal, as well as subchondral sclerosis  Eventually, pain, limited mobility, and compensatory hyperextension at the metacarpophalangeal joint may develop  While normal activity and usage of the thumb joint may provide a painful experience to the patient, this typically does not result in damage to the thumb or hand  Treatment options include resting thumb spica splints to decreased joint edema, pain, and inflammation  Therapy exercises to strengthen the thenar musculature may relieve pain, but do not alter the overall continued development of osteoarthritis  Oral medications, topical medications, corticosteroid injections may decrease pain and increase overall function  Eventually, approximately 5% of patients may require surgical intervention  Operative Discussions:  Endoscopic Carpal Tunnel Release: The anatomy and physiology of carpal tunnel syndrome was discussed with the patient today  Increase pressure localized under the transverse carpal ligament can cause pain, numbness, tingling, or dysesthesias within the median nerve distribution as well as feelings of fatigue, clumsiness, or awkwardness  These symptoms typically occur at night and worse in the morning upon waking    Eventually, untreated carpal tunnel syndrome can result in weakness and permanent loss of muscle within the thenar compartment of the hand  Treatment options were discussed with the patient  Conservative treatment includes nocturnal resting splints to keep the nerve in a neutral position, ergonomic changes within the work or home environment, activity modification, and tendon gliding exercises  Steroid injections within the carpal canal can help a majority of patients, however this is often self-limited in a majority of patients  Surgical intervention to divide the transverse carpal ligament typically results in a long-lasting relief of the patient's complaints, with the recurrence rate of less than 1%  The patient has elected to undergo an endoscopic carpal tunnel release  The 2 incision technique was discussed with the patient, which results in approximately a two-week less recovery time, and less wound complications  In the postoperative period, light activities are allowed immediately, driving is allowed when narcotic medication has stopped, and the bandages may be removed and incision may get wet after 2 days  Heavy activities (lifting more than approximately 10 pounds) will be allowed after follow up appointment in 1-2 weeks  While the pain and discomfort in the hands generally improves rapidly, the numbness and tingling as well as the strength will slowly improve over weeks to months depending on the severity of the carpal tunnel syndrome  Pillar pain was discussed with the patient, which is typically a common but self-limiting condition  The risks of bleeding and infection from the surgery are less than 1%  Risk of recurrence is approximately 0 5%  The risks of nerve injury or nerve damage or damage to the blood vessels is approximately 1 in 1200  The patient has an understanding of the above mentioned discussion  The risks and benefits of the procedure were explained to the patient, which include, but are not limited to: Bleeding, infection, recurrence, pain, scar, damage to tendons, damage to nerves, and damage to blood vessels, failure to give desired results and complications related to anesthesia   These risks, along with alternative conservative treatment options, and postoperative protocols were voiced back and understood by the patient   All questions were answered to the patient's satisfaction   The patient agrees to comply with a standard postoperative protocol, and is willing to proceed   Education was provided via written and auditory forms   There were no barriers to learning  Written handouts regarding wound care, incision and scar care, and general preoperative information was provided to the patient   Prior to surgery, the patient may be requested to stop all anti-inflammatory medications   Prophylactic aspirin, Plavix, and Coumadin may be allowed to be continued   Medications including vitamin E , ginkgo, and fish oil are requested to be stopped approximately one week prior to surgery   Hypertensive medications and beta blockers, if taken, should be continued  _____________________________________________________  CHIEF COMPLAINT:  Chief Complaint   Patient presents with    Right Wrist - Follow-up, Results     U/s performed 8/13/21         SUBJECTIVE:  Shon Bobo is a 82 y o  female who presents for follow up regarding Carpal Tunnel Syndrome  right  Since last visit, Shon Bobo has tried night time bracing with only partial relief  Today there is Numbness to the right hand      Radiation: None  Associated symptoms: Pain  Moderate  Intermittant  Dull and Aching  Handedness: right  Work status: retired    PAST MEDICAL HISTORY:  Past Medical History:   Diagnosis Date    Bladder cancer ([facility] )     had BCG treatments    Bladder cancer ([facility] )     Coronary artery disease     S/P stent placement x 2 in 2011    GERD (gastroesophageal reflux disease)     Hepatitis     got this from food back in 1970s, has not had a problem since    History of leukemia     in remission    History of stomach ulcers 1970    History of transfusion 1970    Hyperlipidemia     Hypertension     Irritable bowel syndrome     Kidney stone     Kidney stones     Pneumonia     Pt had in 2003 and 2004    Pulmonary emboli (HCC) 1970s    Raynaud disease     Shingles     Sleep apnea        PAST SURGICAL HISTORY:  Past Surgical History:   Procedure Laterality Date    CATARACT EXTRACTION      COLONOSCOPY      CORONARY ANGIOPLASTY WITH STENT PLACEMENT      stent x 2    CYSTOSCOPY      diagnostic - onset 4/4/17    EGD      EYE SURGERY      FRACTURE SURGERY      HERNIA REPAIR      HYSTERECTOMY      LEG SURGERY      OOPHORECTOMY      CT OPEN RX FEMUR FX+INTRAMED ALEJANDRO Left 4/8/2018    Procedure: INSERTION NAIL IM FEMUR ANTEGRADE (TROCHANTERIC);   Surgeon: Ridge Wilkinson MD;  [facility];  Service: Orthopedics    [address] [place] [address] N/A 5/12/2021    Procedure: REPAIR HERNIA VENTRAL;  Surgeon: Omer Logan MD;  [facility];  Service: General    TONSILLECTOMY         FAMILY HISTORY:  Family History   Problem Relation Age of Onset    Arthritis Mother     Osteoporosis Mother     Heart disease Father     Hypertension Father     Hypertension Brother     Prostate cancer Brother     Breast cancer Daughter 48    Prostate cancer Son        SOCIAL HISTORY:  Social History     Tobacco Use    Smoking status: Never Smoker    Smokeless tobacco: Never Used   Vaping Use    Vaping Use: Never used   Substance Use Topics    Alcohol use: No    Drug use: No       MEDICATIONS:    Current Outpatient Medications:     acetaminophen (TYLENOL) 500 mg tablet, Take 500 mg by mouth every 6 (six) hours as needed for mild pain, Disp: , Rfl:     amLODIPine (NORVASC) 5 mg tablet, TAKE 1 TABLET BY MOUTH  DAILY, Disp: 90 tablet, Rfl: 1    aspirin 81 MG tablet, Take 81 mg by mouth daily Resume on 4/28 , Disp: , Rfl:     atorvastatin (LIPITOR) 40 mg tablet, TAKE 1 TABLET BY MOUTH  DAILY AFTER DINNER, Disp: 90 tablet, Rfl: 3    bumetanide (BUMEX) 1 mg tablet, TAKE 1/2 A TABLET BY MOUTH  DAILY TAKE A WHOLE TABLET IF  NEEDED, Disp: 90 tablet, Rfl: 3    carvedilol (COREG) 3 125 mg tablet, take 1 tablet by mouth daily with dinner (TAKE WITH THE 6 25 MG TABLET AT DINNER), Disp: 30 tablet, Rfl: 5    carvedilol (COREG) 6 25 mg tablet, TAKE 1 TABLET BY MOUTH  TWICE DAILY WITH MEALS, Disp: 180 tablet, Rfl: 3    Cholecalciferol 2000 units TABS, Take 2,000 Units by mouth daily Resume on 4/28, Disp: , Rfl:     dicyclomine (BENTYL) 10 mg capsule, Take 10 mg by mouth as needed, Disp: , Rfl:     Docusate Sodium (COLACE PO), Take by mouth, Disp: , Rfl:     DULoxetine (CYMBALTA) 30 mg delayed release capsule, TAKE 1 CAPSULE BY MOUTH  DAILY, Disp: 90 capsule, Rfl: 3    fexofenadine (ALLEGRA) 180 MG tablet, Take 180 mg by mouth daily as needed , Disp: , Rfl:     losartan (COZAAR) 100 MG tablet, TAKE 1 TABLET BY MOUTH  DAILY, Disp: 90 tablet, Rfl: 1    Methylcellulose, Laxative, (CITRUCEL PO), Take by mouth, Disp: , Rfl:     MULTIPLE VITAMIN PO, Take by mouth daily , Disp: , Rfl:     omeprazole (PriLOSEC) 40 MG capsule, TAKE 1 CAPSULE BY MOUTH  DAILY BEFORE BREAKFAST, Disp: 90 capsule, Rfl: 3    predniSONE 5 mg tablet, prednisone 5 mg tablet  4 tablets once a day in the morning with food for 3 days the 3 tablets a day for 4 days then 2 tablets a day for 5 days then stay on 1 tablet a day, Disp: , Rfl:     Restasis 0 05 % ophthalmic emulsion, , Disp: , Rfl:     traMADol (ULTRAM) 50 mg tablet, Take 50 mg by mouth every 6 (six) hours as needed for moderate pain, Disp: , Rfl:     ALLERGIES:  Allergies   Allergen Reactions    Lactose - Food Allergy GI Intolerance       REVIEW OF SYSTEMS:  Pertinent items are noted in HPI  A comprehensive review of systems was negative      LABS:  HgA1c: No results found for: HGBA1C  BMP:   Lab Results   Component Value Date    CALCIUM 10 0 07/21/2021    K 4 0 07/21/2021    CO2 30 07/21/2021     07/21/2021    BUN 26 (H) 07/21/2021    CREATININE 0 84 07/21/2021           _____________________________________________________  PHYSICAL EXAMINATION:  Vital signs: /72   Pulse 82   Ht 5' (1 524 m)   Wt 58 4 kg (128 lb 12 8 oz)   BMI 25 15 kg/m²   General: well developed and well nourished, alert, oriented times 3 and appears comfortable  Psychiatric: Normal  HEENT: Trachea Midline, No torticollis  Cardiovascular: No discernable arrhythmia  Pulmonary: No wheezing or stridor  Abdomen: No rebound or guarding  Extremities: No peripheral edema  Skin: No masses, erythema, lacerations, fluctation, ulcerations  Neurovascular: Sensation Intact to the Median, Ulnar, Radial Nerve, Motor Intact to the Median, Ulnar, Radial Nerve and Pulses Intact    MUSCULOSKELETAL EXAMINATION:  RIGHT SIDE:  Carpal tunnel:  Weakness APB, Atrophy to thenar muscles and Postive Tinel's atrophy to thenar muscles,  deltoid 5/5, biceps 5/5, triceps 5/5, wrist flexion 5/5, wrist extension 5/5, full elbow and wrist ROM, AIN 5/5, intrinsic 4+/5, can oppose to ring, apb 4/5    Bilateral CMC Exam:  No adduction contracture  hyperextension deformity of MCP joint  Positive localized tenderness over radial and dorsal aspect of thumb (CMC joint)  Grind test is Positive for pain and Positive for crepitus    _____________________________________________________  STUDIES REVIEWED:  US right wrist demonstrates carpal tunnel ruled in      PROCEDURES PERFORMED:  Procedures  No Procedures performed today   Scribe Attestation    I,:  Yolie Felipe am acting as a scribe while in the presence of the attending physician :       I,:  Lydia Gill MD personally performed the services described in this documentation    as scribed in my presence :

## 2021-09-08 NOTE — PATIENT INSTRUCTIONS
[facility]  Steve Valles MD  Chief of [facility]  [address] [facility], [address] [place] [address]  [phone]  You have chosen to undergo surgery for your condition  Any surgery is associated with risks of potential complications  Certain medical problems such as smoking, diabetes, thyroid disease, neuropathy, malnutrition or bleeding problems may increase your risk of complications  Complications after surgery are rare but include the following:   Bleeding Infection   Scar Pain   Tenderness Problems with healing   Damage to nerves Damage to blood vessels   Lack of desired results Need for further surgery   Numbness Stiffness   Problems with anesthesia Blood clots   Need for hardware removal (if inserted)    If bony work is done, other risks include:   Delayed bone healing   Lack of bone healing   Bones healing in wrong position    While these risks and complications are rare and infrequent they are still important to know and discuss  If you have any other questions, please let me know  _____________________________________________________________________________________  It can be difficult, but it is possible to get on with your life with only one hand for the first few weeks after your operation  The following are a few suggestions that may be helpful when you're recovering from your hand problem or from your hand surgery  While most of these suggestions are really only applicable if your dominant or your writing hand is affected, some apply to problems involving either hand  Most daily activities can be accomplished with some modifications, rather than the need for store bought devices  Before surgery, if you can:   Ask for help: Have others help you with: childcare, housework, and meals   Practice: Dressing, undressing, using the toilet, brushing your teeth, showering   Prepare: for the first few days after surgery    o Open and re-sealed cans and bottles that you may need   o Open medication containers and leave them easy-to-read open  Make sure you put these medication containers out of reach of children, even if you don't expect children to visit you   o Prepare and think about no-cutmeals-sandwiches, ground meats, etc     It helps to have   In the shower  o Plastic bags and rubber bands to cover bandages-the taylor that newspapers come in are good  Also, small trashcan liners will work  Use 2 of these at a time  The other option is an oversized rubber glove that may be purchased at a food store to aid in dishwashing   o A bottle sponge (soft sponge on a long stick)-for the armpit of yourgood hand  o Shower brush  o At [place] in the shower will help you to wash your hair  o A cotton terrycloth bathrobe to aid you in drying your back     In the bathroom  o Toothpaste, shampoo, etc  in a flip top or pump dispenser  o Flossers (dental floss on aYshaped handle)  o Consider an electric razor     In the bedroom  o Back scratcher  o Large sleeve shirts and tops  o Put away clothing which buttons, fastens or snaps in the back, or which uses drawstrings     In the kitchen  o A rubber jar opener Vj-to help open jars, but also to keep things from sliding around while you are working on them   o Double suction cup pads (the little octopus)-to hold items while you use or wash them  o An electric can opener with a lid magnet strong enough to hold the can in the air-for one-handed use   Consider [place] and wear haircut  [facility] Scar [facility]   You should keep your bandages dry and clean; change your dressings if you see drainage coming through your dressings   Signs of infection include increased pain, swelling, redness, warmth, and excessive or foul-smelling drainage  Please contact our office if you experience signs of infection   You may wash with soap and water after given permission     Sutures will be removed between 7-14 days after surgery if the wound is healed  Patients who smoke, have diabetes, or have nutritional problems may need longer to heal    Steri-strips may be placed across your incision at this time, which will fall off or peel away   To help prevent infection, do not submerse your incision area for 2-3 weeks (i e  no hot tubs, pools, ocean, dish water, fish ponds, fish tanks, bathtubs)   Swelling, bruising, and numbness are common after surgery  To help reduce these symptoms, keep the area elevated  Scar Care: To improve the appearance of your scar, you can massage the healed area (using circular motions with your fingertip) for 5 minutes 3x a day after your steri-strips peel away  You can use regular hand lotion or Scar zone from the store  You may also use Silicone pads after the stitches are removed (available at the store)  Redness and bumpiness of the scar are expected  These generally improve as healing progresses, but redness can be expected for up to 6-8 months  ** If you have any questions or concerns, please call our office  [phone]  _____________________________________________________________________________________  Pain Management  Pain after an injury or surgery is common and should often be expected  There are many ways to manage and reduce this pain  This often does not include medications or may not exclusively include medication  Each patient, surgery and surgeon are unique, and the approach to management of pain is individual   It is important to try to discuss your concerns and expectations regarding pain with your surgical team before and after surgery  They want to help you get better and have a good patient experience  Your patient experience includes understanding and treating your pain  Here's what you may expect before surgery and how to manage your pain and medications after surgery   Again, the approach to pain management after injury or surgery is individualized, and this is general information  Your surgical team will have more specific recommendations for you  Before using any of the methods explored here, please discuss with your medical team if these pain management methods are appropriate for you  We advise good communication with your team to let them help you achieve the best outcome  Surgery Day  As your surgery begins, your surgeon and anesthesia team may give you medications by mouth, IV, and/or injection  Giving you medication as the surgery progresses not only helps you to decrease pain during the surgery, but it also reduces your pain post-surgery  You may also receive medication in the recovery room after surgery, if needed  In some cases, you will receive prescription pain medication and instructions for its use to use at home in the days following your surgery  You may also receive instructions on using over-the-counter pain medications  It is important to follow these directions carefully, as many over-the counter medications contain some of the same ingredients as prescription pain medications and using them together can result in a dangerous accidental overdose  Post-Surgery Pain Management  While always important to follow your specific postoperative instructions, here are some different methods, outside of medication, that your team may recommend to reduce your pain:   Elevate: Elevating the injured area so it is higher than your heart can reduce swelling and pain  Swelling can increase quickly by putting your hand at your side, and this can make your dressing feel tight  Often, the pain associated with swelling is difficult to control, so it is best to avoid this problem   Take care of your dressing: If your dressing/splint feels tight, and elevation for 10 minutes does not improve the tight sensation, contact your surgical team  It may be recommended that you unravel any tape or elastic wrap and loosen the outer bandage   If this does not help, you may be advised to tear, unravel, or cut the inner layers with blunt tipped scissors  Make sure you are cutting on the opposite side of where your incision is located  When done, you will need to try to rebuild your dressing to keep your wound clean and covered  Before doing any of this, check with your medical team  They may want to be aware of the tight dressing and could have different instructions for loosening the dressing   Keep moving: If allowed by your surgeon, try to frequently move the fingers, wrist, elbow, and/or shoulder that are outside of the splint or cast  You can do this gently and slowly  This improves blood flow, which limits swelling and prevents bandages from feeling tight  It may be uncomfortable to move at first, but the discomfort will often improve with time and frequently improves with motion  Your surgeon will be more specific about what to move and what to rest    Ice the area: Icing the painful area will typically reduce swelling and inflammation and reduce pain  However, there may be certain procedures (such as surgery on arteries, skin grafts or flaps) where ice could be harmful, so consult your surgeon before using ice   Heat the area: If you are in the phase of care where you can remove your dressing or splint, you may be able to try heat  Heat increases blood flow to an area and can help with muscle spasms, muscle soreness and joint pain   Avoid smoking: Chemicals present in cigarettes can increase pain  Reducing or quitting smoking can improve your pain   Consume vitamin C: Consuming 500mg of vitamin C daily for 6 weeks may reduce pain after some injuries  However, it is ascorbic acid, which can upset your stomach if you have heartburn or gastritis  Post-Surgery Medication Management  The pain-management methods listed above are often effective when used in combination with taking medications post-surgery   There are many different classes of medication that can help pain  Some can be purchased over the counter, and some require a prescription  All medicines can have some benefits and some adverse reactions/side effects  Your surgical team will balance these issues to provide a plan for you  Some commonly prescribed medications can include:   Tylenol (Acetaminophen)   Aleve (Naprosyn/naproxen)   Motrin/Advil (Ibuprofen)   Celebrex (Celecoxib)   Toradol (Ketorolac)    When taking medication, keep the following in mind:   It may take 30-60 minutes for your body to absorb the medication after you take it by mouth, so be patient   Longer-acting medications used before bedtime may help you sleep better the first few nights after surgery   The first few nights post-surgery will generally be the toughest    Do not exceed the dose recommended by your physician or combine medications without consulting with your physician  If you are unfamiliar with these medications, your surgeon can specify how much medication you should take, for how long, and how often  Opioids  Opioids are a type of pain medication made from the poppy plant that is used to make opium and heroin  They can be effective in treating pain, but opioids should be used at a last resort, in limited amounts, and for a limited number of days  Use of these medications should only be done under the guidance of your doctor  When taking opioids, you are at risk of becoming dependent on the medicine, and they may become less effective over time  Oxycodone and hydrocodone are two of the most commonly used and effective opioid pain pills  These pills are frequently combined with Tylenol (acetaminophen), but it must be done carefully  Be sure to consult your surgeon before doing so   Your surgeon will give you a customized plan for managing your pain based on your type of surgery, number of procedures, duration of surgery, etc  Keep in mind that many opioids are combined with Tylenol (acetaminophen) already in the pill, so take care to follow your prescribed directions and not to take more opioids or acetaminophen than prescribed  Overdoses of each of these medications can be dangerous and life threatening  Learn more about opioids, including the side effects, how to safely use them, and how to properly dispose of any extras  Following the program below will greatly decrease your post-operative pain  1  Aleve (naproxen) 220 mg and Tylenol Arthritis 650 mg on the afternoon/evening of surgery  Do NOT take Aleve if you have a history of gastric ulcers, uncontrolled reflux or have been told previously by a physician that you should not take anti-inflammatory medications such as Advil/Aleve/Motrin  2  Aleve (naproxen) 220 mg in the morning and afternoon, for about 2-3 days after the surgery; even if you have no pain  You can stop two days after surgery if your hand does not hurt  3  Tylenol Arthritis (or any brand of acetaminophen 8-hour), 650 mg every eight hours, with a maximum dose of 3000 mg per day, for about 2-3 days after the surgery, even if you have no pain  Tylenol Arthritis plus Aleve is a case of 1+1=3, not 2  That is, they work together as a team to make each other stronger a  The maximum amount of Tylenol is 3000 mg per day, which is the same as 4 of the 650 mg pills  Remember; don't substitute any other medication for the Tylenol: don't take Motrin, aspirin, or any other over the counter medication  It must be Tylenol (or any brand of acetaminophen) for it to work as a team  Remember as well that Tylenol Arthritis is taken every 8 hours, the Aleve is twice a day  4  Norco (hydrocodone/acetaminophen) 5/325 mg or a similar medication to assist with sleeping at night, and possibly every 6 hours during the day, ONLY IF NEEDED, for the first few days   You will be given a written prescription, but many patients find they do not need to take any or all of the Norco  Do not take the medication just because it was given to you; only take it if you need it  Remember that [address] is an opioid pain medication and can lead to addiction, respiratory sedation, and death  In 2015 over 17,500 Americans  from opioid overdoses and I don't want this to happen to you  Opioid medications can also cause constipation, so please plan for that, as well as possible mental confusion and drowsiness  Do not drive while you are taking this medication  With this protocol, you can expect your post-operative pain to be very manageable  The worst pain only lasts for the first 48 hours and improves significantly after that  By the time you see your surgeon for your post-operative visit you probably will no longer require any pain medication on a daily basis  Important Information about Painkillers  You are being prescribed an opioid pain medication to help with severe pain after surgery  Use the medication sparingly as needed to reduce your pain  The goal is not to be pain-free, but to make the pain more tolerable  If you are able to take non-steroidal anti-inflammatory drugs (NSAIDs), alternate the prescription pain medication with over-the-counter Ibuprofen or Naproxen (if you do not have a history of reflux or stomach ulcers)  This will allow you to take less opioid medication  Also, elevate the hand to reduce swelling and consider applying ice to the affected area for 15 minutes at a time, several times per day  Opioid medication is powerful and has the risk of overdose, abuse, and addiction  Only use the medication as directed by your physician and keep the pills in a safe place  When you no longer need the medication, please dispose of the pills properly as directed below  Allowing someone else to use your opioid prescription is illegal   Also, possible side effects from opioid medications are over-sedation, itching, nausea/vomiting, and constipation   Do not drive a vehicle or operate machinery while taking the pain medications  Drink plenty of fluids and consider a stool softener to prevent constipation  If the pain you are experiencing is not severe, stop taking the opioid pills and only take over-the-counter medications such as Tylenol and Ibuprofen  Disposing of unused pain medications:  (1) Follow pharmacist instructions on the bottle if available, or  (2) Call 7-555.197.9867 for a [facility] authorized collection site in your area, or  (3) If no collection site is available in your area, mix the pills with an undesirable substance such as used coffee grounds, teressa litter, or dirt  Place this mixture in a sealed plastic bag  Place the bag in the household garbage  Adapted from the opioid awareness section of the [employer], thanks to Sandeep Spann and Randolph Kulkarni

## 2021-09-09 ENCOUNTER — PREP FOR PROCEDURE (OUTPATIENT)
Dept: OBGYN CLINIC | Facility: HOSPITAL | Age: 86
End: 2021-09-09

## 2021-09-09 ENCOUNTER — TELEPHONE (OUTPATIENT)
Dept: OBGYN CLINIC | Facility: HOSPITAL | Age: 86
End: 2021-09-09

## 2021-09-27 ENCOUNTER — OFFICE VISIT (OUTPATIENT)
Dept: FAMILY MEDICINE CLINIC | Facility: CLINIC | Age: 86
End: 2021-09-27
Payer: MEDICARE

## 2021-09-27 VITALS
WEIGHT: 126 LBS | BODY MASS INDEX: 24.61 KG/M2 | SYSTOLIC BLOOD PRESSURE: 128 MMHG | HEART RATE: 78 BPM | DIASTOLIC BLOOD PRESSURE: 84 MMHG

## 2021-09-27 DIAGNOSIS — I10 ESSENTIAL HYPERTENSION: Primary | ICD-10-CM

## 2021-09-27 PROCEDURE — 99213 OFFICE O/P EST LOW 20 MIN: CPT | Performed by: FAMILY MEDICINE

## 2021-09-27 NOTE — PROGRESS NOTES
Assessment/Plan:    Essential hypertension  /84 and pulse is 78 and regular  Patient is high risk for falls  Patient's symptoms may be due to bradycardia secondary to Coreg use  After discussion with patient will hold off on evening dose of Coreg 3 125 mg  Continue Coreg 6 25 mg in the morning and evening  Recommend to monitor vitals at home  Will follow-up in 10 days to see if this helps  Will get POCT glucose at next visit to rule out hypoglycemia if no improvement   Consider retesting TSH as last one was normal   If this does not help I advised patient to follow-up with her Cardiology given her significant cardiac history  ED precautions discussed  Problem List Items Addressed This Visit        Cardiovascular and Mediastinum    Essential hypertension - Primary     /84 and pulse is 78 and regular  Patient is high risk for falls  Patient's symptoms may be due to bradycardia secondary to Coreg use  After discussion with patient will hold off on evening dose of Coreg 3 125 mg  Continue Coreg 6 25 mg in the morning and evening  Recommend to monitor vitals at home  Will follow-up in 10 days to see if this helps  Will get POCT glucose at next visit to rule out hypoglycemia if no improvement   Consider retesting TSH as last one was normal   If this does not help I advised patient to follow-up with her Cardiology given her significant cardiac history  ED precautions discussed  Subjective:      Patient ID: Shon Bobo is a 82 y o  female  BP in the 150s/160s when waking up and then in 120s/140s after taking her meds   Had episode of nausea, diaphoresis, weakness and  felt like she was ready to pass out  BP was 100/40  This was this past Thursday   She denied any chest pain shortness of breath or confusion    She says since this episode she has been holding off on taking her Coreg with all of her morning meds as she usually does and she feels a little better when she does this   Has a history of falls has caused her to break a hip in the past   She lives home alone  The following portions of the patient's history were reviewed and updated as appropriate: allergies, current medications, past family history, past medical history, past social history, past surgical history and problem list     Review of Systems   Constitutional: Negative for chills, fatigue and fever  Eyes: Negative for visual disturbance  Respiratory: Negative for cough and shortness of breath  Cardiovascular: Negative for chest pain and palpitations  Gastrointestinal: Positive for nausea  Negative for abdominal pain, constipation and vomiting  Endocrine: Negative for cold intolerance, heat intolerance, polydipsia, polyphagia and polyuria  Neurological: Positive for dizziness, weakness and light-headedness  Negative for tremors, seizures, syncope and numbness  All other systems reviewed and are negative  Objective:      /84   Pulse 78   Wt 57 2 kg (126 lb)   BMI 24 61 kg/m²          Physical Exam  Vitals and nursing note reviewed  Constitutional:       General: She is not in acute distress  Appearance: Normal appearance  She is not ill-appearing, toxic-appearing or diaphoretic  HENT:      Mouth/Throat:      Mouth: Mucous membranes are moist       Pharynx: Oropharynx is clear  No oropharyngeal exudate or posterior oropharyngeal erythema  Eyes:      Extraocular Movements: Extraocular movements intact  Conjunctiva/sclera: Conjunctivae normal    Cardiovascular:      Rate and Rhythm: Normal rate and regular rhythm  Pulses: Normal pulses  Heart sounds: Normal heart sounds  No murmur heard  Pulmonary:      Effort: Pulmonary effort is normal  No respiratory distress  Breath sounds: Normal breath sounds  No wheezing or rales  Musculoskeletal:      Cervical back: Normal range of motion and neck supple  Neurological:      General: No focal deficit present  Mental Status: She is alert and oriented to person, place, and time     Psychiatric:         Mood and Affect: Mood normal

## 2021-09-27 NOTE — ASSESSMENT & PLAN NOTE
/84 and pulse is 78 and regular  Patient is high risk for falls  Patient's symptoms may be due to bradycardia secondary to Coreg use  After discussion with patient will hold off on evening dose of Coreg 3 125 mg  Continue Coreg 6 25 mg in the morning and evening  Recommend to monitor vitals at home  Will follow-up in 10 days to see if this helps  Will get POCT glucose at next visit to rule out hypoglycemia if no improvement   Consider retesting TSH as last one was normal   If this does not help I advised patient to follow-up with her Cardiology given her significant cardiac history  ED precautions discussed

## 2021-10-04 ENCOUNTER — TELEPHONE (OUTPATIENT)
Dept: OBGYN CLINIC | Facility: HOSPITAL | Age: 86
End: 2021-10-04

## 2021-10-06 ENCOUNTER — OFFICE VISIT (OUTPATIENT)
Dept: FAMILY MEDICINE CLINIC | Facility: CLINIC | Age: 86
End: 2021-10-06
Payer: MEDICARE

## 2021-10-06 ENCOUNTER — OFFICE VISIT (OUTPATIENT)
Dept: OBGYN CLINIC | Facility: HOSPITAL | Age: 86
End: 2021-10-06
Payer: MEDICARE

## 2021-10-06 VITALS
SYSTOLIC BLOOD PRESSURE: 130 MMHG | WEIGHT: 127 LBS | HEIGHT: 60 IN | BODY MASS INDEX: 24.94 KG/M2 | DIASTOLIC BLOOD PRESSURE: 84 MMHG | HEART RATE: 77 BPM | OXYGEN SATURATION: 100 % | TEMPERATURE: 97.6 F

## 2021-10-06 VITALS
DIASTOLIC BLOOD PRESSURE: 77 MMHG | WEIGHT: 125 LBS | HEIGHT: 60 IN | BODY MASS INDEX: 24.54 KG/M2 | SYSTOLIC BLOOD PRESSURE: 193 MMHG | HEART RATE: 77 BPM

## 2021-10-06 DIAGNOSIS — R42 DIZZINESS: ICD-10-CM

## 2021-10-06 DIAGNOSIS — M65.4 TENOSYNOVITIS, DE QUERVAIN: Primary | ICD-10-CM

## 2021-10-06 DIAGNOSIS — I99.8 FLUCTUATING BLOOD PRESSURE: Primary | ICD-10-CM

## 2021-10-06 DIAGNOSIS — I10 ESSENTIAL HYPERTENSION: ICD-10-CM

## 2021-10-06 DIAGNOSIS — Z23 FLU VACCINE NEED: ICD-10-CM

## 2021-10-06 DIAGNOSIS — R03.1 LOW BLOOD PRESSURE READING: ICD-10-CM

## 2021-10-06 PROCEDURE — 20550 NJX 1 TENDON SHEATH/LIGAMENT: CPT | Performed by: ORTHOPAEDIC SURGERY

## 2021-10-06 PROCEDURE — G0008 ADMIN INFLUENZA VIRUS VAC: HCPCS

## 2021-10-06 PROCEDURE — 99214 OFFICE O/P EST MOD 30 MIN: CPT | Performed by: ORTHOPAEDIC SURGERY

## 2021-10-06 PROCEDURE — 99214 OFFICE O/P EST MOD 30 MIN: CPT | Performed by: FAMILY MEDICINE

## 2021-10-06 PROCEDURE — 90662 IIV NO PRSV INCREASED AG IM: CPT

## 2021-10-06 RX ORDER — LIDOCAINE HYDROCHLORIDE 20 MG/ML
1 INJECTION, SOLUTION EPIDURAL; INFILTRATION; INTRACAUDAL; PERINEURAL
Status: COMPLETED | OUTPATIENT
Start: 2021-10-06 | End: 2021-10-06

## 2021-10-06 RX ORDER — BETAMETHASONE SODIUM PHOSPHATE AND BETAMETHASONE ACETATE 3; 3 MG/ML; MG/ML
6 INJECTION, SUSPENSION INTRA-ARTICULAR; INTRALESIONAL; INTRAMUSCULAR; SOFT TISSUE
Status: COMPLETED | OUTPATIENT
Start: 2021-10-06 | End: 2021-10-06

## 2021-10-06 RX ADMIN — LIDOCAINE HYDROCHLORIDE 1 ML: 20 INJECTION, SOLUTION EPIDURAL; INFILTRATION; INTRACAUDAL; PERINEURAL at 13:07

## 2021-10-06 RX ADMIN — BETAMETHASONE SODIUM PHOSPHATE AND BETAMETHASONE ACETATE 6 MG: 3; 3 INJECTION, SUSPENSION INTRA-ARTICULAR; INTRALESIONAL; INTRAMUSCULAR; SOFT TISSUE at 13:07

## 2021-10-08 ENCOUNTER — TELEPHONE (OUTPATIENT)
Dept: FAMILY MEDICINE CLINIC | Facility: CLINIC | Age: 86
End: 2021-10-08

## 2021-10-08 ENCOUNTER — CLINICAL SUPPORT (OUTPATIENT)
Dept: FAMILY MEDICINE CLINIC | Facility: CLINIC | Age: 86
End: 2021-10-08

## 2021-10-08 VITALS — DIASTOLIC BLOOD PRESSURE: 72 MMHG | SYSTOLIC BLOOD PRESSURE: 152 MMHG

## 2021-10-08 DIAGNOSIS — I10 HYPERTENSION, UNSPECIFIED TYPE: Primary | ICD-10-CM

## 2021-10-12 ENCOUNTER — HOSPITAL ENCOUNTER (OUTPATIENT)
Facility: HOSPITAL | Age: 86
Setting detail: OUTPATIENT SURGERY
Discharge: HOME | End: 2021-10-12
Attending: ORTHOPAEDIC SURGERY | Admitting: ORTHOPAEDIC SURGERY
Payer: MEDICARE

## 2021-10-12 VITALS
WEIGHT: 125 LBS | SYSTOLIC BLOOD PRESSURE: 187 MMHG | BODY MASS INDEX: 24.54 KG/M2 | HEART RATE: 83 BPM | RESPIRATION RATE: 18 BRPM | DIASTOLIC BLOOD PRESSURE: 84 MMHG | OXYGEN SATURATION: 99 % | HEIGHT: 60 IN | TEMPERATURE: 97.7 F

## 2021-10-12 DIAGNOSIS — G56.01 CARPAL TUNNEL SYNDROME OF RIGHT WRIST: ICD-10-CM

## 2021-10-12 PROCEDURE — NC001 PR NO CHARGE: Performed by: ORTHOPAEDIC SURGERY

## 2021-10-12 PROCEDURE — 29848 WRIST ENDOSCOPY/SURGERY: CPT | Performed by: ORTHOPAEDIC SURGERY

## 2021-10-12 RX ORDER — ACETAMINOPHEN 325 MG/1
650 TABLET ORAL EVERY 6 HOURS PRN
Status: DISCONTINUED | OUTPATIENT
Start: 2021-10-12 | End: 2021-10-12 | Stop reason: HOSPADM

## 2021-10-12 RX ORDER — LIDOCAINE HYDROCHLORIDE AND EPINEPHRINE 10; 10 MG/ML; UG/ML
20 INJECTION, SOLUTION INFILTRATION; PERINEURAL ONCE
Status: DISCONTINUED | OUTPATIENT
Start: 2021-10-12 | End: 2021-10-12 | Stop reason: HOSPADM

## 2021-10-12 RX ORDER — HYDROCODONE BITARTRATE AND ACETAMINOPHEN 5; 325 MG/1; MG/1
1 TABLET ORAL EVERY 6 HOURS PRN
Qty: 5 TABLET | Refills: 0 | Status: SHIPPED | OUTPATIENT
Start: 2021-10-12 | End: 2021-10-17

## 2021-10-12 RX ORDER — SENNOSIDES 8.6 MG
650 CAPSULE ORAL EVERY 8 HOURS
Qty: 15 TABLET | Refills: 0 | Status: SHIPPED | OUTPATIENT
Start: 2021-10-12

## 2021-10-12 RX ADMIN — ACETAMINOPHEN 650 MG: 325 TABLET, FILM COATED ORAL at 07:41

## 2021-10-14 ENCOUNTER — TELEPHONE (OUTPATIENT)
Dept: FAMILY MEDICINE CLINIC | Facility: CLINIC | Age: 86
End: 2021-10-14

## 2021-10-14 DIAGNOSIS — K21.9 GASTROESOPHAGEAL REFLUX DISEASE WITHOUT ESOPHAGITIS: Primary | ICD-10-CM

## 2021-10-22 ENCOUNTER — OFFICE VISIT (OUTPATIENT)
Dept: OBGYN CLINIC | Facility: HOSPITAL | Age: 86
End: 2021-10-22

## 2021-10-22 ENCOUNTER — OFFICE VISIT (OUTPATIENT)
Dept: OCCUPATIONAL THERAPY | Facility: HOSPITAL | Age: 86
End: 2021-10-22

## 2021-10-22 VITALS
WEIGHT: 125 LBS | HEART RATE: 73 BPM | SYSTOLIC BLOOD PRESSURE: 181 MMHG | HEIGHT: 60 IN | DIASTOLIC BLOOD PRESSURE: 78 MMHG | BODY MASS INDEX: 24.54 KG/M2

## 2021-10-22 DIAGNOSIS — G56.01 CARPAL TUNNEL SYNDROME OF RIGHT WRIST: Primary | ICD-10-CM

## 2021-10-22 DIAGNOSIS — Z47.89 AFTERCARE FOLLOWING SURGERY OF THE MUSCULOSKELETAL SYSTEM: ICD-10-CM

## 2021-10-22 PROCEDURE — 99024 POSTOP FOLLOW-UP VISIT: CPT | Performed by: ORTHOPAEDIC SURGERY

## 2021-11-03 ENCOUNTER — OFFICE VISIT (OUTPATIENT)
Dept: CARDIOLOGY CLINIC | Facility: CLINIC | Age: 86
End: 2021-11-03
Payer: MEDICARE

## 2021-11-03 VITALS
DIASTOLIC BLOOD PRESSURE: 76 MMHG | HEART RATE: 78 BPM | HEIGHT: 60 IN | WEIGHT: 128.2 LBS | SYSTOLIC BLOOD PRESSURE: 164 MMHG | BODY MASS INDEX: 25.17 KG/M2 | OXYGEN SATURATION: 99 %

## 2021-11-03 DIAGNOSIS — I25.10 CORONARY ARTERY DISEASE INVOLVING NATIVE CORONARY ARTERY OF NATIVE HEART WITHOUT ANGINA PECTORIS: ICD-10-CM

## 2021-11-03 DIAGNOSIS — I65.23 CAROTID ARTERY STENOSIS, ASYMPTOMATIC, BILATERAL: ICD-10-CM

## 2021-11-03 DIAGNOSIS — Z95.820 S/P ANGIOPLASTY WITH STENT: ICD-10-CM

## 2021-11-03 DIAGNOSIS — I10 ESSENTIAL HYPERTENSION: ICD-10-CM

## 2021-11-03 DIAGNOSIS — I50.32 CHRONIC DIASTOLIC (CONGESTIVE) HEART FAILURE (HCC): Primary | Chronic | ICD-10-CM

## 2021-11-03 PROCEDURE — 99214 OFFICE O/P EST MOD 30 MIN: CPT | Performed by: INTERNAL MEDICINE

## 2021-11-03 RX ORDER — CARVEDILOL 12.5 MG/1
12.5 TABLET ORAL 2 TIMES DAILY WITH MEALS
Qty: 180 TABLET | Refills: 3 | Status: SHIPPED | OUTPATIENT
Start: 2021-11-03 | End: 2022-08-09 | Stop reason: SDUPTHER

## 2021-11-04 ENCOUNTER — APPOINTMENT (OUTPATIENT)
Dept: LAB | Facility: CLINIC | Age: 86
End: 2021-11-04
Payer: MEDICARE

## 2021-11-04 DIAGNOSIS — I50.32 CHRONIC DIASTOLIC (CONGESTIVE) HEART FAILURE (HCC): Chronic | ICD-10-CM

## 2021-11-04 DIAGNOSIS — I25.10 CORONARY ARTERY DISEASE INVOLVING NATIVE CORONARY ARTERY OF NATIVE HEART WITHOUT ANGINA PECTORIS: ICD-10-CM

## 2021-11-04 DIAGNOSIS — Z95.820 S/P ANGIOPLASTY WITH STENT: ICD-10-CM

## 2021-11-04 DIAGNOSIS — I65.23 CAROTID ARTERY STENOSIS, ASYMPTOMATIC, BILATERAL: ICD-10-CM

## 2021-11-04 DIAGNOSIS — I10 ESSENTIAL HYPERTENSION: ICD-10-CM

## 2021-11-04 LAB
CHOLEST SERPL-MCNC: 150 MG/DL (ref 50–200)
HDLC SERPL-MCNC: 65 MG/DL
LDLC SERPL CALC-MCNC: 71 MG/DL (ref 0–100)
TRIGL SERPL-MCNC: 70 MG/DL

## 2021-11-04 PROCEDURE — 36415 COLL VENOUS BLD VENIPUNCTURE: CPT

## 2021-11-04 PROCEDURE — 80061 LIPID PANEL: CPT

## 2021-11-12 NOTE — PROGRESS NOTES
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 He reportedly has had a few episodes of syncope over the last couple of years - with these, not always seen by doctor. Had been seen a couple of years ago and had labs checked - told him that his sodium was low. Currently feeling well. Full syncopal events occurred only 4 times, but it sounds like there are other times where he feels more presyncopal. These all occurred either at rest or standing. The most recent event possibly Sz event? This was this summer. CT head in august was normal, was seen in ER then - had COVID infection. He reports probably not eating/drinking consistent enough. PHQ Scores 11/12/2021   PHQ2 Score 0   PHQ9 Score 0     Interpretation of Total Score Depression Severity: 1-4 = Minimal depression, 5-9 = Mild depression, 10-14 = Moderate depression, 15-19 = Moderately severe depression, 20-27 = Severe depression     Prior to Visit Medications    Medication Sig Taking? Authorizing Provider   Aspirin-Acetaminophen-Caffeine (EXCEDRIN PO) Take by mouth Yes Historical Provider, MD       Past Medical History:   Diagnosis Date    History of COVID-19 - 08/2021 11/12/2021    History of migraine 11/12/2021        Social History     Tobacco Use    Smoking status: Never Smoker    Smokeless tobacco: Never Used   Substance Use Topics    Alcohol use: Not Currently    Drug use: Yes     Types: Marijuana Kathia Balderrama)     Comment: occasional        History reviewed. No pertinent surgical history.      No Known Allergies     Family History   Problem Relation Age of Onset    Anemia Mother     No Known Problems Father     No Known Problems Sister     No Known Problems Brother     High Blood OT Evaluation    04/13/18 0700   Patient Data   Rehab Impairment Orthopedic disorder: Unilateral hip fracture   Etiologic Diagnosis L intertrochanteric Femur Fracture   Date of Onset 04/07/18   Home Setup   Type of Home Single Level   Method of Entry Ramp;Stairs  (5 DALLIN or ramp)   Number of Stairs 5   First Floor Bathroom Full; Shower   First Floor Bathroom Accessibility Grab bars in tub/shower  (suction grab bars)   Second Floor Bathroom Full;Tub; Shower;Combo   First Floor Setup Available Yes   Available Equipment (May have [facility])   Baseline Information   Vocation (Retired)   Transportation    Prior Device(s) Used (none)   Prior IADL Participation   Money Management Estimate Change; Identify Money;Estimate Costs; Combine Bills;Manage Checkbook   Meal Preparation Full Participation   Laundry Full Participation   Home Cleaning Full Participation   Prior Level of Function   Self-Care 3  Independent - Patient completed the activities by him/herself, with or without an assistive device, with no assistance from a helper  Indoor-Mobility (Ambulation) 3  Independent - Patient completed the activities by him/herself, with or without an assistive device, with no assistance from a helper  Stairs 3  Independent - Patient completed the activities by him/herself, with or without an assistive device, with no assistance from a helper  Functional Cognition 3  Independent - Patient completed the activities by him/herself, with or without an assistive device, with no assistance from a helper     Psychosocial   Psychosocial (WDL) WDL   Patient Behaviors/Mood Appropriate for age   Restrictions/Precautions   Precautions Fall Risk;Pain   Weight Bearing Restrictions Yes   LLE Weight Bearing Per Order WBAT   ROM Restrictions No   Pain Assessment   Pain Assessment 0-10   Pain Score 4   Pain Type Surgical pain   Pain Location Leg   Pain Orientation Left   Hospital Pain Intervention(s) Emotional support;Distraction Pressure Maternal Grandmother     Diabetes Maternal Grandfather     Arthritis Paternal Grandmother         Patient's past medical history, surgical history, family history, medications, and allergies  were all reviewed and updated as appropriate today. Review of Systems   Constitutional: Negative for fatigue, fever and unexpected weight change. HENT: Negative for congestion, ear pain and sore throat. Eyes: Negative for pain, itching and visual disturbance. Respiratory: Negative for cough, shortness of breath and wheezing. Cardiovascular: Negative for chest pain, palpitations and leg swelling. Gastrointestinal: Negative for abdominal pain, constipation, diarrhea, nausea and vomiting. Endocrine: Negative for cold intolerance, heat intolerance, polydipsia and polyuria. Genitourinary: Negative for dysuria, frequency and hematuria. Musculoskeletal: Negative for arthralgias and joint swelling. Skin: Negative for rash. Neurological: Negative for dizziness, syncope (past episodes) and headaches. Hematological: Negative for adenopathy. /70   Pulse 71   Temp 97.9 °F (36.6 °C)   Ht 6' 1\" (1.854 m)   Wt 185 lb (83.9 kg)   SpO2 99%   BMI 24.41 kg/m²      Physical Exam  Vitals reviewed. Constitutional:       General: He is not in acute distress. Appearance: Normal appearance. He is well-developed and normal weight. HENT:      Head: Normocephalic and atraumatic. Right Ear: Tympanic membrane and ear canal normal. No drainage. No middle ear effusion. Tympanic membrane is not erythematous. Left Ear: Tympanic membrane and ear canal normal. No drainage. No middle ear effusion. Tympanic membrane is not erythematous. Nose: Nose normal. No rhinorrhea. Mouth/Throat:      Mouth: Mucous membranes are moist.      Pharynx: No oropharyngeal exudate or posterior oropharyngeal erythema. Eyes:      Extraocular Movements: Extraocular movements intact.       Pupils: Pupils Response to Interventions Pt tolerated with therapy   Grooming   Able To Initiate Tasks; Wash/Dry Face;Wash/Dry Hands   Limitation Noted In Safety;Strength;Timeliness   Findings Pt participated in grooming in supine in bed 2* swelling and pain of LLE  Pt demo ability to wash/dry face and hands with S     Grooming (FIM) 5 - Patient requires supervision/monitoring   QI: Shower/Bathe Self   Assistance Needed Physical assistance   Assistance Provided by [place] 25%-49%   Shower/Bathe Self CARE Score 3   Bathing   Assessed Bath Style Sponge Bath   Anticipated D/C Bath Style Shower   Able to [place]rel [place] No   Able to [employer] Temperature No   Able to Wash/Rinse/Dry (body part) Left Arm;Right Arm;L Upper Leg;R Upper Leg;Chest;Abdomen;Perineal Area   Limitations Noted in Balance; Coordination; Endurance;ROM;Safety;Strength;Timeliness   Positioning Supine;Seated;Standing   Adaptive Equipment (RW)   Findings  Pt participated bathing in supine in bed  Pt demo ability to wash 7/10 parts, requiring A for buttocks, and B lower Le  Pt demo ability to bathe Ub, arden and B upper Le while in supine  Pt required A to bathe lower LB while seated at EOB  Pt required A to bathe buttocks while standing in stance with use of Rw      Bathing (FIM) 3 - Patient completes 5/10  6/10 or 7/10 parts   QI: Upper Body Dressing   Assistance Needed Supervision   Assistance Provided by [place] No physical assistance   Upper Body Dressing CARE Score 4   QI: Lower Body Dressing   Assistance Needed Physical assistance   Assistance Provided by [place] Total assistance   Lower Body Dressing CARE Score 1   QI: Putting On/Taking Off Footwear   Assistance Needed Physical assistance   Assistance Provided by [place] Total assistance   Putting On/Taking Off Footwear CARE Score 1   QI: Picking Up Object   Reason if not Attempted Safety concerns   Picking Up Object CARE Score 88   Dressing/Undressing Clothing   Remove UB Clothes Other  (hospital gown)   Buster Netter UB Clothes Pullover Shirt;Bra;Undershirt   Don LB Clothes Pants; Undergarment;Socks; Shoes   Limitations Noted In Balance; Coordination; Endurance; Safety;Strength;Timeliness   Adaptive Equipment (RW)   Positioning Sit Edge Of Bed;Standing   Findings Pt participated dressing seated at EOB  Pt demo ability to dress Ub with S  Pt requires A to thread and don pants over hips  2* pain and limited ROM of LLE  Pt requires A to don socks and shoes  UB Dressing (FIM) 5 - Patient requires supervision/monitoring   LB Dressing (FIM) 1 - Patient requires two helpers   QI: [address] Needed Physical assistance   Assistance Provided by [place] Total assistance   Toileting Hygiene CARE Score 1   Toileting   Able to [address] down no, up no  Able to Manage Clothing Closures No   Limitations Noted In Balance; Coordination;ROM;Safety;UE Strength;LE Strength   Adaptive Equipment Grab Bar  (RW)   Findings Pt participated in toileting  Pt unable to relieve bladder or bowels atn this time  Pt requires A for CM and hygiene  Toileting (FIM) 1 - Patient requires two helpers   QI: Lying to Sitting on Side of Bed   Assistance Needed Physical assistance   Assistance Provided by [place] 50%-74%   Lying to Sitting on Side of Bed CARE Score 2   QI: Sit to Stand   Assistance Needed Physical assistance   Assistance Provided by [place] Less than 25%   Sit to Stand CARE Score 3   QI: Chair/Bed-to-Chair Transfer   Assistance Needed Physical assistance   Assistance Provided by [place] Total assistance   Chair/Bed-to-Chair Transfer CARE Score 1   Transfer Bed/Chair/Wheelchair   Limitations Noted In Balance; Coordination; Endurance;UE Strength;LE Strength;Pain Management   Adaptive Equipment Roller Walker   Stand Pivot Minimal Assist;Assist x 2  (2nd to off load leg)   Sit to Stand Minimal   Stand to Sit Minimal   Supine to Sit Other  (CGA)   Findings Pt demo ability to complete sit<>stand with use of the RW with min A   Pt demo ability to are equal, round, and reactive to light. Neck:      Thyroid: No thyromegaly. Vascular: Normal carotid pulses. No carotid bruit. Cardiovascular:      Rate and Rhythm: Normal rate and regular rhythm. Heart sounds: No murmur heard. Pulmonary:      Effort: Pulmonary effort is normal.      Breath sounds: Normal breath sounds. No wheezing. Abdominal:      General: Bowel sounds are normal.      Palpations: Abdomen is soft. There is no mass. Tenderness: There is no abdominal tenderness. Musculoskeletal:         General: No swelling or deformity. Normal range of motion. Cervical back: Neck supple. Lymphadenopathy:      Cervical: No cervical adenopathy. Skin:     General: Skin is warm and dry. Findings: No rash. Neurological:      General: No focal deficit present. Mental Status: He is alert and oriented to person, place, and time. Cranial Nerves: No cranial nerve deficit. Psychiatric:         Mood and Affect: Mood normal.         Behavior: Behavior is cooperative. Assessment:  Encounter Diagnoses   Name Primary?  Syncope, unspecified syncope type - 4 events between 2019 and 2021 Yes    Episode of hyponatremia - mildly low in 2019     History of COVID-19 - 08/2021     History of migraine     Family history of diabetes mellitus     Need for hepatitis C screening test     Screening for HIV (human immunodeficiency virus)        Plan:  1. Syncope, unspecified syncope type - 4 events between 2019 and 2021  New patient to my practice with episodic syncopal events x4 over the last couple of years. See HPI for full details. It seems like a lot of this could be altered with better eating/drinking habits, so encourage patient to do so at this time. Despite this I am somewhat concerned by the history without a good explanation for what could be taking place. I have counseled patient that oftentimes we would like to do a cardiac and/or neurologic work-up.   We complete SPT with use use of Rw, requiring min A x2, second person to off load leg  Pt limited by pain and swelling  Bed, Chair, Wheelchair Transfer (FIM) 1 - Patient requires assist of two people   QI: Toilet Transfer   Assistance Needed Physical assistance   Assistance Provided by [place] Total assistance   Toilet Transfer CARE Score 1   Toilet Transfer   Surface Assessed Standard Commode   Transfer Technique Stand Pivot   Limitations Noted In Balance;Confidence; Endurance;ROM;Safety;UE Strength;LE Strength   Adaptive Equipment Grab Bar  (RW)   Positioning Concerns LE Support   Findings Pt completed toilet transfer with use of Rw from bed to [facility]  Pt completes toilet transfer with use of rw, requiring Ax2, one for steadying assist and 2nd for CM and hygiene  Toilet Transfer (FIM) 1 - Patient requires assist of two people   Tub/Shower Transfer   Not Assessed Other  (SB 2* surgucal incisions)   Comprehension   Assist Devices Glasses   Auditory Complex   Visual Complex   QI: Comprehension 4  Undestands: Clear comprehension without cues or repetitions   Comprehension (FIM) 6 - Understands complex/abstract but requires  glasses for visual comp   Expression   Verbal Complex   Non-Verbal Complex   Intelligibility Sentence   QI: Expression 4  Express complex messages without difficulty and with speech that is clear and easy to Kiana   Expression (FIM) 6 - Expresses complex/abstract but requires:  more time   Social Interaction   Cooperation with staff   Participation Small Group   Medications needed to control mood/behavior?  No   Behaviors observed Appropriate   Social Interaction (FIM) 6 - Interacts appropriately with others BUT requires extra  time   Problem Solving   Complex Manages finances;Manages discharge planning;Manages medications   Routine Manages call bell;[place] precautions;Manages ADL   Problem solving (FIM) 5 - Solves basic problems 90% of time   Memory   Recognize People Yes   Remember Routine Yes will start with formal labs, but if these are normal we may consider having him seen by cardiology for event monitoring.  - CBC Auto Differential; Future  - Comprehensive Metabolic Panel, Fasting; Future  - Lipid, Fasting; Future  - TSH with Reflex; Future    2. Episode of hyponatremia - mildly low in 2019  Repeat labs now. - Comprehensive Metabolic Panel, Fasting; Future  - Lipid, Fasting; Future    3. History of COVID-19 - 08/2021    4. History of migraine    5. Family history of diabetes mellitus  - Hemoglobin A1C; Future    6. Need for hepatitis C screening test  Per recommendations issued by the [facility] and the [facility] for Disease Control and Prevention (CDC) in 2020 adults ? 25years of age be screened at least once for chronic HCV infection. Pt agreeable. No know risk of exposure in past or recent per pt. - Hepatitis C Antibody; Future    7. Screening for HIV (human immunodeficiency virus)  No known risk factors for HIV infection, we recommend at least one-time HIV screening in adults and adolescents 15to 76years of age. In addition, pregnant women should be tested for HIV early in each pregnancy using an \"opt-out\" approach, even if they have been screened during previous pregnancies. - HIV Screen; Future      Return if symptoms worsen or fail to improve. Ibeth Huang, DO     Please note that this chart was generated using dragon dictation software. Although every effort was made to ensure the accuracy of this automated transcription, some errors in transcription may have occurred. Initiates Tasks Yes   Short-Term Impaired  (Pt reports STm is slighty impaired since surgery)   Long Term Intact   Recalls Precaution Yes   Memory (FIM) 5 - Needs cueing reminders <10%   RUE Assessment   RUE Assessment WFL  (3/5)   LUE Assessment   LUE Assessment WFL  (3/5)   Coordination   Movements are Fluid and Coordinated 1   Cognition   Overall Cognitive Status WFL   Arousal/Participation Alert; Cooperative   Attention Within functional limits   Orientation Level Oriented X4   Memory Decreased short term memory   Following Commands Follows multistep commands with increased time or repetition   Comments Pt reports not remembering as well since surgery   Discharge Information   Impressions Pt is a 82 yr  Old female, admitted with a Orthopedic disorder: Unilateral hip fracture s/p falling in her walk in shower, with an etiology of L intertrochanteric Femur fracture, with surgery on 4/8/18 medullary nailing L intertrochanteric femur fracture  Pt was functioning Independently for all ADLs and iADLs PTA  Pt currently lives alone, but her son and dtr in law live locally, and help provide meals for the pt  Pt lives in a single level home with 5 DALLIN or a ramp  Pt has 2 full baths, one with a walking shower and one with a tub shower combo  Pt participated in skilled OT session with focus on (bathing, dressing, grooming, and toileting)See above for details  Pt may benefit from stool during toileting to off load LLE  Pt tolerated session well, but experienced increased pain/nausea with movement  Pt BP sitting EOB 120s/90s, BP on commode 140s/90s  Pt currently limited by decreased ROM LLE, orthopedic restriction LLE (WBAT), decreased dynamic standing balance, decreased standing tolerance, decreased activity tolerance, decreased endurance, nausea, decreased B Le strength, and decreased B UE strength  All limiting pt's occupational performance in ADLs   Pt would benefit from continued skilled OT services with focus on above deficits per POC with an ELOS of 2 weeks with LTG for mod I     OT Therapy Minutes   OT Time In 0700   OT Time Out 0830   OT Total Time (minutes) 90   OT Mode of treatment - Individual (minutes) 90   OT Mode of treatment - Concurrent (minutes) 0   OT Mode of treatment - Group (minutes) 0   OT Mode of treatment - Co-treat (minutes) 0   OT Mode of Teatment - Total time(minutes) 90 minutes

## 2021-11-17 ENCOUNTER — IMMUNIZATIONS (OUTPATIENT)
Dept: FAMILY MEDICINE CLINIC | Facility: HOSPITAL | Age: 86
End: 2021-11-17

## 2021-11-17 DIAGNOSIS — Z23 ENCOUNTER FOR IMMUNIZATION: Primary | ICD-10-CM

## 2021-11-17 PROCEDURE — 0064A COVID-19 MODERNA VACC 0.25 ML BOOSTER: CPT

## 2021-11-17 PROCEDURE — 91306 COVID-19 MODERNA VACC 0.25 ML BOOSTER: CPT

## 2021-11-30 DIAGNOSIS — I10 ESSENTIAL HYPERTENSION: ICD-10-CM

## 2021-11-30 RX ORDER — AMLODIPINE BESYLATE 5 MG/1
5 TABLET ORAL DAILY
Qty: 90 TABLET | Refills: 1 | Status: SHIPPED | OUTPATIENT
Start: 2021-11-30 | End: 2022-06-06

## 2021-12-22 ENCOUNTER — OFFICE VISIT (OUTPATIENT)
Dept: OBGYN CLINIC | Facility: HOSPITAL | Age: 86
End: 2021-12-22
Payer: MEDICARE

## 2021-12-22 ENCOUNTER — HOSPITAL ENCOUNTER (OUTPATIENT)
Dept: RADIOLOGY | Facility: HOSPITAL | Age: 86
Discharge: HOME | End: 2021-12-22
Attending: ORTHOPAEDIC SURGERY
Payer: MEDICARE

## 2021-12-22 ENCOUNTER — TELEPHONE (OUTPATIENT)
Dept: OBGYN CLINIC | Facility: HOSPITAL | Age: 86
End: 2021-12-22

## 2021-12-22 VITALS
DIASTOLIC BLOOD PRESSURE: 77 MMHG | WEIGHT: 132.4 LBS | HEART RATE: 77 BPM | HEIGHT: 60 IN | SYSTOLIC BLOOD PRESSURE: 159 MMHG | BODY MASS INDEX: 26 KG/M2

## 2021-12-22 DIAGNOSIS — R52 PAIN: ICD-10-CM

## 2021-12-22 DIAGNOSIS — M19.042 PRIMARY OSTEOARTHRITIS OF LEFT HAND: Primary | ICD-10-CM

## 2021-12-22 DIAGNOSIS — Z98.890 S/P CARPAL TUNNEL RELEASE: ICD-10-CM

## 2021-12-22 PROCEDURE — 73130 X-RAY EXAM OF HAND: CPT

## 2021-12-22 PROCEDURE — 99214 OFFICE O/P EST MOD 30 MIN: CPT | Performed by: ORTHOPAEDIC SURGERY

## 2021-12-22 RX ORDER — METHYLPREDNISOLONE 4 MG/1
TABLET ORAL
Qty: 1 EACH | Refills: 0 | Status: SHIPPED | OUTPATIENT
Start: 2021-12-22 | End: 2022-01-01 | Stop reason: ALTCHOICE

## 2021-12-28 ENCOUNTER — EVALUATION (OUTPATIENT)
Dept: OCCUPATIONAL THERAPY | Facility: MEDICAL CENTER | Age: 86
End: 2021-12-28
Payer: MEDICARE

## 2021-12-28 DIAGNOSIS — M19.042 PRIMARY OSTEOARTHRITIS OF LEFT HAND: Primary | ICD-10-CM

## 2021-12-28 PROCEDURE — 97165 OT EVAL LOW COMPLEX 30 MIN: CPT

## 2021-12-28 PROCEDURE — 97110 THERAPEUTIC EXERCISES: CPT

## 2022-01-06 ENCOUNTER — OFFICE VISIT (OUTPATIENT)
Dept: OCCUPATIONAL THERAPY | Facility: MEDICAL CENTER | Age: 87
End: 2022-01-06
Payer: MEDICARE

## 2022-01-06 DIAGNOSIS — M19.042 PRIMARY OSTEOARTHRITIS OF LEFT HAND: Primary | ICD-10-CM

## 2022-01-06 PROCEDURE — 97010 HOT OR COLD PACKS THERAPY: CPT

## 2022-01-06 PROCEDURE — 97140 MANUAL THERAPY 1/> REGIONS: CPT

## 2022-01-06 PROCEDURE — 97530 THERAPEUTIC ACTIVITIES: CPT

## 2022-01-06 PROCEDURE — 97110 THERAPEUTIC EXERCISES: CPT

## 2022-01-06 NOTE — PROGRESS NOTES
Occupational Therapy Daily Note:    Today's date: 2022  Patient name: Tali Robles  : 1935  MRN: [id number]  Referring provider: Radha Parks MD  Dx:   Encounter Diagnosis   Name Primary?  Primary osteoarthritis of left hand Yes         Subjective: "I'm starting to see swelling again "    Objective: Pt seen for OT treatment session focusing on pain and edema reduction, increased AROM within pain free range, and joint protection education 2* osteoarthritis  Manual Heat   L hand/digits, seated  X 7 min    Manual Therapy  Seated, LUE on elevated ramp on table    Retrograde Massage x 10 min  L hand/digits, forearm flexors/extensors    Myofascial Trigger Point Release-Adductor Pollicis     Hand Thera Exercise/AROM  Using Elevated Ramp on Table  Wrist Flexion/Extension, seated  L x 15    Digit Flexion/Extension  L x 15     Thumb Opposition  Lx 15    Tennis Ball D2-D3 Extension  L x 15    Patient Education  Joint Protection Education  Handout given      Assessment: Tolerated treatment well  Patient would benefit from continued skilled OT  Pt demo with mild edema of L hand/digits in MCP region, reported 5/10 pain at start of tx session  Pt responded well to manual heat with no adverse effects reported, reporting 3/10 pain level post-application with increased distal mobility noted  Pt demo with slight decrease in L hand/digit edema with retrograde massage and advised to contact physician ASAP if edema worsens to previous levels  Pt demo with improved digit flexion and distal mobility post-massage; c/o mild pain with palpation over 1st and 5th metacarpal regions  Pt demo with G understanding of joint protection education, already using many strategies discussed  Provided with handout to promote increased carryover to home setting  Plan: Continued skilled OT per POC        INTERVENTION COMMENTS:  Diagnosis: Osteoarthritis of left hand  Precautions: N/A  FOTO: 45%  Insurance: Payor: Medicare  PN due 1/28/2022

## 2022-01-12 ENCOUNTER — OFFICE VISIT (OUTPATIENT)
Dept: OCCUPATIONAL THERAPY | Facility: MEDICAL CENTER | Age: 87
End: 2022-01-12
Payer: MEDICARE

## 2022-01-12 DIAGNOSIS — M19.042 PRIMARY OSTEOARTHRITIS OF LEFT HAND: Primary | ICD-10-CM

## 2022-01-12 PROCEDURE — 97110 THERAPEUTIC EXERCISES: CPT

## 2022-01-12 PROCEDURE — 97140 MANUAL THERAPY 1/> REGIONS: CPT

## 2022-01-12 NOTE — PROGRESS NOTES
Occupational Therapy Daily Note    Today's date: 2022  Patient name: Jude Roblero  : 1935  MRN: [id number]  Referring provider: Candido Bolanos MD  Dx:   Encounter Diagnosis   Name Primary?  Primary osteoarthritis of left hand Yes         Subjective: "The swelling is a lot worse "    Prior to treatment session: 7/10  After treatment session: 3/10    Objective:     Precautions: Universal     Manuals HEP 2022   Retrograde massage  10 min   Thenar [place]               Ther Ex     Education on HEP and dx x5 min     AROM tendon glides x10 X 15   AROM table top extension x10 X 15   AROM digit add/abduction x10 X 15   AROM wrist flex/ext/RD/UD x10 X 15   PROM wrist flex/ext  X 15   PROM digit flex/ext  X 15        Therapeutic Activity     Towel Scruches  X 15   Towel Squeezes  X 15             Modalities     Heat 5-10 min 5-10 min 5 min          Therapist supervised patient with use of modalities during today's treatment session  Skin integrity was assessed and appeared normal following use of modalities  Assessment: Tolerated treatment well  Patient would benefit from continued skilled OT  Pt presents to today's treatment session with significant more swelling and pain when compared to initial evaluation  Pt reports that she is no longer on the antibiotic and is going to talk to her Rheumatologist about the concerns with edema at next appointment  Improved pain relief with retrograde massage  Coban wrapped digits and provided with Tubigrip compression sleeve for edema  Reviewed home exercises, joint protection strategies, and methods for reducing swelling  Plan: Continued skilled OT per POC      INTERVENTION COMMENTS:  Diagnosis: Osteoarthritis of left hand  Precautions: N/A  FOTO: 45%  Insurance: Payor: Medicare  PN due 2022

## 2022-01-19 ENCOUNTER — APPOINTMENT (OUTPATIENT)
Dept: OCCUPATIONAL THERAPY | Facility: MEDICAL CENTER | Age: 87
End: 2022-01-19
Payer: MEDICARE

## 2022-01-19 NOTE — PROGRESS NOTES
Occupational Therapy Daily Note    Today's date: 2022  Patient name: Beto Ramirez  : 1935  MRN: [id number]  Referring provider: Chris Vieira MD  Dx:   No diagnosis found  Subjective: "The swelling is a lot worse "    Prior to treatment session: 7/10  After treatment session: 3/10    Objective:     Precautions: Universal     Manuals HEP 2022   Retrograde massage  10 min   Thenar [place]               Ther Ex     Education on HEP and dx x5 min     AROM tendon glides x10 X 15   AROM table top extension x10 X 15   AROM digit add/abduction x10 X 15   AROM wrist flex/ext/RD/UD x10 X 15   PROM wrist flex/ext  X 15   PROM digit flex/ext  X 15        Therapeutic Activity     Towel Scruches  X 15   Towel Squeezes  X 15             Modalities     Heat 5-10 min 5-10 min 5 min          Therapist supervised patient with use of modalities during today's treatment session  Skin integrity was assessed and appeared normal following use of modalities  Assessment: Tolerated treatment well  Patient would benefit from continued skilled OT  Pt presents to today's treatment session with significant more swelling and pain when compared to initial evaluation  Pt reports that she is no longer on the antibiotic and is going to talk to her Rheumatologist about the concerns with edema at next appointment  Improved pain relief with retrograde massage  C[place]an wrapped digits and provided with Tubigrip compression sleeve for edema  Reviewed home exercises, joint protection strategies, and methods for reducing swelling  Plan: Continued skilled OT per POC      INTERVENTION COMMENTS:  Diagnosis: Osteoarthritis of left hand  Precautions: N/A  FOTO: 45%  Insurance: Payor: Medicare  PN due 2022

## 2022-01-24 ENCOUNTER — OFFICE VISIT (OUTPATIENT)
Dept: FAMILY MEDICINE CLINIC | Facility: CLINIC | Age: 87
End: 2022-01-24
Payer: MEDICARE

## 2022-01-24 VITALS
WEIGHT: 131.2 LBS | TEMPERATURE: 97.9 F | HEART RATE: 78 BPM | SYSTOLIC BLOOD PRESSURE: 142 MMHG | HEIGHT: 61 IN | BODY MASS INDEX: 24.77 KG/M2 | DIASTOLIC BLOOD PRESSURE: 74 MMHG | OXYGEN SATURATION: 98 %

## 2022-01-24 DIAGNOSIS — C91.10 CHRONIC LARGE GRANULAR LYMPHOCYTIC LEUKEMIA (HCC): ICD-10-CM

## 2022-01-24 DIAGNOSIS — Z86.2 HISTORY OF THROMBOCYTOPENIA: ICD-10-CM

## 2022-01-24 DIAGNOSIS — R35.0 URINARY FREQUENCY: ICD-10-CM

## 2022-01-24 DIAGNOSIS — I50.32 CHRONIC DIASTOLIC (CONGESTIVE) HEART FAILURE (HCC): Chronic | ICD-10-CM

## 2022-01-24 DIAGNOSIS — Z13.1 DIABETES MELLITUS SCREENING: ICD-10-CM

## 2022-01-24 DIAGNOSIS — Z13.29 THYROID DISORDER SCREEN: ICD-10-CM

## 2022-01-24 DIAGNOSIS — M06.4 INFLAMMATORY POLYARTHROPATHIES (HCC): ICD-10-CM

## 2022-01-24 DIAGNOSIS — Z00.00 MEDICARE ANNUAL WELLNESS VISIT, SUBSEQUENT: Primary | ICD-10-CM

## 2022-01-24 DIAGNOSIS — R68.89 GENERALLY UNWELL: ICD-10-CM

## 2022-01-24 DIAGNOSIS — E21.3 HYPERPARATHYROIDISM, UNSPECIFIED (HCC): ICD-10-CM

## 2022-01-24 DIAGNOSIS — Z13.220 LIPID SCREENING: ICD-10-CM

## 2022-01-24 DIAGNOSIS — I10 ESSENTIAL HYPERTENSION: ICD-10-CM

## 2022-01-24 PROCEDURE — 99214 OFFICE O/P EST MOD 30 MIN: CPT | Performed by: FAMILY MEDICINE

## 2022-01-24 PROCEDURE — G0439 PPPS, SUBSEQ VISIT: HCPCS | Performed by: FAMILY MEDICINE

## 2022-01-24 NOTE — PROGRESS NOTES
Assessment and Plan:     Problem List Items Addressed This Visit     None      Visit Diagnoses     Diabetes mellitus screening    -  Primary    Lipid screening        Thyroid disorder screen               Preventive health issues were discussed with patient, and age appropriate screening tests were ordered as noted in patient's After Visit Summary  Personalized health advice and appropriate referrals for health education or preventive services given if needed, as noted in patient's After Visit Summary       History of Present Illness:     Patient presents for Medicare Annual Wellness visit    Patient Care Team:  Ashlee Frank DO as PCP - General (Family Medicine)  MD Ashlee Dunn DO Delories Buttery, MD Jaime Kind, DO Austin Feliciano DO     Problem List:     Patient Active Problem List   Diagnosis    Essential hypertension    Mixed hyperlipidemia    Coronary artery disease without angina pectoris - S/P stent placement x 2 (2011)    Gastroesophageal reflux disease without esophagitis    Chronic diastolic (congestive) heart failure ([facility] )    Hyperthyroidism    Age-related osteoporosis with current pathological fracture    Intertrochanteric fracture of left femur, closed, with routine healing, subsequent encounter    Ambulatory dysfunction    Elderly person living alone    Low back pain without sciatica    S/P ORIF (open reduction internal fixation) fracture    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[facility] )    Bladder cancer ([facility] )    Allergic rhinitis    Anemia due to vitamin B12 deficiency    Biliary dyskinesia    Cholelithiasis    Chronic right shoulder pain    Closed displaced fracture of left trapezium bone    Degenerative joint disease    Depression, controlled    Deviated nasal septum    Gastric reflux syndrome    Heart valve disorder    Herpes zoster infection of thoracic region    IBS (irritable bowel syndrome)    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[place] [id number] )    Mild vitamin D deficiency    Mixed hearing loss, unilateral    Pancreatic cyst    Raynaud's disease without gangrene    S/P angioplasty with stent    Urge incontinence of urine    Conjunctivitis of both eyes    Thrombocytopenia ([place] [address] )    Medicare annual wellness visit, subsequent    History of pulmonary embolus (PE)    Age-related cataract of both eyes    Hyperparathyroidism, unspecified ([place] [address] )    Pruritic rash    Fear of falling    Balance problems    Difficulty navigating stairs    Lower abdominal pain, unspecified    History of falling    Lower extremity edema    Cough due to ACE inhibitor    Persistent cough for 3 weeks or longer    New onset of headaches    Carotid artery stenosis, asymptomatic, bilateral    Ventral hernia    Cerebral aneurysm without rupture    Intermittent pain and swelling of hand    Right hand paresthesia    BMI 23 0-23 9, adult    Carpal tunnel syndrome of right wrist      Past Medical and Surgical History:     Past Medical History:   Diagnosis Date    Bladder cancer ([place] [address] )     had BCG treatments    Bladder cancer ([place] [address] )     Coronary artery disease     S/P stent placement x 2 in 2011    GERD (gastroesophageal reflux disease)     Hepatitis     got this from food back in 1970s, has not had a problem since    History of leukemia     in remission    History of stomach ulcers 1970    History of transfusion 1970    Hyperlipidemia     Hypertension     Irritable bowel syndrome     Kidney stone     Kidney stones     Pneumonia     Pt had in 2003 and 2004    Pulmonary emboli (HCC) 1970s    Raynaud disease     Shingles     Sleep apnea      Past Surgical History:   Procedure Laterality Date    CATARACT EXTRACTION      COLONOSCOPY      CORONARY ANGIOPLASTY WITH STENT PLACEMENT      stent x 2    CYSTOSCOPY      diagnostic - onset 4/4/17    EGD      EYE SURGERY      FRACTURE SURGERY      HERNIA REPAIR      HYSTERECTOMY      LEG SURGERY      OOPHORECTOMY      OR OPEN RX FEMUR FX+INTRAMED ALEJANDRO Left 4/8/2018    Procedure: INSERTION NAIL IM FEMUR ANTEGRADE (TROCHANTERIC); Surgeon: Joseluis Jorgensen MD;  [facility];  Service: Orthopedics    [address] [place] [address] N/A 5/12/2021    Procedure: REPAIR HERNIA VENTRAL;  Surgeon: Jazzmine Serrano MD;  [facility];  Service: General    OR WRIST Charmel Dg LIG Right 10/12/2021    Procedure: Right endoscopic carpal tunnel release;  Surgeon: Tahira Vasquez MD;  [facility];  Service: Orthopedics    TONSILLECTOMY        Family History:     Family History   Problem Relation Age of Onset    Arthritis Mother     Osteoporosis Mother     Heart disease Father     Hypertension Father     Hypertension Brother     Prostate cancer Brother     Breast cancer Daughter 48    Prostate cancer Son       Social History:     Social History     Socioeconomic History    Marital status:      Spouse name: None    Number of children: None    Years of education: None    Highest education level: None   Occupational History    None   Tobacco Use    Smoking status: Never Smoker    Smokeless tobacco: Never Used   Vaping Use    Vaping Use: Never used   Substance and Sexual Activity    Alcohol use: No    Drug use: No    Sexual activity: Never   Other Topics Concern    None   Social History Narrative    Advance directives declined by parents    Always uses seat belt     Social Determinants of Health     Financial Resource Strain: Not on file   Food Insecurity: Not on file   Transportation Needs: No Transportation Needs    Lack of Transportation (Medical): No    Lack of Transportation (Non-Medical):  No   Physical Activity: Not on file   Stress: Not on file   Social Connections: Not on file   Intimate Partner Violence: Not on file   Housing Stability: Not on file      Medications and Allergies:     Current Outpatient Medications   Medication Sig Dispense Refill  acetaminophen (Tylenol 8 Hour) 650 mg CR tablet Take 1 tablet (650 mg total) by mouth every 8 (eight) hours 15 tablet 0    amLODIPine (NORVASC) 5 mg tablet Take 1 tablet (5 mg total) by mouth daily 90 tablet 1    aspirin 81 MG tablet Take 81 mg by mouth daily Resume on 4/28       atorvastatin (LIPITOR) 40 mg tablet TAKE 1 TABLET BY MOUTH  DAILY AFTER DINNER 90 tablet 3    bumetanide (BUMEX) 1 mg tablet TAKE 1/2 A TABLET BY MOUTH  DAILY TAKE A WHOLE TABLET IF  NEEDED 90 tablet 3    carvedilol (COREG) 12 5 mg tablet Take 1 tablet (12 5 mg total) by mouth 2 (two) times a day with meals 180 tablet 3    dicyclomine (BENTYL) 10 mg capsule Take 10 mg by mouth as needed      DULoxetine (CYMBALTA) 30 mg delayed release capsule TAKE 1 CAPSULE BY MOUTH  DAILY 90 capsule 1    esomeprazole (NexIUM) 20 mg capsule Take 1 capsule (20 mg total) by mouth daily before breakfast 90 capsule 1    fexofenadine (ALLEGRA) 180 MG tablet Take 180 mg by mouth daily as needed       losartan (COZAAR) 100 MG tablet TAKE 1 TABLET BY MOUTH  DAILY 90 tablet 1    Methylcellulose, Laxative, (CITRUCEL PO) Take by mouth as needed       MULTIPLE VITAMIN PO Take by mouth daily       Cholecalciferol 2000 units TABS Take 2,000 Units by mouth daily Resume on 4/28 (Patient not taking: Reported on 1/24/2022 )      Docusate Sodium (COLACE PO) Take by mouth (Patient not taking: Reported on 1/24/2022 )      methylPREDNISolone 4 MG tablet therapy pack Use as directed on package (Patient not taking: Reported on 1/24/2022 ) 1 each 0     No current facility-administered medications for this visit       Allergies   Allergen Reactions    Lactose - Food Allergy GI Intolerance      Immunizations:     Immunization History   Administered Date(s) Administered    COVID-19 MODERNA VACC 0 25 ML IM BOOSTER 11/17/2021    COVID-19 MODERNA VACC 0 5 ML IM 01/23/2021, 02/18/2021    Influenza Split High Dose Preservative Free IM 10/04/2016, 10/05/2017    Influenza, high dose seasonal 0 7 mL 10/11/2018, 10/15/2019, 10/29/2020, 10/06/2021    Pneumococcal Conjugate 13-Valent 07/24/2017    Pneumococcal Polysaccharide PPV23 01/07/2019    SARS-CoV-2 / COVID-19 02/01/2021    Zoster 01/01/2016      Health Maintenance: There are no preventive care reminders to display for this patient  Topic Date Due    DTaP,Tdap,and Td Vaccines (1 - Tdap) Never done      Medicare Health Risk Assessment:     /74 (BP Location: Left arm, Patient Position: Sitting, Cuff Size: Standard)   Pulse 78   Temp 97 9 °F (36 6 °C) (Tympanic)   Ht 5' 1" (1 549 m)   Wt 59 5 kg (131 lb 3 2 oz)   SpO2 98%   BMI 24 79 kg/m²          Health Risk Assessment:   Patient rates overall health as fair  Patient feels that their physical health rating is slightly worse  Patient is satisfied with their life  Eyesight was rated as same  Hearing was rated as same  Patient feels that their emotional and mental health rating is slightly worse  Patients states they are never, rarely angry  Patient states they are always unusually tired/fatigued  Pain experienced in the last 7 days has been a lot  Patient's pain rating has been 7/10  Patient states that she has experienced weight loss or gain in last 6 months  Fall Risk Screening: In the past year, patient has experienced: no history of falling in past year      Urinary Incontinence Screening:   Patient has not leaked urine accidently in the last six months  Home Safety:  Patient does not have trouble with stairs inside or outside of their home  Patient has working smoke alarms and has working carbon monoxide detector  Home safety hazards include: none  Nutrition:   Current diet is Regular  Medications:   Patient is currently taking over-the-counter supplements  OTC medications include: see medication list  Patient is able to manage medications       Activities of Daily Living (ADLs)/Instrumental Activities of Daily Living (IADLs): Walk and transfer into and out of bed and chair?: Yes  Dress and groom yourself?: Yes    Bathe or shower yourself?: Yes    Feed yourself? Yes  Do your laundry/housekeeping?: Yes  Manage your money, pay your bills and track your expenses?: Yes  Make your own meals?: Yes    Do your own shopping?: Yes    Previous Hospitalizations:   Any hospitalizations or ED visits within the last 12 months?: No      Advance Care Planning:   Living will: Yes    Advanced directive: Yes      Cognitive Screening:   Provider or family/friend/caregiver concerned regarding cognition?: No    PREVENTIVE SCREENINGS      Cardiovascular Screening:    General: Screening Not Indicated and History Lipid Disorder      Diabetes Screening:     General: Screening Current      Colorectal Cancer Screening:     General: Screening Not Indicated      Breast Cancer Screening:     General: Screening Not Indicated      Cervical Cancer Screening:    General: Screening Not Indicated      Osteoporosis Screening:    General: Screening Not Indicated and History Osteoporosis      Abdominal Aortic Aneurysm (AAA) Screening:        General: Screening Not Indicated      Lung Cancer Screening:     General: Screening Not Indicated      Hepatitis C Screening:    General: Screening Not Indicated    Screening, Brief Intervention, and Referral to Treatment (SBIRT)    Screening  Typical number of drinks in a day: 0  Typical number of drinks in a week: 0  Interpretation: Low risk drinking behavior      Single Item Drug Screening:  How often have you used an illegal drug (including marijuana) or a prescription medication for non-medical reasons in the past year? never    Single Item Drug Screen Score: 0  Interpretation: Negative screen for possible drug use disorder      Kevin Freeman,

## 2022-01-24 NOTE — PROGRESS NOTES
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[facility] )  Comments:  monitored yearly by Hem/Onc    Chronic diastolic (congestive) heart failure (HCC)  Comments:  sees cardiol approx every 6 months    Hyperparathyroidism, unspecified ([facility] )  Comments:  calcium normal and stable, cpm    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[facility] )  Comments:  managed by rheumatologist    Essential hypertension  -     Comprehensive metabolic panel; Future    Generally unwell  -     Comprehensive metabolic panel; Future  -     CBC and differential; Future  -     UA w Reflex to Microscopic w Reflex to Culture -Lab Collect; Future    Urinary frequency  -     UA w Reflex to Microscopic w Reflex to Culture -Lab Collect; Future    Lipid screening    History of thrombocytopenia  -     CBC and differential; Future    Thyroid disorder screen  -     TSH, 3rd generation with Free T4 reflex; Future    Diabetes mellitus screening  -     Comprehensive metabolic panel; Future          Subjective:   Chief Complaint   Patient presents with    Medicare Wellness Visit    Follow-up        Patient ID: Beto Ramirez is a 82 y o  female  Medicare wellness and f/u appt  Sees her rheum for f/u on 02/01- states "it was 2 weeks after carpal tunnel, started to feel not good, still dont feel good, feeling the same way did after hip surgery, and my hand is swollen"  "And I'm always cold, but I'm more cold than usual"      The following portions of the patient's history were reviewed and updated as appropriate: allergies, current medications, past family history, past medical history, past social history, past surgical history and problem list     Review of Systems   Constitutional: Negative for appetite change, diaphoresis, fever and unexpected weight change  HENT: Negative for sore throat  Respiratory: Negative  Cardiovascular: Negative  Gastrointestinal: Negative  Neurological: Negative  Hematological: Negative  Objective:      /74 (BP Location: Left arm, Patient Position: Sitting, Cuff Size: Standard)   Pulse 78   Temp 97 9 °F (36 6 °C) (Tympanic)   Ht 5' 1" (1 549 m)   Wt 59 5 kg (131 lb 3 2 oz)   SpO2 98%   BMI 24 79 kg/m²          Physical Exam  Vitals and nursing note reviewed  Constitutional:       General: She is not in acute distress  Appearance: She is well-developed  She is not ill-appearing, toxic-appearing or diaphoretic  HENT:      Head: Normocephalic and atraumatic  Eyes:      General: Lids are normal       Conjunctiva/sclera: Conjunctivae normal       Pupils: Pupils are equal, round, and reactive to light  Neck:      Thyroid: No thyroid mass or thyromegaly  Vascular: No JVD  Trachea: Trachea normal    Cardiovascular:      Rate and Rhythm: Normal rate and regular rhythm  Heart sounds: Normal heart sounds  Comments: No edema  Pulmonary:      Effort: Pulmonary effort is normal       Breath sounds: Normal breath sounds  Chest:   Breasts:      Right: No supraclavicular adenopathy  Left: No supraclavicular adenopathy  Abdominal:      General: Bowel sounds are normal  There is no distension  Palpations: Abdomen is soft  There is no hepatomegaly, splenomegaly or mass  Tenderness: There is no abdominal tenderness  Musculoskeletal:      Cervical back: Neck supple  Lymphadenopathy:      Cervical: No cervical adenopathy  Upper Body:      Right upper body: No supraclavicular adenopathy  Left upper body: No supraclavicular adenopathy  Skin:     General: Skin is warm and dry  Coloration: Skin is not pale  Neurological:      Mental Status: She is alert and oriented to person, place, and time  Gait: Gait normal    Psychiatric:         Behavior: Behavior normal  Behavior is cooperative

## 2022-01-24 NOTE — PATIENT INSTRUCTIONS
Medicare Preventive Visit Patient Instructions  Thank you for completing your Welcome to Medicare Visit or Medicare Annual Wellness Visit today  Your next wellness visit will be due in one year (1/25/2023)  The screening/preventive services that you may require over the next 5-10 years are detailed below  Some tests may not apply to you based off risk factors and/or age  Screening tests ordered at today's visit but not completed yet may show as past due  Also, please note that scanned in results may not display below  Preventive Screenings:  Service Recommendations Previous Testing/Comments   Colorectal Cancer Screening  * Colonoscopy    * Fecal Occult Blood Test (FOBT)/Fecal Immunochemical Test (FIT)  * Fecal DNA/Cologuard Test  * Flexible Sigmoidoscopy Age: 54-65 years old   Colonoscopy: every 10 years (may be performed more frequently if at higher risk)  OR  FOBT/FIT: every 1 year  OR  Cologuard: every 3 years  OR  Sigmoidoscopy: every 5 years  Screening may be recommended earlier than age 48 if at higher risk for colorectal cancer  Also, an individualized decision between you and your healthcare provider will decide whether screening between the ages of 74-80 would be appropriate  Colonoscopy: Not on file  FOBT/FIT: Not on file  Cologuard: Not on file  Sigmoidoscopy: Not on file          Breast Cancer Screening Age: 36 years old  Frequency: every 1-2 years  Not required if history of left and right mastectomy Mammogram: 02/01/2019        Cervical Cancer Screening Between the ages of 21-29, pap smear recommended once every 3 years  Between the ages of 33-67, can perform pap smear with HPV co-testing every 5 years     Recommendations may differ for women with a history of total hysterectomy, cervical cancer, or abnormal pap smears in past  Pap Smear: Not on file    Screening Not Indicated   Hepatitis C Screening Once for adults born between [facility]  More frequently in patients at high risk for Hepatitis C Hep C Antibody: Not on file        Diabetes Screening 1-2 times per year if you're at risk for diabetes or have pre-diabetes Fasting glucose: 146 mg/dL   A1C: No results in last 5 years    Screening Current   Cholesterol Screening Once every 5 years if you don't have a lipid disorder  May order more often based on risk factors  Lipid panel: 11/04/2021    Screening Not Indicated  History Lipid Disorder     Other Preventive Screenings Covered by Medicare:  1  Abdominal Aortic Aneurysm (AAA) Screening: covered once if your at risk  You're considered to be at risk if you have a family history of AAA  2  Lung Cancer Screening: covers low dose CT scan once per year if you meet all of the following conditions: (1) Age 50-69; (2) No signs or symptoms of lung cancer; (3) Current smoker or have quit smoking within the last 15 years; (4) You have a tobacco smoking history of at least 30 pack years (packs per day multiplied by number of years you smoked); (5) You get a written order from a healthcare provider  3  Glaucoma Screening: covered annually if you're considered high risk: (1) You have diabetes OR (2) Family history of glaucoma OR (3)  aged 48 and older OR (3)  American aged 72 and older  3  Osteoporosis Screening: covered every 2 years if you meet one of the following conditions: (1) You're estrogen deficient and at risk for osteoporosis based off medical history and other findings; (2) Have a vertebral abnormality; (3) On glucocorticoid therapy for more than 3 months; (4) Have primary hyperparathyroidism; (5) On osteoporosis medications and need to assess response to drug therapy  · Last bone density test (DXA Scan): 05/28/2019   5  HIV Screening: covered annually if you're between the age of 15-65  Also covered annually if you are younger than 13 and older than 72 with risk factors for HIV infection   For pregnant patients, it is covered up to 3 times per pregnancy  Immunizations:  Immunization Recommendations   Influenza Vaccine Annual influenza vaccination during flu season is recommended for all persons aged >= 6 months who do not have contraindications   Pneumococcal Vaccine (Prevnar and Pneumovax)  * Prevnar = PCV13  * Pneumovax = PPSV23   Adults 25-60 years old: 1-3 doses may be recommended based on certain risk factors  Adults 72 years old: Prevnar (PCV13) vaccine recommended followed by Pneumovax (PPSV23) vaccine  If already received PPSV23 since turning 65, then PCV13 recommended at least one year after PPSV23 dose  Hepatitis B Vaccine 3 dose series if at intermediate or high risk (ex: diabetes, end stage renal disease, liver disease)   Tetanus (Td) Vaccine - COST NOT COVERED BY MEDICARE PART B Following completion of primary series, a booster dose should be given every 10 years to maintain immunity against tetanus  Td may also be given as tetanus wound prophylaxis  Tdap Vaccine - COST NOT COVERED BY MEDICARE PART B Recommended at least once for all adults  For pregnant patients, recommended with each pregnancy  Shingles Vaccine (Shingrix) - COST NOT COVERED BY MEDICARE PART B  2 shot series recommended in those aged 48 and above     Health Maintenance Due:  There are no preventive care reminders to display for this patient  Immunizations Due:      Topic Date Due    DTaP,Tdap,and Td Vaccines (1 - Tdap) Never done     Advance Directives   What are advance directives? Advance directives are legal documents that state your wishes and plans for medical care  These plans are made ahead of time in case you lose your ability to make decisions for yourself  Advance directives can apply to any medical decision, such as the treatments you want, and if you want to donate organs  What are the types of advance directives? There are many types of advance directives, and each state has rules about how to use them   You may choose a combination of any of the following:  · Living will: This is a written record of the treatment you want  You can also choose which treatments you do not want, which to limit, and which to stop at a certain time  This includes surgery, medicine, IV fluid, and tube feedings  · Durable power of  for healthcare [place] [facility]): This is a written record that states who you want to make healthcare choices for you when you are unable to make them for yourself  This person, called a proxy, is usually a family member or a friend  You may choose more than 1 proxy  · Do not resuscitate (DNR) order:  A DNR order is used in case your heart stops beating or you stop breathing  It is a request not to have certain forms of treatment, such as CPR  A DNR order may be included in other types of advance directives  · Medical directive: This covers the care that you want if you are in a coma, near death, or unable to make decisions for yourself  You can list the treatments you want for each condition  Treatment may include pain medicine, surgery, blood transfusions, dialysis, IV or tube feedings, and a ventilator (breathing machine)  · Values history: This document has questions about your views, beliefs, and how you feel and think about life  This information can help others choose the care that you would choose  Why are advance directives important? An advance directive helps you control your care  Although spoken wishes may be used, it is better to have your wishes written down  Spoken wishes can be misunderstood, or not followed  Treatments may be given even if you do not want them  An advance directive may make it easier for your family to make difficult choices about your care  © Copyright [employer] 2018 Information is for End User's use only and may not be sold, redistributed or otherwise used for commercial purposes   All illustrations and images included in CareNotes® are the copyrighted property of [employer]  or [employer]

## 2022-01-25 ENCOUNTER — APPOINTMENT (OUTPATIENT)
Dept: LAB | Facility: CLINIC | Age: 87
End: 2022-01-25
Payer: MEDICARE

## 2022-01-25 DIAGNOSIS — R68.89 GENERALLY UNWELL: ICD-10-CM

## 2022-01-25 DIAGNOSIS — Z13.29 THYROID DISORDER SCREEN: ICD-10-CM

## 2022-01-25 DIAGNOSIS — R35.0 URINARY FREQUENCY: ICD-10-CM

## 2022-01-25 DIAGNOSIS — Z13.1 DIABETES MELLITUS SCREENING: ICD-10-CM

## 2022-01-25 DIAGNOSIS — Z86.2 HISTORY OF THROMBOCYTOPENIA: ICD-10-CM

## 2022-01-25 DIAGNOSIS — I10 ESSENTIAL HYPERTENSION: ICD-10-CM

## 2022-01-25 LAB
ALBUMIN SERPL BCP-MCNC: 3.8 G/DL (ref 3.5–5)
ALP SERPL-CCNC: 76 U/L (ref 46–116)
ALT SERPL W P-5'-P-CCNC: 31 U/L (ref 12–78)
ANION GAP SERPL CALCULATED.3IONS-SCNC: 3 MMOL/L (ref 4–13)
AST SERPL W P-5'-P-CCNC: 20 U/L (ref 5–45)
BACTERIA UR QL AUTO: NORMAL /HPF
BASOPHILS # BLD AUTO: 0.04 THOUSANDS/ΜL (ref 0–0.1)
BASOPHILS NFR BLD AUTO: 1 % (ref 0–1)
BILIRUB SERPL-MCNC: 0.59 MG/DL (ref 0.2–1)
BILIRUB UR QL STRIP: NEGATIVE
BUN SERPL-MCNC: 27 MG/DL (ref 5–25)
CALCIUM SERPL-MCNC: 9.2 MG/DL (ref 8.3–10.1)
CHLORIDE SERPL-SCNC: 103 MMOL/L (ref 100–108)
CLARITY UR: CLEAR
CO2 SERPL-SCNC: 31 MMOL/L (ref 21–32)
COLOR UR: YELLOW
CREAT SERPL-MCNC: 0.75 MG/DL (ref 0.6–1.3)
EOSINOPHIL # BLD AUTO: 0.1 THOUSAND/ΜL (ref 0–0.61)
EOSINOPHIL NFR BLD AUTO: 1 % (ref 0–6)
ERYTHROCYTE [DISTWIDTH] IN BLOOD BY AUTOMATED COUNT: 12.6 % (ref 11.6–15.1)
GFR SERPL CREATININE-BSD FRML MDRD: 73 ML/MIN/1.73SQ M
GLUCOSE P FAST SERPL-MCNC: 93 MG/DL (ref 65–99)
GLUCOSE UR STRIP-MCNC: NEGATIVE MG/DL
HCT VFR BLD AUTO: 39.8 % (ref 34.8–46.1)
HGB BLD-MCNC: 13.2 G/DL (ref 11.5–15.4)
HGB UR QL STRIP.AUTO: NORMAL
HYALINE CASTS #/AREA URNS LPF: NORMAL /LPF
IMM GRANULOCYTES # BLD AUTO: 0.02 THOUSAND/UL (ref 0–0.2)
IMM GRANULOCYTES NFR BLD AUTO: 0 % (ref 0–2)
KETONES UR STRIP-MCNC: NEGATIVE MG/DL
LEUKOCYTE ESTERASE UR QL STRIP: NEGATIVE
LYMPHOCYTES # BLD AUTO: 1.89 THOUSANDS/ΜL (ref 0.6–4.47)
LYMPHOCYTES NFR BLD AUTO: 27 % (ref 14–44)
MCH RBC QN AUTO: 31.4 PG (ref 26.8–34.3)
MCHC RBC AUTO-ENTMCNC: 33.2 G/DL (ref 31.4–37.4)
MCV RBC AUTO: 95 FL (ref 82–98)
MONOCYTES # BLD AUTO: 0.63 THOUSAND/ΜL (ref 0.17–1.22)
MONOCYTES NFR BLD AUTO: 9 % (ref 4–12)
NEUTROPHILS # BLD AUTO: 4.41 THOUSANDS/ΜL (ref 1.85–7.62)
NEUTS SEG NFR BLD AUTO: 62 % (ref 43–75)
NITRITE UR QL STRIP: NEGATIVE
NON-SQ EPI CELLS URNS QL MICRO: NORMAL /HPF
NRBC BLD AUTO-RTO: 0 /100 WBCS
PH UR STRIP.AUTO: 7 [PH]
PLATELET # BLD AUTO: 233 THOUSANDS/UL (ref 149–390)
PMV BLD AUTO: 9.9 FL (ref 8.9–12.7)
POTASSIUM SERPL-SCNC: 3.8 MMOL/L (ref 3.5–5.3)
PROT SERPL-MCNC: 7.8 G/DL (ref 6.4–8.2)
PROT UR STRIP-MCNC: NEGATIVE MG/DL
RBC # BLD AUTO: 4.21 MILLION/UL (ref 3.81–5.12)
RBC #/AREA URNS AUTO: NORMAL /HPF
SODIUM SERPL-SCNC: 137 MMOL/L (ref 136–145)
SP GR UR STRIP.AUTO: 1.01 (ref 1–1.03)
TSH SERPL DL<=0.05 MIU/L-ACNC: 2.99 UIU/ML (ref 0.36–3.74)
UROBILINOGEN UR QL STRIP.AUTO: 0.2 E.U./DL
WBC # BLD AUTO: 7.09 THOUSAND/UL (ref 4.31–10.16)
WBC #/AREA URNS AUTO: NORMAL /HPF

## 2022-01-25 PROCEDURE — 85025 COMPLETE CBC W/AUTO DIFF WBC: CPT

## 2022-01-25 PROCEDURE — 84443 ASSAY THYROID STIM HORMONE: CPT

## 2022-01-25 PROCEDURE — 36415 COLL VENOUS BLD VENIPUNCTURE: CPT

## 2022-01-25 PROCEDURE — 80053 COMPREHEN METABOLIC PANEL: CPT

## 2022-01-25 PROCEDURE — 81001 URINALYSIS AUTO W/SCOPE: CPT

## 2022-01-26 ENCOUNTER — OFFICE VISIT (OUTPATIENT)
Dept: OCCUPATIONAL THERAPY | Facility: MEDICAL CENTER | Age: 87
End: 2022-01-26
Payer: MEDICARE

## 2022-01-26 DIAGNOSIS — M19.042 PRIMARY OSTEOARTHRITIS OF LEFT HAND: Primary | ICD-10-CM

## 2022-01-26 PROCEDURE — 97110 THERAPEUTIC EXERCISES: CPT

## 2022-01-26 PROCEDURE — 97140 MANUAL THERAPY 1/> REGIONS: CPT

## 2022-01-26 PROCEDURE — 97530 THERAPEUTIC ACTIVITIES: CPT

## 2022-01-26 NOTE — PROGRESS NOTES
Occupational Therapy Daily Note    Today's date: 2022  Patient name: Nicole Weston  : 1935  MRN: [id number]  Referring provider: Reta Beck MD  Dx:   Encounter Diagnosis   Name Primary?  Primary osteoarthritis of left hand Yes         Subjective: "The compression sleeve is working really well "    Prior to treatment session: 5/10  After treatment session: 5/10    Objective:      Precautions: Universal     Manuals HEP 2022   Retrograde massage  10 min   Thenar [place]               Ther Ex     Education on HEP and dx x5 min     AROM tendon glides x10 X 15   AROM table top extension x10 X 15   AROM digit add/abduction x10 X 15   AROM wrist flex/ext/RD/UD x10 X 15   PROM wrist flex/ext  X 15   PROM digit flex/ext  X 15        Therapeutic Activity     Towel Scruches  X 15   Towel Squeezes  X 15   Wrist Maze   X 10   Sponge Squeezes (Yellow)  X 10        Modalities     Heat 5-10 min 5-10 min 5 min          Therapist supervised patient with use of modalities during today's treatment session  Skin integrity was assessed and appeared normal following use of modalities  Assessment: Tolerated treatment well  Patient would benefit from continued skilled OT  Pt exhibits continued swelling through MP joints limiting full ROM into tight fist and causing pain  Pt reports improvement with use of compression sleeve for edema management  Progressed with therapeutic activities and exercises today, tolerated well  Provided with yellow sponge for gentle squeezes to improve ROM and gripping tolerance at home  [place] wrapped digits and provided with Tubigrip compression sleeve for edema  Reviewed home exercises, joint protection strategies, and methods for reducing swelling  Plan: Continued skilled OT per POC      INTERVENTION COMMENTS:  Diagnosis: Osteoarthritis of left hand  Precautions: N/A  FOTO: 45%  Insurance: Payor: Medicare  PN due 2022

## 2022-02-02 ENCOUNTER — EVALUATION (OUTPATIENT)
Dept: OCCUPATIONAL THERAPY | Facility: MEDICAL CENTER | Age: 87
End: 2022-02-02
Payer: MEDICARE

## 2022-02-02 DIAGNOSIS — M19.042 PRIMARY OSTEOARTHRITIS OF LEFT HAND: Primary | ICD-10-CM

## 2022-02-02 PROCEDURE — 97140 MANUAL THERAPY 1/> REGIONS: CPT

## 2022-02-02 PROCEDURE — 97110 THERAPEUTIC EXERCISES: CPT

## 2022-02-02 PROCEDURE — 97530 THERAPEUTIC ACTIVITIES: CPT

## 2022-02-02 NOTE — PROGRESS NOTES
Occupational Therapy Daily Note    Today's date: 2022  Patient name: Tali Robles  : 1935  MRN: [id number]  Referring provider: Radha Parks MD  Dx:   Encounter Diagnosis   Name Primary?  Primary osteoarthritis of left hand Yes         Subjective: "There is not much change from last week to this week  I couldn't do one of the exercises because I was in too much pain "    Prior to treatment session: 5/10  After treatment session: 4/10    Objective:     Precautions: Universal     Manuals HEP 2022   Retrograde massage  10 min   Thenar [place]               Ther Ex     Education on HEP and dx x5 min     AROM tendon glides x10 X 15   AROM table top extension x10 X 15   AROM digit add/abduction x10 X 15   AROM wrist flex/ext/RD/UD x10 X 15   PROM wrist flex/ext  3 min   PROM digit flex/ext  5 min        Therapeutic Activity     Towel Scruches  X 15   Towel Squeezes  X 15   Yellow Towel Sponges   X 10 gripping, x 10 pinching D1-D5   [place] Crawls  X 10   [place] Slides  X 10 D1-D5        Modalities     Heat 5-10 min 5-10 min 5 min          Therapist supervised patient with use of modalities during today's treatment session  Skin integrity was assessed and appeared normal following use of modalities  Assessment: Tolerated treatment well  Patient would benefit from continued skilled OT  Pt presents to OT swelling with mild edema through D2 and D3 MP joints  Reports decreased swelling with Tubigrip compression sleeves  Introduced new therapeutic exercises today, tolerated well  Reviewed paraffin unit benefits if interested for home use  Pt will be re-evaluated next treatment session  Pt is benefiting from OT services  Plan: Continued skilled OT per POC      INTERVENTION COMMENTS:  Diagnosis: Osteoarthritis of left hand  Precautions: N/A  FOTO: 45%  Insurance: Payor: Medicare  PN due 2022

## 2022-02-09 ENCOUNTER — EVALUATION (OUTPATIENT)
Dept: OCCUPATIONAL THERAPY | Facility: MEDICAL CENTER | Age: 87
End: 2022-02-09
Payer: MEDICARE

## 2022-02-09 DIAGNOSIS — M19.042 PRIMARY OSTEOARTHRITIS OF LEFT HAND: Primary | ICD-10-CM

## 2022-02-09 PROCEDURE — 97530 THERAPEUTIC ACTIVITIES: CPT

## 2022-02-09 PROCEDURE — 97140 MANUAL THERAPY 1/> REGIONS: CPT

## 2022-02-09 PROCEDURE — 97110 THERAPEUTIC EXERCISES: CPT

## 2022-02-09 NOTE — PROGRESS NOTES
OT Discharge     Today's date: 2022  Patient name: Nikki Kelley  : 1935  MRN: [id number]  Referring provider: Tami Snider MD  Dx:   Encounter Diagnosis     ICD-10-CM    1  Primary osteoarthritis of left hand  M19 042                   Assessment  Assessment details: Pt presents to OT discharge today on 2022 to reassess ROM, edema, and strength compared to initial evaluation  Pt continues to have mild swelling through MP joints at D2 and D3, however, exhibits improvement from previous treatment sessions  Maintained pinch strength from IE, no reported changes  Improved Palmar Abduction thumb ROM from IE  Continued AROM with all digits on L, however, this may be patient's baseline due to 20+ year history with arthritis  Pt improved FOTO score indicating she feels she has made progress in therapy  Has an understanding of exercises/activities to be completed at home  Pt reports that she agrees with discharge from therapy at this time  Impairments: abnormal or restricted ROM, activity intolerance, impaired physical strength, lacks appropriate home exercise program and pain with function    Goals  STG:  - Pt will exhibit understanding and demonstrate carry over of HEP - MET  - Pt will begin to verbally report a decrease in pain from time of initial evaluation with use of modalities  - MET  -Pt will decrease joint stiffness and improve thumb AROM by 5 degrees when compared to initial evaluation  - MET  -Pt will continue to exhibit limited edema through L hand  - MET  -Pt will demonstrate compliance with brace wearing schedule  - MET    LTG:  - Pt will exhibit improvements of thumb AROM for increased independence during IADL and ADL tasks   - MET  - Pt will exhibit improvement in  and pinch strength by 3-5lbs for improved performane in IADLs and ADLs - NOT MET  - Pt will increase LUE prehension patterns for improved utensil and container management with <10% droppage - PARTIALLY MET  - Pt will improve QuickDash score when compared to initial evaluation for improved participation in daily routine and tasks  - MET  - Pt will consistently report decreased pain when compared to initial evaluation  - MET  - Pt will report feeling >75% back to normal for increased independence in daily routine and tasks  - PARTIALLY MET  - Pt will express readiness for discharge with improved independence  - MET      Plan  Patient would benefit from: skilled occupational therapy  Planned modality interventions: thermotherapy: hydrocollator packs  Planned therapy interventions: manual therapy, fine motor coordination training, strengthening, stretching, therapeutic activities, therapeutic exercise, coordination and home exercise program  Treatment plan discussed with: patient        Subjective Evaluation    History of Present Illness  Mechanism of injury: Pt presents to OT evaluation  secondary to osteoarthritis of the left hand  Pt reports that she began Medrol dose crystal (6 day, 2 days left)  Since beginning antibiotic from doctor, edema was significantly decreased  Pt provided a picture to therapist of previous edema  No edema present through LUE during today's evaluation  Pt reported that she has braces at home for LUE and will bring them in to review next session  Date of onset: approximately 2 months ago (right after carpal tunnel surgery on R)  At time of discharge pt reports that she feels has made improvement since beginning therapy  Pt feels she has good understanding of therapeutic exercises and activities to complete at home to maintain joint ROM, decrease swelling, and minimize pain    Pain  Current pain ratin  At best pain ratin    Hand dominance: right    Treatments  Current treatment: occupational therapy  Patient Goals  Patient goals for therapy: return to sport/leisure activities, independence with ADLs/IADLs, increased strength and increased motion  Patient goal: "To be able to use my L hand "        Objective     Active Range of Motion     Left Thumb   Palmar Abduction     CMC: 40 degrees  Radial abduction    CMC: 41 degrees  Opposition: Improved Palmar Abduction by 16 degrees from IE  Maintained Radial Abduction  Strength/Myotome Testing     Left Wrist/Hand     Thumb Strength  Key/Lateral Pinch     Trial 1: 9  Tip/Two-Point Pinch     Trial 1: 6  Palmar/Three-Point Pinch     Trial 1: 5    Right Wrist/Hand     Thumb Strength   Key/Lateral Pinch     Trial 1: 12  Tip/Two-Point Pinch     Trial 1: 7  Palmar/Three-Point Pinch     Trial 1: 7    Additional Strength Details  Maintained Pinch strength on L when compared to IE  No changes  Full thumb ROM & Opposition to D5  Mild Pain with palpation at thenar eminence and ulnar styloid    Flowsheet Rows      Most Recent Value   PT/OT G-Codes    Current Score 51   Projected Score 64           Precautions: Universal     HEP 1/12/2022   Retrograde massage   10 min   Thenar [place]                       Ther Ex       Education on HEP and dx x5 min      AROM tendon glides x10 X 15   AROM table top extension x10 X 15   AROM digit add/abduction x10 X 15   AROM wrist flex/ext/RD/UD x10 X 15   PROM wrist flex/ext   3 min   PROM digit flex/ext   5 min           Therapeutic Activity       Towel Scruches   X 15   Towel Squeezes   X 15   Yellow Sponges    X 10 gripping, x 10 pinching D1-D5   Marker Crawls   X 10   [place] Slides   X 10 D1-D5           Modalities       Heat 5-10 min 5-10 min 5 min             Therapist supervised patient with use of modalities during today's treatment session  Skin integrity was assessed and appeared normal following use of modalities  Assessment:   Pt presents to OT discharge today on 2/9/2022 to reassess ROM, edema, and strength compared to initial evaluation  Pt continues to have mild swelling through MP joints at D2 and D3, however, exhibits improvement from previous treatment sessions   Maintained pinch strength from IE, no reported changes  Improved Palmar Abduction thumb ROM from IE  Continued AROM with all digits on L, however, this may be patient's baseline due to 20+ year history with arthritis  Pt improved FOTO score indicating she feels she has made progress in therapy  Has an understanding of exercises/activities to be completed at home  Pt reports that she agrees with discharge from therapy at this time  Plan:   Focus on AROM and strengthening to improve ability to complete daily activites with ease  POC 1x/wk for 6-8 weeks      INTERVENTION COMMENTS:  Diagnosis: Osteoarthritis of left hand  Precautions: N/A  FOTO: 45%, 51% at discharge  Insurance: Payor: Medicare  PN due 1/28/2022

## 2022-02-15 NOTE — PROGRESS NOTES
Daily Note     Today's date: 2018  Patient name: Melba Dickens  : 1935  MRN: [id number]  Referring provider: Elda Nelson PA-C  Dx:   Encounter Diagnosis     ICD-10-CM    1  Left hip pain M25 552    2  Ambulatory dysfunction R26 2    3  Closed fracture of left hip with routine healing, subsequent encounter [id number]          Subjective: Had difficulty moving around the day after therapy but it did subside without residual effects and patient appears to be walking better  Assessment: Tolerating the increased in intensity fairly well    Muscle soreness the day afterward but then it improves        Plan: Continue per plan of care          Objective: See treatment diary below  Daily Treatment Diary      Manual  7/2 7/10  7/13  7/16                                                                                                                                             Exercise Diary  7/2 7/10    7/16               bike 5 min 6 min  7 min  10 min               minisquats 10 20    w/ball between knees 2x15               Standing abd 10 B 2x10  x  2# 2x10               Standing ham curls 10 B 2x10    2# 2x10               Heel raises 20 x  x  HEP               Side steps   7ft 3laps  x                 Step ups      4"  20x  x               Eccentric lower     4"   10x  20x               Marching on foam        2x20               LAQ    2x15                                                                        SLR 10 x  3x20  4x10               Clam shells 10 x  3x10  2x30               bridges 10 x  3x10  30                SLR abd     3x10  x                                                                                                                                                                     Modalities                        MH 10 min x                                                                   X=same as last visit      
0

## 2022-03-07 ENCOUNTER — TELEPHONE (OUTPATIENT)
Dept: FAMILY MEDICINE CLINIC | Facility: CLINIC | Age: 87
End: 2022-03-07

## 2022-03-07 NOTE — TELEPHONE ENCOUNTER
----- Message from Sami Linder sent at 3/7/2022  2:16 PM EST -----  Regarding: acid reflux  A while back I asked you if I could take Nexium  It has not been working so can I try Pepcid? I will start with 10 mg  twice a day if you approve             Thank You

## 2022-03-09 ENCOUNTER — APPOINTMENT (OUTPATIENT)
Dept: LAB | Facility: CLINIC | Age: 87
End: 2022-03-09
Payer: MEDICARE

## 2022-03-09 DIAGNOSIS — M05.89 OTHER RHEUMATOID ARTHRITIS WITH RHEUMATOID FACTOR OF MULTIPLE SITES (HCC): ICD-10-CM

## 2022-03-09 LAB
ALBUMIN SERPL BCP-MCNC: 3.5 G/DL (ref 3.5–5)
ALP SERPL-CCNC: 75 U/L (ref 46–116)
ALT SERPL W P-5'-P-CCNC: 38 U/L (ref 12–78)
ANION GAP SERPL CALCULATED.3IONS-SCNC: 3 MMOL/L (ref 4–13)
AST SERPL W P-5'-P-CCNC: 21 U/L (ref 5–45)
BASOPHILS # BLD AUTO: 0.06 THOUSANDS/ΜL (ref 0–0.1)
BASOPHILS NFR BLD AUTO: 1 % (ref 0–1)
BILIRUB SERPL-MCNC: 0.62 MG/DL (ref 0.2–1)
BUN SERPL-MCNC: 23 MG/DL (ref 5–25)
CALCIUM SERPL-MCNC: 9.1 MG/DL (ref 8.3–10.1)
CHLORIDE SERPL-SCNC: 106 MMOL/L (ref 100–108)
CO2 SERPL-SCNC: 29 MMOL/L (ref 21–32)
CREAT SERPL-MCNC: 0.78 MG/DL (ref 0.6–1.3)
CRP SERPL QL: <3 MG/L
EOSINOPHIL # BLD AUTO: 1 THOUSAND/ΜL (ref 0–0.61)
EOSINOPHIL NFR BLD AUTO: 17 % (ref 0–6)
ERYTHROCYTE [DISTWIDTH] IN BLOOD BY AUTOMATED COUNT: 13.5 % (ref 11.6–15.1)
ERYTHROCYTE [SEDIMENTATION RATE] IN BLOOD: 27 MM/HOUR (ref 0–29)
GFR SERPL CREATININE-BSD FRML MDRD: 70 ML/MIN/1.73SQ M
GLUCOSE P FAST SERPL-MCNC: 94 MG/DL (ref 65–99)
HCT VFR BLD AUTO: 36.8 % (ref 34.8–46.1)
HGB BLD-MCNC: 12.2 G/DL (ref 11.5–15.4)
IMM GRANULOCYTES # BLD AUTO: 0.02 THOUSAND/UL (ref 0–0.2)
IMM GRANULOCYTES NFR BLD AUTO: 0 % (ref 0–2)
LYMPHOCYTES # BLD AUTO: 1.61 THOUSANDS/ΜL (ref 0.6–4.47)
LYMPHOCYTES NFR BLD AUTO: 27 % (ref 14–44)
MCH RBC QN AUTO: 30.7 PG (ref 26.8–34.3)
MCHC RBC AUTO-ENTMCNC: 33.2 G/DL (ref 31.4–37.4)
MCV RBC AUTO: 93 FL (ref 82–98)
MONOCYTES # BLD AUTO: 0.5 THOUSAND/ΜL (ref 0.17–1.22)
MONOCYTES NFR BLD AUTO: 9 % (ref 4–12)
NEUTROPHILS # BLD AUTO: 2.68 THOUSANDS/ΜL (ref 1.85–7.62)
NEUTS SEG NFR BLD AUTO: 46 % (ref 43–75)
NRBC BLD AUTO-RTO: 0 /100 WBCS
PLATELET # BLD AUTO: 159 THOUSANDS/UL (ref 149–390)
PMV BLD AUTO: 9.9 FL (ref 8.9–12.7)
POTASSIUM SERPL-SCNC: 3.8 MMOL/L (ref 3.5–5.3)
PROT SERPL-MCNC: 7.4 G/DL (ref 6.4–8.2)
RBC # BLD AUTO: 3.97 MILLION/UL (ref 3.81–5.12)
SODIUM SERPL-SCNC: 138 MMOL/L (ref 136–145)
WBC # BLD AUTO: 5.87 THOUSAND/UL (ref 4.31–10.16)

## 2022-03-09 PROCEDURE — 80053 COMPREHEN METABOLIC PANEL: CPT

## 2022-03-09 PROCEDURE — 36415 COLL VENOUS BLD VENIPUNCTURE: CPT

## 2022-03-09 PROCEDURE — 85652 RBC SED RATE AUTOMATED: CPT

## 2022-03-09 PROCEDURE — 86140 C-REACTIVE PROTEIN: CPT

## 2022-03-09 PROCEDURE — 85025 COMPLETE CBC W/AUTO DIFF WBC: CPT

## 2022-03-24 ENCOUNTER — OFFICE VISIT (OUTPATIENT)
Dept: FAMILY MEDICINE CLINIC | Facility: CLINIC | Age: 87
End: 2022-03-24
Payer: MEDICARE

## 2022-03-24 VITALS
BODY MASS INDEX: 24.81 KG/M2 | OXYGEN SATURATION: 96 % | SYSTOLIC BLOOD PRESSURE: 130 MMHG | HEART RATE: 64 BPM | WEIGHT: 131.4 LBS | TEMPERATURE: 98.3 F | HEIGHT: 61 IN | DIASTOLIC BLOOD PRESSURE: 80 MMHG

## 2022-03-24 DIAGNOSIS — R11.0 NAUSEA: ICD-10-CM

## 2022-03-24 DIAGNOSIS — R19.7 DIARRHEA, UNSPECIFIED TYPE: Primary | ICD-10-CM

## 2022-03-24 DIAGNOSIS — K21.9 GASTROESOPHAGEAL REFLUX DISEASE WITHOUT ESOPHAGITIS: ICD-10-CM

## 2022-03-24 DIAGNOSIS — R10.84 GENERALIZED ABDOMINAL PAIN: ICD-10-CM

## 2022-03-24 PROCEDURE — 99214 OFFICE O/P EST MOD 30 MIN: CPT | Performed by: FAMILY MEDICINE

## 2022-03-24 RX ORDER — SUCRALFATE 1 G/1
1 TABLET ORAL 4 TIMES DAILY
Qty: 120 TABLET | Refills: 0 | Status: SHIPPED | OUTPATIENT
Start: 2022-03-24 | End: 2022-04-14 | Stop reason: SDUPTHER

## 2022-03-24 RX ORDER — FOLIC ACID 1 MG/1
1 TABLET ORAL DAILY
COMMUNITY

## 2022-03-24 NOTE — PROGRESS NOTES
Assessment/Plan:         Diagnoses and all orders for this visit:    Diarrhea, unspecified type  -     Ambulatory Referral to Gastroenterology; Future  -     White Blood Cells, Stool by Gram Stain; Future  -     Stool Enteric Bacterial Panel by PCR; Future  -     H  pylori antigen, stool; Future  -     Clostridium difficile toxin by PCR; Future    Generalized abdominal pain  -     Ambulatory Referral to Gastroenterology; Future  -     sucralfate (CARAFATE) 1 g tablet; Take 1 tablet (1 g total) by mouth 4 (four) times a day    Nausea  -     Ambulatory Referral to Gastroenterology; Future  -     sucralfate (CARAFATE) 1 g tablet; Take 1 tablet (1 g total) by mouth 4 (four) times a day    Gastroesophageal reflux disease without esophagitis  -     Ambulatory Referral to Gastroenterology; Future  -     sucralfate (CARAFATE) 1 g tablet; Take 1 tablet (1 g total) by mouth 4 (four) times a day      Subjective:   Chief Complaint   Patient presents with    Heartburn     started end of january    Diarrhea     started end of january        Patient ID: Zeenat Baker is a 82 y o  female      Here for problem visit- numerous GI sx  Diarrhea started jan 29th  "Its a little better, but it's really strange-looking- nothing like I ever saw before- sometimes a whitish substance- almost like a piece of cotton stuck on top of it"  "And looks like food is not digested"  "And terrible pains, like spasms"  "Maybe it's the methotrexate"- was started on the med feb 8th  "Nauseous all the time- it never goes away"  switching to nexium "did nothing" per pt  She does take bentyl regularly  And c/o "burning, reflux, when I burp, it's all sour"  "And urine looks almost like blood, not completely- clarifies that it looks very dark "but not always -once very 3-4 weeks, not brown, not red but almost"  No other urinary sx  "Way back in the 70's I had an ulcer, a bleeding ulcer, so when I went on the baby aspirin, I worried about that, but they said dont worry we'll take care of that, because I needed it for the heart"        The following portions of the patient's history were reviewed and updated as appropriate: allergies, current medications, past family history, past medical history, past social history, past surgical history and problem list     Review of Systems      Objective:      /80 (BP Location: Left arm, Patient Position: Sitting, Cuff Size: Standard)   Pulse 64   Temp 98 3 °F (36 8 °C) (Tympanic)   Ht 5' 1" (1 549 m)   Wt 59 6 kg (131 lb 6 4 oz)   SpO2 96%   BMI 24 83 kg/m²          Physical Exam  Constitutional:       General: She is not in acute distress  Appearance: She is well-developed  She is not ill-appearing, toxic-appearing or diaphoretic  HENT:      Head: Normocephalic and atraumatic  Mouth/Throat:      Mouth: Mucous membranes are moist       Pharynx: Oropharynx is clear  Uvula midline  Neck:      Trachea: Phonation normal    Cardiovascular:      Rate and Rhythm: Normal rate and regular rhythm  Pulses: Normal pulses  Heart sounds: S1 normal and S2 normal  No friction rub  No gallop  Pulmonary:      Effort: Pulmonary effort is normal       Breath sounds: Normal breath sounds  Abdominal:      General: Bowel sounds are increased  There is no distension  Palpations: Abdomen is soft  There is no hepatomegaly, splenomegaly or mass  Tenderness: There is generalized abdominal tenderness (mild)  There is no right CVA tenderness, left CVA tenderness, guarding or rebound  Hernia: There is no hernia in the ventral area  Musculoskeletal:      Right lower leg: No edema  Left lower leg: No edema  Skin:     General: Skin is warm and dry  Coloration: Skin is not pale  Neurological:      Mental Status: She is alert and oriented to person, place, and time  Psychiatric:         Mood and Affect: Mood normal          Behavior: Behavior normal  Behavior is cooperative           Cognition and Memory: Cognition normal

## 2022-03-25 ENCOUNTER — APPOINTMENT (OUTPATIENT)
Dept: LAB | Facility: CLINIC | Age: 87
End: 2022-03-25
Payer: MEDICARE

## 2022-03-25 DIAGNOSIS — C91.10 CHRONIC LARGE GRANULAR LYMPHOCYTIC LEUKEMIA (HCC): ICD-10-CM

## 2022-03-25 DIAGNOSIS — R19.7 DIARRHEA, UNSPECIFIED TYPE: ICD-10-CM

## 2022-03-25 LAB
ALBUMIN SERPL BCP-MCNC: 3.6 G/DL (ref 3.5–5)
ALP SERPL-CCNC: 76 U/L (ref 46–116)
ALT SERPL W P-5'-P-CCNC: 40 U/L (ref 12–78)
ANION GAP SERPL CALCULATED.3IONS-SCNC: 4 MMOL/L (ref 4–13)
AST SERPL W P-5'-P-CCNC: 18 U/L (ref 5–45)
BASOPHILS # BLD AUTO: 0.07 THOUSANDS/ΜL (ref 0–0.1)
BASOPHILS NFR BLD AUTO: 1 % (ref 0–1)
BILIRUB SERPL-MCNC: 0.97 MG/DL (ref 0.2–1)
BUN SERPL-MCNC: 31 MG/DL (ref 5–25)
CALCIUM SERPL-MCNC: 9.6 MG/DL (ref 8.3–10.1)
CHLORIDE SERPL-SCNC: 106 MMOL/L (ref 100–108)
CO2 SERPL-SCNC: 29 MMOL/L (ref 21–32)
CREAT SERPL-MCNC: 0.84 MG/DL (ref 0.6–1.3)
EOSINOPHIL # BLD AUTO: 0.68 THOUSAND/ΜL (ref 0–0.61)
EOSINOPHIL NFR BLD AUTO: 12 % (ref 0–6)
ERYTHROCYTE [DISTWIDTH] IN BLOOD BY AUTOMATED COUNT: 13.5 % (ref 11.6–15.1)
GFR SERPL CREATININE-BSD FRML MDRD: 64 ML/MIN/1.73SQ M
GLUCOSE SERPL-MCNC: 82 MG/DL (ref 65–140)
HCT VFR BLD AUTO: 38.2 % (ref 34.8–46.1)
HGB BLD-MCNC: 12.1 G/DL (ref 11.5–15.4)
IMM GRANULOCYTES # BLD AUTO: 0.02 THOUSAND/UL (ref 0–0.2)
IMM GRANULOCYTES NFR BLD AUTO: 0 % (ref 0–2)
LDH SERPL-CCNC: 181 U/L (ref 81–234)
LYMPHOCYTES # BLD AUTO: 1.44 THOUSANDS/ΜL (ref 0.6–4.47)
LYMPHOCYTES NFR BLD AUTO: 25 % (ref 14–44)
MCH RBC QN AUTO: 31.1 PG (ref 26.8–34.3)
MCHC RBC AUTO-ENTMCNC: 31.7 G/DL (ref 31.4–37.4)
MCV RBC AUTO: 98 FL (ref 82–98)
MONOCYTES # BLD AUTO: 0.56 THOUSAND/ΜL (ref 0.17–1.22)
MONOCYTES NFR BLD AUTO: 10 % (ref 4–12)
NEUTROPHILS # BLD AUTO: 2.93 THOUSANDS/ΜL (ref 1.85–7.62)
NEUTS SEG NFR BLD AUTO: 52 % (ref 43–75)
NRBC BLD AUTO-RTO: 0 /100 WBCS
PLATELET # BLD AUTO: 197 THOUSANDS/UL (ref 149–390)
PMV BLD AUTO: 10.1 FL (ref 8.9–12.7)
POTASSIUM SERPL-SCNC: 3.7 MMOL/L (ref 3.5–5.3)
PROT SERPL-MCNC: 7.3 G/DL (ref 6.4–8.2)
RBC # BLD AUTO: 3.89 MILLION/UL (ref 3.81–5.12)
SODIUM SERPL-SCNC: 139 MMOL/L (ref 136–145)
WBC # BLD AUTO: 5.7 THOUSAND/UL (ref 4.31–10.16)

## 2022-03-25 PROCEDURE — 85025 COMPLETE CBC W/AUTO DIFF WBC: CPT

## 2022-03-25 PROCEDURE — 80053 COMPREHEN METABOLIC PANEL: CPT

## 2022-03-25 PROCEDURE — 83615 LACTATE (LD) (LDH) ENZYME: CPT

## 2022-03-25 PROCEDURE — 87505 NFCT AGENT DETECTION GI: CPT

## 2022-03-25 PROCEDURE — 87205 SMEAR GRAM STAIN: CPT

## 2022-03-25 PROCEDURE — 36415 COLL VENOUS BLD VENIPUNCTURE: CPT

## 2022-03-25 PROCEDURE — 87338 HPYLORI STOOL AG IA: CPT

## 2022-03-26 ENCOUNTER — TELEPHONE (OUTPATIENT)
Dept: LAB | Facility: HOSPITAL | Age: 87
End: 2022-03-26

## 2022-03-26 LAB
CAMPYLOBACTER DNA SPEC NAA+PROBE: NORMAL
H PYLORI AG STL QL IA: NEGATIVE
SALMONELLA DNA SPEC QL NAA+PROBE: NORMAL
SHIGA TOXIN STX GENE SPEC NAA+PROBE: NORMAL
SHIGELLA DNA SPEC QL NAA+PROBE: NORMAL
WBC STL QL MICRO: NORMAL

## 2022-03-28 ENCOUNTER — TELEPHONE (OUTPATIENT)
Dept: FAMILY MEDICINE CLINIC | Facility: CLINIC | Age: 87
End: 2022-03-28

## 2022-03-28 NOTE — TELEPHONE ENCOUNTER
Pt was instructed to call Dr Ruth Loving if her GI appointment was far off  Pt calling today to inform us that GI appointment is 6/28 with Dr Miguelito Lindsey

## 2022-04-08 NOTE — TELEPHONE ENCOUNTER
Called and spoke with patient  Patient states that she will wait until June appointment with Dr Vicente Ballard  Patient states that the carafate that was prescribed is helping

## 2022-04-08 NOTE — TELEPHONE ENCOUNTER
Would she like our office to try finding out if she can get in sooner with Dr Arnol Montiel in [place]?

## 2022-04-11 ENCOUNTER — TELEPHONE (OUTPATIENT)
Dept: HEMATOLOGY ONCOLOGY | Facility: CLINIC | Age: 87
End: 2022-04-11

## 2022-04-14 DIAGNOSIS — R11.0 NAUSEA: ICD-10-CM

## 2022-04-14 DIAGNOSIS — K21.9 GASTROESOPHAGEAL REFLUX DISEASE WITHOUT ESOPHAGITIS: ICD-10-CM

## 2022-04-14 DIAGNOSIS — R10.84 GENERALIZED ABDOMINAL PAIN: ICD-10-CM

## 2022-04-14 RX ORDER — SUCRALFATE 1 G/1
1 TABLET ORAL 4 TIMES DAILY
Qty: 360 TABLET | Refills: 1 | Status: SHIPPED | OUTPATIENT
Start: 2022-04-14

## 2022-05-02 ENCOUNTER — OFFICE VISIT (OUTPATIENT)
Dept: HEMATOLOGY ONCOLOGY | Facility: CLINIC | Age: 87
End: 2022-05-02
Payer: MEDICARE

## 2022-05-02 VITALS
BODY MASS INDEX: 25.02 KG/M2 | DIASTOLIC BLOOD PRESSURE: 70 MMHG | OXYGEN SATURATION: 100 % | TEMPERATURE: 97 F | HEART RATE: 62 BPM | WEIGHT: 132.5 LBS | SYSTOLIC BLOOD PRESSURE: 130 MMHG | RESPIRATION RATE: 17 BRPM | HEIGHT: 61 IN

## 2022-05-02 DIAGNOSIS — C91.10 CHRONIC LARGE GRANULAR LYMPHOCYTIC LEUKEMIA (HCC): Primary | ICD-10-CM

## 2022-05-02 PROCEDURE — 99213 OFFICE O/P EST LOW 20 MIN: CPT | Performed by: PHYSICIAN ASSISTANT

## 2022-05-02 NOTE — PROGRESS NOTES
Hematology/Oncology Outpatient Follow-up  Janay Prasad 82 y o  female 1935 [id number]    Date:  5/2/2022      Assessment and Plan:  1  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leukemia [facility])  79-year-old female presents for follow-up regarding chronic large granular lymphocytic leukemia  This remains in observation status  No treatment is required  Labs still remain normal  Follow up in 1 year with labs  - CBC and differential; Future  - Comprehensive metabolic panel; Future  - LD,Blood; Future      HPI:  82year old female presents for follow-up visit for asymptomatic large granular cell lymphocytic leukemia  that was diagnosed several years ago and patient has not required any therapy for this condition  Patient's condition and counts are being monitored  Interval history: was placed on methotrexate for RA; since then she has had weight gain, nausea, leg swelling, chest tightness  These symptoms did improve with dose reduction   Cardiology advised to use diuretic prn for fluid in legs     ROS: Review of Systems   Constitutional: Negative for appetite change, chills, fever and unexpected weight change  Denies night sweats    HENT: Negative for mouth sores and nosebleeds  Respiratory: Negative for cough and shortness of breath  Cardiovascular: Negative for chest pain and palpitations  Gastrointestinal: Negative for abdominal pain, blood in stool, constipation, diarrhea and vomiting  Genitourinary: Negative for difficulty urinating, dysuria and hematuria  Musculoskeletal: Negative for arthralgias  Skin: Negative  Neurological: Negative for dizziness, weakness, light-headedness, numbness and headaches  Hematological: Negative  Psychiatric/Behavioral: Negative          Past Medical History:   Diagnosis Date    Bladder cancer [facility])     had BCG treatments    [facility] [facility] )     Coronary artery disease     S/P stent placement x 2 in 2011    GERD (gastroesophageal reflux disease)     Hepatitis     got this from food back in 1970s, has not had a problem since    History of leukemia     in remission    History of stomach ulcers 1970    History of transfusion 1970    Hyperlipidemia     Hypertension     Irritable bowel syndrome     Kidney stone     Kidney stones     Pneumonia     Pt had in 2003 and 2004    Pulmonary emboli (HCC) 1970s    Raynaud disease     Shingles     Sleep apnea     Thrombocytopenia ([facility] [address] ) 2/20/2012       Past Surgical History:   Procedure Laterality Date    CATARACT EXTRACTION      COLONOSCOPY      CORONARY ANGIOPLASTY WITH STENT PLACEMENT      stent x 2    CYSTOSCOPY      diagnostic - onset 4/4/17    EGD      EYE SURGERY      FRACTURE SURGERY      HERNIA REPAIR      HYSTERECTOMY      LEG SURGERY      OOPHORECTOMY      DE OPEN RX FEMUR FX+INTRAMED ALEJANDRO Left 4/8/2018    Procedure: INSERTION NAIL IM FEMUR ANTEGRADE (TROCHANTERIC); Surgeon: Brian Mark MD;  [facility];  Service: Orthopedics    [address] [place] [address] N/A 5/12/2021    Procedure: REPAIR HERNIA VENTRAL;  Surgeon: Stanley Newman MD;  [facility];  Service: General    DE WRIST Chey Rene LIG Right 10/12/2021    Procedure: Right endoscopic carpal tunnel release;  Surgeon: Tico Salomon MD;  [facility];  Service: Orthopedics    TONSILLECTOMY         Social History     Socioeconomic History    Marital status:       Spouse name: None    Number of children: None    Years of education: None    Highest education level: None   Occupational History    None   Tobacco Use    Smoking status: Never Smoker    Smokeless tobacco: Never Used   Vaping Use    Vaping Use: Never used   Substance and Sexual Activity    Alcohol use: No    Drug use: No    Sexual activity: Never   Other Topics Concern    None   Social History Narrative    Advance directives declined by parents    Always uses seat belt     Social Determinants of Health     Financial Resource Strain: Not on file   Food Insecurity: Not on file   Transportation Needs: Not on file   Physical Activity: Not on file   Stress: Not on file   Social Connections: Not on file   Intimate Partner Violence: Not on file   Housing Stability: Not on file       Family History   Problem Relation Age of Onset    Arthritis Mother     Osteoporosis Mother     Heart disease Father     Hypertension Father     Hypertension Brother     Prostate cancer Brother     Breast cancer Daughter 48    Prostate cancer Son        Allergies   Allergen Reactions    Lactose - Food Allergy GI Intolerance         Current Outpatient Medications:     acetaminophen (Tylenol 8 Hour) 650 mg CR tablet, Take 1 tablet (650 mg total) by mouth every 8 (eight) hours, Disp: 15 tablet, Rfl: 0    amLODIPine (NORVASC) 5 mg tablet, Take 1 tablet (5 mg total) by mouth daily, Disp: 90 tablet, Rfl: 1    aspirin 81 MG tablet, Take 81 mg by mouth daily Resume on 4/28 , Disp: , Rfl:     atorvastatin (LIPITOR) 40 mg tablet, TAKE 1 TABLET BY MOUTH  DAILY AFTER DINNER, Disp: 90 tablet, Rfl: 3    bumetanide (BUMEX) 1 mg tablet, TAKE 1/2 A TABLET BY MOUTH  DAILY TAKE A WHOLE TABLET IF  NEEDED, Disp: 90 tablet, Rfl: 3    carvedilol (COREG) 12 5 mg tablet, Take 1 tablet (12 5 mg total) by mouth 2 (two) times a day with meals, Disp: 180 tablet, Rfl: 3    Cholecalciferol 2000 units TABS, Take 2,000 Units by mouth daily Resume on 4/28, Disp: , Rfl:     dicyclomine (BENTYL) 10 mg capsule, Take 10 mg by mouth as needed, Disp: , Rfl:     DULoxetine (CYMBALTA) 30 mg delayed release capsule, TAKE 1 CAPSULE BY MOUTH  DAILY, Disp: 90 capsule, Rfl: 3    fexofenadine (ALLEGRA) 180 MG tablet, Take 180 mg by mouth daily as needed , Disp: , Rfl:     folic acid (FOLVITE) 1 mg tablet, Take 1 mg by mouth daily, Disp: , Rfl:     losartan (COZAAR) 100 MG tablet, TAKE 1 TABLET BY MOUTH  DAILY, Disp: 90 tablet, Rfl: 1   methotrexate 2 5 mg tablet, Take 2 5 mg by mouth once a week, Disp: , Rfl:     Methylcellulose, Laxative, (CITRUCEL PO), Take by mouth as needed , Disp: , Rfl:     MULTIPLE VITAMIN PO, Take by mouth daily , Disp: , Rfl:     sucralfate (CARAFATE) 1 g tablet, Take 1 tablet (1 g total) by mouth 4 (four) times a day, Disp: 360 tablet, Rfl: 1    esomeprazole (NexIUM) 20 mg capsule, Take 1 capsule (20 mg total) by mouth daily before breakfast, Disp: 90 capsule, Rfl: 1      Physical Exam:  /70 (BP Location: Left arm, Patient Position: Sitting, Cuff Size: Adult)   Pulse 62   Temp (!) 97 °F (36 1 °C)   Resp 17   Ht 5' 1" (1 549 m)   Wt 60 1 kg (132 lb 8 oz)   SpO2 100%   BMI 25 04 kg/m²     Physical Exam  Vitals reviewed  Constitutional:       General: She is not in acute distress  Appearance: She is well-developed  HENT:      Head: Normocephalic and atraumatic  Eyes:      General: No scleral icterus  Conjunctiva/sclera: Conjunctivae normal    Cardiovascular:      Rate and Rhythm: Normal rate and regular rhythm  Heart sounds: Normal heart sounds  No murmur heard  Pulmonary:      Effort: Pulmonary effort is normal  No respiratory distress  Breath sounds: Normal breath sounds  Chest:   Breasts:      Right: No axillary adenopathy or supraclavicular adenopathy  Left: No axillary adenopathy or supraclavicular adenopathy  Abdominal:      Palpations: Abdomen is soft  Tenderness: There is no abdominal tenderness  Musculoskeletal:         General: No tenderness  Normal range of motion  Cervical back: Normal range of motion and neck supple  Right lower leg: No edema  Left lower leg: No edema  Lymphadenopathy:      Cervical: No cervical adenopathy  Upper Body:      Right upper body: No supraclavicular or axillary adenopathy  Left upper body: No supraclavicular or axillary adenopathy  Skin:     General: Skin is warm and dry     Neurological: Mental Status: She is alert and oriented to person, place, and time  Cranial Nerves: No cranial nerve deficit  Psychiatric:         Mood and Affect: Mood normal          Behavior: Behavior normal        Labs:  Lab Results   Component Value Date    WBC 5 70 03/25/2022    HGB 12 1 03/25/2022    HCT 38 2 03/25/2022    MCV 98 03/25/2022     03/25/2022     Lab Results   Component Value Date    K 3 7 03/25/2022     03/25/2022    CO2 29 03/25/2022    BUN 31 (H) 03/25/2022    CREATININE 0 84 03/25/2022    GLUF 94 03/09/2022    CALCIUM 9 6 03/25/2022    AST 18 03/25/2022    ALT 40 03/25/2022    ALKPHOS 76 03/25/2022    EGFR 64 03/25/2022       Patient voiced understanding and agreement in the above discussion  Aware to contact our office with questions/symptoms in the interim  This note has been generated by voice recognition software system  Therefore, there may be spelling, grammar, and or syntax errors  Please contact if questions arise

## 2022-05-06 ENCOUNTER — APPOINTMENT (OUTPATIENT)
Dept: LAB | Facility: CLINIC | Age: 87
End: 2022-05-06
Payer: MEDICARE

## 2022-05-06 DIAGNOSIS — Z79.899 ENCOUNTER FOR LONG-TERM (CURRENT) USE OF OTHER MEDICATIONS: ICD-10-CM

## 2022-05-06 LAB
ALBUMIN SERPL BCP-MCNC: 3.5 G/DL (ref 3.5–5)
ALP SERPL-CCNC: 65 U/L (ref 46–116)
ALT SERPL W P-5'-P-CCNC: 36 U/L (ref 12–78)
ANION GAP SERPL CALCULATED.3IONS-SCNC: 4 MMOL/L (ref 4–13)
AST SERPL W P-5'-P-CCNC: 20 U/L (ref 5–45)
BASOPHILS # BLD AUTO: 0.04 THOUSANDS/ΜL (ref 0–0.1)
BASOPHILS NFR BLD AUTO: 1 % (ref 0–1)
BILIRUB SERPL-MCNC: 0.5 MG/DL (ref 0.2–1)
BUN SERPL-MCNC: 23 MG/DL (ref 5–25)
CALCIUM SERPL-MCNC: 9.6 MG/DL (ref 8.3–10.1)
CHLORIDE SERPL-SCNC: 105 MMOL/L (ref 100–108)
CO2 SERPL-SCNC: 31 MMOL/L (ref 21–32)
CREAT SERPL-MCNC: 0.8 MG/DL (ref 0.6–1.3)
CRP SERPL QL: <3 MG/L
EOSINOPHIL # BLD AUTO: 0.22 THOUSAND/ΜL (ref 0–0.61)
EOSINOPHIL NFR BLD AUTO: 6 % (ref 0–6)
ERYTHROCYTE [DISTWIDTH] IN BLOOD BY AUTOMATED COUNT: 13.2 % (ref 11.6–15.1)
ERYTHROCYTE [SEDIMENTATION RATE] IN BLOOD: 17 MM/HOUR (ref 0–29)
GFR SERPL CREATININE-BSD FRML MDRD: 67 ML/MIN/1.73SQ M
GLUCOSE P FAST SERPL-MCNC: 86 MG/DL (ref 65–99)
HCT VFR BLD AUTO: 38.2 % (ref 34.8–46.1)
HGB BLD-MCNC: 12.2 G/DL (ref 11.5–15.4)
IMM GRANULOCYTES # BLD AUTO: 0 THOUSAND/UL (ref 0–0.2)
IMM GRANULOCYTES NFR BLD AUTO: 0 % (ref 0–2)
LYMPHOCYTES # BLD AUTO: 1.56 THOUSANDS/ΜL (ref 0.6–4.47)
LYMPHOCYTES NFR BLD AUTO: 43 % (ref 14–44)
MCH RBC QN AUTO: 31.4 PG (ref 26.8–34.3)
MCHC RBC AUTO-ENTMCNC: 31.9 G/DL (ref 31.4–37.4)
MCV RBC AUTO: 99 FL (ref 82–98)
MONOCYTES # BLD AUTO: 0.41 THOUSAND/ΜL (ref 0.17–1.22)
MONOCYTES NFR BLD AUTO: 11 % (ref 4–12)
NEUTROPHILS # BLD AUTO: 1.42 THOUSANDS/ΜL (ref 1.85–7.62)
NEUTS SEG NFR BLD AUTO: 39 % (ref 43–75)
NRBC BLD AUTO-RTO: 0 /100 WBCS
PLATELET # BLD AUTO: 165 THOUSANDS/UL (ref 149–390)
PMV BLD AUTO: 10.3 FL (ref 8.9–12.7)
POTASSIUM SERPL-SCNC: 3.5 MMOL/L (ref 3.5–5.3)
PROT SERPL-MCNC: 7.2 G/DL (ref 6.4–8.2)
RBC # BLD AUTO: 3.88 MILLION/UL (ref 3.81–5.12)
SODIUM SERPL-SCNC: 140 MMOL/L (ref 136–145)
WBC # BLD AUTO: 3.65 THOUSAND/UL (ref 4.31–10.16)

## 2022-05-06 PROCEDURE — 36415 COLL VENOUS BLD VENIPUNCTURE: CPT

## 2022-05-06 PROCEDURE — 85025 COMPLETE CBC W/AUTO DIFF WBC: CPT

## 2022-05-06 PROCEDURE — 80053 COMPREHEN METABOLIC PANEL: CPT

## 2022-05-06 PROCEDURE — 85652 RBC SED RATE AUTOMATED: CPT

## 2022-05-06 PROCEDURE — 86140 C-REACTIVE PROTEIN: CPT

## 2022-05-17 NOTE — PROGRESS NOTES
Cardiology Follow Up    Arcenio Gardner  1935  [id number]  [facility] [address]  [phone]    1  Primary hypertension  Echo complete w/ contrast if indicated   2  Mixed hyperlipidemia     3  Chronic diastolic (congestive) heart failure (HCC)     4  Carotid artery stenosis, asymptomatic, bilateral     5  Coronary artery disease involving native coronary artery of native heart without angina pectoris         Interval History:   Ms Arcenio Gardner presents to our office with complaint of elevated BP and not feeling well since starting Methotrexate  Chris Salinas has been experiencing HA, nausea, Dizziness, sortness of breath with exertion and a 7 pound weight gain since starting Methotrexate  She discussed her symptoms with her Rheumatologist who encouraged Chris Abchata to continue on Methotrexat  According to Chris Salinas she is not eating a lot  Home BP : 8:30 am 167/88 and 11:30 am 113/61  She stopped taking Coreg in am and is taking it at 3 pm and 9 pm   Chrissusan Salinas continues to walk 3 times a day  She attempts to get 3 miles in with 3 walks  Chris Brad denies CP  She admits to an episode of severe dizziness on May 3rd         Medical History   CAD sp stents  X2  in 2011   Hypertension  Hyperlipidemia 11/04/21  TG 70, HDL 65, LDL 71   Chronic HFpEF LVEF 55%, grade 1 DD   Carotid artery stenosis  OA on Methotrexate     2/28/22 TTE:  Left ventricular systolic function normal, LVEF 55% wall thickness is normal, grade 1 diastolic dysfunction  Right ventricular size systolic function normal   Mild aortic valve regurgitation, trace tricuspid valve regurgitation    Patient Active Problem List   Diagnosis    Essential hypertension    Mixed hyperlipidemia    Coronary artery disease without angina pectoris - S/P stent placement x 2 (2011)    Gastroesophageal reflux disease without esophagitis    Chronic diastolic (congestive) heart failure (HCC)    Hyperthyroidism    Age-related osteoporosis with current pathological fracture    Intertrochanteric fracture of left femur, closed, with routine healing, subsequent encounter    Ambulatory dysfunction    Elderly person living alone    Low back pain without sciatica    S/P ORIF (open reduction internal fixation) fracture    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HCC)    Bladder cancer (HCC)    Allergic rhinitis    Anemia due to vitamin B12 deficiency    Biliary dyskinesia    Cholelithiasis    Chronic right shoulder pain    Closed displaced fracture of left trapezium bone    Degenerative joint disease    Depression, controlled    Deviated nasal septum    Gastric reflux syndrome    Heart valve disorder    Herpes zoster infection of thoracic region    IBS (irritable bowel syndrome)    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HCC)    Mild vitamin D deficiency    Mixed hearing loss, unilateral    Pancreatic cyst    Raynaud's disease without gangrene    S/P angioplasty with stent    Urge incontinence of urine    Conjunctivitis of both eyes    Medicare annual wellness visit, subsequent    History of pulmonary embolus (PE)    Age-related cataract of both eyes    Hyperparathyroidism, unspecified (HCC)    Pruritic rash    Fear of falling    Balance problems    Difficulty navigating stairs    Lower abdominal pain, unspecified    History of falling    Lower extremity edema    Cough due to ACE inhibitor    Persistent cough for 3 weeks or longer    New onset of headaches    Carotid artery stenosis, asymptomatic, bilateral    Ventral hernia    Cerebral aneurysm without rupture    Intermittent pain and swelling of hand    Right hand paresthesia    BMI 23 0-23 9, adult    Carpal tunnel syndrome of right wrist     Past Medical History:   Diagnosis Date    [facility]     had BCG treatments    [facility]     Coronary artery disease S/P stent placement x 2 in 2011    GERD (gastroesophageal reflux disease)     Hepatitis     got this from food back in 1970s, has not had a problem since    History of leukemia     in remission    History of stomach ulcers 1970    History of transfusion 1970    Hyperlipidemia     Hypertension     Irritable bowel syndrome     Kidney stone     Kidney stones     Pneumonia     Pt had in 2003 and 2004    Pulmonary emboli (HCC) 1970s    Raynaud disease     Shingles     Sleep apnea     Thrombocytopenia ([facility] [address] ) 2/20/2012     Social History     Socioeconomic History    Marital status:       Spouse name: Not on file    Number of children: Not on file    Years of education: Not on file    Highest education level: Not on file   Occupational History    Not on file   Tobacco Use    Smoking status: Never Smoker    Smokeless tobacco: Never Used   Vaping Use    Vaping Use: Never used   Substance and Sexual Activity    Alcohol use: No    Drug use: No    Sexual activity: Never   Other Topics Concern    Not on file   Social History Narrative    Advance directives declined by parents    Always uses seat belt     Social Determinants of Health     Financial Resource Strain: Not on file   Food Insecurity: Not on file   Transportation Needs: Not on file   Physical Activity: Not on file   Stress: Not on file   Social Connections: Not on file   Intimate Partner Violence: Not on file   Housing Stability: Not on file      Family History   Problem Relation Age of Onset    Arthritis Mother     Osteoporosis Mother     Heart disease Father     Hypertension Father     Hypertension Brother     Prostate cancer Brother     Breast cancer Daughter 48    Prostate cancer Son      Past Surgical History:   Procedure Laterality Date    CATARACT EXTRACTION      COLONOSCOPY      CORONARY ANGIOPLASTY WITH STENT PLACEMENT      stent x 2    CYSTOSCOPY      diagnostic - onset 4/4/17    EGD      EYE SURGERY      FRACTURE SURGERY      HERNIA REPAIR      HYSTERECTOMY      LEG SURGERY      OOPHORECTOMY      SD OPEN RX FEMUR FX+INTRAMED ALEJANDRO Left 4/8/2018    Procedure: INSERTION NAIL IM FEMUR ANTEGRADE (TROCHANTERIC);   Surgeon: El Barnes MD;  [facility];  Service: [address] N/A 5/12/2021    Procedure: REPAIR HERNIA VENTRAL;  Surgeon: Wili Joseph MD;  [facility];  Service: General    SD WRIST Fielding Sole LIG Right 10/12/2021    Procedure: Right endoscopic carpal tunnel release;  Surgeon: Niurka Leigh MD;  [facility];  Service: Orthopedics    TONSILLECTOMY         Current Outpatient Medications:     acetaminophen (Tylenol 8 Hour) 650 mg CR tablet, Take 1 tablet (650 mg total) by mouth every 8 (eight) hours, Disp: 15 tablet, Rfl: 0    amLODIPine (NORVASC) 5 mg tablet, Take 1 tablet (5 mg total) by mouth daily, Disp: 90 tablet, Rfl: 1    aspirin 81 MG tablet, Take 81 mg by mouth daily Resume on 4/28 , Disp: , Rfl:     atorvastatin (LIPITOR) 40 mg tablet, TAKE 1 TABLET BY MOUTH  DAILY AFTER DINNER, Disp: 90 tablet, Rfl: 3    bumetanide (BUMEX) 1 mg tablet, TAKE 1/2 A TABLET BY MOUTH  DAILY TAKE A WHOLE TABLET IF  NEEDED, Disp: 90 tablet, Rfl: 3    carvedilol (COREG) 12 5 mg tablet, Take 1 tablet (12 5 mg total) by mouth 2 (two) times a day with meals, Disp: 180 tablet, Rfl: 3    Cholecalciferol 2000 units TABS, Take 2,000 Units by mouth daily Resume on 4/28, Disp: , Rfl:     dicyclomine (BENTYL) 10 mg capsule, Take 10 mg by mouth as needed, Disp: , Rfl:     DULoxetine (CYMBALTA) 30 mg delayed release capsule, TAKE 1 CAPSULE BY MOUTH  DAILY, Disp: 90 capsule, Rfl: 3    esomeprazole (NexIUM) 20 mg capsule, Take 1 capsule (20 mg total) by mouth daily before breakfast, Disp: 90 capsule, Rfl: 1    fexofenadine (ALLEGRA) 180 MG tablet, Take 180 mg by mouth daily as needed , Disp: , Rfl:     folic acid (FOLVITE) 1 mg tablet, Take 1 mg by mouth daily, Disp: , Rfl:     losartan (COZAAR) 100 MG tablet, TAKE 1 TABLET BY MOUTH  DAILY, Disp: 90 tablet, Rfl: 1    methotrexate 2 5 mg tablet, Take 2 5 mg by mouth once a week, Disp: , Rfl:     Methylcellulose, Laxative, (CITRUCEL PO), Take by mouth as needed , Disp: , Rfl:     MULTIPLE VITAMIN PO, Take by mouth daily , Disp: , Rfl:     sucralfate (CARAFATE) 1 g tablet, Take 1 tablet (1 g total) by mouth 4 (four) times a day, Disp: 360 tablet, Rfl: 1  Allergies   Allergen Reactions    Lactose - Food Allergy GI Intolerance       Labs:  Appointment on 05/06/2022   Component Date Value    Sed Rate 05/06/2022 17     CRP 05/06/2022 <3 0     WBC 05/06/2022 3 65 (A)    RBC 05/06/2022 3 88     Hemoglobin 05/06/2022 12 2     Hematocrit 05/06/2022 38 2     MCV 05/06/2022 99 (A)    MCH 05/06/2022 31 4     MCHC 05/06/2022 31 9     RDW 05/06/2022 13 2     MPV 05/06/2022 10 3     Platelets 62/66/3375 165     nRBC 05/06/2022 0     Neutrophils Relative 05/06/2022 39 (A)    Immat GRANS % 05/06/2022 0     Lymphocytes Relative 05/06/2022 43     Monocytes Relative 05/06/2022 11     Eosinophils Relative 05/06/2022 6     Basophils Relative 05/06/2022 1     Neutrophils Absolute 05/06/2022 1 42 (A)    Immature Grans Absolute 05/06/2022 0 00     Lymphocytes Absolute 05/06/2022 1 56     Monocytes Absolute 05/06/2022 0 41     Eosinophils Absolute 05/06/2022 0 22     Basophils Absolute 05/06/2022 0 04     Sodium 05/06/2022 140     Potassium 05/06/2022 3 5     Chloride 05/06/2022 105     CO2 05/06/2022 31     ANION GAP 05/06/2022 4     BUN 05/06/2022 23     Creatinine 05/06/2022 0 80     Glucose, Fasting 05/06/2022 86     Calcium 05/06/2022 9 6     AST 05/06/2022 20     ALT 05/06/2022 36     Alkaline Phosphatase 05/06/2022 65     Total Protein 05/06/2022 7 2     Albumin 05/06/2022 3 5     Total Bilirubin 05/06/2022 0 50     eGFR 05/06/2022 67    Appointment on 03/25/2022 Component Date Value    Fecal Leukocytes 03/25/2022 No WBC's     Salmonella sp PCR 03/25/2022 None Detected     Shigella sp/Enteroinvasi* 03/25/2022 None Detected     Campylobacter sp (jejuni* 03/25/2022 None Detected     Shiga toxin 1/Shiga toxi* 03/25/2022 None Detected     H pylori Ag, Stl 03/25/2022 Negative    Appointment on 03/25/2022   Component Date Value    WBC 03/25/2022 5 70     RBC 03/25/2022 3 89     Hemoglobin 03/25/2022 12 1     Hematocrit 03/25/2022 38 2     MCV 03/25/2022 98     MCH 03/25/2022 31 1     MCHC 03/25/2022 31 7     RDW 03/25/2022 13 5     MPV 03/25/2022 10 1     Platelets 31/35/0677 197     nRBC 03/25/2022 0     Neutrophils Relative 03/25/2022 52     Immat GRANS % 03/25/2022 0     Lymphocytes Relative 03/25/2022 25     Monocytes Relative 03/25/2022 10     Eosinophils Relative 03/25/2022 12 (A)    Basophils Relative 03/25/2022 1     Neutrophils Absolute 03/25/2022 2 93     Immature Grans Absolute 03/25/2022 0 02     Lymphocytes Absolute 03/25/2022 1 44     Monocytes Absolute 03/25/2022 0 56     Eosinophils Absolute 03/25/2022 0 68 (A)    Basophils Absolute 03/25/2022 0 07     Sodium 03/25/2022 139     Potassium 03/25/2022 3 7     Chloride 03/25/2022 106     CO2 03/25/2022 29     ANION GAP 03/25/2022 4     BUN 03/25/2022 31 (A)    Creatinine 03/25/2022 0 84     Glucose 03/25/2022 82     Calcium 03/25/2022 9 6     AST 03/25/2022 18     ALT 03/25/2022 40     Alkaline Phosphatase 03/25/2022 76     Total Protein 03/25/2022 7 3     Albumin 03/25/2022 3 6     Total Bilirubin 03/25/2022 0 97     eGFR 03/25/2022 64     LD 03/25/2022 181      Imaging: No results found  Review of Systems:  Review of Systems   Constitutional: Positive for fatigue  Respiratory: Positive for shortness of breath  Cardiovascular: Positive for leg swelling  Gastrointestinal: Positive for nausea  Musculoskeletal: Positive for arthralgias and myalgias  Neurological: Positive for dizziness  All other systems reviewed and are negative  Physical Exam:  Physical Exam  Vitals reviewed  Constitutional:       Appearance: Normal appearance  HENT:      Head: Normocephalic  Neck:      Comments: No JVD  Cardiovascular:      Rate and Rhythm: Normal rate and regular rhythm  Pulses: Normal pulses  Heart sounds: Normal heart sounds  Pulmonary:      Effort: Pulmonary effort is normal       Breath sounds: Normal breath sounds  Abdominal:      General: Bowel sounds are normal       Palpations: Abdomen is soft  Musculoskeletal:         General: Normal range of motion  Cervical back: Normal range of motion and neck supple  Right lower leg: Edema present  Left lower leg: Edema present  Comments: Trace Maximiliano LE edema    Skin:     General: Skin is warm and dry  Capillary Refill: Capillary refill takes less than 2 seconds  Neurological:      General: No focal deficit present  Mental Status: She is alert and oriented to person, place, and time  Psychiatric:         Mood and Affect: Mood normal          Discussion/Summary:  1  Hypertension /80, I have changed the timing of her medications to see if this makes a difference in controlling her BP  Coreg 12 5 mg b i d  with meals, Norvasc 5 mg lunchtime, losartan 100 mg at bedtime continue Bumex 0 5 mg daily According to Daphney Flow she is eating very little  I have instructed her on a 2gm sodium diet and to stop eating cottage cheese for now  Continue with home BP monitoring, follow up with me in 10 days, TTE in near future  Evaluate wall function   2  Hyperlipidemia 11/04/21  TG 70, HDL 65, LDL 71 continue Lipitor 40mg daily heart healthy diet and daily walking   3  Chronic HFpEF LVEF 55%, grade 1 DD-, NYHA class III stage C- Weight in the office 131 pounds  Trace maximiliano LE edema  Daphney Flow states taking a whole Bumex did not help LE edema  Continue on   Coreg 12 5 mg b i d  with meals, Norvasc 5 mg lunchtime, losartan 100 mg at bedtime continue Bumex 0 5 mg daily  2gm sodium diet and daily weights   4  CAD sp stents  X2  in 2011- continue on ASA 81mg daily  Coreg 12 5 mg b i d  with meals, Norvasc 5 mg lunchtime, losartan 100 mg at bedtime continue Bumex 0 5 mg daily and Lipitor 40mg daily   5  Carotid artery stenosis 5/24/22 Carotid artery doppler showed < 50% stenosis of hazel ICA    Continue on ASA 81mg daily and Lipitor 40mg daily

## 2022-05-18 ENCOUNTER — OFFICE VISIT (OUTPATIENT)
Dept: CARDIOLOGY CLINIC | Facility: CLINIC | Age: 87
End: 2022-05-18
Payer: MEDICARE

## 2022-05-18 VITALS
OXYGEN SATURATION: 98 % | DIASTOLIC BLOOD PRESSURE: 78 MMHG | BODY MASS INDEX: 24.9 KG/M2 | WEIGHT: 131.9 LBS | HEART RATE: 77 BPM | SYSTOLIC BLOOD PRESSURE: 140 MMHG | HEIGHT: 61 IN

## 2022-05-18 DIAGNOSIS — E78.2 MIXED HYPERLIPIDEMIA: ICD-10-CM

## 2022-05-18 DIAGNOSIS — I65.23 CAROTID ARTERY STENOSIS, ASYMPTOMATIC, BILATERAL: ICD-10-CM

## 2022-05-18 DIAGNOSIS — I50.32 CHRONIC DIASTOLIC (CONGESTIVE) HEART FAILURE (HCC): ICD-10-CM

## 2022-05-18 DIAGNOSIS — I25.10 CORONARY ARTERY DISEASE INVOLVING NATIVE CORONARY ARTERY OF NATIVE HEART WITHOUT ANGINA PECTORIS: ICD-10-CM

## 2022-05-18 DIAGNOSIS — I10 PRIMARY HYPERTENSION: Primary | ICD-10-CM

## 2022-05-18 PROCEDURE — 99215 OFFICE O/P EST HI 40 MIN: CPT | Performed by: NURSE PRACTITIONER

## 2022-05-18 NOTE — PATIENT INSTRUCTIONS
Coreg with breakfast and dinner  Norvasc Lunch time  Losartan at bedtime      Continue with Home BP monitoring

## 2022-05-30 NOTE — PROGRESS NOTES
Cardiology Follow Up    Jude Miller  1935  [id number]  [address] [facility]  [facility] [place]  [address] [address] [phone]    No diagnosis found  Interval History:   On 5/18/22 Ms Jude Miller was seen in our office with complaints of elevated BP and not feeling well since starting Methotrexate  Ashanti Nguyễn has been experiencing HA, nausea, Dizziness, sortness of breath with exertion and a 7 pound weight gain since starting Methotrexate  She discussed her symptoms with her Rheumatologist who encouraged Ashanti Nguyễn to continue on Methotrexat  According to Ashanti Gopi she is not eating a lot  Home BP : 8:30 am 167/88 and 11:30 am 113/61  She stopped taking Coreg in am and is taking it at 3 pm and 9 pm   Ashanti Nguyễn continues to walk 3 times a day  She attempts to get 3 miles in with 3 walks  Ashanti Nguyễn denies CP  She admits to an episode of severe dizziness on May 3rd   I had changed timing of her medications and instructed her to follow up with me in 10 days to evaluate her response          Medical History   CAD sp stents  X2  in 2011   Hypertension  Hyperlipidemia 11/04/21  TG 70, HDL 65, LDL 71   Chronic HFpEF LVEF 55%, grade 1 DD   Carotid artery stenosis  OA on Methotrexate      2/28/22 TTE:  Left ventricular systolic function normal, LVEF 55% wall thickness is normal, grade 1 diastolic dysfunction  Right ventricular size systolic function normal   Mild aortic valve regurgitation, trace tricuspid valve regurgitation      Patient Active Problem List   Diagnosis    Essential hypertension    Mixed hyperlipidemia    Coronary artery disease without angina pectoris - S/P stent placement x 2 (2011)    Gastroesophageal reflux disease without esophagitis    Chronic diastolic (congestive) heart failure (HCC)    Hyperthyroidism    Age-related osteoporosis with current pathological fracture    Intertrochanteric fracture of left femur, closed, with routine healing, subsequent encounter    Ambulatory dysfunction    Elderly person living alone    Low back pain without sciatica    S/P ORIF (open reduction internal fixation) fracture    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HCC)    Bladder cancer (HCC)    Allergic rhinitis    Anemia due to vitamin B12 deficiency    Biliary dyskinesia    Cholelithiasis    Chronic right shoulder pain    Closed displaced fracture of left trapezium bone    Degenerative joint disease    Depression, controlled    Deviated nasal septum    Gastric reflux syndrome    Heart valve disorder    Herpes zoster infection of thoracic region    IBS (irritable bowel syndrome)    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HCC)    Mild vitamin D deficiency    Mixed hearing loss, unilateral    Pancreatic cyst    Raynaud's disease without gangrene    S/P angioplasty with stent    Urge incontinence of urine    Conjunctivitis of both eyes    Medicare annual wellness visit, subsequent    History of pulmonary embolus (PE)    Age-related cataract of both eyes    Hyperparathyroidism, unspecified (HCC)    Pruritic rash    Fear of falling    Balance problems    Difficulty navigating stairs    Lower abdominal pain, unspecified    History of falling    Lower extremity edema    Cough due to ACE inhibitor    Persistent cough for 3 weeks or longer    New onset of headaches    Carotid artery stenosis, asymptomatic, bilateral    Ventral hernia    Cerebral aneurysm without rupture    Intermittent pain and swelling of hand    Right hand paresthesia    BMI 23 0-23 9, adult    Carpal tunnel syndrome of right wrist     Past Medical History:   Diagnosis Date    [facility]     had BCG treatments    [facility]     Coronary artery disease     S/P stent placement x 2 in 2011    GERD (gastroesophageal reflux disease)     Hepatitis     got this from food back in 1970s, has not had a problem since  History of leukemia     in remission    History of stomach ulcers 1970    History of transfusion 1970    Hyperlipidemia     Hypertension     Irritable bowel syndrome     Kidney stone     Kidney stones     Pneumonia     Pt had in 2003 and 2004    Pulmonary emboli (HCC) 1970s    Raynaud disease     Shingles     Sleep apnea     [facility]rombocyt[facility] 2/20/2012     Social History     Socioeconomic History    Marital status:       Spouse name: Not on file    Number of children: Not on file    Years of education: Not on file    Highest education level: Not on file   Occupational History    Not on file   Tobacco Use    Smoking status: Never Smoker    Smokeless tobacco: Never Used   Vaping Use    Vaping Use: Never used   Substance and Sexual Activity    Alcohol use: No    Drug use: No    Sexual activity: Never   Other Topics Concern    Not on file   Social History Narrative    Advance directives declined by parents    Always uses seat belt     Social Determinants of Health     Financial Resource Strain: Not on file   Food Insecurity: Not on file   Transportation Needs: Not on file   Physical Activity: Not on file   Stress: Not on file   Social Connections: Not on file   Intimate Partner Violence: Not on file   Housing Stability: Not on file      Family History   Problem Relation Age of Onset    Arthritis Mother     Osteoporosis Mother     Heart disease Father     Hypertension Father     Hypertension Brother     Prostate cancer Brother     Breast cancer Daughter 48    Prostate cancer Son      Past Surgical History:   Procedure Laterality Date    CATARACT EXTRACTION      COLONOSCOPY      CORONARY ANGIOPLASTY WITH STENT PLACEMENT      stent x 2    CYSTOSCOPY      diagnostic - onset 4/4/17    EGD      EYE SURGERY      FRACTURE SURGERY      HERNIA REPAIR      HYSTERECTOMY      LEG SURGERY      OOPHORECTOMY      IN OPEN RX FEMUR FX+INTRAMED ALEJANDRO Left 4/8/2018    Procedure: INSERTION NAIL IM FEMUR ANTEGRADE (TROCHANTERIC); Surgeon: Reba Diamond MD;  [facility];  Service: [address] N/A 5/12/2021    Procedure: REPAIR HERNIA VENTRAL;  Surgeon: Germaine Merida MD;  [facility];  Service: General    [place] WRIST Jesse Bailer LIG Right 10/12/2021    Procedure: Right endoscopic carpal tunnel release;  Surgeon: Mirna Brar MD;  [facility];  Service: Orthopedics    TONSILLECTOMY         Current Outpatient Medications:     acetaminophen (Tylenol 8 Hour) 650 mg CR tablet, Take 1 tablet (650 mg total) by mouth every 8 (eight) hours, Disp: 15 tablet, Rfl: 0    amLODIPine (NORVASC) 5 mg tablet, Take 1 tablet (5 mg total) by mouth daily, Disp: 90 tablet, Rfl: 1    aspirin 81 MG tablet, Take 81 mg by mouth daily Resume on 4/28 , Disp: , Rfl:     atorvastatin (LIPITOR) 40 mg tablet, TAKE 1 TABLET BY MOUTH  DAILY AFTER DINNER, Disp: 90 tablet, Rfl: 3    bumetanide (BUMEX) 1 mg tablet, TAKE 1/2 A TABLET BY MOUTH  DAILY TAKE A WHOLE TABLET IF  NEEDED, Disp: 90 tablet, Rfl: 3    carvedilol (COREG) 12 5 mg tablet, Take 1 tablet (12 5 mg total) by mouth 2 (two) times a day with meals, Disp: 180 tablet, Rfl: 3    Cholecalciferol 2000 units TABS, Take 2,000 Units by mouth in the morning   Resume on 4/28  , Disp: , Rfl:     dicyclomine (BENTYL) 10 mg capsule, Take 10 mg by mouth as needed (Patient not taking: Reported on 5/18/2022), Disp: , Rfl:     DULoxetine (CYMBALTA) 30 mg delayed release capsule, TAKE 1 CAPSULE BY MOUTH  DAILY, Disp: 90 capsule, Rfl: 3    fexofenadine (ALLEGRA) 180 MG tablet, Take 180 mg by mouth daily as needed , Disp: , Rfl:     folic acid (FOLVITE) 1 mg tablet, Take 1 mg by mouth daily, Disp: , Rfl:     losartan (COZAAR) 100 MG tablet, TAKE 1 TABLET BY MOUTH  DAILY, Disp: 90 tablet, Rfl: 1    methotrexate 2 5 mg tablet, Take 2 5 mg by mouth once a week, Disp: , Rfl:    Methylcellulose, Laxative, (CITRUCEL PO), Take by mouth as needed , Disp: , Rfl:     MULTIPLE VITAMIN PO, Take by mouth daily , Disp: , Rfl:     sucralfate (CARAFATE) 1 g tablet, Take 1 tablet (1 g total) by mouth 4 (four) times a day, Disp: 360 tablet, Rfl: 1  Allergies   Allergen Reactions    Lactose - Food Allergy GI Intolerance       Labs:  Appointment on 05/06/2022   Component Date Value    Sed Rate 05/06/2022 17     CRP 05/06/2022 <3 0     WBC 05/06/2022 3 65 (A)    RBC 05/06/2022 3 88     Hemoglobin 05/06/2022 12 2     Hematocrit 05/06/2022 38 2     MCV 05/06/2022 99 (A)    MCH 05/06/2022 31 4     MCHC 05/06/2022 31 9     RDW 05/06/2022 13 2     MPV 05/06/2022 10 3     Platelets 87/19/1079 165     nRBC 05/06/2022 0     Neutrophils Relative 05/06/2022 39 (A)    Immat GRANS % 05/06/2022 0     Lymphocytes Relative 05/06/2022 43     Monocytes Relative 05/06/2022 11     Eosinophils Relative 05/06/2022 6     Basophils Relative 05/06/2022 1     Neutrophils Absolute 05/06/2022 1 42 (A)    Immature Grans Absolute 05/06/2022 0 00     Lymphocytes Absolute 05/06/2022 1 56     Monocytes Absolute 05/06/2022 0 41     Eosinophils Absolute 05/06/2022 0 22     Basophils Absolute 05/06/2022 0 04     Sodium 05/06/2022 140     Potassium 05/06/2022 3 5     Chloride 05/06/2022 105     CO2 05/06/2022 31     ANION GAP 05/06/2022 4     BUN 05/06/2022 23     Creatinine 05/06/2022 0 80     Glucose, Fasting 05/06/2022 86     Calcium 05/06/2022 9 6     AST 05/06/2022 20     ALT 05/06/2022 36     Alkaline Phosphatase 05/06/2022 65     Total Protein 05/06/2022 7 2     Albumin 05/06/2022 3 5     Total Bilirubin 05/06/2022 0 50     eGFR 05/06/2022 67      Imaging: No results found      Review of Systems:  Review of Systems    Physical Exam:  Physical Exam    Discussion/Summary:***

## 2022-05-31 ENCOUNTER — TELEMEDICINE (OUTPATIENT)
Dept: CARDIOLOGY CLINIC | Facility: CLINIC | Age: 87
End: 2022-05-31
Payer: MEDICARE

## 2022-05-31 VITALS
HEART RATE: 86 BPM | SYSTOLIC BLOOD PRESSURE: 138 MMHG | BODY MASS INDEX: 24.92 KG/M2 | WEIGHT: 132 LBS | DIASTOLIC BLOOD PRESSURE: 82 MMHG | HEIGHT: 61 IN

## 2022-05-31 DIAGNOSIS — G47.33 OSA (OBSTRUCTIVE SLEEP APNEA): ICD-10-CM

## 2022-05-31 DIAGNOSIS — I10 PRIMARY HYPERTENSION: Primary | ICD-10-CM

## 2022-05-31 PROCEDURE — 99442 PR PHYS/QHP TELEPHONE EVALUATION 11-20 MIN: CPT | Performed by: NURSE PRACTITIONER

## 2022-05-31 NOTE — PROGRESS NOTES
Virtual Brief Visit    Patient is located in the following state in which I hold an active license [place]      Assessment/Plan:  1  Hypertension- overall BP appears well controlled  I suspect lightheadedness and dizziness after 8 am medication is due to Bumex  I have instructed Annabella Nguyen to take Bumex when she wakes up at 6 am and take Coreg at 8 am  Call our office in one week to evaluate symptoms  Continue on a DASH Diet  2  DELMA not able to tolerate CPAP in past, waking up with MCKENZIE every morning  Ambulatory referral to sleep medicine  On 5/18/22 Ms Zelda Valladares was seen in our office with complaints of elevated BP and not feeling well since starting Methotrexate  Annabella Nguyen has been experiencing HA, nausea, Dizziness, sortness of breath with exertion and a 7 pound weight gain since starting Methotrexate  She discussed her symptoms with her Rheumatologist who encouraged Annabella Nguyen to continue on Methotrexat  According to Annabella Nguyen she is not eating a lot  Home BP : 8:30 am 167/88 and 11:30 am 113/61  She stopped taking Coreg in am and is taking it at 3 pm and 9 pm   Annabella Nguyen continues to walk 3 times a day  She attempts to get 3 miles in with 3 walks  Annabella Nguyen denies CP  She admits to an episode of severe dizziness on May 3rd   I had changed timing of her medications and instructed her to follow up with me in 10 days to evaluate her response  Today visit was changed from office visit to telephone visit  Anahi Cords was not starting this morning  Annabella Nguyen admits to waking up with a HA every morning  She e admits to a remote hx of sleep apnea and not t able to tolerate CPAP in the past      According to Radha BP in am 160/86  She takes Coreg and Bumex at 8 am and experiences lightheadedness and dizziness  BP at 10am 120/70 and occasionally at 11:30am 103/60  Amlodipine at 12 noon, BP at 2 pm 118/70, SBP at  3 pm 140, Coreg at 5:30 to 6pm 120/65  Losartan at bedtime             Medical History   CAD sp stents  X2  in 2011 Hypertension  Hyperlipidemia 11/04/21  TG 70, HDL 65, LDL 71   Chronic HFpEF LVEF 55%, grade 1 DD   Carotid artery stenosis  OA on Methotrexate      2/28/22 TTE:  Left ventricular systolic function normal, LVEF 55% wall thickness is normal, grade 1 diastolic dysfunction  Right ventricular size systolic function normal   Mild aortic valve regurgitation, trace tricuspid valve regurgitation  Problem List Items Addressed This Visit    None         Recent Visits  No visits were found meeting these conditions  Showing recent visits within past 7 days and meeting all other requirements  Today's Visits  Date Type Provider Dept   05/31/22 [address] [place] [address], [address] [place] [address] today's visits and meeting all other requirements  Future Appointments  No visits were found meeting these conditions    Showing future appointments within next 150 days and meeting all other requirements         I spent 20 minutes directly with the patient during this visit

## 2022-06-01 ENCOUNTER — OFFICE VISIT (OUTPATIENT)
Dept: GASTROENTEROLOGY | Facility: CLINIC | Age: 87
End: 2022-06-01
Payer: MEDICARE

## 2022-06-01 VITALS
SYSTOLIC BLOOD PRESSURE: 140 MMHG | DIASTOLIC BLOOD PRESSURE: 80 MMHG | TEMPERATURE: 97.3 F | OXYGEN SATURATION: 98 % | WEIGHT: 131 LBS | BODY MASS INDEX: 24.73 KG/M2 | HEIGHT: 61 IN | HEART RATE: 73 BPM

## 2022-06-01 DIAGNOSIS — K21.9 GASTROESOPHAGEAL REFLUX DISEASE, UNSPECIFIED WHETHER ESOPHAGITIS PRESENT: Primary | ICD-10-CM

## 2022-06-01 DIAGNOSIS — K21.9 GASTROESOPHAGEAL REFLUX DISEASE WITHOUT ESOPHAGITIS: ICD-10-CM

## 2022-06-01 DIAGNOSIS — K58.9 IRRITABLE BOWEL SYNDROME, UNSPECIFIED TYPE: ICD-10-CM

## 2022-06-01 PROCEDURE — 99214 OFFICE O/P EST MOD 30 MIN: CPT | Performed by: INTERNAL MEDICINE

## 2022-06-01 RX ORDER — OMEPRAZOLE 40 MG/1
40 CAPSULE, DELAYED RELEASE ORAL DAILY
Qty: 90 CAPSULE | Refills: 3 | Status: SHIPPED | OUTPATIENT
Start: 2022-06-01

## 2022-06-01 NOTE — PROGRESS NOTES
[facility] - Outpatient Follow-up Note  Debby Cook 82 y o  female MRN: [id number]  Encounter: [id number]          ASSESSMENT AND PLAN:  28-year-old female here for follow-up    1  Gastroesophageal reflux disease, unspecified whether esophagitis present  -would resume her PPI as she was prior doing well on this advised patient that hopefully we can get her GERD under control so that she can continue on methotrexate for her arthritis  - omeprazole (PriLOSEC) 40 MG capsule; Take 1 capsule (40 mg total) by mouth daily  Dispense: 90 capsule; Refill: 3  -family member accompanied her to this appointment and she agreed  -continue Carafate as needed    3  Irritable bowel syndrome- constipation predominant  -continue laxative    Follow-up in 3 months time  ______________________________________________________________________    SUBJECTIVE:  Patient here for follow-up  She is accompanied by her daughter-in-law to this appointment  She lives alone but states they live 20 minutes away and are quite helpful for her  I saw her as a virtual visit in April 2020  She has history of GERD and was doing well on Prilosec 20 mg daily at that time  She currently is not on a PPI  She reports recently starting on methotrexate for arthritis by her rheumatologist   Shortly thereafter she has complained of multiple GI symptoms  She is unable to tell me when she stopped taking her Prilose, but is able to tell me that her symptoms started shortly after starting the methotrexate  She was started on Carafate which has helped some but not as well as the Prilosec had helped her in the past       REVIEW OF SYSTEMS IS OTHERWISE NEGATIVE        Historical Information   Past Medical History:   Diagnosis Date    Bladder cancer [facility])     had BCG treatments    Bladder [facility] )     Coronary artery disease     S/P stent placement x 2 in 2011    GERD (gastroesophageal reflux disease)     Hepatitis     got this from food back in 1970s, has not had a problem since    History of leukemia     in remission    History of stomach ulcers 1970    History of transfusion 1970    Hyperlipidemia     Hypertension     Irritable bowel syndrome     Kidney stone     Kidney stones     Pneumonia     Pt had in 2003 and 2004    Pulmonary emboli (HCC) 1970s    Raynaud disease     Shingles     Sleep apnea     Thrombocytopenia ([facility] ) 2/20/2012     Past Surgical History:   Procedure Laterality Date    CATARACT EXTRACTION      COLONOSCOPY      CORONARY ANGIOPLASTY WITH STENT PLACEMENT      stent x 2    CYSTOSCOPY      diagnostic - onset 4/4/17    EGD      EYE SURGERY      FRACTURE SURGERY      HERNIA REPAIR      HYSTERECTOMY      LEG SURGERY      OOPHORECTOMY      [place] OPEN RX FEMUR FX+INTRAMED ALEJANDRO Left 4/8/2018    Procedure: INSERTION NAIL IM FEMUR ANTEGRADE (TROCHANTERIC);   Surgeon: Nikki Adames MD;  [facility];  Service: Orthopedics    [address] [place] [address] N/A 5/12/2021    Procedure: REPAIR HERNIA VENTRAL;  Surgeon: Robert Graham MD;  [facility];  Service: General    [place] WRIST Teo Ax LIG Right 10/12/2021    Procedure: Right endoscopic carpal tunnel release;  Surgeon: New Landry MD;  [facility];  Service: Orthopedics    TONSILLECTOMY       Social History   Social History     Substance and Sexual Activity   Alcohol Use No     Social History     Substance and Sexual Activity   Drug Use No     Social History     Tobacco Use   Smoking Status Never Smoker   Smokeless Tobacco Never Used     Family History   Problem Relation Age of Onset    Arthritis Mother     Osteoporosis Mother     Heart disease Father     Hypertension Father     Hypertension Brother     Prostate cancer Brother     Breast cancer Daughter 48    Prostate cancer Son        Meds/Allergies       Current Outpatient Medications:     omeprazole (PriLOSEC) 40 MG capsule   acetaminophen (Tylenol 8 Hour) 650 mg CR tablet    amLODIPine (NORVASC) 5 mg tablet    aspirin 81 MG tablet    atorvastatin (LIPITOR) 40 mg tablet    bumetanide (BUMEX) 1 mg tablet    carvedilol (COREG) 12 5 mg tablet    Cholecalciferol 2000 units TABS    dicyclomine (BENTYL) 10 mg capsule    DULoxetine (CYMBALTA) 30 mg delayed release capsule    fexofenadine (ALLEGRA) 180 MG tablet    folic acid (FOLVITE) 1 mg tablet    losartan (COZAAR) 100 MG tablet    methotrexate 2 5 mg tablet    Methylcellulose, Laxative, (CITRUCEL PO)    MULTIPLE VITAMIN PO    sucralfate (CARAFATE) 1 g tablet    Allergies   Allergen Reactions    Lactose - Food Allergy GI Intolerance           Objective     Blood pressure 140/80, pulse 73, temperature (!) 97 3 °F (36 3 °C), temperature source Tympanic, height 5' 1" (1 549 m), weight 59 4 kg (131 lb), SpO2 98 %  Body mass index is 24 75 kg/m²  PHYSICAL EXAM:      General Appearance:   Alert, cooperative, no distress   HEENT:   Normocephalic, atraumatic, anicteric      Neck:  Supple, symmetrical, trachea midline   Lungs:   Clear to auscultation bilaterally; no rales, rhonchi or wheezing; respirations unlabored    Heart[de-identified]   Regular rate and rhythm; no murmur, rub, or gallop  Abdomen:   Soft, non-tender, non-distended; normal bowel sounds; no masses, no organomegaly    Genitalia:   Deferred    Rectal:   Deferred    Extremities:  No cyanosis, clubbing or edema    Pulses:  2+ and symmetric    Skin:  No jaundice, rashes, or lesions    Lymph nodes:  No palpable cervical lymphadenopathy        Lab Results:   No visits with results within 1 Day(s) from this visit     Latest known visit with results is:   Appointment on 05/06/2022   Component Date Value    Sed Rate 05/06/2022 17     CRP 05/06/2022 <3 0     WBC 05/06/2022 3 65 (A)    RBC 05/06/2022 3 88     Hemoglobin 05/06/2022 12 2     Hematocrit 05/06/2022 38 2     MCV 05/06/2022 99 (A)    MCH 05/06/2022 31 4     MCHC 05/06/2022 31 9     RDW 05/06/2022 13 2     MPV 05/06/2022 10 3     Platelets 27/99/7640 165     nRBC 05/06/2022 0     Neutrophils Relative 05/06/2022 39 (A)    Immat GRANS % 05/06/2022 0     Lymphocytes Relative 05/06/2022 43     Monocytes Relative 05/06/2022 11     Eosinophils Relative 05/06/2022 6     Basophils Relative 05/06/2022 1     Neutrophils Absolute 05/06/2022 1 42 (A)    Immature Grans Absolute 05/06/2022 0 00     Lymphocytes Absolute 05/06/2022 1 56     Monocytes Absolute 05/06/2022 0 41     Eosinophils Absolute 05/06/2022 0 22     Basophils Absolute 05/06/2022 0 04     Sodium 05/06/2022 140     Potassium 05/06/2022 3 5     Chloride 05/06/2022 105     CO2 05/06/2022 31     ANION GAP 05/06/2022 4     BUN 05/06/2022 23     Creatinine 05/06/2022 0 80     Glucose, Fasting 05/06/2022 86     Calcium 05/06/2022 9 6     AST 05/06/2022 20     ALT 05/06/2022 36     Alkaline Phosphatase 05/06/2022 65     Total Protein 05/06/2022 7 2     Albumin 05/06/2022 3 5     Total Bilirubin 05/06/2022 0 50     eGFR 05/06/2022 67          Radiology Results:   No results found

## 2022-06-14 ENCOUNTER — HOSPITAL ENCOUNTER (OUTPATIENT)
Dept: NON INVASIVE DIAGNOSTICS | Facility: CLINIC | Age: 87
Discharge: HOME | End: 2022-06-14
Payer: MEDICARE

## 2022-06-14 VITALS
BODY MASS INDEX: 24.73 KG/M2 | HEART RATE: 75 BPM | DIASTOLIC BLOOD PRESSURE: 80 MMHG | SYSTOLIC BLOOD PRESSURE: 140 MMHG | HEIGHT: 61 IN | WEIGHT: 131 LBS

## 2022-06-14 DIAGNOSIS — I10 PRIMARY HYPERTENSION: ICD-10-CM

## 2022-06-14 LAB
AORTIC ROOT: 3.4 CM
APICAL FOUR CHAMBER EJECTION FRACTION: 61 %
ASCENDING AORTA: 3.5 CM
E WAVE DECELERATION TIME: 267 MS
FRACTIONAL SHORTENING: 37 % (ref 28–44)
INTERVENTRICULAR SEPTUM IN DIASTOLE (PARASTERNAL SHORT AXIS VIEW): 0.9 CM
INTERVENTRICULAR SEPTUM: 0.9 CM (ref 0.6–1.1)
LAAS-AP4: 10.8 CM2
LEFT ATRIUM SIZE: 3.5 CM
LEFT INTERNAL DIMENSION IN SYSTOLE: 2.9 CM (ref 2.1–4)
LEFT VENTRICULAR INTERNAL DIMENSION IN DIASTOLE: 4.6 CM (ref 3.5–6)
LEFT VENTRICULAR POSTERIOR WALL IN END DIASTOLE: 0.9 CM
LEFT VENTRICULAR STROKE VOLUME: 62 ML
LVSV (TEICH): 62 ML
MV E'TISSUE VEL-SEP: 4 CM/S
MV PEAK A VEL: 1.09 M/S
MV PEAK E VEL: 79 CM/S
MV STENOSIS PRESSURE HALF TIME: 77 MS
MV VALVE AREA P 1/2 METHOD: 2.86 CM2
RA PRESSURE ESTIMATED: 5 MMHG
RIGHT ATRIUM AREA SYSTOLE A4C: 9.8 CM2
RIGHT VENTRICLE ID DIMENSION: 2.4 CM
RV PSP: 39 MMHG
SL CV LV EF: 65
SL CV PED ECHO LEFT VENTRICLE DIASTOLIC VOLUME (MOD BIPLANE) 2D: 96 ML
SL CV PED ECHO LEFT VENTRICLE SYSTOLIC VOLUME (MOD BIPLANE) 2D: 33 ML
TR MAX PG: 34 MMHG
TR PEAK VELOCITY: 2.9 M/S
TRICUSPID VALVE PEAK REGURGITATION VELOCITY: 2.9 M/S

## 2022-06-14 PROCEDURE — 93306 TTE W/DOPPLER COMPLETE: CPT | Performed by: INTERNAL MEDICINE

## 2022-06-14 PROCEDURE — 93306 TTE W/DOPPLER COMPLETE: CPT

## 2022-07-15 ENCOUNTER — TELEPHONE (OUTPATIENT)
Dept: FAMILY MEDICINE CLINIC | Facility: CLINIC | Age: 87
End: 2022-07-15

## 2022-07-15 NOTE — TELEPHONE ENCOUNTER
----- Message from Collin Mares sent at 7/15/2022  8:51 AM EDT -----  Regarding: Collin Mares  Have not been feeling good for a few weeks   In morning BP still high even after going weeks to cardiologist [place] to change BP pills around  This morning at 8:00am BP was 159/98 P78  After taking BP pills,   pressures are good but I wake with terrible headache, very nauseous, dizzy,  & just don't feel good  Also have temp of  all day  My normal is around 74 4  Im waiting for appointment with Dr Kevin Ramsey  Thank you in advance

## 2022-07-15 NOTE — TELEPHONE ENCOUNTER
Called pt, relayed message, pt didn't want to see anybody but Dr Carlos Quezada, pt set up virtual appointment for 07-

## 2022-07-21 ENCOUNTER — TELEMEDICINE (OUTPATIENT)
Dept: FAMILY MEDICINE CLINIC | Facility: CLINIC | Age: 87
End: 2022-07-21
Payer: MEDICARE

## 2022-07-21 ENCOUNTER — APPOINTMENT (OUTPATIENT)
Dept: LAB | Facility: CLINIC | Age: 87
End: 2022-07-21
Payer: MEDICARE

## 2022-07-21 DIAGNOSIS — R50.9 LOW GRADE FEVER: ICD-10-CM

## 2022-07-21 DIAGNOSIS — I95.9 SYMPTOMATIC HYPOTENSION: ICD-10-CM

## 2022-07-21 DIAGNOSIS — R11.0 NAUSEA: Primary | ICD-10-CM

## 2022-07-21 DIAGNOSIS — R11.0 NAUSEA: ICD-10-CM

## 2022-07-21 DIAGNOSIS — R51.9 NONINTRACTABLE HEADACHE, UNSPECIFIED CHRONICITY PATTERN, UNSPECIFIED HEADACHE TYPE: ICD-10-CM

## 2022-07-21 LAB
ANION GAP SERPL CALCULATED.3IONS-SCNC: 6 MMOL/L (ref 4–13)
BASOPHILS # BLD AUTO: 0.05 THOUSANDS/ΜL (ref 0–0.1)
BASOPHILS NFR BLD AUTO: 1 % (ref 0–1)
BILIRUB UR QL STRIP: NEGATIVE
BUN SERPL-MCNC: 31 MG/DL (ref 5–25)
CALCIUM SERPL-MCNC: 9.5 MG/DL (ref 8.3–10.1)
CHLORIDE SERPL-SCNC: 102 MMOL/L (ref 96–108)
CLARITY UR: CLEAR
CO2 SERPL-SCNC: 27 MMOL/L (ref 21–32)
COLOR UR: COLORLESS
CREAT SERPL-MCNC: 0.9 MG/DL (ref 0.6–1.3)
EOSINOPHIL # BLD AUTO: 0.13 THOUSAND/ΜL (ref 0–0.61)
EOSINOPHIL NFR BLD AUTO: 2 % (ref 0–6)
ERYTHROCYTE [DISTWIDTH] IN BLOOD BY AUTOMATED COUNT: 12.9 % (ref 11.6–15.1)
GFR SERPL CREATININE-BSD FRML MDRD: 58 ML/MIN/1.73SQ M
GLUCOSE SERPL-MCNC: 82 MG/DL (ref 65–140)
GLUCOSE UR STRIP-MCNC: NEGATIVE MG/DL
HCT VFR BLD AUTO: 38.6 % (ref 34.8–46.1)
HGB BLD-MCNC: 12.6 G/DL (ref 11.5–15.4)
HGB UR QL STRIP.AUTO: NEGATIVE
IMM GRANULOCYTES # BLD AUTO: 0.01 THOUSAND/UL (ref 0–0.2)
IMM GRANULOCYTES NFR BLD AUTO: 0 % (ref 0–2)
KETONES UR STRIP-MCNC: NEGATIVE MG/DL
LEUKOCYTE ESTERASE UR QL STRIP: NEGATIVE
LYMPHOCYTES # BLD AUTO: 1.84 THOUSANDS/ΜL (ref 0.6–4.47)
LYMPHOCYTES NFR BLD AUTO: 31 % (ref 14–44)
MCH RBC QN AUTO: 32.5 PG (ref 26.8–34.3)
MCHC RBC AUTO-ENTMCNC: 32.6 G/DL (ref 31.4–37.4)
MCV RBC AUTO: 100 FL (ref 82–98)
MONOCYTES # BLD AUTO: 0.6 THOUSAND/ΜL (ref 0.17–1.22)
MONOCYTES NFR BLD AUTO: 10 % (ref 4–12)
NEUTROPHILS # BLD AUTO: 3.32 THOUSANDS/ΜL (ref 1.85–7.62)
NEUTS SEG NFR BLD AUTO: 56 % (ref 43–75)
NITRITE UR QL STRIP: NEGATIVE
NRBC BLD AUTO-RTO: 0 /100 WBCS
PH UR STRIP.AUTO: 6 [PH]
PLATELET # BLD AUTO: 200 THOUSANDS/UL (ref 149–390)
PMV BLD AUTO: 10.1 FL (ref 8.9–12.7)
POTASSIUM SERPL-SCNC: 3.6 MMOL/L (ref 3.5–5.3)
PROT UR STRIP-MCNC: NEGATIVE MG/DL
RBC # BLD AUTO: 3.88 MILLION/UL (ref 3.81–5.12)
SODIUM SERPL-SCNC: 135 MMOL/L (ref 135–147)
SP GR UR STRIP.AUTO: 1.01 (ref 1–1.03)
UROBILINOGEN UR STRIP-ACNC: <2 MG/DL
WBC # BLD AUTO: 5.95 THOUSAND/UL (ref 4.31–10.16)

## 2022-07-21 PROCEDURE — 99214 OFFICE O/P EST MOD 30 MIN: CPT | Performed by: FAMILY MEDICINE

## 2022-07-21 PROCEDURE — 81003 URINALYSIS AUTO W/O SCOPE: CPT

## 2022-07-21 PROCEDURE — 36415 COLL VENOUS BLD VENIPUNCTURE: CPT

## 2022-07-21 PROCEDURE — 80048 BASIC METABOLIC PNL TOTAL CA: CPT

## 2022-07-21 PROCEDURE — 85025 COMPLETE CBC W/AUTO DIFF WBC: CPT

## 2022-07-21 NOTE — PROGRESS NOTES
COVID-19 Outpatient Progress Note    Assessment/Plan:    Problem List Items Addressed This Visit    None     Visit Diagnoses     Nausea    -  Primary    Relevant Orders    UA w Reflex to Microscopic w Reflex to Culture -Lab Collect    Basic metabolic panel    Symptomatic hypotension        I instructed pt to call her cardiologist with report of her bp drop and dizziness sx within half hour of taking carvediolol    Relevant Orders    Basic metabolic panel    Nonintractable headache, unspecified chronicity pattern, unspecified headache type        Low grade fever        Relevant Orders    UA w Reflex to Microscopic w Reflex to Culture -Lab Collect    Basic metabolic panel    CBC and differential         Disposition:     After clarifying the patient's history, my suspicion for COVID-19 infection is very low  I have spent 20 minutes directly with the patient  Encounter provider Ana M Bloom DO    Provider located at [address] [facility],   [address] [facility] [id number]    Recent Visits  Date Type Provider Dept   07/15/22 Telephone Obie Samaniego [facility] recent visits within past 7 days and meeting all other requirements  Today's Visits  Date Type Provider Dept   07/21/22 Telemedicine Ana M Bloom DO [facility] today's visits and meeting all other requirements  Future Appointments  No visits were found meeting these conditions  Showing future appointments within next 150 days and meeting all other requirements     This virtual check-in was done via [employer] and patient was informed that this is a secure, HIPAA-compliant platform  She agrees to proceed  Patient agrees to participate in a virtual check in via telephone or video visit instead of presenting to the office to address urgent/immediate medical needs  Patient is aware this is a billable service      After connecting through [facility] patient was identified by name and date of birth  Orlando Sierra was informed that this was a telemedicine visit and that the exam was being conducted confidentially over secure lines  My office door was closed  No one else was in the room  Orlando Sierra acknowledged consent and understanding of privacy and security of the telemedicine visit  I informed the patient that I have reviewed her record in Epic and presented the opportunity for her to ask any questions regarding the visit today  The patient agreed to participate  Verification of patient location:  Patient is located in the following state in which I hold an active license: [place]    Subjective:   Orlando Sierra is a 82 y o  female who is concerned about COVID-19  Patient's symptoms include fever, cough ("a little bit once in awhile"), abdominal pain (longstanding complaint - states the carafate helps), nausea and headache  Patient denies sore throat, shortness of breath, chest tightness, vomiting and diarrhea             COVID-19 vaccination status: Fully vaccinated with booster    Exposure:   Contact with a person who is under investigation (PUI) for or who is positive for COVID-19 within the last 14 days?: No    Hospitalized recently for fever and/or lower respiratory symptoms?: No      Currently a healthcare worker that is involved in direct patient care?: No      Works in a special setting where the risk of COVID-19 transmission may be high? (this may include long-term care, correctional and jail facilities; homeless shelters; assisted-living facilities and group homes ): No      Resident in a special setting where the risk of COVID-19 transmission may be high? (this may include long-term care, correctional and jail facilities; homeless shelters; assisted-living facilities and group homes ): No      "not feeling well, nausea, headache, bp high, fever"   Sx onset "been over a month" per pt  "This morning when I woke up 161/90 pulse was 74, temperature was 97 9, then just took it again- about half hour to 45 minutes after took carvedilol - I'm dizzy and bp 95/62, and my temperature just took now and it's 99"  "Did take the COVID home test 2 days ago and it was negative"  "Wake up with headaches and nausea, do get pain in my chest, but wonder if it's the rib pain from the arthritis"  "Just went to Dr Vicente Ballard, because my reflux got really bad, and she put me on prilosec"  Sees her cardiologist again for f/u at end of August  Denies any syncope/near syncope      No results found for: Xiao Gomez, [address] [facility], [address] [facility], [facility] [address], [address] [facility], [facility], [address] [facility]  Past Medical History:   Diagnosis Date    Bladder [facility] )     had BCG treatments    Bladder cancer ([facility] )     Coronary artery disease     S/P stent placement x 2 in 2011    GERD (gastroesophageal reflux disease)     Hepatitis     got this from food back in 1970s, has not had a problem since    History of leukemia     in remission    History of stomach ulcers 1970    History of transfusion 1970    Hyperlipidemia     Hypertension     Irritable bowel syndrome     Kidney stone     Kidney stones     Pneumonia     Pt had in 2003 and 2004    Pulmonary emboli ([facility] ) 1970s    Raynaud disease     Shingles     Sleep apnea     Thrombocytopenia ([facility] ) 2/20/2012     Past Surgical History:   Procedure Laterality Date    CATARACT EXTRACTION      COLONOSCOPY      CORONARY ANGIOPLASTY WITH STENT PLACEMENT      stent x 2    CYSTOSCOPY      diagnostic - onset 4/4/17    EGD      EYE SURGERY      FRACTURE SURGERY      HERNIA REPAIR      HYSTERECTOMY      LEG SURGERY      OOPHORECTOMY      NV OPEN RX FEMUR FX+INTRAMED ALEJANDRO Left 4/8/2018    Procedure: INSERTION NAIL IM FEMUR ANTEGRADE (TROCHANTERIC);   Surgeon: Mikey Levy MD;  [facility];  Service: Orthopedics    [address] [place] [address] N/A 5/12/2021    Procedure: REPAIR HERNIA VENTRAL;  Surgeon: Addie Avalos MD; [facility];  Service: General    TN WRIST Geovannailand Chun LIG Right 10/12/2021    Procedure: Right endoscopic carpal tunnel release;  Surgeon: Dian Kelley MD;  [facility];  Service: Orthopedics    TONSILLECTOMY       Current Outpatient Medications   Medication Sig Dispense Refill    acetaminophen (Tylenol 8 Hour) 650 mg CR tablet Take 1 tablet (650 mg total) by mouth every 8 (eight) hours 15 tablet 0    amLODIPine (NORVASC) 5 mg tablet TAKE 1 TABLET BY MOUTH  DAILY 90 tablet 3    aspirin 81 MG tablet Take 81 mg by mouth daily Resume on 4/28       atorvastatin (LIPITOR) 40 mg tablet TAKE 1 TABLET BY MOUTH  DAILY AFTER DINNER 90 tablet 3    bumetanide (BUMEX) 1 mg tablet TAKE 1/2 A TABLET BY MOUTH  DAILY TAKE A WHOLE TABLET IF  NEEDED 90 tablet 3    carvedilol (COREG) 12 5 mg tablet Take 1 tablet (12 5 mg total) by mouth 2 (two) times a day with meals 180 tablet 3    Cholecalciferol 2000 units TABS Take 2,000 Units by mouth in the morning  Resume on 4/28    DULoxetine (CYMBALTA) 30 mg delayed release capsule TAKE 1 CAPSULE BY MOUTH  DAILY 90 capsule 3    fexofenadine (ALLEGRA) 180 MG tablet Take 180 mg by mouth daily as needed       folic acid (FOLVITE) 1 mg tablet Take 1 mg by mouth daily      losartan (COZAAR) 100 MG tablet TAKE 1 TABLET BY MOUTH  DAILY 90 tablet 1    methotrexate 2 5 mg tablet Take 2 5 mg by mouth once a week      Methylcellulose, Laxative, (CITRUCEL PO) Take by mouth as needed       MULTIPLE VITAMIN PO Take by mouth daily       omeprazole (PriLOSEC) 40 MG capsule Take 1 capsule (40 mg total) by mouth daily 90 capsule 3    sucralfate (CARAFATE) 1 g tablet Take 1 tablet (1 g total) by mouth 4 (four) times a day 360 tablet 1     No current facility-administered medications for this visit  Allergies   Allergen Reactions    Lactose - Food Allergy GI Intolerance       Review of Systems   Constitutional: Positive for fever     HENT: Negative for sore throat  Respiratory: Positive for cough ("a little bit once in awhile")  Negative for chest tightness and shortness of breath  Gastrointestinal: Positive for abdominal pain (longstanding complaint - states the carafate helps) and nausea  Negative for diarrhea and vomiting  Neurological: Positive for headaches  Objective: There were no vitals filed for this visit  Physical Exam  Pulmonary:      Effort: No tachypnea, bradypnea, prolonged expiration or respiratory distress  Neurological:      Mental Status: She is alert and oriented to person, place, and time  Psychiatric:         Behavior: Behavior is cooperative  VIRTUAL VISIT DISCLAIMER    Amandamonika Atkinsd verbally agrees to participate in [employer]  Pt is aware that [employer] could be limited without vital signs or the ability to perform a full hands-on physical Twilla Oats understands she or the provider may request at any time to terminate the video visit and request the patient to seek care or treatment in person

## 2022-08-08 NOTE — PROGRESS NOTES
Cardiology Follow Up    Nicole Weston  1935  [id number]  [address] [place] [address]  [facility] [address]  [phone]    1  Atypical chest pain  NM myocardial perfusion spect (stress and/or rest)    POCT ECG   2  Essential hypertension  carvedilol (COREG) 12 5 mg tablet   3  Mixed hyperlipidemia         Interval History:   Jonna Olsen presents to our office with report of low BP yesterday causing her to feel fatigued, naueaus with tingling in her left jaw  According to Jonna Raúl her BP in am  150-160/80-82  She is taking Coreg in am and BP dropping SBP   Yesterday she was dizzy and nauseous SBP dropped into the 60's  Jonna Olsen experienced tingling in her jaw at this time  She admits to jaw tingling occurring weekly to every couple of weeks  Symptoms similar to prior last Coronary artery stenting  Jonna Pol admits to mid sternal CP she feels is related to her arthritis           Medical History   CAD sp stents  X2  in 2011   Hypertension  Hyperlipidemia 11/04/21  TG 70, HDL 65, LDL 71   Chronic HFpEF LVEF 55%, grade 1 DD   Carotid artery stenosis  OA on Methotrexate      2/28/22 TTE:  Left ventricular systolic function normal, LVEF 55% wall thickness is normal, grade 1 diastolic dysfunction   Right ventricular size systolic function normal   Mild aortic valve regurgitation, trace tricuspid valve regurgitation      Patient Active Problem List   Diagnosis    Essential hypertension    Mixed hyperlipidemia    Coronary artery disease without angina pectoris - S/P stent placement x 2 (2011)    Gastroesophageal reflux disease without esophagitis    Chronic diastolic (congestive) heart failure (HCC)    Hyperthyroidism    Age-related osteoporosis with current pathological fracture    Intertrochanteric fracture of left femur, closed, with routine healing, subsequent encounter    Ambulatory dysfunction    Elderly person living alone    Low back pain without sciatica    S/P ORIF (open reduction internal fixation) fracture    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HCC)    Bladder cancer (HCC)    Allergic rhinitis    Anemia due to vitamin B12 deficiency    Biliary dyskinesia    Cholelithiasis    Chronic right shoulder pain    Closed displaced fracture of left trapezium bone    Degenerative joint disease    Depression, controlled    Deviated nasal septum    Gastric reflux syndrome    Heart valve disorder    Herpes zoster infection of thoracic region    IBS (irritable bowel syndrome)    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HCC)    Mild vitamin D deficiency    Mixed hearing loss, unilateral    Pancreatic cyst    Raynaud's disease without gangrene    S/P angioplasty with stent    Urge incontinence of urine    Conjunctivitis of both eyes    Medicare annual wellness visit, subsequent    History of pulmonary embolus (PE)    Age-related cataract of both eyes    Hyperparathyroidism, unspecified (HCC)    Pruritic rash    Fear of falling    Balance problems    Difficulty navigating stairs    Lower abdominal pain, unspecified    History of falling    Lower extremity edema    Cough due to ACE inhibitor    Persistent cough for 3 weeks or longer    New onset of headaches    Carotid artery stenosis, asymptomatic, bilateral    Ventral hernia    Cerebral aneurysm without rupture    Intermittent pain and swelling of hand    Right hand paresthesia    BMI 23 0-23 9, adult    Carpal tunnel syndrome of right wrist     Past Medical History:   Diagnosis Date    [facility]     had BCG treatments    [facility] ([facility] )     Coronary artery disease     S/P stent placement x 2 in 2011    GERD (gastroesophageal reflux disease)     Hepatitis     got this from food back in 1970s, has not had a problem since    History of leukemia     in remission    History of stomach ulcers 1970    History of transfusion 1970    Hyperlipidemia     Hypertension     Irritable bowel syndrome     Kidney stone     Kidney stones     Pneumonia     Pt had in 2003 and 2004    Pulmonary emboli (HCC) 1970s    Raynaud disease     Shingles     Sleep apnea     Thrombocytopenia ([facility] ) 2/20/2012     Social History     Socioeconomic History    Marital status:      Spouse name: Not on file    Number of children: Not on file    Years of education: Not on file    Highest education level: Not on file   Occupational History    Not on file   Tobacco Use    Smoking status: Never Smoker    Smokeless tobacco: Never Used   Vaping Use    Vaping Use: Never used   Substance and Sexual Activity    Alcohol use: No    Drug use: No    Sexual activity: Never   Other Topics Concern    Not on file   Social History Narrative    Advance directives declined by parents    Always uses seat belt     Social Determinants of Health     Financial Resource Strain: Not on file   Food Insecurity: Not on file   Transportation Needs: Not on file   Physical Activity: Not on file   Stress: Not on file   Social Connections: Not on file   Intimate Partner Violence: Not on file   Housing Stability: Not on file      Family History   Problem Relation Age of Onset    Arthritis Mother     Osteoporosis Mother     Heart disease Father     Hypertension Father     Hypertension Brother     Prostate cancer Brother     Breast cancer Daughter 48    Prostate cancer Son      Past Surgical History:   Procedure Laterality Date    CATARACT EXTRACTION      COLONOSCOPY      CORONARY ANGIOPLASTY WITH STENT PLACEMENT      stent x 2    CYSTOSCOPY      diagnostic - onset 4/4/17    EGD      EYE SURGERY      FRACTURE SURGERY      HERNIA REPAIR      HYSTERECTOMY      LEG SURGERY      OOPHORECTOMY      SD OPEN RX FEMUR FX+INTRAMED ALEJANDRO Left 4/8/2018    Procedure: INSERTION NAIL IM FEMUR ANTEGRADE (TROCHANTERIC);   Surgeon: Elba Mckeon MD;  [facility];  Service: [address] N/A 5/12/2021    Procedure: REPAIR HERNIA VENTRAL;  Surgeon: Tamara Collins MD;  [facility];  Service: General    DC WRIST Jessica Marquez LIG Right 10/12/2021    Procedure: Right endoscopic carpal tunnel release;  Surgeon: Mei Paz MD;  [facility];  Service: Orthopedics    TONSILLECTOMY         Current Outpatient Medications:     acetaminophen (Tylenol 8 Hour) 650 mg CR tablet, Take 1 tablet (650 mg total) by mouth every 8 (eight) hours, Disp: 15 tablet, Rfl: 0    amLODIPine (NORVASC) 5 mg tablet, TAKE 1 TABLET BY MOUTH  DAILY, Disp: 90 tablet, Rfl: 3    aspirin 81 MG tablet, Take 81 mg by mouth daily Resume on 4/28 , Disp: , Rfl:     atorvastatin (LIPITOR) 40 mg tablet, TAKE 1 TABLET BY MOUTH  DAILY AFTER DINNER, Disp: 90 tablet, Rfl: 3    bumetanide (BUMEX) 1 mg tablet, TAKE 1/2 A TABLET BY MOUTH  DAILY TAKE A WHOLE TABLET IF  NEEDED, Disp: 90 tablet, Rfl: 3    carvedilol (COREG) 12 5 mg tablet, Take 1 tablet (12 5 mg total) by mouth 2 (two) times a day with meals, Disp: 180 tablet, Rfl: 3    Cholecalciferol 2000 units TABS, Take 2,000 Units by mouth in the morning   Resume on 4/28  , Disp: , Rfl:     DULoxetine (CYMBALTA) 30 mg delayed release capsule, TAKE 1 CAPSULE BY MOUTH  DAILY, Disp: 90 capsule, Rfl: 3    fexofenadine (ALLEGRA) 180 MG tablet, Take 180 mg by mouth daily as needed , Disp: , Rfl:     folic acid (FOLVITE) 1 mg tablet, Take 1 mg by mouth daily, Disp: , Rfl:     losartan (COZAAR) 100 MG tablet, TAKE 1 TABLET BY MOUTH  DAILY, Disp: 90 tablet, Rfl: 1    methotrexate 2 5 mg tablet, Take 2 5 mg by mouth once a week, Disp: , Rfl:     Methylcellulose, Laxative, (CITRUCEL PO), Take by mouth as needed , Disp: , Rfl:     MULTIPLE VITAMIN PO, Take by mouth daily , Disp: , Rfl:     omeprazole (PriLOSEC) 40 MG capsule, Take 1 capsule (40 mg total) by mouth daily, Disp: 90 capsule, Rfl: 3    sucralfate (CARAFATE) 1 g tablet, Take 1 tablet (1 g total) by mouth 4 (four) times a day, Disp: 360 tablet, Rfl: 1  Allergies   Allergen Reactions    Lactose - Food Allergy GI Intolerance       Labs:  Appointment on 07/21/2022   Component Date Value    Color, UA 07/21/2022 Colorless     Clarity, UA 07/21/2022 Clear     Specific Gravity, UA 07/21/2022 1 009     pH, UA 07/21/2022 6 0     Leukocytes, UA 07/21/2022 Negative     Nitrite, UA 07/21/2022 Negative     Protein, UA 07/21/2022 Negative     Glucose, UA 07/21/2022 Negative     Ketones, UA 07/21/2022 Negative     Urobilinogen, UA 07/21/2022 <2 0     Bilirubin, UA 07/21/2022 Negative     Occult Blood, UA 07/21/2022 Negative     Sodium 07/21/2022 135     Potassium 07/21/2022 3 6     Chloride 07/21/2022 102     CO2 07/21/2022 27     ANION GAP 07/21/2022 6     BUN 07/21/2022 31 (A)    Creatinine 07/21/2022 0 90     Glucose 07/21/2022 82     Calcium 07/21/2022 9 5     eGFR 07/21/2022 58     WBC 07/21/2022 5 95     RBC 07/21/2022 3 88     Hemoglobin 07/21/2022 12 6     Hematocrit 07/21/2022 38 6     MCV 07/21/2022 100 (A)    MCH 07/21/2022 32 5     MCHC 07/21/2022 32 6     RDW 07/21/2022 12 9     MPV 07/21/2022 10 1     Platelets 76/34/2878 200     nRBC 07/21/2022 0     Neutrophils Relative 07/21/2022 56     Immat GRANS % 07/21/2022 0     Lymphocytes Relative 07/21/2022 31     Monocytes Relative 07/21/2022 10     Eosinophils Relative 07/21/2022 2     Basophils Relative 07/21/2022 1     Neutrophils Absolute 07/21/2022 3 32     Immature Grans Absolute 07/21/2022 0 01     Lymphocytes Absolute 07/21/2022 1 84     Monocytes Absolute 07/21/2022 0 60     Eosinophils Absolute 07/21/2022 0 13     Basophils Absolute 07/21/2022 0 05    Hospital Outpatient Visit on 06/14/2022   Component Date Value    LA size 06/14/2022 3 5     Triscuspid Valve Regurgi* 06/14/2022 34 0     Tricuspid valve peak reg* 06/14/2022 2 90     LVPWd 06/14/2022 0 90     Left Atrium Area-systoli* 06/14/2022 10 8     MV E' Tissue Velocity Se* 06/14/2022 4     TR Peak Ricky 06/14/2022 2 9     IVSd 06/14/2022 2 65     LV DIASTOLIC VOLUME (MOD* 28/47/9651 96     LEFT VENTRICLE SYSTOLIC * 61/00/0459 33     Left ventricular stroke * 06/14/2022 62 00     A4C EF 06/14/2022 61     LVIDd 06/14/2022 4 60     IVS 06/14/2022 0 9     LVIDS 06/14/2022 2 90     FS 06/14/2022 37     Asc Ao 06/14/2022 3 5     Ao root 06/14/2022 3 40     RVID d 06/14/2022 2 4     MV valve area p 1/2 meth* 06/14/2022 2 86     E wave deceleration time 06/14/2022 267     MV Peak E Ricky 06/14/2022 79     MV Peak A Ricky 06/14/2022 1 09     RAA A4C 06/14/2022 9 8     MV stenosis pressure 1/2* 06/14/2022 77     LVSV, 2D 06/14/2022 62     LV EF 06/14/2022 65     Est  RA pres 06/14/2022 5 0     Right Ventricular Peak S* 06/14/2022 39 00      Imaging: No results found  Review of Systems:  Review of Systems   Cardiovascular: Positive for chest pain  Left jaw tingling    Musculoskeletal: Positive for arthralgias and myalgias  All other systems reviewed and are negative  Physical Exam:  Physical Exam  Vitals reviewed  Constitutional:       Appearance: Normal appearance  HENT:      Head: Normocephalic  Cardiovascular:      Rate and Rhythm: Normal rate and regular rhythm  Pulses: Normal pulses  Heart sounds: Normal heart sounds  Pulmonary:      Effort: Pulmonary effort is normal       Breath sounds: Normal breath sounds  Abdominal:      General: Bowel sounds are normal       Palpations: Abdomen is soft  Musculoskeletal:         General: Normal range of motion  Cervical back: Normal range of motion and neck supple  Right lower leg: No edema  Left lower leg: No edema  Skin:     General: Skin is warm and dry  Capillary Refill: Capillary refill takes less than 2 seconds  Neurological:      General: No focal deficit present  Mental Status: She is alert and oriented to person, place, and time  Psychiatric:         Mood and Affect: Mood normal          Behavior: Behavior normal          Discussion/Summary:  1  Atypical Chest pain with tingling in left jaw  Mid sternal CP feels is due to OA  EKG NSR without sig ST T wave abnormality  Nuclear stress test, will attempt exercise  If cannot exercise will convert to Rx Nuclear stress test R/O ischemia contributing to symptoms  2  Hypertension with drop in BP yesterday into the 60's  Decrease Coreg from 12 5mg BID to 6 25mg BID  Continue on Bumex 1mg daily, ensure drinking 1500 cc fluid daily, continue on Amlodipine 5mg daily and Losartan 100mg daily at bedtime      3  Hyperlipidemia 11/04/21  TG 70, HDL 65, LDL 71- continue on Lipitor 40mg daily

## 2022-08-09 ENCOUNTER — OFFICE VISIT (OUTPATIENT)
Dept: CARDIOLOGY CLINIC | Facility: CLINIC | Age: 87
End: 2022-08-09
Payer: MEDICARE

## 2022-08-09 VITALS
DIASTOLIC BLOOD PRESSURE: 84 MMHG | BODY MASS INDEX: 24.94 KG/M2 | HEIGHT: 61 IN | OXYGEN SATURATION: 100 % | WEIGHT: 132.1 LBS | SYSTOLIC BLOOD PRESSURE: 176 MMHG | HEART RATE: 81 BPM

## 2022-08-09 DIAGNOSIS — I10 ESSENTIAL HYPERTENSION: ICD-10-CM

## 2022-08-09 DIAGNOSIS — R07.89 ATYPICAL CHEST PAIN: Primary | ICD-10-CM

## 2022-08-09 DIAGNOSIS — E78.2 MIXED HYPERLIPIDEMIA: ICD-10-CM

## 2022-08-09 PROBLEM — N18.31 STAGE 3A CHRONIC KIDNEY DISEASE (HCC): Status: ACTIVE | Noted: 2022-08-09

## 2022-08-09 PROCEDURE — 99215 OFFICE O/P EST HI 40 MIN: CPT | Performed by: NURSE PRACTITIONER

## 2022-08-09 PROCEDURE — 93000 ELECTROCARDIOGRAM COMPLETE: CPT | Performed by: NURSE PRACTITIONER

## 2022-08-09 RX ORDER — CARVEDILOL 12.5 MG/1
6.25 TABLET ORAL 2 TIMES DAILY WITH MEALS
Qty: 180 TABLET | Refills: 3 | Status: SHIPPED | OUTPATIENT
Start: 2022-08-09

## 2022-08-16 ENCOUNTER — HOSPITAL ENCOUNTER (OUTPATIENT)
Dept: NON INVASIVE DIAGNOSTICS | Facility: CLINIC | Age: 87
Discharge: HOME | End: 2022-08-16
Payer: MEDICARE

## 2022-08-16 ENCOUNTER — TELEPHONE (OUTPATIENT)
Dept: CARDIOLOGY CLINIC | Facility: CLINIC | Age: 87
End: 2022-08-16

## 2022-08-16 VITALS
OXYGEN SATURATION: 98 % | HEART RATE: 75 BPM | HEIGHT: 61 IN | SYSTOLIC BLOOD PRESSURE: 176 MMHG | WEIGHT: 132 LBS | DIASTOLIC BLOOD PRESSURE: 80 MMHG | BODY MASS INDEX: 24.92 KG/M2

## 2022-08-16 DIAGNOSIS — R07.89 ATYPICAL CHEST PAIN: ICD-10-CM

## 2022-08-16 LAB
BASELINE ST DEPRESSION: 0 MM
NUC STRESS EJECTION FRACTION: 65 %
RATE PRESSURE PRODUCT: NORMAL
SL CV REST NUCLEAR ISOTOPE DOSE: 10.36 MCI
SL CV STRESS NUCLEAR ISOTOPE DOSE: 31.7 MCI
SL CV STRESS RECOVERY BP: NORMAL MMHG
SL CV STRESS RECOVERY HR: 91 BPM
SL CV STRESS RECOVERY O2 SAT: 99 %
STRESS ANGINA INDEX: 0
STRESS BASELINE BP: NORMAL MMHG
STRESS BASELINE HR: 75 BPM
STRESS O2 SAT REST: 98 %
STRESS PEAK HR: 101 BPM
STRESS POST O2 SAT PEAK: 98 %
STRESS POST PEAK BP: 152 MMHG
STRESS/REST PERFUSION RATIO: 0.95

## 2022-08-16 PROCEDURE — G1004 CDSM NDSC: HCPCS

## 2022-08-16 PROCEDURE — 93016 CV STRESS TEST SUPVJ ONLY: CPT | Performed by: INTERNAL MEDICINE

## 2022-08-16 PROCEDURE — 93018 CV STRESS TEST I&R ONLY: CPT | Performed by: INTERNAL MEDICINE

## 2022-08-16 PROCEDURE — A9502 TC99M TETROFOSMIN: HCPCS

## 2022-08-16 PROCEDURE — 78452 HT MUSCLE IMAGE SPECT MULT: CPT

## 2022-08-16 PROCEDURE — 93017 CV STRESS TEST TRACING ONLY: CPT

## 2022-08-16 PROCEDURE — 78452 HT MUSCLE IMAGE SPECT MULT: CPT | Performed by: INTERNAL MEDICINE

## 2022-08-16 RX ADMIN — REGADENOSON 0.4 MG: 0.08 INJECTION, SOLUTION INTRAVENOUS at 12:29

## 2022-08-16 NOTE — TELEPHONE ENCOUNTER
----- Message from Tanya Magana, [address] sent at 8/16/2022  2:56 PM EDT -----  Please call Sanaz Nelson and inform her the stress test was normal   Thank you     Spoke with pt re: normal ST rslts

## 2022-08-17 LAB
CHEST PAIN STATEMENT: NORMAL
MAX DIASTOLIC BP: 82 MMHG
MAX HEART RATE: 103 BPM
MAX PREDICTED HEART RATE: 135 BPM
MAX. SYSTOLIC BP: 188 MMHG
PROTOCOL NAME: NORMAL
REASON FOR TERMINATION: NORMAL
TARGET HR FORMULA: NORMAL
TEST INDICATION: NORMAL
TIME IN EXERCISE PHASE: NORMAL

## 2022-08-29 ENCOUNTER — OFFICE VISIT (OUTPATIENT)
Dept: CARDIOLOGY CLINIC | Facility: CLINIC | Age: 87
End: 2022-08-29
Payer: MEDICARE

## 2022-08-29 VITALS
WEIGHT: 131.3 LBS | SYSTOLIC BLOOD PRESSURE: 148 MMHG | OXYGEN SATURATION: 98 % | BODY MASS INDEX: 24.79 KG/M2 | HEIGHT: 61 IN | HEART RATE: 82 BPM | DIASTOLIC BLOOD PRESSURE: 80 MMHG

## 2022-08-29 DIAGNOSIS — I25.10 CORONARY ARTERY DISEASE INVOLVING NATIVE CORONARY ARTERY OF NATIVE HEART WITHOUT ANGINA PECTORIS: ICD-10-CM

## 2022-08-29 DIAGNOSIS — E78.2 MIXED HYPERLIPIDEMIA: ICD-10-CM

## 2022-08-29 DIAGNOSIS — I73.00 RAYNAUD'S DISEASE WITHOUT GANGRENE: ICD-10-CM

## 2022-08-29 DIAGNOSIS — R09.89 LABILE BLOOD PRESSURE: Primary | ICD-10-CM

## 2022-08-29 DIAGNOSIS — I10 ESSENTIAL HYPERTENSION: ICD-10-CM

## 2022-08-29 DIAGNOSIS — I65.23 CAROTID ARTERY STENOSIS, ASYMPTOMATIC, BILATERAL: ICD-10-CM

## 2022-08-29 DIAGNOSIS — I50.32 CHRONIC DIASTOLIC (CONGESTIVE) HEART FAILURE (HCC): Chronic | ICD-10-CM

## 2022-08-29 PROCEDURE — 99214 OFFICE O/P EST MOD 30 MIN: CPT | Performed by: INTERNAL MEDICINE

## 2022-08-29 NOTE — PROGRESS NOTES
Cardiology Follow Up    Janie Walker  1935  [id number]  HEART & VASCULAR [facility] [address]    1  Labile blood pressure  VAS renal artery complete    Metanephrine, Fractionated Plasma Free   2  Essential hypertension  VAS renal artery complete    Metanephrine, Fractionated Plasma Free   3  Raynaud's disease without gangrene     4  Coronary artery disease involving native coronary artery of native heart without angina pectoris     5  Chronic diastolic (congestive) heart failure (HCC)  VAS renal artery complete    Metanephrine, Fractionated Plasma Free   6  Carotid artery stenosis, asymptomatic, bilateral  VAS renal artery complete    Metanephrine, Fractionated Plasma Free   7  Mixed hyperlipidemia         Discussion/Summary:   She has had some labile blood pressures  Will try to split losartan to 50 mg b i d  and continue Coreg at 6 25 b i d     Will dose losartan 5 mg in the evening  She will check her blood pressures at noon once a day and give me the results in 2 weeks  If she has any symptoms of lightheadedness will check blood pressure at that time as well  Check renal artery Dopplers to rule out stenosis and check plasma metanephrine  She does have frequent episodes of sweating  Recent stress test was within normal limits  Blood pressure today is acceptable  Follows a low-sodium intake  Interval History:   22-year-old female history of coronary artery disease status post stents in 4577, chronic diastolic congestive heart failure, hypertension, hyperlipidemia presents for routine scheduled follow-up visit  Unfortunately she fell coming out of the shower in April  She suffered a femur fracture and underwent surgical correction  Overall she feels well  Denies any chest pain, shortness of breath, palpitations, lightheadedness, dizziness, or syncope    There has been no lower extremity edema, PND, orthopnea  Blood pressures have been quite high in the mornings in the 546-403 systolic range and 10 to fall as the day goes on  She tries to remain well hydrated  She has had her medications adjusted  She also has some sweating  Strange times  Denies any anginal sounding discomfort recent stress test was negative  Problem List     Intertrochanteric fracture of left femur, closed, with routine healing, subsequent encounter    Essential hypertension    Medication side effect, initial encounter    Hyperlipidemia    Coronary artery disease without angina pectoris - S/P stent placement x 2 (2011)    Gastroesophageal reflux disease without esophagitis    Chronic diastolic (congestive) heart failure (HCC) (Chronic)    Hyperthyroidism    Osteoporosis (Chronic)    Ambulatory dysfunction    Elderly person living alone    Pain in left leg    Low back pain without sciatica    S/P ORIF (open reduction internal fixation) fracture    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HCC)        Past Medical History:   Diagnosis Date    Bladder cancer [facility] [address] )     had BCG treatments    Bladder cancer ([facility] )     Coronary artery disease     S/P stent placement x 2 in 2011    GERD (gastroesophageal reflux disease)     Hepatitis     got this from food back in 1970s, has not had a problem since    History of leukemia     in remission    History of stomach ulcers 1970    History of transfusion 1970    Hyperlipidemia     Hypertension     Irritable bowel syndrome     Kidney stone     Kidney stones     Pneumonia     Pt had in 2003 and 2004    Pulmonary [facility]oli ([facility]) 1970s    Raynaud disease     Shingles     Sleep apnea     [facility] 2/20/2012     Social History     Socioeconomic History    Marital status:       Spouse name: Not on file    Number of children: Not on file    Years of education: Not on file    Highest education level: Not on file   Occupational History    Not on file   Tobacco Use    Smoking status: Never Smoker    Smokeless tobacco: Never Used   Vaping Use    Vaping Use: Never used   Substance and Sexual Activity    Alcohol use: No    Drug use: No    Sexual activity: Never   Other Topics Concern    Not on file   Social History Narrative    Advance directives declined by parents    Always uses seat belt     Social Determinants of Health     Financial Resource Strain: Not on file   Food Insecurity: Not on file   Transportation Needs: Not on file   Physical Activity: Not on file   Stress: Not on file   Social Connections: Not on file   Intimate Partner Violence: Not on file   Housing Stability: Not on file      Family History   Problem Relation Age of Onset    Arthritis Mother     Osteoporosis Mother     Heart disease Father     Hypertension Father     Hypertension Brother     Prostate cancer Brother     Breast cancer Daughter 48    Prostate cancer Son      Past Surgical History:   Procedure Laterality Date    CATARACT EXTRACTION      COLONOSCOPY      CORONARY ANGIOPLASTY WITH STENT PLACEMENT      stent x 2    CYSTOSCOPY      diagnostic - onset 4/4/17    EGD      EYE SURGERY      FRACTURE SURGERY      HERNIA REPAIR      HYSTERECTOMY      LEG SURGERY      OOPHORECTOMY      MT OPEN RX FEMUR FX+INTRAMED ALEJANDRO Left 4/8/2018    Procedure: INSERTION NAIL IM FEMUR ANTEGRADE (TROCHANTERIC);   Surgeon: Morena Hewitt MD;  [facility];  Service: Orthopedics    [address] [place] [address] N/A 5/12/2021    Procedure: REPAIR HERNIA VENTRAL;  Surgeon: Lakshmi Preston MD;  [facility];  Service: General    MT WRIST Dharmesh Queenie LIG Right 10/12/2021    Procedure: Right endoscopic carpal tunnel release;  Surgeon: Malinda Hernandez MD;  [facility];  Service: Orthopedics    TONSILLECTOMY         Current Outpatient Medications:     acetaminophen (Tylenol 8 Hour) 650 mg CR tablet, Take 1 tablet (650 mg total) by mouth every 8 (eight) hours, Disp: 15 tablet, Rfl: 0    amLODIPine (NORVASC) 5 mg tablet, TAKE 1 TABLET BY MOUTH  DAILY, Disp: 90 tablet, Rfl: 3    aspirin 81 MG tablet, Take 81 mg by mouth daily Resume on 4/28 , Disp: , Rfl:     atorvastatin (LIPITOR) 40 mg tablet, TAKE 1 TABLET BY MOUTH  DAILY AFTER DINNER, Disp: 90 tablet, Rfl: 3    bumetanide (BUMEX) 1 mg tablet, TAKE 1/2 A TABLET BY MOUTH  DAILY TAKE A WHOLE TABLET IF  NEEDED, Disp: 90 tablet, Rfl: 3    carvedilol (COREG) 12 5 mg tablet, Take 0 5 tablets (6 25 mg total) by mouth 2 (two) times a day with meals, Disp: 180 tablet, Rfl: 3    Cholecalciferol 2000 units TABS, Take 2,000 Units by mouth in the morning   Resume on 4/28  , Disp: , Rfl:     DULoxetine (CYMBALTA) 30 mg delayed release capsule, TAKE 1 CAPSULE BY MOUTH  DAILY, Disp: 90 capsule, Rfl: 3    fexofenadine (ALLEGRA) 180 MG tablet, Take 180 mg by mouth daily as needed , Disp: , Rfl:     folic acid (FOLVITE) 1 mg tablet, Take 1 mg by mouth daily, Disp: , Rfl:     losartan (COZAAR) 100 MG tablet, TAKE 1 TABLET BY MOUTH  DAILY, Disp: 90 tablet, Rfl: 1    methotrexate 2 5 mg tablet, Take 2 5 mg by mouth once a week, Disp: , Rfl:     MULTIPLE VITAMIN PO, Take by mouth daily , Disp: , Rfl:     omeprazole (PriLOSEC) 40 MG capsule, Take 1 capsule (40 mg total) by mouth daily, Disp: 90 capsule, Rfl: 3    sucralfate (CARAFATE) 1 g tablet, Take 1 tablet (1 g total) by mouth 4 (four) times a day (Patient taking differently: Take 1 g by mouth if needed), Disp: 360 tablet, Rfl: 1    Methylcellulose, Laxative, (CITRUCEL PO), Take by mouth as needed  (Patient not taking: No sig reported), Disp: , Rfl:   Allergies   Allergen Reactions    Lactose - Food Allergy GI Intolerance       Labs:     Chemistry        Component Value Date/Time    K 3 6 07/21/2022 1208     07/21/2022 1208    CO2 27 07/21/2022 1208    BUN 31 (H) 07/21/2022 1208    CREATININE 0 90 07/21/2022 1208        Component Value Date/Time CALCIUM 9 5 07/21/2022 1208    ALKPHOS 65 05/06/2022 0929    AST 20 05/06/2022 0929    ALT 36 05/06/2022 0929            No results found for: CHOL  Lab Results   Component Value Date    HDL 65 11/04/2021    HDL 64 (H) 07/18/2018    HDL 79 (H) 06/12/2017     Lab Results   Component Value Date    LDLCALC 71 11/04/2021    LDLCALC 75 07/18/2018    LDLCALC 70 06/12/2017     Lab Results   Component Value Date    TRIG 70 11/04/2021    TRIG 146 07/18/2018    TRIG 86 06/12/2017     No results found for: CHOLHDL    Imaging: No results found  ECG:  NSR      Review of Systems   Constitutional: Negative  HENT: Negative  Eyes: Negative  Cardiovascular: Negative  Respiratory: Negative  Endocrine: Negative  Hematologic/Lymphatic: Negative  Skin: Negative  Musculoskeletal: Negative  Gastrointestinal: Negative  Genitourinary: Negative  Neurological: Negative  Psychiatric/Behavioral: Negative  Vitals:    08/29/22 1333   BP: 148/80   Pulse: 82   SpO2: 98%     Vitals:    08/29/22 1333   Weight: 59 6 kg (131 lb 4 8 oz)     Height: 5' 1" (154 9 cm)   Body mass index is 24 81 kg/m²  Physical Exam:  Vital signs reviewed  General:  Alert and cooperative, appears stated age, no acute distress  HEENT:  PERRLA, EOMI, no scleral icterus, no conjunctival pallor  Neck:  No lymphadenopathy, no thyromegaly, no carotid bruits, no elevated JVP  Heart:  Regular rate and rhythm, normal S1/S2, no S3/S4, no murmur, rubs or gallops  PMI nondisplaced  Lungs:  Clear to auscultation bilaterally, no wheezes rales or rhonchi  Abdomen:  Soft, non-tender, positive bowel sounds, no rebound or guarding,   no organomegaly   Extremities:  Normal range of motion    No clubbing, cyanosis or edema   Vascular:  2+ pedal pulses  Skin:  No rashes or lesions on exposed skin  Neurologic:  Cranial nerves II-XII grossly intact without focal deficits  Psych:  Normal mood and affect

## 2022-08-30 ENCOUNTER — TELEPHONE (OUTPATIENT)
Dept: GASTROENTEROLOGY | Facility: CLINIC | Age: 87
End: 2022-08-30

## 2022-08-30 NOTE — TELEPHONE ENCOUNTER
Patients GI provider:  Dr Dilan Avitia    Number to return call: [phone]    Reason for call: Pt r/s to virtual visit and would like a phone call instead of it going [place] [facility]      Scheduled procedure/appointment date if applicable: Appt - 79/41/74

## 2022-08-31 ENCOUNTER — APPOINTMENT (OUTPATIENT)
Dept: LAB | Facility: CLINIC | Age: 87
End: 2022-08-31
Payer: MEDICARE

## 2022-08-31 PROCEDURE — 83835 ASSAY OF METANEPHRINES: CPT | Performed by: INTERNAL MEDICINE

## 2022-08-31 PROCEDURE — 36415 COLL VENOUS BLD VENIPUNCTURE: CPT | Performed by: INTERNAL MEDICINE

## 2022-09-01 ENCOUNTER — TELEPHONE (OUTPATIENT)
Dept: GASTROENTEROLOGY | Facility: CLINIC | Age: 87
End: 2022-09-01

## 2022-09-01 ENCOUNTER — TELEMEDICINE (OUTPATIENT)
Dept: GASTROENTEROLOGY | Facility: CLINIC | Age: 87
End: 2022-09-01
Payer: MEDICARE

## 2022-09-01 DIAGNOSIS — K80.20 CALCULUS OF GALLBLADDER WITHOUT CHOLECYSTITIS WITHOUT OBSTRUCTION: ICD-10-CM

## 2022-09-01 DIAGNOSIS — K82.8 BILIARY DYSKINESIA: ICD-10-CM

## 2022-09-01 DIAGNOSIS — K86.2 PANCREATIC CYST: ICD-10-CM

## 2022-09-01 DIAGNOSIS — K21.9 GASTROESOPHAGEAL REFLUX DISEASE WITHOUT ESOPHAGITIS: Primary | ICD-10-CM

## 2022-09-01 DIAGNOSIS — K58.9 IRRITABLE BOWEL SYNDROME, UNSPECIFIED TYPE: ICD-10-CM

## 2022-09-01 PROCEDURE — 99214 OFFICE O/P EST MOD 30 MIN: CPT | Performed by: INTERNAL MEDICINE

## 2022-09-01 NOTE — TELEPHONE ENCOUNTER
----- Message from [address] DO sent at 9/1/2022  1:18 PM EDT -----  Also get her set up for a ct scan for a known pancreas cyst  Also schedule pt for a six month follow up appt

## 2022-09-01 NOTE — TELEPHONE ENCOUNTER
----- Message from [address]  sent at 9/1/2022  1:12 PM EDT -----  Please mail pt a low fod map diet handout  And give her a follow up appt in 4-6 months time

## 2022-09-01 NOTE — TELEPHONE ENCOUNTER
I called pt left vm, advised pt to call central scheduling , I provided number, pt may return our call with any questions    6 month recall placed for appt

## 2022-09-01 NOTE — PROGRESS NOTES
Virtual Regular Visit    Verification of patient location:    Patient is located in the following state in which I hold an active license [place]      Assessment/Plan:    Problem List Items Addressed This Visit        Digestive    Gastroesophageal reflux disease without esophagitis - Primary    Biliary dyskinesia    Cholelithiasis    IBS (irritable bowel syndrome)    Pancreatic cyst        1  GERD- improved with pantoprazole 40 mg  -discussed lifestyle modifications with pt  2  IBS- will mail pt a low fodmap diet information  3  Pancreas cyst,IPMN's- known for many years, has EUS in 2020, will repeat CT as she can't tolerate an MRI     Follow up in six months time       Reason for visit is   Chief Complaint   Patient presents with    Virtual Regular Visit        Encounter provider Niko Szymanski DO    Provider located at [address] [place] [address]  [phone]    Recent Visits  No visits were found meeting these conditions  Showing recent visits within past 7 days and meeting all other requirements  Future Appointments  No visits were found meeting these conditions  Showing future appointments within next 150 days and meeting all other requirements       The patient was identified by name and date of birth  Bethany Sebastian was informed that this is a telemedicine visit and that the visit is being conducted through [address] and patient was informed this is a secure, HIPAA-complaint platform  She agrees to proceed     My office door was closed  No one else was in the room  She acknowledged consent and understanding of privacy and security of the video platform  The patient has agreed to participate and understands they can discontinue the visit at any time  Patient is aware this is a billable service  Subjective  Bethany Sebastian is a 82 y o  female here for evaluation    We tried to do video but she couldn't connect  In regards to her GERD, the protonix has helped about 50 percent for her  She reports as far as her IBS goes, she has had that her whole life, but as she gets older she is only able to tolerate less and less  She has tried to puree vegetables but this doesn't help her ability to tolerate vegetables  This hasn't helped  She is avoiding fried foods and salads  Past Medical History:   Diagnosis Date    Bladder [facility])     had BCG treatments    Bladder [facility] ([facility] )     Coronary artery disease     S/P stent placement x 2 in 2011    GERD (gastroesophageal reflux disease)     Hepatitis     got this from food back in 1970s, has not had a problem since    History of leukemia     in remission    History of stomach ulcers 1970    History of transfusion 1970    Hyperlipidemia     Hypertension     Irritable bowel syndrome     Kidney stone     Kidney stones     Pneumonia     Pt had in 2003 and 2004    Pulmonary emboli (HCC) 1970s    Raynaud disease     Shingles     Sleep apnea     Thrombocytopenia ([facility] ) 2/20/2012       Past Surgical History:   Procedure Laterality Date    CATARACT EXTRACTION      COLONOSCOPY      CORONARY ANGIOPLASTY WITH STENT PLACEMENT      stent x 2    CYSTOSCOPY      diagnostic - onset 4/4/17    EGD      EYE SURGERY      FRACTURE SURGERY      HERNIA REPAIR      HYSTERECTOMY      LEG SURGERY      OOPHORECTOMY      AR OPEN RX FEMUR FX+INTRAMED ALEJANDRO Left 4/8/2018    Procedure: INSERTION NAIL IM FEMUR ANTEGRADE (TROCHANTERIC);   Surgeon: Kasey Machuca MD;  [facility];  Service: Orthopedics    [address] [place] [address] N/A 5/12/2021    Procedure: REPAIR HERNIA VENTRAL;  Surgeon: Elvis Enriquez MD;  [facility];  Service: General    AR WRIST Fernando Flair LIG Right 10/12/2021    Procedure: Right endoscopic carpal tunnel release;  Surgeon: Leopold Mitts, MD;  [facility];  Service: Orthopedics    TONSILLECTOMY         Current Outpatient Medications   Medication Sig Dispense Refill    acetaminophen (Tylenol 8 Hour) 650 mg CR tablet Take 1 tablet (650 mg total) by mouth every 8 (eight) hours 15 tablet 0    amLODIPine (NORVASC) 5 mg tablet TAKE 1 TABLET BY MOUTH  DAILY 90 tablet 3    aspirin 81 MG tablet Take 81 mg by mouth daily Resume on 4/28       atorvastatin (LIPITOR) 40 mg tablet TAKE 1 TABLET BY MOUTH  DAILY AFTER DINNER 90 tablet 3    bumetanide (BUMEX) 1 mg tablet TAKE 1/2 A TABLET BY MOUTH  DAILY TAKE A WHOLE TABLET IF  NEEDED 90 tablet 3    carvedilol (COREG) 12 5 mg tablet Take 0 5 tablets (6 25 mg total) by mouth 2 (two) times a day with meals 180 tablet 3    Cholecalciferol 2000 units TABS Take 2,000 Units by mouth in the morning  Resume on 4/28    DULoxetine (CYMBALTA) 30 mg delayed release capsule TAKE 1 CAPSULE BY MOUTH  DAILY 90 capsule 3    fexofenadine (ALLEGRA) 180 MG tablet Take 180 mg by mouth daily as needed       folic acid (FOLVITE) 1 mg tablet Take 1 mg by mouth daily      losartan (COZAAR) 100 MG tablet TAKE 1 TABLET BY MOUTH  DAILY 90 tablet 1    methotrexate 2 5 mg tablet Take 2 5 mg by mouth once a week      Methylcellulose, Laxative, (CITRUCEL PO) Take by mouth as needed  (Patient not taking: No sig reported)      MULTIPLE VITAMIN PO Take by mouth daily       omeprazole (PriLOSEC) 40 MG capsule Take 1 capsule (40 mg total) by mouth daily 90 capsule 3    sucralfate (CARAFATE) 1 g tablet Take 1 tablet (1 g total) by mouth 4 (four) times a day (Patient taking differently: Take 1 g by mouth if needed) 360 tablet 1     No current facility-administered medications for this visit  Allergies   Allergen Reactions    Lactose - Food Allergy GI Intolerance     REVIEW OF SYSTEMS:    CONSTITUTIONAL: Denies any fever, chills, rigors, and weight loss  HEENT: No earache or tinnitus  Denies hearing loss or visual disturbances    CARDIOVASCULAR: No chest pain or palpitations  RESPIRATORY: Denies any cough, hemoptysis, shortness of breath or dyspnea on exertion  GASTROINTESTINAL: As noted in the History of Present Illness  GENITOURINARY: No problems with urination  Denies any hematuria or dysuria  NEUROLOGIC: No dizziness or vertigo, denies headaches  MUSCULOSKELETAL: Denies any muscle or joint pain  SKIN: Denies skin rashes or itching  ENDOCRINE: Denies excessive thirst  Denies intolerance to heat or cold  PSYCHOSOCIAL: Denies depression or anxiety  Denies any recent memory loss  PHYSICAL EXAMINATION:    General Appearance:   Alert, cooperative, no distress   HEENT:  Normocephalic, atraumatic, anicteric  Lungs:   Equal chest rise and unlabored breathing, normal effort, no coughing  Cardiovascular:   No visualized JVD  Abdomen:   No abdominal distension  Skin:   No jaundice  Musculoskeletal:   Normal range of motion visualized  Psych:  Normal affect and normal insight  Neuro:  Alert and appropriate         I spent 15 minutes directly with the patient during this visit

## 2022-09-07 ENCOUNTER — APPOINTMENT (OUTPATIENT)
Dept: LAB | Facility: CLINIC | Age: 87
End: 2022-09-07
Payer: MEDICARE

## 2022-09-07 DIAGNOSIS — Z79.899 ENCOUNTER FOR LONG-TERM (CURRENT) USE OF OTHER MEDICATIONS: ICD-10-CM

## 2022-09-07 LAB
ALBUMIN SERPL BCP-MCNC: 3.5 G/DL (ref 3.5–5)
ALP SERPL-CCNC: 80 U/L (ref 46–116)
ALT SERPL W P-5'-P-CCNC: 33 U/L (ref 12–78)
ANION GAP SERPL CALCULATED.3IONS-SCNC: 6 MMOL/L (ref 4–13)
AST SERPL W P-5'-P-CCNC: 19 U/L (ref 5–45)
BASOPHILS # BLD AUTO: 0.06 THOUSANDS/ΜL (ref 0–0.1)
BASOPHILS NFR BLD AUTO: 1 % (ref 0–1)
BILIRUB SERPL-MCNC: 0.74 MG/DL (ref 0.2–1)
BUN SERPL-MCNC: 28 MG/DL (ref 5–25)
CALCIUM SERPL-MCNC: 9 MG/DL (ref 8.3–10.1)
CHLORIDE SERPL-SCNC: 106 MMOL/L (ref 96–108)
CO2 SERPL-SCNC: 27 MMOL/L (ref 21–32)
CREAT SERPL-MCNC: 0.94 MG/DL (ref 0.6–1.3)
CRP SERPL QL: <3 MG/L
EOSINOPHIL # BLD AUTO: 0.17 THOUSAND/ΜL (ref 0–0.61)
EOSINOPHIL NFR BLD AUTO: 3 % (ref 0–6)
ERYTHROCYTE [DISTWIDTH] IN BLOOD BY AUTOMATED COUNT: 12.9 % (ref 11.6–15.1)
ERYTHROCYTE [SEDIMENTATION RATE] IN BLOOD: 13 MM/HOUR (ref 0–29)
GFR SERPL CREATININE-BSD FRML MDRD: 55 ML/MIN/1.73SQ M
GLUCOSE P FAST SERPL-MCNC: 76 MG/DL (ref 65–99)
HCT VFR BLD AUTO: 37.1 % (ref 34.8–46.1)
HGB BLD-MCNC: 12.3 G/DL (ref 11.5–15.4)
IMM GRANULOCYTES # BLD AUTO: 0.02 THOUSAND/UL (ref 0–0.2)
IMM GRANULOCYTES NFR BLD AUTO: 0 % (ref 0–2)
LYMPHOCYTES # BLD AUTO: 1.35 THOUSANDS/ΜL (ref 0.6–4.47)
LYMPHOCYTES NFR BLD AUTO: 26 % (ref 14–44)
MCH RBC QN AUTO: 33 PG (ref 26.8–34.3)
MCHC RBC AUTO-ENTMCNC: 33.2 G/DL (ref 31.4–37.4)
MCV RBC AUTO: 100 FL (ref 82–98)
MONOCYTES # BLD AUTO: 0.46 THOUSAND/ΜL (ref 0.17–1.22)
MONOCYTES NFR BLD AUTO: 9 % (ref 4–12)
NEUTROPHILS # BLD AUTO: 3.17 THOUSANDS/ΜL (ref 1.85–7.62)
NEUTS SEG NFR BLD AUTO: 61 % (ref 43–75)
NRBC BLD AUTO-RTO: 0 /100 WBCS
PLATELET # BLD AUTO: 164 THOUSANDS/UL (ref 149–390)
PMV BLD AUTO: 10.2 FL (ref 8.9–12.7)
POTASSIUM SERPL-SCNC: 3.8 MMOL/L (ref 3.5–5.3)
PROT SERPL-MCNC: 7.1 G/DL (ref 6.4–8.4)
RBC # BLD AUTO: 3.73 MILLION/UL (ref 3.81–5.12)
SODIUM SERPL-SCNC: 139 MMOL/L (ref 135–147)
WBC # BLD AUTO: 5.23 THOUSAND/UL (ref 4.31–10.16)

## 2022-09-07 PROCEDURE — 85652 RBC SED RATE AUTOMATED: CPT

## 2022-09-07 PROCEDURE — 36415 COLL VENOUS BLD VENIPUNCTURE: CPT

## 2022-09-07 PROCEDURE — 85025 COMPLETE CBC W/AUTO DIFF WBC: CPT

## 2022-09-07 PROCEDURE — 86140 C-REACTIVE PROTEIN: CPT

## 2022-09-07 PROCEDURE — 80053 COMPREHEN METABOLIC PANEL: CPT

## 2022-09-08 LAB
METANEPH FREE SERPL-MCNC: 28.3 PG/ML (ref 0–88)
NORMETANEPHRINE SERPL-MCNC: 78 PG/ML (ref 0–297.2)

## 2022-09-16 DIAGNOSIS — I10 ESSENTIAL HYPERTENSION: ICD-10-CM

## 2022-09-16 RX ORDER — LOSARTAN POTASSIUM 100 MG/1
TABLET ORAL
Qty: 90 TABLET | Refills: 1 | Status: SHIPPED | OUTPATIENT
Start: 2022-09-16 | End: 2022-09-16

## 2022-09-23 ENCOUNTER — HOSPITAL ENCOUNTER (OUTPATIENT)
Dept: NON INVASIVE DIAGNOSTICS | Facility: CLINIC | Age: 87
Discharge: HOME | End: 2022-09-23
Payer: MEDICARE

## 2022-09-23 DIAGNOSIS — I10 ESSENTIAL HYPERTENSION: ICD-10-CM

## 2022-09-23 DIAGNOSIS — R09.89 LABILE BLOOD PRESSURE: ICD-10-CM

## 2022-09-23 DIAGNOSIS — I50.32 CHRONIC DIASTOLIC (CONGESTIVE) HEART FAILURE (HCC): Chronic | ICD-10-CM

## 2022-09-23 DIAGNOSIS — I65.23 CAROTID ARTERY STENOSIS, ASYMPTOMATIC, BILATERAL: ICD-10-CM

## 2022-09-23 PROCEDURE — 93975 VASCULAR STUDY: CPT

## 2022-09-24 PROCEDURE — 93975 VASCULAR STUDY: CPT | Performed by: SURGERY

## 2022-10-14 ENCOUNTER — RA CDI HCC (OUTPATIENT)
Dept: OTHER | Facility: HOSPITAL | Age: 87
End: 2022-10-14

## 2022-10-14 NOTE — PROGRESS NOTES
I13 0  [facility]  coding opportunities          Chart Reviewed number of suggestions sent to Provider: 1     Patients Insurance     Medicare Insurance: [employer]

## 2022-10-20 ENCOUNTER — IMMUNIZATIONS (OUTPATIENT)
Dept: FAMILY MEDICINE CLINIC | Facility: CLINIC | Age: 87
End: 2022-10-20
Payer: MEDICARE

## 2022-10-20 DIAGNOSIS — Z23 NEED FOR INFLUENZA VACCINATION: Primary | ICD-10-CM

## 2022-10-20 PROCEDURE — 90662 IIV NO PRSV INCREASED AG IM: CPT

## 2022-10-20 PROCEDURE — G0008 ADMIN INFLUENZA VIRUS VAC: HCPCS

## 2022-12-12 ENCOUNTER — APPOINTMENT (OUTPATIENT)
Dept: LAB | Facility: CLINIC | Age: 87
End: 2022-12-12

## 2022-12-12 DIAGNOSIS — M05.89 OTHER RHEUMATOID ARTHRITIS WITH RHEUMATOID FACTOR OF MULTIPLE SITES (HCC): ICD-10-CM

## 2022-12-12 LAB
ALBUMIN SERPL BCP-MCNC: 3.7 G/DL (ref 3.5–5)
ALP SERPL-CCNC: 75 U/L (ref 46–116)
ALT SERPL W P-5'-P-CCNC: 33 U/L (ref 12–78)
ANION GAP SERPL CALCULATED.3IONS-SCNC: 4 MMOL/L (ref 4–13)
AST SERPL W P-5'-P-CCNC: 18 U/L (ref 5–45)
BASOPHILS # BLD AUTO: 0.04 THOUSANDS/ÂΜL (ref 0–0.1)
BASOPHILS NFR BLD AUTO: 1 % (ref 0–1)
BILIRUB SERPL-MCNC: 0.7 MG/DL (ref 0.2–1)
BUN SERPL-MCNC: 24 MG/DL (ref 5–25)
CALCIUM SERPL-MCNC: 9.3 MG/DL (ref 8.3–10.1)
CHLORIDE SERPL-SCNC: 105 MMOL/L (ref 96–108)
CO2 SERPL-SCNC: 28 MMOL/L (ref 21–32)
CREAT SERPL-MCNC: 0.9 MG/DL (ref 0.6–1.3)
CRP SERPL QL: <3 MG/L
EOSINOPHIL # BLD AUTO: 0.12 THOUSAND/ÂΜL (ref 0–0.61)
EOSINOPHIL NFR BLD AUTO: 3 % (ref 0–6)
ERYTHROCYTE [DISTWIDTH] IN BLOOD BY AUTOMATED COUNT: 12.5 % (ref 11.6–15.1)
ERYTHROCYTE [SEDIMENTATION RATE] IN BLOOD: 17 MM/HOUR (ref 0–29)
GFR SERPL CREATININE-BSD FRML MDRD: 58 ML/MIN/1.73SQ M
GLUCOSE P FAST SERPL-MCNC: 94 MG/DL (ref 65–99)
HCT VFR BLD AUTO: 36.2 % (ref 34.8–46.1)
HGB BLD-MCNC: 12.1 G/DL (ref 11.5–15.4)
IMM GRANULOCYTES # BLD AUTO: 0.01 THOUSAND/UL (ref 0–0.2)
IMM GRANULOCYTES NFR BLD AUTO: 0 % (ref 0–2)
LYMPHOCYTES # BLD AUTO: 1.45 THOUSANDS/ÂΜL (ref 0.6–4.47)
LYMPHOCYTES NFR BLD AUTO: 34 % (ref 14–44)
MCH RBC QN AUTO: 32.8 PG (ref 26.8–34.3)
MCHC RBC AUTO-ENTMCNC: 33.4 G/DL (ref 31.4–37.4)
MCV RBC AUTO: 98 FL (ref 82–98)
MONOCYTES # BLD AUTO: 0.45 THOUSAND/ÂΜL (ref 0.17–1.22)
MONOCYTES NFR BLD AUTO: 11 % (ref 4–12)
NEUTROPHILS # BLD AUTO: 2.23 THOUSANDS/ÂΜL (ref 1.85–7.62)
NEUTS SEG NFR BLD AUTO: 51 % (ref 43–75)
NRBC BLD AUTO-RTO: 0 /100 WBCS
PLATELET # BLD AUTO: 206 THOUSANDS/UL (ref 149–390)
PMV BLD AUTO: 10.1 FL (ref 8.9–12.7)
POTASSIUM SERPL-SCNC: 4.1 MMOL/L (ref 3.5–5.3)
PROT SERPL-MCNC: 7.2 G/DL (ref 6.4–8.4)
RBC # BLD AUTO: 3.69 MILLION/UL (ref 3.81–5.12)
SODIUM SERPL-SCNC: 137 MMOL/L (ref 135–147)
WBC # BLD AUTO: 4.3 THOUSAND/UL (ref 4.31–10.16)

## 2023-01-10 ENCOUNTER — TELEPHONE (OUTPATIENT)
Dept: HEMATOLOGY ONCOLOGY | Facility: CLINIC | Age: 88
End: 2023-01-10

## 2023-01-10 DIAGNOSIS — C91.10 CHRONIC LARGE GRANULAR LYMPHOCYTIC LEUKEMIA (HCC): Primary | ICD-10-CM

## 2023-01-10 NOTE — TELEPHONE ENCOUNTER
Can you please place in new labs for Giuseppe Goode  She will be coming back to see iRcky Lunsford in June  Thank you!

## 2023-01-27 ENCOUNTER — OFFICE VISIT (OUTPATIENT)
Dept: FAMILY MEDICINE CLINIC | Facility: CLINIC | Age: 88
End: 2023-01-27

## 2023-01-27 VITALS
TEMPERATURE: 96.7 F | HEART RATE: 81 BPM | SYSTOLIC BLOOD PRESSURE: 125 MMHG | BODY MASS INDEX: 24.51 KG/M2 | WEIGHT: 129.8 LBS | HEIGHT: 61 IN | OXYGEN SATURATION: 98 % | DIASTOLIC BLOOD PRESSURE: 62 MMHG

## 2023-01-27 DIAGNOSIS — I10 ESSENTIAL HYPERTENSION: ICD-10-CM

## 2023-01-27 DIAGNOSIS — N18.31 STAGE 3A CHRONIC KIDNEY DISEASE (HCC): ICD-10-CM

## 2023-01-27 DIAGNOSIS — C91.10 CHRONIC LARGE GRANULAR LYMPHOCYTIC LEUKEMIA (HCC): ICD-10-CM

## 2023-01-27 DIAGNOSIS — I50.32 CHRONIC DIASTOLIC (CONGESTIVE) HEART FAILURE (HCC): ICD-10-CM

## 2023-01-27 DIAGNOSIS — Z00.00 MEDICARE ANNUAL WELLNESS VISIT, SUBSEQUENT: Primary | ICD-10-CM

## 2023-01-27 DIAGNOSIS — M06.4 INFLAMMATORY POLYARTHROPATHIES (HCC): ICD-10-CM

## 2023-01-27 DIAGNOSIS — I70.1 RAS (RENAL ARTERY STENOSIS) (HCC): ICD-10-CM

## 2023-01-27 DIAGNOSIS — E21.3 HYPERPARATHYROIDISM, UNSPECIFIED (HCC): ICD-10-CM

## 2023-01-27 RX ORDER — AMLODIPINE BESYLATE 5 MG/1
5 TABLET ORAL 2 TIMES DAILY
Qty: 90 TABLET | Refills: 1
Start: 2022-09-26 | End: 2023-02-08 | Stop reason: SDUPTHER

## 2023-01-27 NOTE — PROGRESS NOTES
Satinder Song was seen today for medicare wellness visit  Diagnoses and all orders for this visit:    Medicare annual wellness visit, subsequent    Essential hypertension  Comments:  has been taking norvasc 5mg BID instead of QD since 09/26/2022 per cardiol  Orders:  -     amLODIPine (NORVASC) 5 mg tablet; Take 1 tablet (5 mg total) by mouth 2 (two) times a day Taking BID since 09/26/2022 per cardiol    Essential hypertension  Comments:  sideally controlled, cpm  Orders:  -     amLODIPine (NORVASC) 5 mg tablet;  Take 1 tablet (5 mg total) by mouth 2 (two) times a day Taking BID since 09/26/2022 per cardiol    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[facility] )  Comments:  managed by [facility] rheumatol, Dr Padmini Romo, [facility]    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[facility] )  Comments:  managed by Hem/Onc, cpm    Chronic diastolic (congestive) heart failure ([facility] )  Comments:  managed by cardiol, cpm    Hyperparathyroidism, unspecified ([facility] )  Comments:  calcium levels normal, stable, had seen endo, Dr Telma Bobo, in the past, continue monitoring    TIFFANY (renal artery stenosis) ([facility] )    Stage 3a chronic kidney disease ([facility] )  Comments:  avoid nephrotoxic agents, keep well hydrated       Assessment and Plan:     Problem List Items Addressed This Visit        Endocrine    Hyperparathyroidism, unspecified ([facility] )       Cardiovascular and Mediastinum    Chronic diastolic (congestive) heart failure (HCC) (Chronic)    Relevant Medications    amLODIPine (NORVASC) 5 mg tablet    TIFFANY (renal artery stenosis) (HCC)    Essential hypertension    Relevant Medications    amLODIPine (NORVASC) 5 mg tablet       Musculoskeletal and Integument    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[facility] )       Genitourinary    Stage 3a chronic kidney disease ([facility] )       Other    Medicare annual wellness visit, subsequent - Primary    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[facility] )        Preventive health issues were discussed with patient, and age appropriate screening tests were ordered as noted in patient's After Visit Summary  Personalized health advice and appropriate referrals for health education or preventive services given if needed, as noted in patient's After Visit Summary       History of Present Illness:     Patient presents for a Medicare Wellness Visit and f/u    HPI   Patient Care Team:  Adam Bridges DO as PCP - General (Family Medicine)  MD Adam Mejias DO Lowella Mcgregor, MD  [facility] Shanna [facility]     Review of Systems:     Review of Systems     Problem List:     Patient Active Problem List   Diagnosis   • Essential hypertension   • Mixed hyperlipidemia   • Coronary artery disease without angina pectoris - S/P stent placement x 2 (2011)   • Gastroesophageal reflux disease without esophagitis   • Chronic diastolic (congestive) heart failure ([facility] [address] )   • Hyperthyroidism   • Age-related osteoporosis with current pathological fracture   • Intertrochanteric fracture of left femur, closed, with routine healing, subsequent encounter   • Ambulatory dysfunction   • Elderly person living alone   • Low back pain without sciatica   • S/P ORIF (open reduction internal fixation) fracture   • 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HCC)   • Bladder cancer (HCC)   • Allergic rhinitis   • Anemia due to vitamin B12 deficiency   • Biliary dyskinesia   • Cholelithiasis   • Chronic right shoulder pain   • Closed displaced fracture of left trapezium bone   • Degenerative joint disease   • Depression, controlled   • Deviated nasal septum   • Gastric reflux syndrome   • Heart valve disorder   • Herpes zoster infection of thoracic region   • IBS (irritable bowel syndrome)   • 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[facility] [address] )   • Mild vitamin D deficiency   • Mixed hearing loss, unilateral   • Pancreatic cyst   • Raynaud's disease without gangrene   • S/P angioplasty with stent   • Urge incontinence of urine   • Conjunctivitis of both eyes   • Medicare annual wellness visit, subsequent   • History of pulmonary embolus (PE)   • Age-related cataract of both eyes   • Hyperparathyroidism, unspecified ([facility] )   • Pruritic rash   • Fear of falling   • Balance problems   • Difficulty navigating stairs   • Lower abdominal pain, unspecified   • History of falling   • Lower extremity edema   • Cough due to ACE inhibitor   • Persistent cough for 3 weeks or longer   • New onset of headaches   • Carotid artery stenosis, asymptomatic, bilateral   • Ventral hernia   • Cerebral aneurysm without rupture   • Intermittent pain and swelling of hand   • Right hand paresthesia   • BMI 23 0-23 9, adult   • Carpal tunnel syndrome of right wrist   • Stage 3a chronic kidney disease (HCC)   • TIFFANY (renal artery stenosis) ([facility] )      Past Medical and Surgical History:     Past Medical History:   Diagnosis Date   • Bladder cancer ([facility] )     had BCG treatments   • Bladder cancer ([facility] )    • Coronary artery disease     S/P stent placement x 2 in 2011   • GERD (gastroesophageal reflux disease)    • Hepatitis     got this from food back in 1970s, has not had a problem since   • History of leukemia     in remission   • History of stomach ulcers 1970   • History of transfusion 1970   • Hyperlipidemia    • Hypertension    • Irritable bowel syndrome    • Kidney stone    • Kidney stones    • Pneumonia     Pt had in 2003 and 2004   • Pulmonary emboli (HCC) 1970s   • Raynaud disease    • Shingles    • Sleep apnea    • Thrombocytopenia [facility] ) 2/20/2012     Past Surgical History:   Procedure Laterality Date   • CATARACT EXTRACTION     • COLONOSCOPY     • CORONARY ANGIOPLASTY WITH STENT PLACEMENT      stent x 2   • CYSTOSCOPY      diagnostic - onset 4/4/17   • EGD     • EYE SURGERY     • FRACTURE SURGERY     • HERNIA REPAIR     • HYSTERECTOMY     • LEG SURGERY     • OOPHORECTOMY     • [place] NDSC WRST SURG W/RLS TRANSVRS CARPL LIGM Right 10/12/2021    Procedure: Right endoscopic carpal tunnel release;  Surgeon: Darien Pierce MD;  [facility]: [facility];  Service: Orthopedics   • W[place] OPTX FEM SHFT FX W/INSJ IMED IMPLT W/WO SCREW Left 4/8/2018    Procedure: INSERTION NAIL IM FEMUR ANTEGRADE (TROCHANTERIC); Surgeon: Annabella Yarbrough MD;  Location: [facility];  Service: Orthopedics   • WV REPAIR FIRST ABDOMINAL WALL HERNIA N/A 5/12/2021    Procedure: REPAIR HERNIA VENTRAL;  Surgeon: Neida Martinez MD;  Location: [facility];  Service: General   • TONSILLECTOMY        Family History:     Family History   Problem Relation Age of Onset   • Arthritis Mother    • Osteoporosis Mother    • Heart disease Father    • Hypertension Father    • Hypertension Brother    • Prostate cancer Brother    • Breast cancer Daughter 48   • Prostate cancer Son       Social History:     Social History     Socioeconomic History   • Marital status:      Spouse name: None   • Number of children: None   • Years of education: None   • Highest education level: None   Occupational History   • None   Tobacco Use   • Smoking status: Never   • Smokeless tobacco: Never   Vaping Use   • Vaping Use: Never used   Substance and Sexual Activity   • Alcohol use: No   • Drug use: No   • Sexual activity: Never   Other Topics Concern   • None   Social History Narrative    Advance directives declined by parents    Always uses seat belt     Social Determinants of Health     Financial Resource Strain: Low Risk    • Difficulty of Paying Living Expenses: Not very hard   Food Insecurity: Not on file   Transportation Needs: No Transportation Needs   • Lack of Transportation (Medical): No   • Lack of Transportation (Non-Medical):  No   Physical Activity: Not on file   Stress: Not on file   Social Connections: Not on file   Intimate Partner Violence: Not on file   Housing Stability: Not on file      Medications and Allergies:     Current Outpatient Medications   Medication Sig Dispense Refill   • acetaminophen (Tylenol 8 Hour) 650 mg CR tablet Take 1 tablet (650 mg total) by mouth every 8 (eight) hours 15 tablet 0   • amLODIPine (NORVASC) 5 mg tablet Take 1 tablet (5 mg total) by mouth 2 (two) times a day Taking BID since 09/26/2022 per cardiol 90 tablet 1   • aspirin 81 MG tablet Take 81 mg by mouth daily Resume on 4/28      • atorvastatin (LIPITOR) 40 mg tablet TAKE 1 TABLET BY MOUTH  DAILY AFTER DINNER 90 tablet 3   • bumetanide (BUMEX) 1 mg tablet TAKE 1/2 A TABLET BY MOUTH  DAILY TAKE A WHOLE TABLET IF  NEEDED 90 tablet 3   • carvedilol (COREG) 12 5 mg tablet Take 0 5 tablets (6 25 mg total) by mouth 2 (two) times a day with meals 180 tablet 3   • Cholecalciferol 2000 units TABS Take 2,000 Units by mouth in the morning  Resume on 4/28   • DULoxetine (CYMBALTA) 30 mg delayed release capsule TAKE 1 CAPSULE BY MOUTH  DAILY 90 capsule 3   • fexofenadine (ALLEGRA) 180 MG tablet Take 180 mg by mouth daily as needed      • folic acid (FOLVITE) 1 mg tablet Take 1 mg by mouth daily     • losartan (COZAAR) 100 MG tablet TAKE 1 TABLET BY MOUTH  DAILY 90 tablet 3   • methotrexate 2 5 mg tablet Take 2 5 mg by mouth once a week     • MULTIPLE VITAMIN PO Take by mouth daily      • omeprazole (PriLOSEC) 40 MG capsule Take 1 capsule (40 mg total) by mouth daily 90 capsule 3   • sucralfate (CARAFATE) 1 g tablet Take 1 tablet (1 g total) by mouth 4 (four) times a day (Patient taking differently: Take 1 g by mouth if needed) 360 tablet 1     No current facility-administered medications for this visit       Allergies   Allergen Reactions   • Lactose - Food Allergy GI Intolerance      Immunizations:     Immunization History   Administered Date(s) Administered   • COVID-19 MODERNA VACC 0 25 ML IM BOOSTER 11/17/2021   • COVID-19 MODERNA VACC 0 5 ML IM 01/23/2021, 02/18/2021, 11/17/2021   • COVID-19, unspecified 02/01/2021   • INFLUENZA 06/27/2018, 10/03/2018, 10/11/2018, 10/15/2019, 10/07/2020, 10/20/2022   • Influenza Split High Dose Preservative Free IM 10/04/2016, 10/05/2017   • Influenza, high dose seasonal 0 7 mL 10/11/2018, 10/15/2019, 10/29/2020, 10/06/2021, 10/20/2022   • Influenza, seasonal, injectable 06/27/2018, 10/03/2018, 10/11/2018, 10/15/2019, 10/07/2020   • Pneumococcal Conjugate 13-Valent 07/24/2017   • Pneumococcal Polysaccharide PPV23 01/07/2019   • Zoster 01/01/2016   • influenza, injectable, quadrivalent 10/08/2021      Health Maintenance: There are no preventive care reminders to display for this patient  Topic Date Due   • COVID-19 Vaccine (5 - Booster) 01/12/2022      Medicare Screening Tests and Risk Assessments:     Clau Cortez is here for her Subsequent Wellness visit  Health Risk Assessment:   Patient rates overall health as fair  Patient feels that their physical health rating is same  Patient is satisfied with their life  Eyesight was rated as same  Hearing was rated as same  Patient feels that their emotional and mental health rating is same  Patients states they are never, rarely angry  Patient states they are often unusually tired/fatigued  Pain experienced in the last 7 days has been a lot  Patient's pain rating has been 7/10  Patient states that she has experienced no weight loss or gain in last 6 months  Fall Risk Screening: In the past year, patient has experienced: no history of falling in past year      Urinary Incontinence Screening:   Patient has not leaked urine accidently in the last six months  Home Safety:  Patient does not have trouble with stairs inside or outside of their home  Patient has working smoke alarms and has working carbon monoxide detector  Home safety hazards include: none  Nutrition:   Current diet is Regular  Medications:   Patient is currently taking over-the-counter supplements  OTC medications include: see medication list  Patient is able to manage medications       Activities of Daily Living (ADLs)/Instrumental Activities of Daily Living (IADLs):   Walk and transfer into and out of bed and chair?: Yes  Dress and groom yourself?: Yes Bathe or shower yourself?: Yes    Feed yourself? Yes  Do your laundry/housekeeping?: Yes  Manage your money, pay your bills and track your expenses?: Yes  Make your own meals?: Yes    Do your own shopping?: Yes    Previous Hospitalizations:   Any hospitalizations or ED visits within the last 12 months?: No      Advance Care Planning:   Living will: Yes    Advanced directive: Yes      Cognitive Screening:   Provider or family/friend/caregiver concerned regarding cognition?: No    PREVENTIVE SCREENINGS      Cardiovascular Screening:    General: Screening Not Indicated and History Lipid Disorder      Diabetes Screening:     General: Screening Current      Colorectal Cancer Screening:     General: Screening Not Indicated      Breast Cancer Screening:     General: Screening Not Indicated      Cervical Cancer Screening:    General: Screening Not Indicated      Osteoporosis Screening:    General: History Osteoporosis      Abdominal Aortic Aneurysm (AAA) Screening:        General: Screening Not Indicated      Lung Cancer Screening:     General: Screening Not Indicated      Hepatitis C Screening:    General: Screening Not Indicated    Screening, Brief Intervention, and Referral to Treatment (SBIRT)    Screening  Typical number of drinks in a day: 0  Typical number of drinks in a week: 0  Interpretation: Low risk drinking behavior  Single Item Drug Screening:  How often have you used an illegal drug (including marijuana) or a prescription medication for non-medical reasons in the past year? never    Single Item Drug Screen Score: 0  Interpretation: Negative screen for possible drug use disorder    No results found  Physical Exam:     /62 (BP Location: Left arm, Patient Position: Sitting, Cuff Size: Standard)   Pulse 81   Temp (!) 96 7 °F (35 9 °C) (Tympanic)   Ht 5' 1" (1 549 m)   Wt 58 9 kg (129 lb 12 8 oz)   SpO2 98%   BMI 24 53 kg/m²     Physical Exam  Vitals and nursing note reviewed  Constitutional:       General: She is not in acute distress  Appearance: She is not ill-appearing, toxic-appearing or diaphoretic  Comments: Appears younger than stated age   HENT:      Head: Normocephalic and atraumatic  Nose: Nose normal       Mouth/Throat:      Mouth: Mucous membranes are moist    Eyes:      Conjunctiva/sclera: Conjunctivae normal    Cardiovascular:      Rate and Rhythm: Normal rate and regular rhythm  Pulses: Normal pulses  Heart sounds: S1 normal and S2 normal      No friction rub  No gallop  Pulmonary:      Effort: Pulmonary effort is normal       Breath sounds: Normal breath sounds and air entry  Abdominal:      Palpations: Abdomen is soft  There is no hepatomegaly or splenomegaly  Tenderness: There is no abdominal tenderness  Musculoskeletal:      Right lower leg: No edema  Left lower leg: No edema  Skin:     General: Skin is warm and dry  Coloration: Skin is not pale  Neurological:      General: No focal deficit present  Mental Status: She is alert and oriented to person, place, and time  Gait: Gait normal       Comments: MMSE 30/31 recall was 2/3 after 5 min   Psychiatric:         Mood and Affect: Mood normal          Behavior: Behavior normal  Behavior is cooperative            Romayne Karst, DO

## 2023-01-27 NOTE — PATIENT INSTRUCTIONS
Medicare Preventive Visit Patient Instructions  Thank you for completing your Welcome to Medicare Visit or Medicare Annual Wellness Visit today  Your next wellness visit will be due in one year (1/28/2024)  The screening/preventive services that you may require over the next 5-10 years are detailed below  Some tests may not apply to you based off risk factors and/or age  Screening tests ordered at today's visit but not completed yet may show as past due  Also, please note that scanned in results may not display below  Preventive Screenings:  Service Recommendations Previous Testing/Comments   Colorectal Cancer Screening  * Colonoscopy    * Fecal Occult Blood Test (FOBT)/Fecal Immunochemical Test (FIT)  * Fecal DNA/Cologuard Test  * Flexible Sigmoidoscopy Age: 39-70 years old   Colonoscopy: every 10 years (may be performed more frequently if at higher risk)  OR  FOBT/FIT: every 1 year  OR  Cologuard: every 3 years  OR  Sigmoidoscopy: every 5 years  Screening may be recommended earlier than age 39 if at higher risk for colorectal cancer  Also, an individualized decision between you and your healthcare provider will decide whether screening between the ages of 74-80 would be appropriate  Colonoscopy: Not on file  FOBT/FIT: Not on file  Cologuard: Not on file  Sigmoidoscopy: Not on file    Screening Not Indicated     Breast Cancer Screening Age: 36 years old  Frequency: every 1-2 years  Not required if history of left and right mastectomy Mammogram: 02/01/2019        Cervical Cancer Screening Between the ages of 21-29, pap smear recommended once every 3 years  Between the ages of 33-67, can perform pap smear with HPV co-testing every 5 years     Recommendations may differ for women with a history of total hysterectomy, cervical cancer, or abnormal pap smears in past  Pap Smear: Not on file    Screening Not Indicated   Hepatitis C Screening Once for adults born between 1945 and 1965  More frequently in patients at high risk for Hepatitis C Hep C Antibody: Not on file        Diabetes Screening 1-2 times per year if you're at risk for diabetes or have pre-diabetes Fasting glucose: 94 mg/dL (12/12/2022)  A1C: No results in last 5 years (No results in last 5 years)  Screening Current   Cholesterol Screening Once every 5 years if you don't have a lipid disorder  May order more often based on risk factors  Lipid panel: 11/04/2021    Screening Not Indicated  History Lipid Disorder     Other Preventive Screenings Covered by Medicare:  1  Abdominal Aortic Aneurysm (AAA) Screening: covered once if your at risk  You're considered to be at risk if you have a family history of AAA  2  Lung Cancer Screening: covers low dose CT scan once per year if you meet all of the following conditions: (1) Age 50-69; (2) No signs or symptoms of lung cancer; (3) Current smoker or have quit smoking within the last 15 years; (4) You have a tobacco smoking history of at least 20 pack years (packs per day multiplied by number of years you smoked); (5) You get a written order from a healthcare provider  3  Glaucoma Screening: covered annually if you're considered high risk: (1) You have diabetes OR (2) Family history of glaucoma OR (3)  aged 48 and older OR (3)  American aged 72 and older  3  Osteoporosis Screening: covered every 2 years if you meet one of the following conditions: (1) You're estrogen deficient and at risk for osteoporosis based off medical history and other findings; (2) Have a vertebral abnormality; (3) On glucocorticoid therapy for more than 3 months; (4) Have primary hyperparathyroidism; (5) On osteoporosis medications and need to assess response to drug therapy  · Last bone density test (DXA Scan): 05/28/2019   5  HIV Screening: covered annually if you're between the age of 15-65  Also covered annually if you are younger than 13 and older than 72 with risk factors for HIV infection   For pregnant patients, it is covered up to 3 times per pregnancy  Immunizations:  Immunization Recommendations   Influenza Vaccine Annual influenza vaccination during flu season is recommended for all persons aged >= 6 months who do not have contraindications   Pneumococcal Vaccine   * Pneumococcal conjugate vaccine = PCV13 (Prevnar 13), PCV15 (Vaxneuvance), PCV20 (Prevnar 20)  * Pneumococcal polysaccharide vaccine = PPSV23 (Pneumovax) Adults 25-60 years old: 1-3 doses may be recommended based on certain risk factors  Adults 72 years old: 1-2 doses may be recommended based off what pneumonia vaccine you previously received   Hepatitis B Vaccine 3 dose series if at intermediate or high risk (ex: diabetes, end stage renal disease, liver disease)   Tetanus (Td) Vaccine - COST NOT COVERED BY MEDICARE PART B Following completion of primary series, a booster dose should be given every 10 years to maintain immunity against tetanus  Td may also be given as tetanus wound prophylaxis  Tdap Vaccine - COST NOT COVERED BY MEDICARE PART B Recommended at least once for all adults  For pregnant patients, recommended with each pregnancy  Shingles Vaccine (Shingrix) - COST NOT COVERED BY MEDICARE PART B  2 shot series recommended in those aged 48 and above     Health Maintenance Due:  There are no preventive care reminders to display for this patient  Immunizations Due:      Topic Date Due   • COVID-19 Vaccine (5 - Booster) 01/12/2022     Advance Directives   What are advance directives? Advance directives are legal documents that state your wishes and plans for medical care  These plans are made ahead of time in case you lose your ability to make decisions for yourself  Advance directives can apply to any medical decision, such as the treatments you want, and if you want to donate organs  What are the types of advance directives? There are many types of advance directives, and each state has rules about how to use them   You may choose a combination of any of the following:  · Living will: This is a written record of the treatment you want  You can also choose which treatments you do not want, which to limit, and which to stop at a certain time  This includes surgery, medicine, IV fluid, and tube feedings  · Durable power of  for healthcare [place] [facility]): This is a written record that states who you want to make healthcare choices for you when you are unable to make them for yourself  This person, called a proxy, is usually a family member or a friend  You may choose more than 1 proxy  · Do not resuscitate (DNR) order:  A DNR order is used in case your heart stops beating or you stop breathing  It is a request not to have certain forms of treatment, such as CPR  A DNR order may be included in other types of advance directives  · Medical directive: This covers the care that you want if you are in a coma, near death, or unable to make decisions for yourself  You can list the treatments you want for each condition  Treatment may include pain medicine, surgery, blood transfusions, dialysis, IV or tube feedings, and a ventilator (breathing machine)  · Values history: This document has questions about your views, beliefs, and how you feel and think about life  This information can help others choose the care that you would choose  Why are advance directives important? An advance directive helps you control your care  Although spoken wishes may be used, it is better to have your wishes written down  Spoken wishes can be misunderstood, or not followed  Treatments may be given even if you do not want them  An advance directive may make it easier for your family to make difficult choices about your care  © Copyright [employer] 2018 Information is for End User's use only and may not be sold, redistributed or otherwise used for commercial purposes   All illustrations and images included in CareNotes® are the copyrighted property of [employer]  or [address] [facility]

## 2023-02-08 DIAGNOSIS — I10 ESSENTIAL HYPERTENSION: ICD-10-CM

## 2023-02-08 RX ORDER — AMLODIPINE BESYLATE 5 MG/1
5 TABLET ORAL 2 TIMES DAILY
Qty: 180 TABLET | Refills: 1 | Status: SHIPPED | OUTPATIENT
Start: 2023-02-08

## 2023-03-17 ENCOUNTER — APPOINTMENT (OUTPATIENT)
Dept: LAB | Facility: CLINIC | Age: 88
End: 2023-03-17

## 2023-03-17 DIAGNOSIS — M05.89 OTHER RHEUMATOID ARTHRITIS WITH RHEUMATOID FACTOR OF MULTIPLE SITES (HCC): ICD-10-CM

## 2023-03-17 DIAGNOSIS — Z79.899 ENCOUNTER FOR LONG-TERM (CURRENT) USE OF OTHER MEDICATIONS: ICD-10-CM

## 2023-03-17 LAB
ALBUMIN SERPL BCP-MCNC: 3.9 G/DL (ref 3.5–5)
ALP SERPL-CCNC: 71 U/L (ref 46–116)
ALT SERPL W P-5'-P-CCNC: 34 U/L (ref 12–78)
ANION GAP SERPL CALCULATED.3IONS-SCNC: 0 MMOL/L (ref 4–13)
AST SERPL W P-5'-P-CCNC: 23 U/L (ref 5–45)
BASOPHILS # BLD AUTO: 0.04 THOUSANDS/ÂΜL (ref 0–0.1)
BASOPHILS NFR BLD AUTO: 1 % (ref 0–1)
BILIRUB SERPL-MCNC: 0.58 MG/DL (ref 0.2–1)
BUN SERPL-MCNC: 27 MG/DL (ref 5–25)
CALCIUM SERPL-MCNC: 9.8 MG/DL (ref 8.3–10.1)
CHLORIDE SERPL-SCNC: 106 MMOL/L (ref 96–108)
CO2 SERPL-SCNC: 29 MMOL/L (ref 21–32)
CREAT SERPL-MCNC: 0.83 MG/DL (ref 0.6–1.3)
CRP SERPL QL: <3 MG/L
EOSINOPHIL # BLD AUTO: 0.13 THOUSAND/ÂΜL (ref 0–0.61)
EOSINOPHIL NFR BLD AUTO: 3 % (ref 0–6)
ERYTHROCYTE [DISTWIDTH] IN BLOOD BY AUTOMATED COUNT: 12.8 % (ref 11.6–15.1)
GFR SERPL CREATININE-BSD FRML MDRD: 64 ML/MIN/1.73SQ M
GLUCOSE P FAST SERPL-MCNC: 67 MG/DL (ref 65–99)
HCT VFR BLD AUTO: 37.3 % (ref 34.8–46.1)
HGB BLD-MCNC: 12.7 G/DL (ref 11.5–15.4)
IMM GRANULOCYTES # BLD AUTO: 0.01 THOUSAND/UL (ref 0–0.2)
IMM GRANULOCYTES NFR BLD AUTO: 0 % (ref 0–2)
LYMPHOCYTES # BLD AUTO: 1.41 THOUSANDS/ÂΜL (ref 0.6–4.47)
LYMPHOCYTES NFR BLD AUTO: 29 % (ref 14–44)
MCH RBC QN AUTO: 33.4 PG (ref 26.8–34.3)
MCHC RBC AUTO-ENTMCNC: 34 G/DL (ref 31.4–37.4)
MCV RBC AUTO: 98 FL (ref 82–98)
MONOCYTES # BLD AUTO: 0.52 THOUSAND/ÂΜL (ref 0.17–1.22)
MONOCYTES NFR BLD AUTO: 11 % (ref 4–12)
NEUTROPHILS # BLD AUTO: 2.82 THOUSANDS/ÂΜL (ref 1.85–7.62)
NEUTS SEG NFR BLD AUTO: 56 % (ref 43–75)
NRBC BLD AUTO-RTO: 0 /100 WBCS
PLATELET # BLD AUTO: 195 THOUSANDS/UL (ref 149–390)
PMV BLD AUTO: 10 FL (ref 8.9–12.7)
POTASSIUM SERPL-SCNC: 4 MMOL/L (ref 3.5–5.3)
PROT SERPL-MCNC: 7.2 G/DL (ref 6.4–8.4)
RBC # BLD AUTO: 3.8 MILLION/UL (ref 3.81–5.12)
SODIUM SERPL-SCNC: 135 MMOL/L (ref 135–147)
WBC # BLD AUTO: 4.93 THOUSAND/UL (ref 4.31–10.16)

## 2023-04-26 ENCOUNTER — APPOINTMENT (OUTPATIENT)
Dept: LAB | Facility: CLINIC | Age: 88
End: 2023-04-26

## 2023-04-26 DIAGNOSIS — Z79.899 ENCOUNTER FOR LONG-TERM (CURRENT) USE OF OTHER MEDICATIONS: ICD-10-CM

## 2023-04-26 DIAGNOSIS — M05.89 OTHER RHEUMATOID ARTHRITIS WITH RHEUMATOID FACTOR OF MULTIPLE SITES (HCC): ICD-10-CM

## 2023-04-26 LAB
ALBUMIN SERPL BCP-MCNC: 3.8 G/DL (ref 3.5–5)
ALP SERPL-CCNC: 70 U/L (ref 46–116)
ALT SERPL W P-5'-P-CCNC: 34 U/L (ref 12–78)
ANION GAP SERPL CALCULATED.3IONS-SCNC: 3 MMOL/L (ref 4–13)
AST SERPL W P-5'-P-CCNC: 18 U/L (ref 5–45)
BASOPHILS # BLD AUTO: 0.06 THOUSANDS/ΜL (ref 0–0.1)
BASOPHILS NFR BLD AUTO: 2 % (ref 0–1)
BILIRUB SERPL-MCNC: 0.75 MG/DL (ref 0.2–1)
BUN SERPL-MCNC: 31 MG/DL (ref 5–25)
CALCIUM SERPL-MCNC: 9.4 MG/DL (ref 8.3–10.1)
CHLORIDE SERPL-SCNC: 107 MMOL/L (ref 96–108)
CO2 SERPL-SCNC: 28 MMOL/L (ref 21–32)
CREAT SERPL-MCNC: 0.94 MG/DL (ref 0.6–1.3)
CRP SERPL QL: <3 MG/L
EOSINOPHIL # BLD AUTO: 0.16 THOUSAND/ΜL (ref 0–0.61)
EOSINOPHIL NFR BLD AUTO: 4 % (ref 0–6)
ERYTHROCYTE [DISTWIDTH] IN BLOOD BY AUTOMATED COUNT: 13.1 % (ref 11.6–15.1)
GFR SERPL CREATININE-BSD FRML MDRD: 55 ML/MIN/1.73SQ M
GLUCOSE P FAST SERPL-MCNC: 104 MG/DL (ref 65–99)
HCT VFR BLD AUTO: 36.5 % (ref 34.8–46.1)
HGB BLD-MCNC: 12.2 G/DL (ref 11.5–15.4)
IMM GRANULOCYTES # BLD AUTO: 0.01 THOUSAND/UL (ref 0–0.2)
IMM GRANULOCYTES NFR BLD AUTO: 0 % (ref 0–2)
LYMPHOCYTES # BLD AUTO: 1.44 THOUSANDS/ΜL (ref 0.6–4.47)
LYMPHOCYTES NFR BLD AUTO: 36 % (ref 14–44)
MCH RBC QN AUTO: 33.4 PG (ref 26.8–34.3)
MCHC RBC AUTO-ENTMCNC: 33.4 G/DL (ref 31.4–37.4)
MCV RBC AUTO: 100 FL (ref 82–98)
MONOCYTES # BLD AUTO: 0.47 THOUSAND/ΜL (ref 0.17–1.22)
MONOCYTES NFR BLD AUTO: 12 % (ref 4–12)
NEUTROPHILS # BLD AUTO: 1.89 THOUSANDS/ΜL (ref 1.85–7.62)
NEUTS SEG NFR BLD AUTO: 46 % (ref 43–75)
NRBC BLD AUTO-RTO: 0 /100 WBCS
PLATELET # BLD AUTO: 182 THOUSANDS/UL (ref 149–390)
PMV BLD AUTO: 10 FL (ref 8.9–12.7)
POTASSIUM SERPL-SCNC: 4 MMOL/L (ref 3.5–5.3)
PROT SERPL-MCNC: 7.3 G/DL (ref 6.4–8.4)
RBC # BLD AUTO: 3.65 MILLION/UL (ref 3.81–5.12)
SODIUM SERPL-SCNC: 138 MMOL/L (ref 135–147)
WBC # BLD AUTO: 4.03 THOUSAND/UL (ref 4.31–10.16)

## 2023-06-01 ENCOUNTER — OFFICE VISIT (OUTPATIENT)
Dept: GASTROENTEROLOGY | Facility: CLINIC | Age: 88
End: 2023-06-01

## 2023-06-01 VITALS
BODY MASS INDEX: 23.98 KG/M2 | HEIGHT: 61 IN | SYSTOLIC BLOOD PRESSURE: 122 MMHG | WEIGHT: 127 LBS | DIASTOLIC BLOOD PRESSURE: 70 MMHG | TEMPERATURE: 97 F

## 2023-06-01 DIAGNOSIS — K21.9 GASTROESOPHAGEAL REFLUX DISEASE WITHOUT ESOPHAGITIS: Primary | ICD-10-CM

## 2023-06-01 DIAGNOSIS — K58.9 IRRITABLE BOWEL SYNDROME, UNSPECIFIED TYPE: ICD-10-CM

## 2023-06-01 DIAGNOSIS — Z79.631 METHOTREXATE, LONG TERM, CURRENT USE: ICD-10-CM

## 2023-06-01 DIAGNOSIS — K86.2 PANCREAS CYST: ICD-10-CM

## 2023-06-01 DIAGNOSIS — K80.20 CALCULUS OF GALLBLADDER WITHOUT CHOLECYSTITIS WITHOUT OBSTRUCTION: ICD-10-CM

## 2023-06-01 NOTE — PROGRESS NOTES
[facility] - Outpatient Follow-up Note  Luz Eddy 82 y o  female MRN: [id number]  Encounter: [id number]          ASSESSMENT AND PLAN:  82year old female here for follow up  1  Gastroesophageal reflux disease without esophagitis  -continue PPI as she is feeling better    2  Irritable bowel syndrome, unspecified type    3  Pancreas cyst  -will add MRI/MRCP to assess for stability      4  Calculus of gallbladder without cholecystitis without obstruction  -asymptomatic    5  Methotrexate, long term, current use  -only on this for about one year   -recent liver enzymes are normal    Follow up in a few months time      ___________________________________________________________________    SUBJECTIVE:  82year old female here for follow-up  She feels much better on her daily PPI  Is currently without any GI symptoms      REVIEW OF SYSTEMS IS OTHERWISE NEGATIVE        Historical Information   Past Medical History:   Diagnosis Date   • Bladder cancer ([facility] )     had BCG treatments   • Bladder cancer ([facility] )    • Coronary artery disease     S/P stent placement x 2 in 2011   • GERD (gastroesophageal reflux disease)    • Hepatitis     got this from food back in 1970s, has not had a problem since   • History of leukemia     in remission   • History of stomach ulcers 1970   • History of transfusion 1970   • Hyperlipidemia    • Hypertension    • Irritable bowel syndrome    • Kidney stone    • Kidney stones    • Pneumonia     Pt had in 2003 and 2004   • Pulmonary emboli (HCC) 1970s   • Raynaud disease    • Shingles    • Sleep apnea    • Thrombocytopenia ([facility] ) 2/20/2012     Past Surgical History:   Procedure Laterality Date   • CATARACT EXTRACTION     • COLONOSCOPY     • CORONARY ANGIOPLASTY WITH STENT PLACEMENT      stent x 2   • CYSTOSCOPY      diagnostic - onset 4/4/17   • EGD     • EYE SURGERY     • FRACTURE SURGERY     • HERNIA REPAIR     • HYSTERECTOMY     • LEG SURGERY     • OOPHORECTOMY • NH NDSC WRST SURG W/RLS TRANSVRS CARPL LIGM Right 10/12/2021    Procedure: Right endoscopic carpal tunnel release;  Surgeon: Juve Akhtar MD;  [facility];  Service: Orthopedics   • NH OPTX FEM SHFT FX W/INSJ IMED IMPLT W/WO SCREW Left 4/8/2018    Procedure: INSERTION NAIL IM FEMUR ANTEGRADE (TROCHANTERIC); Surgeon: Gladis Contreras MD;  [facility];  Service: Orthopedics   • NH REPAIR FIRST ABDOMINAL WALL HERNIA N/A 5/12/2021    Procedure: REPAIR HERNIA VENTRAL;  Surgeon: Shama Bell MD;  [facility];  Service: General   • TONSILLECTOMY       Social History   Social History     Substance and Sexual Activity   Alcohol Use No     Social History     Substance and Sexual Activity   Drug Use No     Social History     Tobacco Use   Smoking Status Never   Smokeless Tobacco Never     Family History   Problem Relation Age of Onset   • Arthritis Mother    • Osteoporosis Mother    • Heart disease Father    • Hypertension Father    • Hypertension Brother    • Prostate cancer Brother    • Breast cancer Daughter 48   • Prostate cancer Son        Meds/Allergies       Current Outpatient Medications:   •  acetaminophen (Tylenol 8 Hour) 650 mg CR tablet  •  amLODIPine (NORVASC) 5 mg tablet  •  aspirin 81 MG tablet  •  atorvastatin (LIPITOR) 40 mg tablet  •  bumetanide (BUMEX) 1 mg tablet  •  carvedilol (COREG) 12 5 mg tablet  •  Cholecalciferol 2000 units TABS  •  DULoxetine (CYMBALTA) 30 mg delayed release capsule  •  fexofenadine (ALLEGRA) 180 MG tablet  •  folic acid (FOLVITE) 1 mg tablet  •  losartan (COZAAR) 100 MG tablet  •  methotrexate 2 5 mg tablet  •  MULTIPLE VITAMIN PO  •  omeprazole (PriLOSEC) 40 MG capsule  •  sucralfate (CARAFATE) 1 g tablet    Allergies   Allergen Reactions   • Lactose - Food Allergy GI Intolerance           Objective     There were no vitals taken for this visit  There is no height or weight on file to calculate BMI        PHYSICAL EXAM:      General Appearance:   Alert, cooperative, no distress   HEENT:   Normocephalic, atraumatic, anicteric      Neck:  Supple, symmetrical, trachea midline   Lungs:   Clear to auscultation bilaterally; no rales, rhonchi or wheezing; respirations unlabored    Heart[de-identified]   Regular rate and rhythm; no murmur, rub, or gallop  Abdomen:   Soft, non-tender, non-distended; normal bowel sounds; no masses, no organomegaly    Genitalia:   Deferred    Rectal:   Deferred    Extremities:  No cyanosis, clubbing or edema    Pulses:  2+ and symmetric    Skin:  No jaundice, rashes, or lesions    Lymph nodes:  No palpable cervical lymphadenopathy        Lab Results:   No visits with results within 1 Day(s) from this visit     Latest known visit with results is:   Appointment on 04/26/2023   Component Date Value   • CRP 04/26/2023 <3 0    • WBC 04/26/2023 4 03 (L)    • RBC 04/26/2023 3 65 (L)    • Hemoglobin 04/26/2023 12 2    • Hematocrit 04/26/2023 36 5    • MCV 04/26/2023 100 (H)    • MCH 04/26/2023 33 4    • MCHC 04/26/2023 33 4    • RDW 04/26/2023 13 1    • MPV 04/26/2023 10 0    • Platelets 08/63/6961 182    • nRBC 04/26/2023 0    • Neutrophils Relative 04/26/2023 46    • Immat GRANS % 04/26/2023 0    • Lymphocytes Relative 04/26/2023 36    • Monocytes Relative 04/26/2023 12    • Eosinophils Relative 04/26/2023 4    • Basophils Relative 04/26/2023 2 (H)    • Neutrophils Absolute 04/26/2023 1 89    • Immature Grans Absolute 04/26/2023 0 01    • Lymphocytes Absolute 04/26/2023 1 44    • Monocytes Absolute 04/26/2023 0 47    • Eosinophils Absolute 04/26/2023 0 16    • Basophils Absolute 04/26/2023 0 06    • Sodium 04/26/2023 138    • Potassium 04/26/2023 4 0    • Chloride 04/26/2023 107    • CO2 04/26/2023 28    • ANION GAP 04/26/2023 3 (L)    • BUN 04/26/2023 31 (H)    • Creatinine 04/26/2023 0 94    • Glucose, Fasting 04/26/2023 104 (H)    • Calcium 04/26/2023 9 4    • AST 04/26/2023 18    • ALT 04/26/2023 34    • Alkaline Phosphatase 04/26/2023 70    • Total Protein 04/26/2023 7 3    • Albumin 04/26/2023 3 8    • Total Bilirubin 04/26/2023 0 75    • eGFR 04/26/2023 55      Radiology Results:   No results found

## 2023-06-05 ENCOUNTER — TELEPHONE (OUTPATIENT)
Dept: HEMATOLOGY ONCOLOGY | Facility: CLINIC | Age: 88
End: 2023-06-05

## 2023-06-05 ENCOUNTER — APPOINTMENT (OUTPATIENT)
Dept: LAB | Facility: CLINIC | Age: 88
End: 2023-06-05
Payer: MEDICARE

## 2023-06-05 DIAGNOSIS — C91.10 CHRONIC LARGE GRANULAR LYMPHOCYTIC LEUKEMIA (HCC): ICD-10-CM

## 2023-06-05 LAB
ALBUMIN SERPL BCP-MCNC: 3.6 G/DL (ref 3.5–5)
ALP SERPL-CCNC: 70 U/L (ref 46–116)
ALT SERPL W P-5'-P-CCNC: 32 U/L (ref 12–78)
ANION GAP SERPL CALCULATED.3IONS-SCNC: 0 MMOL/L (ref 4–13)
AST SERPL W P-5'-P-CCNC: 20 U/L (ref 5–45)
BASOPHILS # BLD AUTO: 0.06 THOUSANDS/ÂΜL (ref 0–0.1)
BASOPHILS NFR BLD AUTO: 1 % (ref 0–1)
BILIRUB SERPL-MCNC: 0.6 MG/DL (ref 0.2–1)
BUN SERPL-MCNC: 25 MG/DL (ref 5–25)
CALCIUM SERPL-MCNC: 9.4 MG/DL (ref 8.3–10.1)
CHLORIDE SERPL-SCNC: 106 MMOL/L (ref 96–108)
CO2 SERPL-SCNC: 30 MMOL/L (ref 21–32)
CREAT SERPL-MCNC: 0.87 MG/DL (ref 0.6–1.3)
EOSINOPHIL # BLD AUTO: 0.2 THOUSAND/ÂΜL (ref 0–0.61)
EOSINOPHIL NFR BLD AUTO: 5 % (ref 0–6)
ERYTHROCYTE [DISTWIDTH] IN BLOOD BY AUTOMATED COUNT: 12.8 % (ref 11.6–15.1)
GFR SERPL CREATININE-BSD FRML MDRD: 60 ML/MIN/1.73SQ M
GLUCOSE P FAST SERPL-MCNC: 83 MG/DL (ref 65–99)
HCT VFR BLD AUTO: 36.4 % (ref 34.8–46.1)
HGB BLD-MCNC: 12.1 G/DL (ref 11.5–15.4)
IMM GRANULOCYTES # BLD AUTO: 0.01 THOUSAND/UL (ref 0–0.2)
IMM GRANULOCYTES NFR BLD AUTO: 0 % (ref 0–2)
LDH SERPL-CCNC: 210 U/L (ref 81–234)
LYMPHOCYTES # BLD AUTO: 1.16 THOUSANDS/ÂΜL (ref 0.6–4.47)
LYMPHOCYTES NFR BLD AUTO: 27 % (ref 14–44)
MCH RBC QN AUTO: 33.6 PG (ref 26.8–34.3)
MCHC RBC AUTO-ENTMCNC: 33.2 G/DL (ref 31.4–37.4)
MCV RBC AUTO: 101 FL (ref 82–98)
MONOCYTES # BLD AUTO: 0.47 THOUSAND/ÂΜL (ref 0.17–1.22)
MONOCYTES NFR BLD AUTO: 11 % (ref 4–12)
NEUTROPHILS # BLD AUTO: 2.39 THOUSANDS/ÂΜL (ref 1.85–7.62)
NEUTS SEG NFR BLD AUTO: 56 % (ref 43–75)
NRBC BLD AUTO-RTO: 0 /100 WBCS
PLATELET # BLD AUTO: 190 THOUSANDS/UL (ref 149–390)
PMV BLD AUTO: 9.7 FL (ref 8.9–12.7)
POTASSIUM SERPL-SCNC: 4 MMOL/L (ref 3.5–5.3)
PROT SERPL-MCNC: 7.1 G/DL (ref 6.4–8.4)
RBC # BLD AUTO: 3.6 MILLION/UL (ref 3.81–5.12)
SODIUM SERPL-SCNC: 136 MMOL/L (ref 135–147)
WBC # BLD AUTO: 4.29 THOUSAND/UL (ref 4.31–10.16)

## 2023-06-05 PROCEDURE — 83615 LACTATE (LD) (LDH) ENZYME: CPT

## 2023-06-05 PROCEDURE — 36415 COLL VENOUS BLD VENIPUNCTURE: CPT

## 2023-06-05 PROCEDURE — 85025 COMPLETE CBC W/AUTO DIFF WBC: CPT

## 2023-06-05 PROCEDURE — 80053 COMPREHEN METABOLIC PANEL: CPT

## 2023-06-07 DIAGNOSIS — K21.9 GASTROESOPHAGEAL REFLUX DISEASE, UNSPECIFIED WHETHER ESOPHAGITIS PRESENT: ICD-10-CM

## 2023-06-07 RX ORDER — OMEPRAZOLE 40 MG/1
CAPSULE, DELAYED RELEASE ORAL
Qty: 90 CAPSULE | Refills: 3 | Status: SHIPPED | OUTPATIENT
Start: 2023-06-07

## 2023-06-08 ENCOUNTER — OFFICE VISIT (OUTPATIENT)
Dept: HEMATOLOGY ONCOLOGY | Facility: CLINIC | Age: 88
End: 2023-06-08
Payer: MEDICARE

## 2023-06-08 VITALS
TEMPERATURE: 98.5 F | WEIGHT: 127.5 LBS | BODY MASS INDEX: 24.07 KG/M2 | OXYGEN SATURATION: 97 % | DIASTOLIC BLOOD PRESSURE: 82 MMHG | HEART RATE: 86 BPM | SYSTOLIC BLOOD PRESSURE: 148 MMHG | HEIGHT: 61 IN

## 2023-06-08 DIAGNOSIS — C91.10 CHRONIC LARGE GRANULAR LYMPHOCYTIC LEUKEMIA (HCC): Primary | ICD-10-CM

## 2023-06-08 PROCEDURE — 99213 OFFICE O/P EST LOW 20 MIN: CPT | Performed by: PHYSICIAN ASSISTANT

## 2023-06-08 NOTE — PROGRESS NOTES
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[facility])  82-year-old female presents for follow-up regarding chronic large granular lymphocytic leukemia   This remains in observation status   No treatment is required  Labs still remain normal  Follow up in 1 year with labs  - CBC and differential; Future  - Comprehensive metabolic panel; Future  - LD,Blood; Future      HPI:  82year old female presents for follow-up visit for asymptomatic large granular cell lymphocytic leukemia  that was diagnosed several years ago and patient has not required any therapy for this condition   Patient's condition and counts are being monitored  on methotrexate for RA    Interval history: tired at the afternoon, around 3 or 4 pm; walks 3-4 miles per day    ROS: Review of Systems   Constitutional: Positive for fatigue  Negative for appetite change, chills, fever and unexpected weight change  Respiratory: Negative for cough and shortness of breath  Cardiovascular: Negative for chest pain, palpitations and leg swelling  Gastrointestinal: Negative for abdominal pain, constipation, diarrhea, nausea and vomiting  Genitourinary: Negative for difficulty urinating, dysuria and hematuria  Musculoskeletal: Positive for arthralgias  Skin: Negative  Neurological: Negative for dizziness, weakness, light-headedness, numbness and headaches  Hematological: Negative  Psychiatric/Behavioral: Negative          Past Medical History:   Diagnosis Date   • Bladder cancer [facility])     had BCG treatments   • Bladder cancer ([facility] )    • Coronary artery disease     S/P stent placement x 2 in 2011   • GERD (gastroesophageal reflux disease)    • Hepatitis     got this from food back in 1970s, has not had a problem since   • History of leukemia     in remission   • History of stomach ulcers 1970   • History of transfusion 1970   • Hyperlipidemia    • Hypertension    • Irritable bowel syndrome    • Kidney stone    • Kidney stones    • Pneumonia     Pt had in 2003 and 2004   • Pulmonary emboli (HCC) 1970s   • Raynaud disease    • Shingles    • Sleep apnea    • Thrombocytopenia ([facility] ) 2/20/2012       Past Surgical History:   Procedure Laterality Date   • CATARACT EXTRACTION     • COLONOSCOPY     • CORONARY ANGIOPLASTY WITH STENT PLACEMENT      stent x 2   • CYSTOSCOPY      diagnostic - onset 4/4/17   • EGD     • EYE SURGERY     • FRACTURE SURGERY     • HERNIA REPAIR     • HYSTERECTOMY     • LEG SURGERY     • OOPHORECTOMY     • AL NDSC WRST SURG W/RLS TRANSVRS CARPL LIGM Right 10/12/2021    Procedure: Right endoscopic carpal tunnel release;  Surgeon: Fili Rivas MD;  [facility];  Service: Orthopedics   • AL OPTX FEM SHFT FX W/INSJ IMED IMPLT W/WO SCREW Left 4/8/2018    Procedure: INSERTION NAIL IM FEMUR ANTEGRADE (TROCHANTERIC); Surgeon: Chinyere Barton MD;  [facility];  Service: Orthopedics   • AL REPAIR FIRST ABDOMINAL WALL HERNIA N/A 5/12/2021    Procedure: REPAIR HERNIA VENTRAL;  Surgeon: Jason Rossi MD;  [facility];  Service: General   • TONSILLECTOMY         Social History     Socioeconomic History   • Marital status:       Spouse name: None   • Number of children: None   • Years of education: None   • Highest education level: None   Occupational History   • None   Tobacco Use   • Smoking status: Never   • Smokeless tobacco: Never   Vaping Use   • Vaping Use: Never used   Substance and Sexual Activity   • Alcohol use: No   • Drug use: No   • Sexual activity: Never   Other Topics Concern   • None   Social History Narrative    Advance directives declined by parents    Always uses seat belt     Social Determinants of Health     Financial Resource Strain: Low Risk  (1/27/2023)    Overall Financial Resource Strain (CARDIA)    • Difficulty of Paying Living Expenses: Not very hard Food Insecurity: Not on file   Transportation Needs: No Transportation Needs (1/27/2023)    PRAPARE - Transportation    • Lack of Transportation (Medical): No    • Lack of Transportation (Non-Medical): No   Physical Activity: Not on file   Stress: Not on file   Social Connections: Not on file   Intimate Partner Violence: Not on file   Housing Stability: Not on file       Family History   Problem Relation Age of Onset   • Arthritis Mother    • Osteoporosis Mother    • Heart disease Father    • Hypertension Father    • Hypertension Brother    • Prostate cancer Brother    • Breast cancer Daughter 48   • Prostate cancer Son        Allergies   Allergen Reactions   • Lactose - Food Allergy GI Intolerance         Current Outpatient Medications:   •  acetaminophen (Tylenol 8 Hour) 650 mg CR tablet, Take 1 tablet (650 mg total) by mouth every 8 (eight) hours, Disp: 15 tablet, Rfl: 0  •  amLODIPine (NORVASC) 5 mg tablet, Take 1 tablet (5 mg total) by mouth 2 (two) times a day Taking BID since 09/26/2022 per cardiol, Disp: 180 tablet, Rfl: 1  •  aspirin 81 MG tablet, Take 81 mg by mouth daily Resume on 4/28 , Disp: , Rfl:   •  atorvastatin (LIPITOR) 40 mg tablet, TAKE 1 TABLET BY MOUTH  DAILY AFTER DINNER, Disp: 90 tablet, Rfl: 3  •  bumetanide (BUMEX) 1 mg tablet, TAKE 1/2 A TABLET BY MOUTH  DAILY TAKE A WHOLE TABLET IF  NEEDED, Disp: 90 tablet, Rfl: 3  •  carvedilol (COREG) 12 5 mg tablet, Take 0 5 tablets (6 25 mg total) by mouth 2 (two) times a day with meals, Disp: 180 tablet, Rfl: 3  •  Cholecalciferol 2000 units TABS, Take 2,000 Units by mouth in the morning   Resume on 4/28  , Disp: , Rfl:   •  DULoxetine (CYMBALTA) 30 mg delayed release capsule, TAKE 1 CAPSULE BY MOUTH  DAILY, Disp: 90 capsule, Rfl: 3  •  fexofenadine (ALLEGRA) 180 MG tablet, Take 180 mg by mouth daily as needed , Disp: , Rfl:   •  folic acid (FOLVITE) 1 mg tablet, Take 1 mg by mouth daily, Disp: , Rfl:   •  losartan (COZAAR) 100 MG tablet, TAKE 1 "TABLET BY MOUTH  DAILY, Disp: 90 tablet, Rfl: 1  •  methotrexate 2 5 mg tablet, Take 2 5 mg by mouth once a week, Disp: , Rfl:   •  MULTIPLE VITAMIN PO, Take by mouth daily , Disp: , Rfl:   •  omeprazole (PriLOSEC) 40 MG capsule, TAKE 1 CAPSULE BY MOUTH  DAILY, Disp: 90 capsule, Rfl: 3    Physical Exam:  /82 (BP Location: Left arm, Patient Position: Sitting, Cuff Size: Standard)   Pulse 86   Temp 98 5 °F (36 9 °C) (Temporal)   Ht 5' 1\" (1 549 m)   Wt 57 8 kg (127 lb 8 oz)   SpO2 97%   BMI 24 09 kg/m²     Physical Exam  Vitals reviewed  Constitutional:       General: She is not in acute distress  Appearance: She is well-developed  HENT:      Head: Normocephalic and atraumatic  Eyes:      General: No scleral icterus  Conjunctiva/sclera: Conjunctivae normal    Cardiovascular:      Rate and Rhythm: Normal rate and regular rhythm  Heart sounds: Normal heart sounds  No murmur heard  Pulmonary:      Effort: Pulmonary effort is normal  No respiratory distress  Breath sounds: Normal breath sounds  Abdominal:      Palpations: Abdomen is soft  Tenderness: There is no abdominal tenderness  Musculoskeletal:         General: No tenderness  Normal range of motion  Cervical back: Normal range of motion and neck supple  Lymphadenopathy:      Cervical: No cervical adenopathy  Skin:     General: Skin is warm and dry  Neurological:      Mental Status: She is alert and oriented to person, place, and time  Cranial Nerves: No cranial nerve deficit         Labs:  Lab Results   Component Value Date    HCT 36 4 06/05/2023    HGB 12 1 06/05/2023     (H) 06/05/2023     06/05/2023    WBC 4 29 (L) 06/05/2023     I have spent 10 minutes with Patient  today in which greater than 50% of this time was spent in counseling/coordination of care regarding Diagnostic results, Impressions, Documenting in the medical record, Reviewing / ordering tests, medicine, procedures   and " Obtaining or reviewing history    Patient voiced understanding and agreement in the above discussion  Aware to contact our office with questions/symptoms in the interim  This note has been generated by voice recognition software system  Therefore, there may be spelling, grammar, and or syntax errors  Please contact if questions arise

## 2023-06-19 DIAGNOSIS — I10 ESSENTIAL HYPERTENSION: ICD-10-CM

## 2023-06-19 RX ORDER — AMLODIPINE BESYLATE 5 MG/1
TABLET ORAL
Qty: 180 TABLET | Refills: 3 | Status: SHIPPED | OUTPATIENT
Start: 2023-06-19

## 2023-07-10 ENCOUNTER — APPOINTMENT (OUTPATIENT)
Dept: RADIOLOGY | Facility: MEDICAL CENTER | Age: 88
End: 2023-07-10
Payer: MEDICARE

## 2023-07-10 ENCOUNTER — OFFICE VISIT (OUTPATIENT)
Dept: FAMILY MEDICINE CLINIC | Facility: CLINIC | Age: 88
End: 2023-07-10
Payer: MEDICARE

## 2023-07-10 ENCOUNTER — APPOINTMENT (OUTPATIENT)
Dept: LAB | Facility: CLINIC | Age: 88
End: 2023-07-10
Payer: MEDICARE

## 2023-07-10 VITALS
WEIGHT: 129 LBS | OXYGEN SATURATION: 99 % | DIASTOLIC BLOOD PRESSURE: 80 MMHG | HEIGHT: 61 IN | HEART RATE: 84 BPM | TEMPERATURE: 97.1 F | BODY MASS INDEX: 24.35 KG/M2 | SYSTOLIC BLOOD PRESSURE: 110 MMHG

## 2023-07-10 DIAGNOSIS — R06.02 SHORTNESS OF BREATH: ICD-10-CM

## 2023-07-10 DIAGNOSIS — R06.02 SHORTNESS OF BREATH: Primary | ICD-10-CM

## 2023-07-10 DIAGNOSIS — R05.1 ACUTE COUGH: ICD-10-CM

## 2023-07-10 DIAGNOSIS — Z87.01 HISTORY OF PNEUMONIA: ICD-10-CM

## 2023-07-10 DIAGNOSIS — R60.0 BILATERAL LEG EDEMA: ICD-10-CM

## 2023-07-10 LAB
ANION GAP SERPL CALCULATED.3IONS-SCNC: 2 MMOL/L
BASOPHILS # BLD AUTO: 0.04 THOUSANDS/ÂΜL (ref 0–0.1)
BASOPHILS NFR BLD AUTO: 1 % (ref 0–1)
BNP SERPL-MCNC: 30 PG/ML (ref 0–100)
BUN SERPL-MCNC: 25 MG/DL (ref 5–25)
CALCIUM SERPL-MCNC: 9.8 MG/DL (ref 8.3–10.1)
CHLORIDE SERPL-SCNC: 106 MMOL/L (ref 96–108)
CO2 SERPL-SCNC: 30 MMOL/L (ref 21–32)
CREAT SERPL-MCNC: 0.9 MG/DL (ref 0.6–1.3)
EOSINOPHIL # BLD AUTO: 0.13 THOUSAND/ÂΜL (ref 0–0.61)
EOSINOPHIL NFR BLD AUTO: 3 % (ref 0–6)
ERYTHROCYTE [DISTWIDTH] IN BLOOD BY AUTOMATED COUNT: 12.6 % (ref 11.6–15.1)
GFR SERPL CREATININE-BSD FRML MDRD: 58 ML/MIN/1.73SQ M
GLUCOSE P FAST SERPL-MCNC: 99 MG/DL (ref 65–99)
HCT VFR BLD AUTO: 38.1 % (ref 34.8–46.1)
HGB BLD-MCNC: 12.5 G/DL (ref 11.5–15.4)
IMM GRANULOCYTES # BLD AUTO: 0.01 THOUSAND/UL (ref 0–0.2)
IMM GRANULOCYTES NFR BLD AUTO: 0 % (ref 0–2)
LYMPHOCYTES # BLD AUTO: 1.29 THOUSANDS/ÂΜL (ref 0.6–4.47)
LYMPHOCYTES NFR BLD AUTO: 29 % (ref 14–44)
MCH RBC QN AUTO: 32.6 PG (ref 26.8–34.3)
MCHC RBC AUTO-ENTMCNC: 32.8 G/DL (ref 31.4–37.4)
MCV RBC AUTO: 99 FL (ref 82–98)
MONOCYTES # BLD AUTO: 0.47 THOUSAND/ÂΜL (ref 0.17–1.22)
MONOCYTES NFR BLD AUTO: 11 % (ref 4–12)
NEUTROPHILS # BLD AUTO: 2.45 THOUSANDS/ÂΜL (ref 1.85–7.62)
NEUTS SEG NFR BLD AUTO: 56 % (ref 43–75)
NRBC BLD AUTO-RTO: 0 /100 WBCS
PLATELET # BLD AUTO: 180 THOUSANDS/UL (ref 149–390)
PMV BLD AUTO: 10.2 FL (ref 8.9–12.7)
POTASSIUM SERPL-SCNC: 4.3 MMOL/L (ref 3.5–5.3)
RBC # BLD AUTO: 3.84 MILLION/UL (ref 3.81–5.12)
SODIUM SERPL-SCNC: 138 MMOL/L (ref 135–147)
WBC # BLD AUTO: 4.39 THOUSAND/UL (ref 4.31–10.16)

## 2023-07-10 PROCEDURE — 36415 COLL VENOUS BLD VENIPUNCTURE: CPT

## 2023-07-10 PROCEDURE — 83880 ASSAY OF NATRIURETIC PEPTIDE: CPT

## 2023-07-10 PROCEDURE — 80048 BASIC METABOLIC PNL TOTAL CA: CPT

## 2023-07-10 PROCEDURE — 71046 X-RAY EXAM CHEST 2 VIEWS: CPT

## 2023-07-10 PROCEDURE — 85025 COMPLETE CBC W/AUTO DIFF WBC: CPT

## 2023-07-10 PROCEDURE — 99214 OFFICE O/P EST MOD 30 MIN: CPT | Performed by: FAMILY MEDICINE

## 2023-07-10 NOTE — PROGRESS NOTES
Name: Aranza Desai      : 1935      MRN: [id number]  Encounter Provider: Henok Hare DO  Encounter Date: 7/10/2023   Encounter department: [facility] AT [address] [place] [address]     1. Shortness of breath  -     CBC and differential; Future  -     Basic metabolic panel; Future  -     XR chest pa & lateral; Future; Expected date: 07/10/2023  -     B-Type Natriuretic Peptide(BNP); Future    2. Acute cough  -     CBC and differential; Future  -     Basic metabolic panel; Future  -     XR chest pa & lateral; Future; Expected date: 07/10/2023    3. History of pneumonia    4.  Bilateral leg edema    pulse ox, HR, and temp normal today and pt reports no fevers and "don't feel sick"; lungs clear and other than trace to 1+ ankle edema, exam otherwise unremarkable  Pt does report noticing increased lower leg edema, so took whole pill of bumex for 2 days about 5 days ago as directed by her cardiol, "and it didn't seem to change anything"  Will obtain labs next door today and CXR at walk-in location; at this time unclear etiology       Subjective      Chief Complaint   Patient presents with   • Shortness of Breath     Sob for a few weeks       Same day sick appt  States the SOB goes away when she lies down in bed or when she is in her recliner and cough happens only when she takes a deep breath  Denies any sputum, states, "no fever and I don't feel sick"  "My legs are swollen more"  No chest pain, but does admit "a little" pleuritic pain  No appetite changes or weight loss  States she did take the whole pill of bumex for 2 days about 5 days ago as directed by her cardiol, "and it didn't seem to change anything"    Review of Systems    Current Outpatient Medications on File Prior to Visit   Medication Sig   • acetaminophen (Tylenol 8 Hour) 650 mg CR tablet Take 1 tablet (650 mg total) by mouth every 8 (eight) hours   • amLODIPine (NORVASC) 5 mg tablet TAKE 1 TABLET BY MOUTH TWICE  DAILY   • aspirin 81 MG tablet Take 81 mg by mouth daily Resume on 4/28    • atorvastatin (LIPITOR) 40 mg tablet TAKE 1 TABLET BY MOUTH  DAILY AFTER DINNER   • bumetanide (BUMEX) 1 mg tablet TAKE 1/2 A TABLET BY MOUTH  DAILY TAKE A WHOLE TABLET IF  NEEDED   • carvedilol (COREG) 12.5 mg tablet Take 0.5 tablets (6.25 mg total) by mouth 2 (two) times a day with meals   • Cholecalciferol 2000 units TABS Take 2,000 Units by mouth in the morning. Resume on 4/28 . • DULoxetine (CYMBALTA) 30 mg delayed release capsule TAKE 1 CAPSULE BY MOUTH  DAILY   • fexofenadine (ALLEGRA) 180 MG tablet Take 180 mg by mouth daily as needed    • folic acid (FOLVITE) 1 mg tablet Take 1 mg by mouth daily   • losartan (COZAAR) 100 MG tablet TAKE 1 TABLET BY MOUTH  DAILY   • methotrexate 2.5 mg tablet Take 2.5 mg by mouth once a week   • MULTIPLE VITAMIN PO Take by mouth daily    • omeprazole (PriLOSEC) 40 MG capsule TAKE 1 CAPSULE BY MOUTH  DAILY       Objective     /80 (BP Location: Left arm, Patient Position: Sitting, Cuff Size: Standard)   Pulse 84   Temp (!) 97.1 °F (36.2 °C) (Tympanic)   Ht 5' 1" (1.549 m)   Wt 58.5 kg (129 lb)   SpO2 99%   BMI 24.37 kg/m²     Physical Exam  Vitals and nursing note reviewed. Constitutional:       General: She is not in acute distress. Appearance: She is well-developed. She is not ill-appearing, toxic-appearing or diaphoretic. HENT:      Head: Normocephalic and atraumatic. Mouth/Throat:      Lips: Pink. Mouth: Mucous membranes are moist.      Pharynx: Oropharynx is clear. Uvula midline. Neck:      Trachea: Phonation normal.   Cardiovascular:      Rate and Rhythm: Normal rate and regular rhythm. Pulses: Normal pulses. Heart sounds: S1 normal and S2 normal.      No friction rub. No gallop. Comments: Trace to 1+ ankle edema b/l  Pulmonary:      Effort: Pulmonary effort is normal.      Breath sounds: Normal breath sounds and air entry.    Abdominal:      Palpations: Abdomen is soft.      Tenderness: There is no abdominal tenderness. Lymphadenopathy:      Cervical: No cervical adenopathy. Skin:     General: Skin is warm and dry. Coloration: Skin is not pale. Neurological:      Mental Status: She is alert and oriented to person, place, and time. Psychiatric:         Behavior: Behavior is cooperative.        Anitra Lafleur DO

## 2023-07-25 ENCOUNTER — RA CDI HCC (OUTPATIENT)
Dept: OTHER | Facility: HOSPITAL | Age: 88
End: 2023-07-25

## 2023-07-25 NOTE — PROGRESS NOTES
I13.0   [address] coding opportunities          Chart Reviewed number of suggestions sent to Provider: 1     Patients Insurance     Medicare Insurance: [employer]

## 2023-07-27 DIAGNOSIS — I10 ESSENTIAL HYPERTENSION: ICD-10-CM

## 2023-07-28 RX ORDER — LOSARTAN POTASSIUM 100 MG/1
TABLET ORAL
Qty: 90 TABLET | Refills: 3 | Status: SHIPPED | OUTPATIENT
Start: 2023-07-28

## 2023-08-05 ENCOUNTER — HOSPITAL ENCOUNTER (OUTPATIENT)
Dept: RADIOLOGY | Facility: HOSPITAL | Age: 88
Discharge: HOME | End: 2023-08-05
Attending: INTERNAL MEDICINE
Payer: MEDICARE

## 2023-08-05 DIAGNOSIS — K86.2 PANCREAS CYST: ICD-10-CM

## 2023-08-05 PROCEDURE — A9585 GADOBUTROL INJECTION: HCPCS | Performed by: INTERNAL MEDICINE

## 2023-08-05 PROCEDURE — 74183 MRI ABD W/O CNTR FLWD CNTR: CPT

## 2023-08-05 PROCEDURE — G1004 CDSM NDSC: HCPCS

## 2023-08-05 RX ORDER — GADOBUTROL 604.72 MG/ML
5 INJECTION INTRAVENOUS
Status: COMPLETED | OUTPATIENT
Start: 2023-08-05 | End: 2023-08-05

## 2023-08-05 RX ADMIN — GADOBUTROL 5 ML: 604.72 INJECTION INTRAVENOUS at 15:39

## 2023-08-17 ENCOUNTER — APPOINTMENT (OUTPATIENT)
Dept: LAB | Facility: CLINIC | Age: 88
End: 2023-08-17
Payer: MEDICARE

## 2023-08-17 DIAGNOSIS — Z79.899 ENCOUNTER FOR LONG-TERM (CURRENT) USE OF OTHER MEDICATIONS: ICD-10-CM

## 2023-08-17 DIAGNOSIS — M05.89 OTHER RHEUMATOID ARTHRITIS WITH RHEUMATOID FACTOR OF MULTIPLE SITES (HCC): ICD-10-CM

## 2023-08-17 LAB
ALBUMIN SERPL BCP-MCNC: 3.9 G/DL (ref 3.5–5)
ALP SERPL-CCNC: 71 U/L (ref 46–116)
ALT SERPL W P-5'-P-CCNC: 38 U/L (ref 12–78)
ANION GAP SERPL CALCULATED.3IONS-SCNC: 2 MMOL/L
AST SERPL W P-5'-P-CCNC: 26 U/L (ref 5–45)
BASOPHILS # BLD AUTO: 0.04 THOUSANDS/ÂΜL (ref 0–0.1)
BASOPHILS NFR BLD AUTO: 1 % (ref 0–1)
BILIRUB SERPL-MCNC: 0.65 MG/DL (ref 0.2–1)
BUN SERPL-MCNC: 25 MG/DL (ref 5–25)
CALCIUM SERPL-MCNC: 9.6 MG/DL (ref 8.3–10.1)
CHLORIDE SERPL-SCNC: 105 MMOL/L (ref 96–108)
CO2 SERPL-SCNC: 31 MMOL/L (ref 21–32)
CREAT SERPL-MCNC: 1.02 MG/DL (ref 0.6–1.3)
CRP SERPL QL: <3 MG/L
EOSINOPHIL # BLD AUTO: 0.17 THOUSAND/ÂΜL (ref 0–0.61)
EOSINOPHIL NFR BLD AUTO: 4 % (ref 0–6)
ERYTHROCYTE [DISTWIDTH] IN BLOOD BY AUTOMATED COUNT: 13 % (ref 11.6–15.1)
ERYTHROCYTE [SEDIMENTATION RATE] IN BLOOD: 14 MM/HOUR (ref 0–29)
GFR SERPL CREATININE-BSD FRML MDRD: 49 ML/MIN/1.73SQ M
GLUCOSE P FAST SERPL-MCNC: 67 MG/DL (ref 65–99)
HCT VFR BLD AUTO: 37.3 % (ref 34.8–46.1)
HGB BLD-MCNC: 12.3 G/DL (ref 11.5–15.4)
IMM GRANULOCYTES # BLD AUTO: 0.01 THOUSAND/UL (ref 0–0.2)
IMM GRANULOCYTES NFR BLD AUTO: 0 % (ref 0–2)
LYMPHOCYTES # BLD AUTO: 1.33 THOUSANDS/ÂΜL (ref 0.6–4.47)
LYMPHOCYTES NFR BLD AUTO: 31 % (ref 14–44)
MCH RBC QN AUTO: 32.8 PG (ref 26.8–34.3)
MCHC RBC AUTO-ENTMCNC: 33 G/DL (ref 31.4–37.4)
MCV RBC AUTO: 100 FL (ref 82–98)
MONOCYTES # BLD AUTO: 0.46 THOUSAND/ÂΜL (ref 0.17–1.22)
MONOCYTES NFR BLD AUTO: 11 % (ref 4–12)
NEUTROPHILS # BLD AUTO: 2.35 THOUSANDS/ÂΜL (ref 1.85–7.62)
NEUTS SEG NFR BLD AUTO: 53 % (ref 43–75)
NRBC BLD AUTO-RTO: 0 /100 WBCS
PLATELET # BLD AUTO: 200 THOUSANDS/UL (ref 149–390)
PMV BLD AUTO: 10.1 FL (ref 8.9–12.7)
POTASSIUM SERPL-SCNC: 3.6 MMOL/L (ref 3.5–5.3)
PROT SERPL-MCNC: 7.5 G/DL (ref 6.4–8.4)
RBC # BLD AUTO: 3.75 MILLION/UL (ref 3.81–5.12)
SODIUM SERPL-SCNC: 138 MMOL/L (ref 135–147)
WBC # BLD AUTO: 4.36 THOUSAND/UL (ref 4.31–10.16)

## 2023-08-17 PROCEDURE — 85652 RBC SED RATE AUTOMATED: CPT

## 2023-08-17 PROCEDURE — 85025 COMPLETE CBC W/AUTO DIFF WBC: CPT

## 2023-08-17 PROCEDURE — 86140 C-REACTIVE PROTEIN: CPT

## 2023-08-17 PROCEDURE — 36415 COLL VENOUS BLD VENIPUNCTURE: CPT

## 2023-08-17 PROCEDURE — 80053 COMPREHEN METABOLIC PANEL: CPT
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
## 2023-08-28 ENCOUNTER — OFFICE VISIT (OUTPATIENT)
Dept: CARDIOLOGY CLINIC | Facility: CLINIC | Age: 88
End: 2023-08-28
Payer: MEDICARE

## 2023-08-28 VITALS
BODY MASS INDEX: 24.32 KG/M2 | WEIGHT: 128.8 LBS | SYSTOLIC BLOOD PRESSURE: 142 MMHG | HEIGHT: 61 IN | DIASTOLIC BLOOD PRESSURE: 70 MMHG | OXYGEN SATURATION: 99 % | HEART RATE: 72 BPM

## 2023-08-28 DIAGNOSIS — I10 PRIMARY HYPERTENSION: ICD-10-CM

## 2023-08-28 DIAGNOSIS — I50.32 CHRONIC DIASTOLIC (CONGESTIVE) HEART FAILURE (HCC): ICD-10-CM

## 2023-08-28 DIAGNOSIS — E78.2 MIXED HYPERLIPIDEMIA: ICD-10-CM

## 2023-08-28 DIAGNOSIS — R06.02 SHORTNESS OF BREATH: Primary | ICD-10-CM

## 2023-08-28 PROCEDURE — 99214 OFFICE O/P EST MOD 30 MIN: CPT | Performed by: NURSE PRACTITIONER

## 2023-08-29 DIAGNOSIS — I10 ESSENTIAL HYPERTENSION: ICD-10-CM

## 2023-08-29 DIAGNOSIS — E78.5 HYPERLIPIDEMIA, UNSPECIFIED HYPERLIPIDEMIA TYPE: ICD-10-CM

## 2023-08-29 RX ORDER — CARVEDILOL 12.5 MG/1
TABLET ORAL
Qty: 90 TABLET | Refills: 3 | Status: SHIPPED | OUTPATIENT
Start: 2023-08-29

## 2023-08-31 RX ORDER — ATORVASTATIN CALCIUM 40 MG/1
TABLET, FILM COATED ORAL
Qty: 90 TABLET | Refills: 1 | Status: SHIPPED | OUTPATIENT
Start: 2023-08-31

## 2023-10-10 ENCOUNTER — OFFICE VISIT (OUTPATIENT)
Dept: FAMILY MEDICINE CLINIC | Facility: CLINIC | Age: 88
End: 2023-10-10
Payer: MEDICARE

## 2023-10-10 VITALS
HEIGHT: 61 IN | OXYGEN SATURATION: 98 % | DIASTOLIC BLOOD PRESSURE: 68 MMHG | BODY MASS INDEX: 24.05 KG/M2 | SYSTOLIC BLOOD PRESSURE: 128 MMHG | WEIGHT: 127.4 LBS | TEMPERATURE: 97 F | HEART RATE: 96 BPM

## 2023-10-10 DIAGNOSIS — Z23 IMMUNIZATION DUE: ICD-10-CM

## 2023-10-10 DIAGNOSIS — M79.89 SWELLING OF LEFT LOWER EXTREMITY: ICD-10-CM

## 2023-10-10 DIAGNOSIS — R53.83 FATIGUE, UNSPECIFIED TYPE: ICD-10-CM

## 2023-10-10 DIAGNOSIS — E78.2 MIXED HYPERLIPIDEMIA: ICD-10-CM

## 2023-10-10 DIAGNOSIS — I10 ESSENTIAL HYPERTENSION: Primary | ICD-10-CM

## 2023-10-10 PROBLEM — L28.2 PRURITIC RASH: Status: RESOLVED | Noted: 2019-09-03 | Resolved: 2023-10-10

## 2023-10-10 PROBLEM — S62.172A: Status: RESOLVED | Noted: 2017-05-18 | Resolved: 2023-10-10

## 2023-10-10 PROBLEM — R05.3 PERSISTENT COUGH FOR 3 WEEKS OR LONGER: Status: RESOLVED | Noted: 2020-03-10 | Resolved: 2023-10-10

## 2023-10-10 PROCEDURE — G0009 ADMIN PNEUMOCOCCAL VACCINE: HCPCS

## 2023-10-10 PROCEDURE — G0008 ADMIN INFLUENZA VIRUS VAC: HCPCS

## 2023-10-10 PROCEDURE — 99214 OFFICE O/P EST MOD 30 MIN: CPT | Performed by: FAMILY MEDICINE

## 2023-10-10 PROCEDURE — 90677 PCV20 VACCINE IM: CPT

## 2023-10-10 PROCEDURE — 90662 IIV NO PRSV INCREASED AG IM: CPT
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
## 2023-10-12 ENCOUNTER — APPOINTMENT (OUTPATIENT)
Dept: LAB | Facility: CLINIC | Age: 88
End: 2023-10-12
Payer: MEDICARE

## 2023-10-12 DIAGNOSIS — E78.2 MIXED HYPERLIPIDEMIA: ICD-10-CM

## 2023-10-12 LAB
CHOLEST SERPL-MCNC: 127 MG/DL
HDLC SERPL-MCNC: 56 MG/DL
LDLC SERPL CALC-MCNC: 56 MG/DL (ref 0–100)
TRIGL SERPL-MCNC: 76 MG/DL

## 2023-10-12 PROCEDURE — 36415 COLL VENOUS BLD VENIPUNCTURE: CPT

## 2023-10-12 PROCEDURE — 80061 LIPID PANEL: CPT

## 2023-10-20 DIAGNOSIS — F32.A DEPRESSION, UNSPECIFIED DEPRESSION TYPE: ICD-10-CM

## 2023-10-23 RX ORDER — DULOXETIN HYDROCHLORIDE 30 MG/1
CAPSULE, DELAYED RELEASE ORAL
Qty: 90 CAPSULE | Refills: 3 | Status: SHIPPED | OUTPATIENT
Start: 2023-10-23

## 2023-10-25 NOTE — ASSESSMENT & PLAN NOTE
· S/P ground level mechanical fall in bathtub  · Care per primary team -POD #2 s/p left femur IMN  · Pain control, DVT ppx, PT/OT  · Patient's hgb dropped from 11 9-->7 2 yesterday   Received 2 units PRBC with improvement to 9 3 today Minoxidil Pregnancy And Lactation Text: This medication has not been assigned a Pregnancy Risk Category but animal studies failed to show danger with the topical medication. It is unknown if the medication is excreted in breast milk.

## 2023-10-26 ENCOUNTER — HOSPITAL ENCOUNTER (EMERGENCY)
Facility: HOSPITAL | Age: 88
Discharge: HOME | End: 2023-10-26
Attending: EMERGENCY MEDICINE
Payer: MEDICARE

## 2023-10-26 ENCOUNTER — OFFICE VISIT (OUTPATIENT)
Dept: URGENT CARE | Facility: MEDICAL CENTER | Age: 88
End: 2023-10-26
Payer: MEDICARE

## 2023-10-26 ENCOUNTER — APPOINTMENT (EMERGENCY)
Dept: RADIOLOGY | Facility: HOSPITAL | Age: 88
End: 2023-10-26
Payer: MEDICARE

## 2023-10-26 VITALS
HEART RATE: 82 BPM | RESPIRATION RATE: 18 BRPM | OXYGEN SATURATION: 99 % | TEMPERATURE: 98.2 F | SYSTOLIC BLOOD PRESSURE: 181 MMHG | DIASTOLIC BLOOD PRESSURE: 83 MMHG

## 2023-10-26 VITALS
SYSTOLIC BLOOD PRESSURE: 170 MMHG | DIASTOLIC BLOOD PRESSURE: 88 MMHG | HEART RATE: 98 BPM | TEMPERATURE: 97.9 F | OXYGEN SATURATION: 100 % | RESPIRATION RATE: 18 BRPM

## 2023-10-26 DIAGNOSIS — R07.9 CHEST PAIN, UNSPECIFIED TYPE: Primary | ICD-10-CM

## 2023-10-26 DIAGNOSIS — M94.0 COSTOCHONDRITIS: Primary | ICD-10-CM

## 2023-10-26 LAB
ANION GAP SERPL CALCULATED.3IONS-SCNC: 6 MMOL/L
ATRIAL RATE: 375 BPM
ATRIAL RATE: 99 BPM
BASOPHILS # BLD AUTO: 0.04 THOUSANDS/ÂΜL (ref 0–0.1)
BASOPHILS NFR BLD AUTO: 1 % (ref 0–1)
BUN SERPL-MCNC: 25 MG/DL (ref 5–25)
CALCIUM SERPL-MCNC: 9.9 MG/DL (ref 8.4–10.2)
CARDIAC TROPONIN I PNL SERPL HS: 4 NG/L
CHLORIDE SERPL-SCNC: 101 MMOL/L (ref 96–108)
CO2 SERPL-SCNC: 31 MMOL/L (ref 21–32)
CREAT SERPL-MCNC: 0.78 MG/DL (ref 0.6–1.3)
EOSINOPHIL # BLD AUTO: 0.12 THOUSAND/ÂΜL (ref 0–0.61)
EOSINOPHIL NFR BLD AUTO: 2 % (ref 0–6)
ERYTHROCYTE [DISTWIDTH] IN BLOOD BY AUTOMATED COUNT: 12.8 % (ref 11.6–15.1)
GFR SERPL CREATININE-BSD FRML MDRD: 69 ML/MIN/1.73SQ M
GLUCOSE SERPL-MCNC: 154 MG/DL (ref 65–140)
HCT VFR BLD AUTO: 39.2 % (ref 34.8–46.1)
HGB BLD-MCNC: 13.3 G/DL (ref 11.5–15.4)
IMM GRANULOCYTES # BLD AUTO: 0.01 THOUSAND/UL (ref 0–0.2)
IMM GRANULOCYTES NFR BLD AUTO: 0 % (ref 0–2)
LYMPHOCYTES # BLD AUTO: 1.91 THOUSANDS/ÂΜL (ref 0.6–4.47)
LYMPHOCYTES NFR BLD AUTO: 36 % (ref 14–44)
MCH RBC QN AUTO: 33.3 PG (ref 26.8–34.3)
MCHC RBC AUTO-ENTMCNC: 33.9 G/DL (ref 31.4–37.4)
MCV RBC AUTO: 98 FL (ref 82–98)
MONOCYTES # BLD AUTO: 0.43 THOUSAND/ÂΜL (ref 0.17–1.22)
MONOCYTES NFR BLD AUTO: 8 % (ref 4–12)
NEUTROPHILS # BLD AUTO: 2.8 THOUSANDS/ÂΜL (ref 1.85–7.62)
NEUTS SEG NFR BLD AUTO: 53 % (ref 43–75)
NRBC BLD AUTO-RTO: 0 /100 WBCS
P AXIS: -11 DEGREES
PLATELET # BLD AUTO: 188 THOUSANDS/UL (ref 149–390)
PMV BLD AUTO: 9.7 FL (ref 8.9–12.7)
POTASSIUM SERPL-SCNC: 3.9 MMOL/L (ref 3.5–5.3)
PR INTERVAL: 158 MS
QRS AXIS: -41 DEGREES
QRS AXIS: 22 DEGREES
QRSD INTERVAL: 72 MS
QRSD INTERVAL: 78 MS
QT INTERVAL: 346 MS
QT INTERVAL: 364 MS
QTC INTERVAL: 438 MS
QTC INTERVAL: 444 MS
RBC # BLD AUTO: 3.99 MILLION/UL (ref 3.81–5.12)
SODIUM SERPL-SCNC: 138 MMOL/L (ref 135–147)
T WAVE AXIS: 24 DEGREES
T WAVE AXIS: 47 DEGREES
VENTRICULAR RATE: 87 BPM
VENTRICULAR RATE: 99 BPM
WBC # BLD AUTO: 5.31 THOUSAND/UL (ref 4.31–10.16)

## 2023-10-26 PROCEDURE — 36415 COLL VENOUS BLD VENIPUNCTURE: CPT

## 2023-10-26 PROCEDURE — 93010 ELECTROCARDIOGRAM REPORT: CPT | Performed by: INTERNAL MEDICINE

## 2023-10-26 PROCEDURE — 85025 COMPLETE CBC W/AUTO DIFF WBC: CPT

## 2023-10-26 PROCEDURE — 99285 EMERGENCY DEPT VISIT HI MDM: CPT

## 2023-10-26 PROCEDURE — 93005 ELECTROCARDIOGRAM TRACING: CPT

## 2023-10-26 PROCEDURE — 99285 EMERGENCY DEPT VISIT HI MDM: CPT | Performed by: EMERGENCY MEDICINE

## 2023-10-26 PROCEDURE — G0463 HOSPITAL OUTPT CLINIC VISIT: HCPCS | Performed by: PHYSICIAN ASSISTANT

## 2023-10-26 PROCEDURE — 99213 OFFICE O/P EST LOW 20 MIN: CPT | Performed by: PHYSICIAN ASSISTANT

## 2023-10-26 PROCEDURE — 80048 BASIC METABOLIC PNL TOTAL CA: CPT

## 2023-10-26 PROCEDURE — 84484 ASSAY OF TROPONIN QUANT: CPT

## 2023-10-26 PROCEDURE — 93005 ELECTROCARDIOGRAM TRACING: CPT | Performed by: PHYSICIAN ASSISTANT

## 2023-10-26 PROCEDURE — 71045 X-RAY EXAM CHEST 1 VIEW: CPT

## 2023-10-26 RX ORDER — SODIUM CHLORIDE 9 MG/ML
3 INJECTION INTRAVENOUS
Status: DISCONTINUED | OUTPATIENT
Start: 2023-10-26 | End: 2023-10-26 | Stop reason: HOSPADM

## 2023-10-26 RX ORDER — ASPIRIN 81 MG/1
324 TABLET, CHEWABLE ORAL ONCE
Status: COMPLETED | OUTPATIENT
Start: 2023-10-26 | End: 2023-10-26

## 2023-10-26 RX ORDER — ACETAMINOPHEN 325 MG/1
975 TABLET ORAL ONCE
Status: COMPLETED | OUTPATIENT
Start: 2023-10-26 | End: 2023-10-26

## 2023-10-26 RX ADMIN — ACETAMINOPHEN 975 MG: 325 TABLET, FILM COATED ORAL at 17:18

## 2023-10-26 RX ADMIN — ASPIRIN 324 MG: 81 TABLET, CHEWABLE ORAL at 14:52

## 2023-10-26 NOTE — PROGRESS NOTES
[place] [facility]        NAME: Leeroy Layton is a 82 y.o. female  : 1935    MRN: [id number]  DATE: 2023  TIME: 3:08 PM    Assessment and Plan   Chest pain, unspecified type [R07.9]  1. Chest pain, unspecified type  aspirin chewable tablet 324 mg    Transfer to other facility        Proceeding via ambulance to hospital.    Patient Instructions       Follow up with PCP in 3-5 days. Proceed to  ER if symptoms worsen. Chief Complaint     Chief Complaint   Patient presents with    Chest Pain     Pt c/o left sided rib/chest pain radiating to left shoulder that started a few days ago without injury         History of Present Illness       Patient presents with left-sided chest pain that is severe starting 5 days ago but today has started to radiate into the left neck and arm. States she does have a history of arthritis in the neck and arm. Has a history of similar chest pain appearing many years ago but was told was costochondritis. Denies any injury or event that started the symptoms. Denies shortness of breath, difficulty breathing, feeling faint or diaphoretic. Chest Pain   Pertinent negatives include no shortness of breath. Review of Systems   Review of Systems   Constitutional:  Negative for fatigue. Respiratory:  Negative for chest tightness and shortness of breath. Cardiovascular:  Positive for chest pain.          Current Medications       Current Outpatient Medications:     acetaminophen (Tylenol 8 Hour) 650 mg CR tablet, Take 1 tablet (650 mg total) by mouth every 8 (eight) hours, Disp: 15 tablet, Rfl: 0    amLODIPine (NORVASC) 5 mg tablet, TAKE 1 TABLET BY MOUTH TWICE  DAILY, Disp: 180 tablet, Rfl: 3    aspirin 81 MG tablet, Take 81 mg by mouth daily Resume on  , Disp: , Rfl:     atorvastatin (LIPITOR) 40 mg tablet, TAKE 1 TABLET BY MOUTH  DAILY AFTER DINNER, Disp: 90 tablet, Rfl: 1    bumetanide (BUMEX) 1 mg tablet, TAKE 1/2 A TABLET BY MOUTH  DAILY TAKE A WHOLE TABLET IF  NEEDED, Disp: 90 tablet, Rfl: 3    carvedilol (COREG) 12.5 mg tablet, TAKE ONE-HALF TABLET BY  MOUTH TWICE DAILY WITH  MEALS, Disp: 90 tablet, Rfl: 3    Cholecalciferol 2000 units TABS, Take 2,000 Units by mouth in the morning. Resume on 4/28 ., Disp: , Rfl:     DULoxetine (CYMBALTA) 30 mg delayed release capsule, TAKE 1 CAPSULE BY MOUTH  DAILY, Disp: 90 capsule, Rfl: 3    folic acid (FOLVITE) 1 mg tablet, Take 1 mg by mouth daily, Disp: , Rfl:     losartan (COZAAR) 100 MG tablet, TAKE 1 TABLET BY MOUTH DAILY, Disp: 90 tablet, Rfl: 3    methotrexate 2.5 mg tablet, Take 2.5 mg by mouth once a week, Disp: , Rfl:     MULTIPLE VITAMIN PO, Take by mouth daily , Disp: , Rfl:     omeprazole (PriLOSEC) 40 MG capsule, TAKE 1 CAPSULE BY MOUTH  DAILY, Disp: 90 capsule, Rfl: 3    fexofenadine (ALLEGRA) 180 MG tablet, Take 180 mg by mouth daily as needed , Disp: , Rfl:   No current facility-administered medications for this visit.     Current Allergies     Allergies as of 10/26/2023 - Reviewed 10/26/2023   Allergen Reaction Noted    Lactose - food allergy GI Intolerance 11/16/2009            The following portions of the patient's history were reviewed and updated as appropriate: allergies, current medications, past family history, past medical history, past social history, past surgical history and problem list.     Past Medical History:   Diagnosis Date    Bladder cancer ([address])     had BCG treatments    Bladder cancer ([address])     Closed displaced fracture of left trapezium bone 5/18/2017    Coronary artery disease     S/P stent placement x 2 in 2011    GERD (gastroesophageal reflux disease)     Hepatitis     got this from food back in 1970s, has not had a problem since    History of leukemia     in remission    History of stomach ulcers 1970    History of transfusion 1970    Hyperlipidemia     Hypertension     Irritable bowel syndrome     Kidney stone     Kidney stones     Persistent cough for 3 weeks or longer 3/10/2020    Pneumonia     Pt had in 2003 and 2004    Pruritic rash 9/3/2019    Pulmonary emboli (HCC) 1970s    Raynaud disease     Shingles     Sleep apnea     Thrombocytopenia ([address]) 2/20/2012       Past Surgical History:   Procedure Laterality Date    CATARACT EXTRACTION      COLONOSCOPY      CORONARY ANGIOPLASTY WITH STENT PLACEMENT      stent x 2    CYSTOSCOPY      diagnostic - onset 4/4/17    EGD      EYE SURGERY      FRACTURE SURGERY      HERNIA REPAIR      HYSTERECTOMY      LEG SURGERY      OOPHORECTOMY      [place] [id number] WRST SURG W/RLS TRANSVRS CARPL LIGM Right 10/12/2021    Procedure: Right endoscopic carpal tunnel release;  Surgeon: Alexander Gowers, MD;  [facility];  Service: Orthopedics    [place] OPTX FEM SHFT FX W/INSJ IMED IMPLT W/WO SCREW Left 4/8/2018    Procedure: INSERTION NAIL IM FEMUR ANTEGRADE (TROCHANTERIC); Surgeon: Franny Méndez MD;  Location[facility];  Service: Orthopedics    [place] REPAIR FIRST ABDOMINAL WALL HERNIA N/A 5/12/2021    Procedure: REPAIR HERNIA VENTRAL;  Surgeon: Yuridia Noriega MD;  [facility];  Service: General    TONSILLECTOMY         Family History   Problem Relation Age of Onset    Arthritis Mother     Osteoporosis Mother     Heart disease Father     Hypertension Father     Hypertension Brother     Prostate cancer Brother     Breast cancer Daughter 48    Prostate cancer Son          Medications have been verified. Objective   /88   Pulse 98   Temp 97.9 °F (36.6 °C) (Temporal)   Resp 18   SpO2 100%   No LMP recorded. Patient has had a hysterectomy. Physical Exam     Physical Exam  Constitutional:       General: She is not in acute distress. Appearance: She is well-developed. She is not ill-appearing. Cardiovascular:      Rate and Rhythm: Normal rate and regular rhythm. Heart sounds: Normal heart sounds. Pulmonary:      Effort: Pulmonary effort is normal.      Breath sounds: Normal breath sounds.    Neurological:      Mental Status: She is alert.    Psychiatric:         Mood and Affect: Mood normal.         Behavior: Behavior normal.

## 2023-10-26 NOTE — DISCHARGE INSTRUCTIONS
You were evaluated in the Emergency Department today for chest pain. Your evaluation has shown no signs of medical conditions requiring emergent intervention at this time, however we recommend that you follow up with your primary care physician or your cardiologist as soon as possible for further testing as an outpatient. Please schedule an appointment for follow up with your primary care physician as soon as possible. Return to the Emergency Department if you experience worsening or uncontrolled chest pain, shortness of breath, light headedness, feeling faint, nausea, vomiting, or any other concerning symptoms. Thank you for choosing us for your care.

## 2023-10-26 NOTE — ED PROVIDER NOTES
History  Chief Complaint   Patient presents with    Chest Pain     Pt c/o L sided cp for the past 5 days. Told to go to urgent care for chest x-ray, then told to come to ED for further evaluation. EMS provided pt w/ 324 mg asa and pt states cp has subsided. Patient is an 74-year-old female with a significant past medical history of hypertension, hyperlipidemia, arthritis, presenting for evaluation of chest pain. She reports 5 days of reproducible chest wall pain. She denies any specific injuries to the area. She seems to say there is some radiation into her left shoulder. There is no radiation into her jaw or back. It is associated with motion but not necessarily associated with exertion. She denies any shortness of breath. She denies any vomiting or diaphoresis. She has not been coughing at all. She otherwise denies acute complaints. Prior to Admission Medications   Prescriptions Last Dose Informant Patient Reported? Taking? Cholecalciferol 2000 units TABS  Self Yes No   Sig: Take 2,000 Units by mouth in the morning. Resume on 4/28 .    DULoxetine (CYMBALTA) 30 mg delayed release capsule   No No   Sig: TAKE 1 CAPSULE BY MOUTH  DAILY   MULTIPLE VITAMIN PO  Self Yes No   Sig: Take by mouth daily    acetaminophen (Tylenol 8 Hour) 650 mg CR tablet  Self No No   Sig: Take 1 tablet (650 mg total) by mouth every 8 (eight) hours   amLODIPine (NORVASC) 5 mg tablet  Self No No   Sig: TAKE 1 TABLET BY MOUTH TWICE  DAILY   aspirin 81 MG tablet  Self Yes No   Sig: Take 81 mg by mouth daily Resume on 4/28    atorvastatin (LIPITOR) 40 mg tablet   No No   Sig: TAKE 1 TABLET BY MOUTH  DAILY AFTER DINNER   bumetanide (BUMEX) 1 mg tablet  Self No No   Sig: TAKE 1/2 A TABLET BY MOUTH  DAILY TAKE A WHOLE TABLET IF  NEEDED   carvedilol (COREG) 12.5 mg tablet   No No   Sig: TAKE ONE-HALF TABLET BY  MOUTH TWICE DAILY WITH  MEALS   fexofenadine (ALLEGRA) 180 MG tablet  Self Yes No   Sig: Take 180 mg by mouth daily as needed    folic acid (FOLVITE) 1 mg tablet  Self Yes No   Sig: Take 1 mg by mouth daily   losartan (COZAAR) 100 MG tablet  Self No No   Sig: TAKE 1 TABLET BY MOUTH DAILY   methotrexate 2.5 mg tablet  Self Yes No   Sig: Take 2.5 mg by mouth once a week   omeprazole (PriLOSEC) 40 MG capsule  Self No No   Sig: TAKE 1 CAPSULE BY MOUTH  DAILY      Facility-Administered Medications Last Administration Doses Remaining   aspirin chewable tablet 324 mg 10/26/2023  2:52 PM 0          Past Medical History:   Diagnosis Date    Bladder cancer ([address])     had BCG treatments    Bladder cancer ([address])     Closed displaced fracture of left trapezium bone 5/18/2017    Coronary artery disease     S/P stent placement x 2 in 2011    GERD (gastroesophageal reflux disease)     Hepatitis     got this from food back in 1970s, has not had a problem since    History of leukemia     in remission    History of stomach ulcers 1970    History of transfusion 1970    Hyperlipidemia     Hypertension     Irritable bowel syndrome     Kidney stone     Kidney stones     Persistent cough for 3 weeks or longer 3/10/2020    Pneumonia     Pt had in 2003 and 2004    Pruritic rash 9/3/2019    Pulmonary emboli ([address]) 1970s    Raynaud disease     Shingles     Sleep apnea     Thrombocytopenia ([address]) 2/20/2012       Past Surgical History:   Procedure Laterality Date    CATARACT EXTRACTION      COLONOSCOPY      CORONARY ANGIOPLASTY WITH STENT PLACEMENT      stent x 2    CYSTOSCOPY      diagnostic - onset 4/4/17    EGD      EYE SURGERY      FRACTURE SURGERY      HERNIA REPAIR      HYSTERECTOMY      LEG SURGERY      OOPHORECTOMY      [place] [id number] WRST SURG W/RLS TRANSVRS CARPL LIGM Right 10/12/2021    Procedure: Right endoscopic carpal tunnel release;  Surgeon: Canary Goodpasture, MD;  [facility];  Service: Orthopedics    [place] OPTX FEM SHFT FX W/INSJ IMED IMPLT W/WO SCREW Left 4/8/2018    Procedure: INSERTION NAIL IM FEMUR ANTEGRADE (TROCHANTERIC);   Surgeon: Veto Martinez Kiko Aden MD;  [facility];  Service: Orthopedics    [place] REPAIR FIRST ABDOMINAL WALL HERNIA N/A 5/12/2021    Procedure: REPAIR HERNIA VENTRAL;  Surgeon: Ebonie Yao MD;  [facility];  Service: General    TONSILLECTOMY         Family History   Problem Relation Age of Onset    Arthritis Mother     Osteoporosis Mother     Heart disease Father     Hypertension Father     Hypertension Brother     Prostate cancer Brother     Breast cancer Daughter 48    Prostate cancer Son      I have reviewed and agree with the history as documented. E-Cigarette/Vaping    E-Cigarette Use Never User      E-Cigarette/Vaping Substances    Nicotine No     THC No     CBD No     Flavoring No     Other No     Unknown No      Social History     Tobacco Use    Smoking status: Never    Smokeless tobacco: Never   Vaping Use    Vaping Use: Never used   Substance Use Topics    Alcohol use: No    Drug use: No        Review of Systems   Constitutional:  Negative for diaphoresis and fever. Respiratory:  Negative for cough and shortness of breath. Cardiovascular:  Positive for chest pain. Gastrointestinal:  Negative for vomiting. All other systems reviewed and are negative. Physical Exam  ED Triage Vitals   Temperature Pulse Respirations Blood Pressure SpO2   10/26/23 1600 10/26/23 1600 10/26/23 1600 10/26/23 1600 10/26/23 1600   98.2 °F (36.8 °C) 90 18 (!) 197/94 99 %      Temp Source Heart Rate Source Patient Position - Orthostatic VS BP Location FiO2 (%)   10/26/23 1600 10/26/23 1600 10/26/23 1600 10/26/23 1600 --   Oral Monitor Sitting Right arm       Pain Score       10/26/23 1718       5             Orthostatic Vital Signs  Vitals:    10/26/23 1600 10/26/23 1615 10/26/23 1700   BP: (!) 197/94 168/80 (!) 181/83   Pulse: 90 82 82   Patient Position - Orthostatic VS: Sitting Sitting Sitting       Physical Exam  Vitals and nursing note reviewed. Constitutional:       General: She is not in acute distress. Appearance: Normal appearance. She is not ill-appearing or toxic-appearing. HENT:      Head: Normocephalic and atraumatic. Right Ear: External ear normal.      Left Ear: External ear normal.      Nose: Nose normal.   Eyes:      General: No scleral icterus. Right eye: No discharge. Left eye: No discharge. Extraocular Movements: Extraocular movements intact. Conjunctiva/sclera: Conjunctivae normal.   Cardiovascular:      Rate and Rhythm: Normal rate. Heart sounds: Normal heart sounds. No murmur heard. No friction rub. No gallop. Pulmonary:      Effort: Pulmonary effort is normal. No respiratory distress. Breath sounds: Normal breath sounds. Chest:      Chest wall: Tenderness present. Comments: There is tenderness to the left anterior chest wall. No deformities. No flail chest.  No crepitus. No pain out of proportion. No skin changes. Abdominal:      General: Abdomen is flat. There is no distension. Palpations: Abdomen is soft. There is no mass. Tenderness: There is no abdominal tenderness. Genitourinary:     Comments: Deferred  Skin:     General: Skin is warm and dry. Neurological:      General: No focal deficit present. Mental Status: She is alert.          ED Medications  Medications   acetaminophen (TYLENOL) tablet 975 mg (975 mg Oral Given 10/26/23 1718)       Diagnostic Studies  Results Reviewed       Procedure Component Value Units Date/Time    HS Troponin 0hr (reflex protocol) [[id number]]  (Normal) Collected: 10/26/23 1613    Lab Status: Final result Specimen: Blood from Arm, Left Updated: 10/26/23 1649     hs TnI 0hr 4 ng/L     Basic metabolic panel [[id number]]  (Abnormal) Collected: 10/26/23 1613    Lab Status: Final result Specimen: Blood from Arm, Left Updated: 10/26/23 1642     Sodium 138 mmol/L      Potassium 3.9 mmol/L      Chloride 101 mmol/L      CO2 31 mmol/L      ANION GAP 6 mmol/L      BUN 25 mg/dL      Creatinine 0.78 mg/dL      Glucose 154 mg/dL      Calcium 9.9 mg/dL      eGFR 69 ml/min/1.73sq m     Narrative:      [facility] ([facility]D):     Stage 1 with normal or high GFR (GFR > 90 mL/min/1.73 square meters)    Stage 2 Mild CKD (GFR = 60-89 mL/min/1.73 square meters)    Stage 3A Moderate CKD (GFR = 45-59 mL/min/1.73 square meters)    Stage 3B Moderate CKD (GFR = 30-44 mL/min/1.73 square meters)    Stage 4 Severe CKD (GFR = 15-29 mL/min/1.73 square meters)    Stage 5 End Stage CKD (GFR <15 mL/min/1.73 square meters)  Note: GFR calculation is accurate only with a steady state creatinine    CBC and differential [[id number]] Collected: 10/26/23 1613    Lab Status: Final result Specimen: Blood from Arm, Left Updated: 10/26/23 1623     WBC 5.31 Thousand/uL      RBC 3.99 Million/uL      Hemoglobin 13.3 g/dL      Hematocrit 39.2 %      MCV 98 fL      MCH 33.3 pg      MCHC 33.9 g/dL      RDW 12.8 %      MPV 9.7 fL      Platelets 727 Thousands/uL      nRBC 0 /100 WBCs      Neutrophils Relative 53 %      Immat GRANS % 0 %      Lymphocytes Relative 36 %      Monocytes Relative 8 %      Eosinophils Relative 2 %      Basophils Relative 1 %      Neutrophils Absolute 2.80 Thousands/µL      Immature Grans Absolute 0.01 Thousand/uL      Lymphocytes Absolute 1.91 Thousands/µL      Monocytes Absolute 0.43 Thousand/µL      Eosinophils Absolute 0.12 Thousand/µL      Basophils Absolute 0.04 Thousands/µL                    X-ray chest 1 view portable   ED Interpretation by Ian Lundberg DO (10/26 1643)   No acute cardiopulmonary disease on my interpretation      Final Result by Erica Post MD (10/27 1019)      No acute cardiopulmonary disease.                Workstation performed: [id number]               Procedures  Procedures      ED Course  ED Course as of 10/27/23 1310   Thu Oct 26, 2023   1609 Procedure Note: EKG  Date/Time: 10/26/23 4:09 PM   Interpreted by: Mery Alves Indications / Diagnosis: CP  ECG reviewed by me, the ED Provider: yes   The EKG demonstrates:  Rhythm: normal sinus  Intervals: normal intervals  Axis: normal axis  QRS/Blocks: normal QRS  ST Changes: No acute ST Changes, no STD/DALLIN.   1656 hs TnI 0hr: 4  >3 hours with value less than 5, no indication for delta             HEART Risk Score      Flowsheet Row Most Recent Value   Heart Score Risk Calculator    History 0 Filed at: 10/26/2023 1657   ECG 1 Filed at: 10/26/2023 1657   Age 2 Filed at: 10/26/2023 1657   Risk Factors 1 Filed at: 10/26/2023 1657   Troponin 0 Filed at: 10/26/2023 1657   HEART Score 4 Filed at: 10/26/2023 4101 Luis Heredia  Patient is an 32-year-old female presenting for evaluation of chest pain. Based on history evaluation, differential diagnosis includes but is not limited to: Costochondritis, fracture, cannot clinically exclude ACS, arrhythmia, pneumothorax. Plan: CBC, BMP, troponin, chest x-ray, pain control, reassessment    Labs unremarkable including a negative troponin. Chest x-ray without acute cardiopulmonary disease on my independent interpretation. On reassessment, patient's symptoms continue to be well controlled. Suspect costochondritis given the reproducibility of the patient's symptoms. Stable for discharge with primary care follow-up. Patient given a prescription for diclofenac sent to her pharmacy. Patient seems understand this plan and is agreeable. All questions answered. Patient discharged home with return precautions. Amount and/or Complexity of Data Reviewed  Labs: ordered. Decision-making details documented in ED Course. Radiology: ordered and independent interpretation performed. Risk  OTC drugs.           Disposition  Final diagnoses:   Costochondritis     Time reflects when diagnosis was documented in both MDM as applicable and the Disposition within this note       Time User Action Codes Description Comment    10/26/2023  4:57 PM Elvan Osgood Add [M94.0] Costochondritis           ED Disposition       ED Disposition   Discharge    Condition   Stable    Date/Time   Thu Oct 26, 2023 9367    Comment   Masha Cardoza discharge to home/self care. Follow-up Information       Follow up With Specialties Details Why [address], DO Family Medicine Call in 1 day  [address] [address]  [phone]              Discharge Medication List as of 10/26/2023  5:05 PM        CONTINUE these medications which have NOT CHANGED    Details   acetaminophen (Tylenol 8 Hour) 650 mg CR tablet Take 1 tablet (650 mg total) by mouth every 8 (eight) hours, Starting Tue 10/12/2021, Normal      amLODIPine (NORVASC) 5 mg tablet TAKE 1 TABLET BY MOUTH TWICE  DAILY, Normal      aspirin 81 MG tablet Take 81 mg by mouth daily Resume on 4/28 , Historical Med      atorvastatin (LIPITOR) 40 mg tablet TAKE 1 TABLET BY MOUTH  DAILY AFTER DINNER, Normal      bumetanide (BUMEX) 1 mg tablet TAKE 1/2 A TABLET BY MOUTH  DAILY TAKE A WHOLE TABLET IF  NEEDED, Normal      carvedilol (COREG) 12.5 mg tablet TAKE ONE-HALF TABLET BY  MOUTH TWICE DAILY WITH  MEALS, Normal      Cholecalciferol 2000 units TABS Take 2,000 Units by mouth in the morning. Resume on 4/28 ., Historical Med      DULoxetine (CYMBALTA) 30 mg delayed release capsule TAKE 1 CAPSULE BY MOUTH  DAILY, Normal      fexofenadine (ALLEGRA) 180 MG tablet Take 180 mg by mouth daily as needed , Historical Med      folic acid (FOLVITE) 1 mg tablet Take 1 mg by mouth daily, Historical Med      losartan (COZAAR) 100 MG tablet TAKE 1 TABLET BY MOUTH DAILY, Normal      methotrexate 2.5 mg tablet Take 2.5 mg by mouth once a week, Historical Med      MULTIPLE VITAMIN PO Take by mouth daily , Historical Med      omeprazole (PriLOSEC) 40 MG capsule TAKE 1 CAPSULE BY MOUTH  DAILY, Normal           No discharge procedures on file.     PDMP Review Value Time User    PDMP Reviewed  Yes 10/12/2021  7:30 AM Valorie Litten, MD             ED Provider  Attending physically available and evaluated Rashmi Atwood. I managed the patient along with the ED Attending.     Electronically Signed by           Apryl Riley DO  10/27/23 5194

## 2023-10-26 NOTE — ED ATTENDING ATTESTATION
10/26/2023  ISophia DO, saw and evaluated the patient. I have discussed the patient with the resident/non-physician practitioner and agree with the resident's/non-physician practitioner's findings, Plan of Care, and MDM as documented in the resident's/non-physician practitioner's note, except where noted. All available labs and Radiology studies were reviewed. I was present for key portions of any procedure(s) performed by the resident/non-physician practitioner and I was immediately available to provide assistance. At this point I agree with the current assessment done in the Emergency Department. I have conducted an independent evaluation of this patient a history and physical is as follows:    62-year-old female presents for chest pain. Left-sided, started 5 days ago. Seems to worsen with movement or palpation. No shortness of breath no hemoptysis cough fever chills trauma. She was at urgent care today reviewed the note from urgent care and she was sent in for evaluation. She is very tender on palpation of her left chest wall. No rash no deformity clear lungs.   Assessment and plan chest pain cardiac work-up chest x-ray differential includes ACS doubt PE doubt shingles doubt pneumonia or pneumothorax    EKG interpreted by me no ST changes acutely, lots of artifact    ED Course         Critical Care Time  Procedures

## 2023-10-27 ENCOUNTER — VBI (OUTPATIENT)
Dept: FAMILY MEDICINE CLINIC | Facility: CLINIC | Age: 88
End: 2023-10-27

## 2023-10-27 NOTE — TELEPHONE ENCOUNTER
10/27/23 9:00 AM    Patient contacted post ED visit, [facility] department spoke with patient/caregiver and outreach was successful. Thank you.   Hugo Ruiz MA  [facility]

## 2023-10-30 ENCOUNTER — APPOINTMENT (OUTPATIENT)
Dept: RADIOLOGY | Facility: MEDICAL CENTER | Age: 88
End: 2023-10-30
Payer: MEDICARE

## 2023-10-30 ENCOUNTER — OFFICE VISIT (OUTPATIENT)
Dept: FAMILY MEDICINE CLINIC | Facility: CLINIC | Age: 88
End: 2023-10-30
Payer: MEDICARE

## 2023-10-30 VITALS
WEIGHT: 126 LBS | DIASTOLIC BLOOD PRESSURE: 78 MMHG | HEART RATE: 91 BPM | OXYGEN SATURATION: 99 % | HEIGHT: 61 IN | SYSTOLIC BLOOD PRESSURE: 120 MMHG | TEMPERATURE: 96.8 F | BODY MASS INDEX: 23.79 KG/M2

## 2023-10-30 DIAGNOSIS — R07.81 RIB PAIN ON LEFT SIDE: Primary | ICD-10-CM

## 2023-10-30 DIAGNOSIS — M80.00XS AGE-RELATED OSTEOPOROSIS WITH CURRENT PATHOLOGICAL FRACTURE, SEQUELA: ICD-10-CM

## 2023-10-30 DIAGNOSIS — R07.81 RIB PAIN ON LEFT SIDE: ICD-10-CM

## 2023-10-30 PROCEDURE — 71101 X-RAY EXAM UNILAT RIBS/CHEST: CPT

## 2023-10-30 PROCEDURE — 99214 OFFICE O/P EST MOD 30 MIN: CPT | Performed by: FAMILY MEDICINE

## 2023-10-30 RX ORDER — TRAMADOL HYDROCHLORIDE 50 MG/1
50 TABLET ORAL
Qty: 6 TABLET | Refills: 0 | Status: SHIPPED | OUTPATIENT
Start: 2023-10-30

## 2023-10-30 NOTE — PROGRESS NOTES
Name: Issa Johnson      : 1935      MRN: [id number]  Encounter Provider: David Cota DO  Encounter Date: 10/30/2023   Encounter department: [address] [place] [address]     1. Rib pain on left side  -     XR ribs 2 vw left; Future; Expected date: 10/30/2023  -     traMADol (Ultram) 50 mg tablet; Take 1 tablet (50 mg total) by mouth daily at bedtime as needed for moderate pain or severe pain    2. Age-related osteoporosis with current pathological fracture, sequela  -     XR ribs 2 vw left; Future; Expected date: 10/30/2023    Few tabs of tramadol will be escribed for pain control - PDMP reviewed, no red flags; continue PRN Tylenol and voltaren gel; moist heat - if rib xray negative, will order trial PT       Subjective      Chief Complaint   Patient presents with    Follow-up     [facility] ED 10/26/23 chest and rib pain       Called my rheumatologist because was havign pain, [facility] might be a cracked rib and when got there asked where I was having pain and pointed to left side and admitted it went up into neck and down left arm, so had to go by ambulance tot he ER and it wound up being  Currently 8/10 with 2 650mg tylenol (took 2-3 hours ago) and using voltaren cream  Points to worst pain along left 4th rib anteriorly and it goes to my shoulder - think all the ones are having a bad arthritis attack  "I've had it once before many years ago"  Hurts a little more taking a deep breath  Just it's hard to sleep, can't lay on that side and if lay on the other side (right) it pulls and even ion the recliner it hits my back bone  Didn't have any cold sx/coughing prior "I do sneeze, though"         10/26/2023 (1 hours)  [address] [place] [address] Emergency Department  History  Chief Complaint  Patient presents with  · Chest Pain      Pt c/o L sided cp for the past 5 days. Told to go to urgent care for chest x-ray, then told to come to ED for further evaluation. EMS provided pt w/ 324 mg asa and pt states cp has subsided. Patient is an 82-year-old female with a significant past medical history of hypertension, hyperlipidemia, arthritis, presenting for evaluation of chest pain. She reports 5 days of reproducible chest wall pain. She denies any specific injuries to the area. She seems to say there is some radiation into her left shoulder. There is no radiation into her jaw or back. It is associated with motion but not necessarily associated with exertion. She denies any shortness of breath. She denies any vomiting or diaphoresis. She has not been coughing at all. She otherwise denies acute complaints. Medical Decision Making  Patient is an 82-year-old female presenting for evaluation of chest pain. Based on history evaluation, differential diagnosis includes but is not limited to: Costochondritis, fracture, cannot clinically exclude ACS, arrhythmia, pneumothorax. Plan: CBC, BMP, troponin, chest x-ray, pain control, reassessment  Labs unremarkable including a negative troponin. Chest x-ray without acute cardiopulmonary disease on my independent interpretation. On reassessment, patient's symptoms continue to be well controlled. Suspect costochondritis given the reproducibility of the patient's symptoms. Stable for discharge with primary care follow-up. Patient given a prescription for diclofenac sent to her pharmacy. Patient seems understand this plan and is agreeable. All questions answered. Patient discharged home with return precautions. Amount and/or Complexity of Data Reviewed  Labs: ordered. Decision-making details documented in ED Course. Radiology: ordered and independent interpretation performed. Risk  OTC drugs.   Disposition  Final diagnoses:  Costochondritis          Review of Systems    Current Outpatient Medications on File Prior to Visit   Medication Sig    acetaminophen (Tylenol 8 Hour) 650 mg CR tablet Take 1 tablet (650 mg total) by mouth every 8 (eight) hours    amLODIPine (NORVASC) 5 mg tablet TAKE 1 TABLET BY MOUTH TWICE  DAILY    aspirin 81 MG tablet Take 81 mg by mouth daily Resume on 4/28     atorvastatin (LIPITOR) 40 mg tablet TAKE 1 TABLET BY MOUTH  DAILY AFTER DINNER    bumetanide (BUMEX) 1 mg tablet TAKE 1/2 A TABLET BY MOUTH  DAILY TAKE A WHOLE TABLET IF  NEEDED    carvedilol (COREG) 12.5 mg tablet TAKE ONE-HALF TABLET BY  MOUTH TWICE DAILY WITH  MEALS    Cholecalciferol 2000 units TABS Take 2,000 Units by mouth in the morning. Resume on 4/28 . Diclofenac Sodium (VOLTAREN) 1 % Apply 2 g topically 4 (four) times a day    DULoxetine (CYMBALTA) 30 mg delayed release capsule TAKE 1 CAPSULE BY MOUTH  DAILY    fexofenadine (ALLEGRA) 180 MG tablet Take 180 mg by mouth daily as needed     folic acid (FOLVITE) 1 mg tablet Take 1 mg by mouth daily    losartan (COZAAR) 100 MG tablet TAKE 1 TABLET BY MOUTH DAILY    methotrexate 2.5 mg tablet Take 2.5 mg by mouth once a week    MULTIPLE VITAMIN PO Take by mouth daily     omeprazole (PriLOSEC) 40 MG capsule TAKE 1 CAPSULE BY MOUTH  DAILY       Objective     /78 (BP Location: Left arm, Patient Position: Sitting, Cuff Size: Standard)   Pulse 91   Temp (!) 96.8 °F (36 °C) (Tympanic)   Ht 5' 1" (1.549 m)   Wt 57.2 kg (126 lb)   SpO2 99%   BMI 23.81 kg/m²     Physical Exam  Vitals and nursing note reviewed. Constitutional:       General: She is not in acute distress. Appearance: She is well-developed and well-groomed. She is not ill-appearing, toxic-appearing or diaphoretic. Comments: Appears much younger than stated age, extremely pleasant as always   HENT:      Head: Normocephalic and atraumatic. Mouth/Throat:      Pharynx: Uvula midline. Neck:      Trachea: Phonation normal.   Cardiovascular:      Rate and Rhythm: Normal rate and regular rhythm. Heart sounds: Normal heart sounds.    Pulmonary:      Effort: Pulmonary effort is normal.      Breath sounds: Normal breath sounds and air entry. Musculoskeletal:        Arms:    Skin:     Coloration: Skin is not pale. Neurological:      Mental Status: She is alert and oriented to person, place, and time. Psychiatric:         Behavior: Behavior is cooperative.        Gulf Breezechauncey Ridley DO

## 2023-11-06 DIAGNOSIS — M25.519 CHRONIC SHOULDER PAIN, UNSPECIFIED LATERALITY: ICD-10-CM

## 2023-11-06 DIAGNOSIS — G89.29 CHRONIC SHOULDER PAIN, UNSPECIFIED LATERALITY: ICD-10-CM

## 2023-11-06 DIAGNOSIS — R07.81 RIB PAIN ON LEFT SIDE: Primary | ICD-10-CM

## 2023-11-10 ENCOUNTER — APPOINTMENT (OUTPATIENT)
Dept: LAB | Facility: CLINIC | Age: 88
End: 2023-11-10
Payer: MEDICARE

## 2023-11-10 DIAGNOSIS — Z11.59 NEED FOR HEPATITIS C SCREENING TEST: ICD-10-CM

## 2023-11-10 DIAGNOSIS — Z11.1 SCREENING-PULMONARY TB: ICD-10-CM

## 2023-11-10 DIAGNOSIS — Z76.89 ESTABLISHING CARE WITH NEW DOCTOR, ENCOUNTER FOR: ICD-10-CM

## 2023-11-10 PROCEDURE — 86803 HEPATITIS C AB TEST: CPT

## 2023-11-10 PROCEDURE — 86480 TB TEST CELL IMMUN MEASURE: CPT

## 2023-11-10 PROCEDURE — 87340 HEPATITIS B SURFACE AG IA: CPT

## 2023-11-10 PROCEDURE — 36415 COLL VENOUS BLD VENIPUNCTURE: CPT

## 2023-11-11 LAB
GAMMA INTERFERON BACKGROUND BLD IA-ACNC: <0 IU/ML
HBV SURFACE AG SER QL: NORMAL
HCV AB SER QL: NORMAL
M TB IFN-G BLD-IMP: NEGATIVE
M TB IFN-G CD4+ BCKGRND COR BLD-ACNC: 0.01 IU/ML
M TB IFN-G CD4+ BCKGRND COR BLD-ACNC: 0.01 IU/ML
MITOGEN IGNF BCKGRD COR BLD-ACNC: 10 IU/ML

## 2023-11-13 ENCOUNTER — EVALUATION (OUTPATIENT)
Dept: PHYSICAL THERAPY | Facility: REHABILITATION | Age: 88
End: 2023-11-13
Payer: MEDICARE

## 2023-11-13 DIAGNOSIS — R07.81 RIB PAIN ON LEFT SIDE: ICD-10-CM

## 2023-11-13 DIAGNOSIS — M25.519 CHRONIC SHOULDER PAIN, UNSPECIFIED LATERALITY: ICD-10-CM

## 2023-11-13 DIAGNOSIS — G89.29 CHRONIC SHOULDER PAIN, UNSPECIFIED LATERALITY: ICD-10-CM

## 2023-11-13 PROCEDURE — 97162 PT EVAL MOD COMPLEX 30 MIN: CPT | Performed by: PHYSICAL THERAPIST

## 2023-11-13 PROCEDURE — 97110 THERAPEUTIC EXERCISES: CPT | Performed by: PHYSICAL THERAPIST

## 2023-11-13 NOTE — PROGRESS NOTES
PT Evaluation     Today's date: 2023  Patient name: Zari Collazo  : 1935  MRN: [id number]  Referring provider: Tonny Mendoza DO  Dx:   Encounter Diagnosis     ICD-10-CM    1. Rib pain on left side  R07.81 Ambulatory Referral to Physical Therapy      2. Chronic shoulder pain, unspecified laterality  M25.519 Ambulatory Referral to Physical Therapy    G89.29                      Assessment  Assessment details: Pt is an 83 yo female with left shoulder and neck pain that has been present for greater than 6 months. She presents with limited strength and ROM and pain with reaching and lying on her left. She presents with forward head posture. Today repeated left cervical side bending improved shoulder and neck mobility and decreased pain. It appears that cervical dysfunction is contributing to shoulder pain and limited mobility. Impairments: abnormal or restricted ROM, activity intolerance, impaired physical strength, lacks appropriate home exercise program, pain with function and poor posture     Symptom irritability: highUnderstanding of Dx/Px/POC: excellent  Goals  STG: in 4 weeks  Increase L ROM nearly to R  Improve cervical rotation 10 degrees  Pt performing HEP consistently  LTG: by d/c  Pt able to turn her head without significant pain  Pt able to carry bags of groceries without significant pain   Pt able to reach into her cabinet for a glass with her left.       Plan  Patient would benefit from: skilled physical therapy  Referral necessary: No  Planned modality interventions: cryotherapy and thermotherapy: hydrocollator packs  Planned therapy interventions: manual therapy, neuromuscular re-education, patient education, therapeutic activities, therapeutic exercise, IADL retraining and home exercise program  Frequency: 2x week  Duration in visits: 8  Treatment plan discussed with: patient        Subjective Evaluation    History of Present Illness  Mechanism of injury: Neck and shoulder pain began more than 6 months ago. Her neck snaps when she turns it but she can't turn too far. Reaching with the left UE is painful. She also get pain in her ribs. The first attack began 20 years ago. It hurts on and off every month to 3 months. Patient Goals  Patient goals for therapy: decreased pain  Patient goal: Dress a little quicker, Clean easier, carry shopping easier  Pain  Current pain ratin  At worst pain rating: 10  Location: neck and left shoulder  Quality: sharp  Relieving factors: rest  Exacerbated by: sleeping on the left, reaching overhead. Objective     Postural Observations  Seated posture: fair  Standing posture: fair  Correction of posture: has no consistent effect      Palpation   Left   Muscle spasm in the levator scapulae and upper trapezius. Tenderness of the levator scapulae, rhomboids, supraspinatus, teres major, teres minor, thoracic paraspinals, triceps and upper trapezius. Right   Tenderness of the rhomboids and triceps. Tenderness     Left Shoulder   Tenderness in the infraspinatus tendon and subscapularis tendon. [facility] the [facility] joint, biceps tendon (proximal) and supraspinatus tendon. Cervical/Thoracic Screen   Cervical range of motion within normal limits with the following exceptions: Restrictions:  Flex: min  w/pain  Ext: mod w/ pain  B Rot: mod w/pain L  B SB: mod w/pain contralateral  Protraction: min  Retraction: mod    Mechanical assessment:  Repeated left side bending abolished pain with retraction and improved shoulder mobility.        Active Range of Motion   Left Shoulder   Flexion: 130 degrees   Extension: 50 degrees   Abduction: 110 degrees   External rotation BTH: C5   Internal rotation BTB: T12     Right Shoulder   Flexion: 150 degrees with pain  Extension: 35 degrees   Abduction: 160 degrees with pain  External rotation BTH: T1 with pain  Internal rotation BTB: T8     Strength/Myotome Testing     Left Shoulder     Planes of Motion Flexion: 3+   External rotation at 0°: 4-   Internal rotation at 0°: 4+     Isolated Muscles   Biceps: 5   Triceps: 5     Right Shoulder     Planes of Motion   Flexion: 3+   External rotation at 0°: 4-   Internal rotation at 0°: 4+     Isolated Muscles   Biceps: 5   Triceps: 5              Precautions:   Patient Active Problem List   Diagnosis    Essential hypertension    Mixed hyperlipidemia    Coronary artery disease without angina pectoris - S/P stent placement x 2 (2011)    Gastroesophageal reflux disease without esophagitis    Chronic diastolic (congestive) heart failure (HCC)    Hyperthyroidism    Age-related osteoporosis with current pathological fracture    Intertrochanteric fracture of left femur, closed, with routine healing, subsequent encounter    Ambulatory dysfunction    Elderly person living alone    Low back pain without sciatica    S/P ORIF (open reduction internal fixation) fracture    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HCC)    Bladder cancer (HCC)    Allergic rhinitis    Anemia due to vitamin B12 deficiency    Biliary dyskinesia    Cholelithiasis    Chronic right shoulder pain    Degenerative joint disease    Depression, controlled    Deviated nasal septum    Gastric reflux syndrome    Heart valve disorder    Herpes zoster infection of thoracic region    IBS (irritable bowel syndrome)    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HCC)    Mild vitamin D deficiency    Mixed hearing loss, unilateral    Pancreatic cyst    Raynaud's disease without gangrene    S/P angioplasty with stent    Urge incontinence of urine    Conjunctivitis of both eyes    Medicare annual wellness visit, subsequent    History of pulmonary embolus (PE)    Age-related cataract of both eyes    Hyperparathyroidism, unspecified (HCC)    Fear of falling    Balance problems    Difficulty navigating stairs    Lower abdominal pain, unspecified    History of falling    Bilateral leg edema    Cough due to ACE inhibitor    New onset of headaches    Carotid artery stenosis, asymptomatic, bilateral    Ventral hernia    Cerebral aneurysm without rupture    Intermittent pain and swelling of hand    Right hand paresthesia    BMI 23.0-23.9, adult    Carpal tunnel syndrome of right wrist    Stage 3a chronic kidney disease (HCC)    TIFFANY (renal artery stenosis) ([facility])    History of pneumonia    Fatigue    Swelling of left lower extremity    Rib pain on left side          Manuals 11/13            Shoulder PROM gentle             Gentle manual cerv tx                                       Exercises             Cervical SB 2x10            Cervical retr x10            Shoulder ext w/wand x10            Scapular retraction x10            Wand flexion             Wand ER                          Ther Ex                                                                                                                     Ther Activity                                       Gait Training                                       Modalities

## 2023-11-15 ENCOUNTER — OFFICE VISIT (OUTPATIENT)
Dept: PHYSICAL THERAPY | Facility: REHABILITATION | Age: 88
End: 2023-11-15
Payer: MEDICARE

## 2023-11-15 DIAGNOSIS — M25.519 CHRONIC SHOULDER PAIN, UNSPECIFIED LATERALITY: ICD-10-CM

## 2023-11-15 DIAGNOSIS — G89.29 CHRONIC SHOULDER PAIN, UNSPECIFIED LATERALITY: ICD-10-CM

## 2023-11-15 DIAGNOSIS — R07.81 RIB PAIN ON LEFT SIDE: Primary | ICD-10-CM

## 2023-11-15 PROCEDURE — 97140 MANUAL THERAPY 1/> REGIONS: CPT

## 2023-11-15 PROCEDURE — 97110 THERAPEUTIC EXERCISES: CPT

## 2023-11-15 NOTE — PROGRESS NOTES
Daily Note     Today's date: 11/15/2023  Patient name: Garcia Baeza  : 1935  MRN: [id number]  Referring provider: Chucho Albarran DO  Dx:   Encounter Diagnosis     ICD-10-CM    1. Rib pain on left side  R07.81       2. Chronic shoulder pain, unspecified laterality  M25.519     G89.29                      Subjective: Pt reports she feels "crummy as usual" upon arrival to therapy. She has been compliant with HEP. Objective: See treatment diary below      Assessment: Tolerated treatment well. Favorable response to manual techniques performed with pt reporting improved L shoulder flexion and pain levels as well as improved cervical pain afterward. Good recall of exercises in HEP, moderate cues for cervical sidebending technique. Pt demonstrates appropriate challenge and fatigue following treatment and would benefit from continued skilled PT to improve current deficits and maximize function. Plan: Continue per plan of care.         Precautions:   Patient Active Problem List   Diagnosis    Essential hypertension    Mixed hyperlipidemia    Coronary artery disease without angina pectoris - S/P stent placement x 2 ()    Gastroesophageal reflux disease without esophagitis    Chronic diastolic (congestive) heart failure (HCC)    Hyperthyroidism    Age-related osteoporosis with current pathological fracture    Intertrochanteric fracture of left femur, closed, with routine healing, subsequent encounter    Ambulatory dysfunction    Elderly person living alone    Low back pain without sciatica    S/P ORIF (open reduction internal fixation) fracture    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HCC)    Bladder cancer (HCC)    Allergic rhinitis    Anemia due to vitamin B12 deficiency    Biliary dyskinesia    Cholelithiasis    Chronic right shoulder pain    Degenerative joint disease    Depression, controlled    Deviated nasal septum    Gastric reflux syndrome    Heart valve disorder    Herpes zoster infection of thoracic region    IBS (irritable bowel syndrome)    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HCC)    Mild vitamin D deficiency    Mixed hearing loss, unilateral    Pancreatic cyst    Raynaud's disease without gangrene    S/P angioplasty with stent    Urge incontinence of urine    Conjunctivitis of both eyes    Medicare annual wellness visit, subsequent    History of pulmonary embolus (PE)    Age-related cataract of both eyes    Hyperparathyroidism, unspecified ([address])    Fear of falling    Balance problems    Difficulty navigating stairs    Lower abdominal pain, unspecified    History of falling    Bilateral leg edema    Cough due to ACE inhibitor    New onset of headaches    Carotid artery stenosis, asymptomatic, bilateral    Ventral hernia    Cerebral aneurysm without rupture    Intermittent pain and swelling of hand    Right hand paresthesia    BMI 23.0-23.9, adult    Carpal tunnel syndrome of right wrist    Stage 3a chronic kidney disease (HCC)    TIFFANY (renal artery stenosis) ([facility])    History of pneumonia    Fatigue    Swelling of left lower extremity    Rib pain on left side          Manuals 11/13 11/15           Shoulder PROM gentle  SE           Gentle manual cerv tx  SE                                     Exercises             Cervical SB 2x10 2x10           Cervical retr x10 x10           Shoulder ext w/wand x10            Scapular retraction x10 2x10           Wand flexion  5"x10           Wand ER                          Ther Ex                                                                                                                     Ther Activity                                       Gait Training                                       Modalities

## 2023-11-20 ENCOUNTER — OFFICE VISIT (OUTPATIENT)
Dept: PHYSICAL THERAPY | Facility: REHABILITATION | Age: 88
End: 2023-11-20
Payer: MEDICARE

## 2023-11-20 DIAGNOSIS — M25.519 CHRONIC SHOULDER PAIN, UNSPECIFIED LATERALITY: ICD-10-CM

## 2023-11-20 DIAGNOSIS — R07.81 RIB PAIN ON LEFT SIDE: Primary | ICD-10-CM

## 2023-11-20 DIAGNOSIS — G89.29 CHRONIC SHOULDER PAIN, UNSPECIFIED LATERALITY: ICD-10-CM

## 2023-11-20 PROCEDURE — 97140 MANUAL THERAPY 1/> REGIONS: CPT | Performed by: PHYSICAL THERAPIST

## 2023-11-20 PROCEDURE — 97110 THERAPEUTIC EXERCISES: CPT | Performed by: PHYSICAL THERAPIST

## 2023-11-20 NOTE — PROGRESS NOTES
Daily Note     Today's date: 11/15/2023  Patient name: Ofelia Moore  : 1935  MRN: [id number]  Referring provider: Andreia Otoole DO  Dx:   Encounter Diagnosis     ICD-10-CM    1. Rib pain on left side  R07.81       2. Chronic shoulder pain, unspecified laterality  M25.519     G89.29                      Subjective: Pt reports that she can reach overhead with her left hand now. Objective: See treatment diary below      Assessment: Tolerated treatment well. Shoulder ROM is improving nicely. Significant spasm right UT and levator. This reduced with STM. Pt demonstrates appropriate challenge and fatigue following treatment and would benefit from continued skilled PT to improve current deficits and maximize function. Plan: Continue per plan of care.         Precautions:   Patient Active Problem List   Diagnosis    Essential hypertension    Mixed hyperlipidemia    Coronary artery disease without angina pectoris - S/P stent placement x 2 ()    Gastroesophageal reflux disease without esophagitis    Chronic diastolic (congestive) heart failure (HCC)    Hyperthyroidism    Age-related osteoporosis with current pathological fracture    Intertrochanteric fracture of left femur, closed, with routine healing, subsequent encounter    Ambulatory dysfunction    Elderly person living alone    Low back pain without sciatica    S/P ORIF (open reduction internal fixation) fracture    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HCC)    Bladder cancer (HCC)    Allergic rhinitis    Anemia due to vitamin B12 deficiency    Biliary dyskinesia    Cholelithiasis    Chronic right shoulder pain    Degenerative joint disease    Depression, controlled    Deviated nasal septum    Gastric reflux syndrome    Heart valve disorder    Herpes zoster infection of thoracic region    IBS (irritable bowel syndrome)    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HCC)    Mild vitamin D deficiency    Mixed hearing loss, unilateral Pancreatic cyst    Raynaud's disease without gangrene    S/P angioplasty with stent    Urge incontinence of urine    Conjunctivitis of both eyes    Medicare annual wellness visit, subsequent    History of pulmonary embolus (PE)    Age-related cataract of both eyes    Hyperparathyroidism, unspecified ([facility])    Fear of falling    Balance problems    Difficulty navigating stairs    Lower abdominal pain, unspecified    History of falling    Bilateral leg edema    Cough due to ACE inhibitor    New onset of headaches    Carotid artery stenosis, asymptomatic, bilateral    Ventral hernia    Cerebral aneurysm without rupture    Intermittent pain and swelling of hand    Right hand paresthesia    BMI 23.0-23.9, adult    Carpal tunnel syndrome of right wrist    Stage 3a chronic kidney disease (HCC)    TIFFANY (renal artery stenosis) ([facility])    History of pneumonia    Fatigue    Swelling of left lower extremity    Rib pain on left side          Manuals 11/13 11/15 11/20          Shoulder PROM gentle  SE NT          Gentle manual cerv tx  SE NT          STM R UT, levator   NT                       Exercises             Cervical SB 2x10 2x10 X10 ea          Cervical retr x10 x10 X10 w/op          Shoulder ext w/wand x10  5"x10          Scapular retraction x10 2x10 2x10          Wand flexion  5"x10 5"x10          Wand abduction standing   5"x10                                                                                                                                            Ther Activity                                       Gait Training                                       Modalities             MH   10min

## 2023-11-27 ENCOUNTER — OFFICE VISIT (OUTPATIENT)
Dept: PHYSICAL THERAPY | Facility: REHABILITATION | Age: 88
End: 2023-11-27
Payer: MEDICARE

## 2023-11-27 DIAGNOSIS — G89.29 CHRONIC SHOULDER PAIN, UNSPECIFIED LATERALITY: ICD-10-CM

## 2023-11-27 DIAGNOSIS — R07.81 RIB PAIN ON LEFT SIDE: Primary | ICD-10-CM

## 2023-11-27 DIAGNOSIS — M25.519 CHRONIC SHOULDER PAIN, UNSPECIFIED LATERALITY: ICD-10-CM

## 2023-11-27 PROCEDURE — 97140 MANUAL THERAPY 1/> REGIONS: CPT

## 2023-11-27 PROCEDURE — 97110 THERAPEUTIC EXERCISES: CPT

## 2023-11-27 NOTE — PROGRESS NOTES
Daily Note     Today's date: 2023  Patient name: Rufino Beck  : 1935  MRN: [id number]  Referring provider: Laura Calderon DO  Dx:   Encounter Diagnosis     ICD-10-CM    1. Rib pain on left side  R07.81       2. Chronic shoulder pain, unspecified laterality  M25.519     G89.29                      Subjective: Gwen Kennedy reports that her shoulder and rib area is better but one of the exercises is causing her some back pain. Patient reports that she was able to reach up onto a shelf to reach a plate which she couldn't do. Objective: See treatment diary below      Assessment: Patient tolerated treatment well. Patient performed ex and received manual therapy as noted. Provided cues to ensure good ex technique and benefit. Patient demonstrated that she as doing shoulder abd with wand which was bothering her back. Reviewed ER with suzanna and issued an updated written HEP. Patient would benefit from continued PT intervention to address deficits and attain set goals. Plan: Continue per plan of care.         Precautions:   Patient Active Problem List   Diagnosis    Essential hypertension    Mixed hyperlipidemia    Coronary artery disease without angina pectoris - S/P stent placement x 2 ()    Gastroesophageal reflux disease without esophagitis    Chronic diastolic (congestive) heart failure (HCC)    Hyperthyroidism    Age-related osteoporosis with current pathological fracture    Intertrochanteric fracture of left femur, closed, with routine healing, subsequent encounter    Ambulatory dysfunction    Elderly person living alone    Low back pain without sciatica    S/P ORIF (open reduction internal fixation) fracture    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HCC)    Bladder cancer (HCC)    Allergic rhinitis    Anemia due to vitamin B12 deficiency    Biliary dyskinesia    Cholelithiasis    Chronic right shoulder pain    Degenerative joint disease    Depression, controlled    Deviated nasal septum    Gastric reflux syndrome    Heart valve disorder    Herpes zoster infection of thoracic region    IBS (irritable bowel syndrome)    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HCC)    Mild vitamin D deficiency    Mixed hearing loss, unilateral    Pancreatic cyst    Raynaud's disease without gangrene    S/P angioplasty with stent    Urge incontinence of urine    Conjunctivitis of both eyes    Medicare annual wellness visit, subsequent    History of pulmonary embolus (PE)    Age-related cataract of both eyes    Hyperparathyroidism, unspecified ([address])    Fear of falling    Balance problems    Difficulty navigating stairs    Lower abdominal pain, unspecified    History of falling    Bilateral leg edema    Cough due to ACE inhibitor    New onset of headaches    Carotid artery stenosis, asymptomatic, bilateral    Ventral hernia    Cerebral aneurysm without rupture    Intermittent pain and swelling of hand    Right hand paresthesia    BMI 23.0-23.9, adult    Carpal tunnel syndrome of right wrist    Stage 3a chronic kidney disease ([address])    TIFFANY (renal artery stenosis) ([facility])    History of pneumonia    Fatigue    Swelling of left lower extremity    Rib pain on left side          Manuals 11/13 11/15 11/27          Shoulder PROM gentle  SE CC          Gentle manual cerv tx  SE CC                                    Exercises             Cervical SB 2x10 2x10 2x10          Cervical retr x10 x10 x10          Shoulder ext w/wand x10  5"x10          Scapular retraction x10 2x10 2x10          Wand flexion  5"x10 5"x10          Wand ER   5"x10                       Ther Ex                                                                                                                     Ther Activity                                       Gait Training                                       Modalities                                            Access Code: [email or web address]  URL: [email or web address]  Date: 11/27/2023  Prepared by: Felizardo Merlin    Exercises  - Standing Shoulder External Rotation AAROM with Dowel  - 1 x daily - 7 x weekly - 1 sets - 10 reps - 5 hold details… detailed exam

## 2023-11-29 ENCOUNTER — OFFICE VISIT (OUTPATIENT)
Dept: PHYSICAL THERAPY | Facility: REHABILITATION | Age: 88
End: 2023-11-29
Payer: MEDICARE

## 2023-11-29 DIAGNOSIS — G89.29 CHRONIC SHOULDER PAIN, UNSPECIFIED LATERALITY: ICD-10-CM

## 2023-11-29 DIAGNOSIS — M25.519 CHRONIC SHOULDER PAIN, UNSPECIFIED LATERALITY: ICD-10-CM

## 2023-11-29 DIAGNOSIS — R07.81 RIB PAIN ON LEFT SIDE: Primary | ICD-10-CM

## 2023-11-29 PROCEDURE — 97110 THERAPEUTIC EXERCISES: CPT | Performed by: PHYSICAL THERAPIST

## 2023-11-29 PROCEDURE — 97140 MANUAL THERAPY 1/> REGIONS: CPT | Performed by: PHYSICAL THERAPIST

## 2023-11-29 NOTE — PROGRESS NOTES
Daily Note     Today's date: 2023  Patient name: Tyree Ware  : 1935  MRN: [id number]  Referring provider: Bronwyn Colby DO  Dx:   Encounter Diagnosis     ICD-10-CM    1. Rib pain on left side  R07.81       2. Chronic shoulder pain, unspecified laterality  M25.519     G89.29                        Subjective: Danitza Granados is still have pain with standing ER. Her other primary complaint is pain when using the computer and intermittent severe neck pain that she can reduce with extension. Objective: See treatment diary below      Assessment: Patient tolerated treatment well. We changed ER to be done in supine and this went well. I also added cervical extension to be done pro phylactically. I educated her on lowering the monitor so she does not protract and extend when using the computer with her bifocals or buying readers. Patient would benefit from continued PT intervention to address deficits and attain set goals. Plan: Continue per plan of care.         Precautions:   Patient Active Problem List   Diagnosis    Essential hypertension    Mixed hyperlipidemia    Coronary artery disease without angina pectoris - S/P stent placement x 2 ()    Gastroesophageal reflux disease without esophagitis    Chronic diastolic (congestive) heart failure (HCC)    Hyperthyroidism    Age-related osteoporosis with current pathological fracture    Intertrochanteric fracture of left femur, closed, with routine healing, subsequent encounter    Ambulatory dysfunction    Elderly person living alone    Low back pain without sciatica    S/P ORIF (open reduction internal fixation) fracture    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HCC)    Bladder cancer (HCC)    Allergic rhinitis    Anemia due to vitamin B12 deficiency    Biliary dyskinesia    Cholelithiasis    Chronic right shoulder pain    Degenerative joint disease    Depression, controlled    Deviated nasal septum    Gastric reflux syndrome    Heart valve disorder    Herpes zoster infection of thoracic region    IBS (irritable bowel syndrome)    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HCC)    Mild vitamin D deficiency    Mixed hearing loss, unilateral    Pancreatic cyst    Raynaud's disease without gangrene    S/P angioplasty with stent    Urge incontinence of urine    Conjunctivitis of both eyes    Medicare annual wellness visit, subsequent    History of pulmonary embolus (PE)    Age-related cataract of both eyes    Hyperparathyroidism, unspecified ([address])    Fear of falling    Balance problems    Difficulty navigating stairs    Lower abdominal pain, unspecified    History of falling    Bilateral leg edema    Cough due to ACE inhibitor    New onset of headaches    Carotid artery stenosis, asymptomatic, bilateral    Ventral hernia    Cerebral aneurysm without rupture    Intermittent pain and swelling of hand    Right hand paresthesia    BMI 23.0-23.9, adult    Carpal tunnel syndrome of right wrist    Stage 3a chronic kidney disease (HCC)    TIFFANY (renal artery stenosis) ([facility])    History of pneumonia    Fatigue    Swelling of left lower extremity    Rib pain on left side          Manuals 11/13 11/15 11/27 11/29         Shoulder PROM gentle  SE CC NT         Gentle manual cerv tx  SE CC NT                                   Exercises             Cervical SB 2x10 2x10 2x10 2x10         Cervical retr x10 x10 x10 x10         Shoulder ext w/wand x10  5"x10 5"x15         Scapular retraction x10 2x10 2x10 TB PTBx15         Wand flexion  5"x10 5"x10 5"x15         Wand ER   5"x10 5"x10 supine         Cervical extension    x5         Ther Ex                                                                                                                     Ther Activity                                       Gait Training                                       Modalities                                            Access Code: [email or web address]  URL: [email or web address]  Date: 11/27/2023  Prepared by: Kaitlyn Menard    Exercises  - Standing Shoulder External Rotation AAROM with Dowel  - 1 x daily - 7 x weekly - 1 sets - 10 reps - 5 hold

## 2023-12-04 ENCOUNTER — OFFICE VISIT (OUTPATIENT)
Dept: PHYSICAL THERAPY | Facility: REHABILITATION | Age: 88
End: 2023-12-04
Payer: MEDICARE

## 2023-12-04 DIAGNOSIS — R07.81 RIB PAIN ON LEFT SIDE: Primary | ICD-10-CM

## 2023-12-04 DIAGNOSIS — G89.29 CHRONIC SHOULDER PAIN, UNSPECIFIED LATERALITY: ICD-10-CM

## 2023-12-04 DIAGNOSIS — M25.519 CHRONIC SHOULDER PAIN, UNSPECIFIED LATERALITY: ICD-10-CM

## 2023-12-04 PROCEDURE — 97110 THERAPEUTIC EXERCISES: CPT | Performed by: PHYSICAL THERAPIST

## 2023-12-04 PROCEDURE — 97140 MANUAL THERAPY 1/> REGIONS: CPT | Performed by: PHYSICAL THERAPIST

## 2023-12-04 NOTE — PROGRESS NOTES
Daily Note     Today's date: 2023  Patient name: Haylee Vasquez  : 1935  MRN: [id number]  Referring provider: Lopez Diez DO  Dx:   Encounter Diagnosis     ICD-10-CM    1. Rib pain on left side  R07.81       2. Chronic shoulder pain, unspecified laterality  M25.519     G89.29                          Subjective: Pt reports that her shoulder is moving much better. She states that her neck feels sore. Objective: See treatment diary below      Assessment: Patient tolerated treatment well. Shoulder ROM looked great today. Neck motion remains mild to moderately limited. Pt feels that she should be ready for discharge this week. Patient would benefit from continued PT intervention to address deficits and attain set goals. Plan: Continue per plan of care.   Review HTP       Precautions:   Patient Active Problem List   Diagnosis    Essential hypertension    Mixed hyperlipidemia    Coronary artery disease without angina pectoris - S/P stent placement x 2 ()    Gastroesophageal reflux disease without esophagitis    Chronic diastolic (congestive) heart failure (HCC)    Hyperthyroidism    Age-related osteoporosis with current pathological fracture    Intertrochanteric fracture of left femur, closed, with routine healing, subsequent encounter    Ambulatory dysfunction    Elderly person living alone    Low back pain without sciatica    S/P ORIF (open reduction internal fixation) fracture    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HCC)    Bladder cancer (HCC)    Allergic rhinitis    Anemia due to vitamin B12 deficiency    Biliary dyskinesia    Cholelithiasis    Chronic right shoulder pain    Degenerative joint disease    Depression, controlled    Deviated nasal septum    Gastric reflux syndrome    Heart valve disorder    Herpes zoster infection of thoracic region    IBS (irritable bowel syndrome)    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HCC)    Mild vitamin D deficiency    Mixed hearing loss, unilateral    Pancreatic cyst    Raynaud's disease without gangrene    S/P angioplasty with stent    Urge incontinence of urine    Conjunctivitis of both eyes    Medicare annual wellness visit, subsequent    History of pulmonary embolus (PE)    Age-related cataract of both eyes    Hyperparathyroidism, unspecified ([address])    Fear of falling    Balance problems    Difficulty navigating stairs    Lower abdominal pain, unspecified    History of falling    Bilateral leg edema    Cough due to ACE inhibitor    New onset of headaches    Carotid artery stenosis, asymptomatic, bilateral    Ventral hernia    Cerebral aneurysm without rupture    Intermittent pain and swelling of hand    Right hand paresthesia    BMI 23.0-23.9, adult    Carpal tunnel syndrome of right wrist    Stage 3a chronic kidney disease (HCC)    TIFFANY (renal artery stenosis) ([facility])    History of pneumonia    Fatigue    Swelling of left lower extremity    Rib pain on left side          Manuals 11/13 11/15 11/27 11/29 12/4        Shoulder PROM gentle  SE CC NT NT        Gentle manual cerv tx  SE CC NT NT        STM right levator     NT                     Exercises             Cervical SB 2x10 2x10 2x10 2x10 x10        Cervical retr x10 x10 x10 x10 X10 w/op        Shoulder ext w/wand x10  5"x10 5"x15 1#bar 5"x15        Scapular retraction x10 2x10 2x10 TB PTBx15 PTB 5"x20        Wand flexion  5"x10 5"x10 5"x15 1# bar 5"x15        Wand ER   5"x10 5"x10 supine         Cervical extension    x5 x10        UBE     BW 2'        Stacking cones on 2nd shelf     12 up and down                                                                                                   Ther Activity                                       Gait Training                                       Modalities                                            Access Code: [email or web address]  URL: [email or web address]  Date: 11/27/2023  Prepared by: Anthony Banks    Exercises  - Standing Shoulder External Rotation AAROM with Dowel  - 1 x daily - 7 x weekly - 1 sets - 10 reps - 5 hold

## 2023-12-06 ENCOUNTER — OFFICE VISIT (OUTPATIENT)
Dept: PHYSICAL THERAPY | Facility: REHABILITATION | Age: 88
End: 2023-12-06
Payer: MEDICARE

## 2023-12-06 DIAGNOSIS — G89.29 CHRONIC SHOULDER PAIN, UNSPECIFIED LATERALITY: ICD-10-CM

## 2023-12-06 DIAGNOSIS — M25.519 CHRONIC SHOULDER PAIN, UNSPECIFIED LATERALITY: ICD-10-CM

## 2023-12-06 DIAGNOSIS — R07.81 RIB PAIN ON LEFT SIDE: Primary | ICD-10-CM

## 2023-12-06 PROCEDURE — 97110 THERAPEUTIC EXERCISES: CPT

## 2023-12-06 PROCEDURE — 97140 MANUAL THERAPY 1/> REGIONS: CPT

## 2023-12-06 NOTE — PROGRESS NOTES
Daily Note     Today's date: 2023  Patient name: Kaden Thayer  : 1935  MRN: [id number]  Referring provider: Michelle Monroe DO  Dx:   Encounter Diagnosis     ICD-10-CM    1. Rib pain on left side  R07.81       2. Chronic shoulder pain, unspecified laterality  M25.519     G89.29                          Subjective: Pt comes to therapy stating she is feeling very good, denies adverse response to lv or new changes. She states she is ready for today to be her last day. Objective: See treatment diary below      Assessment: Patient tolerated treatment well. Pt with good recall of exercises reviewed for HEP with min cues needed for proper form with completion. Able to progress strengthening for HEP. Pt demonstrates and verbalizes good understanding of HEP and symptom management and is consistently compliant with HEP. Plan: D/C to HEP.        Precautions:   Patient Active Problem List   Diagnosis    Essential hypertension    Mixed hyperlipidemia    Coronary artery disease without angina pectoris - S/P stent placement x 2 ()    Gastroesophageal reflux disease without esophagitis    Chronic diastolic (congestive) heart failure (HCC)    Hyperthyroidism    Age-related osteoporosis with current pathological fracture    Intertrochanteric fracture of left femur, closed, with routine healing, subsequent encounter    Ambulatory dysfunction    Elderly person living alone    Low back pain without sciatica    S/P ORIF (open reduction internal fixation) fracture    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HCC)    Bladder cancer (HCC)    Allergic rhinitis    Anemia due to vitamin B12 deficiency    Biliary dyskinesia    Cholelithiasis    Chronic right shoulder pain    Degenerative joint disease    Depression, controlled    Deviated nasal septum    Gastric reflux syndrome    Heart valve disorder    Herpes zoster infection of thoracic region    IBS (irritable bowel syndrome)    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[address])    Mild vitamin D deficiency    Mixed hearing loss, unilateral    Pancreatic cyst    Raynaud's disease without gangrene    S/P angioplasty with stent    Urge incontinence of urine    Conjunctivitis of both eyes    Medicare annual wellness visit, subsequent    History of pulmonary embolus (PE)    Age-related cataract of both eyes    Hyperparathyroidism, unspecified ([address])    Fear of falling    Balance problems    Difficulty navigating stairs    Lower abdominal pain, unspecified    History of falling    Bilateral leg edema    Cough due to ACE inhibitor    New onset of headaches    Carotid artery stenosis, asymptomatic, bilateral    Ventral hernia    Cerebral aneurysm without rupture    Intermittent pain and swelling of hand    Right hand paresthesia    BMI 23.0-23.9, adult    Carpal tunnel syndrome of right wrist    Stage 3a chronic kidney disease (HCC)    TIFFANY (renal artery stenosis) ([facility])    History of pneumonia    Fatigue    Swelling of left lower extremity    Rib pain on left side            Manuals 11/13 11/15 11/27 11/29 12/4 12/6       Shoulder PROM gentle  SE CC NT NT SE       Gentle manual cerv tx  SE CC NT NT SE       STM right levator     NT SE+ UT                    Exercises             Cervical SB 2x10 2x10 2x10 2x10 x10 x10       Cervical retr x10 x10 x10 x10 X10 w/op X10  OP       Shoulder ext w/wand x10  5"x10 5"x15 1#bar 5"x15 1#bar  5"x20       Scapular retraction x10 2x10 2x10 TB PTBx15 PTB 5"x20 PTB  5"x20       Wand flexion  5"x10 5"x10 5"x15 1# bar 5"x15 1# bar  5"x20       Wand ER   5"x10 5"x10 supine  TB B ER       Cervical extension    x5 x10 x10       UBE     BW 2' BWx       Stacking cones on 2nd shelf     12 up and down 10 up and down                                                                                                  Ther Activity                                       Gait Training                                       Modalities Access Code: [email or web address]  URL: [email or web address]  Date: 11/27/2023  Prepared by: Yuko Denis    Exercises  - Standing Shoulder External Rotation AAROM with Dowel  - 1 x daily - 7 x weekly - 1 sets - 10 reps - 5 hold

## 2023-12-14 ENCOUNTER — APPOINTMENT (OUTPATIENT)
Dept: LAB | Facility: CLINIC | Age: 88
End: 2023-12-14
Payer: MEDICARE

## 2023-12-14 ENCOUNTER — TRANSCRIBE ORDERS (OUTPATIENT)
Dept: LAB | Facility: CLINIC | Age: 88
End: 2023-12-14

## 2023-12-14 DIAGNOSIS — M05.89 OTHER RHEUMATOID ARTHRITIS WITH RHEUMATOID FACTOR OF MULTIPLE SITES (HCC): Primary | ICD-10-CM

## 2023-12-14 DIAGNOSIS — Z79.899 ENCOUNTER FOR LONG-TERM (CURRENT) USE OF OTHER MEDICATIONS: ICD-10-CM

## 2023-12-14 DIAGNOSIS — M05.89 OTHER RHEUMATOID ARTHRITIS WITH RHEUMATOID FACTOR OF MULTIPLE SITES (HCC): ICD-10-CM

## 2023-12-14 LAB
ALBUMIN SERPL BCP-MCNC: 4.4 G/DL (ref 3.5–5)
ALP SERPL-CCNC: 69 U/L (ref 34–104)
ALT SERPL W P-5'-P-CCNC: 26 U/L (ref 7–52)
ANION GAP SERPL CALCULATED.3IONS-SCNC: 6 MMOL/L
AST SERPL W P-5'-P-CCNC: 25 U/L (ref 13–39)
BASOPHILS # BLD AUTO: 0.05 THOUSANDS/ÂΜL (ref 0–0.1)
BASOPHILS NFR BLD AUTO: 1 % (ref 0–1)
BILIRUB SERPL-MCNC: 0.69 MG/DL (ref 0.2–1)
BUN SERPL-MCNC: 23 MG/DL (ref 5–25)
CALCIUM SERPL-MCNC: 9.4 MG/DL (ref 8.4–10.2)
CHLORIDE SERPL-SCNC: 101 MMOL/L (ref 96–108)
CO2 SERPL-SCNC: 30 MMOL/L (ref 21–32)
CREAT SERPL-MCNC: 0.76 MG/DL (ref 0.6–1.3)
CRP SERPL QL: <1 MG/L
EOSINOPHIL # BLD AUTO: 0.11 THOUSAND/ÂΜL (ref 0–0.61)
EOSINOPHIL NFR BLD AUTO: 2 % (ref 0–6)
ERYTHROCYTE [DISTWIDTH] IN BLOOD BY AUTOMATED COUNT: 12.6 % (ref 11.6–15.1)
ERYTHROCYTE [SEDIMENTATION RATE] IN BLOOD: 14 MM/HOUR (ref 0–29)
GFR SERPL CREATININE-BSD FRML MDRD: 71 ML/MIN/1.73SQ M
GLUCOSE P FAST SERPL-MCNC: 73 MG/DL (ref 65–99)
HCT VFR BLD AUTO: 38.2 % (ref 34.8–46.1)
HGB BLD-MCNC: 12.6 G/DL (ref 11.5–15.4)
IMM GRANULOCYTES # BLD AUTO: 0.01 THOUSAND/UL (ref 0–0.2)
IMM GRANULOCYTES NFR BLD AUTO: 0 % (ref 0–2)
LYMPHOCYTES # BLD AUTO: 1.9 THOUSANDS/ÂΜL (ref 0.6–4.47)
LYMPHOCYTES NFR BLD AUTO: 39 % (ref 14–44)
MCH RBC QN AUTO: 33 PG (ref 26.8–34.3)
MCHC RBC AUTO-ENTMCNC: 33 G/DL (ref 31.4–37.4)
MCV RBC AUTO: 100 FL (ref 82–98)
MONOCYTES # BLD AUTO: 0.43 THOUSAND/ÂΜL (ref 0.17–1.22)
MONOCYTES NFR BLD AUTO: 9 % (ref 4–12)
NEUTROPHILS # BLD AUTO: 2.44 THOUSANDS/ÂΜL (ref 1.85–7.62)
NEUTS SEG NFR BLD AUTO: 49 % (ref 43–75)
NRBC BLD AUTO-RTO: 0 /100 WBCS
PLATELET # BLD AUTO: 176 THOUSANDS/UL (ref 149–390)
PMV BLD AUTO: 10.7 FL (ref 8.9–12.7)
POTASSIUM SERPL-SCNC: 3.7 MMOL/L (ref 3.5–5.3)
PROT SERPL-MCNC: 7 G/DL (ref 6.4–8.4)
RBC # BLD AUTO: 3.82 MILLION/UL (ref 3.81–5.12)
SODIUM SERPL-SCNC: 137 MMOL/L (ref 135–147)
WBC # BLD AUTO: 4.94 THOUSAND/UL (ref 4.31–10.16)

## 2023-12-14 PROCEDURE — 86140 C-REACTIVE PROTEIN: CPT

## 2023-12-14 PROCEDURE — 36415 COLL VENOUS BLD VENIPUNCTURE: CPT

## 2023-12-14 PROCEDURE — 85025 COMPLETE CBC W/AUTO DIFF WBC: CPT

## 2023-12-14 PROCEDURE — 85652 RBC SED RATE AUTOMATED: CPT

## 2023-12-14 PROCEDURE — 80053 COMPREHEN METABOLIC PANEL: CPT

## 2024-01-26 ENCOUNTER — RA CDI HCC (OUTPATIENT)
Dept: OTHER | Facility: HOSPITAL | Age: 89
End: 2024-01-26

## 2024-01-29 ENCOUNTER — OFFICE VISIT (OUTPATIENT)
Dept: FAMILY MEDICINE CLINIC | Facility: CLINIC | Age: 89
End: 2024-01-29
Payer: MEDICARE

## 2024-01-29 ENCOUNTER — APPOINTMENT (OUTPATIENT)
Dept: LAB | Facility: CLINIC | Age: 89
End: 2024-01-29
Payer: MEDICARE

## 2024-01-29 ENCOUNTER — TRANSCRIBE ORDERS (OUTPATIENT)
Dept: LAB | Facility: CLINIC | Age: 89
End: 2024-01-29

## 2024-01-29 VITALS
HEART RATE: 75 BPM | SYSTOLIC BLOOD PRESSURE: 120 MMHG | DIASTOLIC BLOOD PRESSURE: 63 MMHG | HEIGHT: 61 IN | BODY MASS INDEX: 23.18 KG/M2 | OXYGEN SATURATION: 100 % | WEIGHT: 122.8 LBS

## 2024-01-29 DIAGNOSIS — F32.A DEPRESSION, CONTROLLED: ICD-10-CM

## 2024-01-29 DIAGNOSIS — C67.9 MALIGNANT NEOPLASM OF URINARY BLADDER, UNSPECIFIED SITE (HCC): ICD-10-CM

## 2024-01-29 DIAGNOSIS — Z79.620 LONG TERM (CURRENT) USE OF IMMUNOSUPPRESSIVE BIOLOGIC: ICD-10-CM

## 2024-01-29 DIAGNOSIS — I50.32 CHRONIC DIASTOLIC (CONGESTIVE) HEART FAILURE (HCC): Chronic | ICD-10-CM

## 2024-01-29 DIAGNOSIS — N39.41 URGE INCONTINENCE OF URINE: ICD-10-CM

## 2024-01-29 DIAGNOSIS — I10 ESSENTIAL HYPERTENSION: ICD-10-CM

## 2024-01-29 DIAGNOSIS — C91.10 CHRONIC LARGE GRANULAR LYMPHOCYTIC LEUKEMIA (HCC): ICD-10-CM

## 2024-01-29 DIAGNOSIS — E21.3 HYPERPARATHYROIDISM, UNSPECIFIED (HCC): ICD-10-CM

## 2024-01-29 DIAGNOSIS — M05.9 SEROPOSITIVE RHEUMATOID ARTHRITIS (HCC): ICD-10-CM

## 2024-01-29 DIAGNOSIS — M06.4 INFLAMMATORY POLYARTHROPATHIES (HCC): ICD-10-CM

## 2024-01-29 DIAGNOSIS — I73.00 RAYNAUD'S DISEASE WITHOUT GANGRENE: ICD-10-CM

## 2024-01-29 DIAGNOSIS — Z00.00 MEDICARE ANNUAL WELLNESS VISIT, SUBSEQUENT: Primary | ICD-10-CM

## 2024-01-29 DIAGNOSIS — I10 ESSENTIAL HYPERTENSION, MALIGNANT: Primary | ICD-10-CM

## 2024-01-29 DIAGNOSIS — N18.31 STAGE 3A CHRONIC KIDNEY DISEASE (HCC): ICD-10-CM

## 2024-01-29 DIAGNOSIS — Z79.899 ENCOUNTER FOR LONG-TERM (CURRENT) USE OF OTHER MEDICATIONS: ICD-10-CM

## 2024-01-29 DIAGNOSIS — I70.1 RAS (RENAL ARTERY STENOSIS) (HCC): ICD-10-CM

## 2024-01-29 PROBLEM — E05.90 HYPERTHYROIDISM: Status: RESOLVED | Noted: 2018-04-10 | Resolved: 2024-01-29

## 2024-01-29 LAB
PTH-INTACT SERPL-MCNC: 99.2 PG/ML (ref 12–88)
TSH SERPL DL<=0.05 MIU/L-ACNC: 3.25 UIU/ML (ref 0.45–4.5)

## 2024-01-29 PROCEDURE — 36415 COLL VENOUS BLD VENIPUNCTURE: CPT

## 2024-01-29 PROCEDURE — 83970 ASSAY OF PARATHORMONE: CPT

## 2024-01-29 PROCEDURE — 84443 ASSAY THYROID STIM HORMONE: CPT

## 2024-01-29 PROCEDURE — 99214 OFFICE O/P EST MOD 30 MIN: CPT | Performed by: FAMILY MEDICINE

## 2024-01-29 PROCEDURE — G0439 PPPS, SUBSEQ VISIT: HCPCS | Performed by: FAMILY MEDICINE

## 2024-01-29 RX ORDER — CERTOLIZUMAB PEGOL 400 MG
KIT SUBCUTANEOUS
COMMUNITY
Start: 2023-11-21

## 2024-01-29 NOTE — PROGRESS NOTES
Assessment and Plan:     Problem List Items Addressed This Visit          Endocrine    Hyperparathyroidism, unspecified (HCC)    Relevant Orders    TSH, 3rd generation with Free T4 reflex (Completed)    PTH, intact (Completed)       Cardiovascular and Mediastinum    Chronic diastolic (congestive) heart failure (HCC) (Chronic)    Raynaud's disease without gangrene    TIFFANY (renal artery stenosis) (HCC)    Essential hypertension    Relevant Orders    Albumin / creatinine urine ratio       Musculoskeletal and Integument    Seropositive rheumatoid arthritis (HCC)    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HCC)       Genitourinary    Stage 3a chronic kidney disease (HCC)    Bladder cancer (HCC)       Other    Urge incontinence of urine    Medicare annual wellness visit, subsequent - Primary    Long term (current) use of immunosuppressive biologic    Relevant Orders    TSH, 3rd generation with Free T4 reflex (Completed)    Depression, controlled    Relevant Orders    TSH, 3rd generation with Free T4 reflex (Completed)    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HCC)     Continue Cymbalta    Depression Screening and Follow-up Plan: Patient's depression screening was positive with a PHQ-9 score of 6. Patient advised to follow-up with PCP for further management.     Urinary Incontinence Plan of Care: counseling topics discussed: practice Kegel (pelvic floor strengthening) exercises, use restroom every 2 hours and limiting fluid intake 3-4 hours before bed.     Preventive health issues were discussed with patient, and age appropriate screening tests were ordered as noted in patient's After Visit Summary.  Personalized health advice and appropriate referrals for health education or preventive services given if needed, as noted in patient's After Visit Summary.     History of Present Illness:     Patient presents for a Medicare Wellness Visit + f/u    Scheduled Medicare AWV + f/u  Pt's depression screening is positive today- pt  "states, \"Think more now of family that I've lost- my daughter and  - but I just say 'Lord help' and then I'll go do a puzzle\" - \"Does affect me more than it has as I've gotten older- thinking of all this stuff\"       Patient Care Team:  Anjelica Sr DO as PCP - General (Family Medicine)  MD Anjelica Suárez, MD Monica Turner, DO Shayne Sparks DO     Review of Systems:     Review of Systems   Constitutional:  Positive for fatigue. Negative for chills, diaphoresis, fever and unexpected weight change.   Respiratory: Negative.     Cardiovascular: Negative.         Problem List:     Patient Active Problem List   Diagnosis    Essential hypertension    Mixed hyperlipidemia    Coronary artery disease without angina pectoris - S/P stent placement x 2 (2011)    Gastroesophageal reflux disease without esophagitis    Chronic diastolic (congestive) heart failure (HCC)    Age-related osteoporosis with current pathological fracture    Intertrochanteric fracture of left femur, closed, with routine healing, subsequent encounter    Ambulatory dysfunction    Elderly person living alone    Low back pain without sciatica    S/P ORIF (open reduction internal fixation) fracture    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HCC)    Bladder cancer (HCC)    Allergic rhinitis    Anemia due to vitamin B12 deficiency    Biliary dyskinesia    Cholelithiasis    Chronic right shoulder pain    Degenerative joint disease    Depression, controlled    Deviated nasal septum    Gastric reflux syndrome    Heart valve disorder    Herpes zoster infection of thoracic region    IBS (irritable bowel syndrome)    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HCC)    Mild vitamin D deficiency    Mixed hearing loss, unilateral    Pancreatic cyst    Raynaud's disease without gangrene    S/P angioplasty with stent    Urge incontinence of urine    Conjunctivitis of both eyes    Medicare annual wellness visit, " subsequent    History of pulmonary embolus (PE)    Age-related cataract of both eyes    Hyperparathyroidism, unspecified (HCC)    Fear of falling    Balance problems    Difficulty navigating stairs    Lower abdominal pain, unspecified    History of falling    Bilateral leg edema    Cough due to ACE inhibitor    New onset of headaches    Carotid artery stenosis, asymptomatic, bilateral    Ventral hernia    Cerebral aneurysm without rupture    Intermittent pain and swelling of hand    Right hand paresthesia    BMI 23.0-23.9, adult    Carpal tunnel syndrome of right wrist    Stage 3a chronic kidney disease (HCC)    TIFFANY (renal artery stenosis) (HCC)    History of pneumonia    Fatigue    Swelling of left lower extremity    Rib pain on left side    Seropositive rheumatoid arthritis (HCC)    Long term (current) use of immunosuppressive biologic      Past Medical and Surgical History:     Past Medical History:   Diagnosis Date    Bladder cancer (HCC)     had BCG treatments    Bladder cancer (HCC)     Closed displaced fracture of left trapezium bone 05/18/2017    Coronary artery disease     S/P stent placement x 2 in 2011    GERD (gastroesophageal reflux disease)     Hepatitis     got this from food back in 1970s, has not had a problem since    History of leukemia     in remission    History of stomach ulcers 1970    History of transfusion 1970    Hyperlipidemia     Hypertension     Hyperthyroidism 04/10/2018    Irritable bowel syndrome     Kidney stone     Kidney stones     Persistent cough for 3 weeks or longer 03/10/2020    Pneumonia     Pt had in 2003 and 2004    Pruritic rash 09/03/2019    Pulmonary emboli (HCC) 1970s    Raynaud disease     Shingles     Sleep apnea     Thrombocytopenia (HCC) 02/20/2012     Past Surgical History:   Procedure Laterality Date    CATARACT EXTRACTION      COLONOSCOPY      CORONARY ANGIOPLASTY WITH STENT PLACEMENT      stent x 2    CYSTOSCOPY      diagnostic - onset 4/4/17    EGD      EYE  SURGERY      FRACTURE SURGERY      HERNIA REPAIR      HYSTERECTOMY      LEG SURGERY      OOPHORECTOMY      OK NDSC WRST SURG W/RLS TRANSVRS CARPL LIGM Right 10/12/2021    Procedure: Right endoscopic carpal tunnel release;  Surgeon: Ghanshyam Rosado MD;  Location: [facility];  Service: Orthopedics    OK OPTX FEM SHFT FX W/INSJ IMED IMPLT W/WO SCREW Left 4/8/2018    Procedure: INSERTION NAIL IM FEMUR ANTEGRADE (TROCHANTERIC);  Surgeon: Lucrecia Boyle MD;  Location: [facility];  Service: Orthopedics    OK REPAIR FIRST ABDOMINAL WALL HERNIA N/A 5/12/2021    Procedure: REPAIR HERNIA VENTRAL;  Surgeon: Rui Pickett MD;  Location: [facility];  Service: General    TONSILLECTOMY        Family History:     Family History   Problem Relation Age of Onset    Arthritis Mother     Osteoporosis Mother     Heart disease Father     Hypertension Father     Hypertension Brother     Prostate cancer Brother     Breast cancer Daughter 50    Prostate cancer Son       Social History:     Social History     Socioeconomic History    Marital status:      Spouse name: None    Number of children: None    Years of education: None    Highest education level: None   Occupational History    None   Tobacco Use    Smoking status: Never    Smokeless tobacco: Never   Vaping Use    Vaping status: Never Used   Substance and Sexual Activity    Alcohol use: No    Drug use: No    Sexual activity: Never   Other Topics Concern    None   Social History Narrative    Advance directives declined by parents    Always uses seat belt     Social Determinants of Health     Financial Resource Strain: Low Risk  (1/29/2024)    Overall Financial Resource Strain (CARDIA)     Difficulty of Paying Living Expenses: Not hard at all   Food Insecurity: Not on file   Transportation Needs: No Transportation Needs (1/29/2024)    PRAPARE - Transportation     Lack of Transportation (Medical): No     Lack of Transportation (Non-Medical): No   Physical Activity: Not on file    Stress: Not on file   Social Connections: Not on file   Intimate Partner Violence: Not on file   Housing Stability: Not on file      Medications and Allergies:     Current Outpatient Medications   Medication Sig Dispense Refill    acetaminophen (Tylenol 8 Hour) 650 mg CR tablet Take 1 tablet (650 mg total) by mouth every 8 (eight) hours 15 tablet 0    amLODIPine (NORVASC) 5 mg tablet TAKE 1 TABLET BY MOUTH TWICE  DAILY 180 tablet 3    aspirin 81 MG tablet Take 81 mg by mouth daily Resume on 4/28       atorvastatin (LIPITOR) 40 mg tablet TAKE 1 TABLET BY MOUTH  DAILY AFTER DINNER 90 tablet 1    bumetanide (BUMEX) 1 mg tablet TAKE 1/2 A TABLET BY MOUTH  DAILY TAKE A WHOLE TABLET IF  NEEDED 90 tablet 3    carvedilol (COREG) 12.5 mg tablet TAKE ONE-HALF TABLET BY  MOUTH TWICE DAILY WITH  MEALS 90 tablet 3    certolizumab (Cimzia) 2 x 200 mg Inject 2 mL every 4 weeks by subcutaneous route.      Cholecalciferol 2000 units TABS Take 2,000 Units by mouth in the morning. Resume on 4/28 .      Diclofenac Sodium (VOLTAREN) 1 % Apply 2 g topically 4 (four) times a day 50 g 0    DULoxetine (CYMBALTA) 30 mg delayed release capsule TAKE 1 CAPSULE BY MOUTH  DAILY 90 capsule 3    fexofenadine (ALLEGRA) 180 MG tablet Take 180 mg by mouth daily as needed       folic acid (FOLVITE) 1 mg tablet Take 1 mg by mouth daily      losartan (COZAAR) 100 MG tablet TAKE 1 TABLET BY MOUTH DAILY 90 tablet 3    methotrexate 2.5 mg tablet Take 10 mg by mouth once a week      MULTIPLE VITAMIN PO Take by mouth daily       omeprazole (PriLOSEC) 40 MG capsule TAKE 1 CAPSULE BY MOUTH  DAILY 90 capsule 3     No current facility-administered medications for this visit.     Allergies   Allergen Reactions    Lactose - Food Allergy GI Intolerance      Immunizations:     Immunization History   Administered Date(s) Administered    COVID-19 MODERNA VACC 0.25 ML IM BOOSTER 11/17/2021    COVID-19 MODERNA VACC 0.5 ML IM 01/23/2021, 02/18/2021, 11/17/2021     "COVID-19, unspecified 02/01/2021    INFLUENZA 06/27/2018, 10/03/2018, 10/11/2018, 10/15/2019, 10/07/2020, 10/20/2022    Influenza Split High Dose Preservative Free IM 10/04/2016, 10/05/2017    Influenza, high dose seasonal 0.7 mL 10/11/2018, 10/15/2019, 10/29/2020, 10/06/2021, 10/20/2022, 10/10/2023    Influenza, seasonal, injectable 06/27/2018, 10/03/2018, 10/11/2018, 10/15/2019, 10/07/2020    Pneumococcal Conjugate 13-Valent 07/24/2017    Pneumococcal Conjugate Vaccine 20-valent (Pcv20), Polysace 10/10/2023    Pneumococcal Polysaccharide PPV23 01/07/2019    Zoster 01/01/2016    influenza, injectable, quadrivalent 10/08/2021      Health Maintenance:         Topic Date Due    Hepatitis C Screening  Completed         Topic Date Due    COVID-19 Vaccine (6 - 2023-24 season) 09/01/2023      Medicare Screening Tests and Risk Assessments:     Radha is here for her Subsequent Wellness visit. Last Medicare Wellness visit information reviewed, patient interviewed and updates made to the record today.      Health Risk Assessment:   Patient rates overall health as fair. Patient feels that their physical health rating is slightly worse. Patient is satisfied with their life. Eyesight was rated as same. Hearing was rated as same. Patient feels that their emotional and mental health rating is slightly worse. Patients states they are never, rarely angry. Patient states they are always unusually tired/fatigued. Pain experienced in the last 7 days has been some. Patient's pain rating has been 5/10. Patient states that she has experienced no weight loss or gain in last 6 months.     Depression Screening:   PHQ-9 Score: 6      Fall Risk Screening:   In the past year, patient has experienced: no history of falling in past year      Urinary Incontinence Screening:   Patient has leaked urine accidently in the last six months. Unchanged urge incontinence \"every once in awhile- it's not a big problem\"    Home Safety:  Patient does not have " trouble with stairs inside or outside of their home. Patient has working smoke alarms and has working carbon monoxide detector. Home safety hazards include: none.     Nutrition:   Current diet is Regular and Other (please comment). No cholesterol, no fat, no skin, no salt    Medications:   Patient is currently taking over-the-counter supplements. OTC medications include: see medication list. Patient is able to manage medications.     Activities of Daily Living (ADLs)/Instrumental Activities of Daily Living (IADLs):   Walk and transfer into and out of bed and chair?: Yes  Dress and groom yourself?: Yes    Bathe or shower yourself?: Yes    Feed yourself? Yes  Do your laundry/housekeeping?: Yes  Manage your money, pay your bills and track your expenses?: Yes  Make your own meals?: Yes    Do your own shopping?: Yes    Previous Hospitalizations:   Any hospitalizations or ED visits within the last 12 months?: No      Advance Care Planning:   Living will: Yes    Advanced directive: Yes      Cognitive Screening:   Provider or family/friend/caregiver concerned regarding cognition?: No    PREVENTIVE SCREENINGS      Cardiovascular Screening:    General: Screening Not Indicated and History Lipid Disorder      Diabetes Screening:     General: Screening Current      Colorectal Cancer Screening:     General: Screening Not Indicated      Breast Cancer Screening:     General: Patient Declines      Cervical Cancer Screening:    General: Screening Not Indicated      Osteoporosis Screening:    General: History Osteoporosis, Risks and Benefits Discussed and Patient Declines      Abdominal Aortic Aneurysm (AAA) Screening:        General: Screening Current      Lung Cancer Screening:     General: Screening Not Indicated      Hepatitis C Screening:    General: Screening Current    Screening, Brief Intervention, and Referral to Treatment (SBIRT)    Screening  Typical number of drinks in a day: 0  Typical number of drinks in a week:  "0  Interpretation: Low risk drinking behavior.    Single Item Drug Screening:  How often have you used an illegal drug (including marijuana) or a prescription medication for non-medical reasons in the past year? never    Single Item Drug Screen Score: 0  Interpretation: Negative screen for possible drug use disorder    No results found.     Physical Exam:     /63 (BP Location: Left arm, Patient Position: Sitting, Cuff Size: Standard)   Pulse 75   Ht 5' 1\" (1.549 m)   Wt 55.7 kg (122 lb 12.8 oz)   SpO2 100%   BMI 23.20 kg/m²     Physical Exam  Vitals and nursing note reviewed.   Constitutional:       General: She is not in acute distress.     Appearance: Normal appearance. She is well-developed and well-groomed. She is not ill-appearing, toxic-appearing or diaphoretic.      Comments: Appears much younger than stated age; extremely pleasant as always   HENT:      Head: Normocephalic and atraumatic.      Nose: Nose normal.      Mouth/Throat:      Mouth: Mucous membranes are moist.   Eyes:      General: Lids are normal.      Extraocular Movements: Extraocular movements intact.      Conjunctiva/sclera: Conjunctivae normal.      Pupils: Pupils are equal, round, and reactive to light.   Neck:      Thyroid: No thyroid mass, thyromegaly or thyroid tenderness.      Vascular: No JVD.      Trachea: Trachea and phonation normal.   Cardiovascular:      Rate and Rhythm: Normal rate and regular rhythm.      Heart sounds: S1 normal and S2 normal.   Pulmonary:      Effort: Pulmonary effort is normal.      Breath sounds: Normal breath sounds and air entry.   Abdominal:      General: Bowel sounds are normal.      Palpations: Abdomen is soft. There is no hepatomegaly, splenomegaly or mass.      Tenderness: There is no abdominal tenderness.   Musculoskeletal:      Cervical back: Neck supple.      Right lower leg: No edema.      Left lower leg: No edema.   Lymphadenopathy:      Cervical: No cervical adenopathy.   Skin:     " Coloration: Skin is not pale.   Neurological:      Mental Status: She is alert and oriented to person, place, and time.   Psychiatric:         Mood and Affect: Mood and affect normal.         Behavior: Behavior normal. Behavior is cooperative.         Thought Content: Thought content normal.         Cognition and Memory: Cognition normal.          Anjelica Sr DO  Depression Screening Follow-up Plan: Patient's depression screening was positive with a PHQ-2 score of . Their PHQ-9 score was 6. Patient advised to follow-up with PCP for further management. Continue Cymbalta

## 2024-01-29 NOTE — PATIENT INSTRUCTIONS
Can try V8 Hydrate drinks or squeezable packets of vegetables/fruits pureed      Medicare Preventive Visit Patient Instructions  Thank you for completing your Welcome to Medicare Visit or Medicare Annual Wellness Visit today. Your next wellness visit will be due in one year (1/29/2025).  The screening/preventive services that you may require over the next 5-10 years are detailed below. Some tests may not apply to you based off risk factors and/or age. Screening tests ordered at today's visit but not completed yet may show as past due. Also, please note that scanned in results may not display below.  Preventive Screenings:  Service Recommendations Previous Testing/Comments   Colorectal Cancer Screening  * Colonoscopy    * Fecal Occult Blood Test (FOBT)/Fecal Immunochemical Test (FIT)  * Fecal DNA/Cologuard Test  * Flexible Sigmoidoscopy Age: 45-75 years old   Colonoscopy: every 10 years (may be performed more frequently if at higher risk)  OR  FOBT/FIT: every 1 year  OR  Cologuard: every 3 years  OR  Sigmoidoscopy: every 5 years  Screening may be recommended earlier than age 45 if at higher risk for colorectal cancer. Also, an individualized decision between you and your healthcare provider will decide whether screening between the ages of 76-85 would be appropriate. Colonoscopy: Not on file  FOBT/FIT: Not on file  Cologuard: Not on file  Sigmoidoscopy: Not on file    Screening Not Indicated     Breast Cancer Screening Age: 40+ years old  Frequency: every 1-2 years  Not required if history of left and right mastectomy Mammogram: 02/01/2019        Cervical Cancer Screening Between the ages of 21-29, pap smear recommended once every 3 years.   Between the ages of 30-65, can perform pap smear with HPV co-testing every 5 years.   Recommendations may differ for women with a history of total hysterectomy, cervical cancer, or abnormal pap smears in past. Pap Smear: Not on file    Screening Not Indicated   Hepatitis C  Screening Once for adults born between 1945 and 1965  More frequently in patients at high risk for Hepatitis C Hep C Antibody: 11/10/2023    Screening Current   Diabetes Screening 1-2 times per year if you're at risk for diabetes or have pre-diabetes Fasting glucose: 73 mg/dL (12/14/2023)  A1C: No results in last 5 years (No results in last 5 years)  Screening Current   Cholesterol Screening Once every 5 years if you don't have a lipid disorder. May order more often based on risk factors. Lipid panel: 10/12/2023    Screening Not Indicated  History Lipid Disorder     Other Preventive Screenings Covered by Medicare:  Abdominal Aortic Aneurysm (AAA) Screening: covered once if your at risk. You're considered to be at risk if you have a family history of AAA.  Lung Cancer Screening: covers low dose CT scan once per year if you meet all of the following conditions: (1) Age 55-77; (2) No signs or symptoms of lung cancer; (3) Current smoker or have quit smoking within the last 15 years; (4) You have a tobacco smoking history of at least 20 pack years (packs per day multiplied by number of years you smoked); (5) You get a written order from a healthcare provider.  Glaucoma Screening: covered annually if you're considered high risk: (1) You have diabetes OR (2) Family history of glaucoma OR (3)  aged 50 and older OR (4)  American aged 65 and older  Osteoporosis Screening: covered every 2 years if you meet one of the following conditions: (1) You're estrogen deficient and at risk for osteoporosis based off medical history and other findings; (2) Have a vertebral abnormality; (3) On glucocorticoid therapy for more than 3 months; (4) Have primary hyperparathyroidism; (5) On osteoporosis medications and need to assess response to drug therapy.   Last bone density test (DXA Scan): 05/28/2019.  HIV Screening: covered annually if you're between the age of 15-65. Also covered annually if you are younger than  15 and older than 65 with risk factors for HIV infection. For pregnant patients, it is covered up to 3 times per pregnancy.    Immunizations:  Immunization Recommendations   Influenza Vaccine Annual influenza vaccination during flu season is recommended for all persons aged >= 6 months who do not have contraindications   Pneumococcal Vaccine   * Pneumococcal conjugate vaccine = PCV13 (Prevnar 13), PCV15 (Vaxneuvance), PCV20 (Prevnar 20)  * Pneumococcal polysaccharide vaccine = PPSV23 (Pneumovax) Adults 19-63 yo with certain risk factors or if 65+ yo  If never received any pneumonia vaccine: recommend Prevnar 20 (PCV20)  Give PCV20 if previously received 1 dose of PCV13 or PPSV23   Hepatitis B Vaccine 3 dose series if at intermediate or high risk (ex: diabetes, end stage renal disease, liver disease)   Respiratory syncytial virus (RSV) Vaccine - COVERED BY MEDICARE PART D  * RSVPreF3 (Arexvy) CDC recommends that adults 60 years of age and older may receive a single dose of RSV vaccine using shared clinical decision-making (SCDM)   Tetanus (Td) Vaccine - COST NOT COVERED BY MEDICARE PART B Following completion of primary series, a booster dose should be given every 10 years to maintain immunity against tetanus. Td may also be given as tetanus wound prophylaxis.   Tdap Vaccine - COST NOT COVERED BY MEDICARE PART B Recommended at least once for all adults. For pregnant patients, recommended with each pregnancy.   Shingles Vaccine (Shingrix) - COST NOT COVERED BY MEDICARE PART B  2 shot series recommended in those 19 years and older who have or will have weakened immune systems or those 50 years and older     Health Maintenance Due:      Topic Date Due    Hepatitis C Screening  Completed     Immunizations Due:      Topic Date Due    COVID-19 Vaccine (6 - 2023-24 season) 09/01/2023     Advance Directives   What are advance directives?  Advance directives are legal documents that state your wishes and plans for medical  care. These plans are made ahead of time in case you lose your ability to make decisions for yourself. Advance directives can apply to any medical decision, such as the treatments you want, and if you want to donate organs.   What are the types of advance directives?  There are many types of advance directives, and each state has rules about how to use them. You may choose a combination of any of the following:  Living will:  This is a written record of the treatment you want. You can also choose which treatments you do not want, which to limit, and which to stop at a certain time. This includes surgery, medicine, IV fluid, and tube feedings.   Durable power of  for healthcare (DPAHC):  This is a written record that states who you want to make healthcare choices for you when you are unable to make them for yourself. This person, called a proxy, is usually a family member or a friend. You may choose more than 1 proxy.  Do not resuscitate (DNR) order:  A DNR order is used in case your heart stops beating or you stop breathing. It is a request not to have certain forms of treatment, such as CPR. A DNR order may be included in other types of advance directives.  Medical directive:  This covers the care that you want if you are in a coma, near death, or unable to make decisions for yourself. You can list the treatments you want for each condition. Treatment may include pain medicine, surgery, blood transfusions, dialysis, IV or tube feedings, and a ventilator (breathing machine).  Values history:  This document has questions about your views, beliefs, and how you feel and think about life. This information can help others choose the care that you would choose.  Why are advance directives important?  An advance directive helps you control your care. Although spoken wishes may be used, it is better to have your wishes written down. Spoken wishes can be misunderstood, or not followed. Treatments may be given even if  you do not want them. An advance directive may make it easier for your family to make difficult choices about your care.   Urinary Incontinence   Urinary incontinence (UI)  is when you lose control of your bladder. UI develops because your bladder cannot store or empty urine properly. The 3 most common types of UI are stress incontinence, urge incontinence, or both.  Medicines:   May be given to help strengthen your bladder control. Report any side effects of medication to your healthcare provider.  Do pelvic muscle exercises often:  Your pelvic muscles help you stop urinating. Squeeze these muscles tight for 5 seconds, then relax for 5 seconds. Gradually work up to squeezing for 10 seconds. Do 3 sets of 15 repetitions a day, or as directed. This will help strengthen your pelvic muscles and improve bladder control.  Train your bladder:  Go to the bathroom at set times, such as every 2 hours, even if you do not feel the urge to go. You can also try to hold your urine when you feel the urge to go. For example, hold your urine for 5 minutes when you feel the urge to go. As that becomes easier, hold your urine for 10 minutes.   Self-care:   Keep a UI record.  Write down how often you leak urine and how much you leak. Make a note of what you were doing when you leaked urine.  Drink liquids as directed. You may need to limit the amount of liquid you drink to help control your urine leakage. Do not drink any liquid right before you go to bed. Limit or do not have drinks that contain caffeine or alcohol.   Prevent constipation.  Eat a variety of high-fiber foods. Good examples are high-fiber cereals, beans, vegetables, and whole-grain breads. Walking is the best way to trigger your intestines to have a bowel movement.  Exercise regularly and maintain a healthy weight.  Weight loss and exercise will decrease pressure on your bladder and help you control your leakage.   Use a catheter as directed  to help empty your bladder.  A catheter is a tiny, plastic tube that is put into your bladder to drain your urine.   Go to behavior therapy as directed.  Behavior therapy may be used to help you learn to control your urge to urinate.     © Copyright [employer] 2018 Information is for End User's use only and may not be sold, redistributed or otherwise used for commercial purposes. All illustrations and images included in CareNotes® are the copyrighted property of [employer] Inc[employer] or [employer]

## 2024-01-30 ENCOUNTER — OFFICE VISIT (OUTPATIENT)
Dept: CARDIOLOGY CLINIC | Facility: CLINIC | Age: 89
End: 2024-01-30
Payer: MEDICARE

## 2024-01-30 VITALS
SYSTOLIC BLOOD PRESSURE: 148 MMHG | WEIGHT: 122 LBS | HEART RATE: 95 BPM | DIASTOLIC BLOOD PRESSURE: 74 MMHG | HEIGHT: 61 IN | BODY MASS INDEX: 23.03 KG/M2 | OXYGEN SATURATION: 98 %

## 2024-01-30 DIAGNOSIS — Z95.820 S/P ANGIOPLASTY WITH STENT: ICD-10-CM

## 2024-01-30 DIAGNOSIS — I65.23 CAROTID ARTERY STENOSIS, ASYMPTOMATIC, BILATERAL: ICD-10-CM

## 2024-01-30 DIAGNOSIS — E21.3 HYPERPARATHYROIDISM, UNSPECIFIED (HCC): Primary | ICD-10-CM

## 2024-01-30 DIAGNOSIS — I70.1 RAS (RENAL ARTERY STENOSIS) (HCC): ICD-10-CM

## 2024-01-30 DIAGNOSIS — I73.00 RAYNAUD'S DISEASE WITHOUT GANGRENE: ICD-10-CM

## 2024-01-30 DIAGNOSIS — M05.9 SEROPOSITIVE RHEUMATOID ARTHRITIS (HCC): ICD-10-CM

## 2024-01-30 DIAGNOSIS — I10 ESSENTIAL HYPERTENSION: ICD-10-CM

## 2024-01-30 DIAGNOSIS — I50.32 CHRONIC DIASTOLIC (CONGESTIVE) HEART FAILURE (HCC): Primary | Chronic | ICD-10-CM

## 2024-01-30 PROCEDURE — 99214 OFFICE O/P EST MOD 30 MIN: CPT | Performed by: INTERNAL MEDICINE

## 2024-01-30 NOTE — PROGRESS NOTES
Cardiology Follow Up    Radha Ma  1935  [id number]  HEART & VASCULAR [facility]  [address]  [place] [address]    1. Chronic diastolic (congestive) heart failure (HCC)        2. Carotid artery stenosis, asymptomatic, bilateral        3. Essential hypertension        4. TIFFANY (renal artery stenosis) ([facility])        5. Raynaud's disease without gangrene        6. Seropositive rheumatoid arthritis ([facility])        7. S/P angioplasty with stent            Discussion/Summary:   Overall she has been doing well she still remains active for her age.  Coronary disease stable no complaints of angina.  Blood pressure overall has been well-controlled not trying to drive to low given the renal artery stenosis.  Renal function has been normal on recent blood work.  Given her advanced age would avoid any invasive procedures.  Lipids have been stable.  Recent echocardiogram and stress test were personally reviewed with the patient.  Carotids are mild bilateral stenosis follow-up at 2-year intervals.  Continue with current medications no testing I will see her back in 8 to 9 months.        Interval History:   88-year-old female history of coronary artery disease status post stents in 2011, chronic diastolic congestive heart failure, hypertension, hyperlipidemia presents for routine scheduled follow-up visit.  Unfortunately she fell coming out of the shower in April.  She suffered a femur fracture and underwent surgical correction.       Overall she continues to remain very physically active she maintains her own home does her own shopping and cooking.  Denies any chest pain, palpitations, lightheadedness, dizziness, or syncope.  There is been a lower extreme edema, PND, orthopnea.          Problem List       Intertrochanteric fracture of left femur, closed, with routine healing, subsequent encounter    Essential hypertension    Medication side  effect, initial encounter    Hyperlipidemia    Coronary artery disease without angina pectoris - S/P stent placement x 2 (2011)    Gastroesophageal reflux disease without esophagitis    Chronic diastolic (congestive) heart failure (HCC) (Chronic)    Hyperthyroidism    Osteoporosis (Chronic)    Ambulatory dysfunction    Elderly person living alone    Pain in left leg    Low back pain without sciatica    S/P ORIF (open reduction internal fixation) fracture    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HCC)          Past Medical History:   Diagnosis Date    Bladder cancer (HCC)     had BCG treatments    Bladder cancer (HCC)     Closed displaced fracture of left trapezium bone 05/18/2017    Coronary artery disease     S/P stent placement x 2 in 2011    GERD (gastroesophageal reflux disease)     Hepatitis     got this from food back in 1970s, has not had a problem since    History of leukemia     in remission    History of stomach ulcers 1970    History of transfusion 1970    Hyperlipidemia     Hypertension     Hyperthyroidism 04/10/2018    Irritable bowel syndrome     Kidney stone     Kidney stones     Persistent cough for 3 weeks or longer 03/10/2020    Pneumonia     Pt had in 2003 and 2004    Pruritic rash 09/03/2019    Pulmonary emboli (HCC) 1970s    Raynaud disease     Shingles     Sleep apnea     Thrombocytopenia (HCC) 02/20/2012     Social History     Socioeconomic History    Marital status:      Spouse name: Not on file    Number of children: Not on file    Years of education: Not on file    Highest education level: Not on file   Occupational History    Not on file   Tobacco Use    Smoking status: Never    Smokeless tobacco: Never   Vaping Use    Vaping status: Never Used   Substance and Sexual Activity    Alcohol use: No    Drug use: No    Sexual activity: Never   Other Topics Concern    Not on file   Social History Narrative    Advance directives declined by parents    Always uses seat belt     Social  Determinants of Health     Financial Resource Strain: Low Risk  (1/29/2024)    Overall Financial Resource Strain (CARDIA)     Difficulty of Paying Living Expenses: Not hard at all   Food Insecurity: Not on file   Transportation Needs: No Transportation Needs (1/29/2024)    PRAPARE - Transportation     Lack of Transportation (Medical): No     Lack of Transportation (Non-Medical): No   Physical Activity: Not on file   Stress: Not on file   Social Connections: Not on file   Intimate Partner Violence: Not on file   Housing Stability: Not on file      Family History   Problem Relation Age of Onset    Arthritis Mother     Osteoporosis Mother     Heart disease Father     Hypertension Father     Hypertension Brother     Prostate cancer Brother     Breast cancer Daughter 50    Prostate cancer Son      Past Surgical History:   Procedure Laterality Date    CATARACT EXTRACTION      COLONOSCOPY      CORONARY ANGIOPLASTY WITH STENT PLACEMENT      stent x 2    CYSTOSCOPY      diagnostic - onset 4/4/17    EGD      EYE SURGERY      FRACTURE SURGERY      HERNIA REPAIR      HYSTERECTOMY      LEG SURGERY      OOPHORECTOMY      LA NDSC WRST SURG W/RLS TRANSVRS CARPL LIGM Right 10/12/2021    Procedure: Right endoscopic carpal tunnel release;  Surgeon: Ghanshyam Rosado MD;  Location: [facility];  Service: Orthopedics    LA OPTX FEM SHFT FX W/INSJ IMED IMPLT W/WO SCREW Left 4/8/2018    Procedure: INSERTION NAIL IM FEMUR ANTEGRADE (TROCHANTERIC);  Surgeon: Lucrecia Boyle MD;  Location: [facility];  Service: Orthopedics    LA REPAIR FIRST ABDOMINAL WALL HERNIA N/A 5/12/2021    Procedure: REPAIR HERNIA VENTRAL;  Surgeon: Rui Pickett MD;  Location: [facility];  Service: General    TONSILLECTOMY         Current Outpatient Medications:     acetaminophen (Tylenol 8 Hour) 650 mg CR tablet, Take 1 tablet (650 mg total) by mouth every 8 (eight) hours, Disp: 15 tablet, Rfl: 0    amLODIPine (NORVASC) 5 mg tablet, TAKE 1 TABLET BY MOUTH TWICE   "DAILY, Disp: 180 tablet, Rfl: 3    aspirin 81 MG tablet, Take 81 mg by mouth daily Resume on 4/28 , Disp: , Rfl:     atorvastatin (LIPITOR) 40 mg tablet, TAKE 1 TABLET BY MOUTH  DAILY AFTER DINNER, Disp: 90 tablet, Rfl: 1    bumetanide (BUMEX) 1 mg tablet, TAKE 1/2 A TABLET BY MOUTH  DAILY TAKE A WHOLE TABLET IF  NEEDED, Disp: 90 tablet, Rfl: 3    carvedilol (COREG) 12.5 mg tablet, TAKE ONE-HALF TABLET BY  MOUTH TWICE DAILY WITH  MEALS, Disp: 90 tablet, Rfl: 3    certolizumab (Cimzia) 2 x 200 mg, Inject 2 mL every 4 weeks by subcutaneous route., Disp: , Rfl:     Cholecalciferol 2000 units TABS, Take 2,000 Units by mouth in the morning. Resume on 4/28 ., Disp: , Rfl:     DULoxetine (CYMBALTA) 30 mg delayed release capsule, TAKE 1 CAPSULE BY MOUTH  DAILY, Disp: 90 capsule, Rfl: 3    fexofenadine (ALLEGRA) 180 MG tablet, Take 180 mg by mouth daily as needed , Disp: , Rfl:     folic acid (FOLVITE) 1 mg tablet, Take 1 mg by mouth daily, Disp: , Rfl:     losartan (COZAAR) 100 MG tablet, TAKE 1 TABLET BY MOUTH DAILY, Disp: 90 tablet, Rfl: 3    methotrexate 2.5 mg tablet, Take 10 mg by mouth once a week, Disp: , Rfl:     MULTIPLE VITAMIN PO, Take by mouth daily , Disp: , Rfl:     omeprazole (PriLOSEC) 40 MG capsule, TAKE 1 CAPSULE BY MOUTH  DAILY, Disp: 90 capsule, Rfl: 3    Diclofenac Sodium (VOLTAREN) 1 %, Apply 2 g topically 4 (four) times a day, Disp: 50 g, Rfl: 0  Allergies   Allergen Reactions    Lactose - Food Allergy GI Intolerance       Labs:     Chemistry        Component Value Date/Time    K 3.7 12/14/2023 1029     12/14/2023 1029    CO2 30 12/14/2023 1029    BUN 23 12/14/2023 1029    CREATININE 0.76 12/14/2023 1029        Component Value Date/Time    CALCIUM 9.4 12/14/2023 1029    ALKPHOS 69 12/14/2023 1029    AST 25 12/14/2023 1029    ALT 26 12/14/2023 1029            No results found for: \"CHOL\"  Lab Results   Component Value Date    HDL 56 10/12/2023    HDL 65 11/04/2021    HDL 64 (H) 07/18/2018     Lab " "Results   Component Value Date    LDLCALC 56 10/12/2023    LDLCALC 71 11/04/2021    LDLCALC 75 07/18/2018     Lab Results   Component Value Date    TRIG 76 10/12/2023    TRIG 70 11/04/2021    TRIG 146 07/18/2018     No results found for: \"CHOLHDL\"    Imaging: No results found.    ECG:  NSR      Review of Systems   Constitutional: Negative.   HENT: Negative.     Eyes: Negative.    Cardiovascular: Negative.    Respiratory: Negative.     Endocrine: Negative.    Hematologic/Lymphatic: Negative.    Skin: Negative.    Musculoskeletal: Negative.    Gastrointestinal: Negative.    Genitourinary: Negative.    Neurological: Negative.    Psychiatric/Behavioral: Negative.         Vitals:    01/30/24 1057   BP: 148/74   Pulse: 95   SpO2: 98%     Vitals:    01/30/24 1057   Weight: 55.3 kg (122 lb)     Height: 5' 1\" (154.9 cm)   Body mass index is 23.05 kg/m².    Physical Exam:  Vital signs reviewed  General:  Alert and cooperative, appears stated age, no acute distress  HEENT:  PERRLA, EOMI, no scleral icterus, no conjunctival pallor  Neck:  No lymphadenopathy, no thyromegaly, no carotid bruits, no elevated JVP  Heart:  Regular rate and rhythm, normal S1/S2, no S3/S4, no murmur, rubs or gallops.  PMI nondisplaced  Lungs:  Clear to auscultation bilaterally, no wheezes rales or rhonchi  Abdomen:  Soft, non-tender, positive bowel sounds, no rebound or guarding,   no organomegaly   Extremities:  Normal range of motion.  No clubbing, cyanosis or edema   Vascular:  2+ pedal pulses  Skin:  No rashes or lesions on exposed skin  Neurologic:  Cranial nerves II-XII grossly intact without focal deficits  Psych:  Normal mood and affect            "

## 2024-01-31 ENCOUNTER — TELEPHONE (OUTPATIENT)
Dept: ENDOCRINOLOGY | Facility: CLINIC | Age: 89
End: 2024-01-31

## 2024-02-01 ENCOUNTER — APPOINTMENT (OUTPATIENT)
Dept: LAB | Facility: CLINIC | Age: 89
End: 2024-02-01
Payer: MEDICARE

## 2024-02-01 DIAGNOSIS — I10 ESSENTIAL HYPERTENSION, MALIGNANT: ICD-10-CM

## 2024-02-01 LAB
CREAT UR-MCNC: 71.9 MG/DL
MICROALBUMIN UR-MCNC: 14 MG/L
MICROALBUMIN/CREAT 24H UR: 19 MG/G CREATININE (ref 0–30)

## 2024-02-01 PROCEDURE — 82043 UR ALBUMIN QUANTITATIVE: CPT

## 2024-02-01 PROCEDURE — 82570 ASSAY OF URINE CREATININE: CPT

## 2024-02-17 DIAGNOSIS — E78.5 HYPERLIPIDEMIA, UNSPECIFIED HYPERLIPIDEMIA TYPE: ICD-10-CM

## 2024-02-18 RX ORDER — ATORVASTATIN CALCIUM 40 MG/1
TABLET, FILM COATED ORAL
Qty: 90 TABLET | Refills: 3 | Status: SHIPPED | OUTPATIENT
Start: 2024-02-18

## 2024-02-21 PROBLEM — H10.9 CONJUNCTIVITIS OF BOTH EYES: Status: RESOLVED | Noted: 2018-10-19 | Resolved: 2024-02-21

## 2024-02-21 PROBLEM — R05.8 COUGH DUE TO ACE INHIBITOR: Status: RESOLVED | Noted: 2020-03-10 | Resolved: 2024-02-21

## 2024-02-21 PROBLEM — T46.4X5A COUGH DUE TO ACE INHIBITOR: Status: RESOLVED | Noted: 2020-03-10 | Resolved: 2024-02-21

## 2024-02-21 PROBLEM — Z00.00 MEDICARE ANNUAL WELLNESS VISIT, SUBSEQUENT: Status: RESOLVED | Noted: 2019-01-07 | Resolved: 2024-02-21

## 2024-02-23 ENCOUNTER — APPOINTMENT (OUTPATIENT)
Dept: LAB | Facility: CLINIC | Age: 89
End: 2024-02-23
Payer: MEDICARE

## 2024-02-23 DIAGNOSIS — Z79.899 ENCOUNTER FOR LONG-TERM (CURRENT) USE OF OTHER MEDICATIONS: ICD-10-CM

## 2024-02-23 LAB
ALBUMIN SERPL BCP-MCNC: 4 G/DL (ref 3.5–5)
ALP SERPL-CCNC: 57 U/L (ref 34–104)
ALT SERPL W P-5'-P-CCNC: 25 U/L (ref 7–52)
ANION GAP SERPL CALCULATED.3IONS-SCNC: 7 MMOL/L
AST SERPL W P-5'-P-CCNC: 20 U/L (ref 13–39)
BASOPHILS # BLD AUTO: 0.03 THOUSANDS/ÂΜL (ref 0–0.1)
BASOPHILS NFR BLD AUTO: 1 % (ref 0–1)
BILIRUB SERPL-MCNC: 0.7 MG/DL (ref 0.2–1)
BUN SERPL-MCNC: 25 MG/DL (ref 5–25)
CALCIUM SERPL-MCNC: 9.1 MG/DL (ref 8.4–10.2)
CHLORIDE SERPL-SCNC: 103 MMOL/L (ref 96–108)
CO2 SERPL-SCNC: 28 MMOL/L (ref 21–32)
CREAT SERPL-MCNC: 0.72 MG/DL (ref 0.6–1.3)
EOSINOPHIL # BLD AUTO: 0.1 THOUSAND/ÂΜL (ref 0–0.61)
EOSINOPHIL NFR BLD AUTO: 2 % (ref 0–6)
ERYTHROCYTE [DISTWIDTH] IN BLOOD BY AUTOMATED COUNT: 12.9 % (ref 11.6–15.1)
GFR SERPL CREATININE-BSD FRML MDRD: 76 ML/MIN/1.73SQ M
GLUCOSE SERPL-MCNC: 86 MG/DL (ref 65–140)
HCT VFR BLD AUTO: 35.1 % (ref 34.8–46.1)
HGB BLD-MCNC: 11.8 G/DL (ref 11.5–15.4)
IMM GRANULOCYTES # BLD AUTO: 0.01 THOUSAND/UL (ref 0–0.2)
IMM GRANULOCYTES NFR BLD AUTO: 0 % (ref 0–2)
LYMPHOCYTES # BLD AUTO: 1.89 THOUSANDS/ÂΜL (ref 0.6–4.47)
LYMPHOCYTES NFR BLD AUTO: 34 % (ref 14–44)
MCH RBC QN AUTO: 32.7 PG (ref 26.8–34.3)
MCHC RBC AUTO-ENTMCNC: 33.6 G/DL (ref 31.4–37.4)
MCV RBC AUTO: 97 FL (ref 82–98)
MONOCYTES # BLD AUTO: 0.51 THOUSAND/ÂΜL (ref 0.17–1.22)
MONOCYTES NFR BLD AUTO: 9 % (ref 4–12)
NEUTROPHILS # BLD AUTO: 2.95 THOUSANDS/ÂΜL (ref 1.85–7.62)
NEUTS SEG NFR BLD AUTO: 54 % (ref 43–75)
NRBC BLD AUTO-RTO: 0 /100 WBCS
PLATELET # BLD AUTO: 175 THOUSANDS/UL (ref 149–390)
PMV BLD AUTO: 10.4 FL (ref 8.9–12.7)
POTASSIUM SERPL-SCNC: 3.9 MMOL/L (ref 3.5–5.3)
PROT SERPL-MCNC: 6.5 G/DL (ref 6.4–8.4)
RBC # BLD AUTO: 3.61 MILLION/UL (ref 3.81–5.12)
SODIUM SERPL-SCNC: 138 MMOL/L (ref 135–147)
WBC # BLD AUTO: 5.49 THOUSAND/UL (ref 4.31–10.16)

## 2024-02-23 PROCEDURE — 36415 COLL VENOUS BLD VENIPUNCTURE: CPT

## 2024-02-23 PROCEDURE — 80053 COMPREHEN METABOLIC PANEL: CPT

## 2024-02-23 PROCEDURE — 85025 COMPLETE CBC W/AUTO DIFF WBC: CPT

## 2024-03-01 ENCOUNTER — TRANSCRIBE ORDERS (OUTPATIENT)
Dept: LAB | Facility: CLINIC | Age: 89
End: 2024-03-01

## 2024-03-01 DIAGNOSIS — Z79.899 ENCOUNTER FOR LONG-TERM (CURRENT) USE OF OTHER MEDICATIONS: Primary | ICD-10-CM

## 2024-04-05 ENCOUNTER — CONSULT (OUTPATIENT)
Dept: ENDOCRINOLOGY | Facility: CLINIC | Age: 89
End: 2024-04-05
Payer: MEDICARE

## 2024-04-05 VITALS
HEIGHT: 61 IN | BODY MASS INDEX: 23.41 KG/M2 | OXYGEN SATURATION: 97 % | WEIGHT: 124 LBS | DIASTOLIC BLOOD PRESSURE: 78 MMHG | SYSTOLIC BLOOD PRESSURE: 140 MMHG | HEART RATE: 87 BPM | TEMPERATURE: 98.4 F

## 2024-04-05 DIAGNOSIS — M80.00XD AGE-RELATED OSTEOPOROSIS WITH CURRENT PATHOLOGICAL FRACTURE WITH ROUTINE HEALING, SUBSEQUENT ENCOUNTER: ICD-10-CM

## 2024-04-05 DIAGNOSIS — E21.3 HYPERPARATHYROIDISM, UNSPECIFIED (HCC): Primary | ICD-10-CM

## 2024-04-05 PROBLEM — M81.0 AGE-RELATED OSTEOPOROSIS WITHOUT CURRENT PATHOLOGICAL FRACTURE: Status: ACTIVE | Noted: 2024-04-05

## 2024-04-05 PROCEDURE — 99204 OFFICE O/P NEW MOD 45 MIN: CPT | Performed by: STUDENT IN AN ORGANIZED HEALTH CARE EDUCATION/TRAINING PROGRAM

## 2024-04-05 NOTE — PATIENT INSTRUCTIONS
I do recommend start taking calcium supplementation (centimeters citrate or carbonate) 500 mg daily  I would print out foods are rich in calcium, so you can read about them and start adding dose to your daily meals  Continue vitamin D supplementation  We ordered DEXA scan, please call central scheduling to schedule it.  I am going to see you back in 4 months and I am ordering blood work before you come to see me

## 2024-04-05 NOTE — PROGRESS NOTES
Radha Ma 89 y.o. female MRN: [id number]    Encounter: [id number]      Assessment/Plan     Problem List Items Addressed This Visit          Endocrine    Hyperparathyroidism, unspecified (HCC) - Primary     Elevated PTH intermittently with normal calcium, differentials are secondary hyperparathyroidism due to low calcium intake versus normocalcemic primary hyperparathyroidism.  I believe this is most likely secondary hyperparathyroidism due to low calcium intake, we reviewed daily required calcium, 1200 mg daily, preferably from diet, foods rich in calcium was discussed, educational materials provided on discharge.  We do expect improvement in PTH with taking enough daily calcium supplementation, even if primary hyperparathyroidism is confirmed, given her age, she is reluctant to pursue surgery.           Relevant Orders    PTH, intact    Vitamin D 25 hydroxy    Calcium, ionized       Musculoskeletal and Integument    Age-related osteoporosis with current pathological fracture     She has history of fragility fracture from standing height, previously received Prolia injection, we did discuss importance of appropriate treatment for osteoporosis to prevent further fracture, I did order DEXA scan and she was advised to call to schedule,  She is willing to discuss treatment options after completing DEXA scan, calcium supplementation, vitamin D supplementation was encouraged.  Fall safety precaution was discussed.  Weightbearing exercise was encouraged.           Relevant Orders    DXA bone density spine hip and pelvis    Age-related osteoporosis without current pathological fracture    Relevant Orders    DXA bone density spine hip and pelvis         CC:   High PTH    History of Present Illness     HPI:  Radha Ma is a 89 year old female who is referred by PCP for elevated PTH,    She has history of elevated PTH of 106.8 ( in 2018), which normalized to 58.1 (2018), 87.1 ( 2020),  and recent check showed 99.2  pg/ml.   She has history of osteoporosis, 1 on Prolia for more than 10 years, she has been off Prolia injection for many years, she has history of fragility fracture status post ORIF.  She reports history of kidney stone.  Denies personal or family history of hypercalcemia.      Review of Systems   Constitutional:  Negative for appetite change, fatigue and unexpected weight change.   HENT:  Negative for trouble swallowing and voice change.    Eyes:  Negative for visual disturbance.   Respiratory:  Negative for cough, shortness of breath and wheezing.    Cardiovascular:  Negative for palpitations and leg swelling.   Gastrointestinal:  Positive for abdominal pain and diarrhea. Negative for constipation, nausea and vomiting.   Endocrine: Negative for cold intolerance, heat intolerance, polyphagia and polyuria.   Musculoskeletal:  Negative for arthralgias.   Skin:  Negative for color change, rash and wound.   Neurological:  Negative for dizziness, tremors, weakness, light-headedness, numbness and headaches.   Psychiatric/Behavioral:  Negative for agitation and sleep disturbance. The patient is not nervous/anxious.        Historical Information   Past Medical History:   Diagnosis Date    Bladder cancer (HCC)     had BCG treatments    Bladder cancer (HCC)     Closed displaced fracture of left trapezium bone 05/18/2017    Coronary artery disease     S/P stent placement x 2 in 2011    GERD (gastroesophageal reflux disease)     Hepatitis     got this from food back in 1970s, has not had a problem since    History of leukemia     in remission    History of stomach ulcers 1970    History of transfusion 1970    Hyperlipidemia     Hypertension     Hyperthyroidism 04/10/2018    Irritable bowel syndrome     Kidney stone     Kidney stones     Persistent cough for 3 weeks or longer 03/10/2020    Pneumonia     Pt had in 2003 and 2004    Pruritic rash 09/03/2019    Pulmonary emboli (HCC) 1970s    Raynaud disease     Shingles     Sleep  apnea     Thrombocytopenia (HCC) 02/20/2012     Past Surgical History:   Procedure Laterality Date    CATARACT EXTRACTION      COLONOSCOPY      CORONARY ANGIOPLASTY WITH STENT PLACEMENT      stent x 2    CYSTOSCOPY      diagnostic - onset 4/4/17    EGD      EYE SURGERY      FRACTURE SURGERY      HERNIA REPAIR      HYSTERECTOMY      LEG SURGERY      OOPHORECTOMY      UT NDSC WRST SURG W/RLS TRANSVRS CARPL LIGM Right 10/12/2021    Procedure: Right endoscopic carpal tunnel release;  Surgeon: Ghanshyam Rosado MD;  Location: [facility];  Service: Orthopedics    UT OPTX FEM SHFT FX W/INSJ IMED IMPLT W/WO SCREW Left 4/8/2018    Procedure: INSERTION NAIL IM FEMUR ANTEGRADE (TROCHANTERIC);  Surgeon: Lucrecia Boyle MD;  Location: [facility];  Service: Orthopedics    UT REPAIR FIRST ABDOMINAL WALL HERNIA N/A 5/12/2021    Procedure: REPAIR HERNIA VENTRAL;  Surgeon: Rui Pickett MD;  Location: [facility];  Service: General    TONSILLECTOMY       Social History   Social History     Substance and Sexual Activity   Alcohol Use No     Social History     Substance and Sexual Activity   Drug Use No     Social History     Tobacco Use   Smoking Status Never   Smokeless Tobacco Never     Family History:   Family History   Problem Relation Age of Onset    Arthritis Mother     Osteoporosis Mother     Heart disease Father     Hypertension Father     Hypertension Brother     Prostate cancer Brother     Breast cancer Daughter 50    Prostate cancer Son        Meds/Allergies   Current Outpatient Medications   Medication Sig Dispense Refill    acetaminophen (Tylenol 8 Hour) 650 mg CR tablet Take 1 tablet (650 mg total) by mouth every 8 (eight) hours 15 tablet 0    amLODIPine (NORVASC) 5 mg tablet TAKE 1 TABLET BY MOUTH TWICE  DAILY 180 tablet 3    aspirin 81 MG tablet Take 81 mg by mouth daily Resume on 4/28       atorvastatin (LIPITOR) 40 mg tablet TAKE 1 TABLET BY MOUTH DAILY  AFTER DINNER 90 tablet 3    bumetanide (BUMEX) 1 mg tablet  "TAKE 1/2 A TABLET BY MOUTH  DAILY TAKE A WHOLE TABLET IF  NEEDED 90 tablet 3    carvedilol (COREG) 12.5 mg tablet TAKE ONE-HALF TABLET BY  MOUTH TWICE DAILY WITH  MEALS 90 tablet 3    certolizumab (Cimzia) 2 x 200 mg Inject 2 mL every 4 weeks by subcutaneous route.      Cholecalciferol 2000 units TABS Take 2,000 Units by mouth in the morning. Resume on 4/28 .      DULoxetine (CYMBALTA) 30 mg delayed release capsule TAKE 1 CAPSULE BY MOUTH  DAILY 90 capsule 3    fexofenadine (ALLEGRA) 180 MG tablet Take 180 mg by mouth daily as needed       folic acid (FOLVITE) 1 mg tablet Take 1 mg by mouth daily      losartan (COZAAR) 100 MG tablet TAKE 1 TABLET BY MOUTH DAILY 90 tablet 3    methotrexate 2.5 mg tablet Take 10 mg by mouth once a week      MULTIPLE VITAMIN PO Take by mouth daily       omeprazole (PriLOSEC) 40 MG capsule TAKE 1 CAPSULE BY MOUTH  DAILY 90 capsule 3    Diclofenac Sodium (VOLTAREN) 1 % Apply 2 g topically 4 (four) times a day (Patient not taking: Reported on 4/5/2024) 50 g 0     No current facility-administered medications for this visit.     Allergies   Allergen Reactions    Lactose - Food Allergy GI Intolerance       Objective   Vitals: Temperature 98.4 °F (36.9 °C), height 5' 1\" (1.549 m), weight 56.2 kg (124 lb).    Physical Exam  Constitutional:       Appearance: She is well-developed.   HENT:      Head: Normocephalic and atraumatic.      Nose: Nose normal.   Eyes:      Pupils: Pupils are equal, round, and reactive to light.   Neck:      Thyroid: No thyromegaly.      Vascular: No JVD.   Cardiovascular:      Rate and Rhythm: Normal rate and regular rhythm.      Heart sounds: Murmur heard.      No friction rub. No gallop.   Pulmonary:      Effort: Pulmonary effort is normal. No respiratory distress.      Breath sounds: Normal breath sounds. No stridor. No wheezing or rales.   Chest:      Chest wall: No tenderness.   Abdominal:      General: Bowel sounds are normal. There is no distension.      " Palpations: Abdomen is soft. There is no mass.      Tenderness: There is no abdominal tenderness. There is no guarding.   Musculoskeletal:         General: No deformity. Normal range of motion.      Cervical back: Normal range of motion.   Skin:     General: Skin is warm.   Neurological:      Mental Status: She is alert and oriented to person, place, and time.         The history was obtained from the review of the chart, patient.    Lab Results:   Lab Results   Component Value Date/Time    TSH 3RD TIDWELL 3.247 01/29/2024 12:07 PM     Component      Latest Ref Rng 3/9/2022 3/17/2023 4/26/2023 12/14/2023 1/29/2024   WBC      4.31 - 10.16 Thousand/uL    4.94     RBC      3.81 - 5.12 Million/uL    3.82     Hemoglobin      11.5 - 15.4 g/dL    12.6     Hematocrit      34.8 - 46.1 %    38.2     MCV      82 - 98 fL    100 (H)     MCH      26.8 - 34.3 pg    33.0     MCHC      31.4 - 37.4 g/dL    33.0     RDW      11.6 - 15.1 %    12.6     MPV      8.9 - 12.7 fL    10.7     Platelet Count      149 - 390 Thousands/uL    176     nRBC      /100 WBCs    0     Neutrophils %      43 - 75 %    49     Immature Grans %      0 - 2 %    0     Lymphocytes %      14 - 44 %    39     Monocytes %      4 - 12 %    9     Eosinophils %      0 - 6 %    2     Basophils %      0 - 1 %    1     Absolute Neutrophils      1.85 - 7.62 Thousands/µL    2.44     Absolute Immature Grans      0.00 - 0.20 Thousand/uL    0.01     Absolute Lymphocytes      0.60 - 4.47 Thousands/µL    1.90     Absolute Monocytes      0.17 - 1.22 Thousand/µL    0.43     Absolute Eosinophils      0.00 - 0.61 Thousand/µL    0.11     Absolute Basophils      0.00 - 0.10 Thousands/µL    0.05     Sodium      135 - 147 mmol/L 138  135  138  137     Potassium      3.5 - 5.3 mmol/L 3.8  4.0  4.0  3.7     Chloride      96 - 108 mmol/L 106  106  107  101     Carbon Dioxide      21 - 32 mmol/L 29  29  28  30     ANION GAP      mmol/L 3 (L)  0 (L)  3 (L)  6     BUN      5 - 25 mg/dL 23   27 (H)  31 (H)  23     Creatinine      0.60 - 1.30 mg/dL 0.78  0.83  0.94  0.76     GLUCOSE, FASTING      65 - 99 mg/dL 94  67  104 (H)  73     Calcium      8.4 - 10.2 mg/dL 9.1  9.8  9.4  9.4     AST      13 - 39 U/L 21  23  18  25     ALT      7 - 52 U/L 38  34  34  26     ALK PHOS      34 - 104 U/L 75  71  70  69     Total Protein      6.4 - 8.4 g/dL 7.4  7.2  7.3  7.0     Albumin      3.5 - 5.0 g/dL 3.5  3.9  3.8  4.4     Total Bilirubin      0.20 - 1.00 mg/dL 0.62  0.58  0.75  0.69     GFR, Calculated      ml/min/1.73sq m 70  64  55  71     EXT Creatinine Urine      Reference range not established. mg/dL        Albumin,U,Random      <20.0 mg/L        Albumin Creat Ratio      0 - 30 mg/g creatinine        TSH 3RD GENERATON      0.450 - 4.500 uIU/mL     3.247    PTH      12.0 - 88.0 pg/mL     99.2 (H)      Component      Latest Ref Rng 2/1/2024   WBC      4.31 - 10.16 Thousand/uL    RBC      3.81 - 5.12 Million/uL    Hemoglobin      11.5 - 15.4 g/dL    Hematocrit      34.8 - 46.1 %    MCV      82 - 98 fL    MCH      26.8 - 34.3 pg    MCHC      31.4 - 37.4 g/dL    RDW      11.6 - 15.1 %    MPV      8.9 - 12.7 fL    Platelet Count      149 - 390 Thousands/uL    nRBC      /100 WBCs    Neutrophils %      43 - 75 %    Immature Grans %      0 - 2 %    Lymphocytes %      14 - 44 %    Monocytes %      4 - 12 %    Eosinophils %      0 - 6 %    Basophils %      0 - 1 %    Absolute Neutrophils      1.85 - 7.62 Thousands/µL    Absolute Immature Grans      0.00 - 0.20 Thousand/uL    Absolute Lymphocytes      0.60 - 4.47 Thousands/µL    Absolute Monocytes      0.17 - 1.22 Thousand/µL    Absolute Eosinophils      0.00 - 0.61 Thousand/µL    Absolute Basophils      0.00 - 0.10 Thousands/µL    Sodium      135 - 147 mmol/L    Potassium      3.5 - 5.3 mmol/L    Chloride      96 - 108 mmol/L    Carbon Dioxide      21 - 32 mmol/L    ANION GAP      mmol/L    BUN      5 - 25 mg/dL    Creatinine      0.60 - 1.30 mg/dL    GLUCOSE,  FASTING      65 - 99 mg/dL    Calcium      8.4 - 10.2 mg/dL    AST      13 - 39 U/L    ALT      7 - 52 U/L    ALK PHOS      34 - 104 U/L    Total Protein      6.4 - 8.4 g/dL    Albumin      3.5 - 5.0 g/dL    Total Bilirubin      0.20 - 1.00 mg/dL    GFR, Calculated      ml/min/1.73sq m    EXT Creatinine Urine      Reference range not established. mg/dL 71.9    Albumin,U,Random      <20.0 mg/L 14.0    Albumin Creat Ratio      0 - 30 mg/g creatinine 19    TSH 3RD GENERATON      0.450 - 4.500 uIU/mL    PTH      12.0 - 88.0 pg/mL       Legend:  (L) Low  (H) High  Imaging Studies:     DXA SCAN     CLINICAL HISTORY: 84-year-old postmenopausal woman.  OTHER RISK FACTORS: History of fracture following a low level of injury.  Rheumatoid arthritis.  Pantoprazole for reflux.     TECHNIQUE: Bone densitometry was performed using a Hologic Horizon A bone densitometer.  Regions of interest appear properly placed.      COMPARISON: There are no prior DXA studies performed on this unit for comparison.      RESULTS:      LUMBAR SPINE: Not assessed because  scoliosis, generalized spondylosis, degenerative sclerosis and osteophytosis result in fewer than two evaluable vertebrae. .     RIGHT TOTAL HIP: BMD 0.748  gm/cm2 / T-score -1.6 / Z score 0.6  RIGHT FEMORAL NECK: BMD 0.6-1  gm/cm2 / T score -2.1 / Z score 0.3     LEFT  FOREARM:  33% RADIUS BMD  0.500  gm/cm2 / T-score -3.2 / Z score 0.2        IMPRESSION:     1.  Osteoporosis. [Based on the left radius]     2.  The 10 year risk of hip fracture is 9.3% with the 10 year risk of major osteoporotic fracture being 29% as calculated by the [employer]/WHO fracture risk assessment tool (FRAX).       3.  The current NOF guidelines recommend treating patients with a T-score of -2.5 or less in the lumbar spine or hips, or in post-menopausal women and men over the age of 50 with low bone mass (osteopenia) and a FRAX 10 year risk score of >3% for hip   fracture and/or >20% for  "major osteoporotic fracture.     4.  The NOF recommends follow-up DXA in 1-2 years after initiating therapy for osteoporosis and every 2 years thereafter. More frequent evaluation is appropriate for patients with conditions associated with rapid bone loss, such as glucocorticoid   therapy. The interval between DXA screenings may be longer for individuals without major risk factors and initial T-score in the normal or upper low bone mass range.        The FRAX algorithm has certain limitations:  -FRAX has not been validated in patients currently or previously treated with pharmacotherapy for osteoporosis.  In such patients, clinical judgment must be exercised in interpreting FRAX scores.    -Prior hip, vertebral and humeral fragility fractures appear to confer greater risk of subsequent fracture than fractures at other sites (this is especially true for individuals with severe vertebral fractures), but quantification of this incremental   risk is not possible with FRAX.  -FRAX underestimates fracture risk in patients with history of multiple fragility fractures.  -FRAX may underestimate fracture risk in patients with history of frequent falls.  -It is not appropriate to use FRAX to monitor treatment response.        WHO CLASSIFICATION:  Normal (a T-score of -1.0 or higher)  Low bone mineral density (a T-score of less than -1.0 but higher than -2.5)  Osteoporosis (a T-score of -2.5 or less)  Severe osteoporosis (a T-score of -2.5 or less with a fragility fracture)          I have personally reviewed pertinent reports.      Portions of the record may have been created with voice recognition software. Occasional wrong word or \"sound a like\" substitutions may have occurred due to the inherent limitations of voice recognition software. Read the chart carefully and recognize, using context, where substitutions have occurred.    I have spent a total time of 40 minutes on 04/05/24 in caring for this patient including " Diagnostic results, Prognosis, Risks and benefits of tx options, Instructions for management, Patient and family education, Importance of tx compliance, Risk factor reductions, Impressions, Counseling / Coordination of care, Documenting in the medical record, Reviewing / ordering tests, medicine, procedures  , and Obtaining or reviewing history  .

## 2024-04-05 NOTE — ASSESSMENT & PLAN NOTE
She has history of fragility fracture from standing height, previously received Prolia injection, we did discuss importance of appropriate treatment for osteoporosis to prevent further fracture, I did order DEXA scan and she was advised to call to schedule,  She is willing to discuss treatment options after completing DEXA scan, calcium supplementation, vitamin D supplementation was encouraged.  Fall safety precaution was discussed.  Weightbearing exercise was encouraged.

## 2024-04-05 NOTE — ASSESSMENT & PLAN NOTE
Elevated PTH intermittently with normal calcium, differentials are secondary hyperparathyroidism due to low calcium intake versus normocalcemic primary hyperparathyroidism.  I believe this is most likely secondary hyperparathyroidism due to low calcium intake, we reviewed daily required calcium, 1200 mg daily, preferably from diet, foods rich in calcium was discussed, educational materials provided on discharge.  We do expect improvement in PTH with taking enough daily calcium supplementation, even if primary hyperparathyroidism is confirmed, given her age, she is reluctant to pursue surgery.

## 2024-04-23 ENCOUNTER — TELEPHONE (OUTPATIENT)
Age: 89
End: 2024-04-23

## 2024-04-23 NOTE — TELEPHONE ENCOUNTER
Called patient to inquire. Patient was seen by provider 4/5.  Calcium was recommended. She is having a lot of nausea and diarrhea. She says she is urinating a lot and its darker.  She is drinking a good amount according to patient    Sending to the provider

## 2024-04-23 NOTE — TELEPHONE ENCOUNTER
Patient called and said that she has been feeling very sick recently. The past week and a half she has been feeling nauseous, she has frequent urination, and always thirsty. She said the only thing she started was the calcium and she is worried this may be causing the sick feeling. She was wondering if someone could call her on her cell and talk to her about this. Thank you.

## 2024-04-25 ENCOUNTER — RA CDI HCC (OUTPATIENT)
Dept: OTHER | Facility: HOSPITAL | Age: 89
End: 2024-04-25

## 2024-05-01 ENCOUNTER — OFFICE VISIT (OUTPATIENT)
Dept: FAMILY MEDICINE CLINIC | Facility: CLINIC | Age: 89
End: 2024-05-01
Payer: MEDICARE

## 2024-05-01 VITALS
DIASTOLIC BLOOD PRESSURE: 60 MMHG | HEIGHT: 61 IN | TEMPERATURE: 95.7 F | BODY MASS INDEX: 23.53 KG/M2 | HEART RATE: 66 BPM | SYSTOLIC BLOOD PRESSURE: 110 MMHG | WEIGHT: 124.6 LBS | OXYGEN SATURATION: 99 %

## 2024-05-01 DIAGNOSIS — Z87.898 HISTORY OF LEFT FLANK PAIN: ICD-10-CM

## 2024-05-01 DIAGNOSIS — R82.998 DARK BROWN URINE: ICD-10-CM

## 2024-05-01 DIAGNOSIS — Z87.898 HISTORY OF ABDOMINAL PAIN: ICD-10-CM

## 2024-05-01 DIAGNOSIS — R52 GENERALIZED BODY ACHES: Primary | ICD-10-CM

## 2024-05-01 DIAGNOSIS — F32.A DEPRESSION, CONTROLLED: ICD-10-CM

## 2024-05-01 DIAGNOSIS — R10.814 LEFT LOWER QUADRANT ABDOMINAL TENDERNESS WITHOUT REBOUND TENDERNESS: ICD-10-CM

## 2024-05-01 PROCEDURE — G2211 COMPLEX E/M VISIT ADD ON: HCPCS | Performed by: FAMILY MEDICINE

## 2024-05-01 PROCEDURE — 99214 OFFICE O/P EST MOD 30 MIN: CPT | Performed by: FAMILY MEDICINE

## 2024-05-01 NOTE — PROGRESS NOTES
"Name: Radha Ma      : 1935      MRN: [id number]  Encounter Provider: Anjelica Sr DO  Encounter Date: 2024   Encounter department: [facility] [place]    Assessment & Plan     1. Generalized body aches  -     Comprehensive metabolic panel; Future  -     CBC and differential; Future    2. History of left flank pain  -     Comprehensive metabolic panel; Future  -     CBC and differential; Future  -     UA (URINE) with reflex to Scope; Future    3. Dark brown urine  -     Comprehensive metabolic panel; Future  -     UA (URINE) with reflex to Scope; Future    4. History of abdominal pain  -     Comprehensive metabolic panel; Future  -     CBC and differential; Future  -     UA (URINE) with reflex to Scope; Future    5. Left lower quadrant abdominal tenderness without rebound tenderness    6. Depression, controlled           Subjective      Chief Complaint   Patient presents with    OTHER     Discuss thyroid       Scheduled problem visit  Pt johana, 'Went to the specialist - said thyroid was high, but it was low and it was robbing the calcium-  and he put me on calcium - have never been so sick in all my life - stomach was on fire, was so confused, was forgetting how to work the remote, was so tired and my kidney was hurting, every part of my body was hurting, had a terrible headache - called him and he told me to stop it\" states sx lasted about a day or two  Pt actually was evaluated by endo for hyperPARAthyroidism, not thyroid  Symptoms are all gone per pt  No fever during symptoms  Denies any dysuria- states \"that was the other thing, my urine was brown\" -since then normal color      All Conversations: patient medication conern  (Oldest Message First)  24  9:52 AM  Francesca Radha contacted Bárbara Langston  PS    24  9:53 AM  Note     Patient called and said that she has been feeling very sick recently. The past week and a half she has been " feeling nauseous, she has frequent urination, and always thirsty. She said the only thing she started was the calcium and she is worried this may be causing the sick feeling. She was wondering if someone could call her on her cell and talk to her about this. Thank you.   PS    4/23/24  9:53 AM  Bárbara Langston routed this conversation to [facility]   Anjelica Rocha  CW    4/23/24 12:23 PM  Note     Called patient to inquire. Patient was seen by provider 4/5.  Calcium was recommended. She is having a lot of nausea and diarrhea. She says she is urinating a lot and its darker.  She is drinking a good amount according to patient  Sending to the provider  CW    4/23/24 12:24 PM  Anjelica Rocha routed this conversation to MD Saba Vargas MD   to Anjelica Rocha     4/23/24  4:27 PM   The symptoms are less likely due to calcium, however she can hold off taking calcium for now and monitor her symptoms, I would recommend her to reach out to her primary physician to rule out other possible causes.  April 24, 2024  Anjelica Rocha   CW    4/24/24  8:20 AM  Note     She will follow up with primary and keep us updated      Ordered On 1/29/2024  PTH, intact  Status: Final result  Result Care Coordination  Result Notes   Janine Mohit  1/31/2024  9:33 AM EST Back to Top  Call placed to pt-- pt verbally understood and relayed the endocrinologist reached out to her in regards to an appt   Anjelica Sr DO  1/30/2024  5:14 PM EST   Please let pt know that her thyroid lab is in normal range, but the parathyroid hormone level is high- I placed order for endocrinologist eval/treat (as was discussed at visit in the event the result was high again)              Review of Systems    Current Outpatient Medications on File Prior to Visit   Medication Sig    acetaminophen (Tylenol 8 Hour) 650 mg CR tablet Take 1 tablet (650 mg total) by mouth every 8 (eight) hours    amLODIPine (NORVASC) 5 mg  "tablet TAKE 1 TABLET BY MOUTH TWICE  DAILY    aspirin 81 MG tablet Take 81 mg by mouth daily Resume on 4/28     atorvastatin (LIPITOR) 40 mg tablet TAKE 1 TABLET BY MOUTH DAILY  AFTER DINNER    bumetanide (BUMEX) 1 mg tablet TAKE 1/2 A TABLET BY MOUTH  DAILY TAKE A WHOLE TABLET IF  NEEDED    carvedilol (COREG) 12.5 mg tablet TAKE ONE-HALF TABLET BY  MOUTH TWICE DAILY WITH  MEALS    certolizumab (Cimzia) 2 x 200 mg Inject 2 mL every 4 weeks by subcutaneous route.    Cholecalciferol 2000 units TABS Take 2,000 Units by mouth in the morning. Resume on 4/28 .    DULoxetine (CYMBALTA) 30 mg delayed release capsule TAKE 1 CAPSULE BY MOUTH  DAILY    fexofenadine (ALLEGRA) 180 MG tablet Take 180 mg by mouth daily as needed     folic acid (FOLVITE) 1 mg tablet Take 1 mg by mouth daily    losartan (COZAAR) 100 MG tablet TAKE 1 TABLET BY MOUTH DAILY    methotrexate 2.5 mg tablet Take 10 mg by mouth once a week    MULTIPLE VITAMIN PO Take by mouth daily     omeprazole (PriLOSEC) 40 MG capsule TAKE 1 CAPSULE BY MOUTH  DAILY    Diclofenac Sodium (VOLTAREN) 1 % Apply 2 g topically 4 (four) times a day (Patient not taking: Reported on 4/5/2024)       Objective     /60 (BP Location: Left arm, Patient Position: Sitting, Cuff Size: Standard)   Pulse 66   Temp (!) 95.7 °F (35.4 °C) (Tympanic)   Ht 5' 1\" (1.549 m)   Wt 56.5 kg (124 lb 9.6 oz)   SpO2 99%   BMI 23.54 kg/m²     Physical Exam  Vitals and nursing note reviewed.   Constitutional:       General: She is not in acute distress.     Appearance: She is well-developed and well-groomed. She is not ill-appearing, toxic-appearing or diaphoretic.      Comments: Extremely pleasant as always   HENT:      Head: Normocephalic and atraumatic.      Mouth/Throat:      Pharynx: Uvula midline.   Eyes:      General: Lids are normal. No scleral icterus.     Conjunctiva/sclera: Conjunctivae normal.   Neck:      Thyroid: No thyroid mass, thyromegaly or thyroid tenderness.      Vascular: " No JVD.      Trachea: Trachea and phonation normal.   Cardiovascular:      Rate and Rhythm: Normal rate and regular rhythm.      Heart sounds: Normal heart sounds.   Pulmonary:      Effort: Pulmonary effort is normal.      Breath sounds: Normal breath sounds and air entry.   Abdominal:      General: Bowel sounds are normal. There is no distension.      Palpations: Abdomen is soft. There is no hepatomegaly, splenomegaly or mass.      Tenderness: There is abdominal tenderness (mild) in the left lower quadrant. There is no right CVA tenderness, left CVA tenderness, guarding or rebound.      Hernia: There is no hernia in the ventral area.   Musculoskeletal:      Cervical back: Neck supple.      Right lower leg: No edema.      Left lower leg: No edema.   Lymphadenopathy:      Cervical: No cervical adenopathy.   Skin:     General: Skin is warm and dry.      Capillary Refill: Capillary refill takes less than 2 seconds.      Coloration: Skin is not jaundiced or pale.   Neurological:      Mental Status: She is alert and oriented to person, place, and time.      Gait: Gait normal.      Deep Tendon Reflexes: Reflexes are normal and symmetric.   Psychiatric:         Mood and Affect: Mood normal.         Behavior: Behavior is cooperative.         Cognition and Memory: Cognition and memory normal.         Judgment: Judgment normal.       Anjelica Sr DO

## 2024-05-02 ENCOUNTER — APPOINTMENT (OUTPATIENT)
Dept: LAB | Facility: CLINIC | Age: 89
End: 2024-05-02
Payer: MEDICARE

## 2024-05-02 DIAGNOSIS — Z87.898 HISTORY OF LEFT FLANK PAIN: ICD-10-CM

## 2024-05-02 DIAGNOSIS — Z79.899 ENCOUNTER FOR LONG-TERM (CURRENT) USE OF OTHER MEDICATIONS: ICD-10-CM

## 2024-05-02 DIAGNOSIS — R52 GENERALIZED BODY ACHES: ICD-10-CM

## 2024-05-02 DIAGNOSIS — Z87.898 HISTORY OF ABDOMINAL PAIN: ICD-10-CM

## 2024-05-02 DIAGNOSIS — R82.998 DARK BROWN URINE: ICD-10-CM

## 2024-05-02 LAB
ALBUMIN SERPL BCP-MCNC: 4.4 G/DL (ref 3.5–5)
ALP SERPL-CCNC: 55 U/L (ref 34–104)
ALT SERPL W P-5'-P-CCNC: 22 U/L (ref 7–52)
ANION GAP SERPL CALCULATED.3IONS-SCNC: 8 MMOL/L (ref 4–13)
AST SERPL W P-5'-P-CCNC: 25 U/L (ref 13–39)
BACTERIA UR QL AUTO: ABNORMAL /HPF
BASOPHILS # BLD AUTO: 0.04 THOUSANDS/ÂΜL (ref 0–0.1)
BASOPHILS NFR BLD AUTO: 1 % (ref 0–1)
BILIRUB SERPL-MCNC: 0.63 MG/DL (ref 0.2–1)
BILIRUB UR QL STRIP: NEGATIVE
BUN SERPL-MCNC: 29 MG/DL (ref 5–25)
CALCIUM SERPL-MCNC: 9.6 MG/DL (ref 8.4–10.2)
CHLORIDE SERPL-SCNC: 101 MMOL/L (ref 96–108)
CLARITY UR: CLEAR
CO2 SERPL-SCNC: 28 MMOL/L (ref 21–32)
COLOR UR: YELLOW
CREAT SERPL-MCNC: 0.9 MG/DL (ref 0.6–1.3)
EOSINOPHIL # BLD AUTO: 0.09 THOUSAND/ÂΜL (ref 0–0.61)
EOSINOPHIL NFR BLD AUTO: 2 % (ref 0–6)
ERYTHROCYTE [DISTWIDTH] IN BLOOD BY AUTOMATED COUNT: 12.4 % (ref 11.6–15.1)
GFR SERPL CREATININE-BSD FRML MDRD: 57 ML/MIN/1.73SQ M
GLUCOSE P FAST SERPL-MCNC: 99 MG/DL (ref 65–99)
GLUCOSE UR STRIP-MCNC: NEGATIVE MG/DL
HCT VFR BLD AUTO: 37.7 % (ref 34.8–46.1)
HGB BLD-MCNC: 12.5 G/DL (ref 11.5–15.4)
HGB UR QL STRIP.AUTO: ABNORMAL
IMM GRANULOCYTES # BLD AUTO: 0.02 THOUSAND/UL (ref 0–0.2)
IMM GRANULOCYTES NFR BLD AUTO: 0 % (ref 0–2)
KETONES UR STRIP-MCNC: NEGATIVE MG/DL
LEUKOCYTE ESTERASE UR QL STRIP: NEGATIVE
LYMPHOCYTES # BLD AUTO: 2.08 THOUSANDS/ÂΜL (ref 0.6–4.47)
LYMPHOCYTES NFR BLD AUTO: 41 % (ref 14–44)
MCH RBC QN AUTO: 33.5 PG (ref 26.8–34.3)
MCHC RBC AUTO-ENTMCNC: 33.2 G/DL (ref 31.4–37.4)
MCV RBC AUTO: 101 FL (ref 82–98)
MONOCYTES # BLD AUTO: 0.54 THOUSAND/ÂΜL (ref 0.17–1.22)
MONOCYTES NFR BLD AUTO: 11 % (ref 4–12)
NEUTROPHILS # BLD AUTO: 2.37 THOUSANDS/ÂΜL (ref 1.85–7.62)
NEUTS SEG NFR BLD AUTO: 45 % (ref 43–75)
NITRITE UR QL STRIP: NEGATIVE
NON-SQ EPI CELLS URNS QL MICRO: ABNORMAL /HPF
NRBC BLD AUTO-RTO: 0 /100 WBCS
PH UR STRIP.AUTO: 6 [PH]
PLATELET # BLD AUTO: 162 THOUSANDS/UL (ref 149–390)
PMV BLD AUTO: 10.6 FL (ref 8.9–12.7)
POTASSIUM SERPL-SCNC: 4.1 MMOL/L (ref 3.5–5.3)
PROT SERPL-MCNC: 7.6 G/DL (ref 6.4–8.4)
PROT UR STRIP-MCNC: ABNORMAL MG/DL
RBC # BLD AUTO: 3.73 MILLION/UL (ref 3.81–5.12)
RBC #/AREA URNS AUTO: ABNORMAL /HPF
SODIUM SERPL-SCNC: 137 MMOL/L (ref 135–147)
SP GR UR STRIP.AUTO: 1.02 (ref 1–1.03)
UROBILINOGEN UR STRIP-ACNC: <2 MG/DL
WBC # BLD AUTO: 5.14 THOUSAND/UL (ref 4.31–10.16)
WBC #/AREA URNS AUTO: ABNORMAL /HPF

## 2024-05-02 PROCEDURE — 85025 COMPLETE CBC W/AUTO DIFF WBC: CPT

## 2024-05-02 PROCEDURE — 80053 COMPREHEN METABOLIC PANEL: CPT

## 2024-05-02 PROCEDURE — 36415 COLL VENOUS BLD VENIPUNCTURE: CPT

## 2024-05-02 PROCEDURE — 81001 URINALYSIS AUTO W/SCOPE: CPT

## 2024-05-05 DIAGNOSIS — Z87.898 HISTORY OF LEFT FLANK PAIN: ICD-10-CM

## 2024-05-05 DIAGNOSIS — R10.814 LEFT LOWER QUADRANT ABDOMINAL TENDERNESS WITHOUT REBOUND TENDERNESS: ICD-10-CM

## 2024-05-05 DIAGNOSIS — Z87.898 HISTORY OF ABDOMINAL PAIN: ICD-10-CM

## 2024-05-05 DIAGNOSIS — R31.9 HEMATURIA, UNSPECIFIED TYPE: Primary | ICD-10-CM

## 2024-05-09 ENCOUNTER — HOSPITAL ENCOUNTER (OUTPATIENT)
Dept: RADIOLOGY | Facility: MEDICAL CENTER | Age: 89
Discharge: HOME | End: 2024-05-09
Payer: MEDICARE

## 2024-05-09 DIAGNOSIS — R31.9 HEMATURIA, UNSPECIFIED TYPE: ICD-10-CM

## 2024-05-09 DIAGNOSIS — R10.814 LEFT LOWER QUADRANT ABDOMINAL TENDERNESS WITHOUT REBOUND TENDERNESS: ICD-10-CM

## 2024-05-09 DIAGNOSIS — Z87.898 HISTORY OF LEFT FLANK PAIN: ICD-10-CM

## 2024-05-09 PROCEDURE — 74176 CT ABD & PELVIS W/O CONTRAST: CPT

## 2024-05-12 DIAGNOSIS — I10 ESSENTIAL HYPERTENSION: ICD-10-CM

## 2024-05-14 RX ORDER — AMLODIPINE BESYLATE 5 MG/1
TABLET ORAL
Qty: 180 TABLET | Refills: 1 | Status: SHIPPED | OUTPATIENT
Start: 2024-05-14

## 2024-05-16 ENCOUNTER — TELEPHONE (OUTPATIENT)
Age: 89
End: 2024-05-16

## 2024-05-16 NOTE — TELEPHONE ENCOUNTER
Patient transferring care from Dr. Iglesias to Dr. Westfall due to location is closer to where she lives.

## 2024-05-19 ENCOUNTER — DOCUMENTATION (OUTPATIENT)
Dept: FAMILY MEDICINE CLINIC | Facility: CLINIC | Age: 89
End: 2024-05-19

## 2024-05-19 ENCOUNTER — NURSE TRIAGE (OUTPATIENT)
Dept: OTHER | Facility: OTHER | Age: 89
End: 2024-05-19

## 2024-05-19 DIAGNOSIS — R11.0 NAUSEA: Primary | ICD-10-CM

## 2024-05-19 RX ORDER — ONDANSETRON 4 MG/1
4 TABLET, FILM COATED ORAL EVERY 8 HOURS PRN
Qty: 20 TABLET | Refills: 0 | Status: SHIPPED | OUTPATIENT
Start: 2024-05-19

## 2024-05-20 ENCOUNTER — APPOINTMENT (EMERGENCY)
Dept: RADIOLOGY | Facility: HOSPITAL | Age: 89
DRG: 690 | End: 2024-05-20
Payer: MEDICARE

## 2024-05-20 ENCOUNTER — HOSPITAL ENCOUNTER (INPATIENT)
Facility: HOSPITAL | Age: 89
LOS: 1 days | Discharge: HOME | DRG: 690 | End: 2024-05-21
Attending: EMERGENCY MEDICINE | Admitting: INTERNAL MEDICINE
Payer: MEDICARE

## 2024-05-20 DIAGNOSIS — R31.9 HEMATURIA: ICD-10-CM

## 2024-05-20 DIAGNOSIS — G56.01 CARPAL TUNNEL SYNDROME OF RIGHT WRIST: ICD-10-CM

## 2024-05-20 DIAGNOSIS — N13.30 HYDRONEPHROSIS, LEFT: ICD-10-CM

## 2024-05-20 DIAGNOSIS — N13.30 HYDRONEPHROSIS: Primary | ICD-10-CM

## 2024-05-20 DIAGNOSIS — M05.9 SEROPOSITIVE RHEUMATOID ARTHRITIS (HCC): ICD-10-CM

## 2024-05-20 DIAGNOSIS — N39.0 UTI (URINARY TRACT INFECTION): ICD-10-CM

## 2024-05-20 PROBLEM — N13.1 HYDRONEPHROSIS WITH URETERAL STRICTURE, NOT ELSEWHERE CLASSIFIED: Status: ACTIVE | Noted: 2024-05-20

## 2024-05-20 LAB
ALBUMIN SERPL BCP-MCNC: 4.2 G/DL (ref 3.5–5)
ALP SERPL-CCNC: 54 U/L (ref 34–104)
ALT SERPL W P-5'-P-CCNC: 22 U/L (ref 7–52)
ANION GAP SERPL CALCULATED.3IONS-SCNC: 6 MMOL/L (ref 4–13)
AST SERPL W P-5'-P-CCNC: 21 U/L (ref 13–39)
BACTERIA UR QL AUTO: ABNORMAL /HPF
BASOPHILS # BLD AUTO: 0.04 THOUSANDS/ÂΜL (ref 0–0.1)
BASOPHILS NFR BLD AUTO: 1 % (ref 0–1)
BILIRUB SERPL-MCNC: 0.92 MG/DL (ref 0.2–1)
BILIRUB UR QL STRIP: NEGATIVE
BUN SERPL-MCNC: 28 MG/DL (ref 5–25)
CALCIUM SERPL-MCNC: 9.6 MG/DL (ref 8.4–10.2)
CHLORIDE SERPL-SCNC: 100 MMOL/L (ref 96–108)
CK SERPL-CCNC: 189 U/L (ref 26–192)
CLARITY UR: CLEAR
CO2 SERPL-SCNC: 29 MMOL/L (ref 21–32)
COLOR UR: ABNORMAL
CREAT SERPL-MCNC: 1.05 MG/DL (ref 0.6–1.3)
EOSINOPHIL # BLD AUTO: 0.04 THOUSAND/ÂΜL (ref 0–0.61)
EOSINOPHIL NFR BLD AUTO: 1 % (ref 0–6)
ERYTHROCYTE [DISTWIDTH] IN BLOOD BY AUTOMATED COUNT: 12.7 % (ref 11.6–15.1)
GFR SERPL CREATININE-BSD FRML MDRD: 47 ML/MIN/1.73SQ M
GLUCOSE SERPL-MCNC: 99 MG/DL (ref 65–140)
GLUCOSE UR STRIP-MCNC: NEGATIVE MG/DL
HCT VFR BLD AUTO: 36.8 % (ref 34.8–46.1)
HGB BLD-MCNC: 12.4 G/DL (ref 11.5–15.4)
HGB UR QL STRIP.AUTO: ABNORMAL
HYALINE CASTS #/AREA URNS LPF: ABNORMAL /LPF
IMM GRANULOCYTES # BLD AUTO: 0.02 THOUSAND/UL (ref 0–0.2)
IMM GRANULOCYTES NFR BLD AUTO: 0 % (ref 0–2)
KETONES UR STRIP-MCNC: NEGATIVE MG/DL
LEUKOCYTE ESTERASE UR QL STRIP: ABNORMAL
LIPASE SERPL-CCNC: 29 U/L (ref 11–82)
LYMPHOCYTES # BLD AUTO: 2.16 THOUSANDS/ÂΜL (ref 0.6–4.47)
LYMPHOCYTES NFR BLD AUTO: 31 % (ref 14–44)
MCH RBC QN AUTO: 33.5 PG (ref 26.8–34.3)
MCHC RBC AUTO-ENTMCNC: 33.7 G/DL (ref 31.4–37.4)
MCV RBC AUTO: 100 FL (ref 82–98)
MONOCYTES # BLD AUTO: 0.9 THOUSAND/ÂΜL (ref 0.17–1.22)
MONOCYTES NFR BLD AUTO: 13 % (ref 4–12)
MUCOUS THREADS UR QL AUTO: ABNORMAL
NEUTROPHILS # BLD AUTO: 3.86 THOUSANDS/ÂΜL (ref 1.85–7.62)
NEUTS SEG NFR BLD AUTO: 54 % (ref 43–75)
NITRITE UR QL STRIP: NEGATIVE
NON-SQ EPI CELLS URNS QL MICRO: ABNORMAL /HPF
NRBC BLD AUTO-RTO: 0 /100 WBCS
PH UR STRIP.AUTO: 5 [PH]
PLATELET # BLD AUTO: 168 THOUSANDS/UL (ref 149–390)
PMV BLD AUTO: 10.1 FL (ref 8.9–12.7)
POTASSIUM SERPL-SCNC: 3.8 MMOL/L (ref 3.5–5.3)
PROT SERPL-MCNC: 7.6 G/DL (ref 6.4–8.4)
PROT UR STRIP-MCNC: ABNORMAL MG/DL
RBC # BLD AUTO: 3.7 MILLION/UL (ref 3.81–5.12)
RBC #/AREA URNS AUTO: ABNORMAL /HPF
SODIUM SERPL-SCNC: 135 MMOL/L (ref 135–147)
SP GR UR STRIP.AUTO: 1.01 (ref 1–1.03)
UROBILINOGEN UR STRIP-ACNC: <2 MG/DL
WBC # BLD AUTO: 7.02 THOUSAND/UL (ref 4.31–10.16)
WBC #/AREA URNS AUTO: ABNORMAL /HPF

## 2024-05-20 PROCEDURE — 99285 EMERGENCY DEPT VISIT HI MDM: CPT | Performed by: EMERGENCY MEDICINE

## 2024-05-20 PROCEDURE — 80053 COMPREHEN METABOLIC PANEL: CPT

## 2024-05-20 PROCEDURE — 74177 CT ABD & PELVIS W/CONTRAST: CPT

## 2024-05-20 PROCEDURE — 99284 EMERGENCY DEPT VISIT MOD MDM: CPT

## 2024-05-20 PROCEDURE — 36415 COLL VENOUS BLD VENIPUNCTURE: CPT

## 2024-05-20 PROCEDURE — 82550 ASSAY OF CK (CPK): CPT

## 2024-05-20 PROCEDURE — 96374 THER/PROPH/DIAG INJ IV PUSH: CPT

## 2024-05-20 PROCEDURE — 81001 URINALYSIS AUTO W/SCOPE: CPT

## 2024-05-20 PROCEDURE — 99222 1ST HOSP IP/OBS MODERATE 55: CPT | Performed by: INTERNAL MEDICINE

## 2024-05-20 PROCEDURE — 83690 ASSAY OF LIPASE: CPT

## 2024-05-20 PROCEDURE — 85025 COMPLETE CBC W/AUTO DIFF WBC: CPT

## 2024-05-20 PROCEDURE — 99222 1ST HOSP IP/OBS MODERATE 55: CPT | Performed by: NURSE PRACTITIONER

## 2024-05-20 PROCEDURE — 87086 URINE CULTURE/COLONY COUNT: CPT

## 2024-05-20 RX ORDER — ATORVASTATIN CALCIUM 40 MG/1
40 TABLET, FILM COATED ORAL
Status: DISCONTINUED | OUTPATIENT
Start: 2024-05-20 | End: 2024-05-21 | Stop reason: HOSPADM

## 2024-05-20 RX ORDER — ONDANSETRON 2 MG/ML
4 INJECTION INTRAMUSCULAR; INTRAVENOUS EVERY 8 HOURS PRN
Status: DISCONTINUED | OUTPATIENT
Start: 2024-05-20 | End: 2024-05-21 | Stop reason: HOSPADM

## 2024-05-20 RX ORDER — CEFUROXIME AXETIL 500 MG/1
250 TABLET ORAL EVERY 12 HOURS SCHEDULED
Qty: 7 TABLET | Refills: 0 | Status: CANCELLED | OUTPATIENT
Start: 2024-05-20 | End: 2024-05-27

## 2024-05-20 RX ORDER — FOLIC ACID 1 MG/1
1 TABLET ORAL DAILY
Status: DISCONTINUED | OUTPATIENT
Start: 2024-05-21 | End: 2024-05-21 | Stop reason: HOSPADM

## 2024-05-20 RX ORDER — ONDANSETRON 2 MG/ML
4 INJECTION INTRAMUSCULAR; INTRAVENOUS ONCE
Status: COMPLETED | OUTPATIENT
Start: 2024-05-20 | End: 2024-05-20

## 2024-05-20 RX ORDER — CARVEDILOL 6.25 MG/1
6.25 TABLET ORAL 2 TIMES DAILY WITH MEALS
Status: DISCONTINUED | OUTPATIENT
Start: 2024-05-20 | End: 2024-05-21 | Stop reason: HOSPADM

## 2024-05-20 RX ORDER — DULOXETIN HYDROCHLORIDE 30 MG/1
30 CAPSULE, DELAYED RELEASE ORAL
Status: DISCONTINUED | OUTPATIENT
Start: 2024-05-20 | End: 2024-05-21 | Stop reason: HOSPADM

## 2024-05-20 RX ORDER — AMLODIPINE BESYLATE 5 MG/1
5 TABLET ORAL 2 TIMES DAILY
Status: DISCONTINUED | OUTPATIENT
Start: 2024-05-20 | End: 2024-05-21 | Stop reason: HOSPADM

## 2024-05-20 RX ORDER — ACETAMINOPHEN 325 MG/1
650 TABLET ORAL EVERY 6 HOURS PRN
Status: DISCONTINUED | OUTPATIENT
Start: 2024-05-20 | End: 2024-05-21 | Stop reason: HOSPADM

## 2024-05-20 RX ORDER — PANTOPRAZOLE SODIUM 40 MG/1
40 TABLET, DELAYED RELEASE ORAL
Status: DISCONTINUED | OUTPATIENT
Start: 2024-05-21 | End: 2024-05-21 | Stop reason: HOSPADM

## 2024-05-20 RX ORDER — ASPIRIN 81 MG/1
81 TABLET, CHEWABLE ORAL DAILY
Status: DISCONTINUED | OUTPATIENT
Start: 2024-05-21 | End: 2024-05-21 | Stop reason: HOSPADM

## 2024-05-20 RX ORDER — LOSARTAN POTASSIUM 50 MG/1
100 TABLET ORAL DAILY
Status: DISCONTINUED | OUTPATIENT
Start: 2024-05-21 | End: 2024-05-21 | Stop reason: HOSPADM

## 2024-05-20 RX ADMIN — AMLODIPINE BESYLATE 5 MG: 5 TABLET ORAL at 22:26

## 2024-05-20 RX ADMIN — IOHEXOL 85 ML: 350 INJECTION, SOLUTION INTRAVENOUS at 16:50

## 2024-05-20 RX ADMIN — CARVEDILOL 6.25 MG: 6.25 TABLET, FILM COATED ORAL at 20:05

## 2024-05-20 RX ADMIN — ONDANSETRON 4 MG: 2 INJECTION INTRAMUSCULAR; INTRAVENOUS at 16:22

## 2024-05-20 RX ADMIN — CEFTRIAXONE 1000 MG: 1 INJECTION, POWDER, FOR SOLUTION INTRAMUSCULAR; INTRAVENOUS at 19:22

## 2024-05-20 RX ADMIN — DULOXETINE HYDROCHLORIDE 30 MG: 30 CAPSULE, DELAYED RELEASE ORAL at 22:26

## 2024-05-20 RX ADMIN — ATORVASTATIN CALCIUM 40 MG: 40 TABLET, FILM COATED ORAL at 20:05

## 2024-05-20 NOTE — H&P
[facility]  H&P  Name: Radha Ma 89 y.o. female I MRN: [id number]  Unit/Bed#: -01 I Date of Admission: 5/20/2024   Date of Service: 5/20/2024 I Hospital Day: 0      Assessment & Plan   * Hydronephrosis with ureteral stricture  Assessment & Plan  Presenting with left flank pain, nausea x 2 weeks with intermittent dark-colored urine  UA with WBC 10-20, innumerable RBC, occasional bacteria  CT A/P shows new left mild hydronephrosis and hydroureter involving the proximal two thirds of the left ureter with urothelial enhancement which could be reactive or secondary to superimposed infection.  No calculus seen at the transition point.  Findings can represent recently passed calculus or presence of stricture/neoplasm  Urology consulted, appreciate recommendations  Will start IV ceftriaxone for empiric UTI coverage  Follow-up urine culture    Stage 3a chronic kidney disease (HCC)  Assessment & Plan  Lab Results   Component Value Date    EGFR 47 05/20/2024    EGFR 57 05/02/2024    EGFR 76 02/23/2024    CREATININE 1.05 05/20/2024    CREATININE 0.90 05/02/2024    CREATININE 0.72 02/23/2024     Creatinine around baseline  Monitor off Lasix, consider resuming tomorrow if kidney function remain stable  Avoid nephrotoxic agents and relative hypotension    Chronic diastolic (congestive) heart failure (HCC)  Assessment & Plan  Wt Readings from Last 3 Encounters:   05/01/24 56.5 kg (124 lb 9.6 oz)   04/05/24 56.2 kg (124 lb)   01/30/24 55.3 kg (122 lb)     Overall euvolemic on exam  Will hold Lasix for now, resume tomorrow if kidney function remain stable          Coronary artery disease without angina pectoris - S/P stent placement x 2 (2011)  Assessment & Plan  Denies chest pain  Continue cardiac medications.           VTE Pharmacologic Prophylaxis:   Moderate Risk (Score 3-4) - Pharmacological DVT Prophylaxis Contraindicated. Sequential Compression Devices Ordered.  Code Status:  Prior   Discussion with family:  at BEDSIDE.     Anticipated Length of Stay: Patient will be admitted on an inpatient basis with an anticipated length of stay of greater than 2 midnights secondary to above.    Total Time Spent on Date of Encounter in care of patient: 35 mins. This time was spent on one or more of the following: performing physical exam; counseling and coordination of care; obtaining or reviewing history; documenting in the medical record; reviewing/ordering tests, medications or procedures; communicating with other healthcare professionals and discussing with patient's family/caregivers.    Chief Complaint: flank pain    History of Present Illness:  Radha Ma is a 89 y.o. female with a PMH of TCC of bladder, CLL , CHF,CKD who presents with left sided back pain and intermittent dark/brown urine. She underwent CT A/P showing New left mild hydronephrosis and hydroureter involving the proximal two thirds of the left ureter with urothelial enhancement, which can be reactive or secondary to superimposed infection. No calculus is seen at the transition point. Findings can represent a recently passed calculus, or presence of stricture/neoplasm at the transition point. No signs of gross hematuria. Urology consulted    Review of Systems:  Review of Systems as in HPI otherwise negative    Past Medical and Surgical History:   Past Medical History:   Diagnosis Date    Bladder cancer (HCC)     had BCG treatments    Bladder cancer (HCC)     Closed displaced fracture of left trapezium bone 05/18/2017    Coronary artery disease     S/P stent placement x 2 in 2011    GERD (gastroesophageal reflux disease)     Hepatitis     got this from food back in 1970s, has not had a problem since    History of leukemia     in remission    History of stomach ulcers 1970    History of transfusion 1970    Hyperlipidemia     Hypertension     Hyperthyroidism 04/10/2018    Irritable bowel syndrome     Kidney stone     Kidney stones      Persistent cough for 3 weeks or longer 03/10/2020    Pneumonia     Pt had in 2003 and 2004    Pruritic rash 09/03/2019    Pulmonary emboli (HCC) 1970s    Raynaud disease     Shingles     Sleep apnea     Thrombocytopenia (HCC) 02/20/2012       Past Surgical History:   Procedure Laterality Date    CATARACT EXTRACTION      COLONOSCOPY      CORONARY ANGIOPLASTY WITH STENT PLACEMENT      stent x 2    CYSTOSCOPY      diagnostic - onset 4/4/17    EGD      EYE SURGERY      FRACTURE SURGERY      HERNIA REPAIR      HYSTERECTOMY      LEG SURGERY      OOPHORECTOMY      RI NDSC WRST SURG W/RLS TRANSVRS CARPL LIGM Right 10/12/2021    Procedure: Right endoscopic carpal tunnel release;  Surgeon: Ghanshyam Rosado MD;  Location: [facility];  Service: Orthopedics    RI OPTX FEM SHFT FX W/INSJ IMED IMPLT W/WO SCREW Left 4/8/2018    Procedure: INSERTION NAIL IM FEMUR ANTEGRADE (TROCHANTERIC);  Surgeon: Lucrecia Boyle MD;  Location: [facility];  Service: Orthopedics    RI REPAIR FIRST ABDOMINAL WALL HERNIA N/A 5/12/2021    Procedure: REPAIR HERNIA VENTRAL;  Surgeon: Rui Pickett MD;  Location: [facility];  Service: General    TONSILLECTOMY         Meds/Allergies:  Prior to Admission medications    Medication Sig Start Date End Date Taking? Authorizing Provider   acetaminophen (Tylenol 8 Hour) 650 mg CR tablet Take 1 tablet (650 mg total) by mouth every 8 (eight) hours 10/12/21   Ghanshyam Rosado MD   amLODIPine (NORVASC) 5 mg tablet TAKE 1 TABLET BY MOUTH TWICE  DAILY 5/14/24   Shayne Sparks DO   aspirin 81 MG tablet Take 81 mg by mouth daily Resume on 4/28     Historical Provider, MD   atorvastatin (LIPITOR) 40 mg tablet TAKE 1 TABLET BY MOUTH DAILY  AFTER DINNER 2/18/24   Anjelica Sr,    bumetanide (BUMEX) 1 mg tablet TAKE 1/2 A TABLET BY MOUTH  DAILY TAKE A WHOLE TABLET IF  NEEDED 7/19/21   Shayne Sparks DO   carvedilol (COREG) 12.5 mg tablet TAKE ONE-HALF TABLET BY  MOUTH TWICE DAILY WITH  MEALS  8/29/23   BRUCE Khan   certolizumab (Cimzia) 2 x 200 mg Inject 2 mL every 4 weeks by subcutaneous route. 11/21/23   Historical Provider, MD   Cholecalciferol 2000 units TABS Take 2,000 Units by mouth in the morning. Resume on 4/28 .    Historical Provider, MD   Diclofenac Sodium (VOLTAREN) 1 % Apply 2 g topically 4 (four) times a day  Patient not taking: Reported on 4/5/2024 10/26/23   Wayne Fofana, DO   DULoxetine (CYMBALTA) 30 mg delayed release capsule TAKE 1 CAPSULE BY MOUTH  DAILY 10/23/23   Anjelica Sr,    fexofenadine (ALLEGRA) 180 MG tablet Take 180 mg by mouth daily as needed     Historical Provider, MD   folic acid (FOLVITE) 1 mg tablet Take 1 mg by mouth daily    Historical Provider, MD   losartan (COZAAR) 100 MG tablet TAKE 1 TABLET BY MOUTH DAILY 7/28/23   Grupo Neely MD   methotrexate 2.5 mg tablet Take 10 mg by mouth once a week    Historical Provider, MD   MULTIPLE VITAMIN PO Take by mouth daily     Historical Provider, MD   omeprazole (PriLOSEC) 40 MG capsule TAKE 1 CAPSULE BY MOUTH  DAILY 6/7/23   Monica Iglesias, DO   ondansetron (ZOFRAN) 4 mg tablet Take 1 tablet (4 mg total) by mouth every 8 (eight) hours as needed for nausea or vomiting 5/19/24   Grupo Neely MD     I have reviewed home medications with patient personally.    Allergies:   Allergies   Allergen Reactions    Lactose - Food Allergy GI Intolerance       Social History:  Marital Status:      Substance Use History:   Social History     Substance and Sexual Activity   Alcohol Use No     Social History     Tobacco Use   Smoking Status Never   Smokeless Tobacco Never     Social History     Substance and Sexual Activity   Drug Use No       Family History:  Family History   Problem Relation Age of Onset    Arthritis Mother     Osteoporosis Mother     Heart disease Father     Hypertension Father     Hypertension Brother     Prostate cancer Brother     Breast cancer Daughter 50     Prostate cancer Son        Physical Exam:     Vitals:   Blood Pressure: 138/67 (05/20/24 1838)  Pulse: 92 (05/20/24 1838)  Temperature: 98.6 °F (37 °C) (05/20/24 1344)  Respirations: 20 (05/20/24 1838)  SpO2: 98 % (05/20/24 1838)    Physical Exam  Vitals and nursing note reviewed.   Constitutional:       General: She is not in acute distress.     Appearance: She is well-developed.   HENT:      Head: Normocephalic and atraumatic.   Eyes:      Conjunctiva/sclera: Conjunctivae normal.   Cardiovascular:      Rate and Rhythm: Normal rate and regular rhythm.      Heart sounds: No murmur heard.  Pulmonary:      Effort: Pulmonary effort is normal. No respiratory distress.      Breath sounds: Normal breath sounds.   Abdominal:      Palpations: Abdomen is soft.      Tenderness: There is no abdominal tenderness. There is no right CVA tenderness or left CVA tenderness.   Musculoskeletal:         General: No swelling.      Cervical back: Neck supple.   Skin:     General: Skin is warm and dry.      Capillary Refill: Capillary refill takes less than 2 seconds.   Neurological:      Mental Status: She is alert.   Psychiatric:         Mood and Affect: Mood normal.          Additional Data:     Lab Results:  Results from last 7 days   Lab Units 05/20/24  1421   WBC Thousand/uL 7.02   HEMOGLOBIN g/dL 12.4   HEMATOCRIT % 36.8   PLATELETS Thousands/uL 168   SEGS PCT % 54   LYMPHO PCT % 31   MONO PCT % 13*   EOS PCT % 1     Results from last 7 days   Lab Units 05/20/24  1421   SODIUM mmol/L 135   POTASSIUM mmol/L 3.8   CHLORIDE mmol/L 100   CO2 mmol/L 29   BUN mg/dL 28*   CREATININE mg/dL 1.05   ANION GAP mmol/L 6   CALCIUM mg/dL 9.6   ALBUMIN g/dL 4.2   TOTAL BILIRUBIN mg/dL 0.92   ALK PHOS U/L 54   ALT U/L 22   AST U/L 21   GLUCOSE RANDOM mg/dL 99                       Lines/Drains:  Invasive Devices       Peripheral Intravenous Line  Duration             Peripheral IV 05/20/24 Right Antecubital <1 day                        Imaging:  Reviewed radiology reports from this admission including: abdominal/pelvic CT  CT abdomen pelvis w contrast   Final Result by Carlitos Olsen MD (05/20 1734)      New left mild hydronephrosis and hydroureter involving the proximal two thirds of the left ureter with urothelial enhancement, which can be reactive or secondary to superimposed infection. No calculus is seen at the transition point. Findings can    represent a recently passed calculus, or presence of stricture/neoplasm at the transition point. Direct visualization versus further assessment with CT urogram is recommended for further assessment.      Limited assessment for presence of rectovesicular fistula since orally administered contrast reaches up to the level of hepatic flexure. However there are no foci of air within the urinary bladder      Apparent mild wall thickening of the descending colon, which can be secondary to underdistention or due to colitis (infectious/inflammatory). A small amount of free fluid and fat stranding around the descending colon can be secondary to colitis or due to    presence of small volume free fluid of unknown etiology.      Stable pancreatic cystic lesions.      The study was marked in EPIC for immediate notification.         Workstation performed: [id number]             EKG and Other Studies Reviewed on Admission:   EKG: No EKG obtained.    ** Please Note: This note has been constructed using a voice recognition system. **

## 2024-05-20 NOTE — ASSESSMENT & PLAN NOTE
Lab Results   Component Value Date    EGFR 47 05/20/2024    EGFR 57 05/02/2024    EGFR 76 02/23/2024    CREATININE 1.05 05/20/2024    CREATININE 0.90 05/02/2024    CREATININE 0.72 02/23/2024     Creatinine around baseline  Monitor off Lasix, consider resuming tomorrow if kidney function remain stable  Avoid nephrotoxic agents and relative hypotension

## 2024-05-20 NOTE — ED ATTENDING ATTESTATION
5/20/2024  I, Milka Brown MD, saw and evaluated the patient. I have discussed the patient with the resident/non-physician practitioner and agree with the resident's/non-physician practitioner's findings, Plan of Care, and MDM as documented in the resident's/non-physician practitioner's note, except where noted. All available labs and Radiology studies were reviewed.  I was present for key portions of any procedure(s) performed by the resident/non-physician practitioner and I was immediately available to provide assistance.       At this point I agree with the current assessment done in the Emergency Department.  I have conducted an independent evaluation of this patient a history and physical is as follows:  Patient here with left-sided flank and back pain.  This is a patient who has had intermittent left-sided flank and abdominal pain that is worse at night that has been going on for weeks to a month.  Patient states that she has had intermittent brown-colored urine as well.  Patient states that she can be well during the day, but then the symptoms come on, and they are associate with nausea and chills.  No fevers.  No vomiting.  No diarrhea.  Patient did see her primary care doctor for this, and underwent outpatient evaluation including CT scanning of her abdomen for potential kidney stone.  The patient does have a remote history of kidney stone, and also a history of bladder cancer.  She did have outpatient blood work done as well.  The patient states that her symptoms were worse yesterday, and that is why she comes to the ED today.  She called her primary care doctor yesterday and got the on-call service, and was rec to come to the emergency department.  The patient currently is feeling better.  Her review of systems otherwise negative in 12 systems reviewed.  On exam the patient is awake, alert, interactive, and ambulatory with normal vital signs.  Her sclera are anicteric.  Her conjunctiva are pink.   Mucous membranes are moist.  His face is symmetric.  Neck is supple and nontender with no adenopathy.  Her heart is regular without murmurs, rubs, or gallops.  Her lungs are clear with good air movement.  She has left CVA tenderness, and mild left and epigastric abdominal tenderness with no rebound, guarding, or masses.  Extremities are intact without lateralizing edema.  She is neurologically nonfocal.MEDICAL DECISION MAKING    Number and Complexity of Problems  Differential diagnosis: Urinary tract infection, dehydration, patient states that she always has blood in her urine and her most recent CT shows no evidence of stone, so doubt stone disease, does have known lesion in the pancreas still may represent obstructive process with elevated bilirubin in the urine, doubt rhabdomyolysis, doubt fistula, doubt intra-abdominal surgical pathology    Medical Decision Making Data  External documents reviewed: Patient's visit from May 2 reviewed, patient's labs from May 2 reviewed and CT scanning from May 9 reviewed, patient with a mild ASHLEE at that time, worsening today.  Patient with CT showing cystic lesion in pancreas which has been stable, has 1 at the neck of the pancreas and 1 at the tail  My EKG interpretation:   My CT interpretation:   My X-ray interpretation:   My ultrasound interpretation:     CT abdomen pelvis w contrast   Final Result      New left mild hydronephrosis and hydroureter involving the proximal two thirds of the left ureter with urothelial enhancement, which can be reactive or secondary to superimposed infection. No calculus is seen at the transition point. Findings can    represent a recently passed calculus, or presence of stricture/neoplasm at the transition point. Direct visualization versus further assessment with CT urogram is recommended for further assessment.      Limited assessment for presence of rectovesicular fistula since orally administered contrast reaches up to the level of hepatic  flexure. However there are no foci of air within the urinary bladder      Apparent mild wall thickening of the descending colon, which can be secondary to underdistention or due to colitis (infectious/inflammatory). A small amount of free fluid and fat stranding around the descending colon can be secondary to colitis or due to    presence of small volume free fluid of unknown etiology.      Stable pancreatic cystic lesions.      The study was marked in EPIC for immediate notification.         Workstation performed: [id number]             Labs Reviewed   CBC AND DIFFERENTIAL - Abnormal       Result Value Ref Range Status    WBC 7.02  4.31 - 10.16 Thousand/uL Final    RBC 3.70 (*) 3.81 - 5.12 Million/uL Final    Hemoglobin 12.4  11.5 - 15.4 g/dL Final    Hematocrit 36.8  34.8 - 46.1 % Final     (*) 82 - 98 fL Final    MCH 33.5  26.8 - 34.3 pg Final    MCHC 33.7  31.4 - 37.4 g/dL Final    RDW 12.7  11.6 - 15.1 % Final    MPV 10.1  8.9 - 12.7 fL Final    Platelets 168  149 - 390 Thousands/uL Final    nRBC 0  /100 WBCs Final    Segmented % 54  43 - 75 % Final    Immature Grans % 0  0 - 2 % Final    Lymphocytes % 31  14 - 44 % Final    Monocytes % 13 (*) 4 - 12 % Final    Eosinophils Relative 1  0 - 6 % Final    Basophils Relative 1  0 - 1 % Final    Absolute Neutrophils 3.86  1.85 - 7.62 Thousands/µL Final    Absolute Immature Grans 0.02  0.00 - 0.20 Thousand/uL Final    Absolute Lymphocytes 2.16  0.60 - 4.47 Thousands/µL Final    Absolute Monocytes 0.90  0.17 - 1.22 Thousand/µL Final    Eosinophils Absolute 0.04  0.00 - 0.61 Thousand/µL Final    Basophils Absolute 0.04  0.00 - 0.10 Thousands/µL Final   COMPREHENSIVE METABOLIC PANEL - Abnormal    Sodium 135  135 - 147 mmol/L Final    Potassium 3.8  3.5 - 5.3 mmol/L Final    Chloride 100  96 - 108 mmol/L Final    CO2 29  21 - 32 mmol/L Final    ANION GAP 6  4 - 13 mmol/L Final    BUN 28 (*) 5 - 25 mg/dL Final    Creatinine 1.05  0.60 - 1.30 mg/dL Final    Comment:  Standardized to IDMS reference method    Glucose 99  65 - 140 mg/dL Final    Comment: If the patient is fasting, the ADA then defines impaired fasting glucose as > 100 mg/dL and diabetes as > or equal to 123 mg/dL.    Calcium 9.6  8.4 - 10.2 mg/dL Final    AST 21  13 - 39 U/L Final    ALT 22  7 - 52 U/L Final    Comment: Specimen collection should occur prior to Sulfasalazine administration due to the potential for falsely depressed results.     Alkaline Phosphatase 54  34 - 104 U/L Final    Total Protein 7.6  6.4 - 8.4 g/dL Final    Albumin 4.2  3.5 - 5.0 g/dL Final    Total Bilirubin 0.92  0.20 - 1.00 mg/dL Final    Comment: Use of this assay is not recommended for patients undergoing treatment with eltrombopag due to the potential for falsely elevated results.  N-acetyl-p-benzoquinone imine (metabolite of Acetaminophen) will generate erroneously low results in samples for patients that have taken an overdose of Acetaminophen.    eGFR 47  ml/min/1.73sq m Final    Narrative:     [employer] guidelines for Chronic Kidney Disease (CKD):     Stage 1 with normal or high GFR (GFR > 90 mL/min/1.73 square meters)    Stage 2 Mild CKD (GFR = 60-89 mL/min/1.73 square meters)    Stage 3A Moderate CKD (GFR = 45-59 mL/min/1.73 square meters)    Stage 3B Moderate CKD (GFR = 30-44 mL/min/1.73 square meters)    Stage 4 Severe CKD (GFR = 15-29 mL/min/1.73 square meters)    Stage 5 End Stage CKD (GFR <15 mL/min/1.73 square meters)  Note: GFR calculation is accurate only with a steady state creatinine   UA W REFLEX TO MICROSCOPIC WITH REFLEX TO CULTURE - Abnormal    Color, UA Light Yellow   Final    Clarity, UA Clear   Final    Specific Gravity, UA 1.009  1.003 - 1.030 Final    pH, UA 5.0  4.5, 5.0, 5.5, 6.0, 6.5, 7.0, 7.5, 8.0 Final    Leukocytes, UA Small (*) Negative Final    Nitrite, UA Negative  Negative Final    Protein, UA Trace (*) Negative mg/dl Final    Glucose, UA Negative  Negative mg/dl Final     Ketones, UA Negative  Negative mg/dl Final    Urobilinogen, UA <2.0  <2.0 mg/dl mg/dl Final    Bilirubin, UA Negative  Negative Final    Occult Blood, UA Large (*) Negative Final   URINE MICROSCOPIC - Abnormal    RBC, UA Innumerable (*) None Seen, 1-2 /hpf Final    WBC, UA 10-20 (*) None Seen, 1-2 /hpf Final    Epithelial Cells Occasional  None Seen, Occasional /hpf Final    Bacteria, UA Occasional  None Seen, Occasional /hpf Final    MUCUS THREADS Occasional (*) None Seen Final    Hyaline Casts, UA 0-3 (*) None Seen /lpf Final   LIPASE - Normal    Lipase 29  11 - 82 u/L Final   CK - Normal    Total   26 - 192 U/L Final   URINE CULTURE   CYTOLOGY, URINE       Labs reviewed by me are significant for: Slightly worsening renal function, CK normal    Clinical decision rules/scores are significant for:     Discussed case with:   Considered admission for: New hydronephrosis, ASHLEE, differential including stricture, mass    Treatment and Disposition  ED course: Patient seen and examined, IV access established.  I think that patient's discomfort is likely chronic and best managed in the outpatient setting, however her last CT was noncontrasted, therefore will perform contrasted CT to rule out surgical pathology, progression of her pancreatic lesion, will recheck labs to trend her kidney injury, and reassess.  Shared decision making:   Code status:     ED Course         Critical Care Time  Procedures

## 2024-05-20 NOTE — DISCHARGE INSTRUCTIONS
New left mild hydronephrosis and hydroureter involving the proximal two thirds of the left ureter with urothelial enhancement, which can be reactive or secondary to superimposed infection. No calculus is seen at the transition point. Findings can , represent a recently passed calculus, or presence of stricture/neoplasm at the transition point. Direct visualization versus further assessment with CT urogram is recommended for further assessment.   Stable pancreatic cystic lesions.    Please follow up with urology for further cystography.

## 2024-05-20 NOTE — ASSESSMENT & PLAN NOTE
Presenting with left flank pain, nausea x 2 weeks with intermittent dark-colored urine  UA with WBC 10-20, innumerable RBC, occasional bacteria  CT A/P shows new left mild hydronephrosis and hydroureter involving the proximal two thirds of the left ureter with urothelial enhancement which could be reactive or secondary to superimposed infection.  No calculus seen at the transition point.  Findings can represent recently passed calculus or presence of stricture/neoplasm  Urology consulted, appreciate recommendations  Will start IV ceftriaxone for empiric UTI coverage  Follow-up urine culture

## 2024-05-20 NOTE — ASSESSMENT & PLAN NOTE
Denies chest pain  Continue cardiac medications.   Neurology Progress Note      HPI: HPI: 61 y/o M, former smoker, PMHx CVA (loop recorder, on Plavix and 81mg ASA, no residual deficits), seizures (on Keppra, changed from 1000mg BID to 1250 BID + Vimpat BID), HTN, iron deficiency anemia, dementia, SHx hernia repair, Sent to ED by neurologist Dr. Guillen for concern of possible stroke vs TIA on Sunday. Per wife, Pt had a seizure two nights ago (Sunday) that was witnessed by son, and is his second episode in the past 2 weeks, was GTC typical for pt. He was given Keppra and his symptoms resolved after 5 minutes. Pt was sitting in his chair, no head injury or LOC. He was not brought to the hospital after his seizure episode. Afterwards at 8PM, Pt began to right sided facial, upper and lower extremity numbness and weakness in the right side. No facial droop. According to wife, Pt appeared to be confused afterwards - these sx resolved spontaneously within 1 hour and have not recurred since then.  Comes to ED today for further evaluation. Denies chest pain, SOB, n/v/d, abdominal pain, bloody stools, leg swelling, and rashes Last CVA 1 year ago with LOC. Pt is compliant with medications.  Scheduled for an endoscopy on 11/11/2021.     Stroke neuro consulted for right sided weakness after seizure. As per wife, "the patient has been having shaking seizures more often so they increased his keppra and added vimpat. Then he had an episode where he stared off to the right then started shaking and then the right side went weak. 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       Interval History:    Patient seen and examined at bedside. There were no acute events overnight. Patient states he feels well, offers now complaints. Says "I feel better with less brain fog".      PAST MEDICAL & SURGICAL HISTORY:  Hypertension  CVA (cerebral vascular accident)  Dementia  Anemia  Seizure  H/O hernia repair      Medications:  albuterol/ipratropium for Nebulization 3 milliLiter(s) Nebulizer every 6 hours  aspirin enteric coated 81 milliGRAM(s) Oral daily  atorvastatin 40 milliGRAM(s) Oral at bedtime  clopidogrel Tablet 75 milliGRAM(s) Oral daily  donepezil 5 milliGRAM(s) Oral at bedtime  enoxaparin Injectable 40 milliGRAM(s) SubCutaneous at bedtime  lacosamide 100 milliGRAM(s) Oral two times a day  lisinopril 20 milliGRAM(s) Oral daily  melatonin 5 milliGRAM(s) Oral at bedtime  QUEtiapine 12.5 milliGRAM(s) Oral at bedtime  senna 2 Tablet(s) Oral at bedtime      Vital Signs Last 24 Hrs  T(C): 37 (03 Nov 2021 12:48), Max: 37 (03 Nov 2021 12:48)  T(F): 98.6 (03 Nov 2021 12:48), Max: 98.6 (03 Nov 2021 12:48)  HR: 78 (03 Nov 2021 12:48) (59 - 78)  BP: 108/68 (03 Nov 2021 12:48) (108/68 - 156/86)  BP(mean): 81 (03 Nov 2021 12:48) (81 - 81)  RR: 18 (03 Nov 2021 12:48) (18 - 18)  SpO2: 92% (03 Nov 2021 12:48) (92% - 95%)      Neurological Exam:   Mental status: Awake, alert and oriented x2 to self and location. Oriented to year but not to month, date, or day of the week. No dysarthria, no aphasia. Appears more alert and awake, able to carry out conversation and follow commands, answering questions appropriately.  Cranial nerves: Pupils equally round and reactive to light, visual fields full, no nystagmus, extraocular muscles intact, V1 through V3 intact bilaterally and symmetric, face symmetric, hearing intact to finger rub, palate elevation symmetric, tongue was midline.  Motor:  Slight bradykinesia with fine motor movements in L hand and LUE. MRC grading 5/5 B/L UE/LE.   strength 5/5.  Normal tone and bulk.  No abnormal movements.    Sensation: Intact to light touch, proprioception, and pinprick.   Coordination: No dysmetria on finger-to-nose and heel-to-shin.  No dysdiadokinesia.  Reflexes: 2+ in bilateral UE/LE, downgoing toes bilaterally. (-) Ho.  Gait: Narrow and steady. No ataxia.  Romberg negative      Labs:  CBC Full  -  ( 01 Nov 2021 10:37 )  WBC Count : 5.67 K/uL  RBC Count : 5.06 M/uL  Hemoglobin : 13.2 g/dL  Hematocrit : 41.5 %  Platelet Count - Automated : 233 K/uL  Mean Cell Volume : 82.0 fl  Mean Cell Hemoglobin : 26.1 pg  Mean Cell Hemoglobin Concentration : 31.8 gm/dL      Urinalysis Basic - ( 01 Nov 2021 12:22 )    Color: Yellow / Appearance: Clear / SG: >=1.030 / pH: x  Gluc: x / Ketone: NEGATIVE  / Bili: Negative / Urobili: 0.2 E.U./dL   Blood: x / Protein: Trace mg/dL / Nitrite: NEGATIVE   Leuk Esterase: NEGATIVE / RBC: < 5 /HPF / WBC < 5 /HPF   Sq Epi: x / Non Sq Epi: 0-5 /HPF / Bacteria: Present /HPF

## 2024-05-20 NOTE — ASSESSMENT & PLAN NOTE
Wt Readings from Last 3 Encounters:   05/01/24 56.5 kg (124 lb 9.6 oz)   04/05/24 56.2 kg (124 lb)   01/30/24 55.3 kg (122 lb)     Overall euvolemic on exam  Will hold Lasix for now, resume tomorrow if kidney function remain stable

## 2024-05-20 NOTE — ED PROVIDER NOTES
Chief Complaint   Patient presents with    Flank Pain     C/o L side flank pain and nausea x2 weeks. States she's been having dark urine, states sometimes her urine is brown and sometimes back to normal.      History of Present Illness and Review of Systems   This is a 89 y.o. female with pmhx of transitional cell carcinoma of the bladder, chf, ckd and CLL who comes in with two week hx of left sided back pain, dark urine, and nausea. She is passing normal stool. She has not had a colonoscopy in many years. She states that her symptoms got worse yesterday prompting her to come to the ED for further evaluation. She denies any dysuria or fever.    - No language barrier.     No other complaints for this encounter.    Remainder of ROS Reviewed and Non-Pertinent    Past Medical, Past Surgical History:    has a past medical history of Bladder cancer (HCC), Bladder cancer (HCC), Closed displaced fracture of left trapezium bone (05/18/2017), Coronary artery disease, GERD (gastroesophageal reflux disease), Hepatitis, History of leukemia, History of stomach ulcers (1970), History of transfusion (1970), Hyperlipidemia, Hypertension, Hyperthyroidism (04/10/2018), Irritable bowel syndrome, Kidney stone, Kidney stones, Persistent cough for 3 weeks or longer (03/10/2020), Pneumonia, Pruritic rash (09/03/2019), Pulmonary emboli (HCC) (1970s), Raynaud disease, Shingles, Sleep apnea, and Thrombocytopenia (HCC) (02/20/2012).   has a past surgical history that includes Coronary angioplasty with stent; Hysterectomy; Cystoscopy; pr optx fem shft fx w/insj imed implt w/wo screw (Left, 4/8/2018); Oophorectomy; Cataract extraction; Eye surgery; Leg Surgery; Tonsillectomy; Colonoscopy; EGD; Fracture surgery; pr repair first abdominal wall hernia (N/A, 5/12/2021); Hernia repair; and pr ndsc wrst surg w/rls transvrs carpl ligm (Right, 10/12/2021).     Allergies:     Allergies   Allergen Reactions    Lactose - Food Allergy GI Intolerance        Social and Family History:     Social History     Substance and Sexual Activity   Alcohol Use No     Social History     Tobacco Use   Smoking Status Never   Smokeless Tobacco Never     Social History     Substance and Sexual Activity   Drug Use No       Physical Examination     Vitals:    05/21/24 0745 05/21/24 1029 05/21/24 1605 05/21/24 1606   BP: 152/73 125/65 116/60 116/60   Pulse: 77 72 74 79   Resp:       Temp: 97.8 °F (36.6 °C)      TempSrc:       SpO2: 97% 97% 98% 95%   Weight:       Height:           Physical Exam  Vitals and nursing note reviewed.   Constitutional:       General: She is not in acute distress.     Appearance: She is well-developed.   HENT:      Head: Normocephalic and atraumatic.   Eyes:      Conjunctiva/sclera: Conjunctivae normal.   Cardiovascular:      Rate and Rhythm: Normal rate and regular rhythm.      Heart sounds: No murmur heard.  Pulmonary:      Effort: Pulmonary effort is normal. No respiratory distress.      Breath sounds: Normal breath sounds.   Abdominal:      General: Abdomen is flat. Bowel sounds are normal. There is no abdominal bruit. There are no signs of injury.      Palpations: Abdomen is soft.      Tenderness: There is abdominal tenderness in the epigastric area. There is no right CVA tenderness, left CVA tenderness, guarding or rebound. Negative signs include Gtz's sign, Rovsing's sign, McBurney's sign, psoas sign and obturator sign.   Musculoskeletal:         General: No swelling.      Cervical back: Neck supple.   Skin:     General: Skin is warm and dry.      Capillary Refill: Capillary refill takes less than 2 seconds.   Neurological:      Mental Status: She is alert.   Psychiatric:         Mood and Affect: Mood normal.           Procedures   Procedures      MDM:   Medical Decision Making  Amount and/or Complexity of Data Reviewed  Labs: ordered. Decision-making details documented in ED Course.  Radiology: ordered.    Risk  Prescription drug  management.  Decision regarding hospitalization.    89-year-old woman with past medical history of bladder cancer and leukemia comes in with numerous weeks of black urine that is associated with left-sided flank/back pain and epigastric pain.      Physical exam as reported above.    Differentials for this patient include UTI dehydration kidney stone rhabdo myelitis colovesicular fistula or cystitis    Plan to get a urinalysis as well as a CT with p.o./IV contrast as well as a CK to look for any pathology at this time.    Results of the patient's lab work showed that she does have a UTI as well as concerning findings that could represent a stricture/neoplasm at the transition point of her bladder.     The patient's obstructive pattern will require direct visualization with possible stenting.  The patient will be admitted to the internal medicine team with consult to urology for further evaluation and treatment  - Reviewed relevant past office visits/hospitalizations/procedures  -Obtained pertinent history that influenced decision making from the     ED Course as of 05/23/24 1952   Mon May 20, 2024   1448 Leukocytes, UA(!): Small   1510 Bacteria, UA: Occasional  Will treat uti   1744 superimposed infection. No calculus is seen at the transition point. Findings can  represent a recently passed calculus, or presence of stricture/neoplasm at the transition point. Direct visualization versus further assessment with CT urogram is recommended for further assessment.       Final Dispo   Final Diagnosis:  1. Hydronephrosis    2. UTI (urinary tract infection)    3. Hematuria    4. Carpal tunnel syndrome of right wrist    5. Seropositive rheumatoid arthritis (HCC)    6. Hydronephrosis, left      Time reflects when diagnosis was documented in both MDM as applicable and the Disposition within this note       Time User Action Codes Description Comment    5/20/2024  3:13 PM Jian Murray Add [N39.0] UTI (urinary tract infection)      5/20/2024  5:48 PM Jian Murray Add [N13.30] Hydronephrosis     5/20/2024  6:24 PM Mike Vargas Add [R31.9] Hematuria     5/20/2024  6:37 PM Jian Murray Modify [N39.0] UTI (urinary tract infection)     5/20/2024  6:37 PM Jian Murray Modify [N13.30] Hydronephrosis     5/20/2024  6:37 PM Jian Murray Modify [R31.9] Hematuria     5/21/2024  5:04 PM Elvira Monroy Add [G56.01] Carpal tunnel syndrome of right wrist     5/21/2024  5:07 PM Elvira Monroy Add [M05.9] Seropositive rheumatoid arthritis (HCC)     5/21/2024  5:07 PM Elvira Monroy Add [N13.30] Hydronephrosis, left           ED Disposition       ED Disposition   Admit    Condition   Stable    Date/Time   Mon May 20, 2024  6:36 PM    Comment   Case was discussed with  and the patient's admission status was agreed to be Admission Status: inpatient status to the service of Dr. Vargas .               Follow-up Information       Follow up With Specialties Details Why Contact Info Additional Information    Anjelica Sr DO Family Medicine Schedule an appointment as soon as possible for a visit in 1 week(s)  [address] [place] [id number]  [phone]       [facility]   [address]  [address]  [facility] [id number] [phone] [facility] [place], [address], [place], [place], [id number] [phone]          Medications   iohexol (OMNIPAQUE) 240 MG/ML solution 50 mL (50 mL Oral Given 5/20/24 1650)   ondansetron (ZOFRAN) injection 4 mg (4 mg Intravenous Given 5/20/24 1622)   iohexol (OMNIPAQUE) 350 MG/ML injection (MULTI-DOSE) 85 mL (85 mL Intravenous Given 5/20/24 1650)       Risk Stratification Tools                Orders Placed This Encounter   Procedures    Urine culture    CT abdomen pelvis w contrast    CBC and differential    Comprehensive metabolic panel    Lipase    CK    UA w Reflex to Microscopic w Reflex to Culture     Urine Microscopic    CBC    Basic metabolic panel    Ambulatory referral to Urology    Discharge Diet    Notify admitting physician    Notify admitting physician on arrival    Activity as tolerated    Inpatient consult to Urology    INPATIENT ADMISSION    Discharge patient       Labs:     Labs Reviewed   CBC AND DIFFERENTIAL - Abnormal       Result Value Ref Range Status    WBC 7.02  4.31 - 10.16 Thousand/uL Final    RBC 3.70 (*) 3.81 - 5.12 Million/uL Final    Hemoglobin 12.4  11.5 - 15.4 g/dL Final    Hematocrit 36.8  34.8 - 46.1 % Final     (*) 82 - 98 fL Final    MCH 33.5  26.8 - 34.3 pg Final    MCHC 33.7  31.4 - 37.4 g/dL Final    RDW 12.7  11.6 - 15.1 % Final    MPV 10.1  8.9 - 12.7 fL Final    Platelets 168  149 - 390 Thousands/uL Final    nRBC 0  /100 WBCs Final    Segmented % 54  43 - 75 % Final    Immature Grans % 0  0 - 2 % Final    Lymphocytes % 31  14 - 44 % Final    Monocytes % 13 (*) 4 - 12 % Final    Eosinophils Relative 1  0 - 6 % Final    Basophils Relative 1  0 - 1 % Final    Absolute Neutrophils 3.86  1.85 - 7.62 Thousands/µL Final    Absolute Immature Grans 0.02  0.00 - 0.20 Thousand/uL Final    Absolute Lymphocytes 2.16  0.60 - 4.47 Thousands/µL Final    Absolute Monocytes 0.90  0.17 - 1.22 Thousand/µL Final    Eosinophils Absolute 0.04  0.00 - 0.61 Thousand/µL Final    Basophils Absolute 0.04  0.00 - 0.10 Thousands/µL Final   COMPREHENSIVE METABOLIC PANEL - Abnormal    Sodium 135  135 - 147 mmol/L Final    Potassium 3.8  3.5 - 5.3 mmol/L Final    Chloride 100  96 - 108 mmol/L Final    CO2 29  21 - 32 mmol/L Final    ANION GAP 6  4 - 13 mmol/L Final    BUN 28 (*) 5 - 25 mg/dL Final    Creatinine 1.05  0.60 - 1.30 mg/dL Final    Comment: Standardized to IDMS reference method    Glucose 99  65 - 140 mg/dL Final    Comment: If the patient is fasting, the ADA then defines impaired fasting glucose as > 100 mg/dL and diabetes as > or equal to 123 mg/dL.    Calcium 9.6  8.4 - 10.2 mg/dL Final     AST 21  13 - 39 U/L Final    ALT 22  7 - 52 U/L Final    Comment: Specimen collection should occur prior to Sulfasalazine administration due to the potential for falsely depressed results.     Alkaline Phosphatase 54  34 - 104 U/L Final    Total Protein 7.6  6.4 - 8.4 g/dL Final    Albumin 4.2  3.5 - 5.0 g/dL Final    Total Bilirubin 0.92  0.20 - 1.00 mg/dL Final    Comment: Use of this assay is not recommended for patients undergoing treatment with eltrombopag due to the potential for falsely elevated results.  N-acetyl-p-benzoquinone imine (metabolite of Acetaminophen) will generate erroneously low results in samples for patients that have taken an overdose of Acetaminophen.    eGFR 47  ml/min/1.73sq m Final    Narrative:     National Kidney Disease Foundation guidelines for Chronic Kidney Disease (CKD):     Stage 1 with normal or high GFR (GFR > 90 mL/min/1.73 square meters)    Stage 2 Mild CKD (GFR = 60-89 mL/min/1.73 square meters)    Stage 3A Moderate CKD (GFR = 45-59 mL/min/1.73 square meters)    Stage 3B Moderate CKD (GFR = 30-44 mL/min/1.73 square meters)    Stage 4 Severe CKD (GFR = 15-29 mL/min/1.73 square meters)    Stage 5 End Stage CKD (GFR <15 mL/min/1.73 square meters)  Note: GFR calculation is accurate only with a steady state creatinine   UA W REFLEX TO MICROSCOPIC WITH REFLEX TO CULTURE - Abnormal    Color, UA Light Yellow   Final    Clarity, UA Clear   Final    Specific Gravity, UA 1.009  1.003 - 1.030 Final    pH, UA 5.0  4.5, 5.0, 5.5, 6.0, 6.5, 7.0, 7.5, 8.0 Final    Leukocytes, UA Small (*) Negative Final    Nitrite, UA Negative  Negative Final    Protein, UA Trace (*) Negative mg/dl Final    Glucose, UA Negative  Negative mg/dl Final    Ketones, UA Negative  Negative mg/dl Final    Urobilinogen, UA <2.0  <2.0 mg/dl mg/dl Final    Bilirubin, UA Negative  Negative Final    Occult Blood, UA Large (*) Negative Final   URINE MICROSCOPIC - Abnormal    RBC, UA Innumerable (*) None Seen, 1-2 /hpf  "Final    WBC, UA 10-20 (*) None Seen, 1-2 /hpf Final    Epithelial Cells Occasional  None Seen, Occasional /hpf Final    Bacteria, UA Occasional  None Seen, Occasional /hpf Final    MUCUS THREADS Occasional (*) None Seen Final    Hyaline Casts, UA 0-3 (*) None Seen /lpf Final   LIPASE - Normal    Lipase 29  11 - 82 u/L Final   CK - Normal    Total   26 - 192 U/L Final       Imaging:     CT abdomen pelvis w contrast   Final Result by Carlitos Olsen MD (05/20 1734)      New left mild hydronephrosis and hydroureter involving the proximal two thirds of the left ureter with urothelial enhancement, which can be reactive or secondary to superimposed infection. No calculus is seen at the transition point. Findings can    represent a recently passed calculus, or presence of stricture/neoplasm at the transition point. Direct visualization versus further assessment with CT urogram is recommended for further assessment.      Limited assessment for presence of rectovesicular fistula since orally administered contrast reaches up to the level of hepatic flexure. However there are no foci of air within the urinary bladder      Apparent mild wall thickening of the descending colon, which can be secondary to underdistention or due to colitis (infectious/inflammatory). A small amount of free fluid and fat stranding around the descending colon can be secondary to colitis or due to    presence of small volume free fluid of unknown etiology.      Stable pancreatic cystic lesions.      The study was marked in EPIC for immediate notification.         Workstation performed: [id number]            All details of the evaluation and treatment plan were made clear and additionally all questions and concerns were addressed while under my care.    Portions of the record may have been created with voice recognition software. Occasional wrong word or \"sound a like\" substitutions may have occurred due to the inherent limitations of voice " recognition software. Read the chart carefully and recognize, using context, where substitutions have occurred.    The attending physician physically available and evaluated the above patient alongside myself.      Jian Murray MD  05/23/24 1952

## 2024-05-21 VITALS
BODY MASS INDEX: 23.41 KG/M2 | HEIGHT: 61 IN | WEIGHT: 124 LBS | OXYGEN SATURATION: 95 % | TEMPERATURE: 97.8 F | SYSTOLIC BLOOD PRESSURE: 116 MMHG | HEART RATE: 79 BPM | DIASTOLIC BLOOD PRESSURE: 60 MMHG | RESPIRATION RATE: 18 BRPM

## 2024-05-21 PROBLEM — N13.30 HYDRONEPHROSIS, LEFT: Status: ACTIVE | Noted: 2024-05-20

## 2024-05-21 LAB
ANION GAP SERPL CALCULATED.3IONS-SCNC: 8 MMOL/L (ref 4–13)
BACTERIA UR CULT: NORMAL
BUN SERPL-MCNC: 22 MG/DL (ref 5–25)
CALCIUM SERPL-MCNC: 9 MG/DL (ref 8.4–10.2)
CHLORIDE SERPL-SCNC: 102 MMOL/L (ref 96–108)
CO2 SERPL-SCNC: 28 MMOL/L (ref 21–32)
CREAT SERPL-MCNC: 0.85 MG/DL (ref 0.6–1.3)
ERYTHROCYTE [DISTWIDTH] IN BLOOD BY AUTOMATED COUNT: 12.5 % (ref 11.6–15.1)
GFR SERPL CREATININE-BSD FRML MDRD: 61 ML/MIN/1.73SQ M
GLUCOSE SERPL-MCNC: 88 MG/DL (ref 65–140)
HCT VFR BLD AUTO: 33.4 % (ref 34.8–46.1)
HGB BLD-MCNC: 11.1 G/DL (ref 11.5–15.4)
MCH RBC QN AUTO: 33 PG (ref 26.8–34.3)
MCHC RBC AUTO-ENTMCNC: 33.2 G/DL (ref 31.4–37.4)
MCV RBC AUTO: 99 FL (ref 82–98)
PLATELET # BLD AUTO: 127 THOUSANDS/UL (ref 149–390)
PMV BLD AUTO: 9.9 FL (ref 8.9–12.7)
POTASSIUM SERPL-SCNC: 3.5 MMOL/L (ref 3.5–5.3)
RBC # BLD AUTO: 3.36 MILLION/UL (ref 3.81–5.12)
SODIUM SERPL-SCNC: 138 MMOL/L (ref 135–147)
WBC # BLD AUTO: 4.65 THOUSAND/UL (ref 4.31–10.16)

## 2024-05-21 PROCEDURE — 85027 COMPLETE CBC AUTOMATED: CPT | Performed by: INTERNAL MEDICINE

## 2024-05-21 PROCEDURE — 99232 SBSQ HOSP IP/OBS MODERATE 35: CPT | Performed by: NURSE PRACTITIONER

## 2024-05-21 PROCEDURE — 99238 HOSP IP/OBS DSCHRG MGMT 30/<: CPT | Performed by: INTERNAL MEDICINE

## 2024-05-21 PROCEDURE — 80048 BASIC METABOLIC PNL TOTAL CA: CPT | Performed by: INTERNAL MEDICINE

## 2024-05-21 PROCEDURE — 88112 CYTOPATH CELL ENHANCE TECH: CPT | Performed by: STUDENT IN AN ORGANIZED HEALTH CARE EDUCATION/TRAINING PROGRAM

## 2024-05-21 RX ORDER — CEPHALEXIN 500 MG/1
500 CAPSULE ORAL EVERY 8 HOURS SCHEDULED
Qty: 9 CAPSULE | Refills: 0 | Status: SHIPPED | OUTPATIENT
Start: 2024-05-22 | End: 2024-05-25

## 2024-05-21 RX ORDER — SENNOSIDES 8.6 MG
650 CAPSULE ORAL
Qty: 15 TABLET | Refills: 0 | Status: SHIPPED | OUTPATIENT
Start: 2024-05-21 | End: 2024-05-21

## 2024-05-21 RX ORDER — SENNOSIDES 8.6 MG
650 CAPSULE ORAL
Start: 2024-05-21

## 2024-05-21 RX ADMIN — ASPIRIN 81 MG CHEWABLE TABLET 81 MG: 81 TABLET CHEWABLE at 08:35

## 2024-05-21 RX ADMIN — CARVEDILOL 6.25 MG: 6.25 TABLET, FILM COATED ORAL at 08:34

## 2024-05-21 RX ADMIN — Medication 2000 UNITS: at 08:36

## 2024-05-21 RX ADMIN — ATORVASTATIN CALCIUM 40 MG: 40 TABLET, FILM COATED ORAL at 17:13

## 2024-05-21 RX ADMIN — CEFTRIAXONE 1000 MG: 1 INJECTION, POWDER, FOR SOLUTION INTRAMUSCULAR; INTRAVENOUS at 17:13

## 2024-05-21 RX ADMIN — AMLODIPINE BESYLATE 5 MG: 5 TABLET ORAL at 08:36

## 2024-05-21 RX ADMIN — PANTOPRAZOLE SODIUM 40 MG: 40 TABLET, DELAYED RELEASE ORAL at 06:06

## 2024-05-21 RX ADMIN — CARVEDILOL 6.25 MG: 6.25 TABLET, FILM COATED ORAL at 17:13

## 2024-05-21 RX ADMIN — LOSARTAN POTASSIUM 100 MG: 50 TABLET, FILM COATED ORAL at 10:26

## 2024-05-21 RX ADMIN — FOLIC ACID 1 MG: 1 TABLET ORAL at 08:36

## 2024-05-21 NOTE — PROGRESS NOTES
Progress Note - Urology  Radha Ma 1935, 89 y.o. female MRN: [id number]    Unit/Bed#: -01 Encounter: [id number]    * Hydronephrosis with ureteral stricture  Assessment & Plan  CT: New mild left hydronephrosis and hydroureter with urothelial enhancement possibly reactive or secondary to superimposed infection.  No calculus seen.  WBC 4.65  Creatinine 0.5  Asymptomatic  Urine culture mixed contaminants  Urine cytology pending  No need for urgent urological intervention during hospitalization.  Can follow-up with urology outpatient  Discharge per primary team      Urology will sign off but remain available for any further inpatient needs. Please feel free to contact the provider currently covering the [facility] [place] role for this campus with questions or concerns.     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  She previously followed with Dr. Betancourt and then Dr. Fernandes.  Her last surveillance cystoscopy was August 2020 was unremarkable.  Patient sitting out of bed in chair.  Offers no complaints at this time.  Her flank pain has resolved and she reports voiding clear yellow urine.  She no longer has any nausea.    24 HR EVENTS:   no significant events.      Patient has  no complaints.      Review of Systems   Constitutional:  Negative for activity change, appetite change, chills, fatigue, fever and unexpected weight change.   HENT:  Negative for facial swelling.    Eyes:  Negative for discharge.   Respiratory: Negative.  Negative for cough and shortness of breath.    Cardiovascular:  Negative for chest pain and leg swelling.   Gastrointestinal: Negative.  Negative for abdominal distention, abdominal pain, constipation, diarrhea, nausea and vomiting.   Endocrine: Negative.    Genitourinary: Negative.  Negative for decreased urine volume, difficulty urinating, dysuria, enuresis, flank pain, frequency, genital sores, hematuria and urgency.  "  Musculoskeletal:  Negative for back pain and myalgias.   Skin:  Negative for pallor and rash.   Allergic/Immunologic: Negative.  Negative for immunocompromised state.   Neurological:  Negative for facial asymmetry and speech difficulty.   Psychiatric/Behavioral:  Negative for agitation and confusion.        Objective:    Vitals: Blood pressure 125/65, pulse 72, temperature 97.8 °F (36.6 °C), resp. rate 18, height 5' 1\" (1.549 m), weight 56.2 kg (124 lb), SpO2 97%.,Body mass index is 23.43 kg/m².  INS & OUTS:  No intake/output data recorded.    Physical Exam  Vitals and nursing note reviewed.   Constitutional:       General: She is not in acute distress.     Appearance: Normal appearance. She is not ill-appearing or toxic-appearing.   HENT:      Head: Normocephalic.   Cardiovascular:      Rate and Rhythm: Normal rate.   Pulmonary:      Effort: Pulmonary effort is normal. No respiratory distress.   Abdominal:      General: Abdomen is flat. There is no distension.   Musculoskeletal:         General: No swelling.      Cervical back: Normal range of motion.   Skin:     General: Skin is warm and dry.   Neurological:      General: No focal deficit present.      Mental Status: She is alert and oriented to person, place, and time.   Psychiatric:         Mood and Affect: Mood normal.         Behavior: Behavior normal.         Thought Content: Thought content normal.         Judgment: Judgment normal.         Imaging:  CT ABDOMEN AND PELVIS WITH IV CONTRAST     INDICATION: colorectal fistula vs pancreatic source of black urine.     COMPARISON: CT abdomen pelvis 5/9/2024     TECHNIQUE: CT examination of the abdomen and pelvis was performed. Multiplanar 2D reformatted images were created from the source data.     This examination, like all CT scans performed in the [facility], was performed utilizing techniques to minimize radiation dose exposure, including the use of iterative reconstruction and automated " exposure control. Radiation dose length   product (DLP) for this visit: 272.16 mGy-cm     IV Contrast: 85 mL of iohexol (OMNIPAQUE) 50 mL of iohexol (OMNIPAQUE)  Enteric Contrast: Administered.     FINDINGS:     ABDOMEN     LOWER CHEST: No clinically significant abnormality in the visualized lower chest. Coronary atherosclerosis at the visualized base of the heart.     LIVER/BILIARY TREE: Subcentimeter hypoattenuating lesion(s), too small to characterize but statistically likely benign, which do not require follow-up (ACR White Paper 2017). No suspicious mass. Normal hepatic contours. No biliary dilation. Focal fatty   infiltration at the insertion site of falciform ligament.     GALLBLADDER: No calcified gallstones. No pericholecystic inflammatory change.     SPLEEN: Unremarkable.     PANCREAS: Again seen are known pancreatic cystic lesions measuring 2.2 cm in the pancreatic body 1.8 cm in the pancreatic tail grossly stable in size since MRI abdomen 8/5/2023. Additional smaller pancreatic cystic lesions are seen. No main pancreatic   ductal dilation or obvious mass lesion. Please note that presence of a subtle focal pancreatic lesion is suboptimal on this single phase postcontrast CT examination...     ADRENAL GLANDS: Unremarkable.     KIDNEYS/URETERS: No hydronephrosis or urinary tract calculi. A few bilateral renal simple and septated cysts. Subcentimeter hypoattenuating renal lesion(s), too small to characterize but statistically likely benign, which do not warrant follow-up   (Radiology June 2019).     There is new mild left hydronephrosis and hydroureter involving the proximal two thirds of the left ureter. No definite calculus is seen at the transition point (series 2 image 104). There is left ureteric urothelial enhancement. Findings are new since   5/9/2024     STOMACH AND BOWEL: Collapsed left hemicolon, limiting evaluation. There is apparent mild distal colon wall thickening with surrounding inflammatory  fat stranding and small amount of free fluid (series 2 image 83/93), which can represent colitis in   appropriate clinical setting..     APPENDIX: Normal.     ABDOMINOPELVIC CAVITY: No ascites. No pneumoperitoneum. No lymphadenopathy.     VESSELS: Atherosclerosis without abdominal aortic aneurysm.     PELVIS     REPRODUCTIVE ORGANS: Post hysterectomy.     URINARY BLADDER: Unremarkable. No foci of air within the bladder. Orally administered contrast material reaches the hepatic flexure, limiting assessment for presence of colovesicular/rectovesicular fistula. No definite high attenuation material is seen   within the urinary bladder.     ABDOMINAL WALL/INGUINAL REGIONS: Unremarkable.     BONES: No acute fracture or suspicious osseous lesion. Partially visualized is left femoral hardware.     IMPRESSION:     New left mild hydronephrosis and hydroureter involving the proximal two thirds of the left ureter with urothelial enhancement, which can be reactive or secondary to superimposed infection. No calculus is seen at the transition point. Findings can   represent a recently passed calculus, or presence of stricture/neoplasm at the transition point. Direct visualization versus further assessment with CT urogram is recommended for further assessment.     Limited assessment for presence of rectovesicular fistula since orally administered contrast reaches up to the level of hepatic flexure. However there are no foci of air within the urinary bladder     Apparent mild wall thickening of the descending colon, which can be secondary to underdistention or due to colitis (infectious/inflammatory). A small amount of free fluid and fat stranding around the descending colon can be secondary to colitis or due to   presence of small volume free fluid of unknown etiology.     Stable pancreatic cystic lesions.     The study was marked in EPIC for immediate notification.        Workstation performed: [id number]  Imaging reviewed - both  report and images personally reviewed.     Labs:  Recent Labs     05/20/24  1421 05/21/24  0555   WBC 7.02 4.65       Recent Labs     05/20/24  1421 05/21/24  0555   HGB 12.4 11.1*     Recent Labs     05/20/24  1421 05/21/24  0555   HCT 36.8 33.4*     Recent Labs     05/20/24  1421 05/21/24  0555   CREATININE 1.05 0.85     Lab Results   Component Value Date    HGB 11.1 (L) 05/21/2024    HCT 33.4 (L) 05/21/2024    WBC 4.65 05/21/2024     (L) 05/21/2024     Lab Results   Component Value Date    K 3.5 05/21/2024     05/21/2024    CO2 28 05/21/2024    BUN 22 05/21/2024    CREATININE 0.85 05/21/2024    CALCIUM 9.0 05/21/2024     Urine Culture: Growth: Less than 10,000 mixed contaminants    History:    Past Medical History:   Diagnosis Date    Bladder cancer (HCC)     had BCG treatments    Bladder cancer (HCC)     Closed displaced fracture of left trapezium bone 05/18/2017    Coronary artery disease     S/P stent placement x 2 in 2011    GERD (gastroesophageal reflux disease)     Hepatitis     got this from food back in 1970s, has not had a problem since    History of leukemia     in remission    History of stomach ulcers 1970    History of transfusion 1970    Hyperlipidemia     Hypertension     Hyperthyroidism 04/10/2018    Irritable bowel syndrome     Kidney stone     Kidney stones     Persistent cough for 3 weeks or longer 03/10/2020    Pneumonia     Pt had in 2003 and 2004    Pruritic rash 09/03/2019    Pulmonary emboli (HCC) 1970s    Raynaud disease     Shingles     Sleep apnea     Thrombocytopenia (HCC) 02/20/2012     Past Surgical History:   Procedure Laterality Date    CATARACT EXTRACTION      COLONOSCOPY      CORONARY ANGIOPLASTY WITH STENT PLACEMENT      stent x 2    CYSTOSCOPY      diagnostic - onset 4/4/17    EGD      EYE SURGERY      FRACTURE SURGERY      HERNIA REPAIR      HYSTERECTOMY      LEG SURGERY      OOPHORECTOMY      SC NDSC WRST SURG W/RLS TRANSVRS CARPL LIGM Right 10/12/2021     Procedure: Right endoscopic carpal tunnel release;  Surgeon: Ghanshyam Rosado MD;  [facility];  Service: Orthopedics    UT OPTX FEM SHFT FX W/INSJ IMED IMPLT W/WO SCREW Left 4/8/2018    Procedure: INSERTION NAIL IM FEMUR ANTEGRADE (TROCHANTERIC);  Surgeon: Lucrecia Boyle MD;  [facility];  Service: Orthopedics    UT REPAIR FIRST ABDOMINAL WALL HERNIA N/A 5/12/2021    Procedure: REPAIR HERNIA VENTRAL;  Surgeon: Rui Pickett MD;  [facility];  Service: General    TONSILLECTOMY       Family History   Problem Relation Age of Onset    Arthritis Mother     Osteoporosis Mother     Heart disease Father     Hypertension Father     Hypertension Brother     Prostate cancer Brother     Breast cancer Daughter 50    Prostate cancer Son      Social History     Socioeconomic History    Marital status:      Spouse name: None    Number of children: None    Years of education: None    Highest education level: None   Occupational History    None   Tobacco Use    Smoking status: Never    Smokeless tobacco: Never   Vaping Use    Vaping status: Never Used   Substance and Sexual Activity    Alcohol use: No    Drug use: No    Sexual activity: Never   Other Topics Concern    None   Social History Narrative    Advance directives declined by parents    Always uses seat belt     Social Determinants of Health     Financial Resource Strain: Low Risk  (1/29/2024)    Overall Financial Resource Strain (CARDIA)     Difficulty of Paying Living Expenses: Not hard at all   Food Insecurity: Not on file   Transportation Needs: No Transportation Needs (1/29/2024)    PRAPARE - Transportation     Lack of Transportation (Medical): No     Lack of Transportation (Non-Medical): No   Physical Activity: Not on file   Stress: Not on file   Social Connections: Not on file   Intimate Partner Violence: Not on file   Housing Stability: Not on file       The following portions of the patient's history were reviewed and updated as  appropriate: allergies, current medications, past family history, past medical history, past social history, past surgical history and problem list    BRUCE Perdue  Date: 5/21/2024 Time: 3:24 PM

## 2024-05-21 NOTE — ASSESSMENT & PLAN NOTE
CT: New mild left hydronephrosis and hydroureter with urothelial enhancement possibly reactive or secondary to superimposed infection.  No calculus seen.  WBC 4.65  Creatinine 0.5  Asymptomatic  Urine culture mixed contaminants  Urine cytology pending  No need for urgent urological intervention during hospitalization.  Can follow-up with urology outpatient  Discharge per primary team

## 2024-05-21 NOTE — CONSULTS
Consult - Urology   Radha Ma 1935, 89 y.o. 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 No calculus seen at the transition point. Findings can represent recently passed calculus or presence of stricture/neoplasm   Creatinine around baseline   UA pending, treat empirically and follow culture   Pain management, supportive care   Consider outpatient cysto and outpatient retrograde pyelogram and ureteroscopy to eval recurrence of CA or stricture.  Last procedure visit was Aug 2020 with Dr. Fernandes.  She had normal cystoscopy at that time and was not interested in any surgical interventions at that time due to her advanced age.  Plan was for urine cytology and only proceed with retrograde pyelography and possible ureteroscopy if she has abnormal cytology concerning for malignancy.  August 2020 urine cytology negative.   Urine cytology ordered today         Subjective:   Radha Ma is an 89 year old female with a past medical history of CLL, CHF, CKD and urothelial ca of the bladder who presented left-sided back pain and intermittent dark brown urine over the last 2 weeks.  CT findings as noted above with normal creatinine and WBC.  Is currently pending.    Patient has a remote history of urothelial cancer of the bladder and laser ablation of a ureteral tumor.  Patient had been being seen for yearly surveillance cystoscopies.  Patient was last seen in August 2020 by Dr. Fernandes with normal cystoscopy at that time.  Urine cytology was also normal at that time and patient has not  "followed up since and she is unsure why.    Patient states she is no longer having pain at this time.  Urine had been brown and darker however is now clear yellow.  Denies any abdominal pain.    Objective:  Vitals: Blood pressure 138/67, pulse 92, temperature 98.6 °F (37 °C), resp. rate 20, height 5' 1\" (1.549 m), weight 56.2 kg (124 lb), SpO2 98%.,Body mass index is 23.43 kg/m².    Physical Exam  Vitals reviewed.   Constitutional:       Appearance: Normal appearance. She is not ill-appearing.   Cardiovascular:      Rate and Rhythm: Normal rate.   Pulmonary:      Effort: Pulmonary effort is normal.   Abdominal:      General: Bowel sounds are normal. There is no distension.      Palpations: Abdomen is soft.      Tenderness: There is no abdominal tenderness. There is no right CVA tenderness or left CVA tenderness.   Musculoskeletal:         General: Normal range of motion.      Cervical back: Normal range of motion.   Skin:     General: Skin is warm and dry.   Neurological:      Mental Status: She is alert and oriented to person, place, and time.   Psychiatric:         Mood and Affect: Mood normal.         Behavior: Behavior normal.         Thought Content: Thought content normal.         Judgment: Judgment normal.         Imaging:  CT A/P  New left mild hydronephrosis and hydroureter involving the proximal two thirds of the left ureter with urothelial enhancement, which can be reactive or secondary to superimposed infection. No calculus is seen at the transition point. Findings can   represent a recently passed calculus, or presence of stricture/neoplasm at the transition point. Direct visualization versus further assessment with CT urogram is recommended for further assessment.      Labs:  Recent Labs     05/20/24  1421   WBC 7.02     Recent Labs     05/20/24  1421   HGB 12.4       Recent Labs     05/20/24  1421   CREATININE 1.05       Microbiology:  Urine pending  And cytology pending    History:  Social History "     Socioeconomic History    Marital status:      Spouse name: None    Number of children: None    Years of education: None    Highest education level: None   Occupational History    None   Tobacco Use    Smoking status: Never    Smokeless tobacco: Never   Vaping Use    Vaping status: Never Used   Substance and Sexual Activity    Alcohol use: No    Drug use: No    Sexual activity: Never   Other Topics Concern    None   Social History Narrative    Advance directives declined by parents    Always uses seat belt     Social Determinants of Health     Financial Resource Strain: Low Risk  (1/29/2024)    Overall Financial Resource Strain (CARDIA)     Difficulty of Paying Living Expenses: Not hard at all   Food Insecurity: Not on file   Transportation Needs: No Transportation Needs (1/29/2024)    PRAPARE - Transportation     Lack of Transportation (Medical): No     Lack of Transportation (Non-Medical): No   Physical Activity: Not on file   Stress: Not on file   Social Connections: Not on file   Intimate Partner Violence: Not on file   Housing Stability: Not on file       Past Medical History:   Diagnosis Date    Bladder cancer (HCC)     had BCG treatments    Bladder cancer (HCC)     Closed displaced fracture of left trapezium bone 05/18/2017    Coronary artery disease     S/P stent placement x 2 in 2011    GERD (gastroesophageal reflux disease)     Hepatitis     got this from food back in 1970s, has not had a problem since    History of leukemia     in remission    History of stomach ulcers 1970    History of transfusion 1970    Hyperlipidemia     Hypertension     Hyperthyroidism 04/10/2018    Irritable bowel syndrome     Kidney stone     Kidney stones     Persistent cough for 3 weeks or longer 03/10/2020    Pneumonia     Pt had in 2003 and 2004    Pruritic rash 09/03/2019    Pulmonary emboli (HCC) 1970s    Raynaud disease     Shingles     Sleep apnea     Thrombocytopenia (HCC) 02/20/2012     Past Surgical History:    Procedure Laterality Date    CATARACT EXTRACTION      COLONOSCOPY      CORONARY ANGIOPLASTY WITH STENT PLACEMENT      stent x 2    CYSTOSCOPY      diagnostic - onset 4/4/17    EGD      EYE SURGERY      FRACTURE SURGERY      HERNIA REPAIR      HYSTERECTOMY      LEG SURGERY      OOPHORECTOMY      AZ NDSC WRST SURG W/RLS TRANSVRS CARPL LIGM Right 10/12/2021    Procedure: Right endoscopic carpal tunnel release;  Surgeon: Ghanshyam Rosado MD;  [facility];  Service: Orthopedics    AZ OPTX FEM SHFT FX W/INSJ IMED IMPLT W/WO SCREW Left 4/8/2018    Procedure: INSERTION NAIL IM FEMUR ANTEGRADE (TROCHANTERIC);  Surgeon: Lucrecia Boyle MD;  [facility];  Service: Orthopedics    [place] REPAIR FIRST ABDOMINAL WALL HERNIA N/A 5/12/2021    Procedure: REPAIR HERNIA VENTRAL;  Surgeon: Rui Pickett MD;  [facility];  Service: General    TONSILLECTOMY       Family History   Problem Relation Age of Onset    Arthritis Mother     Osteoporosis Mother     Heart disease Father     Hypertension Father     Hypertension Brother     Prostate cancer Brother     Breast cancer Daughter 50    Prostate cancer Son        Pilar Yin BRUCE Crabtree  Date: 5/20/2024 Time: 9:22 PM

## 2024-05-21 NOTE — PLAN OF CARE
Problem: SAFETY ADULT  Goal: Maintain or return to baseline ADL function  Description: INTERVENTIONS:  -  Assess patient's ability to carry out ADLs; assess patient's baseline for ADL function and identify physical deficits which impact ability to perform ADLs (bathing, care of mouth/teeth, toileting, grooming, dressing, etc.)  - Assess/evaluate cause of self-care deficits   - Assess range of motion  - Assess patient's mobility; develop plan if impaired  - Assess patient's need for assistive devices and provide as appropriate  - Encourage maximum independence but intervene and supervise when necessary  - Involve family in performance of ADLs  - Assess for home care needs following discharge   - Consider OT consult to assist with ADL evaluation and planning for discharge  - Provide patient education as appropriate  Outcome: Progressing     Problem: SAFETY ADULT  Goal: Maintain or return to baseline ADL function  Description: INTERVENTIONS:  -  Assess patient's ability to carry out ADLs; assess patient's baseline for ADL function and identify physical deficits which impact ability to perform ADLs (bathing, care of mouth/teeth, toileting, grooming, dressing, etc.)  - Assess/evaluate cause of self-care deficits   - Assess range of motion  - Assess patient's mobility; develop plan if impaired  - Assess patient's need for assistive devices and provide as appropriate  - Encourage maximum independence but intervene and supervise when necessary  - Involve family in performance of ADLs  - Assess for home care needs following discharge   - Consider OT consult to assist with ADL evaluation and planning for discharge  - Provide patient education as appropriate  Outcome: Progressing

## 2024-05-22 ENCOUNTER — TRANSITIONAL CARE MANAGEMENT (OUTPATIENT)
Dept: FAMILY MEDICINE CLINIC | Facility: CLINIC | Age: 89
End: 2024-05-22

## 2024-05-22 DIAGNOSIS — Z71.89 COMPLEX CARE COORDINATION: Primary | ICD-10-CM

## 2024-05-22 PROCEDURE — 88112 CYTOPATH CELL ENHANCE TECH: CPT | Performed by: STUDENT IN AN ORGANIZED HEALTH CARE EDUCATION/TRAINING PROGRAM

## 2024-05-22 NOTE — DISCHARGE SUMMARY
"      Discharging Physician / Practitioner: Elvira Monroy MD  PCP: Anjelica Sr DO  Admission Date:   Admission Orders (From admission, onward)       Ordered        05/20/24 1836  INPATIENT ADMISSION  Once                          Discharge Date: 05/21/24    Principal discharge diagnosis:  Mild left hydronephrosis    Secondary diagnoses:  Chronic diastolic heart failure  CKD 3a  Coronary artery disease    Consultations During Hospital Stay:  Urology    Procedures Performed:   CT abdomen/pelvis with contrast - New left mild hydronephrosis and hydroureter involving the proximal two thirds of the left ureter with urothelial enhancement.  Stable pancreatic cystic lesions    Test Results Pending at Discharge (will require follow up):   Urine cytology    Hospital Course:   Radha Ma is a 89 y.o. female patient who originally presented to the hospital on 5/20/2024 due to left-sided back pain and intermittent dark brown urine over 2 weeks.  Results of CT abdomen/pelvis are as above.  No calculus was noted at the transition point .  Unclear urothelial enhancement was due to superimposed infection versus reactive. 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  She was being evaluated yearly with surveillance cystoscopies but had not followed up since August 2020.   She was evaluated by urology who recommended outpatient cystoscopy, retrograde pyelogram and ureteroscopy.  She received 2 doses of ceftriaxone and she was discharged on Keflex for 3 days to complete a 5-day course of antibiotics per urology recommendation    Condition at Discharge: stable    Discharge Day Visit / Exam:   Subjective:    Vitals: Blood Pressure: 116/60 (05/21/24 1606)  Pulse: 79 (05/21/24 1606)  Temperature: 97.8 °F (36.6 °C) (05/21/24 0745)  Temp Source: Oral (05/20/24 1838)  Respirations: 18 (05/21/24 0306)  Height: 5' 1\" (154.9 cm) (05/20/24 2000)  Weight - Scale: 56.2 kg (124 lb) (05/20/24 " 2000)  SpO2: 95 % (05/21/24 1606)  Exam:   Physical Exam  Vitals reviewed.   HENT:      Head: Normocephalic.      Nose: Nose normal.      Mouth/Throat:      Mouth: Mucous membranes are moist.   Eyes:      Extraocular Movements: Extraocular movements intact.   Cardiovascular:      Rate and Rhythm: Normal rate and regular rhythm.   Pulmonary:      Effort: Pulmonary effort is normal. No respiratory distress.      Breath sounds: Normal breath sounds. No wheezing.   Abdominal:      General: Bowel sounds are normal. There is no distension.      Palpations: Abdomen is soft.      Tenderness: There is no abdominal tenderness.   Musculoskeletal:         General: No swelling.      Cervical back: Neck supple.   Skin:     General: Skin is warm and dry.   Neurological:      General: No focal deficit present.      Mental Status: She is alert and oriented to person, place, and time.   Psychiatric:         Mood and Affect: Mood normal.         Behavior: Behavior normal.          Discussion with Family: Updated  (son) at bedside.    Discharge instructions/Information to patient and family:   See after visit summary for information provided to patient and family.      Provisions for Follow-Up Care:  See after visit summary for information related to follow-up care and any pertinent home health orders.      Mobility at time of Discharge:   Basic Mobility Inpatient Raw Score: 24  JH-HLM Goal: 8: Walk 250 feet or more  JH-HLM Achieved: 8: Walk 250 feet ot more  HLM Goal achieved. Continue to encourage appropriate mobility.     Disposition:   Home    Planned Readmission: No     Discharge Statement:  I spent 30 minutes discharging the patient. This time was spent on the day of discharge. I had direct contact with the patient on the day of discharge. Greater than 50% of the total time was spent examining patient, answering all patient questions, arranging and discussing plan of care with patient as well as directly providing  post-discharge instructions.  Additional time then spent on discharge activities.    Discharge Medications:  See after visit summary for reconciled discharge medications provided to patient and/or family.      **Please Note: This note may have been constructed using a voice recognition system**

## 2024-05-23 ENCOUNTER — PATIENT OUTREACH (OUTPATIENT)
Dept: CASE MANAGEMENT | Facility: HOSPITAL | Age: 89
End: 2024-05-23

## 2024-05-23 ENCOUNTER — TELEPHONE (OUTPATIENT)
Dept: OTHER | Facility: HOSPITAL | Age: 89
End: 2024-05-23

## 2024-05-23 ENCOUNTER — TELEPHONE (OUTPATIENT)
Dept: HEMATOLOGY ONCOLOGY | Facility: CLINIC | Age: 89
End: 2024-05-23

## 2024-05-23 NOTE — TELEPHONE ENCOUNTER
Urine cytology returned -- Negative for high grade urothelial carcinoma.    As previously stated by Elida BARRERA -- agree with follow up with AP. Repeat CT in 2-4 weeks (ordered)

## 2024-05-23 NOTE — PROGRESS NOTES
HRR referral received and chart review completed. Pt was recently hospitalized 5/20-5/21 with left hydronephrosis. Recommended OP cystoscopy, pyelogram and ureteroscopy. She was discharged with keflex to home.     Call placed to Radha and introduced self as RN CM and explained reason for call.   Radha states she does not have any more pain but is very exhausted from hospital stay. She is doing ok and resting.  Offered to review medications, she states she has them all and is taking as prescribed. We reviewed AVS and new medication/changes. Radha is not taking methotrexate until done with keflex as they are incompatible.     Reviewed upcoming appointments. She states that she called the urology office and is awaiting a call back. Per notes CT scan recommended and follow up with urology appointment afterwards.   Other appointments reviewed include PCP for TCM 6/3, Endocrine 8/5 and gastro 8/14. She also has a follow up with rheumatologist (MATTHIEU).    Radha states she drives herself to appointments. BP medications make her a little dizzy so she tries to drive later in the day. She does have a BP cuff and her pressures are between 118/-120 systolic.   We discussed bumex. She weighs herself daily and is around 120 lbs. Her legs and ankles do swell. She states she has been dealing with this for years and her cardiologist told her to take a whole pill if she notices fluid buildup and will go back to taking half pill when it resolves.    Radha denies any needs at this time. Informed her CM is available for follow up calls to manage health. She consents to further calls. She states that she lives alone and before last hospitalization she did have symptoms but she did not know what was wrong. She would like follow up for symptom management and urology follow up appointments.     Dyan Polanco RN CM added to care team and note routed to same.

## 2024-05-23 NOTE — TELEPHONE ENCOUNTER
Appointment Change  Cancel, Reschedule, Change to Virtual      Who are you speaking with? Not available    If it is not the patient, is the caller listed on the communication consent form? N/A   Which provider is the appointment scheduled with? Lyubov Linares PA-C   When was the original appointment scheduled?    Please list date and time 06/13/2024 @ 11:30AM    At which location is the appointment scheduled to take place? [place]   Was the appointment rescheduled?     Was the appointment changed from an in person visit to a virtual visit?    If so, please list the details of the change. Not available.    What is the reason for the appointment change? Provider OOO

## 2024-05-23 NOTE — TELEPHONE ENCOUNTER
Patient is aware of cat scan need and she is scheduled for a urology follow up in [place] in June.

## 2024-05-24 ENCOUNTER — TELEPHONE (OUTPATIENT)
Dept: HEMATOLOGY ONCOLOGY | Facility: CLINIC | Age: 89
End: 2024-05-24

## 2024-05-24 NOTE — TELEPHONE ENCOUNTER
Appointment Schedule   Who are you speaking with? Patient   If it is not the patient, are they listed on an active communication consent form? N/A   Which provider is the appointment scheduled with? Lyubov Linares PA-C   At which location is the appointment scheduled for? [place]   When is the appointment scheduled?  Please list date and time 7/12/24 140   What is the reason for this appointment? F/u   Did patient voice understanding of the details of this appointment? Yes   Was the no show policy reviewed with patient? Yes

## 2024-05-30 ENCOUNTER — PATIENT OUTREACH (OUTPATIENT)
Dept: CASE MANAGEMENT | Facility: HOSPITAL | Age: 89
End: 2024-05-30

## 2024-05-30 NOTE — PROGRESS NOTES
IB message received pt due for outreach. Chart review completed.   Call placed to Radha. Left detailed message with contact info for Dyan Polanco RN CM. PCP follow up scheduled for 6/3.     Note routed to same.

## 2024-05-31 DIAGNOSIS — R60.0 LOWER EXTREMITY EDEMA: ICD-10-CM

## 2024-05-31 RX ORDER — BUMETANIDE 1 MG/1
1 TABLET ORAL DAILY
Qty: 6 TABLET | Refills: 0 | Status: SHIPPED | OUTPATIENT
Start: 2024-05-31 | End: 2024-06-03 | Stop reason: SDUPTHER

## 2024-05-31 RX ORDER — BUMETANIDE 1 MG/1
TABLET ORAL
Qty: 90 TABLET | Refills: 1 | Status: SHIPPED | OUTPATIENT
Start: 2024-05-31

## 2024-05-31 NOTE — TELEPHONE ENCOUNTER
Pt contacted Call Center requested refill of their medication.        Pt requesting a fill of 6 pills to local pharmacy at [facility] and [facility] to handle the rest of the fill. TOTALLY OUT OF MEDICATION.    Doctor Name: Clare      Medication Name: Bumex      Dosage of Med: 1mg      Frequency of Med: 1/2 tablet daily. 1 pill if legs are very swollen then goes back to 1/2      Remaining Medication: OUT OF MEDS      Pharmacy and Location: [facility] 90 pills at a time        Pt. Preferred Callback Phone Number: [phone]

## 2024-06-03 ENCOUNTER — OFFICE VISIT (OUTPATIENT)
Dept: FAMILY MEDICINE CLINIC | Facility: CLINIC | Age: 89
End: 2024-06-03
Payer: MEDICARE

## 2024-06-03 VITALS
SYSTOLIC BLOOD PRESSURE: 122 MMHG | BODY MASS INDEX: 23.6 KG/M2 | WEIGHT: 125 LBS | HEIGHT: 61 IN | DIASTOLIC BLOOD PRESSURE: 80 MMHG | OXYGEN SATURATION: 99 % | TEMPERATURE: 98.1 F | HEART RATE: 78 BPM

## 2024-06-03 DIAGNOSIS — Z76.89 ENCOUNTER FOR SUPPORT AND COORDINATION OF TRANSITION OF CARE: Primary | ICD-10-CM

## 2024-06-03 DIAGNOSIS — R10.9 LEFT FLANK PAIN: ICD-10-CM

## 2024-06-03 DIAGNOSIS — N18.2 CKD (CHRONIC KIDNEY DISEASE) STAGE 2, GFR 60-89 ML/MIN: ICD-10-CM

## 2024-06-03 DIAGNOSIS — Z79.899 LONG TERM CURRENT USE OF DIURETIC: ICD-10-CM

## 2024-06-03 DIAGNOSIS — N13.30 HYDRONEPHROSIS, LEFT: ICD-10-CM

## 2024-06-03 DIAGNOSIS — D69.6 PLATELETS DECREASED (HCC): ICD-10-CM

## 2024-06-03 PROCEDURE — 99495 TRANSJ CARE MGMT MOD F2F 14D: CPT | Performed by: FAMILY MEDICINE

## 2024-06-03 NOTE — PROGRESS NOTES
Transition of Care Visit  Name: Radha Ma      : 1935      MRN: [id number]  Encounter Provider: Anjelica Sr DO  Encounter Date: 6/3/2024   Encounter department: [facility] [place]    Assessment & Plan   1. Encounter for support and coordination of transition of care  2. Hydronephrosis, left  Comments:  urology saw in hospital and will be doing outpatient cystoscopy, retrograde pyelogram and ureteroscopy; she completed a 5-day course of antibiotics per urology  3. Left flank pain  Comments:  much better/improving  4. Long term current use of diuretic  5. 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 Platelets decreased (HCC)         History of Present Illness     Transitional Care Management Review:   Radha Ma is a 89 y.o. female here for TCM follow up.     During the TCM phone call patient stated:  TCM Call       Date and time call was made  2024  8:46 AM    Patient was hospitialized at  [facility]    Date of Admission  24    Date of discharge  24    Diagnosis  Hydronephrosis, left    Disposition  Home    Were the patients medications reviewed and updated  Yes    Current Symptoms  None          TCM Call       Post hospital issues  None    Patients specialists  Urologist; Cardiologist    Did you obtain your prescribed medications  Yes    Do you need help managing your prescriptions or medications  No    Is transportation to your appointment needed  No    I have advised the patient to call PCP with any new or worsening symptoms  Janine JIMENEZ MA    Living Arrangements  Alone    Support System  Family; Friends; Neighboors    The type of support provided  Emotional; Financial; Physical    Do you have social support  Yes, as much as I need    Are you recieving any outpatient services  No    Are you recieving home care services  No    Are you using any community resources  No    Current waiver services  No    Have you fallen in the last 12 months  No  "   Interperter language line needed  Yes          TCM appt, per c/c, [facility] and hosp notes reviewed by me  States left abd/flank area is still \"sore\" but much, much better  No gross hematuria  No fever or chills since d/c  Appetite back to normal for her  Also reports, \"Was on injection for my arthritis - in my stomach, thought wasn't helping me, so I stopped it, since stopping it, I've had this bad arthritis attack\" (rubs her shoulder) - I ask if it's just her shoulder that flared- states \"my hands, my wrists - couldn't put my pants on one day\" states she is still on the methotrexate, pt's rheumatologist is through [facility] \"because couldn't find one with [facility]'s that was close enough- all were about 45 minute drive\" - Was on Cimzia injections per [facility] rheum note - started about 6 months ago and pt states she stopped getting the injections about a month ago          5/20/2024 - 5/21/2024 (29 hours)  [facility]  Principal discharge diagnosis:  Mild left hydronephrosis   Secondary diagnoses:  1. Chronic diastolic heart failure  2. CKD 3a  3. Coronary artery disease  Consultations During Hospital Stay:  Urology  Procedures Performed:   CT abdomen/pelvis with contrast - New left mild hydronephrosis and hydroureter involving the proximal two thirds of the left ureter with urothelial enhancement.  Stable pancreatic cystic lesions  Test Results Pending at Discharge (will require follow up):   Urine cytology  Hospital Course:   Radha Ma is a 89 y.o. female patient who originally presented to the hospital on 5/20/2024 due to left-sided back pain and intermittent dark brown urine over 2 weeks.  Results of CT abdomen/pelvis are as above.  No calculus was noted at the transition point .  Unclear urothelial enhancement was due to superimposed infection versus reactive. 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  She was being evaluated yearly " with surveillance cystoscopies but had not followed up since August 2020.   She was evaluated by urology who recommended outpatient cystoscopy, retrograde pyelogram and ureteroscopy.  She received 2 doses of ceftriaxone and she was discharged on Keflex for 3 days to complete a 5-day course of antibiotics per urology recommendation   BRUCE Perdue  Nurse Practitioner  Urology  Progress Notes     Attested  Date of Service: 5/21/2024  3:16 PM  Attested    Attestation signed by Lon Whitlock MD at 5/21/2024  3:30 PM  Attending Attestation::  I was the supervising/collaborating physician on the date of service.  I acknowledge the AP's documentation and services provided. I was available by phone, if needed, for consultation.  Lon Whitlock MD 05/21/24  Progress Note - Urology  * Hydronephrosis with ureteral stricture  Assessment & Plan  · CT: New mild left hydronephrosis and hydroureter with urothelial enhancement possibly reactive or secondary to superimposed infection.  No calculus seen.  · WBC 4.65  · Creatinine 0.5  · Asymptomatic  · Urine culture mixed contaminants  · Urine cytology pending  · No need for urgent urological intervention during hospitalization.  Can follow-up with urology outpatient  · Discharge per primary team  Urology will sign off but remain available for any further inpatient needs. Please feel free to contact the provider currently covering the [facility]rology [facility] role for this campus with questions or concerns.   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  She previously followed with Dr. Betancourt and then Dr. Fernandes.  Her last surveillance cystoscopy was August 2020 was unremarkable.  Patient sitting out of bed in chair.  Offers no complaints at this time.  Her flank pain has resolved and she reports voiding clear yellow urine.  She no longer has any nausea.  24 HR EVENTS:   no significant events.      "                Review of Systems   All other systems reviewed and are negative.    Objective     /80 (BP Location: Left arm, Patient Position: Sitting, Cuff Size: Adult)   Pulse 78   Temp 98.1 °F (36.7 °C)   Ht 5' 1\" (1.549 m)   Wt 56.7 kg (125 lb)   SpO2 99%   BMI 23.62 kg/m²     Physical Exam  Vitals and nursing note reviewed.   Constitutional:       General: She is not in acute distress.     Appearance: Normal appearance. She is well-groomed. She is not ill-appearing, toxic-appearing or diaphoretic.      Comments: Appears much younger than stated age; extremely pleasant as always   HENT:      Head: Normocephalic and atraumatic.      Nose: Nose normal.      Mouth/Throat:      Lips: Pink.      Mouth: Mucous membranes are moist.      Pharynx: Oropharynx is clear. Uvula midline.   Eyes:      General: Lids are normal.      Conjunctiva/sclera: Conjunctivae normal.   Neck:      Vascular: No JVD.      Trachea: Trachea and phonation normal.   Cardiovascular:      Rate and Rhythm: Normal rate and regular rhythm.      Pulses: Normal pulses.      Heart sounds: Normal heart sounds.   Pulmonary:      Effort: Pulmonary effort is normal.      Breath sounds: Normal breath sounds and air entry.   Abdominal:      General: Bowel sounds are normal. There is no distension.      Palpations: Abdomen is soft. There is no hepatomegaly, splenomegaly or mass.      Tenderness: There is abdominal tenderness (mild) in the epigastric area and left upper quadrant. There is no right CVA tenderness, left CVA tenderness, guarding or rebound.      Hernia: There is no hernia in the ventral area.   Musculoskeletal:      Cervical back: Neck supple.      Right lower leg: No edema.      Left lower leg: No edema.   Skin:     General: Skin is warm and dry.      Coloration: Skin is not pale.   Neurological:      Mental Status: She is alert and oriented to person, place, and time.      Gait: Gait normal.   Psychiatric:         Mood and Affect: " Mood normal.         Behavior: Behavior normal. Behavior is cooperative.         Cognition and Memory: Cognition and memory normal.         Judgment: Judgment normal.       Medications have been reviewed by provider in current encounter    Administrative Statements

## 2024-06-04 ENCOUNTER — PATIENT OUTREACH (OUTPATIENT)
Dept: FAMILY MEDICINE CLINIC | Facility: CLINIC | Age: 89
End: 2024-06-04

## 2024-06-04 NOTE — PROGRESS NOTES
Inbasket reminder to call pt for second contact attempt.Called pt and message left requesting return call back.Will send pt an unable to reach letter to her active My Chart and close out in a week if no response.

## 2024-06-04 NOTE — LETTER
Date: 06/04/24    Dear Radha Ma,   My name is Dyan; I am a registered nurse care manager working with [facility] [place]    I have not been able to reach you by phone and would like to discuss any concerns you have about your health conditions.  My work is to help patients that have complex medical conditions understand and better manage their health.  This includes patients who may have been in the hospital or emergency room.    My contact information is enclosed below.  Please call me if with any questions you may have or if you believe this would be something helpful to you.  I look forward to speaking with you.    Sincerely,  Dyan Polanco RN  [phone]  Outpatient Care Manager

## 2024-06-05 ENCOUNTER — PATIENT OUTREACH (OUTPATIENT)
Dept: FAMILY MEDICINE CLINIC | Facility: CLINIC | Age: 89
End: 2024-06-05

## 2024-06-05 NOTE — PROGRESS NOTES
Pt returned call from message left and informed her of the letter in My Chart. Pt states that she is doing well and manages independently for most things. Pt states that she drives locally to providers and switched some to closer to her home so that she can drive to them. Pt reports that her son and DIL cook meals and share with her and she only needs to make the sides for them. Pt prepares her own medications in a pill box 2 weeks at a time. She has an active My Chart and problem solved an issue with it yesterday when calling [employer] link to straighten it out.  Reviewed s/s UTI and pt reports that she had some of these s/s for some time but she wasn't aware until she went to hospital for infection recently. Reviewed upcoming appts.Pt denies needing outreach for care management and was appreciative of the outreach.

## 2024-06-06 DIAGNOSIS — I10 ESSENTIAL HYPERTENSION: ICD-10-CM

## 2024-06-07 RX ORDER — LOSARTAN POTASSIUM 100 MG/1
TABLET ORAL
Qty: 90 TABLET | Refills: 1 | Status: SHIPPED | OUTPATIENT
Start: 2024-06-07

## 2024-06-14 ENCOUNTER — HOSPITAL ENCOUNTER (OUTPATIENT)
Dept: RADIOLOGY | Facility: MEDICAL CENTER | Age: 89
Discharge: HOME | End: 2024-06-14
Payer: MEDICARE

## 2024-06-14 DIAGNOSIS — N13.30 HYDRONEPHROSIS, UNSPECIFIED HYDRONEPHROSIS TYPE: ICD-10-CM

## 2024-06-14 PROCEDURE — 74178 CT ABD&PLV WO CNTR FLWD CNTR: CPT

## 2024-06-14 RX ADMIN — IOHEXOL 100 ML: 350 INJECTION, SOLUTION INTRAVENOUS at 11:48

## 2024-06-27 ENCOUNTER — OFFICE VISIT (OUTPATIENT)
Dept: UROLOGY | Facility: AMBULATORY SURGERY CENTER | Age: 89
End: 2024-06-27
Payer: MEDICARE

## 2024-06-27 ENCOUNTER — TELEPHONE (OUTPATIENT)
Dept: UROLOGY | Facility: AMBULATORY SURGERY CENTER | Age: 89
End: 2024-06-27

## 2024-06-27 VITALS
HEIGHT: 61 IN | BODY MASS INDEX: 23.6 KG/M2 | SYSTOLIC BLOOD PRESSURE: 126 MMHG | OXYGEN SATURATION: 98 % | WEIGHT: 125 LBS | HEART RATE: 81 BPM | DIASTOLIC BLOOD PRESSURE: 80 MMHG

## 2024-06-27 DIAGNOSIS — C67.9 MALIGNANT NEOPLASM OF URINARY BLADDER, UNSPECIFIED SITE (HCC): Primary | ICD-10-CM

## 2024-06-27 DIAGNOSIS — N13.30 HYDRONEPHROSIS, LEFT: ICD-10-CM

## 2024-06-27 PROCEDURE — 99214 OFFICE O/P EST MOD 30 MIN: CPT

## 2024-06-27 RX ORDER — CEFAZOLIN SODIUM 1 G/50ML
1000 SOLUTION INTRAVENOUS ONCE
Status: CANCELLED | OUTPATIENT
Start: 2024-06-27 | End: 2024-06-27

## 2024-06-27 RX ORDER — CEFAZOLIN SODIUM 1 G/50ML
1000 SOLUTION INTRAVENOUS ONCE
OUTPATIENT
Start: 2024-06-27 | End: 2024-06-27

## 2024-06-27 NOTE — PROGRESS NOTES
Assessment and plan:     Bladder cancer (HCC)  CT scan on 5/9/2024 stone study without contrast demonstrated no hydronephrosis or renal calculi present  CT scan on 5/20/2024 demonstrated left hydronephrosis and hydroureter and urothelial enhancement; differentials included urothelial carcinoma, stricture/scar tissue, recent kidney stone passage, infection  CT renal protocol from 6/14/2024 demonstrating enhancing soft tissue abnormality involving the distal left ureter, highly suspicious for urothelial carcinoma, mild to moderate left hydronephrosis  Prior imaging dated back to 2019 which showed no renal calculi or hydronephrosis  Urine cytology on 5/21/2024 negative for high-grade urothelial carcinoma  Followed by Dr. Fernandes, last cystoscopy was in August 2020 in which cystoscopy was normal and there was no evidence of recurrence of disease, no follow up since this time  Urine culture from 5/20/2024 negative  BMP on 5/21/2024: Creat 0.85, BUN 22, GFR 61  Discussed case with Dr. Fernandes and will plan to proceed to OR for cystoscopy, ureteroscopy on the left side     I had a lengthy discussion today in the office with Radha regarding her most recent events and hospital admission.  She believes that she passed a kidney stone due to the amount of pain and discomfort she was having but reports not seeing any visible kidney stone passage.  I did discuss with her at length that there was no kidney stones noted on any of her imaging.  I discussed with her that her CT scan is highly suspicious for recurrence of urothelial carcinoma and I would recommend having Dr. Fernandes directly visualize the area with ureteroscopy.  She is understanding of this and all questions and concerns were answered at today's office appointment.      History of Present Illness     Radha Ma is a 89 y.o. female who presents today to the office for follow-up of urothelial carcinoma.  She does have a remote history of laser ablation of  "ureteral tumor, she reported in the past that she had a ureteral tumor ablated on the left side in 2011, and reports bladder tumor in 2015.     She was last seen in the office by Dr. Fernandes in 2020 for cystoscopy which was negative for recurrence of disease.  Most recently she reported to the ER for increasing left flank pain and blood in the urine this was on 5/20/2024.  It was noted on CT imaging that she had mild to moderate hydronephrosis and potential recurrence of urothelial carcinoma.  She believed that she thought she was passing a kidney stone but did not visibly see any kidney stone or report of passing it.    Today in the office she states that she is overall doing very well.  She states that she occasionally does have left flank pain but it is not as severe as it was about a month ago.  She denies any recent hematuria.  She denies dysuria, suprapubic or flank pain.    Laboratory     Lab Results   Component Value Date    BUN 22 05/21/2024    CREATININE 0.85 05/21/2024       No components found for: \"GFR\"    Lab Results   Component Value Date    CALCIUM 9.0 05/21/2024    K 3.5 05/21/2024    CO2 28 05/21/2024     05/21/2024       Lab Results   Component Value Date    WBC 4.65 05/21/2024    HGB 11.1 (L) 05/21/2024    HCT 33.4 (L) 05/21/2024    MCV 99 (H) 05/21/2024     (L) 05/21/2024       No results found for: \"PSA\"    No results found for this or any previous visit (from the past 1 hour(s)).    Review of Systems     Review of Systems   Constitutional:  Negative for chills and fever.   Respiratory: Negative.  Negative for cough and shortness of breath.    Cardiovascular:  Negative for chest pain and leg swelling.   Genitourinary:  Negative for dyspareunia, dysuria, flank pain, frequency, hematuria, menstrual problem, pelvic pain, urgency, vaginal bleeding, vaginal discharge and vaginal pain.   Skin:  Negative for rash.   Neurological: Negative.    Hematological:  Negative for adenopathy. Does " "not bruise/bleed easily.                 Allergies     Allergies   Allergen Reactions    Lactose - Food Allergy GI Intolerance       Physical Exam     Physical Exam  Vitals reviewed.   Constitutional:       Appearance: Normal appearance.   HENT:      Head: Normocephalic and atraumatic.   Eyes:      Pupils: Pupils are equal, round, and reactive to light.   Cardiovascular:      Rate and Rhythm: Normal rate.   Pulmonary:      Effort: Pulmonary effort is normal.   Musculoskeletal:      Cervical back: Normal range of motion.   Skin:     General: Skin is warm and dry.   Neurological:      General: No focal deficit present.      Mental Status: She is alert and oriented to person, place, and time. Mental status is at baseline.   Psychiatric:         Mood and Affect: Mood normal.         Behavior: Behavior normal.         Thought Content: Thought content normal.         Judgment: Judgment normal.         Vital Signs     Vitals:    06/27/24 1304   BP: 126/80   BP Location: Left arm   Patient Position: Sitting   Cuff Size: Standard   Pulse: 81   SpO2: 98%   Weight: 56.7 kg (125 lb)   Height: 5' 1\" (1.549 m)       Current Medications       Current Outpatient Medications:     acetaminophen (Tylenol 8 Hour) 650 mg CR tablet, Take 1 tablet (650 mg total) by mouth daily at bedtime, Disp: , Rfl:     amLODIPine (NORVASC) 5 mg tablet, TAKE 1 TABLET BY MOUTH TWICE  DAILY, Disp: 180 tablet, Rfl: 1    aspirin 81 MG tablet, Take 81 mg by mouth daily Resume on 4/28 , Disp: , Rfl:     atorvastatin (LIPITOR) 40 mg tablet, TAKE 1 TABLET BY MOUTH DAILY  AFTER DINNER, Disp: 90 tablet, Rfl: 3    bumetanide (BUMEX) 1 mg tablet, TAKE 1/2 A TABLET BY MOUTH  DAILY TAKE A WHOLE TABLET IF  NEEDED, Disp: 90 tablet, Rfl: 1    carvedilol (COREG) 12.5 mg tablet, TAKE ONE-HALF TABLET BY  MOUTH TWICE DAILY WITH  MEALS (Patient taking differently: Half tablet BID), Disp: 90 tablet, Rfl: 3    Cholecalciferol 2000 units TABS, Take 2,000 Units by mouth in the " morning. Resume on 4/28 ., Disp: , Rfl:     DULoxetine (CYMBALTA) 30 mg delayed release capsule, TAKE 1 CAPSULE BY MOUTH  DAILY, Disp: 90 capsule, Rfl: 3    fexofenadine (ALLEGRA) 180 MG tablet, Take 180 mg by mouth daily as needed , Disp: , Rfl:     folic acid (FOLVITE) 1 mg tablet, Take 1 mg by mouth daily, Disp: , Rfl:     losartan (COZAAR) 100 MG tablet, TAKE 1 TABLET BY MOUTH DAILY, Disp: 90 tablet, Rfl: 1    methotrexate 10 MG tablet, Take 1 tablet (10 mg total) by mouth once a week Do not start before May 28, 2024. (Patient taking differently: Take 10 mg by mouth once a week 4 tablets weekly), Disp: , Rfl:     MULTIPLE VITAMIN PO, Take by mouth daily, Disp: , Rfl:     omeprazole (PriLOSEC) 40 MG capsule, TAKE 1 CAPSULE BY MOUTH  DAILY, Disp: 90 capsule, Rfl: 3    ondansetron (ZOFRAN) 4 mg tablet, Take 1 tablet (4 mg total) by mouth every 8 (eight) hours as needed for nausea or vomiting, Disp: 20 tablet, Rfl: 0    Active Problems     Patient Active Problem List   Diagnosis    Essential hypertension    Mixed hyperlipidemia    Coronary artery disease without angina pectoris - S/P stent placement x 2 (2011)    Gastroesophageal reflux disease without esophagitis    Chronic diastolic (congestive) heart failure (HCC)    Age-related osteoporosis with current pathological fracture    Intertrochanteric fracture of left femur, closed, with routine healing, subsequent encounter    Ambulatory dysfunction    Elderly person living alone    Low back pain without sciatica    S/P ORIF (open reduction internal fixation) fracture    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HCC)    Bladder cancer (HCC)    Allergic rhinitis    Anemia due to vitamin B12 deficiency    Biliary dyskinesia    Cholelithiasis    Chronic right shoulder pain    Degenerative joint disease    Depression, controlled    Deviated nasal septum    Gastric reflux syndrome    Heart valve disorder    Herpes zoster infection of thoracic region    IBS (irritable  bowel syndrome)    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HCC)    Mild vitamin D deficiency    Mixed hearing loss, unilateral    Pancreatic cyst    Raynaud's disease without gangrene    S/P angioplasty with stent    Urge incontinence of urine    Platelets decreased (HCC)    History of pulmonary embolus (PE)    Age-related cataract of both eyes    Hyperparathyroidism, unspecified (HCC)    Fear of falling    Balance problems    Difficulty navigating stairs    Lower abdominal pain, unspecified    History of falling    Bilateral leg edema    New onset of headaches    Carotid artery stenosis, asymptomatic, bilateral    Ventral hernia    Cerebral aneurysm without rupture    Intermittent pain and swelling of hand    Right hand paresthesia    BMI 23.0-23.9, adult    Carpal tunnel syndrome of right wrist    CKD (chronic kidney disease) stage 2, GFR 60-89 ml/min    TIFFANY (renal artery stenosis) (HCC)    History of pneumonia    Fatigue    Swelling of left lower extremity    Rib pain on left side    Seropositive rheumatoid arthritis (HCC)    Long term (current) use of immunosuppressive biologic    Age-related osteoporosis without current pathological fracture    Left lower quadrant abdominal tenderness without rebound tenderness    Hydronephrosis, left    Long term current use of diuretic       Past Medical History     Past Medical History:   Diagnosis Date    Bladder cancer (HCC)     had BCG treatments    Bladder cancer (HCC)     Closed displaced fracture of left trapezium bone 05/18/2017    Coronary artery disease     S/P stent placement x 2 in 2011    GERD (gastroesophageal reflux disease)     Hepatitis     got this from food back in 1970s, has not had a problem since    History of leukemia     in remission    History of stomach ulcers 1970    History of transfusion 1970    Hyperlipidemia     Hypertension     Hyperthyroidism 04/10/2018    Irritable bowel syndrome     Kidney stone     Kidney stones     Persistent cough for 3 weeks or  longer 03/10/2020    Pneumonia     Pt had in 2003 and 2004    Pruritic rash 09/03/2019    Pulmonary emboli (HCC) 1970s    Raynaud disease     Shingles     Sleep apnea     Thrombocytopenia (HCC) 02/20/2012       Surgical History     Past Surgical History:   Procedure Laterality Date    CATARACT EXTRACTION      COLONOSCOPY      CORONARY ANGIOPLASTY WITH STENT PLACEMENT      stent x 2    CYSTOSCOPY      diagnostic - onset 4/4/17    EGD      EYE SURGERY      FRACTURE SURGERY      HERNIA REPAIR      HYSTERECTOMY      LEG SURGERY      OOPHORECTOMY      GA NDSC WRST SURG W/RLS TRANSVRS CARPL LIGM Right 10/12/2021    Procedure: Right endoscopic carpal tunnel release;  Surgeon: Ghanshyam Rosado MD;  Location: [facility];  Service: Orthopedics    GA OPTX FEM SHFT FX W/INSJ IMED IMPLT W/WO SCREW Left 4/8/2018    Procedure: INSERTION NAIL IM FEMUR ANTEGRADE (TROCHANTERIC);  Surgeon: Lucrecia Boyle MD;  Location: [facility];  Service: Orthopedics    GA REPAIR FIRST ABDOMINAL WALL HERNIA N/A 5/12/2021    Procedure: REPAIR HERNIA VENTRAL;  Surgeon: Rui Pickett MD;  Location: [facility];  Service: General    TONSILLECTOMY         Family History     Family History   Problem Relation Age of Onset    Arthritis Mother     Osteoporosis Mother     Heart disease Father     Hypertension Father     Hypertension Brother     Prostate cancer Brother     Breast cancer Daughter 50    Prostate cancer Son        Social History     Social History     Social History     Tobacco Use   Smoking Status Never   Smokeless Tobacco Never       Past Surgical History:   Procedure Laterality Date    CATARACT EXTRACTION      COLONOSCOPY      CORONARY ANGIOPLASTY WITH STENT PLACEMENT      stent x 2    CYSTOSCOPY      diagnostic - onset 4/4/17    EGD      EYE SURGERY      FRACTURE SURGERY      HERNIA REPAIR      HYSTERECTOMY      LEG SURGERY      OOPHORECTOMY      GA NDSC WRST SURG W/RLS TRANSVRS CARPL LIGM Right 10/12/2021    Procedure: Right endoscopic  carpal tunnel release;  Surgeon: Ghanshyam Rosado MD;  [facility]: [facility];  Service: Orthopedics    NM OPTX FEM SHFT FX W/INSJ IMED IMPLT W/WO SCREW Left 4/8/2018    Procedure: INSERTION NAIL IM FEMUR ANTEGRADE (TROCHANTERIC);  Surgeon: Lucrecia Boyle MD;  [facility];  Service: Orthopedics    NM REPAIR FIRST ABDOMINAL WALL HERNIA N/A 5/12/2021    Procedure: REPAIR HERNIA VENTRAL;  Surgeon: Rui Pickett MD;  [facility];  Service: General    TONSILLECTOMY           The following portions of the patient's history were reviewed and updated as appropriate: allergies, current medications, past family history, past medical history, past social history, past surgical history and problem list    Please note :  Voice dictation software has been used to create this document.  There may be inadvertent transcription errors.    BRUCE Garrido

## 2024-06-27 NOTE — TELEPHONE ENCOUNTER
I called Radha and unfortunately got voicemail.  I left a voicemail stating that I reviewed her case with Dr. Fernandes and he would like to see her in the operating room and skip having any cystoscopy done in the office setting.  We discussed this at length in the office today.  I told her that the surgery scheduler will be reaching out to her with next steps and plan.  If she has any additional questions or concerns she can feel free to reach back out to our office and we would be happy to address them.

## 2024-07-08 ENCOUNTER — PREP FOR PROCEDURE (OUTPATIENT)
Dept: UROLOGY | Facility: AMBULATORY SURGERY CENTER | Age: 89
End: 2024-07-08

## 2024-07-08 ENCOUNTER — TELEPHONE (OUTPATIENT)
Dept: UROLOGY | Facility: AMBULATORY SURGERY CENTER | Age: 89
End: 2024-07-08

## 2024-07-08 DIAGNOSIS — R39.89 SUSPECTED UTI: ICD-10-CM

## 2024-07-08 DIAGNOSIS — Z01.810 PRE-OPERATIVE CARDIOVASCULAR EXAMINATION: ICD-10-CM

## 2024-07-08 DIAGNOSIS — Z01.812 PRE-OPERATIVE LABORATORY EXAMINATION: ICD-10-CM

## 2024-07-08 DIAGNOSIS — N13.30 HYDRONEPHROSIS, LEFT: Primary | ICD-10-CM

## 2024-07-08 NOTE — TELEPHONE ENCOUNTER
Spoke with patient and confirmed surgery date of: 8/22/24  Type of surgery: Ureteroscopy/ Poss. Biopsy   Operating physician: Dr. Britt  Location of surgery: [place]    Verbally went over prep with patient on: 7/8/24  NPO  Bowel prep? No  Hospital calls afternoon prior with arrival time -Calls Friday afternoon for Monday surgeries  Patient needs ride to and from surgery (outpatient/inpatient)   Pre-op testing to be done 2 weeks prior to surgery  Bmp, cmp, cbc, ucx, EKG   Blood thinners:   Aspirin and vitamins  Clearances needed: none    Mailed/emailed to patient on: 7/9/24  Copy of packet scanned into Media on: 7/9/24  Labs in packet  Soap / Bowel prep in packet  Pre-op & Post-op in packet    Consent: in Media or on admit

## 2024-07-09 ENCOUNTER — TELEPHONE (OUTPATIENT)
Age: 89
End: 2024-07-09

## 2024-07-09 ENCOUNTER — APPOINTMENT (OUTPATIENT)
Dept: LAB | Facility: CLINIC | Age: 89
End: 2024-07-09
Payer: MEDICARE

## 2024-07-09 DIAGNOSIS — C91.10 CHRONIC LARGE GRANULAR LYMPHOCYTIC LEUKEMIA (HCC): Primary | ICD-10-CM

## 2024-07-09 DIAGNOSIS — C91.10 CHRONIC LARGE GRANULAR LYMPHOCYTIC LEUKEMIA (HCC): ICD-10-CM

## 2024-07-09 DIAGNOSIS — E21.3 HYPERPARATHYROIDISM, UNSPECIFIED (HCC): ICD-10-CM

## 2024-07-09 LAB
25(OH)D3 SERPL-MCNC: 63.5 NG/ML (ref 30–100)
ALBUMIN SERPL BCG-MCNC: 4 G/DL (ref 3.5–5)
ALP SERPL-CCNC: 60 U/L (ref 34–104)
ALT SERPL W P-5'-P-CCNC: 19 U/L (ref 7–52)
ANION GAP SERPL CALCULATED.3IONS-SCNC: 7 MMOL/L (ref 4–13)
AST SERPL W P-5'-P-CCNC: 19 U/L (ref 13–39)
BASOPHILS # BLD AUTO: 0.05 THOUSANDS/ÂΜL (ref 0–0.1)
BASOPHILS NFR BLD AUTO: 1 % (ref 0–1)
BILIRUB SERPL-MCNC: 0.71 MG/DL (ref 0.2–1)
BUN SERPL-MCNC: 21 MG/DL (ref 5–25)
CA-I BLD-SCNC: 1.18 MMOL/L (ref 1.12–1.32)
CALCIUM SERPL-MCNC: 9.6 MG/DL (ref 8.4–10.2)
CHLORIDE SERPL-SCNC: 102 MMOL/L (ref 96–108)
CO2 SERPL-SCNC: 29 MMOL/L (ref 21–32)
CREAT SERPL-MCNC: 0.83 MG/DL (ref 0.6–1.3)
EOSINOPHIL # BLD AUTO: 0.13 THOUSAND/ÂΜL (ref 0–0.61)
EOSINOPHIL NFR BLD AUTO: 3 % (ref 0–6)
ERYTHROCYTE [DISTWIDTH] IN BLOOD BY AUTOMATED COUNT: 12.9 % (ref 11.6–15.1)
GFR SERPL CREATININE-BSD FRML MDRD: 63 ML/MIN/1.73SQ M
GLUCOSE P FAST SERPL-MCNC: 76 MG/DL (ref 65–99)
HCT VFR BLD AUTO: 36.3 % (ref 34.8–46.1)
HGB BLD-MCNC: 11.9 G/DL (ref 11.5–15.4)
IMM GRANULOCYTES # BLD AUTO: 0.02 THOUSAND/UL (ref 0–0.2)
IMM GRANULOCYTES NFR BLD AUTO: 1 % (ref 0–2)
LDH SERPL-CCNC: 161 U/L (ref 140–271)
LYMPHOCYTES # BLD AUTO: 1.38 THOUSANDS/ÂΜL (ref 0.6–4.47)
LYMPHOCYTES NFR BLD AUTO: 33 % (ref 14–44)
MCH RBC QN AUTO: 32.7 PG (ref 26.8–34.3)
MCHC RBC AUTO-ENTMCNC: 32.8 G/DL (ref 31.4–37.4)
MCV RBC AUTO: 100 FL (ref 82–98)
MONOCYTES # BLD AUTO: 0.43 THOUSAND/ÂΜL (ref 0.17–1.22)
MONOCYTES NFR BLD AUTO: 10 % (ref 4–12)
NEUTROPHILS # BLD AUTO: 2.22 THOUSANDS/ÂΜL (ref 1.85–7.62)
NEUTS SEG NFR BLD AUTO: 52 % (ref 43–75)
NRBC BLD AUTO-RTO: 0 /100 WBCS
PLATELET # BLD AUTO: 157 THOUSANDS/UL (ref 149–390)
PMV BLD AUTO: 10.6 FL (ref 8.9–12.7)
POTASSIUM SERPL-SCNC: 4.3 MMOL/L (ref 3.5–5.3)
PROT SERPL-MCNC: 7.2 G/DL (ref 6.4–8.4)
PTH-INTACT SERPL-MCNC: 84 PG/ML (ref 12–88)
RBC # BLD AUTO: 3.64 MILLION/UL (ref 3.81–5.12)
SODIUM SERPL-SCNC: 138 MMOL/L (ref 135–147)
WBC # BLD AUTO: 4.23 THOUSAND/UL (ref 4.31–10.16)

## 2024-07-09 PROCEDURE — 85025 COMPLETE CBC W/AUTO DIFF WBC: CPT

## 2024-07-09 PROCEDURE — 82330 ASSAY OF CALCIUM: CPT

## 2024-07-09 PROCEDURE — 82306 VITAMIN D 25 HYDROXY: CPT

## 2024-07-09 PROCEDURE — 83615 LACTATE (LD) (LDH) ENZYME: CPT

## 2024-07-09 PROCEDURE — 80053 COMPREHEN METABOLIC PANEL: CPT

## 2024-07-09 PROCEDURE — 36415 COLL VENOUS BLD VENIPUNCTURE: CPT

## 2024-07-09 PROCEDURE — 83970 ASSAY OF PARATHORMONE: CPT

## 2024-07-09 NOTE — TELEPHONE ENCOUNTER
Pt calling in regards to upcoming surgery. Pt was requesting a call back from  due to pt thought Dr Fernandes would be performing her procedure and she is a little upset that another provider will be. Please review.    Pt call back- [phone]

## 2024-07-09 NOTE — TELEPHONE ENCOUNTER
Returned call to patient to inform her that she does need blood work prior to her appointment with Bradley Linares PA-C on 7/12. Lab orders placed in her chart. Pt verbalized understanding.

## 2024-07-09 NOTE — TELEPHONE ENCOUNTER
Patient calling to confirm if she needs labs done prior to her appt 7/12/24 with Lyubov at [facility] at 1:40pm.  She can be reached at [phone]. Please call to inform.

## 2024-07-10 NOTE — TELEPHONE ENCOUNTER
Spoke with patient and rescheduled surgery with Dr. Fernandes. Updated surgery packet to be mailed to patient. I did ask that if patient has any future questions or concerns to please give the office a call. Patient verbalized understanding.

## 2024-07-12 ENCOUNTER — OFFICE VISIT (OUTPATIENT)
Dept: HEMATOLOGY ONCOLOGY | Facility: CLINIC | Age: 89
End: 2024-07-12
Payer: MEDICARE

## 2024-07-12 VITALS
WEIGHT: 125 LBS | DIASTOLIC BLOOD PRESSURE: 77 MMHG | SYSTOLIC BLOOD PRESSURE: 144 MMHG | BODY MASS INDEX: 23.6 KG/M2 | HEART RATE: 74 BPM | TEMPERATURE: 98.4 F | RESPIRATION RATE: 14 BRPM | OXYGEN SATURATION: 97 % | HEIGHT: 61 IN

## 2024-07-12 DIAGNOSIS — C91.10 CHRONIC LARGE GRANULAR LYMPHOCYTIC LEUKEMIA (HCC): Primary | ICD-10-CM

## 2024-07-12 PROCEDURE — 99213 OFFICE O/P EST LOW 20 MIN: CPT | Performed by: PHYSICIAN ASSISTANT

## 2024-07-12 NOTE — PROGRESS NOTES
Hematology/Oncology Outpatient Follow-up  Radha Ma 89 y.o. female 1935 [id number]    Date:  7/12/2024      Assessment and Plan:  1. 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HCC)  Stable lab parameters   No symptoms   Follow up in 1 year     If urology needs our assistance after procedure for any cancer interventions, patient will have sooner appt here.     - CBC and differential; Future  - Comprehensive metabolic panel; Future  - LD,Blood; Future     HPI:  Oncology History Overview Note   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  that was diagnosed several years ago and patient has not required any therapy for this condition.  Patient's condition and counts are being monitored.     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HCC)    Chemotherapy      2018: No treatment.  Surveillance only.       89 year old female presents for follow-up visit for asymptomatic large granular cell lymphocytic leukemia  that was diagnosed several years ago and patient has not required any therapy for this condition.  Patient's condition and counts are being monitored.     on methotrexate for RA    Interval history:  Has upcoming cystoscopy in OR with urology for biopsy of concern for urothelial carcinoma recurrence    ROS: Review of Systems   Constitutional:  Negative for activity change, appetite change, chills, fatigue, fever and unexpected weight change.   HENT:  Negative for nosebleeds.    Respiratory:  Negative for cough and shortness of breath.    Cardiovascular:  Negative for chest pain, palpitations and leg swelling.   Gastrointestinal:  Negative for abdominal pain, constipation, diarrhea, nausea and vomiting.   Genitourinary:  Negative for difficulty urinating, dysuria and hematuria.   Musculoskeletal:  Negative for arthralgias.   Skin: Negative.    Neurological:  Negative for dizziness, weakness, light-headedness, numbness and headaches.   Hematological: Negative.     Psychiatric/Behavioral: Negative.       Past Medical History:   Diagnosis Date    Bladder cancer (HCC)     had BCG treatments    Bladder cancer (HCC)     Closed displaced fracture of left trapezium bone 05/18/2017    Coronary artery disease     S/P stent placement x 2 in 2011    GERD (gastroesophageal reflux disease)     Hepatitis     got this from food back in 1970s, has not had a problem since    History of leukemia     in remission    History of stomach ulcers 1970    History of transfusion 1970    Hyperlipidemia     Hypertension     Hyperthyroidism 04/10/2018    Irritable bowel syndrome     Kidney stone     Kidney stones     Persistent cough for 3 weeks or longer 03/10/2020    Pneumonia     Pt had in 2003 and 2004    Pruritic rash 09/03/2019    Pulmonary emboli (HCC) 1970s    Raynaud disease     Shingles     Sleep apnea     Thrombocytopenia (HCC) 02/20/2012       Past Surgical History:   Procedure Laterality Date    CATARACT EXTRACTION      COLONOSCOPY      CORONARY ANGIOPLASTY WITH STENT PLACEMENT      stent x 2    CYSTOSCOPY      diagnostic - onset 4/4/17    EGD      EYE SURGERY      FRACTURE SURGERY      HERNIA REPAIR      HYSTERECTOMY      LEG SURGERY      OOPHORECTOMY      ND NDSC WRST SURG W/RLS TRANSVRS CARPL LIGM Right 10/12/2021    Procedure: Right endoscopic carpal tunnel release;  Surgeon: Ghanshyam Rosado MD;  Location: [facility];  Service: Orthopedics    ND OPTX FEM SHFT FX W/INSJ IMED IMPLT W/WO SCREW Left 4/8/2018    Procedure: INSERTION NAIL IM FEMUR ANTEGRADE (TROCHANTERIC);  Surgeon: Lucrecia Boyle MD;  Location: [facility];  Service: Orthopedics    ND REPAIR FIRST ABDOMINAL WALL HERNIA N/A 5/12/2021    Procedure: REPAIR HERNIA VENTRAL;  Surgeon: Rui Pickett MD;  Location: [facility];  Service: General    TONSILLECTOMY         Social History     Socioeconomic History    Marital status:      Spouse name: None    Number of children: None    Years of education: None    Highest  education level: None   Occupational History    None   Tobacco Use    Smoking status: Never    Smokeless tobacco: Never   Vaping Use    Vaping status: Never Used   Substance and Sexual Activity    Alcohol use: No    Drug use: No    Sexual activity: Never   Other Topics Concern    None   Social History Narrative    Advance directives declined by parents    Always uses seat belt     Social Determinants of Health     Financial Resource Strain: Low Risk  (1/29/2024)    Overall Financial Resource Strain (CARDIA)     Difficulty of Paying Living Expenses: Not hard at all   Food Insecurity: Not on file   Transportation Needs: No Transportation Needs (1/29/2024)    PRAPARE - Transportation     Lack of Transportation (Medical): No     Lack of Transportation (Non-Medical): No   Physical Activity: Not on file   Stress: Not on file   Social Connections: Not on file   Intimate Partner Violence: Not on file   Housing Stability: Not on file       Family History   Problem Relation Age of Onset    Arthritis Mother     Osteoporosis Mother     Heart disease Father     Hypertension Father     Hypertension Brother     Prostate cancer Brother     Breast cancer Daughter 50    Prostate cancer Son        Allergies   Allergen Reactions    Lactose - Food Allergy GI Intolerance         Current Outpatient Medications:     acetaminophen (Tylenol 8 Hour) 650 mg CR tablet, Take 1 tablet (650 mg total) by mouth daily at bedtime, Disp: , Rfl:     amLODIPine (NORVASC) 5 mg tablet, TAKE 1 TABLET BY MOUTH TWICE  DAILY, Disp: 180 tablet, Rfl: 1    aspirin 81 MG tablet, Take 81 mg by mouth daily Resume on 4/28 , Disp: , Rfl:     atorvastatin (LIPITOR) 40 mg tablet, TAKE 1 TABLET BY MOUTH DAILY  AFTER DINNER, Disp: 90 tablet, Rfl: 3    bumetanide (BUMEX) 1 mg tablet, TAKE 1/2 A TABLET BY MOUTH  DAILY TAKE A WHOLE TABLET IF  NEEDED, Disp: 90 tablet, Rfl: 1    carvedilol (COREG) 12.5 mg tablet, TAKE ONE-HALF TABLET BY  MOUTH TWICE DAILY WITH  MEALS (Patient  "taking differently: Half tablet BID), Disp: 90 tablet, Rfl: 3    Cholecalciferol 2000 units TABS, Take 2,000 Units by mouth in the morning. Resume on 4/28 ., Disp: , Rfl:     DULoxetine (CYMBALTA) 30 mg delayed release capsule, TAKE 1 CAPSULE BY MOUTH  DAILY, Disp: 90 capsule, Rfl: 3    fexofenadine (ALLEGRA) 180 MG tablet, Take 180 mg by mouth daily as needed , Disp: , Rfl:     folic acid (FOLVITE) 1 mg tablet, Take 1 mg by mouth daily, Disp: , Rfl:     losartan (COZAAR) 100 MG tablet, TAKE 1 TABLET BY MOUTH DAILY, Disp: 90 tablet, Rfl: 1    methotrexate 10 MG tablet, Take 1 tablet (10 mg total) by mouth once a week Do not start before May 28, 2024. (Patient taking differently: Take 10 mg by mouth once a week 4 tablets weekly), Disp: , Rfl:     MULTIPLE VITAMIN PO, Take by mouth daily, Disp: , Rfl:     omeprazole (PriLOSEC) 40 MG capsule, TAKE 1 CAPSULE BY MOUTH  DAILY, Disp: 90 capsule, Rfl: 3    ondansetron (ZOFRAN) 4 mg tablet, Take 1 tablet (4 mg total) by mouth every 8 (eight) hours as needed for nausea or vomiting, Disp: 20 tablet, Rfl: 0      Physical Exam:  /77 (BP Location: Left arm, Patient Position: Sitting, Cuff Size: Standard)   Pulse 74   Temp 98.4 °F (36.9 °C) (Temporal)   Resp 14   Ht 5' 1\" (1.549 m)   Wt 56.7 kg (125 lb)   SpO2 97%   BMI 23.62 kg/m²     Physical Exam  Vitals reviewed.   Constitutional:       General: She is not in acute distress.     Appearance: She is well-developed. She is not ill-appearing.   HENT:      Head: Normocephalic and atraumatic.   Eyes:      General: No scleral icterus.     Conjunctiva/sclera: Conjunctivae normal.   Cardiovascular:      Rate and Rhythm: Normal rate and regular rhythm.      Heart sounds: Normal heart sounds. No murmur heard.  Pulmonary:      Effort: Pulmonary effort is normal. No respiratory distress.      Breath sounds: Normal breath sounds.   Abdominal:      Palpations: Abdomen is soft.      Tenderness: There is no abdominal tenderness. "   Musculoskeletal:         General: No tenderness. Normal range of motion.      Cervical back: Normal range of motion and neck supple.      Right lower leg: No edema.      Left lower leg: No edema.   Lymphadenopathy:      Cervical: No cervical adenopathy.   Skin:     General: Skin is warm and dry.   Neurological:      Mental Status: She is alert and oriented to person, place, and time.      Cranial Nerves: No cranial nerve deficit.   Psychiatric:         Mood and Affect: Mood normal.         Behavior: Behavior normal.       Labs:  Lab Results   Component Value Date    WBC 4.23 (L) 07/09/2024    HGB 11.9 07/09/2024    HCT 36.3 07/09/2024     (H) 07/09/2024     07/09/2024     I have spent 20 minutes with Patient  today in which greater than 50% of this time was spent in counseling/coordination of care regarding Diagnostic results, Risks and benefits of tx options, Instructions for management, Impressions, Documenting in the medical record, Reviewing / ordering tests, medicine, procedures  , and Obtaining or reviewing history  .    Patient voiced understanding and agreement in the above discussion. Aware to contact our office with questions/symptoms in the interim.     This note has been generated by voice recognition software system.  Therefore, there may be spelling, grammar, and or syntax errors. Please contact if questions arise.

## 2024-07-16 ENCOUNTER — OFFICE VISIT (OUTPATIENT)
Dept: FAMILY MEDICINE CLINIC | Facility: CLINIC | Age: 89
End: 2024-07-16
Payer: MEDICARE

## 2024-07-16 VITALS
TEMPERATURE: 98.8 F | WEIGHT: 127 LBS | OXYGEN SATURATION: 98 % | SYSTOLIC BLOOD PRESSURE: 140 MMHG | HEART RATE: 94 BPM | BODY MASS INDEX: 23.98 KG/M2 | DIASTOLIC BLOOD PRESSURE: 90 MMHG | HEIGHT: 61 IN

## 2024-07-16 DIAGNOSIS — M79.89 PAIN AND SWELLING OF RIGHT LOWER EXTREMITY: ICD-10-CM

## 2024-07-16 DIAGNOSIS — M79.604 PAIN AND SWELLING OF RIGHT LOWER EXTREMITY: ICD-10-CM

## 2024-07-16 DIAGNOSIS — M79.604 ACUTE PAIN OF RIGHT LOWER EXTREMITY: Primary | ICD-10-CM

## 2024-07-16 PROCEDURE — 99214 OFFICE O/P EST MOD 30 MIN: CPT | Performed by: FAMILY MEDICINE

## 2024-07-16 PROCEDURE — G2211 COMPLEX E/M VISIT ADD ON: HCPCS | Performed by: FAMILY MEDICINE

## 2024-07-16 NOTE — PROGRESS NOTES
"Assessment/Plan:      Diagnoses and all orders for this visit:    Acute pain of right lower extremity  -     VAS VENOUS DUPLEX -LOWER LIMB UNILATERAL; Future    Pain and swelling of right lower extremity  -     VAS VENOUS DUPLEX -LOWER LIMB UNILATERAL; Future      Rule out DVT - STAT venous duplex ordered and our MA staff is assisting pt with scheduling this today  Pt was instructed to go to ER if sx worsen and/or if SOB, CP sx develop    Subjective:  Chief Complaint   Patient presents with   • Leg Pain     Right Leg pain and swelling - was worse Over the weekend        Patient ID: Radha Ma is a 89 y.o. female.    Same day sick appt  Pt appears fatigued when I enter the room- she states, \"must be the heat\"  C/o sudden onset pain back of right knee and down to her foot 5 days ago and then soon after noticed swelling right lower leg - continued unchanged through Sunday \"then started to get better and today woke up and can walk on it,\" pain gone today, but swelling persists right lower leg  States her sons tried to convince her to go to the ER, but she didn't want to drive because her leg hurt so much and didn't want to call an ambulance  Denies any SOB or CP  Pt also reports that \"they think cancer is back\" (bladder CA) - cystoscopy scheduled for September 6/27/2024  [facility] BRUCE Bear  Urology Malignant neoplasm of urinary bladder, unspecified site (HCC) +1 more  Dx New Patient Visit; Referred by Elvira Monroy MD  Reason for Visit  Progress Notes  BRUCE Garrido (Nurse Practitioner) · Urology  Cosigned by: Sen Britt MD at 7/1/2024  1:49 PM  Attestation signed by Sen Britt MD at 7/1/2024  1:49 PM  Attending Attestation:  I supervised the Advanced Practitioner on 6/27/2024. I reviewed the Advanced Practitioner note and agree.  The patient had a CT scan with concern for left distal ureteral enhancement and I agree with plan for ureteroscopy to " biopsy and potentially ablate                Review of Systems      Objective:     Physical Exam  Vitals and nursing note reviewed.   Constitutional:       General: She is not in acute distress.     Appearance: She is well-developed and well-groomed. She is not ill-appearing, toxic-appearing or diaphoretic.      Comments: Appears mildly fatigued   HENT:      Head: Normocephalic and atraumatic.      Mouth/Throat:      Pharynx: Uvula midline.   Neck:      Trachea: Phonation normal.   Cardiovascular:      Rate and Rhythm: Normal rate and regular rhythm.      Pulses: Normal pulses.      Heart sounds: S1 normal and S2 normal.      No friction rub. No gallop.      Comments: No pitting edema  Pulmonary:      Effort: Pulmonary effort is normal.      Breath sounds: Normal breath sounds and air entry.   Musculoskeletal:      Comments: +Tenderness right lower posterior thigh and right calf tenderness  +generalized swelling right lower leg to foot   Skin:     General: Skin is warm and dry.      Capillary Refill: Capillary refill takes less than 2 seconds.      Coloration: Skin is not pale.   Neurological:      Mental Status: She is alert and oriented to person, place, and time.      Gait: Gait normal.   Psychiatric:         Mood and Affect: Mood normal.         Behavior: Behavior normal. Behavior is cooperative.         Cognition and Memory: Cognition normal.

## 2024-07-17 ENCOUNTER — HOSPITAL ENCOUNTER (OUTPATIENT)
Dept: NON INVASIVE DIAGNOSTICS | Facility: HOSPITAL | Age: 89
Discharge: HOME | End: 2024-07-17
Payer: MEDICARE

## 2024-07-17 DIAGNOSIS — M79.604 PAIN AND SWELLING OF RIGHT LOWER EXTREMITY: ICD-10-CM

## 2024-07-17 DIAGNOSIS — M79.89 PAIN AND SWELLING OF RIGHT LOWER EXTREMITY: ICD-10-CM

## 2024-07-17 DIAGNOSIS — M79.604 ACUTE PAIN OF RIGHT LOWER EXTREMITY: ICD-10-CM

## 2024-07-17 PROCEDURE — 93971 EXTREMITY STUDY: CPT

## 2024-07-18 ENCOUNTER — TELEPHONE (OUTPATIENT)
Dept: FAMILY MEDICINE CLINIC | Facility: CLINIC | Age: 89
End: 2024-07-18

## 2024-07-18 PROCEDURE — 93971 EXTREMITY STUDY: CPT | Performed by: SURGERY

## 2024-07-18 NOTE — TELEPHONE ENCOUNTER
----- Message from Anjelica Sr DO sent at 7/18/2024 10:13 AM EDT -----  Please let pt know that US definitively is negative for any clot in the leg- continue elevate, rest, tylenol therapies

## 2024-07-18 NOTE — TELEPHONE ENCOUNTER
Called and spoke with patient and informed of the provider's note. Patient understood and had no further questions

## 2024-07-25 ENCOUNTER — NURSE TRIAGE (OUTPATIENT)
Age: 89
End: 2024-07-25

## 2024-07-25 DIAGNOSIS — R39.9 UTI SYMPTOMS: Primary | ICD-10-CM

## 2024-07-25 NOTE — TELEPHONE ENCOUNTER
"Regarding: Patient is c/o of fever  ----- Message from Quoc GRANT sent at 7/25/2024  3:16 PM EDT -----  Patient called today after the advise of MARYA Morley stating that if the patient experienced a fever to go to the ER.    Pt is calling today stating that she has a \"low grade temp of 99.4\".    Pt is requesting a returned call to discuss.    Call back [phone] or [phone]    "

## 2024-07-25 NOTE — TELEPHONE ENCOUNTER
Left a voicemail to have patient call back and speak to clinical triage regarding fever. Office phone number given.

## 2024-07-26 NOTE — TELEPHONE ENCOUNTER
"Spoke to patient and she is due for surgery on 9/3/2024. She is still having ongoing low grade fever. This morning she measured her temperature at 99.0. She confirmed that she registered the temperature in the proper setting of not having anything by mouth, no heavy clothes or blankets prior to taking the measurement. She does have history of IBS and she notes she is nauseous at times but no vomiting. Appetite is slightly different, however due to IBS she has to \"watch what she eats\". She denies any abdominal, flank or pelvic pain. Asymptomatic urologically. She does she a GI doctor this coming August. She is hydrating as best as she can. I gave her care advice to rest, hydrate well with water, utilize a cool compress on the base of her neck, contact PCP or report to ER if her symptoms persist. Please advise of any further recommendations.      "

## 2024-07-26 NOTE — TELEPHONE ENCOUNTER
Called Radha banda notified her that urine studies were placed in system - she should also call her PCP if her fever persists. She understands

## 2024-07-26 NOTE — TELEPHONE ENCOUNTER
Patient called stating she still concerned about the low grade fever and she wants a call back from the nurse.    Patient [phone] or [phone]

## 2024-07-29 ENCOUNTER — APPOINTMENT (OUTPATIENT)
Dept: LAB | Facility: CLINIC | Age: 89
End: 2024-07-29
Payer: MEDICARE

## 2024-07-29 DIAGNOSIS — R39.9 UTI SYMPTOMS: ICD-10-CM

## 2024-07-29 LAB
BACTERIA UR QL AUTO: NORMAL /HPF
BILIRUB UR QL STRIP: NEGATIVE
CLARITY UR: CLEAR
COLOR UR: COLORLESS
GLUCOSE UR STRIP-MCNC: NEGATIVE MG/DL
HGB UR QL STRIP.AUTO: NEGATIVE
KETONES UR STRIP-MCNC: NEGATIVE MG/DL
LEUKOCYTE ESTERASE UR QL STRIP: NEGATIVE
NITRITE UR QL STRIP: NEGATIVE
NON-SQ EPI CELLS URNS QL MICRO: NORMAL /HPF
PH UR STRIP.AUTO: 7 [PH]
PROT UR STRIP-MCNC: NEGATIVE MG/DL
RBC #/AREA URNS AUTO: NORMAL /HPF
SP GR UR STRIP.AUTO: 1.01 (ref 1–1.03)
UROBILINOGEN UR STRIP-ACNC: <2 MG/DL
WBC #/AREA URNS AUTO: NORMAL /HPF

## 2024-07-29 PROCEDURE — 87086 URINE CULTURE/COLONY COUNT: CPT

## 2024-07-29 PROCEDURE — 81001 URINALYSIS AUTO W/SCOPE: CPT

## 2024-07-30 LAB — BACTERIA UR CULT: NORMAL

## 2024-08-01 DIAGNOSIS — I10 ESSENTIAL HYPERTENSION: ICD-10-CM

## 2024-08-02 RX ORDER — CARVEDILOL 12.5 MG/1
TABLET ORAL
Qty: 90 TABLET | Refills: 0 | Status: SHIPPED | OUTPATIENT
Start: 2024-08-02

## 2024-08-07 ENCOUNTER — RA CDI HCC (OUTPATIENT)
Dept: OTHER | Facility: HOSPITAL | Age: 89
End: 2024-08-07

## 2024-08-07 NOTE — PROGRESS NOTES
It is noted in the patient record that the patient has F32.A depression on Cymbalta    If appropriate, can the depression be further specified as:   F32.0 major depressive disorder, single episode, mild  OR   F32.1 major depressive disorder, single episode, moderate  OR   F32.2 major depressive disorder, single episode, severe without psychotic features OR   F32.4 major depressive disorder, single episode, in partial remission OR   F32.5 major depressive disorder, single episode, in full remission    F33.0 major depressive disorder, recurrent, mild  OR   F33.1 major depressive disorder, recurrent, moderate  OR   F33.2 major depressive disorder, recurrent, severe without psychotic features OR   F33.41 major depressive disorder, recurrent, in partial remission OR   F33.42 major depressive disorder, recurrent, in full remission    I13.0  HCC coding opportunities          Chart Reviewed number of suggestions sent to Provider: 2     Patients Insurance     Medicare Insurance: Medicare

## 2024-08-14 ENCOUNTER — OFFICE VISIT (OUTPATIENT)
Dept: GASTROENTEROLOGY | Facility: CLINIC | Age: 89
End: 2024-08-14
Payer: MEDICARE

## 2024-08-14 VITALS
WEIGHT: 127 LBS | HEIGHT: 61 IN | BODY MASS INDEX: 23.98 KG/M2 | DIASTOLIC BLOOD PRESSURE: 70 MMHG | TEMPERATURE: 98.2 F | SYSTOLIC BLOOD PRESSURE: 134 MMHG

## 2024-08-14 DIAGNOSIS — K21.9 GASTROESOPHAGEAL REFLUX DISEASE WITHOUT ESOPHAGITIS: Primary | ICD-10-CM

## 2024-08-14 DIAGNOSIS — D49.0 IPMN (INTRADUCTAL PAPILLARY MUCINOUS NEOPLASM): ICD-10-CM

## 2024-08-14 DIAGNOSIS — K58.2 IRRITABLE BOWEL SYNDROME WITH BOTH CONSTIPATION AND DIARRHEA: ICD-10-CM

## 2024-08-14 PROCEDURE — 99214 OFFICE O/P EST MOD 30 MIN: CPT | Performed by: STUDENT IN AN ORGANIZED HEALTH CARE EDUCATION/TRAINING PROGRAM

## 2024-08-14 RX ORDER — OMEPRAZOLE 40 MG/1
40 CAPSULE, DELAYED RELEASE ORAL DAILY
Qty: 90 CAPSULE | Refills: 3 | Status: SHIPPED | OUTPATIENT
Start: 2024-08-14

## 2024-08-14 NOTE — PROGRESS NOTES
[facility] - Outpatient Follow-up Note  Radha Ma 89 y.o. female MRN: [id number]  Encounter: [id number]          ASSESSMENT AND PLAN:    89F with CAD s/p PCI, hypertension, prior ureter cancer with new bladder mass here for follow-up of reflux and IBS.  Symptoms relatively well-controlled with dietary modification and omeprazole 40 mg daily.  Does report some nocturnal reflux breakthrough so will try bedtime famotidine to see if this gives better control.  1. Gastroesophageal reflux disease without esophagitis  - omeprazole (PriLOSEC) 40 MG capsule; Take 1 capsule (40 mg total) by mouth daily  Dispense: 90 capsule; Refill: 3  -Famotidine 20 mg nightly    2. Irritable bowel syndrome with both constipation and diarrhea  Continue to avoid trigger foods    3. IPMN (intraductal papillary mucinous neoplasm)  Has been getting surveillance MRIs since 2017 and had an EUS FNA in 2020.  Discussed that given her advanced age, comorbidities and current guidelines, likely little benefit to ongoing cyst surveillance.  She is in agreement with stopping surveillance at this time.    Follow up in 1 year    ______________________________________________________________________    SUBJECTIVE:    Has made a lot of changes to diet.  Has to remove skin from fruits and vegetables, avoids sauces.  Takes all her pills at bedtime, gets some nocturnal reflux.  Taking omeprazole 40 mg daily    Gets alternating diarrhea and constipation. Usually well controlled with diet.    Last seen 6/1/2023 for GERD, IBS and pancreatic cyst    No family history of pancreatic cancer.      MRCP 8/5/23  Unchanged pancreatic parenchymal cysts measuring up to 23 mm. Patient has had prior EUS evaluation. Management as per gastroenterology.     EUS 2/5/20  1. Multiple cysts seen in the pancreas.  The largest 1 was 2.8 cm.  Another 1 seen in the body of the pancreas measured 2 cm and had a hyperechoic area along the wall.  Due to this  recent FNA of this cyst was performed.  All the cyst seem to be communicating with the pancreatic duct.  These are likely side branch IPMNs.  2. Possible pancreas divisum.  3. No evidence of gastritis was seen.  No evidence of duodenitis was seen.  Biopsies were done in the stomach.  4. Few gastric polyps were seen which are likely fundic gland polyps.  Biopsies were done.    Pathology 2/5/20  A. Gastric antrum (biopsy):     - Minimal chronic inactive gastritis involving antral and oxyntic mucosa.     - No definitive morphologic evidence of Helicobacter on routine sections.     - No intestinal metaplasia, dysplasia or carcinoma identified.     B. Gastric body (biopsy):     - Gastric oxyntic mucosa with no significant pathologic abnormality.     - No definitive morphologic evidence of Helicobacter on routine sections.     - No intestinal metaplasia, dysplasia or carcinoma identified.     C. Gastric / small bowel polyp (biopsy):     - Portions of polypoid gastric oxyntic mucosa with suggestion of medication (PPI) effect.     - No dysplasia or carcinoma identified.    CT renal protocol 6/14/24  Enhancing soft tissue abnormality involving the distal left ureter extending from the pelvic inlet to the level of superior left acetabulum highly suspicious for urothelial carcinoma. Associated mild to moderate left hydronephrosis.     Pancreatic cysts again measuring up to 2.0 cm.    REVIEW OF SYSTEMS IS OTHERWISE NEGATIVE.      Historical Information   Past Medical History:   Diagnosis Date    Bladder cancer (HCC)     had BCG treatments    Bladder cancer (HCC)     Closed displaced fracture of left trapezium bone 05/18/2017    Coronary artery disease     S/P stent placement x 2 in 2011    GERD (gastroesophageal reflux disease)     Hepatitis     got this from food back in 1970s, has not had a problem since    History of leukemia     in remission    History of stomach ulcers 1970    History of transfusion 1970    Hyperlipidemia      Hypertension     Hyperthyroidism 04/10/2018    Irritable bowel syndrome     Kidney stone     Kidney stones     Persistent cough for 3 weeks or longer 03/10/2020    Pneumonia     Pt had in 2003 and 2004    Pruritic rash 09/03/2019    Pulmonary emboli (HCC) 1970s    Raynaud disease     Shingles     Sleep apnea     Thrombocytopenia (HCC) 02/20/2012     Past Surgical History:   Procedure Laterality Date    CATARACT EXTRACTION      COLONOSCOPY      CORONARY ANGIOPLASTY WITH STENT PLACEMENT      stent x 2    CYSTOSCOPY      diagnostic - onset 4/4/17    EGD      EYE SURGERY      FRACTURE SURGERY      HERNIA REPAIR      HYSTERECTOMY      LEG SURGERY      OOPHORECTOMY      NH NDSC WRST SURG W/RLS TRANSVRS CARPL LIGM Right 10/12/2021    Procedure: Right endoscopic carpal tunnel release;  Surgeon: Ghanshyam Rosado MD;  [facility];  Service: Orthopedics    NH OPTX FEM SHFT FX W/INSJ IMED IMPLT W/WO SCREW Left 4/8/2018    Procedure: INSERTION NAIL IM FEMUR ANTEGRADE (TROCHANTERIC);  Surgeon: Lucrecia Boyle MD;  [facility];  Service: Orthopedics    NH REPAIR FIRST ABDOMINAL WALL HERNIA N/A 5/12/2021    Procedure: REPAIR HERNIA VENTRAL;  Surgeon: Rui Pickett MD;  [facility];  Service: General    TONSILLECTOMY       Social History   Social History     Substance and Sexual Activity   Alcohol Use No     Social History     Substance and Sexual Activity   Drug Use No     Social History     Tobacco Use   Smoking Status Never   Smokeless Tobacco Never     Family History   Problem Relation Age of Onset    Arthritis Mother     Osteoporosis Mother     Heart disease Father     Hypertension Father     Hypertension Brother     Prostate cancer Brother     Breast cancer Daughter 50    Prostate cancer Son        Meds/Allergies       Current Outpatient Medications:     acetaminophen (Tylenol 8 Hour) 650 mg CR tablet    amLODIPine (NORVASC) 5 mg tablet    aspirin 81 MG tablet    atorvastatin (LIPITOR) 40 mg  "tablet    bumetanide (BUMEX) 1 mg tablet    carvedilol (COREG) 12.5 mg tablet    Cholecalciferol 2000 units TABS    DULoxetine (CYMBALTA) 30 mg delayed release capsule    fexofenadine (ALLEGRA) 180 MG tablet    folic acid (FOLVITE) 1 mg tablet    losartan (COZAAR) 100 MG tablet    methotrexate 10 MG tablet    MULTIPLE VITAMIN PO    omeprazole (PriLOSEC) 40 MG capsule    ondansetron (ZOFRAN) 4 mg tablet    Allergies   Allergen Reactions    Lactose - Food Allergy GI Intolerance           Objective   /70 (BP Location: Right arm, Patient Position: Sitting, Cuff Size: Adult)   Temp 98.2 °F (36.8 °C) (Tympanic)   Ht 5' 1\" (1.549 m)   Wt 57.6 kg (127 lb)   BMI 24.00 kg/m²         PHYSICAL EXAM:      General Appearance:   Alert, cooperative, no distress   HEENT:   Normocephalic, atraumatic, anicteric.     Neck:  Supple, symmetrical, trachea midline   Lungs:   Clear to auscultation bilaterally; no rales, rhonchi or wheezing; respirations unlabored    Heart::   Regular rate and rhythm; no murmur, rub, or gallop.   Abdomen:   Soft, non-tender, non-distended; normal bowel sounds; no masses, no organomegaly    Genitalia:   Deferred    Rectal:   Deferred    Extremities:  No cyanosis, clubbing or edema    Pulses:  2+ and symmetric    Skin:  No jaundice, rashes, or lesions    Lymph nodes:  No palpable cervical lymphadenopathy        Lab Results:   No visits with results within 1 Day(s) from this visit.   Latest known visit with results is:   Appointment on 07/29/2024   Component Date Value    Urine Culture 07/29/2024 50,000-59,000 cfu/ml     Color, UA 07/29/2024 Colorless     Clarity, UA 07/29/2024 Clear     Specific Gravity, UA 07/29/2024 1.011     pH, UA 07/29/2024 7.0     Leukocytes, UA 07/29/2024 Negative     Nitrite, UA 07/29/2024 Negative     Protein, UA 07/29/2024 Negative     Glucose, UA 07/29/2024 Negative     Ketones, UA 07/29/2024 Negative     Urobilinogen, UA 07/29/2024 <2.0     Bilirubin, UA 07/29/2024 " Negative     Occult Blood, UA 07/29/2024 Negative     RBC, UA 07/29/2024 1-2     WBC, UA 07/29/2024 1-2     Epithelial Cells 07/29/2024 None Seen     Bacteria, UA 07/29/2024 None Seen          Radiology Results:   VAS VENOUS DUPLEX -LOWER LIMB UNILATERAL    Result Date: 7/18/2024  Narrative:  [facility] REPORT [facility]INICAL: Indications: Patient presents with right lower extremity pain and swelling x 5 days. Operative History: Coronary angioplasty with stent placement Risk Factors The patient has history of HTN, Hyperlipidemia, CKD and CAD.   CONCLUSION: Impression: RIGHT LOWER LIMB No evidence of acute or chronic deep vein thrombosis. No evidence of superficial thrombophlebitis noted. Doppler evaluation shows a normal response to augmentation maneuvers. Popliteal, posterior tibial and anterior tibial arterial Doppler waveform's are biphasic.  LEFT LOWER LIMB LIMITED Evaluation shows no evidence of thrombus in the common femoral vein. Doppler evaluation shows a normal response to augmentation maneuvers.  Technical findings were given to Dr. Sr 1555 7/17/2024  SIGNATURE: Electronically Signed by: SHAISTA SOUSA MD on 2024-07-18 12:40:46 AM

## 2024-08-15 ENCOUNTER — OFFICE VISIT (OUTPATIENT)
Dept: UROLOGY | Facility: AMBULATORY SURGERY CENTER | Age: 89
End: 2024-08-15

## 2024-08-15 VITALS
SYSTOLIC BLOOD PRESSURE: 134 MMHG | HEART RATE: 83 BPM | WEIGHT: 125 LBS | BODY MASS INDEX: 23.6 KG/M2 | DIASTOLIC BLOOD PRESSURE: 64 MMHG | OXYGEN SATURATION: 98 % | HEIGHT: 61 IN

## 2024-08-15 DIAGNOSIS — N13.5 URETER, STRICTURE: Primary | ICD-10-CM

## 2024-08-15 DIAGNOSIS — C67.9 MALIGNANT NEOPLASM OF URINARY BLADDER, UNSPECIFIED SITE (HCC): ICD-10-CM

## 2024-08-15 LAB
POST-VOID RESIDUAL VOLUME, ML POC: 11 ML
SL AMB  POCT GLUCOSE, UA: NORMAL
SL AMB LEUKOCYTE ESTERASE,UA: NORMAL
SL AMB POCT BILIRUBIN,UA: NORMAL
SL AMB POCT BLOOD,UA: NORMAL
SL AMB POCT CLARITY,UA: CLEAR
SL AMB POCT COLOR,UA: YELLOW
SL AMB POCT KETONES,UA: NORMAL
SL AMB POCT NITRITE,UA: NORMAL
SL AMB POCT PH,UA: 5
SL AMB POCT SPECIFIC GRAVITY,UA: 1.01
SL AMB POCT URINE PROTEIN: NORMAL
SL AMB POCT UROBILINOGEN: 0.2

## 2024-08-15 PROCEDURE — 87086 URINE CULTURE/COLONY COUNT: CPT

## 2024-08-15 NOTE — H&P (VIEW-ONLY)
Pre-op visit  8/15/2024      Chief Complaint   Patient presents with    Follow-up         Assessment and Plan     89 y.o. female managed by Dr. Fernandes    Bladder cancer (HCC)  CT scan on 5/9/2024 stone study without contrast demonstrated no hydronephrosis or renal calculi present  CT scan on 5/20/2024 demonstrated left hydronephrosis and hydroureter and urothelial enhancement; differentials included urothelial carcinoma, stricture/scar tissue, recent kidney stone passage, infection  CT renal protocol from 6/14/2024 demonstrating enhancing soft tissue abnormality involving the distal left ureter, highly suspicious for urothelial carcinoma, mild to moderate left hydronephrosis  Prior imaging dated back to 2019 which showed no renal calculi or hydronephrosis  Urine cytology on 5/21/2024 negative for high-grade urothelial carcinoma  Followed by Dr. Fernandes, last cystoscopy was in August 2020 in which cystoscopy was normal and there was no evidence of recurrence of disease, no follow up since this time  Urine culture obtained in office today, will monitor results closely and treat accordingly  BMP on 5/21/2024: Creat 0.85, BUN 22, GFR 61  Discussed case with Dr. Fernandes and will plan to proceed to OR for Cystoscopy, LEFT ureteroscopy with possible biopsy and fulguration, retrograde pyelogram, possible LEFT ureteral stent placement     History and physical was performed for the patients upcoming Cystoscopy, LEFT ureteroscopy with possible biopsy fulguration, retrograde pyelogram, possible LEFT ureteral stent placement scheduled for 9/3/2024 with Dr. Fernandes. All questions and concerns regarding surgery and perioperative expectations have been addressed and answered.  No overall changes in their health since last visit, denies any prior complications with anesthesia.  Will proceed with surgery as planned.    History of Present Illness  Radha Ma is a 89 y.o. female here for history and physical prior to their  upcoming surgery.    She presented to the office on 6/27/2024 for follow-up of urothelial carcinoma.  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      She was last seen in the office by Dr. Fernandes in 2020 for cystoscopy which was negative for recurrence of disease.  Most recently she reported to the ER for increasing left flank pain and blood in the urine this was on 5/20/2024.  It was noted on CT imaging that she had mild to moderate hydronephrosis and potential recurrence of urothelial carcinoma.  She believed that she thought she was passing a kidney stone but did not visibly see any kidney stone or report of passing it.     Today in the office she states that she is overall doing very well.  She states that she occasionally does have left flank pain but it is not as severe as it was about a month ago.  She denies any recent hematuria.  She denies dysuria, suprapubic or flank pain.    She states that she has been dealing with some IBS issues and has been seeing her GI doctor regarding this. This does cause her some abdominal pain at times. She does have issues with constipation after anesthesia. She is planning to take stool softeners following the procedure to help with this.       Review of Systems   Constitutional:  Negative for chills and fever.   Respiratory: Negative.  Negative for cough and shortness of breath.    Cardiovascular:  Negative for chest pain and leg swelling.   Genitourinary:  Negative for dyspareunia, dysuria, flank pain, frequency, hematuria, menstrual problem, pelvic pain, urgency, vaginal bleeding, vaginal discharge and vaginal pain.   Skin:  Negative for rash.   Neurological: Negative.    Hematological:  Negative for adenopathy. Does not bruise/bleed easily.       Vitals  Vitals:    08/15/24 1221   BP: 134/64   BP Location: Left arm  "  Patient Position: Sitting   Cuff Size: Standard   Pulse: 83   SpO2: 98%   Weight: 56.7 kg (125 lb)   Height: 5' 1\" (1.549 m)       Physical Exam  Vitals reviewed.   Constitutional:       Appearance: Normal appearance.   HENT:      Head: Normocephalic and atraumatic.   Eyes:      Pupils: Pupils are equal, round, and reactive to light.   Cardiovascular:      Rate and Rhythm: Normal rate and regular rhythm.      Heart sounds: Normal heart sounds.      Comments: Murmur  Pulmonary:      Effort: Pulmonary effort is normal.      Breath sounds: Normal breath sounds.   Abdominal:      General: Abdomen is flat. There is no distension.      Palpations: Abdomen is soft.   Musculoskeletal:      Cervical back: Normal range of motion.   Skin:     General: Skin is warm and dry.   Neurological:      General: No focal deficit present.      Mental Status: She is alert and oriented to person, place, and time. Mental status is at baseline.   Psychiatric:         Mood and Affect: Mood normal.         Behavior: Behavior normal.         Thought Content: Thought content normal.         Judgment: Judgment normal.             Past Medical History  Past Medical History:   Diagnosis Date    Bladder cancer (HCC)     had BCG treatments    Bladder cancer (HCC)     Closed displaced fracture of left trapezium bone 05/18/2017    Coronary artery disease     S/P stent placement x 2 in 2011    GERD (gastroesophageal reflux disease)     Hepatitis     got this from food back in 1970s, has not had a problem since    History of leukemia     in remission    History of stomach ulcers 1970    History of transfusion 1970    Hyperlipidemia     Hypertension     Hyperthyroidism 04/10/2018    Irritable bowel syndrome     Kidney stone     Kidney stones     Persistent cough for 3 weeks or longer 03/10/2020    Pneumonia     Pt had in 2003 and 2004    Pruritic rash 09/03/2019    Pulmonary emboli (HCC) 1970s    Raynaud disease     Shingles     Sleep apnea     " Thrombocytopenia (HCC) 02/20/2012       Past Social History  Past Surgical History:   Procedure Laterality Date    CATARACT EXTRACTION      COLONOSCOPY      CORONARY ANGIOPLASTY WITH STENT PLACEMENT      stent x 2    CYSTOSCOPY      diagnostic - onset 4/4/17    EGD      EYE SURGERY      FRACTURE SURGERY      HERNIA REPAIR      HYSTERECTOMY      LEG SURGERY      OOPHORECTOMY      ND NDSC WRST SURG W/RLS TRANSVRS CARPL LIGM Right 10/12/2021    Procedure: Right endoscopic carpal tunnel release;  Surgeon: Ghanshyam Rosado MD;  [facility];  Service: Orthopedics    ND OPTX FEM SHFT FX W/INSJ IMED IMPLT W/WO SCREW Left 4/8/2018    Procedure: INSERTION NAIL IM FEMUR ANTEGRADE (TROCHANTERIC);  Surgeon: Lucrecia Boyle MD;  [facility];  Service: Orthopedics    ND REPAIR FIRST ABDOMINAL WALL HERNIA N/A 5/12/2021    Procedure: REPAIR HERNIA VENTRAL;  Surgeon: Rui Pickett MD;  [facility];  Service: General    TONSILLECTOMY         Past Family History  Family History   Problem Relation Age of Onset    Arthritis Mother     Osteoporosis Mother     Heart disease Father     Hypertension Father     Hypertension Brother     Prostate cancer Brother     Breast cancer Daughter 50    Prostate cancer Son        Past Social history  Social History     Socioeconomic History    Marital status:      Spouse name: Not on file    Number of children: Not on file    Years of education: Not on file    Highest education level: Not on file   Occupational History    Not on file   Tobacco Use    Smoking status: Never    Smokeless tobacco: Never   Vaping Use    Vaping status: Never Used   Substance and Sexual Activity    Alcohol use: No    Drug use: No    Sexual activity: Never   Other Topics Concern    Not on file   Social History Narrative    Advance directives declined by parents    Always uses seat belt     Social Determinants of Health     Financial Resource Strain: Low Risk  (1/29/2024)    Overall Financial  Resource Strain (CARDIA)     Difficulty of Paying Living Expenses: Not hard at all   Food Insecurity: Not on file   Transportation Needs: No Transportation Needs (1/29/2024)    PRAPARE - Transportation     Lack of Transportation (Medical): No     Lack of Transportation (Non-Medical): No   Physical Activity: Not on file   Stress: Not on file   Social Connections: Not on file   Intimate Partner Violence: Not on file   Housing Stability: Not on file       Current Medications  Current Outpatient Medications   Medication Sig Dispense Refill    acetaminophen (Tylenol 8 Hour) 650 mg CR tablet Take 1 tablet (650 mg total) by mouth daily at bedtime      amLODIPine (NORVASC) 5 mg tablet TAKE 1 TABLET BY MOUTH TWICE  DAILY 180 tablet 1    aspirin 81 MG tablet Take 81 mg by mouth daily Resume on 4/28       atorvastatin (LIPITOR) 40 mg tablet TAKE 1 TABLET BY MOUTH DAILY  AFTER DINNER 90 tablet 3    bumetanide (BUMEX) 1 mg tablet TAKE 1/2 A TABLET BY MOUTH  DAILY TAKE A WHOLE TABLET IF  NEEDED 90 tablet 1    carvedilol (COREG) 12.5 mg tablet TAKE ONE-HALF TABLET BY MOUTH  TWICE DAILY WITH MEALS 90 tablet 0    Cholecalciferol 2000 units TABS Take 2,000 Units by mouth in the morning. Resume on 4/28 .      DULoxetine (CYMBALTA) 30 mg delayed release capsule TAKE 1 CAPSULE BY MOUTH  DAILY 90 capsule 3    fexofenadine (ALLEGRA) 180 MG tablet Take 180 mg by mouth daily as needed       folic acid (FOLVITE) 1 mg tablet Take 1 mg by mouth daily      losartan (COZAAR) 100 MG tablet TAKE 1 TABLET BY MOUTH DAILY 90 tablet 1    methotrexate 10 MG tablet Take 1 tablet (10 mg total) by mouth once a week Do not start before May 28, 2024. (Patient taking differently: Take 10 mg by mouth once a week 6 tablets weekly)      MULTIPLE VITAMIN PO Take by mouth daily      omeprazole (PriLOSEC) 40 MG capsule Take 1 capsule (40 mg total) by mouth daily 90 capsule 3    ondansetron (ZOFRAN) 4 mg tablet Take 1 tablet (4 mg total) by mouth every 8 (eight)  hours as needed for nausea or vomiting (Patient not taking: Reported on 8/14/2024) 20 tablet 0     No current facility-administered medications for this visit.       Allergies  Allergies   Allergen Reactions    Lactose - Food Allergy GI Intolerance         Past Medical History, Social History, Family History, medications and allergies were reviewed.

## 2024-08-15 NOTE — ASSESSMENT & PLAN NOTE
CT scan on 5/9/2024 stone study without contrast demonstrated no hydronephrosis or renal calculi present  CT scan on 5/20/2024 demonstrated left hydronephrosis and hydroureter and urothelial enhancement; differentials included urothelial carcinoma, stricture/scar tissue, recent kidney stone passage, infection  CT renal protocol from 6/14/2024 demonstrating enhancing soft tissue abnormality involving the distal left ureter, highly suspicious for urothelial carcinoma, mild to moderate left hydronephrosis  Prior imaging dated back to 2019 which showed no renal calculi or hydronephrosis  Urine cytology on 5/21/2024 negative for high-grade urothelial carcinoma  Followed by Dr. Fernandes, last cystoscopy was in August 2020 in which cystoscopy was normal and there was no evidence of recurrence of disease, no follow up since this time  Urine culture obtained in office today, will monitor results closely and treat accordingly  BMP on 5/21/2024: Creat 0.85, BUN 22, GFR 61  Discussed case with Dr. Fernandes and will plan to proceed to OR for Cystoscopy, LEFT ureteroscopy with possible biopsy and fulguration, retrograde pyelogram, possible LEFT ureteral stent placement

## 2024-08-15 NOTE — PROGRESS NOTES
Pre-op visit  8/15/2024      Chief Complaint   Patient presents with    Follow-up         Assessment and Plan     89 y.o. female managed by Dr. Fernandes    Bladder cancer (HCC)  CT scan on 5/9/2024 stone study without contrast demonstrated no hydronephrosis or renal calculi present  CT scan on 5/20/2024 demonstrated left hydronephrosis and hydroureter and urothelial enhancement; differentials included urothelial carcinoma, stricture/scar tissue, recent kidney stone passage, infection  CT renal protocol from 6/14/2024 demonstrating enhancing soft tissue abnormality involving the distal left ureter, highly suspicious for urothelial carcinoma, mild to moderate left hydronephrosis  Prior imaging dated back to 2019 which showed no renal calculi or hydronephrosis  Urine cytology on 5/21/2024 negative for high-grade urothelial carcinoma  Followed by Dr. Fernandes, last cystoscopy was in August 2020 in which cystoscopy was normal and there was no evidence of recurrence of disease, no follow up since this time  Urine culture obtained in office today, will monitor results closely and treat accordingly  BMP on 5/21/2024: Creat 0.85, BUN 22, GFR 61  Discussed case with Dr. Fernandes and will plan to proceed to OR for Cystoscopy, LEFT ureteroscopy with possible biopsy and fulguration, retrograde pyelogram, possible LEFT ureteral stent placement     History and physical was performed for the patients upcoming Cystoscopy, LEFT ureteroscopy with possible biopsy fulguration, retrograde pyelogram, possible LEFT ureteral stent placement scheduled for 9/3/2024 with Dr. Fernandes. All questions and concerns regarding surgery and perioperative expectations have been addressed and answered.  No overall changes in their health since last visit, denies any prior complications with anesthesia.  Will proceed with surgery as planned.    History of Present Illness  Radha Ma is a 89 y.o. female here for history and physical prior to their  upcoming surgery.    She presented to the office on 6/27/2024 for follow-up of urothelial carcinoma.  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      She was last seen in the office by Dr. Fernandes in 2020 for cystoscopy which was negative for recurrence of disease.  Most recently she reported to the ER for increasing left flank pain and blood in the urine this was on 5/20/2024.  It was noted on CT imaging that she had mild to moderate hydronephrosis and potential recurrence of urothelial carcinoma.  She believed that she thought she was passing a kidney stone but did not visibly see any kidney stone or report of passing it.     Today in the office she states that she is overall doing very well.  She states that she occasionally does have left flank pain but it is not as severe as it was about a month ago.  She denies any recent hematuria.  She denies dysuria, suprapubic or flank pain.    She states that she has been dealing with some IBS issues and has been seeing her GI doctor regarding this. This does cause her some abdominal pain at times. She does have issues with constipation after anesthesia. She is planning to take stool softeners following the procedure to help with this.       Review of Systems   Constitutional:  Negative for chills and fever.   Respiratory: Negative.  Negative for cough and shortness of breath.    Cardiovascular:  Negative for chest pain and leg swelling.   Genitourinary:  Negative for dyspareunia, dysuria, flank pain, frequency, hematuria, menstrual problem, pelvic pain, urgency, vaginal bleeding, vaginal discharge and vaginal pain.   Skin:  Negative for rash.   Neurological: Negative.    Hematological:  Negative for adenopathy. Does not bruise/bleed easily.       Vitals  Vitals:    08/15/24 1221   BP: 134/64   BP Location: Left arm  "  Patient Position: Sitting   Cuff Size: Standard   Pulse: 83   SpO2: 98%   Weight: 56.7 kg (125 lb)   Height: 5' 1\" (1.549 m)       Physical Exam  Vitals reviewed.   Constitutional:       Appearance: Normal appearance.   HENT:      Head: Normocephalic and atraumatic.   Eyes:      Pupils: Pupils are equal, round, and reactive to light.   Cardiovascular:      Rate and Rhythm: Normal rate and regular rhythm.      Heart sounds: Normal heart sounds.      Comments: Murmur  Pulmonary:      Effort: Pulmonary effort is normal.      Breath sounds: Normal breath sounds.   Abdominal:      General: Abdomen is flat. There is no distension.      Palpations: Abdomen is soft.   Musculoskeletal:      Cervical back: Normal range of motion.   Skin:     General: Skin is warm and dry.   Neurological:      General: No focal deficit present.      Mental Status: She is alert and oriented to person, place, and time. Mental status is at baseline.   Psychiatric:         Mood and Affect: Mood normal.         Behavior: Behavior normal.         Thought Content: Thought content normal.         Judgment: Judgment normal.             Past Medical History  Past Medical History:   Diagnosis Date    Bladder cancer (HCC)     had BCG treatments    Bladder cancer (HCC)     Closed displaced fracture of left trapezium bone 05/18/2017    Coronary artery disease     S/P stent placement x 2 in 2011    GERD (gastroesophageal reflux disease)     Hepatitis     got this from food back in 1970s, has not had a problem since    History of leukemia     in remission    History of stomach ulcers 1970    History of transfusion 1970    Hyperlipidemia     Hypertension     Hyperthyroidism 04/10/2018    Irritable bowel syndrome     Kidney stone     Kidney stones     Persistent cough for 3 weeks or longer 03/10/2020    Pneumonia     Pt had in 2003 and 2004    Pruritic rash 09/03/2019    Pulmonary emboli (HCC) 1970s    Raynaud disease     Shingles     Sleep apnea     " Thrombocytopenia (HCC) 02/20/2012       Past Social History  Past Surgical History:   Procedure Laterality Date    CATARACT EXTRACTION      COLONOSCOPY      CORONARY ANGIOPLASTY WITH STENT PLACEMENT      stent x 2    CYSTOSCOPY      diagnostic - onset 4/4/17    EGD      EYE SURGERY      FRACTURE SURGERY      HERNIA REPAIR      HYSTERECTOMY      LEG SURGERY      OOPHORECTOMY      OR NDSC WRST SURG W/RLS TRANSVRS CARPL LIGM Right 10/12/2021    Procedure: Right endoscopic carpal tunnel release;  Surgeon: Ghanshyam Rosado MD;  Location: [facility];  Service: Orthopedics    OR OPTX FEM SHFT FX W/INSJ IMED IMPLT W/WO SCREW Left 4/8/2018    Procedure: INSERTION NAIL IM FEMUR ANTEGRADE (TROCHANTERIC);  Surgeon: Lucrecia Boyle MD;  Location: [facility];  Service: Orthopedics    OR REPAIR FIRST ABDOMINAL WALL HERNIA N/A 5/12/2021    Procedure: REPAIR HERNIA VENTRAL;  Surgeon: Rui Pickett MD;  Location: [facility];  Service: General    TONSILLECTOMY         Past Family History  Family History   Problem Relation Age of Onset    Arthritis Mother     Osteoporosis Mother     Heart disease Father     Hypertension Father     Hypertension Brother     Prostate cancer Brother     Breast cancer Daughter 50    Prostate cancer Son        Past Social history  Social History     Socioeconomic History    Marital status:      Spouse name: Not on file    Number of children: Not on file    Years of education: Not on file    Highest education level: Not on file   Occupational History    Not on file   Tobacco Use    Smoking status: Never    Smokeless tobacco: Never   Vaping Use    Vaping status: Never Used   Substance and Sexual Activity    Alcohol use: No    Drug use: No    Sexual activity: Never   Other Topics Concern    Not on file   Social History Narrative    Advance directives declined by parents    Always uses seat belt     Social Determinants of Health     Financial Resource Strain: Low Risk  (1/29/2024)    Overall Financial  Resource Strain (CARDIA)     Difficulty of Paying Living Expenses: Not hard at all   Food Insecurity: Not on file   Transportation Needs: No Transportation Needs (1/29/2024)    PRAPARE - Transportation     Lack of Transportation (Medical): No     Lack of Transportation (Non-Medical): No   Physical Activity: Not on file   Stress: Not on file   Social Connections: Not on file   Intimate Partner Violence: Not on file   Housing Stability: Not on file       Current Medications  Current Outpatient Medications   Medication Sig Dispense Refill    acetaminophen (Tylenol 8 Hour) 650 mg CR tablet Take 1 tablet (650 mg total) by mouth daily at bedtime      amLODIPine (NORVASC) 5 mg tablet TAKE 1 TABLET BY MOUTH TWICE  DAILY 180 tablet 1    aspirin 81 MG tablet Take 81 mg by mouth daily Resume on 4/28       atorvastatin (LIPITOR) 40 mg tablet TAKE 1 TABLET BY MOUTH DAILY  AFTER DINNER 90 tablet 3    bumetanide (BUMEX) 1 mg tablet TAKE 1/2 A TABLET BY MOUTH  DAILY TAKE A WHOLE TABLET IF  NEEDED 90 tablet 1    carvedilol (COREG) 12.5 mg tablet TAKE ONE-HALF TABLET BY MOUTH  TWICE DAILY WITH MEALS 90 tablet 0    Cholecalciferol 2000 units TABS Take 2,000 Units by mouth in the morning. Resume on 4/28 .      DULoxetine (CYMBALTA) 30 mg delayed release capsule TAKE 1 CAPSULE BY MOUTH  DAILY 90 capsule 3    fexofenadine (ALLEGRA) 180 MG tablet Take 180 mg by mouth daily as needed       folic acid (FOLVITE) 1 mg tablet Take 1 mg by mouth daily      losartan (COZAAR) 100 MG tablet TAKE 1 TABLET BY MOUTH DAILY 90 tablet 1    methotrexate 10 MG tablet Take 1 tablet (10 mg total) by mouth once a week Do not start before May 28, 2024. (Patient taking differently: Take 10 mg by mouth once a week 6 tablets weekly)      MULTIPLE VITAMIN PO Take by mouth daily      omeprazole (PriLOSEC) 40 MG capsule Take 1 capsule (40 mg total) by mouth daily 90 capsule 3    ondansetron (ZOFRAN) 4 mg tablet Take 1 tablet (4 mg total) by mouth every 8 (eight)  hours as needed for nausea or vomiting (Patient not taking: Reported on 8/14/2024) 20 tablet 0     No current facility-administered medications for this visit.       Allergies  Allergies   Allergen Reactions    Lactose - Food Allergy GI Intolerance         Past Medical History, Social History, Family History, medications and allergies were reviewed.

## 2024-08-17 LAB — BACTERIA UR CULT: NORMAL

## 2024-08-19 DIAGNOSIS — I10 ESSENTIAL HYPERTENSION: ICD-10-CM

## 2024-08-19 DIAGNOSIS — F32.A DEPRESSION, UNSPECIFIED DEPRESSION TYPE: ICD-10-CM

## 2024-08-19 DIAGNOSIS — R60.0 LOWER EXTREMITY EDEMA: ICD-10-CM

## 2024-08-19 RX ORDER — CARVEDILOL 12.5 MG/1
TABLET ORAL
Qty: 90 TABLET | Refills: 1 | Status: SHIPPED | OUTPATIENT
Start: 2024-08-19

## 2024-08-19 RX ORDER — AMLODIPINE BESYLATE 5 MG/1
TABLET ORAL
Qty: 180 TABLET | Refills: 1 | Status: SHIPPED | OUTPATIENT
Start: 2024-08-19

## 2024-08-19 RX ORDER — BUMETANIDE 1 MG/1
TABLET ORAL
Qty: 90 TABLET | Refills: 1 | Status: SHIPPED | OUTPATIENT
Start: 2024-08-19

## 2024-08-20 RX ORDER — DULOXETIN HYDROCHLORIDE 30 MG/1
CAPSULE, DELAYED RELEASE ORAL
Qty: 90 CAPSULE | Refills: 1 | Status: SHIPPED | OUTPATIENT
Start: 2024-08-20

## 2024-08-21 ENCOUNTER — APPOINTMENT (OUTPATIENT)
Dept: LAB | Facility: CLINIC | Age: 89
End: 2024-08-21
Payer: MEDICARE

## 2024-08-21 ENCOUNTER — OFFICE VISIT (OUTPATIENT)
Dept: LAB | Facility: HOSPITAL | Age: 89
End: 2024-08-21
Payer: MEDICARE

## 2024-08-21 ENCOUNTER — CONSULT (OUTPATIENT)
Dept: FAMILY MEDICINE CLINIC | Facility: CLINIC | Age: 89
End: 2024-08-21
Payer: MEDICARE

## 2024-08-21 VITALS
DIASTOLIC BLOOD PRESSURE: 70 MMHG | WEIGHT: 125 LBS | TEMPERATURE: 92.2 F | OXYGEN SATURATION: 99 % | BODY MASS INDEX: 23.6 KG/M2 | HEIGHT: 61 IN | SYSTOLIC BLOOD PRESSURE: 110 MMHG | HEART RATE: 79 BPM

## 2024-08-21 DIAGNOSIS — Z01.812 PRE-OPERATIVE LABORATORY EXAMINATION: ICD-10-CM

## 2024-08-21 DIAGNOSIS — N13.30 HYDRONEPHROSIS, LEFT: ICD-10-CM

## 2024-08-21 DIAGNOSIS — I70.1 RAS (RENAL ARTERY STENOSIS) (HCC): ICD-10-CM

## 2024-08-21 DIAGNOSIS — D51.9 ANEMIA DUE TO VITAMIN B12 DEFICIENCY, UNSPECIFIED B12 DEFICIENCY TYPE: ICD-10-CM

## 2024-08-21 DIAGNOSIS — Z01.818 PREOP GENERAL PHYSICAL EXAM: Primary | ICD-10-CM

## 2024-08-21 DIAGNOSIS — C67.9 MALIGNANT NEOPLASM OF URINARY BLADDER, UNSPECIFIED SITE (HCC): ICD-10-CM

## 2024-08-21 DIAGNOSIS — I50.32 CHRONIC DIASTOLIC (CONGESTIVE) HEART FAILURE (HCC): Chronic | ICD-10-CM

## 2024-08-21 DIAGNOSIS — I67.1 CEREBRAL ANEURYSM WITHOUT RUPTURE: ICD-10-CM

## 2024-08-21 DIAGNOSIS — N18.2 CKD (CHRONIC KIDNEY DISEASE) STAGE 2, GFR 60-89 ML/MIN: ICD-10-CM

## 2024-08-21 DIAGNOSIS — I10 ESSENTIAL HYPERTENSION: ICD-10-CM

## 2024-08-21 DIAGNOSIS — Z95.820 S/P ANGIOPLASTY WITH STENT: ICD-10-CM

## 2024-08-21 DIAGNOSIS — I73.00 RAYNAUD'S DISEASE WITHOUT GANGRENE: ICD-10-CM

## 2024-08-21 DIAGNOSIS — Z01.810 PRE-OPERATIVE CARDIOVASCULAR EXAMINATION: ICD-10-CM

## 2024-08-21 DIAGNOSIS — R60.0 BILATERAL LEG EDEMA: ICD-10-CM

## 2024-08-21 DIAGNOSIS — I25.10 CORONARY ARTERY DISEASE INVOLVING NATIVE CORONARY ARTERY OF NATIVE HEART WITHOUT ANGINA PECTORIS: ICD-10-CM

## 2024-08-21 PROBLEM — S72.142D INTERTROCHANTERIC FRACTURE OF LEFT FEMUR, CLOSED, WITH ROUTINE HEALING, SUBSEQUENT ENCOUNTER: Status: RESOLVED | Noted: 2018-04-12 | Resolved: 2024-08-21

## 2024-08-21 PROBLEM — B02.9 HERPES ZOSTER INFECTION OF THORACIC REGION: Status: RESOLVED | Noted: 2017-12-05 | Resolved: 2024-08-21

## 2024-08-21 PROBLEM — R10.814 LEFT LOWER QUADRANT ABDOMINAL TENDERNESS WITHOUT REBOUND TENDERNESS: Status: RESOLVED | Noted: 2024-05-01 | Resolved: 2024-08-21

## 2024-08-21 LAB
ALBUMIN SERPL BCG-MCNC: 4.1 G/DL (ref 3.5–5)
ALP SERPL-CCNC: 52 U/L (ref 34–104)
ALT SERPL W P-5'-P-CCNC: 17 U/L (ref 7–52)
ANION GAP SERPL CALCULATED.3IONS-SCNC: 9 MMOL/L (ref 4–13)
AST SERPL W P-5'-P-CCNC: 20 U/L (ref 13–39)
BASOPHILS # BLD AUTO: 0.04 THOUSANDS/ÂΜL (ref 0–0.1)
BASOPHILS NFR BLD AUTO: 1 % (ref 0–1)
BILIRUB SERPL-MCNC: 0.66 MG/DL (ref 0.2–1)
BUN SERPL-MCNC: 22 MG/DL (ref 5–25)
CALCIUM SERPL-MCNC: 9.7 MG/DL (ref 8.4–10.2)
CHLORIDE SERPL-SCNC: 102 MMOL/L (ref 96–108)
CO2 SERPL-SCNC: 28 MMOL/L (ref 21–32)
CREAT SERPL-MCNC: 0.89 MG/DL (ref 0.6–1.3)
EOSINOPHIL # BLD AUTO: 0.13 THOUSAND/ÂΜL (ref 0–0.61)
EOSINOPHIL NFR BLD AUTO: 3 % (ref 0–6)
ERYTHROCYTE [DISTWIDTH] IN BLOOD BY AUTOMATED COUNT: 12.9 % (ref 11.6–15.1)
GFR SERPL CREATININE-BSD FRML MDRD: 58 ML/MIN/1.73SQ M
GLUCOSE P FAST SERPL-MCNC: 93 MG/DL (ref 65–99)
HCT VFR BLD AUTO: 34.4 % (ref 34.8–46.1)
HGB BLD-MCNC: 11.4 G/DL (ref 11.5–15.4)
IMM GRANULOCYTES # BLD AUTO: 0.02 THOUSAND/UL (ref 0–0.2)
IMM GRANULOCYTES NFR BLD AUTO: 0 % (ref 0–2)
LYMPHOCYTES # BLD AUTO: 1.35 THOUSANDS/ÂΜL (ref 0.6–4.47)
LYMPHOCYTES NFR BLD AUTO: 26 % (ref 14–44)
MCH RBC QN AUTO: 32.7 PG (ref 26.8–34.3)
MCHC RBC AUTO-ENTMCNC: 33.1 G/DL (ref 31.4–37.4)
MCV RBC AUTO: 99 FL (ref 82–98)
MONOCYTES # BLD AUTO: 0.47 THOUSAND/ÂΜL (ref 0.17–1.22)
MONOCYTES NFR BLD AUTO: 9 % (ref 4–12)
NEUTROPHILS # BLD AUTO: 3.12 THOUSANDS/ÂΜL (ref 1.85–7.62)
NEUTS SEG NFR BLD AUTO: 61 % (ref 43–75)
NRBC BLD AUTO-RTO: 0 /100 WBCS
PLATELET # BLD AUTO: 144 THOUSANDS/UL (ref 149–390)
PMV BLD AUTO: 10.8 FL (ref 8.9–12.7)
POTASSIUM SERPL-SCNC: 4 MMOL/L (ref 3.5–5.3)
PROT SERPL-MCNC: 7.3 G/DL (ref 6.4–8.4)
RBC # BLD AUTO: 3.49 MILLION/UL (ref 3.81–5.12)
SODIUM SERPL-SCNC: 139 MMOL/L (ref 135–147)
WBC # BLD AUTO: 5.13 THOUSAND/UL (ref 4.31–10.16)

## 2024-08-21 PROCEDURE — 93005 ELECTROCARDIOGRAM TRACING: CPT

## 2024-08-21 PROCEDURE — 80053 COMPREHEN METABOLIC PANEL: CPT

## 2024-08-21 PROCEDURE — G2211 COMPLEX E/M VISIT ADD ON: HCPCS | Performed by: FAMILY MEDICINE

## 2024-08-21 PROCEDURE — 85025 COMPLETE CBC W/AUTO DIFF WBC: CPT

## 2024-08-21 PROCEDURE — 36415 COLL VENOUS BLD VENIPUNCTURE: CPT

## 2024-08-21 PROCEDURE — 99214 OFFICE O/P EST MOD 30 MIN: CPT | Performed by: FAMILY MEDICINE

## 2024-08-21 NOTE — PROGRESS NOTES
PRE-OPERATIVE EVALUATION  FAMILY PRACTICE AT [place]    NAME: Radha Ma  AGE: 89 y.o. SEX: female  : 1935     DATE: 2024    Internal Medicine Pre-Operative Evaluation      Chief Complaint: Pre-operative Evaluation     Date/Time: 24 1010         Procedures:      CYSTOSCOPY, LEFT URETEROSCOPY WITH POSSIBLE BIOPSY FULGURATION, RETROGRADE PYELOGRAM, POSSIBLE LEFT URETERAL STENT PLACEMENT (Left: Bladder)      CYSTOSCOPY RETROGRADE PYELOGRAM WITH INSERTION STENT URETERAL (Left: Bladder)      INSERTION STENT URETERAL (Left: Bladder)   Anesthesia type: General   Diagnosis:      Hydronephrosis, left [N13.30]      Malignant neoplasm of urinary bladder, unspecified site (HCC) [C67.9]   Pre-op diagnosis:      Hydronephrosis, left [N13.30]      Malignant neoplasm of urinary bladder, unspecified site (HCC) [C67.9]   Location:  [facility] ROOM  / [facility]   Surgeons: Jet Fernandes MD   -------------------------------------------------------------------------------------------------------------------  Progress Notes  BRUCE Garrido (Nurse Practitioner)  Urology  Cosigned by: Sen Britt MD at 2024  1:49 PM       Attestation signed by Sen Britt MD at 2024  1:49 PM  Attending Attestation:  I supervised the Advanced Practitioner on 2024. I reviewed the Advanced Practitioner note and agree.  The patient had a CT scan with concern for left distal ureteral enhancement and I agree with plan for ureteroscopy to biopsy and potentially ablate            -------------------------------------------------------------------------------------------------------------------  Surgery: as above  Anticipated Date of Surgery: 9/3/2024  Referring Provider:  Dr. Fernandes  History of Present Illness:     Radha Ma is a 89 y.o. female who presents to the office today for a preoperative consultation. Planned anesthesia is general. Patient has a bleeding  risk of: no recent abnormal bleeding, no remote history of abnormal bleeding, and use of Ca-channel blockers (see med list). Patient does not have objections to receiving blood products if needed. Current anti-platelet/anti-coagulation medications that the patient is prescribed includes: Aspirin.      Assessment of Chronic Conditions: chronic problems are stable, controlled                   Malignant neoplasm of urinary bladder, unspecified site (HCC)         Coronary artery disease involving native coronary artery of native heart without angina pectoris         S/P angioplasty with stent      x2 2011       Chronic diastolic (congestive) heart failure (HCC)         Raynaud's disease without gangrene         TIFFANY (renal artery stenosis) (Formerly [facility])         CKD (chronic kidney disease) stage 2, GFR 60-89 ml/min         Cerebral aneurysm without rupture         Anemia due to vitamin B12 deficiency, unspecified B12 deficiency type         Bilateral leg edema         Essential hypertension               Assessment of Cardiac Risk:  Denies unstable or severe angina or MI in the last 6 weeks or history of stent placement in the last year   Denies decompensated heart failure (e.g. New onset heart failure, NYHA functional class IV heart failure, or worsening existing heart failure)  Denies significant arrhythmias such as high grade AV block, symptomatic ventricular arrhythmia, newly recognized ventricular tachycardia, supraventricular tachycardia with resting heart rate >100, or symptomatic bradycardia  Denies severe heart valve disease including aortic stenosis or symptomatic mitral stenosis     Exercise Capacity:  Able to walk 4 blocks without symptoms?: Yes  Able to walk 2 flights without symptoms?: N/A - pt does not typically walk up more than 4 steps in her usual day-to-day life    Prior Anesthesia Reactions: No     Personal history of venous thromboembolic disease? No    History of steroid use for >2 weeks within last year?  No    STOP-BANG Sleep Apnea Screening Questionnaire:  no witnessed apnea events       Review of Systems:     Review of Systems   Constitutional: Negative.    HENT:  Negative for congestion, nosebleeds and sore throat.    Respiratory: Negative.     Cardiovascular: Negative.    Gastrointestinal: Negative.    Genitourinary:  Negative for difficulty urinating, dysuria, frequency and hematuria.   Neurological: Negative.    Hematological: Negative.        Current Problem List:     Patient Active Problem List   Diagnosis    Essential hypertension    Mixed hyperlipidemia    Coronary artery disease without angina pectoris - S/P stent placement x 2 (2011)    Gastroesophageal reflux disease without esophagitis    Chronic diastolic (congestive) heart failure (HCC)    Age-related osteoporosis with current pathological fracture    Ambulatory dysfunction    Elderly person living alone    Low back pain without sciatica    S/P ORIF (open reduction internal fixation) fracture    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HCC)    Bladder cancer ([facility])    Allergic rhinitis    Anemia due to vitamin B12 deficiency    Biliary dyskinesia    Cholelithiasis    Chronic right shoulder pain    Degenerative joint disease    Depression, controlled    Deviated nasal septum    Heart valve disorder    IBS (irritable bowel syndrome)    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HCC)    Mild vitamin D deficiency    Mixed hearing loss, unilateral    Pancreatic cyst    Raynaud's disease without gangrene    S/P angioplasty with stent    Urge incontinence of urine    Platelets decreased (HCC)    History of pulmonary embolus (PE)    Age-related cataract of both eyes    Hyperparathyroidism, unspecified (HCC)    Fear of falling    Balance problems    Difficulty navigating stairs    Lower abdominal pain, unspecified    History of falling    Bilateral leg edema    New onset of headaches    Carotid artery stenosis, asymptomatic, bilateral    Ventral hernia    Cerebral  "aneurysm without rupture    Intermittent pain and swelling of hand    Right hand paresthesia    BMI 23.0-23.9, adult    Carpal tunnel syndrome of right wrist    CKD (chronic kidney disease) stage 2, GFR 60-89 ml/min    TIFFANY (renal artery stenosis) ([facility])    History of pneumonia    Fatigue    Swelling of left lower extremity    Rib pain on left side    Seropositive rheumatoid arthritis (HCC)    Long term (current) use of immunosuppressive biologic    Age-related osteoporosis without current pathological fracture    Hydronephrosis, left    Long term current use of diuretic       Allergies:     Allergies   Allergen Reactions    Lactose - Food Allergy GI Intolerance       Physical Exam:     Vitals:    08/21/24 1414   BP: 110/70   BP Location: Left arm   Patient Position: Sitting   Cuff Size: Standard   Pulse: 79   Temp: (!) 92.2 °F (33.4 °C)   TempSrc: Tympanic   SpO2: 99%   Weight: 56.7 kg (125 lb)   Height: 5' 1\" (1.549 m)           Current Outpatient Medications:     acetaminophen (Tylenol 8 Hour) 650 mg CR tablet, Take 1 tablet (650 mg total) by mouth daily at bedtime, Disp: , Rfl:     amLODIPine (NORVASC) 5 mg tablet, TAKE 1 TABLET BY MOUTH TWICE  DAILY, Disp: 180 tablet, Rfl: 1    aspirin 81 MG tablet, Take 81 mg by mouth daily Resume on 4/28 , Disp: , Rfl:     atorvastatin (LIPITOR) 40 mg tablet, TAKE 1 TABLET BY MOUTH DAILY  AFTER DINNER, Disp: 90 tablet, Rfl: 3    bumetanide (BUMEX) 1 mg tablet, TAKE 1/2 TABLET BY MOUTH DAILY  (MAY TAKE 1 FULL TABLET IF  NEEDED), Disp: 90 tablet, Rfl: 1    carvedilol (COREG) 12.5 mg tablet, TAKE ONE-HALF TABLET BY MOUTH  TWICE DAILY WITH MEALS, Disp: 90 tablet, Rfl: 1    Cholecalciferol 2000 units TABS, Take 2,000 Units by mouth in the morning. Resume on 4/28 ., Disp: , Rfl:     DULoxetine (CYMBALTA) 30 mg delayed release capsule, TAKE 1 CAPSULE BY MOUTH DAILY, Disp: 90 capsule, Rfl: 1    fexofenadine (ALLEGRA) 180 MG tablet, Take 180 mg by mouth daily as needed , Disp: , Rfl:    "  folic acid (FOLVITE) 1 mg tablet, Take 1 mg by mouth daily, Disp: , Rfl:     losartan (COZAAR) 100 MG tablet, TAKE 1 TABLET BY MOUTH DAILY, Disp: 90 tablet, Rfl: 1    methotrexate 10 MG tablet, Take 1 tablet (10 mg total) by mouth once a week Do not start before May 28, 2024. (Patient taking differently: Take 10 mg by mouth once a week 6 tablets weekly), Disp: , Rfl:     MULTIPLE VITAMIN PO, Take by mouth daily, Disp: , Rfl:     omeprazole (PriLOSEC) 40 MG capsule, Take 1 capsule (40 mg total) by mouth daily, Disp: 90 capsule, Rfl: 3    Past Medical History:       Past Medical History:   Diagnosis Date    Bladder cancer (HCC)     had BCG treatments    Bladder cancer (HCC)     Closed displaced fracture of left trapezium bone 05/18/2017    Coronary artery disease     S/P stent placement x 2 in 2011    GERD (gastroesophageal reflux disease)     Hepatitis     got this from food back in 1970s, has not had a problem since    History of leukemia     in remission    History of stomach ulcers 1970    History of transfusion 1970    Hyperlipidemia     Hypertension     Hyperthyroidism 04/10/2018    Irritable bowel syndrome     Kidney stone     Kidney stones     Persistent cough for 3 weeks or longer 03/10/2020    Pneumonia     Pt had in 2003 and 2004    Pruritic rash 09/03/2019    Pulmonary emboli (HCC) 1970s    Raynaud disease     Shingles     Sleep apnea     Thrombocytopenia (HCC) 02/20/2012        Past Surgical History:   Procedure Laterality Date    CATARACT EXTRACTION      COLONOSCOPY      CORONARY ANGIOPLASTY WITH STENT PLACEMENT      stent x 2    CYSTOSCOPY      diagnostic - onset 4/4/17    EGD      EYE SURGERY      FRACTURE SURGERY      HERNIA REPAIR      HYSTERECTOMY      LEG SURGERY      OOPHORECTOMY      KS NDSC WRST SURG W/RLS TRANSVRS CARPL LIGM Right 10/12/2021    Procedure: Right endoscopic carpal tunnel release;  Surgeon: Ghanshyam Rosado MD;  [facility]: [facility];  Service: Orthopedics    KS OPTX FEM SHFT FX  W/INSJ IMED IMPLT W/WO SCREW Left 4/8/2018    Procedure: INSERTION NAIL IM FEMUR ANTEGRADE (TROCHANTERIC);  Surgeon: Lucrecia Boyle MD;  Location: [facility];  Service: Orthopedics    AL REPAIR FIRST ABDOMINAL WALL HERNIA N/A 5/12/2021    Procedure: REPAIR HERNIA VENTRAL;  Surgeon: Rui Pickett MD;  Location: [facility];  Service: General    TONSILLECTOMY          Family History   Problem Relation Age of Onset    Arthritis Mother     Osteoporosis Mother     Heart disease Father     Hypertension Father     Hypertension Brother     Prostate cancer Brother     Breast cancer Daughter 50    Prostate cancer Son         Social History     Socioeconomic History    Marital status:      Spouse name: Not on file    Number of children: Not on file    Years of education: Not on file    Highest education level: Not on file   Occupational History    Not on file   Tobacco Use    Smoking status: Never    Smokeless tobacco: Never   Vaping Use    Vaping status: Never Used   Substance and Sexual Activity    Alcohol use: No    Drug use: No    Sexual activity: Never   Other Topics Concern    Not on file   Social History Narrative    Advance directives declined by parents    Always uses seat belt     Social Determinants of Health     Financial Resource Strain: Low Risk  (1/29/2024)    Overall Financial Resource Strain (CARDIA)     Difficulty of Paying Living Expenses: Not hard at all   Food Insecurity: Not on file   Transportation Needs: No Transportation Needs (1/29/2024)    PRAPARE - Transportation     Lack of Transportation (Medical): No     Lack of Transportation (Non-Medical): No   Physical Activity: Not on file   Stress: Not on file   Social Connections: Not on file   Intimate Partner Violence: Not on file   Housing Stability: Not on file        Physical Exam:     Physical Exam  Vitals and nursing note reviewed.   Constitutional:       General: She is not in acute distress.     Appearance: She is well-developed and  well-groomed. She is not ill-appearing, toxic-appearing or diaphoretic.      Comments: Extremely pleasant as always  Appears much younger than stated age   HENT:      Head: Normocephalic and atraumatic.      Right Ear: Tympanic membrane, ear canal and external ear normal.      Left Ear: Tympanic membrane, ear canal and external ear normal.      Nose: Nose normal.      Mouth/Throat:      Lips: Pink.      Mouth: Mucous membranes are moist.      Pharynx: Oropharynx is clear. Uvula midline.   Eyes:      General: Lids are normal.      Conjunctiva/sclera: Conjunctivae normal.      Pupils: Pupils are equal, round, and reactive to light.   Neck:      Thyroid: No thyroid mass, thyromegaly or thyroid tenderness.      Vascular: No JVD.      Trachea: Trachea and phonation normal.   Cardiovascular:      Rate and Rhythm: Normal rate and regular rhythm.      Pulses: Normal pulses.      Heart sounds: Normal heart sounds.   Pulmonary:      Effort: Pulmonary effort is normal.      Breath sounds: Normal breath sounds and air entry.   Abdominal:      General: Bowel sounds are normal. There is no distension.      Palpations: Abdomen is soft. There is no hepatomegaly, splenomegaly or mass.      Tenderness: There is no abdominal tenderness.      Hernia: There is no hernia in the ventral area.   Musculoskeletal:      Cervical back: Neck supple.      Right lower leg: No edema.      Left lower leg: No edema.   Lymphadenopathy:      Cervical: No cervical adenopathy.   Skin:     General: Skin is warm and dry.      Capillary Refill: Capillary refill takes less than 2 seconds.      Coloration: Skin is not pale.   Neurological:      General: No focal deficit present.      Mental Status: She is alert and oriented to person, place, and time.      Gait: Gait normal.   Psychiatric:         Mood and Affect: Mood normal.         Behavior: Behavior normal. Behavior is cooperative.         Cognition and Memory: Cognition and memory normal.          Judgment: Judgment normal.          Data:     Pre-operative work-up    Laboratory Results:  preop labs obtained this morning- results pending    08/22/2024: preop labs stable results    EKG:  pt will obtain preop EKG today - order active    08/22/2024:  Study Result  Narrative & Impression   Normal sinus rhythm  Nonspecific T wave abnormality  When compared with ECG of 26-OCT-2023 16:01,  QRS axis Shifted right  Confirmed by Joseph Quiroz ([id number]) on 8/22/2024 5:27:40 AM       Chest x-ray:  10/2023 no acute disease    Previous cardiopulmonary studies within the past year:  Echocardiogram: none  Cardiac Catheterization: none  Stress Test: none  Pulmonary Function Testing: none      Assessment & Recommendations:     1. Preop general physical exam        2. Hydronephrosis, left        3. Malignant neoplasm of urinary bladder, unspecified site (Formerly [facility])        4. Coronary artery disease involving native coronary artery of native heart without angina pectoris        5. S/P angioplasty with stent      x2 2011      6. Chronic diastolic (congestive) heart failure ([facility])        7. Raynaud's disease without gangrene        8. TIFFANY (renal artery stenosis) (Formerly [facility])        9. CKD (chronic kidney disease) stage 2, GFR 60-89 ml/min        10. Cerebral aneurysm without rupture        11. Anemia due to vitamin B12 deficiency, unspecified B12 deficiency type        12. Bilateral leg edema        13. Essential hypertension            Pre-Op Evaluation Assessment  89 y.o. female with planned surgery: as above.    Known risk factors for perioperative complications: Anemia  Coronary disease  Renal dysfunction.      Cardiac Risk Estimation: per the Revised Cardiac Risk Index (Circ. 100:1043, 1999), the patient's risk factors for cardiac complications include  none , putting her in: RCI RISK CLASS I (0 risk factors, risk of major cardiac compl. appr. 0.5%).    Current medications which may produce withdrawal symptoms if withheld perioperatively:  cymbalta.    Pre-Op Evaluation Plan  1. Further preoperative workup as follows:   - ECG  - await bloodwork results    2. Medication Management/Recommendations:   - Patient has been instructed to avoid herbs or non-directed vitamins the week prior to surgery to ensure no drug interactions with perioperative surgical and anesthetic medications.  - Patient has been instructed to avoid aspirin containing medications or non-steroidal anti-inflammatory drugs for the week preceding surgery.    3. Prophylaxis for cardiac events with perioperative beta-blockers: not indicated.    4. Patient requires further consultation with:  pending EKG    Clearance  Pending bloodwork results and EKG    08/22/2024: bloodwork and EKG reviewed, and pt is CLEARED for surgery     Anjelica Sr DO  [facility] AT [address] [address]  Phone#  [phone]  Fax#  [phone]

## 2024-08-22 LAB
ATRIAL RATE: 80 BPM
P AXIS: 3 DEGREES
PR INTERVAL: 156 MS
QRS AXIS: 13 DEGREES
QRSD INTERVAL: 82 MS
QT INTERVAL: 396 MS
QTC INTERVAL: 456 MS
T WAVE AXIS: 11 DEGREES
VENTRICULAR RATE: 80 BPM

## 2024-08-22 PROCEDURE — 93010 ELECTROCARDIOGRAM REPORT: CPT | Performed by: INTERNAL MEDICINE

## 2024-08-29 NOTE — PRE-PROCEDURE INSTRUCTIONS
Pre-Surgery Instructions:   Medication Instructions    acetaminophen (Tylenol 8 Hour) 650 mg CR tablet Uses PRN- OK to take day of surgery    amLODIPine (NORVASC) 5 mg tablet Take day of surgery.    aspirin 81 MG tablet Instructions provided by MD-last dose 8/26    atorvastatin (LIPITOR) 40 mg tablet Take night before surgery    bumetanide (BUMEX) 1 mg tablet Hold day of surgery.    carvedilol (COREG) 12.5 mg tablet Take day of surgery.    Cholecalciferol 2000 units TABS Instructions provided by MD-last dose 8/26    DULoxetine (CYMBALTA) 30 mg delayed release capsule Take night before surgery    fexofenadine (ALLEGRA) 180 MG tablet Uses PRN- OK to take day of surgery    folic acid (FOLVITE) 1 mg tablet Start to hold 8/29    losartan (COZAAR) 100 MG tablet Hold day of surgery.    methotrexate 10 MG tablet Take day of surgery.    MULTIPLE VITAMIN PO Instructions provided by MD-last dose 8/26    omeprazole (PriLOSEC) 40 MG capsule Take day of surgery.      Medication instructions for day surgery reviewed. Please use only a sip of water to take your instructed medications. Avoid all over the counter vitamins, supplements and NSAIDS as of 8/29/24. Tylenol is ok to take as needed.     You will receive a call one business day prior to surgery with an arrival time and hospital directions. If your surgery is scheduled on a Monday, the hospital will be calling you on the Friday prior to your surgery. If you have not heard from anyone by 8pm, please call the hospital supervisor through the hospital  at [phone]. ([place] [phone] or [place] [phone]).    Do not eat or drink anything after midnight the night before your surgery, including candy, mints, lifesavers, or chewing gum. Do not drink alcohol 24hrs before your surgery. Try not to smoke at least 24hrs before your surgery.       Follow the pre surgery showering instructions as listed in the “My Surgical Experience Booklet” or otherwise provided by  your surgeon's office. Do not use a blade to shave the surgical area 1 week before surgery. It is okay to use a clean electric clippers up to 24 hours before surgery. Do not apply any lotions, creams, including makeup, cologne, deodorant, or perfumes after showering on the day of your surgery. Do not use dry shampoo, hair spray, hair gel, or any type of hair products.     No contact lenses, eye make-up, or artificial eyelashes. Remove nail polish, including gel polish, and any artificial, gel, or acrylic nails if possible. Remove all jewelry including rings and body piercing jewelry.     Wear causal clothing that is easy to take on and off. Consider your type of surgery.    Keep any valuables, jewelry, piercings at home. Please bring any specially ordered equipment (sling, braces) if indicated.    Arrange for a responsible person to drive you to and from the hospital on the day of your surgery. Please confirm the visitor policy for the day of your procedure when you receive your phone call with an arrival time.     Call the surgeon's office with any new illnesses, exposures, or additional questions prior to surgery.    Please reference your “My Surgical Experience Booklet” for additional information to prepare for your upcoming surgery.

## 2024-09-02 ENCOUNTER — ANESTHESIA EVENT (OUTPATIENT)
Dept: PERIOP | Facility: HOSPITAL | Age: 89
End: 2024-09-02
Payer: MEDICARE

## 2024-09-03 ENCOUNTER — ANESTHESIA (OUTPATIENT)
Dept: PERIOP | Facility: HOSPITAL | Age: 89
End: 2024-09-03
Payer: MEDICARE

## 2024-09-03 ENCOUNTER — APPOINTMENT (OUTPATIENT)
Dept: RADIOLOGY | Facility: HOSPITAL | Age: 89
End: 2024-09-03
Payer: MEDICARE

## 2024-09-03 ENCOUNTER — HOSPITAL ENCOUNTER (OUTPATIENT)
Facility: HOSPITAL | Age: 89
Setting detail: OUTPATIENT SURGERY
Discharge: HOME | End: 2024-09-03
Attending: UROLOGY | Admitting: UROLOGY
Payer: MEDICARE

## 2024-09-03 VITALS
TEMPERATURE: 97.4 F | OXYGEN SATURATION: 97 % | BODY MASS INDEX: 23.6 KG/M2 | RESPIRATION RATE: 19 BRPM | WEIGHT: 125 LBS | SYSTOLIC BLOOD PRESSURE: 158 MMHG | HEIGHT: 61 IN | HEART RATE: 86 BPM | DIASTOLIC BLOOD PRESSURE: 90 MMHG

## 2024-09-03 DIAGNOSIS — C67.9 MALIGNANT NEOPLASM OF URINARY BLADDER, UNSPECIFIED SITE (HCC): ICD-10-CM

## 2024-09-03 DIAGNOSIS — N13.30 HYDRONEPHROSIS, LEFT: ICD-10-CM

## 2024-09-03 DIAGNOSIS — C66.2: Primary | ICD-10-CM

## 2024-09-03 PROCEDURE — C1758 CATHETER, URETERAL: HCPCS | Performed by: UROLOGY

## 2024-09-03 PROCEDURE — 74420 UROGRAPHY RTRGR +-KUB: CPT

## 2024-09-03 PROCEDURE — 88112 CYTOPATH CELL ENHANCE TECH: CPT | Performed by: PATHOLOGY

## 2024-09-03 PROCEDURE — 87086 URINE CULTURE/COLONY COUNT: CPT | Performed by: UROLOGY

## 2024-09-03 PROCEDURE — C2625 STENT, NON-COR, TEM W/DEL SY: HCPCS | Performed by: UROLOGY

## 2024-09-03 PROCEDURE — 52354 CYSTOURETERO W/BIOPSY: CPT | Performed by: UROLOGY

## 2024-09-03 PROCEDURE — 52332 CYSTOSCOPY AND TREATMENT: CPT | Performed by: UROLOGY

## 2024-09-03 PROCEDURE — C1769 GUIDE WIRE: HCPCS | Performed by: UROLOGY

## 2024-09-03 PROCEDURE — 88305 TISSUE EXAM BY PATHOLOGIST: CPT | Performed by: PATHOLOGY

## 2024-09-03 DEVICE — INLAY OPTIMA URETERAL STENT W/O GUIDEWIRE
Type: IMPLANTABLE DEVICE | Site: URETER | Status: FUNCTIONAL
Brand: BARD® INLAY OPTIMA® URETERAL STENT

## 2024-09-03 RX ORDER — SUCCINYLCHOLINE/SOD CL,ISO/PF 100 MG/5ML
SYRINGE (ML) INTRAVENOUS AS NEEDED
Status: DISCONTINUED | OUTPATIENT
Start: 2024-09-03 | End: 2024-09-03

## 2024-09-03 RX ORDER — MIDAZOLAM HYDROCHLORIDE 2 MG/2ML
INJECTION, SOLUTION INTRAMUSCULAR; INTRAVENOUS AS NEEDED
Status: DISCONTINUED | OUTPATIENT
Start: 2024-09-03 | End: 2024-09-03

## 2024-09-03 RX ORDER — ONDANSETRON 2 MG/ML
INJECTION INTRAMUSCULAR; INTRAVENOUS AS NEEDED
Status: DISCONTINUED | OUTPATIENT
Start: 2024-09-03 | End: 2024-09-03

## 2024-09-03 RX ORDER — DEXAMETHASONE SODIUM PHOSPHATE 10 MG/ML
INJECTION, SOLUTION INTRAMUSCULAR; INTRAVENOUS AS NEEDED
Status: DISCONTINUED | OUTPATIENT
Start: 2024-09-03 | End: 2024-09-03

## 2024-09-03 RX ORDER — FENTANYL CITRATE/PF 50 MCG/ML
12.5 SYRINGE (ML) INJECTION
Status: DISCONTINUED | OUTPATIENT
Start: 2024-09-03 | End: 2024-09-03 | Stop reason: HOSPADM

## 2024-09-03 RX ORDER — HYDROCODONE BITARTRATE AND ACETAMINOPHEN 5; 325 MG/1; MG/1
1 TABLET ORAL EVERY 4 HOURS PRN
Status: DISCONTINUED | OUTPATIENT
Start: 2024-09-03 | End: 2024-09-03 | Stop reason: HOSPADM

## 2024-09-03 RX ORDER — HYDROMORPHONE HCL/PF 1 MG/ML
0.25 SYRINGE (ML) INJECTION
Status: DISCONTINUED | OUTPATIENT
Start: 2024-09-03 | End: 2024-09-03 | Stop reason: HOSPADM

## 2024-09-03 RX ORDER — ONDANSETRON 2 MG/ML
4 INJECTION INTRAMUSCULAR; INTRAVENOUS ONCE AS NEEDED
Status: DISCONTINUED | OUTPATIENT
Start: 2024-09-03 | End: 2024-09-03 | Stop reason: HOSPADM

## 2024-09-03 RX ORDER — FENTANYL CITRATE 50 UG/ML
INJECTION, SOLUTION INTRAMUSCULAR; INTRAVENOUS AS NEEDED
Status: DISCONTINUED | OUTPATIENT
Start: 2024-09-03 | End: 2024-09-03

## 2024-09-03 RX ORDER — PROPOFOL 10 MG/ML
INJECTION, EMULSION INTRAVENOUS AS NEEDED
Status: DISCONTINUED | OUTPATIENT
Start: 2024-09-03 | End: 2024-09-03

## 2024-09-03 RX ORDER — LIDOCAINE HYDROCHLORIDE 10 MG/ML
INJECTION, SOLUTION EPIDURAL; INFILTRATION; INTRACAUDAL; PERINEURAL AS NEEDED
Status: DISCONTINUED | OUTPATIENT
Start: 2024-09-03 | End: 2024-09-03

## 2024-09-03 RX ORDER — CEPHALEXIN 500 MG/1
500 CAPSULE ORAL 3 TIMES DAILY
Qty: 9 CAPSULE | Refills: 0 | Status: SHIPPED | OUTPATIENT
Start: 2024-09-03 | End: 2024-09-06

## 2024-09-03 RX ORDER — CEFAZOLIN SODIUM 1 G/50ML
1000 SOLUTION INTRAVENOUS ONCE
Status: COMPLETED | OUTPATIENT
Start: 2024-09-03 | End: 2024-09-03

## 2024-09-03 RX ORDER — HYDROCODONE BITARTRATE AND ACETAMINOPHEN 5; 325 MG/1; MG/1
1 TABLET ORAL EVERY 6 HOURS PRN
Qty: 4 TABLET | Refills: 0 | Status: SHIPPED | OUTPATIENT
Start: 2024-09-03 | End: 2024-09-13

## 2024-09-03 RX ORDER — SODIUM CHLORIDE, SODIUM LACTATE, POTASSIUM CHLORIDE, CALCIUM CHLORIDE 600; 310; 30; 20 MG/100ML; MG/100ML; MG/100ML; MG/100ML
INJECTION, SOLUTION INTRAVENOUS CONTINUOUS PRN
Status: DISCONTINUED | OUTPATIENT
Start: 2024-09-03 | End: 2024-09-03

## 2024-09-03 RX ORDER — MAGNESIUM HYDROXIDE 1200 MG/15ML
LIQUID ORAL AS NEEDED
Status: DISCONTINUED | OUTPATIENT
Start: 2024-09-03 | End: 2024-09-03 | Stop reason: HOSPADM

## 2024-09-03 RX ADMIN — FENTANYL CITRATE 25 MCG: 50 INJECTION INTRAMUSCULAR; INTRAVENOUS at 08:38

## 2024-09-03 RX ADMIN — SODIUM CHLORIDE, SODIUM LACTATE, POTASSIUM CHLORIDE, AND CALCIUM CHLORIDE: .6; .31; .03; .02 INJECTION, SOLUTION INTRAVENOUS at 08:32

## 2024-09-03 RX ADMIN — Medication 60 MG: at 08:45

## 2024-09-03 RX ADMIN — ONDANSETRON 4 MG: 2 INJECTION INTRAMUSCULAR; INTRAVENOUS at 08:51

## 2024-09-03 RX ADMIN — CEFAZOLIN SODIUM 1000 MG: 1 SOLUTION INTRAVENOUS at 08:50

## 2024-09-03 RX ADMIN — PROPOFOL 40 MG: 10 INJECTION, EMULSION INTRAVENOUS at 08:41

## 2024-09-03 RX ADMIN — FENTANYL CITRATE 25 MCG: 50 INJECTION INTRAMUSCULAR; INTRAVENOUS at 09:27

## 2024-09-03 RX ADMIN — FENTANYL CITRATE 25 MCG: 50 INJECTION INTRAMUSCULAR; INTRAVENOUS at 09:26

## 2024-09-03 RX ADMIN — LIDOCAINE HYDROCHLORIDE 4 ML: 10 INJECTION, SOLUTION EPIDURAL; INFILTRATION; INTRACAUDAL; PERINEURAL at 08:38

## 2024-09-03 RX ADMIN — FENTANYL CITRATE 25 MCG: 50 INJECTION INTRAMUSCULAR; INTRAVENOUS at 08:51

## 2024-09-03 RX ADMIN — DEXAMETHASONE SODIUM PHOSPHATE 5 MG: 10 INJECTION, SOLUTION INTRAMUSCULAR; INTRAVENOUS at 08:51

## 2024-09-03 RX ADMIN — PROPOFOL 80 MG: 10 INJECTION, EMULSION INTRAVENOUS at 08:45

## 2024-09-03 RX ADMIN — PROPOFOL 80 MG: 10 INJECTION, EMULSION INTRAVENOUS at 08:38

## 2024-09-03 RX ADMIN — MIDAZOLAM 2 MG: 1 INJECTION INTRAMUSCULAR; INTRAVENOUS at 08:31

## 2024-09-03 NOTE — INTERVAL H&P NOTE
H&P reviewed. After examining the patient I find no changes in the patients condition since the H&P had been written.    Vitals:    09/03/24 0659   BP: 163/77   Pulse: 78   Resp: 18   Temp: 97.7 °F (36.5 °C)   SpO2: 100%       CT scan reviewed.  Suspicion for urothelial lesion in the distal left ureter.  Recommend cystoscopy with left ureteroscopy with washing and biopsy along with left ureteral stent insertion.  In addition we discussed possible laser ablation of the lesion.  Risk of procedure including, not limited to bleeding, infection, sepsis, ureteral injury, inability to obtain a tissue biopsy, need for additional procedures or surgery was discussed and reviewed.  Informed consent obtained.

## 2024-09-03 NOTE — ANESTHESIA POSTPROCEDURE EVALUATION
Post-Op Assessment Note    CV Status:  Stable    Pain management: adequate       Mental Status:  Awake   Hydration Status:  Stable   PONV Controlled:  Controlled   Airway Patency:  Patent     Post Op Vitals Reviewed: Yes    No anethesia notable event occurred.    Staff: Anesthesiologist, CRNA               BP   180/86   Temp   97.7   Pulse  72   Resp   18   SpO2   100

## 2024-09-03 NOTE — ANESTHESIA PREPROCEDURE EVALUATION
Procedure:  CYSTOSCOPY, LEFT URETEROSCOPY WITH POSSIBLE BIOPSY FULGURATION, RETROGRADE PYELOGRAM, POSSIBLE LEFT URETERAL STENT PLACEMENT (Left: Bladder)  CYSTOSCOPY RETROGRADE PYELOGRAM WITH INSERTION STENT URETERAL (Left: Bladder)  INSERTION STENT URETERAL (Left: Bladder)    Relevant Problems   CARDIO   (+) Coronary artery disease without angina pectoris - S/P stent placement x 2 (2011)   (+) Essential hypertension   (+) Mixed hyperlipidemia   (+) Rib pain on left side      ENDO   (+) Hyperparathyroidism, unspecified (HCC)      GI/HEPATIC   (+) Gastroesophageal reflux disease without esophagitis   (+) Pancreatic cyst      /RENAL   (+) CKD (chronic kidney disease) stage 2, GFR 60-89 ml/min   (+) Hydronephrosis, left      HEMATOLOGY   (+) Anemia due to vitamin B12 deficiency      MUSCULOSKELETAL   (+) Degenerative joint disease   (+) 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HCC)   (+) Low back pain without sciatica   (+) Seropositive rheumatoid arthritis (HCC)      NEURO/PSYCH   (+) Chronic right shoulder pain   (+) Depression, controlled   (+) New onset of headaches   (+) Right hand paresthesia      Other   (+) 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HCC)        Physical Exam    Airway    Mallampati score: II  TM Distance: >3 FB  Neck ROM: full     Dental   No notable dental hx     Cardiovascular  Rhythm: regular, Rate: normal    Pulmonary   Breath sounds clear to auscultation    Other Findings  post-pubertal.      Anesthesia Plan  ASA Score- 3     Anesthesia Type- general with ASA Monitors.         Additional Monitors:     Airway Plan: ETT.           Plan Factors-Exercise tolerance (METS): >4 METS.    Chart reviewed. EKG reviewed.  Existing labs reviewed. Patient summary reviewed.    Patient is not a current smoker.      Obstructive sleep apnea risk education given perioperatively.        Induction- intravenous.    Postoperative Plan- Plan for postoperative opioid use.         Informed Consent- Anesthetic plan and  risks discussed with patient and son.  I personally reviewed this patient with the CRNA. Discussed and agreed on the Anesthesia Plan with the CRNA..

## 2024-09-03 NOTE — OP NOTE
OPERATIVE REPORT  PATIENT NAME: Radha Ma    :  1935  MRN: [id number]  Pt Location: [facility] ROOM 01    SURGERY DATE: 9/3/2024    Surgeons and Role:     * Jet Fernandes MD - Primary    Preop Diagnosis:  Hydronephrosis, left [N13.30]  Malignant neoplasm of urinary bladder, unspecified site (HCC) [C67.9]    Post-Op Diagnosis Codes:     * Hydronephrosis, left [N13.30]     * Malignant neoplasm of urinary bladder, unspecified site (HCC) [C67.9]    Procedure(s):  Cystoscopy, left ureteroscopy, left retrograde pyelogram, left ureteral washing, laser ablation of left ureteral tumor, biopsy of left ureteral tumor, insertion of left double-J ureteral stent    Specimen(s):  ID Type Source Tests Collected by Time Destination   1 : Bladder Washing Urine Urine, Bladder Wash CYTOLOGY, URINE Jet Fernandes MD 9/3/2024 0902    2 : L ureteral washing Washing Ureter, Left NON-GYNECOLOGIC CYTOLOGY Jet Fernandes MD 9/3/2024 0903    3 : Left Ureteral Biopsy Tissue Ureter, Left TISSUE EXAM Jet Fernandes MD 9/3/2024 0914    A : Bladder Culture Urine Urine, Cystoscopic URINE CULTURE Jet Fernandes MD 9/3/2024 0901        Estimated Blood Loss:   0 mL    Drains:  * No LDAs found *    Anesthesia Type:   General    Operative Indications:  Hydronephrosis, left [N13.30]  Malignant neoplasm of urinary bladder, unspecified site (HCC) [C67.9]      Operative Findings:  Distal left ureteral stricture with small recurrence of ureteral tumor both proximal and distal to the stricture level.  Both small tumors removed with the basket and the base fulgurated with holmium laser.  Left ureteral stent placed without a string      Complications:   None    Procedure and Technique:  Radha is a 89-year-old female with a distant history of a left ureteral lesion/tumor previously resected with laser ablation.  On recent follow-up imaging she was found to have left-sided hydronephrosis to the level of a stricture  versus a lesion in the distal left ureter.  Risk and benefits of cystoscopy with left ureteroscopy with biopsy and possible holmium laser along with left retrograde and left ureteral stent insertion were discussed and reviewed.  Informed consent obtained.    Patient was brought to the operating room on September 3, 2024.  After the smooth induction of general LMA converted to general endotracheal anesthesia, she was placed in the dorsolithotomy position.  Her genitalia was prepped and draped in sterile fashion.  Intravenous antibiotics were administered.  A timeout was performed with all members of the operative team confirming the patient's identity and procedure to be performed.    22 Cymraes rigid cystoscope with 30 degree lens was inserted.  A high bladder neck was noted.  The bladder was entered.  30 and 70 degree lenses were utilized to inspect the bladder.  The bladder was without mucosal abnormality or lesion.  Attention was focused on the left ureteral orifice.  A 5 Cymraes open-ended catheter was inserted and a distal ureteral washing was performed.  The E flux was sent for cytology.  I then performed a retrograde pyelogram.  There was an apple core lesion in the distal left ureter.  There was hydronephrosis proximal to this area.  There was tortuosity of the ureter proximally.  There was moderate left-sided hydronephrosis.  There were no other filling defects in the more proximal ureter or within the kidney identified on retrograde.  A wire was inserted as a safety.    A short semirigid ureteroscope was then inserted adjacent to the wire.  The distal ureter was inspected with the assistance of a second wire.  A distal ureteral stricture was appreciated which was able to be navigated with the scope.  Both proximal and distal to the tight stricture were 2 small papillary lesions which appeared superficial and frondular and most consistent with recurrent left ureteral urothelial carcinoma.  Both small lesions  were amenable to basket stone extraction with a SkPebble 1.9 Swedish basket.  I then utilized a 272 µm holmium laser fiber on tissue ablation settings to fulgurate the base.  Hemostasis was excellent.  The ureter was reinspected.  Retrograde contrast still showed the apple core lesion consistent with a stricture but was now less prominent.  I passed the scope well into the proximal ureter but could not traverse the 360 degree loop of the ureter.  Additional contrast was injected and again no proximal filling defects were visualized.  The ureteroscope was withdrawn 1 last time from the ureter and again there were no residual tumors identified.  The wire was backloaded through the cystoscope and a left 24-6 Swedish double-J ureteral stent was then placed.  The proximal coil was appreciated within the left renal pelvis and the distal coil within the bladder.  There was no string left in place.  Bladder was emptied and the cystoscope was removed.    Overall the patient tolerated the procedure well and there were no complications.  The patient was extubated in the operating room and transferred the PACU in stable condition at the conclusion of the case.    Patient Disposition:  PACU              SIGNATURE: Jet Fernandes MD  DATE: September 3, 2024  TIME: 9:41 AM

## 2024-09-04 ENCOUNTER — TELEPHONE (OUTPATIENT)
Dept: UROLOGY | Facility: AMBULATORY SURGERY CENTER | Age: 89
End: 2024-09-04

## 2024-09-04 NOTE — TELEPHONE ENCOUNTER
Post Op Note    Radha Ma is a 89 y.o. female s/p Cystoscopy, left ureteroscopy, left retrograde pyelogram, left ureteral washing, laser ablation of left ureteral tumor, biopsy of left ureteral tumor, insertion of left double-J ureteral stent performed 9/3/2024.  Radha Ma is a patient of Dr. Fernandes and is seen at the [place] office.     How would you rate your pain on a scale from 1 to 10, 10 being the worst pain ever? 5  Have you had a fever? No  Have your bowel movements been regular? No, taking stool softeners  Do you have any difficulty urinating? No  Do you have any other questions or concerns that I can address at this time? None at this time.    Patient is doing well at this time. She is having some burning with urination. Informed this is normal and reviewed post TURBT/stent symptoms. Supportive measures reviewed as well. Confirmed follow up appt and informed to call sooner with any questions or concerns.

## 2024-09-05 LAB — BACTERIA UR CULT: NORMAL

## 2024-09-09 PROCEDURE — 88305 TISSUE EXAM BY PATHOLOGIST: CPT | Performed by: PATHOLOGY

## 2024-09-09 PROCEDURE — 88112 CYTOPATH CELL ENHANCE TECH: CPT | Performed by: PATHOLOGY

## 2024-09-13 NOTE — ASSESSMENT & PLAN NOTE
CT scan on 5/9/2024 stone study without contrast demonstrated no hydronephrosis or renal calculi present  CT scan on 5/20/2024 demonstrated left hydronephrosis and hydroureter and urothelial enhancement; differentials included urothelial carcinoma, stricture/scar tissue, recent kidney stone passage, infection  CT renal protocol from 6/14/2024 demonstrating enhancing soft tissue abnormality involving the distal left ureter, highly suspicious for urothelial carcinoma, mild to moderate left hydronephrosis  Prior imaging dated back to 2019 which showed no renal calculi or hydronephrosis  Urine cytology on 5/21/2024 negative for high-grade urothelial carcinoma  Followed by Dr. Fernandes, last cystoscopy was in August 2020 in which cystoscopy was normal and there was no evidence of recurrence of disease, no follow up since this time  Urine culture from 5/20/2024 negative  BMP on 5/21/2024: Creat 0.85, BUN 22, GFR 61  Discussed case with Dr. Fernandes and will plan to proceed to OR for cystoscopy, ureteroscopy on the left side     I had a lengthy discussion today in the office with Radha regarding her most recent events and hospital admission.  She believes that she passed a kidney stone due to the amount of pain and discomfort she was having but reports not seeing any visible kidney stone passage.  I did discuss with her at length that there was no kidney stones noted on any of her imaging.  I discussed with her that her CT scan is highly suspicious for recurrence of urothelial carcinoma and I would recommend having Dr. Fernandes directly visualize the area with ureteroscopy.  She is understanding of this and all questions and concerns were answered at today's office appointment.       We have not received on MANUELA, spoke to patient mom to informed that we would need to have the manuela signed by asia before we speak with the school nurse.   Mom verbalized understanding she stated that she will get in contact with school to have formed signed.

## 2024-09-25 ENCOUNTER — OFFICE VISIT (OUTPATIENT)
Dept: UROLOGY | Facility: AMBULATORY SURGERY CENTER | Age: 89
End: 2024-09-25
Payer: MEDICARE

## 2024-09-25 VITALS
HEIGHT: 61 IN | DIASTOLIC BLOOD PRESSURE: 80 MMHG | SYSTOLIC BLOOD PRESSURE: 150 MMHG | BODY MASS INDEX: 23.03 KG/M2 | WEIGHT: 122 LBS | HEART RATE: 80 BPM | OXYGEN SATURATION: 95 %

## 2024-09-25 DIAGNOSIS — C66.2: Primary | ICD-10-CM

## 2024-09-25 PROCEDURE — 99214 OFFICE O/P EST MOD 30 MIN: CPT | Performed by: UROLOGY

## 2024-09-25 RX ORDER — ONDANSETRON 4 MG/1
1 TABLET, FILM COATED ORAL EVERY 8 HOURS PRN
COMMUNITY

## 2024-09-25 RX ORDER — SODIUM CHLORIDE 9 MG/ML
125 INJECTION, SOLUTION INTRAVENOUS CONTINUOUS
OUTPATIENT
Start: 2024-09-25

## 2024-09-25 RX ORDER — CEFAZOLIN SODIUM 1 G/50ML
1000 SOLUTION INTRAVENOUS ONCE
OUTPATIENT
Start: 2024-09-25 | End: 2024-09-25

## 2024-09-25 NOTE — ASSESSMENT & PLAN NOTE
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  Fortunately it is low-grade and appeared superficial intraoperatively.  I also felt that I removed all of the lesion with a basket and laser.  I am also concerned that there could be an underlying stricture of the ureter that persists.  I recommend leaving the stent in place and returning to the operating room for second look ureteroscopy in the next 3 to 4 months.  If there are any lesions that recur within the distal left ureter I would again perform laser ablation.  We also discussed the possibility of either a distal ureterectomy or a nephro ureterectomy.  At almost 89 years of age the patient adamantly refuses any type of major surgical extirpation which I feel is reasonable.  The patient is amenable with this plan and will discuss with her son

## 2024-09-25 NOTE — PROGRESS NOTES
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  Fortunately it is low-grade and appeared superficial intraoperatively.  I also felt that I removed all of the lesion with a basket and laser.  I am also concerned that there could be an underlying stricture of the ureter that persists.  I recommend leaving the stent in place and returning to the operating room for second look ureteroscopy in the next 3 to 4 months.  If there are any lesions that recur within the distal left ureter I would again perform laser ablation.  We also discussed the possibility of either a distal ureterectomy or a nephro ureterectomy.  At almost 89 years of age the patient adamantly refuses any type of major surgical extirpation which I feel is reasonable.  The patient is amenable with this plan and will discuss with her son       History of Present Illness     Radha Ma is a 89 y.o. female who presents in follow-up after recent ureteroscopy.  She has a distant history of bladder cancer.  In addition she has a history of a left ureteral carcinoma.  She underwent laser ablation in .  Her bladder tumor was in .  I last performed office cystoscopy in  and more recently she had a CT scan showing a suspicious lesion in the left upper tract highly concerning for recurrent urothelial carcinoma.  She was taken to the OR on September 3, 2024.  Upon operative intervention I found a distal left ureteral stricture as well as a small recurrence of ureteral tumor both proximal and distal to the stricture.  Both tumors  "were small enough to be removed with a basket and then be fulgurated with holmium laser ablation.  A left ureteral stent was placed without a string.  She returns in follow-up today.  Her son is present with her in the office.  Pathology reveals low-grade superficial papillary urothelial carcinoma of the left upper tract.      Review of Systems          Objective     /80 (BP Location: Left arm, Patient Position: Sitting, Cuff Size: Adult)   Pulse 80   Ht 5' 1\" (1.549 m) Comment: per patient  Wt 55.3 kg (122 lb)   SpO2 95%   BMI 23.05 kg/m²   Physical Exam  Results  No results found for: \"PSA\"  Lab Results   Component Value Date    CALCIUM 9.7 08/21/2024    K 4.0 08/21/2024    CO2 28 08/21/2024     08/21/2024    BUN 22 08/21/2024    CREATININE 0.89 08/21/2024     Lab Results   Component Value Date    WBC 5.13 08/21/2024    HGB 11.4 (L) 08/21/2024    HCT 34.4 (L) 08/21/2024    MCV 99 (H) 08/21/2024     (L) 08/21/2024       Office Urine Dip  No results found for this or any previous visit (from the past 1 hour(s)).]    Administrative Statements         "

## 2024-10-01 ENCOUNTER — IMMUNIZATIONS (OUTPATIENT)
Dept: FAMILY MEDICINE CLINIC | Facility: CLINIC | Age: 89
End: 2024-10-01
Payer: MEDICARE

## 2024-10-01 DIAGNOSIS — Z23 ENCOUNTER FOR IMMUNIZATION: Primary | ICD-10-CM

## 2024-10-01 PROCEDURE — 90662 IIV NO PRSV INCREASED AG IM: CPT

## 2024-10-01 PROCEDURE — 96372 THER/PROPH/DIAG INJ SC/IM: CPT

## 2024-10-01 PROCEDURE — G0008 ADMIN INFLUENZA VIRUS VAC: HCPCS

## 2024-10-02 ENCOUNTER — TELEPHONE (OUTPATIENT)
Dept: UROLOGY | Facility: AMBULATORY SURGERY CENTER | Age: 89
End: 2024-10-02

## 2024-10-02 NOTE — TELEPHONE ENCOUNTER
LVM to ask daughter in law questions for the surgery intake paper.  CB# was given and my name to direct call.  I also let her know that I would try to call later.

## 2024-10-09 ENCOUNTER — TELEPHONE (OUTPATIENT)
Dept: UROLOGY | Facility: AMBULATORY SURGERY CENTER | Age: 89
End: 2024-10-09

## 2024-10-09 ENCOUNTER — PREP FOR PROCEDURE (OUTPATIENT)
Dept: UROLOGY | Facility: AMBULATORY SURGERY CENTER | Age: 89
End: 2024-10-09

## 2024-10-09 DIAGNOSIS — R39.89 SUSPECTED UTI: ICD-10-CM

## 2024-10-09 DIAGNOSIS — Z01.812 PRE-PROCEDURE LAB EXAM: ICD-10-CM

## 2024-10-09 DIAGNOSIS — Z01.818 PREOP EXAMINATION: ICD-10-CM

## 2024-10-09 DIAGNOSIS — C66.2: Primary | ICD-10-CM

## 2024-10-09 NOTE — TELEPHONE ENCOUNTER
Spoke with patient and confirmed surgery date of: 1/7/2024  Type of surgery: Cysto/ L. RGP/ L. URS w/ ablation of ureteral tumor/ L. Stent exchange  Operating physician: Dr. Fernandes  Location of surgery: [facility]    Verbally went over prep with patient on: 10/9/2024  NPO  Bowel prep? No  Hospital calls afternoon prior with arrival time -Calls Friday afternoon for Monday surgeries  Patient needs ride to and from surgery outpatient  Pre-op testing to be done 2 weeks prior to surgery  CBC, CMP and Urine Culture  Blood thinners:   Aspirin  Clearances needed: Medical    Mailed/emailed to patient on: 10/10/2024  Copy of packet scanned into Media on: 10/10/2024  Labs in packet  Soap / Bowel prep in packet  Pre-op & Post-op in packet  Dates of H&P and post-op if needed    Consent: in Media   If needed:    Medical/Cardiac Clearance: Medical  Appt with: Dr. Sr  Appt date and time: 12/16/2024 at 12:00 PM  Date clearance form faxed:  Best fax number:  Pt wanted to schedule the medical clearance appt and will be scheduled between 12/17- 1/2    Clearance note will be updated in EPIC at the time of appt.    Medication Suspension of: Aspirin  Ordering provider: Dr. Sparks  Faxed Medication Suspension form on:  Best fax number:  Sent message in EPIC

## 2024-10-14 DIAGNOSIS — I10 ESSENTIAL HYPERTENSION: ICD-10-CM

## 2024-10-15 RX ORDER — LOSARTAN POTASSIUM 100 MG/1
TABLET ORAL
Qty: 90 TABLET | Refills: 1 | Status: SHIPPED | OUTPATIENT
Start: 2024-10-15

## 2024-10-29 ENCOUNTER — HOSPITAL ENCOUNTER (OUTPATIENT)
Dept: RADIOLOGY | Facility: MEDICAL CENTER | Age: 89
Discharge: HOME | End: 2024-10-29
Payer: MEDICARE

## 2024-10-29 DIAGNOSIS — M80.00XD AGE-RELATED OSTEOPOROSIS WITH CURRENT PATHOLOGICAL FRACTURE WITH ROUTINE HEALING, SUBSEQUENT ENCOUNTER: ICD-10-CM

## 2024-10-29 PROCEDURE — 77080 DXA BONE DENSITY AXIAL: CPT

## 2024-10-30 ENCOUNTER — NURSE TRIAGE (OUTPATIENT)
Age: 89
End: 2024-10-30

## 2024-10-30 DIAGNOSIS — C66.2: Primary | ICD-10-CM

## 2024-10-30 RX ORDER — TAMSULOSIN HYDROCHLORIDE 0.4 MG/1
0.4 CAPSULE ORAL
Qty: 30 CAPSULE | Refills: 1 | Status: SHIPPED | OUTPATIENT
Start: 2024-10-30

## 2024-10-30 RX ORDER — OXYBUTYNIN CHLORIDE 5 MG/1
5 TABLET ORAL 3 TIMES DAILY
Qty: 60 TABLET | Refills: 1 | Status: SHIPPED | OUTPATIENT
Start: 2024-10-30

## 2024-10-30 NOTE — TELEPHONE ENCOUNTER
Patient of Dr. Fernandes, last seen 9/25/24    Patient calling with complaints of left flank pain for the last 3 weeks, does have a stent in left ureter. Did discuss that with a stent some discomfort is expected.  Pain level 5 out of 10    Takes tylenol, she states sometimes it is effective and sometimes it isn't, when she rests in the evening the pain gets 95% better.     Discussed utilizing heat pad and encouraged to increase water, decreasing bladder irritants, rest when needed, continue with Tylenol and call back if any new or worsening symptoms.     ED precautions reviewed    CB #[phone]  Please advise if any other recommendations  Answer Assessment - Initial Assessment Questions  1. When did this pain start?   3 weeks  2. Where is your pain? Is your pain on the left, right, or both sides and does it radiate anywhere?  left flank/left mid back  3. Are you able to rate your pain on a scale of 1-10 with 10 being the worst? Would you say it is mild, moderate, or severe?   Pain level 5 out of 10  4. Do you have other symptoms like nausea, vomiting or a fever of 101 or higher?   99.0 last night  5. Do you have any urinary symptoms such as inability to urinate, urgency, frequency, pain or burning with urination?   denies  6. Do you have a history of kidney stones or UTI's?   yes  7. Have you had a recent urologic procedure or surgery? If yes, what procedure or surgery?   9/3/24 CYSTOSCOPY, LEFT URETEROSCOPY, RETROGRADE PYELOGRAM, LEFT URETERAL STENT PLACEMENT, LEFT URETERAL WASHING, LEFT URETERAL BIOPSY, LASER ABLATION OF LEFT URETERAL TUMOR (Left: Bladder)       CYSTOSCOPY RETROGRADE PYELOGRAM WITH INSERTION STENT URETERAL (Left: Bladder)       INSERTION STENT URETERAL (Left: Bladder)   9. Have you had recent imaging or urine testing within the last week?   denies    Protocols used: Urology-Flank Pain / Kidney Stones / Hydronephrosis-ADULT-[place]

## 2024-10-30 NOTE — TELEPHONE ENCOUNTER
Please call Radha and let her know that I sent over a couple of medications to help with her stent pain and discomfort.  I sent over oxybutynin and Flomax which she can use as needed for the pain or discomfort.  If she does use the Flomax this should be taken once a day at night biggest side effects are dizziness and lightheadedness when starting the medication.  Side effects of oxybutynin are dry mouth, dry eye, and constipation.

## 2024-10-31 NOTE — TELEPHONE ENCOUNTER
medications to help with her stent pain and discomfort.have been sent to [facility] . oxybutynin and Flomax which she can use as needed for the pain or discomfort.  If she does use the Flomax this should be taken once a day at night biggest side effects are dizziness and lightheadedness when starting the medication.  Side effects of oxybutynin are dry mouth, dry eye, and constipation. Removed  with patient

## 2024-11-06 NOTE — TELEPHONE ENCOUNTER
Medication side effecrt will likely be persistent.  Would stop the Flomax because this is the likely etiology of her dizziness.  Was stopped oxybutynin as this is the likely etiology of her dry mouth

## 2024-11-06 NOTE — TELEPHONE ENCOUNTER
Patient called in stating she is experiencing side effects from oxybutynin and flomax. Reports having dizziness and severe dry mouth to the point where it is hard to swallow at times. She would like to know if side effects will go away or if she should stop the medications. Please advise.

## 2024-11-07 NOTE — TELEPHONE ENCOUNTER
Called Radha back and notified her the Flomax is what is probably making her dizzy and the oxybutynin is probably giving her dry mouth .  She states she will stop oxybutynin first as that is what is bothering her and if she still had dizziness with stop Flomax -

## 2024-11-08 NOTE — PROGRESS NOTES
11/08/24      Facundo Singh  [address] [facility] [address]      Facundo Singh is a patient under my care. 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 This patient frequently wakes up with a glucose in normal range, but with light activity, his glucose will spontaneously decrease to the lower 50s.     Facundo's erratic, unpredictable, and difficult behaviors pose a safety issue to his parents. During the hypoglycemic episodes, his behaviors regress to similar of a young child.     Due to his intellectual disability and the spontaneous hypoglycemia, it is medically necessary for Facundo to have a continuous glucometer. Please reach out to our office with any questions.           Sincerely,         MD [facility]  [address]  [place] [address]  Phone: [phone]  Fax: [phone]                 Please call the patient regarding her abnormal result  Free T4 is normal   Anemia has improved      Sent to my chart

## 2024-12-10 ENCOUNTER — TELEPHONE (OUTPATIENT)
Age: 89
End: 2024-12-10

## 2024-12-10 NOTE — TELEPHONE ENCOUNTER
Patient called in to schedule an rheum appt / I advise the patient she will need a referral from PCP

## 2024-12-11 ENCOUNTER — OFFICE VISIT (OUTPATIENT)
Dept: UROLOGY | Facility: AMBULATORY SURGERY CENTER | Age: 89
End: 2024-12-11
Payer: MEDICARE

## 2024-12-11 VITALS
HEART RATE: 81 BPM | BODY MASS INDEX: 23.03 KG/M2 | HEIGHT: 61 IN | DIASTOLIC BLOOD PRESSURE: 70 MMHG | WEIGHT: 122 LBS | SYSTOLIC BLOOD PRESSURE: 128 MMHG | OXYGEN SATURATION: 99 %

## 2024-12-11 DIAGNOSIS — C66.2: Primary | ICD-10-CM

## 2024-12-11 PROCEDURE — 99213 OFFICE O/P EST LOW 20 MIN: CPT

## 2024-12-11 RX ORDER — HYDROCODONE BITARTRATE AND ACETAMINOPHEN 5; 325 MG/1; MG/1
TABLET ORAL
COMMUNITY

## 2024-12-11 RX ORDER — METHOTREXATE 2.5 MG/1
TABLET ORAL
COMMUNITY
Start: 2024-11-25

## 2024-12-11 NOTE — ASSESSMENT & PLAN NOTE
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 Fernandes on 9/3/2024 and had removal of left ureteral lesion with basket and laser, he was concerned that there could be an ongoing stricture so a left ureteral stent was left in place  Patient is planning to report to the operating room on 1/7/2024 for further assessment of the area of concern in the distal left ureter, she did have a discussion with Dr. Fernandes that if there were any lesions that were recurring he would once again perform laser ablation  Dr. Fernandes also had an extensive discussion with the patient that she may possibly undergo a ureterectomy for a nephro ureterectomy but patient wishes to refuse any major surgical intervention  Urine culture to be obtained approximately 2 weeks prior to procedure, monitor urine culture results closely and treat accordingly  Patient does endorse stent pain, she is taking oxybutynin as needed, she does state that the oxybutynin does cause her significant dry mouth but it does give her relief with pain and discomfort, Flomax did cause her significant dizziness so she discontinued that medication, she is continuing to take Tylenol for pain and discomfort as well  Surgical consent obtained and signed today in the office

## 2024-12-11 NOTE — PROGRESS NOTES
Pre-op visit  12/11/2024      No chief complaint on file.    Assessment and Plan     89 y.o. female managed by Dr. 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 Fernandes on 9/3/2024 and had removal of left ureteral lesion with basket and laser, he was concerned that there could be an ongoing stricture so a left ureteral stent was left in place  Patient is planning to report to the operating room on 1/7/2024 for further assessment of the area of concern in the distal left ureter, she did have a discussion with Dr. Fernandes that if there were any lesions that were recurring he would once again perform laser ablation  Dr. Fernandes also had an extensive discussion with the patient that she may possibly undergo a ureterectomy for a nephro ureterectomy but patient wishes to refuse any major surgical intervention  Urine culture to be obtained approximately 2 weeks prior to procedure, monitor urine culture results closely and treat accordingly  Patient does endorse stent pain, she is taking oxybutynin as needed, she does state that the oxybutynin does cause her significant dry mouth but it does give her relief with pain and discomfort, Flomax did cause her significant dizziness so she discontinued that medication, she is continuing to take Tylenol for pain and discomfort as well  Surgical consent obtained and signed today in the office      History and physical was performed for the patients upcoming cystoscopy, left ureteroscopy with ablation of left ureteral tumor, retrograde pyelogram and left ureteral stent insertion scheduled for 1/7/2025 with Dr. Fernandes. All questions and concerns regarding surgery and perioperative expectations have been addressed and answered.  No overall changes in their health since last visit, denies any prior complications with anesthesia.  Will  proceed with surgery as planned.      History of Present Illness  Radha Ma is a 89 y.o. female here for history and physical prior to their upcoming surgery.    Urologic history as below:    Today in the office she states she is overall doing okay.  She does state that she does have some stent pain and discomfort.  The oxybutynin does cause her significant dry mouth and throat so she only takes it as needed and usually only 1 pill.  She recently discontinued the Flomax due to increased dizziness.  She denies any other urinary/urological complaints.    HPI from 9/25/2024:  She has a distant history of bladder cancer. In addition she has a history of a left ureteral carcinoma. She underwent laser ablation in 2011. Her bladder tumor was in 2015. Her last in office cystoscopy was in 2020 and more recently she had a CT scan showing a suspicious lesion in the left upper tract highly concerning for recurrent urothelial carcinoma. She was taken to the OR on September 3, 2024. Upon operative intervention a distal left ureteral stricture  was found as well as a small recurrence of ureteral tumor both proximal and distal to the stricture. Both tumors were small enough to be removed with a basket and then be fulgurated with holmium laser ablation. A left ureteral stent was placed without a string. Pathology reveals low-grade superficial papillary urothelial carcinoma of the left upper tract.       Review of Systems   Constitutional:  Negative for chills and fever.   Respiratory: Negative.  Negative for cough and shortness of breath.    Cardiovascular:  Negative for chest pain and leg swelling.   Genitourinary:  Negative for dyspareunia, dysuria, flank pain, frequency, hematuria, menstrual problem, pelvic pain, urgency, vaginal bleeding, vaginal discharge and vaginal pain.   Skin:  Negative for rash.   Neurological: Negative.    Hematological:  Negative for adenopathy. Does not bruise/bleed easily.       Vitals  Vitals:     "12/11/24 1340   BP: 128/70   BP Location: Left arm   Patient Position: Sitting   Cuff Size: Adult   Pulse: 81   SpO2: 99%   Weight: 55.3 kg (122 lb)   Height: 5' 1\" (1.549 m)       Physical Exam  Vitals reviewed.   Constitutional:       Appearance: Normal appearance.   HENT:      Head: Normocephalic and atraumatic.   Eyes:      Pupils: Pupils are equal, round, and reactive to light.   Cardiovascular:      Rate and Rhythm: Normal rate.   Pulmonary:      Effort: Pulmonary effort is normal.   Abdominal:      General: Abdomen is flat.      Palpations: Abdomen is soft.   Musculoskeletal:      Cervical back: Normal range of motion.   Skin:     General: Skin is warm and dry.   Neurological:      General: No focal deficit present.      Mental Status: She is alert and oriented to person, place, and time. Mental status is at baseline.   Psychiatric:         Mood and Affect: Mood normal.         Behavior: Behavior normal.         Thought Content: Thought content normal.         Judgment: Judgment normal.             Past Medical History  Past Medical History:   Diagnosis Date    Bladder cancer (HCC)     had BCG treatments    Bladder cancer (HCC)     Closed displaced fracture of left trapezium bone 05/18/2017    Coronary artery disease     S/P stent placement x 2 in 2011    GERD (gastroesophageal reflux disease)     Hepatitis     got this from food back in 1970s, has not had a problem since    History of leukemia     in remission    History of stomach ulcers 1970    History of transfusion 1970    Hyperlipidemia     Hypertension     Hyperthyroidism 04/10/2018    Irritable bowel syndrome     Kidney stone     Kidney stones     Persistent cough for 3 weeks or longer 03/10/2020    Pneumonia     Pt had in 2003 and 2004    PONV (postoperative nausea and vomiting)     Pruritic rash 09/03/2019    Pulmonary emboli (HCC) 1970s    Raynaud disease     Shingles     Sleep apnea     Thrombocytopenia (HCC) 02/20/2012       Past Social " History  Past Surgical History:   Procedure Laterality Date    CATARACT EXTRACTION      COLONOSCOPY      CORONARY ANGIOPLASTY WITH STENT PLACEMENT      stent x 2    CYSTOSCOPY      diagnostic - onset 4/4/17    EGD      EYE SURGERY      FL RETROGRADE PYELOGRAM  9/3/2024    FRACTURE SURGERY      HERNIA REPAIR      HYSTERECTOMY      LEG SURGERY      OOPHORECTOMY      WY CYSTO BLADDER W/URETERAL CATHETERIZATION Left 9/3/2024    Procedure: CYSTOSCOPY RETROGRADE PYELOGRAM WITH INSERTION STENT URETERAL;  Surgeon: Jet Fernandes MD;  Location: [facility];  Service: Urology    WY CYSTO W/INSERT URETERAL STENT Left 9/3/2024    Procedure: INSERTION STENT URETERAL;  Surgeon: Jte Fernandes MD;  Location: [facility];  Service: Urology    WY CYSTO/PYELOSCOPY BX&/FULGURATION PELIVC LESION Left 9/3/2024    Procedure: CYSTOSCOPY, LEFT URETEROSCOPY, RETROGRADE PYELOGRAM, LEFT URETERAL STENT PLACEMENT, LEFT URETERAL WASHING, LEFT URETERAL BIOPSY, LASER ABLATION OF LEFT URETERAL TUMOR;  Surgeon: Jet Fernandes MD;  Location: [facility];  Service: Urology    WY NDSC WRST SURG W/RLS TRANSVRS CARPL LIGM Right 10/12/2021    Procedure: Right endoscopic carpal tunnel release;  Surgeon: Ghanshyam Rosado MD;  Location: [facility];  Service: Orthopedics    WY OPTX FEM SHFT FX W/INSJ IMED IMPLT W/WO SCREW Left 4/8/2018    Procedure: INSERTION NAIL IM FEMUR ANTEGRADE (TROCHANTERIC);  Surgeon: Lucrecia Boyle MD;  Location: [facility];  Service: Orthopedics    WY REPAIR FIRST ABDOMINAL WALL HERNIA N/A 5/12/2021    Procedure: REPAIR HERNIA VENTRAL;  Surgeon: Rui Pickett MD;  Location: [facility];  Service: General    TONSILLECTOMY         Past Family History  Family History   Problem Relation Age of Onset    Arthritis Mother     Osteoporosis Mother     Heart disease Father     Hypertension Father     Hypertension Brother     Prostate cancer Brother     Breast cancer Daughter 50    Prostate cancer Son        Past Social  history  Social History     Socioeconomic History    Marital status:      Spouse name: Not on file    Number of children: Not on file    Years of education: Not on file    Highest education level: Not on file   Occupational History    Not on file   Tobacco Use    Smoking status: Never    Smokeless tobacco: Never   Vaping Use    Vaping status: Never Used   Substance and Sexual Activity    Alcohol use: No    Drug use: No    Sexual activity: Not on file   Other Topics Concern    Not on file   Social History Narrative    Advance directives declined by parents    Always uses seat belt     Social Drivers of Health     Financial Resource Strain: Low Risk  (1/29/2024)    Overall Financial Resource Strain (CARDIA)     Difficulty of Paying Living Expenses: Not hard at all   Food Insecurity: Not on file   Transportation Needs: No Transportation Needs (1/29/2024)    PRAPARE - Transportation     Lack of Transportation (Medical): No     Lack of Transportation (Non-Medical): No   Physical Activity: Not on file   Stress: Not on file   Social Connections: Not on file   Intimate Partner Violence: Not on file   Housing Stability: Not on file       Current Medications  Current Outpatient Medications   Medication Sig Dispense Refill    acetaminophen (Tylenol 8 Hour) 650 mg CR tablet Take 1 tablet (650 mg total) by mouth daily at bedtime      amLODIPine (NORVASC) 5 mg tablet TAKE 1 TABLET BY MOUTH TWICE  DAILY 180 tablet 1    aspirin 81 MG tablet Take 81 mg by mouth daily Per pt stopped 8/26 per MD instructions      atorvastatin (LIPITOR) 40 mg tablet TAKE 1 TABLET BY MOUTH DAILY  AFTER DINNER 90 tablet 3    bumetanide (BUMEX) 1 mg tablet TAKE 1/2 TABLET BY MOUTH DAILY  (MAY TAKE 1 FULL TABLET IF  NEEDED) 90 tablet 1    carvedilol (COREG) 12.5 mg tablet TAKE ONE-HALF TABLET BY MOUTH  TWICE DAILY WITH MEALS 90 tablet 1    Cholecalciferol 2000 units TABS Take 2,000 Units by mouth daily Per pt stopped 8/26 per MD instructions       DULoxetine (CYMBALTA) 30 mg delayed release capsule TAKE 1 CAPSULE BY MOUTH DAILY 90 capsule 1    fexofenadine (ALLEGRA) 180 MG tablet Take 180 mg by mouth daily as needed       folic acid (FOLVITE) 1 mg tablet Take 1 mg by mouth daily      HYDROcodone-acetaminophen (NORCO) 5-325 mg per tablet       losartan (COZAAR) 100 MG tablet TAKE 1 TABLET BY MOUTH DAILY 90 tablet 1    methotrexate 10 MG tablet Take 1 tablet (10 mg total) by mouth once a week Do not start before May 28, 2024.      methotrexate 2.5 MG tablet       MULTIPLE VITAMIN PO Take by mouth daily Per pt stopped 8/26 per MD instructions      omeprazole (PriLOSEC) 40 MG capsule Take 1 capsule (40 mg total) by mouth daily 90 capsule 3    ondansetron (ZOFRAN) 4 mg tablet Take 1 tablet by mouth every 8 (eight) hours as needed      oxybutynin (DITROPAN) 5 mg tablet Take 1 tablet (5 mg total) by mouth 3 (three) times a day 60 tablet 1    tamsulosin (FLOMAX) 0.4 mg Take 1 capsule (0.4 mg total) by mouth daily with dinner 30 capsule 1     No current facility-administered medications for this visit.       Allergies  Allergies   Allergen Reactions    Lactose - Food Allergy GI Intolerance         Past Medical History, Social History, Family History, medications and allergies were reviewed.

## 2024-12-16 ENCOUNTER — OFFICE VISIT (OUTPATIENT)
Dept: FAMILY MEDICINE CLINIC | Facility: CLINIC | Age: 89
End: 2024-12-16
Payer: MEDICARE

## 2024-12-16 VITALS
WEIGHT: 123.4 LBS | DIASTOLIC BLOOD PRESSURE: 64 MMHG | OXYGEN SATURATION: 98 % | BODY MASS INDEX: 23.3 KG/M2 | SYSTOLIC BLOOD PRESSURE: 124 MMHG | RESPIRATION RATE: 17 BRPM | HEART RATE: 80 BPM | HEIGHT: 61 IN | TEMPERATURE: 96.8 F

## 2024-12-16 DIAGNOSIS — I73.00 RAYNAUD'S DISEASE WITHOUT GANGRENE: ICD-10-CM

## 2024-12-16 DIAGNOSIS — Z01.818 PREOP EXAMINATION: ICD-10-CM

## 2024-12-16 DIAGNOSIS — N18.2 CKD (CHRONIC KIDNEY DISEASE) STAGE 2, GFR 60-89 ML/MIN: ICD-10-CM

## 2024-12-16 DIAGNOSIS — I70.1 RAS (RENAL ARTERY STENOSIS) (HCC): ICD-10-CM

## 2024-12-16 DIAGNOSIS — I10 ESSENTIAL HYPERTENSION: ICD-10-CM

## 2024-12-16 DIAGNOSIS — C91.10 CHRONIC LARGE GRANULAR LYMPHOCYTIC LEUKEMIA (HCC): ICD-10-CM

## 2024-12-16 DIAGNOSIS — Z95.820 S/P ANGIOPLASTY WITH STENT: ICD-10-CM

## 2024-12-16 DIAGNOSIS — Z79.620 LONG TERM (CURRENT) USE OF IMMUNOSUPPRESSIVE BIOLOGIC: ICD-10-CM

## 2024-12-16 DIAGNOSIS — F32.A CONTROLLED DEPRESSION: ICD-10-CM

## 2024-12-16 DIAGNOSIS — H69.92 EUSTACHIAN TUBE DYSFUNCTION, LEFT: ICD-10-CM

## 2024-12-16 DIAGNOSIS — I25.10 CORONARY ARTERY DISEASE INVOLVING NATIVE CORONARY ARTERY OF NATIVE HEART WITHOUT ANGINA PECTORIS: ICD-10-CM

## 2024-12-16 DIAGNOSIS — E21.3 HYPERPARATHYROIDISM, UNSPECIFIED (HCC): ICD-10-CM

## 2024-12-16 DIAGNOSIS — D51.9 ANEMIA DUE TO VITAMIN B12 DEFICIENCY, UNSPECIFIED B12 DEFICIENCY TYPE: ICD-10-CM

## 2024-12-16 DIAGNOSIS — C66.2: ICD-10-CM

## 2024-12-16 DIAGNOSIS — C67.9 MALIGNANT NEOPLASM OF URINARY BLADDER, UNSPECIFIED SITE (HCC): ICD-10-CM

## 2024-12-16 DIAGNOSIS — M05.9 SEROPOSITIVE RHEUMATOID ARTHRITIS (HCC): ICD-10-CM

## 2024-12-16 DIAGNOSIS — Z01.818 PREOP GENERAL PHYSICAL EXAM: Primary | ICD-10-CM

## 2024-12-16 DIAGNOSIS — I50.32 CHRONIC DIASTOLIC (CONGESTIVE) HEART FAILURE (HCC): Chronic | ICD-10-CM

## 2024-12-16 PROCEDURE — 99214 OFFICE O/P EST MOD 30 MIN: CPT | Performed by: FAMILY MEDICINE

## 2024-12-16 PROCEDURE — G2211 COMPLEX E/M VISIT ADD ON: HCPCS | Performed by: FAMILY MEDICINE

## 2024-12-16 NOTE — PROGRESS NOTES
PRE-OPERATIVE EVALUATION  FAMILY PRACTICE AT [place]    NAME: Radha Ma  AGE: 89 y.o. 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 Fernandes      History of Present Illness:     Radha Ma is a 89 y.o. female who presents to the office today for a preoperative consultation. Planned anesthesia is general. Patient has a bleeding risk of: no recent abnormal bleeding, no remote history of abnormal bleeding, and use of Ca-channel blockers (see med list). Patient does not have objections to receiving blood products if needed. Current anti-platelet/anti-coagulation medications that the patient is prescribed includes: Aspirin.    will be obtaining preop labs this week/in couple of days    Assessment of Chronic Conditions:            Malignant tumor of ureter, left (HCC)         Malignant neoplasm of urinary bladder, unspecified site (HCC)         TIFFANY (renal artery stenosis) ([facility])  Stable, cpm       Raynaud's disease without gangrene  Controlled, cpm       Seropositive rheumatoid arthritis ([facility])  Stable, cpm       Long term (current) use of immunosuppressive biologic         S/P angioplasty with stent         Coronary artery disease involving native coronary artery of native heart without angina pectoris  Stable, cpm       Chronic diastolic (congestive) heart failure  (HCC)  Stable, compensated, cpm       Hyperparathyroidism, unspecified (HCC)  Stable, cpm       Essential hypertension  Controlled, stable, cpm       CKD (chronic kidney disease) stage 2, GFR 60-89 ml/min  Await preop BMP      . 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HCC)  Has been stable, await preop CBC       Anemia due to vitamin B12 deficiency, unspecified B12 deficiency type  Await preop CBC               Controlled depression  cpm       Eustachian tube dysfunction, left             Assessment of Cardiac Risk:  Denies unstable or severe angina or MI in the last 6 weeks or history of stent placement in the last year   Denies decompensated heart failure (e.g. New onset heart failure, NYHA functional class IV heart failure, or worsening existing heart failure)  Denies significant arrhythmias such as high grade AV block, symptomatic ventricular arrhythmia, newly recognized ventricular tachycardia, supraventricular tachycardia with resting heart rate >100, or symptomatic bradycardia  Denies severe heart valve disease including aortic stenosis or symptomatic mitral stenosis     Exercise Capacity:  Able to walk 4 blocks without symptoms?: Yes  Able to walk 2 flights without symptoms?: Yes    Prior Anesthesia Reactions: No     Personal history of venous thromboembolic disease? No    History of steroid use for >2 weeks within last year? No    STOP-BANG Sleep Apnea Screening Questionnaire:  no witnessed apnea events       Review of Systems:     Review of Systems   Constitutional: Negative.    Respiratory: Negative.     Cardiovascular: Negative.    Gastrointestinal: Negative.    Neurological: Negative.    Hematological: Negative.        Current Problem List:     Patient Active Problem List   Diagnosis    Essential hypertension    Mixed hyperlipidemia    Coronary artery disease without angina pectoris - S/P stent placement x 2 (2011)    Gastroesophageal reflux disease without esophagitis    Chronic diastolic  (congestive) heart failure (HCC)    Age-related osteoporosis with current pathological fracture    Ambulatory dysfunction    Elderly person living alone    Low back pain without sciatica    S/P ORIF (open reduction internal fixation) fracture    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HCC)    Bladder cancer ([facility])    Allergic rhinitis    Anemia due to vitamin B12 deficiency    Biliary dyskinesia    Cholelithiasis    Chronic right shoulder pain    Degenerative joint disease    Controlled depression    Deviated nasal septum    Heart valve disorder    IBS (irritable bowel syndrome)    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[facility])    Mild vitamin D deficiency    Mixed hearing loss, unilateral    Pancreatic cyst    Raynaud's disease without gangrene    S/P angioplasty with stent    Urge incontinence of urine    Platelets decreased ([facility])    History of pulmonary embolus (PE)    Age-related cataract of both eyes    Hyperparathyroidism, unspecified ([facility])    Fear of falling    Balance problems    Difficulty navigating stairs    Lower abdominal pain, unspecified    History of falling    Bilateral leg edema    New onset of headaches    Carotid artery stenosis, asymptomatic, bilateral    Ventral hernia    Cerebral aneurysm without rupture    Intermittent pain and swelling of hand    Right hand paresthesia    BMI 23.0-23.9, adult    Carpal tunnel syndrome of right wrist    CKD (chronic kidney disease) stage 2, GFR 60-89 ml/min    TIFFANY (renal artery stenosis) ([facility])    History of pneumonia    Fatigue    Swelling of left lower extremity    Rib pain on left side    Seropositive rheumatoid arthritis ([facility])    Long term (current) use of immunosuppressive biologic    Age-related osteoporosis without current pathological fracture    Hydronephrosis, left    Long term current use of diuretic    Malignant tumor of ureter, left ([facility])       Allergies:     Allergies   Allergen Reactions    Lactose - Food Allergy GI Intolerance       Physical Exam:  "    Vitals:    12/16/24 1211   BP: 124/64   BP Location: Left arm   Patient Position: Sitting   Cuff Size: Standard   Pulse: 80   Resp: 17   Temp: (!) 96.8 °F (36 °C)   TempSrc: Tympanic   SpO2: 98%   Weight: 56 kg (123 lb 6.4 oz)   Height: 5' 1\" (1.549 m)           Current Outpatient Medications:     acetaminophen (Tylenol 8 Hour) 650 mg CR tablet, Take 1 tablet (650 mg total) by mouth daily at bedtime, Disp: , Rfl:     amLODIPine (NORVASC) 5 mg tablet, TAKE 1 TABLET BY MOUTH TWICE  DAILY, Disp: 180 tablet, Rfl: 1    aspirin 81 MG tablet, Take 81 mg by mouth daily Per pt stopped 8/26 per MD instructions, Disp: , Rfl:     atorvastatin (LIPITOR) 40 mg tablet, TAKE 1 TABLET BY MOUTH DAILY  AFTER DINNER, Disp: 90 tablet, Rfl: 3    bumetanide (BUMEX) 1 mg tablet, TAKE 1/2 TABLET BY MOUTH DAILY  (MAY TAKE 1 FULL TABLET IF  NEEDED), Disp: 90 tablet, Rfl: 1    carvedilol (COREG) 12.5 mg tablet, TAKE ONE-HALF TABLET BY MOUTH  TWICE DAILY WITH MEALS, Disp: 90 tablet, Rfl: 1    Cholecalciferol 2000 units TABS, Take 2,000 Units by mouth daily Per pt stopped 8/26 per MD instructions, Disp: , Rfl:     DULoxetine (CYMBALTA) 30 mg delayed release capsule, TAKE 1 CAPSULE BY MOUTH DAILY, Disp: 90 capsule, Rfl: 1    fexofenadine (ALLEGRA) 180 MG tablet, Take 180 mg by mouth daily as needed , Disp: , Rfl:     folic acid (FOLVITE) 1 mg tablet, Take 1 mg by mouth daily, Disp: , Rfl:     HYDROcodone-acetaminophen (NORCO) 5-325 mg per tablet, , Disp: , Rfl:     losartan (COZAAR) 100 MG tablet, TAKE 1 TABLET BY MOUTH DAILY, Disp: 90 tablet, Rfl: 1    methotrexate 10 MG tablet, Take 1 tablet (10 mg total) by mouth once a week Do not start before May 28, 2024., Disp: , Rfl:     methotrexate 2.5 MG tablet, , Disp: , Rfl:     MULTIPLE VITAMIN PO, Take by mouth daily Per pt stopped 8/26 per MD instructions, Disp: , Rfl:     omeprazole (PriLOSEC) 40 MG capsule, Take 1 capsule (40 mg total) by mouth daily, Disp: 90 capsule, Rfl: 3    ondansetron " (ZOFRAN) 4 mg tablet, Take 1 tablet by mouth every 8 (eight) hours as needed, Disp: , Rfl:     oxybutynin (DITROPAN) 5 mg tablet, Take 1 tablet (5 mg total) by mouth 3 (three) times a day, Disp: 60 tablet, Rfl: 1    tamsulosin (FLOMAX) 0.4 mg, Take 1 capsule (0.4 mg total) by mouth daily with dinner, Disp: 30 capsule, Rfl: 1    Past Medical History:       Past Medical History:   Diagnosis Date    Bladder cancer (HCC)     had BCG treatments    Bladder cancer (HCC)     Closed displaced fracture of left trapezium bone 05/18/2017    Coronary artery disease     S/P stent placement x 2 in 2011    GERD (gastroesophageal reflux disease)     Hepatitis     got this from food back in 1970s, has not had a problem since    History of leukemia     in remission    History of stomach ulcers 1970    History of transfusion 1970    Hyperlipidemia     Hypertension     Hyperthyroidism 04/10/2018    Irritable bowel syndrome     Kidney stone     Kidney stones     Persistent cough for 3 weeks or longer 03/10/2020    Pneumonia     Pt had in 2003 and 2004    PONV (postoperative nausea and vomiting)     Pruritic rash 09/03/2019    Pulmonary emboli (HCC) 1970s    Raynaud disease     Shingles     Sleep apnea     Thrombocytopenia (HCC) 02/20/2012        Past Surgical History:   Procedure Laterality Date    CATARACT EXTRACTION      COLONOSCOPY      CORONARY ANGIOPLASTY WITH STENT PLACEMENT      stent x 2    CYSTOSCOPY      diagnostic - onset 4/4/17    EGD      EYE SURGERY      FL RETROGRADE PYELOGRAM  9/3/2024    FRACTURE SURGERY      HERNIA REPAIR      HYSTERECTOMY      LEG SURGERY      OOPHORECTOMY      DE CYSTO BLADDER W/URETERAL CATHETERIZATION Left 9/3/2024    Procedure: CYSTOSCOPY RETROGRADE PYELOGRAM WITH INSERTION STENT URETERAL;  Surgeon: Jet Fernandes MD;  [facility]: [facility];  Service: Urology    DE CYSTO W/INSERT URETERAL STENT Left 9/3/2024    Procedure: INSERTION STENT URETERAL;  Surgeon: Jet Fernandes MD;   [facility];  Service: Urology    DE CYSTO/PYELOSCOPY BX&/FULGURATION PELIVC LESION Left 9/3/2024    Procedure: CYSTOSCOPY, LEFT URETEROSCOPY, RETROGRADE PYELOGRAM, LEFT URETERAL STENT PLACEMENT, LEFT URETERAL WASHING, LEFT URETERAL BIOPSY, LASER ABLATION OF LEFT URETERAL TUMOR;  Surgeon: Jet Fernandes MD;  [facility];  Service: Urology    DE NDSC WRST SURG W/RLS TRANSVRS CARPL LIGM Right 10/12/2021    Procedure: Right endoscopic carpal tunnel release;  Surgeon: Ghanshyam Rosado MD;  [facility];  Service: Orthopedics    DE OPTX FEM SHFT FX W/INSJ IMED IMPLT W/WO SCREW Left 4/8/2018    Procedure: INSERTION NAIL IM FEMUR ANTEGRADE (TROCHANTERIC);  Surgeon: Lucrecia Boyle MD;  [facility];  Service: Orthopedics    DE REPAIR FIRST ABDOMINAL WALL HERNIA N/A 5/12/2021    Procedure: REPAIR HERNIA VENTRAL;  Surgeon: Rui Pickett MD;  Location: [facility];  Service: General    TONSILLECTOMY          Family History   Problem Relation Age of Onset    Arthritis Mother     Osteoporosis Mother     Heart disease Father     Hypertension Father     Hypertension Brother     Prostate cancer Brother     Breast cancer Daughter 50    Prostate cancer Son         Social History     Socioeconomic History    Marital status:      Spouse name: Not on file    Number of children: Not on file    Years of education: Not on file    Highest education level: Not on file   Occupational History    Not on file   Tobacco Use    Smoking status: Never    Smokeless tobacco: Never   Vaping Use    Vaping status: Never Used   Substance and Sexual Activity    Alcohol use: No    Drug use: No    Sexual activity: Not on file   Other Topics Concern    Not on file   Social History Narrative    Advance directives declined by parents    Always uses seat belt     Social Drivers of Health     Financial Resource Strain: Low Risk  (1/29/2024)    Overall Financial Resource Strain (CARDIA)     Difficulty of Paying  Living Expenses: Not hard at all   Food Insecurity: Not on file   Transportation Needs: No Transportation Needs (1/29/2024)    PRAPARE - Transportation     Lack of Transportation (Medical): No     Lack of Transportation (Non-Medical): No   Physical Activity: Not on file   Stress: Not on file   Social Connections: Not on file   Intimate Partner Violence: Not on file   Housing Stability: Not on file        Physical Exam:     Physical Exam  Vitals and nursing note reviewed.   Constitutional:       General: She is not in acute distress.     Appearance: She is well-developed and well-groomed. She is not ill-appearing, toxic-appearing or diaphoretic.      Comments: Appears much younger than stated age   HENT:      Head: Normocephalic and atraumatic.      Right Ear: Tympanic membrane, ear canal and external ear normal.      Left Ear: Ear canal and external ear normal.  No middle ear effusion. Tympanic membrane is retracted. Tympanic membrane is not injected, scarred or erythematous.      Nose: Nose normal.      Mouth/Throat:      Lips: Pink.      Pharynx: Oropharynx is clear. Uvula midline.   Eyes:      General: Lids are normal.      Extraocular Movements: Extraocular movements intact.      Conjunctiva/sclera: Conjunctivae normal.      Pupils: Pupils are equal, round, and reactive to light.   Neck:      Thyroid: No thyroid mass, thyromegaly or thyroid tenderness.      Vascular: No JVD.      Trachea: Trachea and phonation normal.   Cardiovascular:      Rate and Rhythm: Normal rate and regular rhythm.      Pulses: Normal pulses.      Heart sounds: Normal heart sounds.   Pulmonary:      Effort: Pulmonary effort is normal.      Breath sounds: Normal breath sounds and air entry.   Abdominal:      General: Bowel sounds are normal.      Palpations: Abdomen is soft. There is no hepatomegaly, splenomegaly or mass.      Tenderness: There is no abdominal tenderness.   Musculoskeletal:      Cervical back: Neck supple.      Right lower  leg: No edema.      Left lower leg: No edema.   Lymphadenopathy:      Cervical: No cervical adenopathy.   Skin:     General: Skin is warm and dry.      Capillary Refill: Capillary refill takes less than 2 seconds.      Coloration: Skin is not pale.   Neurological:      General: No focal deficit present.      Mental Status: She is alert and oriented to person, place, and time.      Gait: Gait normal.   Psychiatric:         Mood and Affect: Mood normal.         Behavior: Behavior normal. Behavior is cooperative.         Cognition and Memory: Cognition and memory normal.         Judgment: Judgment normal.          Data:     Pre-operative work-up    Laboratory Results: Preop labs to be obtained this week      EKG:   Narrative & Impression  Normal sinus rhythm  Nonspecific T wave abnormality  When compared with ECG of 26-OCT-2023 16:01,  QRS axis Shifted right  Confirmed by Joseph Quiroz ([id number]) on 8/22/2024 5:27:40 AM   Specimen Collected: 08/21/24      Chest x-ray: none needed preop    Previous cardiopulmonary studies within the past year:  Echocardiogram: none  Cardiac Catheterization: none  Stress Test: none  Pulmonary Function Testing: none      Assessment & Recommendations:     1. Preop general physical exam        2. Malignant tumor of ureter, left (HCC)  Ambulatory referral to Family Practice      3. Malignant neoplasm of urinary bladder, unspecified site ([facility])        4. TIFFANY (renal artery stenosis) ([facility])        5. Raynaud's disease without gangrene        6. Seropositive rheumatoid arthritis ([facility])        7. Long term (current) use of immunosuppressive biologic        8. S/P angioplasty with stent        9. Coronary artery disease involving native coronary artery of native heart without angina pectoris        10. Chronic diastolic (congestive) heart failure (HCC)        11. Hyperparathyroidism, unspecified ([facility])        12. Essential hypertension        13. CKD (chronic kidney disease) stage 2, GFR 60-89 ml/min         14. 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HCC)        15. Anemia due to vitamin B12 deficiency, unspecified B12 deficiency type        16. Preop examination  Ambulatory referral to Family Practice      17. Controlled depression        18. Eustachian tube dysfunction, left            Pre-Op Evaluation Assessment  89 y.o. female with planned surgery: as above.    Known risk factors for perioperative complications: Anemia  Coronary disease  Congestive heart failure  Renal dysfunction.      Cardiac Risk Estimation: per the Revised Cardiac Risk Index (Circ. 100:1043, 1999), the patient's risk factors for cardiac complications include history of congestive heart failure, putting her in: RCI RISK CLASS II (1 risk factor, risk of major cardiac compl. appr. 1.3%).    Current medications which may produce withdrawal symptoms if withheld perioperatively: none.    Pre-Op Evaluation Plan  1. Further preoperative workup as follows:   - Complete blood count  - Basic metabolic profile    2. Medication Management/Recommendations:   - Patient has been instructed to avoid herbs or non-directed vitamins the week prior to surgery to ensure no drug interactions with perioperative surgical and anesthetic medications.  - Patient has been instructed to avoid aspirin containing medications or non-steroidal anti-inflammatory drugs for the week preceding surgery.    3. Prophylaxis for cardiac events with perioperative beta-blockers: not indicated.    4. Patient requires further consultation with: None    Clearance  Preop clearance pending preop labs.     Anjelica Sr DO  FAMILY PRACTICE AT [address] [address]  Phone#  [phone]  Fax#  [phone]

## 2024-12-18 ENCOUNTER — APPOINTMENT (OUTPATIENT)
Dept: LAB | Facility: CLINIC | Age: 89
End: 2024-12-18
Payer: MEDICARE

## 2024-12-18 DIAGNOSIS — Z01.818 PREOP EXAMINATION: ICD-10-CM

## 2024-12-18 DIAGNOSIS — R39.89 SUSPECTED UTI: ICD-10-CM

## 2024-12-18 DIAGNOSIS — C66.2: ICD-10-CM

## 2024-12-18 DIAGNOSIS — Z01.812 PRE-PROCEDURE LAB EXAM: ICD-10-CM

## 2024-12-18 LAB
ALBUMIN SERPL BCG-MCNC: 4.3 G/DL (ref 3.5–5)
ALP SERPL-CCNC: 61 U/L (ref 34–104)
ALT SERPL W P-5'-P-CCNC: 19 U/L (ref 7–52)
ANION GAP SERPL CALCULATED.3IONS-SCNC: 7 MMOL/L (ref 4–13)
AST SERPL W P-5'-P-CCNC: 21 U/L (ref 13–39)
BASOPHILS # BLD AUTO: 0.05 THOUSANDS/ÂΜL (ref 0–0.1)
BASOPHILS NFR BLD AUTO: 1 % (ref 0–1)
BILIRUB SERPL-MCNC: 0.65 MG/DL (ref 0.2–1)
BUN SERPL-MCNC: 21 MG/DL (ref 5–25)
CALCIUM SERPL-MCNC: 9.9 MG/DL (ref 8.4–10.2)
CHLORIDE SERPL-SCNC: 102 MMOL/L (ref 96–108)
CO2 SERPL-SCNC: 31 MMOL/L (ref 21–32)
CREAT SERPL-MCNC: 0.66 MG/DL (ref 0.6–1.3)
EOSINOPHIL # BLD AUTO: 0.31 THOUSAND/ÂΜL (ref 0–0.61)
EOSINOPHIL NFR BLD AUTO: 7 % (ref 0–6)
ERYTHROCYTE [DISTWIDTH] IN BLOOD BY AUTOMATED COUNT: 12.8 % (ref 11.6–15.1)
GFR SERPL CREATININE-BSD FRML MDRD: 79 ML/MIN/1.73SQ M
GLUCOSE P FAST SERPL-MCNC: 95 MG/DL (ref 65–99)
HCT VFR BLD AUTO: 38 % (ref 34.8–46.1)
HGB BLD-MCNC: 12.3 G/DL (ref 11.5–15.4)
IMM GRANULOCYTES # BLD AUTO: 0.01 THOUSAND/UL (ref 0–0.2)
IMM GRANULOCYTES NFR BLD AUTO: 0 % (ref 0–2)
LYMPHOCYTES # BLD AUTO: 1.46 THOUSANDS/ÂΜL (ref 0.6–4.47)
LYMPHOCYTES NFR BLD AUTO: 33 % (ref 14–44)
MCH RBC QN AUTO: 32.5 PG (ref 26.8–34.3)
MCHC RBC AUTO-ENTMCNC: 32.4 G/DL (ref 31.4–37.4)
MCV RBC AUTO: 100 FL (ref 82–98)
MONOCYTES # BLD AUTO: 0.55 THOUSAND/ÂΜL (ref 0.17–1.22)
MONOCYTES NFR BLD AUTO: 12 % (ref 4–12)
NEUTROPHILS # BLD AUTO: 2.04 THOUSANDS/ÂΜL (ref 1.85–7.62)
NEUTS SEG NFR BLD AUTO: 47 % (ref 43–75)
NRBC BLD AUTO-RTO: 0 /100 WBCS
PLATELET # BLD AUTO: 171 THOUSANDS/UL (ref 149–390)
PMV BLD AUTO: 10.4 FL (ref 8.9–12.7)
POTASSIUM SERPL-SCNC: 3.9 MMOL/L (ref 3.5–5.3)
PROT SERPL-MCNC: 7.2 G/DL (ref 6.4–8.4)
RBC # BLD AUTO: 3.79 MILLION/UL (ref 3.81–5.12)
SODIUM SERPL-SCNC: 140 MMOL/L (ref 135–147)
WBC # BLD AUTO: 4.42 THOUSAND/UL (ref 4.31–10.16)

## 2024-12-18 PROCEDURE — 36415 COLL VENOUS BLD VENIPUNCTURE: CPT

## 2024-12-18 PROCEDURE — 80053 COMPREHEN METABOLIC PANEL: CPT

## 2024-12-18 PROCEDURE — 85025 COMPLETE CBC W/AUTO DIFF WBC: CPT

## 2024-12-18 PROCEDURE — 87086 URINE CULTURE/COLONY COUNT: CPT

## 2024-12-19 ENCOUNTER — OFFICE VISIT (OUTPATIENT)
Dept: ENDOCRINOLOGY | Facility: CLINIC | Age: 89
End: 2024-12-19
Payer: MEDICARE

## 2024-12-19 VITALS
TEMPERATURE: 98.1 F | HEIGHT: 61 IN | WEIGHT: 122.8 LBS | SYSTOLIC BLOOD PRESSURE: 120 MMHG | DIASTOLIC BLOOD PRESSURE: 66 MMHG | HEART RATE: 96 BPM | BODY MASS INDEX: 23.18 KG/M2 | OXYGEN SATURATION: 99 %

## 2024-12-19 DIAGNOSIS — M81.0 AGE-RELATED OSTEOPOROSIS WITHOUT CURRENT PATHOLOGICAL FRACTURE: Primary | ICD-10-CM

## 2024-12-19 DIAGNOSIS — C66.2: ICD-10-CM

## 2024-12-19 DIAGNOSIS — E21.3 HYPERPARATHYROIDISM, UNSPECIFIED (HCC): ICD-10-CM

## 2024-12-19 PROCEDURE — 99214 OFFICE O/P EST MOD 30 MIN: CPT | Performed by: STUDENT IN AN ORGANIZED HEALTH CARE EDUCATION/TRAINING PROGRAM

## 2024-12-19 RX ORDER — TAMSULOSIN HYDROCHLORIDE 0.4 MG/1
0.4 CAPSULE ORAL
Qty: 60 CAPSULE | Refills: 5 | Status: SHIPPED | OUTPATIENT
Start: 2024-12-19

## 2024-12-19 NOTE — PROGRESS NOTES
Name: Radha Ma      : 1935      MRN: [id number]  Encounter Provider: Saba Marmolejo MD  Encounter Date: 2024   Encounter department: [facility] [place]  :  Assessment & Plan  Age-related osteoporosis without current pathological fracture  Long history of osteoporosis and recent DEXA scan which showed significantly decreased in bone density scan in different areas especially in left forearm with T-score of -4.8.  She was previously on Prolia every 6 months injection, for approximately 10 years and was discontinued 2 years ago.  We reviewed different treatment options, including resuming Prolia, yearly Reclast injection or anabolic agents if she has coverage.  We did reviewed pros and cons, educational material was provided on discharge, and she will let me know regarding her choice.  Fall safety precaution reviewed.  Weightbearing exercise as tolerated.  Continue calcium and vitamin D supplementation.      Orders:    Protein electrophoresis, urine; Future    Protein electrophoresis, serum; Future    PTH, intact; Future    Vitamin D 25 hydroxy; Future    C-Telopeptide; Future    NTX Panel, Urine; Future    Hyperparathyroidism, unspecified (HCC)  Hyperparathyroidism which is most likely secondary as PTH has normalized with increased daily oral calcium intake.  She was advised to continue calcium supplementation, daily calcium requirement 2816-0789 mg discussed.  Continue vitamin D supplementation.  We will monitor calcium and PTH over time.             History of Present Illness   HPI  Radha Ma is a 89 y.o. female who presents for follow up for hyperparathyroidism and osteoporosis          Review of Systems   Constitutional:  Negative for fatigue and unexpected weight change.   Endocrine: Negative for polydipsia and polyuria.          Objective   /66 (BP Location: Left arm, Patient Position: Sitting, Cuff Size: Adult)   Pulse 96   Temp 98.1 °F (36.7  "°C) (Temporal)   Ht 5' 1\" (1.549 m)   Wt 55.7 kg (122 lb 12.8 oz)   SpO2 99%   BMI 23.20 kg/m²      Physical Exam  Vitals and nursing note reviewed.   Constitutional:       General: She is not in acute distress.     Appearance: She is well-developed.   HENT:      Head: Normocephalic and atraumatic.   Cardiovascular:      Rate and Rhythm: Normal rate and regular rhythm.   Pulmonary:      Effort: Pulmonary effort is normal. No respiratory distress.   Abdominal:      Palpations: Abdomen is soft.   Musculoskeletal:         General: No swelling.      Cervical back: Neck supple.   Skin:     General: Skin is warm and dry.   Neurological:      Mental Status: She is alert.             DXA SCAN     CLINICAL HISTORY: 89-year-old postmenopausal female.  OTHER RISK FACTORS: Prior fracture as a result of minor injury, rheumatoid arthritis, PPI therapy, SSRI therapy.     PHARMACOLOGIC THERAPY FOR OSTEOPOROSIS: None.     TECHNIQUE: Bone densitometry was performed using a Hologic Horizon A bone densitometer.  Regions of interest appear properly placed.        COMPARISON: 5/28/2019.     RESULTS:     LUMBAR SPINE  Level: L2-L3  (L1, L4 vertebrae excluded from analysis due to local structural abnormalities or artifact):  BMD: 0.809 gm/cm2  T-score: -2.3        RIGHT TOTAL HIP:  BMD: 0.690 gm/cm2  T-score: -2.1     RIGHT FEMORAL NECK:  BMD: 0.578 gm/cm2  T score: -2.4     LEFT  FOREARM:  33% RADIUS BMD: 0.409 gm/cm2  T-score: -4.8        IMPRESSION:     1. Osteoporosis. Based on the left radius     2.  Since a DXA study from 5/28/2019, there has been:  A  STATISTICALLY SIGNIFICANT DECREASE in bone mineral density of 0.060 g/cm2 (6.9%) in the lumbar spine.  A  STATISTICALLY SIGNIFICANT DECREASE in bone mineral density of 0.058 g/cm2 (7.8%) in the right total hip.  A  STATISTICALLY SIGNIFICANT DECREASE in bone mineral density of 0.092 g/cm2 (18.3%) in the left radius.     3.  The 10 year risk of hip fracture is 10% with the 10 year " risk of major osteoporotic fracture being 28% as calculated by the [employer] fracture risk assessment tool (FRAX, which is based on data generated by the WHO Collaborating [employer]   for Metabolic Bone Diseases).     4.  The current NOF guidelines recommend treating patients with a T-score of -2.5 or less in the lumbar spine or hips, or in post-menopausal women and men over the age of 50 with low bone mass (osteopenia) and a FRAX 10 year risk score of >3% for hip   fracture and/or >20% for major osteoporotic fracture.     5.  The NOF recommends follow-up DXA in 1-2 years after initiating therapy for osteoporosis and every 2 years thereafter. More frequent evaluation is appropriate for patients with conditions associated with rapid bone loss, such as glucocorticoid   therapy. The interval between DXA screenings may be longer for individuals without major risk factors and initial T-score in the normal or upper low bone mass range.        The FRAX algorithm has certain limitations:  -FRAX has not been validated in patients currently or previously treated with pharmacotherapy for osteoporosis.  In such patients, clinical judgment must be exercised in interpreting FRAX scores.  -Prior hip, vertebral and humeral fragility fractures appear to confer greater risk of subsequent fracture than fractures at other sites (this is especially true for individuals with severe vertebral fractures), but quantification of this incremental   risk is not possible with FRAX.  -FRAX underestimates fracture risk in patients with history of multiple fragility fractures.  -FRAX may underestimate fracture risk in patients with history of frequent falls.  -It is not appropriate to use FRAX to monitor treatment response.        WHO CLASSIFICATION:  Normal (a T-score of -1.0 or higher)  Low bone mineral density (a T-score of less than -1.0 but higher than -2.5)  Osteoporosis (a T-score of -2.5 or less)  Severe osteoporosis (a T-score  of -2.5 or less with a fragility fracture)        LEAST SIGNIFICANT CHANGE (AT 95% C.I):     Lumbar spine: 0.022; 2.2%  Total hip: 0.017 g/cm2; 1.9%  Forearm: 0.032 g/cm2; 5.0%

## 2024-12-19 NOTE — PATIENT INSTRUCTIONS
Please take calcium supplementation, calcium citrate 600 mg a day  Continue vitamin D.  Come back in 4 months.  Labs prior to next visit    Here are the different medications option for osteoporosis, please read about them and let me know which 1 you would like to start    Zoledronic acid (Brand name: Reclast)  An Intravenous version of alendronate given once per year. No acid reflux but can cause muscle aches for a day or two.    Denosumab (Brand name: Prolia)  Stronger medicine than the above but must be given every 6 months under the skin. Small risk of skin infections. Once you want to stop this medicine, you need to switch to alendronate or something similar for 6 months to 1 year.    Abaloparatide/Teriparatide (Brand Names: Tymlos/Forteo)  Daily injections that you would give yourself for 2 years; some of the strongest medicines for osteoporosis especially for women with spine fractures. Once you want to stop this medicine, you need to switch to denosumab or alendronate or something similar for 6 months to 1 year.    Romosozumab (Brand Name: Evenity)  Newest medicine for osteoporosis (Approved 2019). Subcutaneous injection given once per month in a clinic setting. Very large impact on improving your bone strength. There is a possible risk of stroke or heart attack but that is still not clear.    Once you want to stop this medicine, you need to switch to denosumab or alendronate or something similar for 6 months to 1 year.     unsure

## 2024-12-20 LAB — BACTERIA UR CULT: NORMAL

## 2024-12-20 NOTE — ASSESSMENT & PLAN NOTE
Hyperparathyroidism which is most likely secondary as PTH has normalized with increased daily oral calcium intake.  She was advised to continue calcium supplementation, daily calcium requirement 1797-8233 mg discussed.  Continue vitamin D supplementation.  We will monitor calcium and PTH over time.

## 2024-12-20 NOTE — ASSESSMENT & PLAN NOTE
Long history of osteoporosis and recent DEXA scan which showed significantly decreased in bone density scan in different areas especially in left forearm with T-score of -4.8.  She was previously on Prolia every 6 months injection, for approximately 10 years and was discontinued 2 years ago.  We reviewed different treatment options, including resuming Prolia, yearly Reclast injection or anabolic agents if she has coverage.  We did reviewed pros and cons, educational material was provided on discharge, and she will let me know regarding her choice.  Fall safety precaution reviewed.  Weightbearing exercise as tolerated.  Continue calcium and vitamin D supplementation.      Orders:    Protein electrophoresis, urine; Future    Protein electrophoresis, serum; Future    PTH, intact; Future    Vitamin D 25 hydroxy; Future    C-Telopeptide; Future    NTX Panel, Urine; Future

## 2024-12-21 DIAGNOSIS — E78.5 HYPERLIPIDEMIA, UNSPECIFIED HYPERLIPIDEMIA TYPE: ICD-10-CM

## 2024-12-23 RX ORDER — ATORVASTATIN CALCIUM 40 MG/1
40 TABLET, FILM COATED ORAL
Qty: 90 TABLET | Refills: 0 | Status: SHIPPED | OUTPATIENT
Start: 2024-12-23

## 2024-12-26 NOTE — PROGRESS NOTES
Name: Radha Ma      : 1935      MRN: [id number]  Encounter Provider: Nicki Olsen DO  Encounter Date: 2024   Encounter department: [facility] [address]  :  Assessment & Plan  Rheumatoid arthritis involving multiple sites with positive rheumatoid factor (HCC)  Patient is an 89-year-old female presenting to stop his care for longstanding rheumatoid arthritis.  Patient reports previous history of swelling in the MCPs and wrist.  Has been maintained on methotrexate 15 mg once weekly without any joint swelling or prolonged morning stiffness.  No synovitis or joint tenderness on exam today.  Patient hand pain seems most consistent with osteoarthritis at this point.  Previous labs and imaging reviewed.  RF 40, CCP negative.  Most recent labs stable.  -Continue methotrexate 15 mg once weekly  -Continue folic acid 1 mg daily.  Discussed with patient to let me know if she gets recurrent oral ulcers as we may need to increase the folic acid dose  -CBC and CMP every 3 months while on methotrexate  -Advised patient to hold methotrexate dose if concerns for any active infection       High risk medication use  Patient's rheumatologic medication(s) require intensive monitoring for toxicity. Does not endorse any significant side effects.    Orders:    CBC and differential; Standing    Comprehensive metabolic panel; Standing    Primary generalized (osteo)arthritis  Patient noted to have generalized osteoarthritis of multiple joints which is predominantly the cause of her joint pain right now.  Reports the pain is constant and worse with use.  Discussed conservative measures such as topical agents and Tylenol.  Recommended no more than 3 g of Tylenol a day.       RTC in 3 months       Pertinent Medical History   Per chart review, patient was previously following at [facility] with Dr. Echevarria for seropositive RA, generalized osteoarthritis and severe osteoporosis.    Patient was found to have  inflammatory joint pain of the hands and wrist. RF 40 and CCP negative. Patient started on MTX.    Patient has a history of vertebral fractures in the past.  In 2018, patient slipped in the shower and fell on her back and broke her left femur.  Patient was treated with Prolia from 2552-7776. Patient now follows with endocrinology for osteoporosis.     History of Present Illness   Radha Ma is a 89 y.o. female with a hx of CHF, HTN, GERD, carotid artery stenosis, hyperparathyroidism  who presents to establish care for RA.    HPI   Patient states she started developing symptoms of arthritis in her late.  Patient states that she was started on methotrexate several years ago due to concerns for rheumatoid arthritis.  Patient states that she was having swelling of the MCP joints and has not noticed that since being maintained on methotrexate.  Patient currently takes 15 mg once weekly of methotrexate with daily folic acid supplements.  Reports occasionally getting oral ulcers in her mouth.  Patient states that her dose of folic acid has been adjusted in the past.    Currently, patient reports she has pain in all her joints.  Reports the pain is present all throughout the day.  Endorses pain in her back, hands, feet.  Pain is worse with use.  Denies prolonged morning stiffness or joint swelling.  Patient states that she takes Tylenol every day and sometimes she has to take it during the day as well which helps provide some relief.  Patient is treats that she has tried multiple topical agents without any benefit.    Patient states she has established care with endocrinology to help manage her osteoporosis is a clinic is closer to home and she has trouble driving far distances.    Review of Systems  Complete ROS conducted as per HPI. In addition, denies:  Fever  Photosensitive rash  Sicca symptoms  Muscle weakness  Uveitis  Dactylitis  Chest pain  SOB  Pleurisy  Gross hematuria  Foamy urine  Raynaud's  Joint issues other  than noted above    Current Outpatient Medications on File Prior to Visit   Medication Sig Dispense Refill    acetaminophen (Tylenol 8 Hour) 650 mg CR tablet Take 1 tablet (650 mg total) by mouth daily at bedtime      amLODIPine (NORVASC) 5 mg tablet TAKE 1 TABLET BY MOUTH TWICE  DAILY 180 tablet 1    aspirin 81 MG tablet Take 81 mg by mouth daily Per pt stopped 8/26 per MD instructions      atorvastatin (LIPITOR) 40 mg tablet TAKE 1 TABLET BY MOUTH DAILY  AFTER DINNER 90 tablet 0    carvedilol (COREG) 12.5 mg tablet TAKE ONE-HALF TABLET BY MOUTH  TWICE DAILY WITH MEALS 90 tablet 1    Cholecalciferol 2000 units TABS Take 2,000 Units by mouth daily Per pt stopped 8/26 per MD instructions      DULoxetine (CYMBALTA) 30 mg delayed release capsule TAKE 1 CAPSULE BY MOUTH DAILY 90 capsule 1    fexofenadine (ALLEGRA) 180 MG tablet Take 180 mg by mouth daily as needed       folic acid (FOLVITE) 1 mg tablet Take 1 mg by mouth daily      losartan (COZAAR) 100 MG tablet TAKE 1 TABLET BY MOUTH DAILY 90 tablet 1    methotrexate 10 MG tablet Take 1 tablet (10 mg total) by mouth once a week Do not start before May 28, 2024. (Patient taking differently: Take 10 mg by mouth every 12 hours for 3 doses only each week)      MULTIPLE VITAMIN PO Take by mouth daily Per pt stopped 8/26 per MD instructions      omeprazole (PriLOSEC) 40 MG capsule Take 1 capsule (40 mg total) by mouth daily 90 capsule 3    bumetanide (BUMEX) 1 mg tablet TAKE 1/2 TABLET BY MOUTH DAILY  (MAY TAKE 1 FULL TABLET IF  NEEDED) (Patient not taking: Reported on 12/19/2024) 90 tablet 1    HYDROcodone-acetaminophen (NORCO) 5-325 mg per tablet  (Patient not taking: Reported on 12/19/2024)      methotrexate 2.5 MG tablet       ondansetron (ZOFRAN) 4 mg tablet Take 1 tablet by mouth every 8 (eight) hours as needed (Patient not taking: Reported on 12/19/2024)      oxybutynin (DITROPAN) 5 mg tablet Take 1 tablet (5 mg total) by mouth 3 (three) times a day (Patient not  "taking: Reported on 12/19/2024) 60 tablet 1    tamsulosin (FLOMAX) 0.4 mg TAKE 1 CAPSULE BY MOUTH DAILY  WITH DINNER (Patient not taking: Reported on 12/27/2024) 60 capsule 5     No current facility-administered medications on file prior to visit.      Social History     Tobacco Use    Smoking status: Never    Smokeless tobacco: Never   Vaping Use    Vaping status: Never Used   Substance and Sexual Activity    Alcohol use: No    Drug use: No    Sexual activity: Not on file         Objective   /74 (BP Location: Right arm, Patient Position: Sitting, Cuff Size: Adult)   Pulse 89   Temp (!) 97.3 °F (36.3 °C) (Tympanic)   Ht 5' 1\" (1.549 m)   Wt 55.8 kg (123 lb)   SpO2 98%   BMI 23.24 kg/m²     Physical Exam  General appearance: normal appearing, no acute distress  Skin: normal, no rashes  HEENT: normal, moist oropharynx, no nasal or oral ulcers  Lymph nodes: no palpable adenopathy  Lungs: normal respiratory effort, comfortable on room air, lungs clear to auscultation b/l   Heart: normal heart sounds, normal rate, normal rhythm,  Abdomen: soft, normal bowel sounds, no tenderness  Neurologic: no obvious neurological deficits   Extremities: no edema, warm and well perfused     Musculoskeletal Exam:   - Observation: [place]den and Simba nodes present, squaring of the CMC joints bilaterally  - Palpation: no joint tenderness  - Synovitis: absent  - Joint effusions: absent  - ROM: intact throughout  - Muscle Strength: 5/5 throughout       Recent labs:  Lab Results   Component Value Date/Time    SODIUM 140 12/18/2024 09:56 AM    K 3.9 12/18/2024 09:56 AM    BUN 21 12/18/2024 09:56 AM    CREATININE 0.66 12/18/2024 09:56 AM    GLUC 88 05/21/2024 05:55 AM    CALCIUM 9.9 12/18/2024 09:56 AM    AST 21 12/18/2024 09:56 AM    ALT 19 12/18/2024 09:56 AM    ALB 4.3 12/18/2024 09:56 AM    TP 7.2 12/18/2024 09:56 AM    EGFR 79 12/18/2024 09:56 AM     Lab Results   Component Value Date/Time    HGB 12.3 12/18/2024 09:56 AM    " WBC 4.42 12/18/2024 09:56 AM     12/18/2024 09:56 AM    INR 0.99 04/08/2018 02:03 AM    PTT 30 04/08/2018 02:03 AM     Lab Results   Component Value Date/Time    [id number] 3.247 01/29/2024 12:07 PM       RF 40  CCP negative     I have personally reviewed notes, labs, and imaging available in the chart.     Nicki Oslen DO, CCD, [facility]

## 2024-12-27 ENCOUNTER — CONSULT (OUTPATIENT)
Age: 89
End: 2024-12-27
Payer: MEDICARE

## 2024-12-27 VITALS
TEMPERATURE: 97.3 F | HEART RATE: 89 BPM | WEIGHT: 123 LBS | SYSTOLIC BLOOD PRESSURE: 124 MMHG | BODY MASS INDEX: 23.22 KG/M2 | OXYGEN SATURATION: 98 % | DIASTOLIC BLOOD PRESSURE: 74 MMHG | HEIGHT: 61 IN

## 2024-12-27 DIAGNOSIS — Z79.899 HIGH RISK MEDICATION USE: ICD-10-CM

## 2024-12-27 DIAGNOSIS — M05.79 RHEUMATOID ARTHRITIS INVOLVING MULTIPLE SITES WITH POSITIVE RHEUMATOID FACTOR (HCC): Primary | ICD-10-CM

## 2024-12-27 DIAGNOSIS — M15.0 PRIMARY GENERALIZED (OSTEO)ARTHRITIS: ICD-10-CM

## 2024-12-27 PROCEDURE — G2211 COMPLEX E/M VISIT ADD ON: HCPCS | Performed by: STUDENT IN AN ORGANIZED HEALTH CARE EDUCATION/TRAINING PROGRAM

## 2024-12-27 PROCEDURE — 99204 OFFICE O/P NEW MOD 45 MIN: CPT | Performed by: STUDENT IN AN ORGANIZED HEALTH CARE EDUCATION/TRAINING PROGRAM

## 2024-12-30 ENCOUNTER — TELEPHONE (OUTPATIENT)
Age: 89
End: 2024-12-30

## 2024-12-30 NOTE — TELEPHONE ENCOUNTER
Michelle from [facility] called today and ask if Dr. Sr can medical clear this pt. She asked if she can addend the notes from 12/16, pt already had her labs done. She needs this as soon as possible. Please review thank you.

## 2025-01-03 NOTE — PRE-PROCEDURE INSTRUCTIONS
Pre-Surgery Instructions:   Medication Instructions    acetaminophen (Tylenol 8 Hour) 650 mg CR tablet Take night before surgery    amLODIPine (NORVASC) 5 mg tablet Take day of surgery.    aspirin 81 MG tablet Take day of surgery.    atorvastatin (LIPITOR) 40 mg tablet Take night before surgery    bumetanide (BUMEX) 1 mg tablet Hold day of surgery.    carvedilol (COREG) 12.5 mg tablet Take day of surgery.    Cholecalciferol 2000 units TABS Stop taking 4 days prior to surgery.    DULoxetine (CYMBALTA) 30 mg delayed release capsule Take day of surgery.    fexofenadine (ALLEGRA) 180 MG tablet Hold day of surgery.    folic acid (FOLVITE) 1 mg tablet Stop taking 4 days prior to surgery.    losartan (COZAAR) 100 MG tablet Hold day of surgery.    methotrexate 2.5 MG tablet Take day of surgery.    MULTIPLE VITAMIN PO Stop taking 4 days prior to surgery.    omeprazole (PriLOSEC) 40 MG capsule Take day of surgery.    ondansetron (ZOFRAN) 4 mg tablet Uses PRN- OK to take day of surgery    Medication instructions for day surgery reviewed. Please use only a sip of water to take your instructed medications. Avoid all over the counter vitamins, supplements and NSAIDS for one week prior to surgery per anesthesia guidelines. Tylenol is ok to take as needed.     You will receive a call one business day prior to surgery with an arrival time and hospital directions. If your surgery is scheduled on a Monday, the hospital will be calling you on the Friday prior to your surgery. If you have not heard from anyone by 8pm, please call the hospital supervisor through the hospital  at [phone]. ([place] [phone] or [place] [phone]).    Do not eat or drink anything after midnight the night before your surgery, including candy, mints, lifesavers, or chewing gum. Do not drink alcohol 24hrs before your surgery. Try not to smoke at least 24hrs before your surgery.       Follow the pre surgery showering instructions as listed  in the “My Surgical Experience Booklet” or otherwise provided by your surgeon's office. Do not use a blade to shave the surgical area 1 week before surgery. It is okay to use a clean electric clippers up to 24 hours before surgery. Do not apply any lotions, creams, including makeup, cologne, deodorant, or perfumes after showering on the day of your surgery. Do not use dry shampoo, hair spray, hair gel, or any type of hair products.     No contact lenses, eye make-up, or artificial eyelashes. Remove nail polish, including gel polish, and any artificial, gel, or acrylic nails if possible. Remove all jewelry including rings and body piercing jewelry.     Wear causal clothing that is easy to take on and off. Consider your type of surgery.    Keep any valuables, jewelry, piercings at home. Please bring any specially ordered equipment (sling, braces) if indicated.    Arrange for a responsible person to drive you to and from the hospital on the day of your surgery. Please confirm the visitor policy for the day of your procedure when you receive your phone call with an arrival time.     Call the surgeon's office with any new illnesses, exposures, or additional questions prior to surgery.    Please reference your “My Surgical Experience Booklet” for additional information to prepare for your upcoming surgery.

## 2025-01-07 ENCOUNTER — TELEPHONE (OUTPATIENT)
Dept: UROLOGY | Facility: CLINIC | Age: 89
End: 2025-01-07

## 2025-01-07 ENCOUNTER — APPOINTMENT (OUTPATIENT)
Dept: RADIOLOGY | Facility: HOSPITAL | Age: 89
End: 2025-01-07
Payer: MEDICARE

## 2025-01-07 ENCOUNTER — ANESTHESIA (OUTPATIENT)
Dept: PERIOP | Facility: HOSPITAL | Age: 89
End: 2025-01-07
Payer: MEDICARE

## 2025-01-07 ENCOUNTER — ANESTHESIA EVENT (OUTPATIENT)
Dept: PERIOP | Facility: HOSPITAL | Age: 89
End: 2025-01-07
Payer: MEDICARE

## 2025-01-07 ENCOUNTER — HOSPITAL ENCOUNTER (OUTPATIENT)
Facility: HOSPITAL | Age: 89
Setting detail: OUTPATIENT SURGERY
Discharge: HOME | End: 2025-01-07
Attending: UROLOGY | Admitting: UROLOGY
Payer: MEDICARE

## 2025-01-07 VITALS
RESPIRATION RATE: 17 BRPM | SYSTOLIC BLOOD PRESSURE: 170 MMHG | DIASTOLIC BLOOD PRESSURE: 74 MMHG | TEMPERATURE: 97.5 F | HEART RATE: 68 BPM | WEIGHT: 121 LBS | BODY MASS INDEX: 22.84 KG/M2 | OXYGEN SATURATION: 98 % | HEIGHT: 61 IN

## 2025-01-07 DIAGNOSIS — C66.2: ICD-10-CM

## 2025-01-07 PROCEDURE — 74420 UROGRAPHY RTRGR +-KUB: CPT

## 2025-01-07 PROCEDURE — C1758 CATHETER, URETERAL: HCPCS | Performed by: UROLOGY

## 2025-01-07 PROCEDURE — 52354 CYSTOURETERO W/BIOPSY: CPT | Performed by: UROLOGY

## 2025-01-07 PROCEDURE — C2625 STENT, NON-COR, TEM W/DEL SY: HCPCS | Performed by: UROLOGY

## 2025-01-07 PROCEDURE — C1894 INTRO/SHEATH, NON-LASER: HCPCS | Performed by: UROLOGY

## 2025-01-07 PROCEDURE — 52332 CYSTOSCOPY AND TREATMENT: CPT | Performed by: UROLOGY

## 2025-01-07 PROCEDURE — 88305 TISSUE EXAM BY PATHOLOGIST: CPT | Performed by: PATHOLOGY

## 2025-01-07 PROCEDURE — NC001 PR NO CHARGE: Performed by: UROLOGY

## 2025-01-07 PROCEDURE — C1769 GUIDE WIRE: HCPCS | Performed by: UROLOGY

## 2025-01-07 PROCEDURE — 88112 CYTOPATH CELL ENHANCE TECH: CPT | Performed by: PATHOLOGY

## 2025-01-07 RX ORDER — CEFAZOLIN SODIUM 1 G/50ML
1000 SOLUTION INTRAVENOUS ONCE
Status: COMPLETED | OUTPATIENT
Start: 2025-01-07 | End: 2025-01-07

## 2025-01-07 RX ORDER — LIDOCAINE HYDROCHLORIDE 10 MG/ML
INJECTION, SOLUTION EPIDURAL; INFILTRATION; INTRACAUDAL; PERINEURAL AS NEEDED
Status: DISCONTINUED | OUTPATIENT
Start: 2025-01-07 | End: 2025-01-07

## 2025-01-07 RX ORDER — DIPHENHYDRAMINE HYDROCHLORIDE 50 MG/ML
12.5 INJECTION INTRAMUSCULAR; INTRAVENOUS ONCE AS NEEDED
Status: DISCONTINUED | OUTPATIENT
Start: 2025-01-07 | End: 2025-01-07 | Stop reason: HOSPADM

## 2025-01-07 RX ORDER — ACETAMINOPHEN 325 MG/1
650 TABLET ORAL EVERY 6 HOURS PRN
Status: DISCONTINUED | OUTPATIENT
Start: 2025-01-07 | End: 2025-01-07 | Stop reason: HOSPADM

## 2025-01-07 RX ORDER — ONDANSETRON 2 MG/ML
INJECTION INTRAMUSCULAR; INTRAVENOUS AS NEEDED
Status: DISCONTINUED | OUTPATIENT
Start: 2025-01-07 | End: 2025-01-07

## 2025-01-07 RX ORDER — HYDROMORPHONE HCL IN WATER/PF 6 MG/30 ML
0.2 PATIENT CONTROLLED ANALGESIA SYRINGE INTRAVENOUS
Status: DISCONTINUED | OUTPATIENT
Start: 2025-01-07 | End: 2025-01-07 | Stop reason: HOSPADM

## 2025-01-07 RX ORDER — ONDANSETRON 2 MG/ML
4 INJECTION INTRAMUSCULAR; INTRAVENOUS ONCE AS NEEDED
Status: DISCONTINUED | OUTPATIENT
Start: 2025-01-07 | End: 2025-01-07 | Stop reason: HOSPADM

## 2025-01-07 RX ORDER — SODIUM CHLORIDE 9 MG/ML
125 INJECTION, SOLUTION INTRAVENOUS CONTINUOUS
Status: DISCONTINUED | OUTPATIENT
Start: 2025-01-07 | End: 2025-01-07

## 2025-01-07 RX ORDER — SODIUM CHLORIDE, SODIUM LACTATE, POTASSIUM CHLORIDE, CALCIUM CHLORIDE 600; 310; 30; 20 MG/100ML; MG/100ML; MG/100ML; MG/100ML
125 INJECTION, SOLUTION INTRAVENOUS CONTINUOUS
Status: DISCONTINUED | OUTPATIENT
Start: 2025-01-07 | End: 2025-01-07 | Stop reason: HOSPADM

## 2025-01-07 RX ORDER — MAGNESIUM HYDROXIDE 1200 MG/15ML
LIQUID ORAL AS NEEDED
Status: DISCONTINUED | OUTPATIENT
Start: 2025-01-07 | End: 2025-01-07 | Stop reason: HOSPADM

## 2025-01-07 RX ORDER — DEXAMETHASONE SODIUM PHOSPHATE 10 MG/ML
INJECTION, SOLUTION INTRAMUSCULAR; INTRAVENOUS AS NEEDED
Status: DISCONTINUED | OUTPATIENT
Start: 2025-01-07 | End: 2025-01-07

## 2025-01-07 RX ORDER — PROPOFOL 10 MG/ML
INJECTION, EMULSION INTRAVENOUS AS NEEDED
Status: DISCONTINUED | OUTPATIENT
Start: 2025-01-07 | End: 2025-01-07

## 2025-01-07 RX ADMIN — PROPOFOL 50 MG: 10 INJECTION, EMULSION INTRAVENOUS at 10:31

## 2025-01-07 RX ADMIN — DEXAMETHASONE SODIUM PHOSPHATE 10 MG: 10 INJECTION, SOLUTION INTRAMUSCULAR; INTRAVENOUS at 10:25

## 2025-01-07 RX ADMIN — ONDANSETRON 4 MG: 2 INJECTION INTRAMUSCULAR; INTRAVENOUS at 10:25

## 2025-01-07 RX ADMIN — SODIUM CHLORIDE, SODIUM LACTATE, POTASSIUM CHLORIDE, AND CALCIUM CHLORIDE 125 ML/HR: .6; .31; .03; .02 INJECTION, SOLUTION INTRAVENOUS at 08:04

## 2025-01-07 RX ADMIN — LIDOCAINE HYDROCHLORIDE 60 MG: 10 INJECTION, SOLUTION EPIDURAL; INFILTRATION; INTRACAUDAL; PERINEURAL at 10:14

## 2025-01-07 RX ADMIN — PROPOFOL 160 MG: 10 INJECTION, EMULSION INTRAVENOUS at 10:14

## 2025-01-07 RX ADMIN — CEFAZOLIN SODIUM 1000 MG: 1 SOLUTION INTRAVENOUS at 10:12

## 2025-01-07 NOTE — TELEPHONE ENCOUNTER
Patient status post left ureteroscopy, left ureteral lesion biopsy and stent exchange at [place] on 1/7/2025    Left-sided 6 x 24 stent.  No string.    Patient will need follow-up with Dr. Fernandes.  Will yield to him in terms of follow-up timing.

## 2025-01-07 NOTE — DISCHARGE INSTR - AVS FIRST PAGE
Ms. Cotto,      I looked in your entire left ureter and left kidney.  There was one questionable area of inflammation in your ureter that may have been recurrent for cancer, but it looks more like inflammation to me.  I took a biopsy and send it to pathology just in case.    I placed a new ureteral stent.    Please see the postoperative instructions below for details.    Please call office with any questions or concerns.        Sincerely,  Carl Currylupecollins          You had procedure on your ureter and kidney.      You had ureteral stent placed.  This is a tube that is placed in your ureter to keep urine draining from your kidney to your bladder.  It may cause discomfort in the form of back spasms or lower abdominal spasms.  This is normal and can be treated with medications if need be.      You may see some blood in your urine.  This is normal.  Please drink plenty of fluids.      Please call the office if you notice that you are passing large blood clots or if you are unable to urinate.      You have previously been given a prescription for Flomax for stent discomfort.  If this medication previously helped you, please take this daily while your ureteral stent is in place. This medication can occasionally cause lightheadedness. Please stop medication if you experience this or any other concerning side effects.  If you need more refills for this medication, please reach out to the office      You have been previously given a prescription for oxybutynin for stent discomfort.  If this medication previously helped you, please take this daily as needed for stent discomfort. Please note that this medication may have side effects including but not limited to: dry eyes, dry mouth, constipation, urinary retention. Please drink plenty of water with this medication.  If you need more refills for this medication, please reach out to the office.      You may take Tylenol as needed for pain.        Most importantly, please drink  lots of fluids, as this is the best way to avoid pain with your stent!      You may shower and bathe as usual when you get home.    You do not have any incisions and can resume typical physical activities, as long as you are not having too much discomfort with stent in place.    Please call the office or present to the ED if you experience concerning signs or symptoms including but not limited to: fevers, chills, nausea, vomiting, worsening flank pain, worsening abdominal pain, passage of large blood clots in the urine, inability to urinate.    You will get a call from the office to set up your follow-up appointment with Dr. Fernandes or someone from his clinical team

## 2025-01-07 NOTE — H&P
UROLOGY H&P NOTE     Patient Identifiers: Radha Ma (MRN [id number])    Date of Service: 1/7/2025    History of Present Illness:     Radha Ma is a 89 y.o. old with a history of left upper tract urothelial carcinoma    Past Medical, Past Surgical History:     Past Medical History:   Diagnosis Date    Bladder cancer (HCC)     had BCG treatments    Bladder cancer (HCC)     Closed displaced fracture of left trapezium bone 05/18/2017    Coronary artery disease     S/P stent placement x 2 in 2011    GERD (gastroesophageal reflux disease)     Hepatitis     got this from food back in 1970s, has not had a problem since    History of leukemia     in remission    History of stomach ulcers 1970    History of transfusion 1970    Hyperlipidemia     Hypertension     Hyperthyroidism 04/10/2018    Irritable bowel syndrome     Kidney stone     Kidney stones     Persistent cough for 3 weeks or longer 03/10/2020    Pneumonia     Pt had in 2003 and 2004    PONV (postoperative nausea and vomiting)     Pruritic rash 09/03/2019    Pulmonary emboli (HCC) 1970s    Raynaud disease     Shingles     Sleep apnea     Thrombocytopenia (HCC) 02/20/2012   :    Past Surgical History:   Procedure Laterality Date    CATARACT EXTRACTION      COLONOSCOPY      CORONARY ANGIOPLASTY WITH STENT PLACEMENT      stent x 2    CYSTOSCOPY      diagnostic - onset 4/4/17    EGD      EYE SURGERY      FL RETROGRADE PYELOGRAM  09/03/2024    FRACTURE SURGERY      HERNIA REPAIR      HYSTERECTOMY      LEG SURGERY Left     OOPHORECTOMY      IN CYSTO W/INSERT URETERAL STENT Left 09/03/2024    Procedure: INSERTION STENT URETERAL;  Surgeon: Jet Fernandes MD;  [facility]: [facility];  Service: Urology    IN CYSTO/PYELOSCOPY BX&/FULGURATION PELIVC LESION Left 09/03/2024    Procedure: CYSTOSCOPY, LEFT URETEROSCOPY, RETROGRADE PYELOGRAM, LEFT URETERAL STENT PLACEMENT, LEFT URETERAL WASHING, LEFT URETERAL BIOPSY, LASER ABLATION OF LEFT URETERAL TUMOR;  Surgeon:  Jet Fernandes MD;  Location[facility];  Service: Urology    WI CYSTOURETHROSCOPY W/URETERAL CATHETERIZATION Left 09/03/2024    Procedure: CYSTOSCOPY RETROGRADE PYELOGRAM WITH INSERTION STENT URETERAL;  Surgeon: Jet Fernandes MD;  [facility];  Service: Urology    WI NDSC WRST SURG W/RLS TRANSVRS CARPL LIGM Right 10/12/2021    Procedure: Right endoscopic carpal tunnel release;  Surgeon: Ghanshyam Rosado MD;  [facility];  Service: Orthopedics    WI OPTX FEM SHFT FX W/INSJ IMED IMPLT W/WO SCREW Left 04/08/2018    Procedure: INSERTION NAIL IM FEMUR ANTEGRADE (TROCHANTERIC);  Surgeon: Lucrecia Boyle MD;  [facility];  Service: Orthopedics    WI REPAIR FIRST ABDOMINAL WALL HERNIA N/A 05/12/2021    Procedure: REPAIR HERNIA VENTRAL;  Surgeon: Rui Pickett MD;  [facility]: [facility];  Service: General    TONSILLECTOMY     :    Medications, Allergies:     Current Facility-Administered Medications:     ceFAZolin (ANCEF) IVPB (premix in dextrose) 1,000 mg 50 mL, Once    lactated ringers infusion, Continuous, Last Rate: 125 mL/hr (01/07/25 0804)    Allergies:  Allergies   Allergen Reactions    Lactose - Food Allergy GI Intolerance   :    Social and Family History:   Social History:   Social History     Tobacco Use    Smoking status: Never    Smokeless tobacco: Never   Vaping Use    Vaping status: Never Used   Substance Use Topics    Alcohol use: Never    Drug use: Never   .    Social History     Tobacco Use   Smoking Status Never   Smokeless Tobacco Never       Family History:  Family History   Problem Relation Age of Onset    Arthritis Mother     Osteoporosis Mother     Heart disease Father     Hypertension Father     Hypertension Brother     Prostate cancer Brother     Breast cancer Daughter 50    Prostate cancer Son    :     Review of Systems:     General: Fever, chills, or night sweats: negative  Cardiac: Negative for chest pain.    Pulmonary: Negative for shortness of  "breath.  Gastrointestinal: Abdominal pain negative.  Nausea, vomiting, or diarrhea negative,  Genitourinary: See HPI above.  Patient does not have hematuria.  All other systems queried were negative.    Physical Exam:   General: Patient is pleasant and in NAD. Awake and alert  /86 (BP Location: Left arm)   Pulse 73   Temp (!) 97 °F (36.1 °C) (Temporal)   Resp 18   Ht 5' 1\" (1.549 m)   Wt 54.9 kg (121 lb)   SpO2 100%   BMI 22.86 kg/m² Temp (24hrs), Av °F (36.1 °C), Min:97 °F (36.1 °C), Max:97 °F (36.1 °C)  current; Temperature: (!) 97 °F (36.1 °C)  No intake/output data recorded.  Cardiac: Peripheral edema: negative  Pulmonary: Non-labored breathing  Abdomen: Soft, non-tender, non-distended.  No surgical scars.  No masses, tenderness, hernias noted.    Genitourinary: Negative CVA tenderness, negative suprapubic tenderness.    Heart with normal rate  Lungs clear bilaterally  No significant peripheral edema in bilateral lower extremities    Labs:     Lab Results   Component Value Date    HGB 12.3 2024    HCT 38.0 2024    WBC 4.42 2024     2024   ]    Lab Results   Component Value Date    K 3.9 2024     2024    CO2 31 2024    BUN 21 2024    CREATININE 0.66 2024    CALCIUM 9.9 2024   ]    Imaging:     Any pertinent imaging was personally reviewed and discussed with patient. See below for details.    ASSESSMENT:     89 y.o. old female with left upper tract urothelial carcinoma    Anticoagulation: Aspirin 81 mg    History of left ureteral urothelial carcinoma status post laser ablation in     TURBT in     Negative cystoscopy in     On surveillance imaging had suspicious lesion in left upper tract concerning for recurrent urothelial carcinoma    Went to the OR with Dr. Fernandes on 2024.  Was found to have a distal left ureteral stricture as well as small recurrence of ureteral tumor at both proximal and distal to the " stricture.  These tumors were removed with a basket and fulgurated with laser.  Tumor ended up being low-grade.  Left ureteral stent was placed at that time.    Patient followed up in the office with Dr. Fernandes.  Plan was made for repeat ureteroscopy a few months later.  If any lesions persisted, laser ablation would be performed.    Patient was consented for cystoscopy, left retrograde pyelogram, left ureteroscopy, possible urothelial lesion biopsy, possible urothelial lesion laser ablation, left ureteral stent exchange.    Went over steps of procedure.  Went over risks and benefits of procedure.  Explained to the patient that the risks include but are not limited to: General Anesthesia risks, infection, bleeding, need for more procedures, lack of diagnosis, injury to the ureter, need for nephrostomy tube, need for Westfall catheter, damage nearby structures.  Patient agreed to proceed.    PLAN:     Preop Ancef  OR plan as above  Dc home from PACU

## 2025-01-07 NOTE — ANESTHESIA PREPROCEDURE EVALUATION
Procedure:  CYSTOSCOPY RETROGRADE PYELOGRAM WITH INSERTION STENT URETERAL (Left: Bladder)  URETEROSCOPY with ablation of left ureteral tumor (Left: Bladder)    Relevant Problems   CARDIO   (+) Carotid artery stenosis, asymptomatic, bilateral   (+) Cerebral aneurysm without rupture   (+) Coronary artery disease without angina pectoris - S/P stent placement x 2 (2011)   (+) Essential hypertension   (+) Heart valve disorder   (+) Mixed hyperlipidemia   (+) TIFFANY (renal artery stenosis) (HCC)   (+) Raynaud's disease without gangrene   (+) Rib pain on left side      ENDO   (+) Hyperparathyroidism, unspecified (HCC)      GI/HEPATIC   (+) Biliary dyskinesia   (+) Gastroesophageal reflux disease without esophagitis   (+) Pancreatic cyst      /RENAL   (+) CKD (chronic kidney disease) stage 2, GFR 60-89 ml/min   (+) Hydronephrosis, left      HEMATOLOGY   (+) Anemia due to vitamin B12 deficiency      MUSCULOSKELETAL   (+) Biliary dyskinesia   (+) Degenerative joint disease   (+) 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HCC)   (+) Low back pain without sciatica   (+) Seropositive rheumatoid arthritis (HCC)      NEURO/PSYCH   (+) Chronic right shoulder pain   (+) Controlled depression   (+) New onset of headaches   (+) Right hand paresthesia      Other   (+) 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HCC)        Physical Exam    Airway    Mallampati score: II  TM Distance: >3 FB  Neck ROM: full     Dental   Comment: False upper incisors pt states fell out recently and replaced by her dentist, No notable dental hx     Cardiovascular  Rhythm: regular, Rate: normal    Pulmonary   Breath sounds clear to auscultation    Other Findings  post-pubertal.      Anesthesia Plan  ASA Score- 3     Anesthesia Type- general with ASA Monitors.         Additional Monitors:     Airway Plan: ETT and LMA.           Plan Factors-Exercise tolerance (METS): >4 METS.    Chart reviewed. EKG reviewed. Imaging results reviewed. Existing labs reviewed. Patient  summary reviewed.    Patient is not a current smoker.      Obstructive sleep apnea risk education given perioperatively.        Induction- intravenous.    Postoperative Plan- Plan for postoperative opioid use.     Perioperative Resuscitation Plan - Level 1 - Full Code.       Informed Consent- Anesthetic plan and risks discussed with patient and spouse.  I personally reviewed this patient with the CRNA. Discussed and agreed on the Anesthesia Plan with the CRNA..

## 2025-01-07 NOTE — ANESTHESIA POSTPROCEDURE EVALUATION
Post-Op Assessment Note    CV Status:  Stable    Pain management: adequate       Mental Status:  Alert and awake   Hydration Status:  Euvolemic   PONV Controlled:  Controlled   Airway Patency:  Patent     Post Op Vitals Reviewed: Yes    No anethesia notable event occurred.    Staff: Anesthesiologist           Last Filed PACU Vitals:  Vitals Value Taken Time   Temp     Pulse 77 01/07/25 1109   BP     Resp     SpO2 97 % 01/07/25 1109   Vitals shown include unfiled device data.    Modified Deni:     Vitals Value Taken Time   Activity 2 01/07/25 1115   Respiration 2 01/07/25 1115   Circulation 2 01/07/25 1115   Consciousness 1 01/07/25 1115   Oxygen Saturation 2 01/07/25 1115     Modified Deni Score: 9

## 2025-01-07 NOTE — OP NOTE
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]    Procedure(s):      Cystourethroscopy  Left retrograde pyelogram with live fluoroscopic interpretation  Left ureteroscopy  Selective cytology of left ureter  Left ureteral lesion biopsy  Left ureteral stent exchange        Specimen(s):  ID Type Source Tests Collected by Time Destination   1 : left ureteral lesion Tissue Ureter, Left TISSUE EXAM Carl Cooper DO 2025 1033    2 :  Washing Ureter, Left NON-GYNECOLOGIC CYTOLOGY Carl Cooper DO 2025 1042        Estimated Blood Loss:   Minimal    Drains:  * No LDAs found *    Anesthesia Type:   General    Operative Indications:  Malignant tumor of ureter, 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 old female with left upper tract urothelial carcinoma     Anticoagulation: Aspirin 81 mg     History of left ureteral urothelial carcinoma status post laser ablation in      TURBT in      Negative cystoscopy in      On surveillance imaging had suspicious lesion in left upper tract concerning for recurrent urothelial carcinoma     Went to the OR with Dr. Fernandes on 2024.  Was found to have a distal left ureteral stricture as well as small recurrence of ureteral tumor at both proximal and distal to the stricture.  These tumors were removed with a basket and fulgurated with laser.  Tumor ended up being low-grade.  Left ureteral stent was placed at that time.     Patient followed up in the office with Dr. Fernandes.  Plan was made for repeat ureteroscopy a few months later.  If any lesions persisted, laser ablation would be performed.     Patient was consented for cystoscopy, left retrograde pyelogram,  left ureteroscopy, possible urothelial lesion biopsy, possible urothelial lesion laser ablation, left ureteral stent exchange.     Went over steps of procedure.  Went over risks and benefits of procedure.  Explained to the patient that the risks include but are not limited to: General Anesthesia risks, infection, bleeding, need for more procedures, lack of diagnosis, injury to the ureter, need for nephrostomy tube, need for Westfall catheter, damage nearby structures.  Patient agreed to proceed.          Operative Findings:        No meatal stenosis  No urethral strictures  Bilateral ureteral orifices in orthotopic positions  No bladder lesions  No bladder stones  No trabeculations  Pre-existing left ureteral stent with mild encrustation    Left retrograde pyelogram with live fluoroscopic interpretation: About 3 cm of narrowing at the mid to distal ureter, consistent with previous stricture history; no filling defects in the remaining ureter and kidney.    Left semirigid ureteroscopy of the distal and mid ureter: Area of scar tissue narrowing about 3 cm long at the mid to distal ureter.  Small area of inflammation, possible small papillary component about 0.5 cm total in the ureter just proximal to area of scarring and narrowing, around area of iliac vessel crossing.  This lesion was biopsied using biopsy forceps.    Left flexible ureteroscopy revealed no lesions in the kidney and proximal ureter    Backout flexible ureteroscopy revealed no additional lesions other than what was mentioned above in the left semirigid ureteroscopy.  No ureteral injury.    Successfully placed 6 x 24 double-J ureteral stent.  No string.              Complications:   None    Procedure and Technique:        Patient identified in the preop holding area.  Consent was obtained.  Risks and benefits of the procedure were explained the patient.  Patient was in agreement.  Patient was brought back to the OR and placed upon the table.  Patient  received IV Ancef for preoperative antibiotics.  Bilateral lower extremity SCDs were placed and turned on.  Patient underwent smooth induction of anesthesia.  Patient legs were placed in stirrups.  Patient was in dorsolithotomy position.  Area prepped and draped in sterile fashion.  Timeout performed by the OR team.    Case began with insertion of 21 [place] cystoscope.  Pan cystourethroscopy was performed.  See above fines for details.    Attention was turned toward the left ureteral orifice.  5 [place] open-ended catheter was advanced toward the left ureteral orifice.  Wire was advanced through the 5 [place] open-ended catheter and alongside the pre-existing stent and up the ureter.  Wire was coiled into the kidney under fluoroscopic guidance.  5 [place] open-ended catheter was removed over the wire in a push pull fashion.    Stent graspers were used to remove the pre-existing stent.    The scope was backed out over the wire and a push pull fashion.    Semirigid ureteroscope was prepared and was advanced alongside the wire and navigated into the left ureteral orifice and into the ureter.    Left distal ureteral stricture was noted as above.  Scope was navigated just distal to the stricture and retrograde pyelogram was performed through the scope.  See above fines for details.    Lesion above was noted in the mid to distal ureter, just proximal to the stricture.  Biopsy forceps were used to get biopsy of the lesion.    Selective cytology from this area of the ureter was also collected through the ureteroscope.    Second wire was advanced through the scope and coiled into the kidney.  Scope was backed out over the wire and a push pull fashion.    Due to supply issues, an 8.5 flexible ureteroscope was prepared.  The scope was loaded over one of the 2 wires and attempts were made to advance this over the wire into the kidney for evaluation.  Unfortunately the scope was too wide to pass the area of stricture in the ureter.   Decision was made to try to get past the area of stricture with an access sheath.  The scope was removed over the wire.  A 10-12 [place] citizen 35 cm access sheath was prepared and advanced over the wire toward the kidney under fluoroscopic guidance.  Inner sheath and wire were removed.  Attempts were made to advance the scope into the outer sheath, which was 12 [place] citizen and should theoretically accommodate the 8.5 scope, but this was not successful.    Scope was removed.  Wire was replaced through the access sheath and coiled into the kidney again under fluoroscopic guidance.  Access sheath was removed.    This time, a larger access sheath, 12-14 [place] citizen 35 cm was prepared and advanced along the wire into the proximal ureter.    This access sheath was able to accommodate the ureteroscope fortunately.    Left pyeloscopy was performed.  See above fines for details.    Backout ureteroscopy was performed and other than the lesion that was biopsied mentioned above in the area of narrowing, there were no other significant findings in the ureter.    A 6 x 24 double-J ureteral stent was advanced over the wire under fluoroscopic guidance toward the left kidney.  Wire was removed and stent was deployed.  Good proximal curl was seen on fluoroscopy.  Good distal curl was seen on fluoroscopy.    Bladder was emptied.    Patient was uneventfully awoken from anesthesia and extubated.  Patient was brought to PACU in good condition.           A qualified resident physician was not available.    Patient Disposition:  PACU              SIGNATURE: Carl Cooper DO  DATE: January 7, 2025  TIME: 11:36 AM        Plan  - Patient has left-sided 6 x 24 double-J ureteral stent in place.  No string.  - Follow-up in the office setting with Dr. Fernandes to discuss further management.  Pathology will be reviewed at that time.

## 2025-01-09 PROCEDURE — 88112 CYTOPATH CELL ENHANCE TECH: CPT | Performed by: PATHOLOGY

## 2025-01-10 PROCEDURE — 88305 TISSUE EXAM BY PATHOLOGIST: CPT | Performed by: PATHOLOGY

## 2025-01-22 ENCOUNTER — OFFICE VISIT (OUTPATIENT)
Dept: UROLOGY | Facility: AMBULATORY SURGERY CENTER | Age: 89
End: 2025-01-22
Payer: MEDICARE

## 2025-01-22 VITALS
DIASTOLIC BLOOD PRESSURE: 82 MMHG | OXYGEN SATURATION: 99 % | WEIGHT: 123 LBS | SYSTOLIC BLOOD PRESSURE: 140 MMHG | HEART RATE: 75 BPM | BODY MASS INDEX: 23.22 KG/M2 | HEIGHT: 61 IN

## 2025-01-22 DIAGNOSIS — N18.31 CHRONIC KIDNEY DISEASE, STAGE 3A (HCC): ICD-10-CM

## 2025-01-22 DIAGNOSIS — C66.2 URETER MALIGNANT NEOPLASM, LEFT (HCC): Primary | ICD-10-CM

## 2025-01-22 PROCEDURE — 99214 OFFICE O/P EST MOD 30 MIN: CPT | Performed by: UROLOGY

## 2025-01-22 RX ORDER — SODIUM CHLORIDE 9 MG/ML
125 INJECTION, SOLUTION INTRAVENOUS CONTINUOUS
OUTPATIENT
Start: 2025-01-22

## 2025-01-22 RX ORDER — CEFAZOLIN SODIUM 2 G/50ML
2000 SOLUTION INTRAVENOUS ONCE
OUTPATIENT
Start: 2025-01-22 | End: 2025-01-22

## 2025-01-22 NOTE — PROGRESS NOTES
Name: Radha Ma      : 1935      MRN: [id number]  Encounter Provider: Florin Fernandes MD  Encounter Date: 2025   Encounter department: [facility] [place]  :  Assessment & Plan  Ureter malignant neoplasm, left (HCC)    Orders:  •  Cytology, urine; Future  •  Case request operating room: URETEROSCOPY, cystoscopy, bilateral retrogrades, left ureteroscopy, laser ablation of tumor, left stent exchange; Standing    Chronic kidney disease, stage 3a (HCC)  Lab Results   Component Value Date    EGFR 79 2024    EGFR 58 2024    EGFR 63 2024    CREATININE 0.66 2024    CREATININE 0.89 2024    CREATININE 0.83 2024            Impression: History of left ureteral tumor, status post laser ablation, left ureteral stricture status post left ureteral stent    Plan: I provided the patient with reassurance and a summary of the assessment of my partner Dr. Cooper who performed a left-sided ureteroscopy.  He removed a very small fragment of tissue with a slight polypoid appearance and the pathology is benign.  Otherwise there is no evidence of florin urothelial carcinoma.  At 89 years of age with a history of recurrent low-grade disease I recommend continued endoscopic management with laser ablation.  I feel that a distal ureterectomy or even nephro ureterectomy would be overly aggressive and the patient agrees.  At almost 89 years of age she does not wish to go any type of major surgical intervention.  I recommend that she return in 4 months time with a urine cytology.  We will return to the operating room in 2025 for surveillance ureteroscopy with laser ablation of any recurrent disease.  The patient and her son who is present in the office today are amenable with this plan.  She does understand the possible risk of disease progression with observation at this time.    History of Present Illness   Radha Ma is a 89 y.o. female who presents for  "follow-up of her left ureteral tumor.  Most recently my partner Dr. Cooper performed left ureteroscopy and found an area of approximately 3 cm length of scarring in the mid to distal ureter with a possible small area of a papillary component measuring up to 5 mm.  The lesion was biopsied with biopsy forceps.  A new ureteral stent was placed.  As you shows him a new portion of urothelial mucosa without tumor identified.  Cytology from the left ureter revealed atypical cells.  She returns in follow-up today.  She has a left ureteral stent which persists.    Review of Systems       Objective   /82 (BP Location: Left arm, Patient Position: Sitting, Cuff Size: Adult)   Pulse 75   Ht 5' 1\" (1.549 m)   Wt 55.8 kg (123 lb)   SpO2 99%   BMI 23.24 kg/m²     Physical Exam on examination she is in no acute distress.  She is mentally sharp and conversant.  She appears younger than stated age.  Gait normal.  Affect normal.    Results  No results found for: \"PSA\"  Lab Results   Component Value Date    CALCIUM 9.9 12/18/2024    K 3.9 12/18/2024    CO2 31 12/18/2024     12/18/2024    BUN 21 12/18/2024    CREATININE 0.66 12/18/2024     Lab Results   Component Value Date    WBC 4.42 12/18/2024    HGB 12.3 12/18/2024    HCT 38.0 12/18/2024     (H) 12/18/2024     12/18/2024       Office Urine Dip  No results found for this or any previous visit (from the past hour).]      "

## 2025-01-22 NOTE — ASSESSMENT & PLAN NOTE
Lab Results   Component Value Date    EGFR 79 12/18/2024    EGFR 58 08/21/2024    EGFR 63 07/09/2024    CREATININE 0.66 12/18/2024    CREATININE 0.89 08/21/2024    CREATININE 0.83 07/09/2024

## 2025-01-30 ENCOUNTER — OFFICE VISIT (OUTPATIENT)
Dept: FAMILY MEDICINE CLINIC | Facility: CLINIC | Age: 89
End: 2025-01-30
Payer: MEDICARE

## 2025-01-30 VITALS
DIASTOLIC BLOOD PRESSURE: 74 MMHG | RESPIRATION RATE: 18 BRPM | TEMPERATURE: 98.3 F | HEART RATE: 76 BPM | SYSTOLIC BLOOD PRESSURE: 130 MMHG | OXYGEN SATURATION: 98 % | HEIGHT: 61 IN | BODY MASS INDEX: 23.68 KG/M2 | WEIGHT: 125.4 LBS

## 2025-01-30 DIAGNOSIS — H91.93 HEARING DIFFICULTY OF BOTH EARS: ICD-10-CM

## 2025-01-30 DIAGNOSIS — E21.3 HYPERPARATHYROIDISM, UNSPECIFIED (HCC): ICD-10-CM

## 2025-01-30 DIAGNOSIS — I50.32 CHRONIC DIASTOLIC (CONGESTIVE) HEART FAILURE (HCC): ICD-10-CM

## 2025-01-30 DIAGNOSIS — Z12.31 ENCOUNTER FOR SCREENING MAMMOGRAM FOR BREAST CANCER: ICD-10-CM

## 2025-01-30 DIAGNOSIS — Z80.3 FAMILY HISTORY OF BREAST CANCER: ICD-10-CM

## 2025-01-30 DIAGNOSIS — Z00.00 MEDICARE ANNUAL WELLNESS VISIT, SUBSEQUENT: Primary | ICD-10-CM

## 2025-01-30 DIAGNOSIS — M05.79 RHEUMATOID ARTHRITIS INVOLVING MULTIPLE SITES WITH POSITIVE RHEUMATOID FACTOR (HCC): ICD-10-CM

## 2025-01-30 DIAGNOSIS — I10 ESSENTIAL HYPERTENSION: ICD-10-CM

## 2025-01-30 DIAGNOSIS — I70.1 RAS (RENAL ARTERY STENOSIS) (HCC): ICD-10-CM

## 2025-01-30 DIAGNOSIS — C91.10 CHRONIC LARGE GRANULAR LYMPHOCYTIC LEUKEMIA (HCC): ICD-10-CM

## 2025-01-30 DIAGNOSIS — F32.A DEPRESSION, UNSPECIFIED DEPRESSION TYPE: ICD-10-CM

## 2025-01-30 DIAGNOSIS — C66.2: ICD-10-CM

## 2025-01-30 PROCEDURE — G0439 PPPS, SUBSEQ VISIT: HCPCS | Performed by: FAMILY MEDICINE

## 2025-01-30 PROCEDURE — 99214 OFFICE O/P EST MOD 30 MIN: CPT | Performed by: FAMILY MEDICINE

## 2025-01-30 PROCEDURE — G2211 COMPLEX E/M VISIT ADD ON: HCPCS | Performed by: FAMILY MEDICINE

## 2025-01-30 NOTE — PROGRESS NOTES
Name: Radha Ma      : 1935      MRN: [id number]  Encounter Provider: Anjelica Sr DO  Encounter Date: 2025   Encounter department: FAMILY PRACTICE AT [facility]  Medicare annual wellness visit, subsequent         Essential hypertension  Controlled, cpm       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HCC)  Managed by Hem/Onc, cpm       Rheumatoid arthritis involving multiple sites with positive rheumatoid factor (HCC)  Stable, cpm       Chronic diastolic (congestive) heart failure (HCC)  Stable, compensated, cpm  Wt Readings from Last 3 Encounters:   25 56.9 kg (125 lb 6.4 oz)   25 55.8 kg (123 lb)   25 54.9 kg (121 lb)              TIFFANY (renal artery stenosis) (HCC)  Managed by nephrology, cpm       Hyperparathyroidism, unspecified (HCC)  Managed by nephrology, cpm       Malignant tumor of ureter, left (HCC)  Managed by urology, cpm       Depression, unspecified depression type  Controlled, cpm         Family history of breast cancer    Orders:    Mammo screening bilateral w 3d and cad; Future    Encounter for screening mammogram for breast cancer    Orders:    Mammo screening bilateral w 3d and cad; Future    Hearing difficulty of both ears    Orders:    Ambulatory Referral to Otolaryngology; Future       Preventive health issues were discussed with patient, and age appropriate screening tests were ordered as noted in patient's After Visit Summary. Personalized health advice and appropriate referrals for health education or preventive services given if needed, as noted in patient's After Visit Summary.    History of Present Illness     Scheduled Medicare AWV + f/u       Patient Care Team:  Anjelica Sr DO as PCP - General (Family Medicine)  Saba Marmolejo MD as PCP - Endocrinology (Endocrinology)  MD Anjelica Suárez DO Eric Mayer, MD Kimberly Jegel Chaput, DO Christopher Cutitta, DO Alexandra Therese Buczek, PA-C as  Physician Assistant (Hematology and Oncology)  Donna Westfall MD (Gastroenterology)    Review of Systems  Medical History Reviewed by provider this encounter:  Tobacco  Allergies  Meds  Problems  Med Hx  Surg Hx  Fam Hx       Annual Wellness Visit Questionnaire   Radha is here for her Subsequent Wellness visit. Last Medicare Wellness visit information reviewed, patient interviewed and updates made to the record today.      Health Risk Assessment:   Patient rates overall health as fair. Patient feels that their physical health rating is slightly worse. Patient is satisfied with their life. Eyesight was rated as same. Hearing was rated as slightly worse. Patient feels that their emotional and mental health rating is same. Patients states they are never, rarely angry. Patient states they are sometimes unusually tired/fatigued. Pain experienced in the last 7 days has been some. Patient's pain rating has been 5/10. Patient states that she has experienced no weight loss or gain in last 6 months.     Depression Screening:   PHQ-9 Score: 0      Fall Risk Screening:   In the past year, patient has experienced: no history of falling in past year      Urinary Incontinence Screening:   Patient has not leaked urine accidently in the last six months.     Home Safety:  Patient does not have trouble with stairs inside or outside of their home. Patient has working smoke alarms and has working carbon monoxide detector. Home safety hazards include: none.     Nutrition:   Current diet is Regular.     Medications:   Patient is not currently taking any over-the-counter supplements. Patient is able to manage medications.     Activities of Daily Living (ADLs)/Instrumental Activities of Daily Living (IADLs):   Walk and transfer into and out of bed and chair?: Yes  Dress and groom yourself?: Yes    Bathe or shower yourself?: Yes    Feed yourself? Yes  Do your laundry/housekeeping?: Yes  Manage your money, pay your bills and track your  expenses?: Yes  Make your own meals?: Yes    Do your own shopping?: Yes    Previous Hospitalizations:   Any hospitalizations or ED visits within the last 12 months?: Yes    How many hospitalizations have you had in the last year?: 1-2    Hospitalization Comments: Cancer treatments    Advance Care Planning:   Living will: Yes    Advanced directive: Yes      Cognitive Screening:   Provider or family/friend/caregiver concerned regarding cognition?: No    PREVENTIVE SCREENINGS      Cardiovascular Screening:    General: Screening Not Indicated and History Lipid Disorder      Diabetes Screening:     General: Screening Current      Colorectal Cancer Screening:     General: Screening Not Indicated      Breast Cancer Screening:     General: Risks and Benefits Discussed    Due for: Mammogram        Cervical Cancer Screening:    General: Screening Not Indicated      Osteoporosis Screening:    General: History Osteoporosis and Screening Current      Abdominal Aortic Aneurysm (AAA) Screening:        General: Screening Current      Lung Cancer Screening:     General: Screening Not Indicated      Hepatitis C Screening:    General: Screening Current    Screening, Brief Intervention, and Referral to Treatment (SBIRT)    Screening  Typical number of drinks in a day: 0  Typical number of drinks in a week: 0  Interpretation: Low risk drinking behavior.    Single Item Drug Screening:  How often have you used an illegal drug (including marijuana) or a prescription medication for non-medical reasons in the past year? never    Single Item Drug Screen Score: 0  Interpretation: Negative screen for possible drug use disorder    Social Drivers of Health     Financial Resource Strain: Low Risk  (1/29/2024)    Overall Financial Resource Strain (CARDIA)     Difficulty of Paying Living Expenses: Not hard at all   Food Insecurity: No Food Insecurity (1/30/2025)    Hunger Vital Sign     Worried About Running Out of Food in the Last Year: Never true  "    Ran Out of Food in the Last Year: Never true   Transportation Needs: No Transportation Needs (1/30/2025)    PRAPARE - Transportation     Lack of Transportation (Medical): No     Lack of Transportation (Non-Medical): No   Housing Stability: Low Risk  (1/30/2025)    Housing Stability Vital Sign     Unable to Pay for Housing in the Last Year: No     Number of Times Moved in the Last Year: 0     Homeless in the Last Year: No   Utilities: Not At Risk (1/30/2025)    [facility]     Threatened with loss of utilities: No     No results found.    Objective   /74 (BP Location: Left arm, Patient Position: Sitting, Cuff Size: Standard)   Pulse 76   Temp 98.3 °F (36.8 °C) (Tympanic)   Resp 18   Ht 5' 1\" (1.549 m)   Wt 56.9 kg (125 lb 6.4 oz)   SpO2 98%   BMI 23.69 kg/m²     Physical Exam  Vitals and nursing note reviewed.   Constitutional:       General: She is not in acute distress.     Appearance: Normal appearance. She is well-developed and well-groomed. She is not ill-appearing, toxic-appearing or diaphoretic.      Comments: Appears much younger than stated age   HENT:      Head: Normocephalic and atraumatic.      Right Ear: Tympanic membrane, ear canal and external ear normal.      Left Ear: Tympanic membrane, ear canal and external ear normal.      Nose: Nose normal.      Mouth/Throat:      Lips: Pink.      Mouth: Mucous membranes are moist.      Pharynx: Oropharynx is clear. Uvula midline.   Eyes:      Conjunctiva/sclera: Conjunctivae normal.   Cardiovascular:      Rate and Rhythm: Normal rate and regular rhythm.      Pulses: Normal pulses.      Heart sounds:      No friction rub. No gallop.   Pulmonary:      Effort: Pulmonary effort is normal.      Breath sounds: Normal breath sounds and air entry.   Abdominal:      Palpations: Abdomen is soft. There is no hepatomegaly, splenomegaly or mass.      Tenderness: There is no abdominal tenderness.   Musculoskeletal:      Right lower leg: No edema.      Left " lower leg: No edema.   Skin:     Coloration: Skin is not pale.   Neurological:      General: No focal deficit present.      Mental Status: She is alert and oriented to person, place, and time.      Gait: Gait normal.   Psychiatric:         Mood and Affect: Mood normal.         Behavior: Behavior normal. Behavior is cooperative.         Cognition and Memory: Cognition and memory normal.         Judgment: Judgment normal.

## 2025-01-30 NOTE — ASSESSMENT & PLAN NOTE
Stable, compensated, cpm  Wt Readings from Last 3 Encounters:   01/30/25 56.9 kg (125 lb 6.4 oz)   01/22/25 55.8 kg (123 lb)   01/07/25 54.9 kg (121 lb)

## 2025-01-30 NOTE — PATIENT INSTRUCTIONS
Medicare Preventive Visit Patient Instructions  Thank you for completing your Welcome to Medicare Visit or Medicare Annual Wellness Visit today. Your next wellness visit will be due in one year (1/31/2026).  The screening/preventive services that you may require over the next 5-10 years are detailed below. Some tests may not apply to you based off risk factors and/or age. Screening tests ordered at today's visit but not completed yet may show as past due. Also, please note that scanned in results may not display below.  Preventive Screenings:  Service Recommendations Previous Testing/Comments   Colorectal Cancer Screening  * Colonoscopy    * Fecal Occult Blood Test (FOBT)/Fecal Immunochemical Test (FIT)  * Fecal DNA/Cologuard Test  * Flexible Sigmoidoscopy Age: 45-75 years old   Colonoscopy: every 10 years (may be performed more frequently if at higher risk)  OR  FOBT/FIT: every 1 year  OR  Cologuard: every 3 years  OR  Sigmoidoscopy: every 5 years  Screening may be recommended earlier than age 45 if at higher risk for colorectal cancer. Also, an individualized decision between you and your healthcare provider will decide whether screening between the ages of 76-85 would be appropriate. Colonoscopy: Not on file  FOBT/FIT: Not on file  Cologuard: Not on file  Sigmoidoscopy: Not on file    Screening Not Indicated     Breast Cancer Screening Age: 40+ years old  Frequency: every 1-2 years  Not required if history of left and right mastectomy Mammogram: 02/01/2019        Cervical Cancer Screening Between the ages of 21-29, pap smear recommended once every 3 years.   Between the ages of 30-65, can perform pap smear with HPV co-testing every 5 years.   Recommendations may differ for women with a history of total hysterectomy, cervical cancer, or abnormal pap smears in past. Pap Smear: Not on file    Screening Not Indicated   Hepatitis C Screening Once for adults born between 1945 and 1965  More frequently in patients at  high risk for Hepatitis C Hep C Antibody: 11/10/2023    Screening Current   Diabetes Screening 1-2 times per year if you're at risk for diabetes or have pre-diabetes Fasting glucose: 95 mg/dL (12/18/2024)  A1C: No results in last 5 years (No results in last 5 years)  Screening Current   Cholesterol Screening Once every 5 years if you don't have a lipid disorder. May order more often based on risk factors. Lipid panel: 10/12/2023    Screening Not Indicated  History Lipid Disorder     Other Preventive Screenings Covered by Medicare:  Abdominal Aortic Aneurysm (AAA) Screening: covered once if your at risk. You're considered to be at risk if you have a family history of AAA.  Lung Cancer Screening: covers low dose CT scan once per year if you meet all of the following conditions: (1) Age 55-77; (2) No signs or symptoms of lung cancer; (3) Current smoker or have quit smoking within the last 15 years; (4) You have a tobacco smoking history of at least 20 pack years (packs per day multiplied by number of years you smoked); (5) You get a written order from a healthcare provider.  Glaucoma Screening: covered annually if you're considered high risk: (1) You have diabetes OR (2) Family history of glaucoma OR (3)  aged 50 and older OR (4)  American aged 65 and older  Osteoporosis Screening: covered every 2 years if you meet one of the following conditions: (1) You're estrogen deficient and at risk for osteoporosis based off medical history and other findings; (2) Have a vertebral abnormality; (3) On glucocorticoid therapy for more than 3 months; (4) Have primary hyperparathyroidism; (5) On osteoporosis medications and need to assess response to drug therapy.   Last bone density test (DXA Scan): 10/29/2024.  HIV Screening: covered annually if you're between the age of 15-65. Also covered annually if you are younger than 15 and older than 65 with risk factors for HIV infection. For pregnant patients, it is  covered up to 3 times per pregnancy.    Immunizations:  Immunization Recommendations   Influenza Vaccine Annual influenza vaccination during flu season is recommended for all persons aged >= 6 months who do not have contraindications   Pneumococcal Vaccine   * Pneumococcal conjugate vaccine = PCV13 (Prevnar 13), PCV15 (Vaxneuvance), PCV20 (Prevnar 20)  * Pneumococcal polysaccharide vaccine = PPSV23 (Pneumovax) Adults 19-65 yo with certain risk factors or if 65+ yo  If never received any pneumonia vaccine: recommend Prevnar 20 (PCV20)  Give PCV20 if previously received 1 dose of PCV13 or PPSV23   Hepatitis B Vaccine 3 dose series if at intermediate or high risk (ex: diabetes, end stage renal disease, liver disease)   Respiratory syncytial virus (RSV) Vaccine - COVERED BY MEDICARE PART D  * RSVPreF3 (Arexvy) CDC recommends that adults 60 years of age and older may receive a single dose of RSV vaccine using shared clinical decision-making (SCDM)   Tetanus (Td) Vaccine - COST NOT COVERED BY MEDICARE PART B Following completion of primary series, a booster dose should be given every 10 years to maintain immunity against tetanus. Td may also be given as tetanus wound prophylaxis.   Tdap Vaccine - COST NOT COVERED BY MEDICARE PART B Recommended at least once for all adults. For pregnant patients, recommended with each pregnancy.   Shingles Vaccine (Shingrix) - COST NOT COVERED BY MEDICARE PART B  2 shot series recommended in those 19 years and older who have or will have weakened immune systems or those 50 years and older     Health Maintenance Due:      Topic Date Due   • Hepatitis C Screening  Completed     Immunizations Due:      Topic Date Due   • COVID-19 Vaccine (6 - 2024-25 season) 09/01/2024     Advance Directives   What are advance directives?  Advance directives are legal documents that state your wishes and plans for medical care. These plans are made ahead of time in case you lose your ability to make decisions  for yourself. Advance directives can apply to any medical decision, such as the treatments you want, and if you want to donate organs.   What are the types of advance directives?  There are many types of advance directives, and each state has rules about how to use them. You may choose a combination of any of the following:  Living will:  This is a written record of the treatment you want. You can also choose which treatments you do not want, which to limit, and which to stop at a certain time. This includes surgery, medicine, IV fluid, and tube feedings.   Durable power of  for healthcare (DPAHC):  This is a written record that states who you want to make healthcare choices for you when you are unable to make them for yourself. This person, called a proxy, is usually a family member or a friend. You may choose more than 1 proxy.  Do not resuscitate (DNR) order:  A DNR order is used in case your heart stops beating or you stop breathing. It is a request not to have certain forms of treatment, such as CPR. A DNR order may be included in other types of advance directives.  Medical directive:  This covers the care that you want if you are in a coma, near death, or unable to make decisions for yourself. You can list the treatments you want for each condition. Treatment may include pain medicine, surgery, blood transfusions, dialysis, IV or tube feedings, and a ventilator (breathing machine).  Values history:  This document has questions about your views, beliefs, and how you feel and think about life. This information can help others choose the care that you would choose.  Why are advance directives important?  An advance directive helps you control your care. Although spoken wishes may be used, it is better to have your wishes written down. Spoken wishes can be misunderstood, or not followed. Treatments may be given even if you do not want them. An advance directive may make it easier for your family to make  difficult choices about your care.       © Copyright [employer] 2018 Information is for End User's use only and may not be sold, redistributed or otherwise used for commercial purposes. All illustrations and images included in CareNotes® are the copyrighted property of [employer] or [employer]

## 2025-03-08 DIAGNOSIS — E78.5 HYPERLIPIDEMIA, UNSPECIFIED HYPERLIPIDEMIA TYPE: ICD-10-CM

## 2025-03-11 ENCOUNTER — TELEPHONE (OUTPATIENT)
Dept: FAMILY MEDICINE CLINIC | Facility: CLINIC | Age: 89
End: 2025-03-11

## 2025-03-11 DIAGNOSIS — E78.5 HYPERLIPIDEMIA, UNSPECIFIED HYPERLIPIDEMIA TYPE: Primary | ICD-10-CM

## 2025-03-11 RX ORDER — ATORVASTATIN CALCIUM 40 MG/1
40 TABLET, FILM COATED ORAL
Qty: 90 TABLET | Refills: 1 | Status: SHIPPED | OUTPATIENT
Start: 2025-03-11

## 2025-03-11 NOTE — TELEPHONE ENCOUNTER
Please let pt know that I placed order for lipid panel to obtain when she goes for labs for endocrinologist

## 2025-03-20 ENCOUNTER — HOSPITAL ENCOUNTER (OUTPATIENT)
Dept: RADIOLOGY | Facility: MEDICAL CENTER | Age: 89
Discharge: HOME | End: 2025-03-20
Payer: MEDICARE

## 2025-03-20 VITALS — WEIGHT: 125 LBS | HEIGHT: 61 IN | BODY MASS INDEX: 23.6 KG/M2

## 2025-03-20 DIAGNOSIS — Z12.31 ENCOUNTER FOR SCREENING MAMMOGRAM FOR BREAST CANCER: ICD-10-CM

## 2025-03-20 DIAGNOSIS — Z80.3 FAMILY HISTORY OF BREAST CANCER: ICD-10-CM

## 2025-03-20 PROCEDURE — 77067 SCR MAMMO BI INCL CAD: CPT

## 2025-03-20 PROCEDURE — 77063 BREAST TOMOSYNTHESIS BI: CPT

## 2025-03-24 ENCOUNTER — APPOINTMENT (OUTPATIENT)
Dept: LAB | Facility: CLINIC | Age: 89
End: 2025-03-24
Payer: MEDICARE

## 2025-03-24 DIAGNOSIS — M81.0 AGE-RELATED OSTEOPOROSIS WITHOUT CURRENT PATHOLOGICAL FRACTURE: ICD-10-CM

## 2025-03-24 DIAGNOSIS — E78.5 HYPERLIPIDEMIA, UNSPECIFIED HYPERLIPIDEMIA TYPE: ICD-10-CM

## 2025-03-24 DIAGNOSIS — Z79.899 HIGH RISK MEDICATION USE: ICD-10-CM

## 2025-03-24 LAB
25(OH)D3 SERPL-MCNC: 54.2 NG/ML (ref 30–100)
ALBUMIN SERPL BCG-MCNC: 4.5 G/DL (ref 3.5–5)
ALP SERPL-CCNC: 69 U/L (ref 34–104)
ALT SERPL W P-5'-P-CCNC: 24 U/L (ref 7–52)
ANION GAP SERPL CALCULATED.3IONS-SCNC: 9 MMOL/L (ref 4–13)
AST SERPL W P-5'-P-CCNC: 21 U/L (ref 13–39)
BASOPHILS # BLD AUTO: 0.05 THOUSANDS/ÂΜL (ref 0–0.1)
BASOPHILS NFR BLD AUTO: 1 % (ref 0–1)
BILIRUB SERPL-MCNC: 0.65 MG/DL (ref 0.2–1)
BUN SERPL-MCNC: 24 MG/DL (ref 5–25)
CALCIUM SERPL-MCNC: 9.9 MG/DL (ref 8.4–10.2)
CHLORIDE SERPL-SCNC: 101 MMOL/L (ref 96–108)
CHOLEST SERPL-MCNC: 151 MG/DL (ref ?–200)
CO2 SERPL-SCNC: 29 MMOL/L (ref 21–32)
CREAT SERPL-MCNC: 0.78 MG/DL (ref 0.6–1.3)
EOSINOPHIL # BLD AUTO: 0.19 THOUSAND/ÂΜL (ref 0–0.61)
EOSINOPHIL NFR BLD AUTO: 4 % (ref 0–6)
ERYTHROCYTE [DISTWIDTH] IN BLOOD BY AUTOMATED COUNT: 13.2 % (ref 11.6–15.1)
GFR SERPL CREATININE-BSD FRML MDRD: 68 ML/MIN/1.73SQ M
GLUCOSE P FAST SERPL-MCNC: 104 MG/DL (ref 65–99)
HCT VFR BLD AUTO: 37.9 % (ref 34.8–46.1)
HDLC SERPL-MCNC: 64 MG/DL
HGB BLD-MCNC: 12.6 G/DL (ref 11.5–15.4)
IMM GRANULOCYTES # BLD AUTO: 0.01 THOUSAND/UL (ref 0–0.2)
IMM GRANULOCYTES NFR BLD AUTO: 0 % (ref 0–2)
LDLC SERPL CALC-MCNC: 68 MG/DL (ref 0–100)
LYMPHOCYTES # BLD AUTO: 1.58 THOUSANDS/ÂΜL (ref 0.6–4.47)
LYMPHOCYTES NFR BLD AUTO: 35 % (ref 14–44)
MCH RBC QN AUTO: 32.7 PG (ref 26.8–34.3)
MCHC RBC AUTO-ENTMCNC: 33.2 G/DL (ref 31.4–37.4)
MCV RBC AUTO: 98 FL (ref 82–98)
MONOCYTES # BLD AUTO: 0.49 THOUSAND/ÂΜL (ref 0.17–1.22)
MONOCYTES NFR BLD AUTO: 11 % (ref 4–12)
NEUTROPHILS # BLD AUTO: 2.15 THOUSANDS/ÂΜL (ref 1.85–7.62)
NEUTS SEG NFR BLD AUTO: 49 % (ref 43–75)
NRBC BLD AUTO-RTO: 0 /100 WBCS
PLATELET # BLD AUTO: 185 THOUSANDS/UL (ref 149–390)
PMV BLD AUTO: 10.5 FL (ref 8.9–12.7)
POTASSIUM SERPL-SCNC: 3.9 MMOL/L (ref 3.5–5.3)
PROT SERPL-MCNC: 7.4 G/DL (ref 6.4–8.4)
PTH-INTACT SERPL-MCNC: 78.6 PG/ML (ref 12–88)
RBC # BLD AUTO: 3.85 MILLION/UL (ref 3.81–5.12)
SODIUM SERPL-SCNC: 139 MMOL/L (ref 135–147)
TRIGL SERPL-MCNC: 96 MG/DL (ref ?–150)
WBC # BLD AUTO: 4.47 THOUSAND/UL (ref 4.31–10.16)

## 2025-03-24 PROCEDURE — 82306 VITAMIN D 25 HYDROXY: CPT

## 2025-03-24 PROCEDURE — 83970 ASSAY OF PARATHORMONE: CPT

## 2025-03-24 PROCEDURE — 36415 COLL VENOUS BLD VENIPUNCTURE: CPT

## 2025-03-24 PROCEDURE — 85025 COMPLETE CBC W/AUTO DIFF WBC: CPT

## 2025-03-24 PROCEDURE — 82523 COLLAGEN CROSSLINKS: CPT

## 2025-03-24 PROCEDURE — 80061 LIPID PANEL: CPT

## 2025-03-24 PROCEDURE — 84165 PROTEIN E-PHORESIS SERUM: CPT

## 2025-03-24 PROCEDURE — 80053 COMPREHEN METABOLIC PANEL: CPT

## 2025-03-25 ENCOUNTER — RESULTS FOLLOW-UP (OUTPATIENT)
Dept: ENDOCRINOLOGY | Facility: CLINIC | Age: 89
End: 2025-03-25

## 2025-03-25 DIAGNOSIS — I10 ESSENTIAL HYPERTENSION: ICD-10-CM

## 2025-03-25 LAB
ALBUMIN SERPL ELPH-MCNC: 4.23 G/DL (ref 3.2–5.1)
ALBUMIN SERPL ELPH-MCNC: 59.6 % (ref 48–70)
ALPHA1 GLOB SERPL ELPH-MCNC: 0.28 G/DL (ref 0.15–0.47)
ALPHA1 GLOB SERPL ELPH-MCNC: 4 % (ref 1.8–7)
ALPHA2 GLOB SERPL ELPH-MCNC: 0.69 G/DL (ref 0.42–1.04)
ALPHA2 GLOB SERPL ELPH-MCNC: 9.7 % (ref 5.9–14.9)
BETA GLOB ABNORMAL SERPL ELPH-MCNC: 0.43 G/DL (ref 0.31–0.57)
BETA1 GLOB SERPL ELPH-MCNC: 6 % (ref 4.7–7.7)
BETA2 GLOB SERPL ELPH-MCNC: 3.8 % (ref 3.1–7.9)
BETA2+GAMMA GLOB SERPL ELPH-MCNC: 0.27 G/DL (ref 0.2–0.58)
GAMMA GLOB ABNORMAL SERPL ELPH-MCNC: 1.2 G/DL (ref 0.4–1.66)
GAMMA GLOB SERPL ELPH-MCNC: 16.9 % (ref 6.9–22.3)
IGG/ALB SER: 1.48 {RATIO} (ref 1.1–1.8)
PROT PATTERN SERPL ELPH-IMP: NORMAL
PROT SERPL-MCNC: 7.1 G/DL (ref 6.4–8.2)

## 2025-03-25 PROCEDURE — 84165 PROTEIN E-PHORESIS SERUM: CPT | Performed by: PATHOLOGY

## 2025-03-26 ENCOUNTER — APPOINTMENT (OUTPATIENT)
Dept: LAB | Facility: CLINIC | Age: 89
End: 2025-03-26
Payer: MEDICARE

## 2025-03-26 DIAGNOSIS — C66.2 URETER MALIGNANT NEOPLASM, LEFT (HCC): ICD-10-CM

## 2025-03-26 DIAGNOSIS — F32.A DEPRESSION, UNSPECIFIED DEPRESSION TYPE: ICD-10-CM

## 2025-03-26 PROCEDURE — 84166 PROTEIN E-PHORESIS/URINE/CSF: CPT

## 2025-03-26 PROCEDURE — 82570 ASSAY OF URINE CREATININE: CPT

## 2025-03-26 PROCEDURE — 86335 IMMUNFIX E-PHORSIS/URINE/CSF: CPT

## 2025-03-26 PROCEDURE — 88112 CYTOPATH CELL ENHANCE TECH: CPT | Performed by: PATHOLOGY

## 2025-03-26 PROCEDURE — 82523 COLLAGEN CROSSLINKS: CPT

## 2025-03-27 LAB
ALBUMIN UR ELPH-MCNC: 57.1 %
ALPHA1 GLOB MFR UR ELPH: 5 %
ALPHA2 GLOB MFR UR ELPH: 9.6 %
B-GLOBULIN MFR UR ELPH: 9.9 %
GAMMA GLOB MFR UR ELPH: 18.4 %
INTERPRETATION UR IFE-IMP: NORMAL
PROT PATTERN UR ELPH-IMP: NORMAL
PROT UR-MCNC: 61.2 MG/DL

## 2025-03-27 PROCEDURE — 86335 IMMUNFIX E-PHORSIS/URINE/CSF: CPT | Performed by: PATHOLOGY

## 2025-03-27 PROCEDURE — 84166 PROTEIN E-PHORESIS/URINE/CSF: CPT | Performed by: PATHOLOGY

## 2025-03-27 RX ORDER — LOSARTAN POTASSIUM 100 MG/1
100 TABLET ORAL DAILY
Qty: 90 TABLET | Refills: 1 | Status: SHIPPED | OUTPATIENT
Start: 2025-03-27

## 2025-03-27 RX ORDER — DULOXETIN HYDROCHLORIDE 30 MG/1
30 CAPSULE, DELAYED RELEASE ORAL DAILY
Qty: 90 CAPSULE | Refills: 1 | Status: SHIPPED | OUTPATIENT
Start: 2025-03-27

## 2025-03-27 NOTE — PROGRESS NOTES
Name: Radha Ma      : 1935      MRN: [id number]  Encounter Provider: Nicki Olsen DO  Encounter Date: 3/28/2025   Encounter department: [facility] [address]  :  Assessment & Plan  Rheumatoid arthritis involving multiple sites with positive rheumatoid factor (HCC)  Patient is an 89-year-old female presenting for follow-up of longstanding seropositive RA.  Patient is currently on methotrexate 15 mg once weekly with folic acid supplements.  No prolonged morning stiffness or joint swelling.  No synovitis or joint tenderness on exam.  Most recent labs reviewed and stable.  Patient does report some persistent ulcers in her mouth, however reports they are getting better.  -Continue methotrexate 15 mg once weekly  -Increase folic acid to 2 mg daily to see if it helps with oral ulcers  -CBC and CMP every 3 months while on methotrexate  -Hold methotrexate if concerns for any active infection  Orders:    folic acid (FOLVITE) 1 mg tablet; Take 2 tablets (2 mg total) by mouth daily    methotrexate 2.5 MG tablet; Take 6 tablets (15 mg total) by mouth once a week    High risk medication use  Patient's rheumatologic medication(s) require intensive monitoring for toxicity. Does not endorse any significant side effects.         Primary generalized (osteo)arthritis  Patient has generalized osteoarthritis of multiple joints.  Currently most bothersome joint is left CMC.  Discussed that the pain seems most consistent with osteoarthritis.  We discussed conservative measures such as trying Voltaren gel as well as using a thumb splint brace.  Continue Tylenol, no more than 3 g a day.  If significant worsening of symptoms, can consider intra-articular steroids.       RTC in 3 months     Pertinent Medical History   Seropositive RA (RF 40, CCP negative)   Per chart review, patient was previously following at [facility] with Dr. Echevarria for seropositive RA, generalized osteoarthritis and severe osteoporosis.  Patient  was found to have inflammatory joint pain of the hands and wrist.  Swelling was around mostly the MCPs.  RF 40 and CCP negative. Patient started on MTX.  - 12/27/24: Established care. Doing well on MTX 15mg weekly with folic acid. Continue medications     2. Osteoporosis   Patient has a history of vertebral fractures in the past.  In 2018, patient slipped in the shower and fell on her back and broke her left femur.  Patient was treated with Prolia from [id number]. Patient now follows with endocrinology for osteoporosis.     History of Present Illness   Radha Ma is a 89 y.o. female with a hx of CHF, HTN, GERD, carotid artery stenosis, hyperparathyroidism  who presents for f/u of RA.    HPI   Patient reports that her most bothersome joint is her left thumb.  Reports the mornings feel okay, however it gets worse as the day goes on.  Patient states that she has trouble with using the joints.  Reports that she is unable to open jars and has a device to help her do that.  Has a thumb brace that she uses as needed.  Patient takes Tylenol 3 times a day to help with her OA related pain.  Also reports neck pain which has been better since doing exercises recently.  Denies prolonged morning stiffness or joint swelling.  Patient currently takes methotrexate 15 mg once a week.  Patient has noticed some ulcers developing in her mouth, however they have been better recently.    Continues to follow with endocrinology for osteoporosis.    Review of Systems  Complete ROS conducted as per HPI. In addition, denies:  Fever  Photosensitive rash  Sicca symptoms  Muscle weakness  Uveitis  Dactylitis  Chest pain  SOB  Pleurisy  Gross hematuria  Foamy urine  Joint issues other than noted above    Current Outpatient Medications on File Prior to Visit   Medication Sig Dispense Refill    acetaminophen (Tylenol 8 Hour) 650 mg CR tablet Take 1 tablet (650 mg total) by mouth daily at bedtime      amLODIPine (NORVASC) 5 mg tablet TAKE 1 TABLET  "BY MOUTH TWICE  DAILY 180 tablet 1    aspirin 81 MG tablet Take 81 mg by mouth daily      atorvastatin (LIPITOR) 40 mg tablet TAKE 1 TABLET BY MOUTH DAILY  AFTER DINNER 90 tablet 1    carvedilol (COREG) 12.5 mg tablet TAKE ONE-HALF TABLET BY MOUTH  TWICE DAILY WITH MEALS 90 tablet 1    Cholecalciferol 2000 units TABS Take 2,000 Units by mouth daily Per pt stopped 8/26 per MD instructions      DULoxetine (CYMBALTA) 30 mg delayed release capsule TAKE 1 CAPSULE BY MOUTH DAILY 90 capsule 1    fexofenadine (ALLEGRA) 180 MG tablet Take 180 mg by mouth daily as needed       losartan (COZAAR) 100 MG tablet TAKE 1 TABLET BY MOUTH DAILY 90 tablet 1    MULTIPLE VITAMIN PO Take by mouth daily Per pt stopped 8/26 per MD instructions      omeprazole (PriLOSEC) 40 MG capsule Take 1 capsule (40 mg total) by mouth daily 90 capsule 3    ondansetron (ZOFRAN) 4 mg tablet Take 1 tablet by mouth every 8 (eight) hours as needed      [DISCONTINUED] folic acid (FOLVITE) 1 mg tablet Take 1 mg by mouth daily      bumetanide (BUMEX) 1 mg tablet TAKE 1/2 TABLET BY MOUTH DAILY  (MAY TAKE 1 FULL TABLET IF  NEEDED) (Patient taking differently: TAKE 1/2 TABLET BY MOUTH DAILY  (MAY TAKE 1 FULL TABLET IF  NEEDED)) 90 tablet 1    [DISCONTINUED] methotrexate 10 MG tablet Take 1 tablet (10 mg total) by mouth once a week Do not start before May 28, 2024. (Patient not taking: Reported on 1/30/2025)      [DISCONTINUED] methotrexate 2.5 MG tablet Takes 6 tabs on Tuesdays       No current facility-administered medications on file prior to visit.      Social History     Tobacco Use    Smoking status: Never    Smokeless tobacco: Never   Vaping Use    Vaping status: Never Used   Substance and Sexual Activity    Alcohol use: Never    Drug use: Never    Sexual activity: Not on file         Objective   /64 (BP Location: Left arm, Patient Position: Sitting, Cuff Size: Standard)   Pulse 87   Temp (!) 97.3 °F (36.3 °C) (Tympanic)   Ht 5' 1\" (1.549 m)   Wt " 56.8 kg (125 lb 3.2 oz)   SpO2 99%   BMI 23.66 kg/m²     Physical Exam  General appearance: normal appearing, no acute distress  Skin: normal, no rashes  HEENT: normal, moist oropharynx, no nasal or oral ulcers  Lymph nodes: no palpable adenopathy  Lungs: normal respiratory effort, comfortable on room air, lungs clear to auscultation b/l   Heart: normal heart sounds, normal rate, normal rhythm,  Abdomen: soft, normal bowel sounds, no tenderness  Neurologic: no obvious neurological deficits   Extremities: no edema, warm and well perfused     Musculoskeletal Exam:   - Observation: [place] and Simba nodes present, squaring of the CMC joints bilaterally  - Palpation: no joint tenderness  - Synovitis: absent  - Joint effusions: absent  - ROM: intact throughout  - Muscle Strength: 5/5 throughout       Recent labs:  Lab Results   Component Value Date/Time    SODIUM 139 03/24/2025 07:57 AM    K 3.9 03/24/2025 07:57 AM    BUN 24 03/24/2025 07:57 AM    CREATININE 0.78 03/24/2025 07:57 AM    GLUC 88 05/21/2024 05:55 AM    CALCIUM 9.9 03/24/2025 07:57 AM    AST 21 03/24/2025 07:57 AM    ALT 24 03/24/2025 07:57 AM    ALB 4.5 03/24/2025 07:57 AM    TP 7.4 03/24/2025 07:57 AM    TP 7.1 03/24/2025 07:57 AM    EGFR 68 03/24/2025 07:57 AM     Lab Results   Component Value Date/Time    HGB 12.6 03/24/2025 07:57 AM    WBC 4.47 03/24/2025 07:57 AM     03/24/2025 07:57 AM    INR 0.99 04/08/2018 02:03 AM    PTT 30 04/08/2018 02:03 AM     Lab Results   Component Value Date/Time    COJ4QWOAEZHV 3.247 01/29/2024 12:07 PM       RF 40  CCP negative     I have personally reviewed notes, labs, and imaging available in the chart.     Nicki Olsen DO, CCD, [facility]  [facility]

## 2025-03-28 ENCOUNTER — OFFICE VISIT (OUTPATIENT)
Age: 89
End: 2025-03-28
Payer: MEDICARE

## 2025-03-28 VITALS
HEART RATE: 87 BPM | BODY MASS INDEX: 23.64 KG/M2 | OXYGEN SATURATION: 99 % | SYSTOLIC BLOOD PRESSURE: 130 MMHG | WEIGHT: 125.2 LBS | DIASTOLIC BLOOD PRESSURE: 64 MMHG | TEMPERATURE: 97.3 F | HEIGHT: 61 IN

## 2025-03-28 DIAGNOSIS — M05.79 RHEUMATOID ARTHRITIS INVOLVING MULTIPLE SITES WITH POSITIVE RHEUMATOID FACTOR (HCC): Primary | ICD-10-CM

## 2025-03-28 DIAGNOSIS — M15.0 PRIMARY GENERALIZED (OSTEO)ARTHRITIS: ICD-10-CM

## 2025-03-28 DIAGNOSIS — Z79.899 HIGH RISK MEDICATION USE: ICD-10-CM

## 2025-03-28 LAB — COLLAGEN CTX SERPL-MCNC: 441 PG/ML

## 2025-03-28 PROCEDURE — G2211 COMPLEX E/M VISIT ADD ON: HCPCS | Performed by: STUDENT IN AN ORGANIZED HEALTH CARE EDUCATION/TRAINING PROGRAM

## 2025-03-28 PROCEDURE — 88112 CYTOPATH CELL ENHANCE TECH: CPT | Performed by: PATHOLOGY

## 2025-03-28 PROCEDURE — 99214 OFFICE O/P EST MOD 30 MIN: CPT | Performed by: STUDENT IN AN ORGANIZED HEALTH CARE EDUCATION/TRAINING PROGRAM

## 2025-03-28 RX ORDER — FOLIC ACID 1 MG/1
2 TABLET ORAL DAILY
Qty: 60 TABLET | Refills: 3 | Status: SHIPPED | OUTPATIENT
Start: 2025-03-28

## 2025-03-28 RX ORDER — METHOTREXATE 2.5 MG/1
15 TABLET ORAL WEEKLY
Qty: 24 TABLET | Refills: 3 | Status: SHIPPED | OUTPATIENT
Start: 2025-03-28

## 2025-04-11 LAB
COLLAGEN NTX UR-SCNC: 327 NMOL BCE
COLLAGEN NTX/CREAT UR-SRTO: 52 NM BCE/MM CR (ref 0–89)
CREAT UR-MCNC: 70.4 MG/DL
INTERPRETIVE GUIDE:: NORMAL

## 2025-04-29 ENCOUNTER — OFFICE VISIT (OUTPATIENT)
Dept: CARDIOLOGY CLINIC | Facility: CLINIC | Age: 89
End: 2025-04-29
Payer: MEDICARE

## 2025-04-29 VITALS
WEIGHT: 124 LBS | SYSTOLIC BLOOD PRESSURE: 132 MMHG | HEART RATE: 79 BPM | DIASTOLIC BLOOD PRESSURE: 68 MMHG | OXYGEN SATURATION: 98 % | BODY MASS INDEX: 23.43 KG/M2

## 2025-04-29 DIAGNOSIS — Z95.820 S/P ANGIOPLASTY WITH STENT: ICD-10-CM

## 2025-04-29 DIAGNOSIS — I25.10 CORONARY ARTERY DISEASE INVOLVING NATIVE CORONARY ARTERY OF NATIVE HEART WITHOUT ANGINA PECTORIS: Primary | ICD-10-CM

## 2025-04-29 DIAGNOSIS — I50.32 CHRONIC DIASTOLIC (CONGESTIVE) HEART FAILURE (HCC): Chronic | ICD-10-CM

## 2025-04-29 DIAGNOSIS — I65.23 CAROTID ARTERY STENOSIS, ASYMPTOMATIC, BILATERAL: ICD-10-CM

## 2025-04-29 DIAGNOSIS — R60.0 BILATERAL LEG EDEMA: ICD-10-CM

## 2025-04-29 DIAGNOSIS — E78.2 MIXED HYPERLIPIDEMIA: ICD-10-CM

## 2025-04-29 PROCEDURE — 99214 OFFICE O/P EST MOD 30 MIN: CPT | Performed by: INTERNAL MEDICINE

## 2025-04-29 NOTE — PROGRESS NOTES
Cardiology Follow Up    Radha Ma  1935  [id number]  HEART & VASCULAR [facility] [place]  [address]  [place] [address]    1. Coronary artery disease involving native coronary artery of native heart without angina pectoris  Echo complete w/ contrast if indicated      2. Carotid artery stenosis, asymptomatic, bilateral  Echo complete w/ contrast if indicated    VAS carotid complete study      3. Mixed hyperlipidemia  Echo complete w/ contrast if indicated      4. Bilateral leg edema  Echo complete w/ contrast if indicated      5. Chronic diastolic (congestive) heart failure (HCC)        6. S/P angioplasty with stent            Discussion/Summary: Coronary disease stable no active angina.  Blood pressure well-controlled lipids have been doing well.  Continues to remain very physically active.  She is euvolemic on exam.  She is due for an echocardiogram and 2-year follow-up on bilateral carotid stenosis.    Interval History:   89-year-old female history of coronary artery disease status post stents in 2011, chronic diastolic congestive heart failure, hypertension, hyperlipidemia presents for routine scheduled follow-up visit.  Unfortunately she fell coming out of the shower in April.  She suffered a femur fracture and underwent surgical correction.       Overall continues to remain active.  Denies any chest pain, shortness of breath, palpitations, lightheadedness, dizziness, or syncope.  There has been no lower extremity edema, PND, orthopnea.  She has been taking all medications as prescribed.  No claudication.         Problem List       Intertrochanteric fracture of left femur, closed, with routine healing, subsequent encounter    Essential hypertension    Medication side effect, initial encounter    Hyperlipidemia    Coronary artery disease without angina pectoris - S/P stent placement x 2 (2011)    Gastroesophageal reflux disease  without esophagitis    Chronic diastolic (congestive) heart failure (HCC) (Chronic)    Hyperthyroidism    Osteoporosis (Chronic)    Ambulatory dysfunction    Elderly person living alone    Pain in left leg    Low back pain without sciatica    S/P ORIF (open reduction internal fixation) fracture    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HCC)          Past Medical History:   Diagnosis Date    Bladder cancer (HCC)     had BCG treatments    Bladder cancer (HCC)     Closed displaced fracture of left trapezium bone 05/18/2017    Coronary artery disease     S/P stent placement x 2 in 2011    GERD (gastroesophageal reflux disease)     Hepatitis     got this from food back in 1970s, has not had a problem since    History of leukemia     in remission    History of stomach ulcers 1970    History of transfusion 1970    Hyperlipidemia     Hypertension     Hyperthyroidism 04/10/2018    Irritable bowel syndrome     Kidney stone     Kidney stones     Persistent cough for 3 weeks or longer 03/10/2020    Platelets decreased ([facility]) 02/20/2012    Pneumonia     Pt had in 2003 and 2004    PONV (postoperative nausea and vomiting)     Pruritic rash 09/03/2019    Pulmonary emboli ([facility]) 1970s    Raynaud disease     Shingles     Sleep apnea     Thrombocytopenia ([facility]) 02/20/2012     Social History     Socioeconomic History    Marital status:      Spouse name: Not on file    Number of children: Not on file    Years of education: Not on file    Highest education level: Not on file   Occupational History    Not on file   Tobacco Use    Smoking status: Never    Smokeless tobacco: Never   Vaping Use    Vaping status: Never Used   Substance and Sexual Activity    Alcohol use: Never    Drug use: Never    Sexual activity: Not on file   Other Topics Concern    Not on file   Social History Narrative    Advance directives declined by parents    Always uses seat belt     Social Drivers of Health     Financial Resource Strain: Low Risk   (1/29/2024)    Overall Financial Resource Strain (CARDIA)     Difficulty of Paying Living Expenses: Not hard at all   Food Insecurity: No Food Insecurity (1/30/2025)    Hunger Vital Sign     Worried About Running Out of Food in the Last Year: Never true     Ran Out of Food in the Last Year: Never true   Transportation Needs: No Transportation Needs (1/30/2025)    PRAPARE - Transportation     Lack of Transportation (Medical): No     Lack of Transportation (Non-Medical): No   Physical Activity: Not on file   Stress: Not on file   Social Connections: Not on file   Intimate Partner Violence: Not on file   Housing Stability: Low Risk  (1/30/2025)    Housing Stability Vital Sign     Unable to Pay for Housing in the Last Year: No     Number of Times Moved in the Last Year: 0     Homeless in the Last Year: No      Family History   Problem Relation Age of Onset    Arthritis Mother     Osteoporosis Mother     Heart disease Father     Hypertension Father     Breast cancer Daughter 50    No Known Problems Maternal Grandmother     No Known Problems Maternal Grandfather     No Known Problems Paternal Grandmother     No Known Problems Paternal Grandfather     Hypertension Brother     Prostate cancer Brother     No Known Problems Brother     Prostate cancer Son      Past Surgical History:   Procedure Laterality Date    CATARACT EXTRACTION      COLONOSCOPY      CORONARY ANGIOPLASTY WITH STENT PLACEMENT      stent x 2    CYSTOSCOPY      diagnostic - onset 4/4/17    EGD      EYE SURGERY      FL RETROGRADE PYELOGRAM  09/03/2024    FL RETROGRADE PYELOGRAM  1/7/2025    FRACTURE SURGERY      HERNIA REPAIR      HYSTERECTOMY      LEG SURGERY Left     OOPHORECTOMY      NE CYSTO W/INSERT URETERAL STENT Left 09/03/2024    Procedure: INSERTION STENT URETERAL;  Surgeon: Jet Fernandes MD;  [facility];  Service: Urology    NE CYSTO/PYELOSCOPY BX&/FULGURATION PELIVC LESION Left 09/03/2024    Procedure: CYSTOSCOPY, LEFT  URETEROSCOPY, RETROGRADE PYELOGRAM, LEFT URETERAL STENT PLACEMENT, LEFT URETERAL WASHING, LEFT URETERAL BIOPSY, LASER ABLATION OF LEFT URETERAL TUMOR;  Surgeon: Jet Fernandes MD;  Location: [facility];  Service: Urology    NE CYSTO/PYELOSCOPY BX&/FULGURATION PELIVC LESION Left 1/7/2025    Procedure: URETEROSCOPY WITH BIOPSY;  Surgeon: Carl Cooper DO;  [facility]: [facility];  Service: Urology    NE CYSTOURETHROSCOPY W/URETERAL CATHETERIZATION Left 09/03/2024    Procedure: CYSTOSCOPY RETROGRADE PYELOGRAM WITH INSERTION STENT URETERAL;  Surgeon: Jet Fernandes MD;  [facility];  Service: Urology    NE CYSTOURETHROSCOPY W/URETERAL CATHETERIZATION Left 1/7/2025    Procedure: CYSTOSCOPY RETROGRADE PYELOGRAM WITH INSERTION STENT URETERAL;  Surgeon: Carl Cooper DO;  [facility];  Service: Urology    NE NDSC WRST SURG W/RLS TRANSVRS CARPL LIGM Right 10/12/2021    Procedure: Right endoscopic carpal tunnel release;  Surgeon: Ghanshyam Rosado MD;  Location: [facility];  Service: Orthopedics    NE OPTX FEM SHFT FX W/INSJ IMED IMPLT W/WO SCREW Left 04/08/2018    Procedure: INSERTION NAIL IM FEMUR ANTEGRADE (TROCHANTERIC);  Surgeon: Lucrecia Boyle MD;  [facility];  Service: Orthopedics    NE REPAIR FIRST ABDOMINAL WALL HERNIA N/A 05/12/2021    Procedure: REPAIR HERNIA VENTRAL;  Surgeon: Rui Pickett MD;  [facility];  Service: General    TONSILLECTOMY         Current Outpatient Medications:     acetaminophen (Tylenol 8 Hour) 650 mg CR tablet, Take 1 tablet (650 mg total) by mouth daily at bedtime, Disp: , Rfl:     amLODIPine (NORVASC) 5 mg tablet, TAKE 1 TABLET BY MOUTH TWICE  DAILY, Disp: 180 tablet, Rfl: 1    aspirin 81 MG tablet, Take 81 mg by mouth daily, Disp: , Rfl:     atorvastatin (LIPITOR) 40 mg tablet, TAKE 1 TABLET BY MOUTH DAILY  AFTER DINNER, Disp: 90 tablet, Rfl: 1    carvedilol (COREG) 12.5 mg tablet, TAKE ONE-HALF TABLET BY MOUTH   "TWICE DAILY WITH MEALS, Disp: 90 tablet, Rfl: 1    Cholecalciferol 2000 units TABS, Take 2,000 Units by mouth daily Per pt stopped 8/26 per MD instructions, Disp: , Rfl:     DULoxetine (CYMBALTA) 30 mg delayed release capsule, TAKE 1 CAPSULE BY MOUTH DAILY, Disp: 90 capsule, Rfl: 1    fexofenadine (ALLEGRA) 180 MG tablet, Take 180 mg by mouth daily as needed , Disp: , Rfl:     folic acid (FOLVITE) 1 mg tablet, Take 2 tablets (2 mg total) by mouth daily, Disp: 60 tablet, Rfl: 3    losartan (COZAAR) 100 MG tablet, TAKE 1 TABLET BY MOUTH DAILY, Disp: 90 tablet, Rfl: 1    methotrexate 2.5 MG tablet, Take 6 tablets (15 mg total) by mouth once a week, Disp: 24 tablet, Rfl: 3    MULTIPLE VITAMIN PO, Take by mouth daily Per pt stopped 8/26 per MD instructions, Disp: , Rfl:     omeprazole (PriLOSEC) 40 MG capsule, Take 1 capsule (40 mg total) by mouth daily, Disp: 90 capsule, Rfl: 3    ondansetron (ZOFRAN) 4 mg tablet, Take 1 tablet by mouth every 8 (eight) hours as needed, Disp: , Rfl:     bumetanide (BUMEX) 1 mg tablet, TAKE 1/2 TABLET BY MOUTH DAILY  (MAY TAKE 1 FULL TABLET IF  NEEDED) (Patient taking differently: TAKE 1/2 TABLET BY MOUTH DAILY  (MAY TAKE 1 FULL TABLET IF  NEEDED)), Disp: 90 tablet, Rfl: 1  Allergies   Allergen Reactions    Lactose - Food Allergy GI Intolerance       Labs:     Chemistry        Component Value Date/Time    K 3.9 03/24/2025 0757     03/24/2025 0757    CO2 29 03/24/2025 0757    BUN 24 03/24/2025 0757    CREATININE 0.78 03/24/2025 0757        Component Value Date/Time    CALCIUM 9.9 03/24/2025 0757    ALKPHOS 69 03/24/2025 0757    AST 21 03/24/2025 0757    ALT 24 03/24/2025 0757            No results found for: \"CHOL\"  Lab Results   Component Value Date    HDL 64 03/24/2025    HDL 56 10/12/2023    HDL 65 11/04/2021     Lab Results   Component Value Date    LDLCALC 68 03/24/2025    LDLCALC 56 10/12/2023    LDLCALC 71 11/04/2021     Lab Results   Component Value Date    TRIG 96 03/24/2025 " "   TRIG 76 10/12/2023    TRIG 70 11/04/2021     No results found for: \"CHOLHDL\"    Imaging: No results found.    ECG:  NSR      Review of Systems   Constitutional: Negative.   HENT: Negative.     Eyes: Negative.    Cardiovascular: Negative.    Respiratory: Negative.     Endocrine: Negative.    Hematologic/Lymphatic: Negative.    Skin: Negative.    Musculoskeletal: Negative.    Gastrointestinal: Negative.    Genitourinary: Negative.    Neurological: Negative.    Psychiatric/Behavioral: Negative.         Vitals:    04/29/25 1531   BP: 132/68   Pulse: 79   SpO2: 98%     Vitals:    04/29/25 1531   Weight: 56.2 kg (124 lb)         Body mass index is 23.43 kg/m².    Physical Exam:  Vital signs reviewed  General:  Alert and cooperative, appears stated age, no acute distress  HEENT:  PERRLA, EOMI, no scleral icterus, no conjunctival pallor  Neck:  No lymphadenopathy, no thyromegaly, no carotid bruits, no elevated JVP  Heart:  Regular rate and rhythm, normal S1/S2, no S3/S4, no murmur, rubs or gallops.  PMI nondisplaced  Lungs:  Clear to auscultation bilaterally, no wheezes rales or rhonchi  Abdomen:  Soft, non-tender, positive bowel sounds, no rebound or guarding,   no organomegaly   Extremities:  Normal range of motion.  No clubbing, cyanosis or edema   Vascular:  2+ pedal pulses  Skin:  No rashes or lesions on exposed skin  Neurologic:  Cranial nerves II-XII grossly intact without focal deficits  Psych:  Normal mood and affect              "

## 2025-05-13 ENCOUNTER — TELEPHONE (OUTPATIENT)
Dept: ENDOCRINOLOGY | Facility: CLINIC | Age: 89
End: 2025-05-13

## 2025-05-13 ENCOUNTER — OFFICE VISIT (OUTPATIENT)
Dept: FAMILY MEDICINE CLINIC | Facility: CLINIC | Age: 89
End: 2025-05-13
Payer: MEDICARE

## 2025-05-13 ENCOUNTER — APPOINTMENT (OUTPATIENT)
Dept: RADIOLOGY | Facility: CLINIC | Age: 89
End: 2025-05-13
Attending: FAMILY MEDICINE
Payer: MEDICARE

## 2025-05-13 ENCOUNTER — OFFICE VISIT (OUTPATIENT)
Dept: ENDOCRINOLOGY | Facility: CLINIC | Age: 89
End: 2025-05-13
Payer: MEDICARE

## 2025-05-13 VITALS
HEIGHT: 61 IN | TEMPERATURE: 98 F | DIASTOLIC BLOOD PRESSURE: 70 MMHG | BODY MASS INDEX: 23.94 KG/M2 | RESPIRATION RATE: 18 BRPM | WEIGHT: 126.8 LBS | OXYGEN SATURATION: 98 % | HEART RATE: 92 BPM | SYSTOLIC BLOOD PRESSURE: 126 MMHG

## 2025-05-13 VITALS
OXYGEN SATURATION: 98 % | BODY MASS INDEX: 23.85 KG/M2 | HEIGHT: 61 IN | DIASTOLIC BLOOD PRESSURE: 70 MMHG | SYSTOLIC BLOOD PRESSURE: 126 MMHG | WEIGHT: 126.3 LBS | RESPIRATION RATE: 18 BRPM | TEMPERATURE: 98 F | HEART RATE: 92 BPM

## 2025-05-13 DIAGNOSIS — M79.671 RIGHT FOOT PAIN: Primary | ICD-10-CM

## 2025-05-13 DIAGNOSIS — M81.0 AGE-RELATED OSTEOPOROSIS WITHOUT CURRENT PATHOLOGICAL FRACTURE: Primary | ICD-10-CM

## 2025-05-13 PROCEDURE — 73630 X-RAY EXAM OF FOOT: CPT

## 2025-05-13 PROCEDURE — G2211 COMPLEX E/M VISIT ADD ON: HCPCS | Performed by: STUDENT IN AN ORGANIZED HEALTH CARE EDUCATION/TRAINING PROGRAM

## 2025-05-13 PROCEDURE — G2211 COMPLEX E/M VISIT ADD ON: HCPCS | Performed by: FAMILY MEDICINE

## 2025-05-13 PROCEDURE — 99213 OFFICE O/P EST LOW 20 MIN: CPT | Performed by: STUDENT IN AN ORGANIZED HEALTH CARE EDUCATION/TRAINING PROGRAM

## 2025-05-13 PROCEDURE — 73610 X-RAY EXAM OF ANKLE: CPT

## 2025-05-13 PROCEDURE — 99213 OFFICE O/P EST LOW 20 MIN: CPT | Performed by: FAMILY MEDICINE

## 2025-05-13 NOTE — PROGRESS NOTES
Name: Radha Ma      : 1935      MRN: [id number]  Encounter Provider: Saba Marmolejo MD  Encounter Date: 2025   Encounter department: [facility] [place]    Chief Complaint   Patient presents with   • Follow-up   :  Assessment & Plan  Age-related osteoporosis without current pathological fracture  Evaluation for secondary causes of OP were unremarkable, with normal vitamin D levels and no evidence of hyperparathyroidism or myeloma. The bone density scan conducted in 2024 revealed a significantly low T-score of -4.8 in the wrist area, indicating severe osteoporosis. Other sites showed osteopenia with T-scores of -2.4 in the right femoral neck, -2.1 in the right total hip, and -2.3 in the lumbar spine. She had been on Prolia for approximately 10 years but discontinued while ago. Various treatment options were discussed, including Fosamax, Reclast, Evenity, Forteo, and Tymlos. The potential side effects of these medications were also explained. She expressed interest in resuming Prolia but was informed that it requires lifelong commitment.  She received prolia today,  Calcium and vitamin D3 supplementations discussed.  Fall safety precaution reviewed.  Weight bearing exercise discussed.                 History of Present Illness   History of Present Illness    Radha Ma is a 89 y.o. female for follow up for osteoporosis.    She was previously on a regimen of Prolia, administered as an injection every 6 months, for a duration of 10 years. However, she discontinued this treatment approximately long time ago. She recalls experiencing nausea during the initial week following each Prolia injection, but these symptoms would subsequently subside. She is currently contemplating the resumption of Prolia therapy.         Pertinent Medical History           Review of Systems as per HPI  Medical History Reviewed by provider this encounter:     .  Current Outpatient  "Medications on File Prior to Visit   Medication Sig Dispense Refill   • acetaminophen (Tylenol 8 Hour) 650 mg CR tablet Take 1 tablet (650 mg total) by mouth daily at bedtime     • amLODIPine (NORVASC) 5 mg tablet TAKE 1 TABLET BY MOUTH TWICE  DAILY 180 tablet 1   • aspirin 81 MG tablet Take 81 mg by mouth daily     • atorvastatin (LIPITOR) 40 mg tablet TAKE 1 TABLET BY MOUTH DAILY  AFTER DINNER 90 tablet 1   • bumetanide (BUMEX) 1 mg tablet TAKE 1/2 TABLET BY MOUTH DAILY  (MAY TAKE 1 FULL TABLET IF  NEEDED) (Patient taking differently: TAKE 1/2 TABLET BY MOUTH DAILY  (MAY TAKE 1 FULL TABLET IF  NEEDED)) 90 tablet 1   • carvedilol (COREG) 12.5 mg tablet TAKE ONE-HALF TABLET BY MOUTH  TWICE DAILY WITH MEALS 90 tablet 1   • Cholecalciferol 2000 units TABS Take 2,000 Units by mouth daily Per pt stopped 8/26 per MD instructions     • DULoxetine (CYMBALTA) 30 mg delayed release capsule TAKE 1 CAPSULE BY MOUTH DAILY 90 capsule 1   • fexofenadine (ALLEGRA) 180 MG tablet Take 180 mg by mouth daily as needed      • folic acid (FOLVITE) 1 mg tablet Take 2 tablets (2 mg total) by mouth daily 60 tablet 3   • losartan (COZAAR) 100 MG tablet TAKE 1 TABLET BY MOUTH DAILY 90 tablet 1   • methotrexate 2.5 MG tablet Take 6 tablets (15 mg total) by mouth once a week 24 tablet 3   • MULTIPLE VITAMIN PO Take by mouth daily Per pt stopped 8/26 per MD instructions     • omeprazole (PriLOSEC) 40 MG capsule Take 1 capsule (40 mg total) by mouth daily 90 capsule 3   • ondansetron (ZOFRAN) 4 mg tablet Take 1 tablet by mouth every 8 (eight) hours as needed       No current facility-administered medications on file prior to visit.         Medical History Reviewed by provider this encounter:     .    Objective   /70 (BP Location: Left arm, Patient Position: Sitting, Cuff Size: Standard)   Pulse 92   Temp 98 °F (36.7 °C) (Temporal)   Resp 18   Ht 5' 1\" (1.549 m)   Wt 57.5 kg (126 lb 12.8 oz)   SpO2 98%   BMI 23.96 kg/m²      Body " "mass index is 23.96 kg/m².  Wt Readings from Last 3 Encounters:   05/13/25 57.3 kg (126 lb 4.8 oz)   05/13/25 57.5 kg (126 lb 12.8 oz)   04/29/25 56.2 kg (124 lb)     Physical Exam  Constitutional:       General: She is not in acute distress.     Appearance: She is not ill-appearing.   HENT:      Head: Normocephalic and atraumatic.   Pulmonary:      Effort: Pulmonary effort is normal. No respiratory distress.   Neurological:      Mental Status: She is oriented to person, place, and time.       Physical Exam      Results  Laboratory Studies  Hyperparathyroidism test came back normal. Vitamin D is normal. No high calcium detected. Myeloma of bone test came back negative.    Imaging  Bone density scan: T-score in wrist area was -4.8, right femoral neck -2.4, right total hip -2.1, and lumbar spine -2.3.  Labs:   No results found for: \"HGBA1C\"  Lab Results   Component Value Date    CREATININE 0.78 03/24/2025    CREATININE 0.66 12/18/2024    CREATININE 0.89 08/21/2024    BUN 24 03/24/2025    K 3.9 03/24/2025     03/24/2025    CO2 29 03/24/2025     eGFR   Date Value Ref Range Status   03/24/2025 68 ml/min/1.73sq m Final     Lab Results   Component Value Date    HDL 64 03/24/2025    TRIG 96 03/24/2025     Lab Results   Component Value Date    ALT 24 03/24/2025    AST 21 03/24/2025    ALKPHOS 69 03/24/2025     Lab Results   Component Value Date    ZCQ4AJJJHKJK 3.247 01/29/2024    WBW5VBVKTXWR 2.990 01/25/2022    LBV3SMLNHHSD 2.750 10/29/2020     Lab Results   Component Value Date    FREET4 0.81 08/02/2018       There are no Patient Instructions on file for this visit.    Discussed with the patient and all questioned fully answered. She will call me if any problems arise.      "

## 2025-05-13 NOTE — PROGRESS NOTES
"Name: Radha Ma      : 1935      MRN: [id number]  Encounter Provider: Grupo Neely MD  Encounter Date: 2025   Encounter department: [facility]    :  Assessment & Plan  Right foot pain  No significant swelling or redness on exam today.  She has full range of motion.  No inciting events that cause the pain.  Low suspicion for acute pathology but will get x-rays for further evaluation.  I recommend to continue supportive care as Tylenol helps with the pain.  Follow-up with podiatry.  Referral to [facility] made today.  She reports that she has a podiatrist already and will see if she can get in with them.  She follows with podiatry at [facility]  Orders:    XR foot 3+ vw right; Future    XR ankle 3+ vw right; Future    Ambulatory Referral to Podiatry; Future      Assessment & Plan          Depression Screening and Follow-up Plan: Patient was screened for depression during today's encounter. They screened negative with a PHQ-9 score of 0.          History of Present Illness     History of Present Illness    Patient presents to the office today for foot pain.  Started about 2 weeks ago and has not gone away.  Her right foot hurts hurts on the side she says around the ankle and the lower part of the foot.  No fever or chills.  There is no swelling.  There is no redness.  She is not sure exactly what caused it.  She denies any specific trauma.  No rolling of the ankle.  She does states she gets on her tippy toes and leans forward in her kitchen because her microwave is really high.  Denies any history of gout.  No recent tick bites.    But has not called them yet.  She takes Tylenol for pain and she says that helps with her ankle.    Review of Systems   All other systems reviewed and are negative.    Objective   /70   Pulse 92   Temp 98 °F (36.7 °C)   Resp 18   Ht 5' 1\" (1.549 m)   Wt 57.3 kg (126 lb 4.8 oz)   SpO2 98%   BMI 23.86 kg/m² "     Physical Exam    Physical Exam  Vitals and nursing note reviewed.   Constitutional:       General: She is not in acute distress.     Appearance: Normal appearance. She is well-developed. She is not ill-appearing, toxic-appearing or diaphoretic.   HENT:      Head: Normocephalic and atraumatic.     Eyes:      General:         Right eye: No discharge.         Left eye: No discharge.      Extraocular Movements: Extraocular movements intact.      Conjunctiva/sclera: Conjunctivae normal.       Cardiovascular:      Rate and Rhythm: Normal rate.   Pulmonary:      Effort: Pulmonary effort is normal.     Musculoskeletal:         General: No swelling, tenderness, deformity or signs of injury.      Right lower leg: No edema.      Left lower leg: No edema.     Skin:     General: Skin is warm and dry.      Capillary Refill: Capillary refill takes less than 2 seconds.     Neurological:      Mental Status: She is alert and oriented to person, place, and time.     Psychiatric:         Mood and Affect: Mood normal.         Behavior: Behavior normal.         Thought Content: Thought content normal.         Judgment: Judgment normal.

## 2025-05-14 NOTE — ASSESSMENT & PLAN NOTE
Evaluation for secondary causes of OP were unremarkable, with normal vitamin D levels and no evidence of hyperparathyroidism or myeloma. The bone density scan conducted in October 2024 revealed a significantly low T-score of -4.8 in the wrist area, indicating severe osteoporosis. Other sites showed osteopenia with T-scores of -2.4 in the right femoral neck, -2.1 in the right total hip, and -2.3 in the lumbar spine. She had been on Prolia for approximately 10 years but discontinued while ago. Various treatment options were discussed, including Fosamax, Reclast, Evenity, Forteo, and Tymlos. The potential side effects of these medications were also explained. She expressed interest in resuming Prolia but was informed that it requires lifelong commitment.  She received prolia today,  Calcium and vitamin D3 supplementations discussed.  Fall safety precaution reviewed.  Weight bearing exercise discussed.

## 2025-05-16 ENCOUNTER — RESULTS FOLLOW-UP (OUTPATIENT)
Dept: FAMILY MEDICINE CLINIC | Facility: CLINIC | Age: 89
End: 2025-05-16

## 2025-05-19 NOTE — TELEPHONE ENCOUNTER
Marilyn from [employer] called  wrong pt info was sent to them for verification.  Pt has medicare, id was given they are doing the verification of benefits now and =will then do the secondary insurance.

## 2025-05-20 ENCOUNTER — TELEPHONE (OUTPATIENT)
Age: 89
End: 2025-05-20

## 2025-05-20 NOTE — TELEPHONE ENCOUNTER
Patient calling to confirm her upcoming surgery;  informed patient that she has office visit with Dr. Fernandes in [place] office on 6/6/25 not surgery;   told her surgery will be some time in July, but not scheduled yet.      Told patient that she will need to come to office with full bladder and provide urine specimen.   Asking if any procedures will be done during this office visit on 6/6/25; I told her that urine specimen is needed.     # [phone];

## 2025-05-20 NOTE — TELEPHONE ENCOUNTER
Patient calling back to inform office that she is going to [place] for vacation 7/9/25 for a month;   so her surgery will need to be end of June or after August 18th;   she wanted to make you aware;      Reviewed appointment time and address for 6/6/25 visit.

## 2025-05-28 ENCOUNTER — RA CDI HCC (OUTPATIENT)
Dept: OTHER | Facility: HOSPITAL | Age: 89
End: 2025-05-28

## 2025-05-29 ENCOUNTER — OFFICE VISIT (OUTPATIENT)
Dept: FAMILY MEDICINE CLINIC | Facility: CLINIC | Age: 89
End: 2025-05-29
Payer: MEDICARE

## 2025-05-29 ENCOUNTER — APPOINTMENT (OUTPATIENT)
Dept: RADIOLOGY | Facility: MEDICAL CENTER | Age: 89
End: 2025-05-29
Attending: FAMILY MEDICINE
Payer: MEDICARE

## 2025-05-29 VITALS
HEART RATE: 74 BPM | RESPIRATION RATE: 18 BRPM | OXYGEN SATURATION: 99 % | BODY MASS INDEX: 23.64 KG/M2 | SYSTOLIC BLOOD PRESSURE: 130 MMHG | WEIGHT: 125.2 LBS | DIASTOLIC BLOOD PRESSURE: 72 MMHG | HEIGHT: 61 IN | TEMPERATURE: 98.1 F

## 2025-05-29 DIAGNOSIS — Z91.81 HISTORY OF RECENT FALL: ICD-10-CM

## 2025-05-29 DIAGNOSIS — Z91.81 AT RISK FOR FALLS: ICD-10-CM

## 2025-05-29 DIAGNOSIS — R29.6 RECURRENT FALLS: ICD-10-CM

## 2025-05-29 DIAGNOSIS — R26.9 GAIT DISTURBANCE: ICD-10-CM

## 2025-05-29 DIAGNOSIS — R09.89 SINUS SYMPTOM: ICD-10-CM

## 2025-05-29 DIAGNOSIS — M25.551 ACUTE RIGHT HIP PAIN: Primary | ICD-10-CM

## 2025-05-29 DIAGNOSIS — M25.551 ACUTE RIGHT HIP PAIN: ICD-10-CM

## 2025-05-29 PROCEDURE — G2211 COMPLEX E/M VISIT ADD ON: HCPCS | Performed by: FAMILY MEDICINE

## 2025-05-29 PROCEDURE — 73502 X-RAY EXAM HIP UNI 2-3 VIEWS: CPT

## 2025-05-29 PROCEDURE — 99214 OFFICE O/P EST MOD 30 MIN: CPT | Performed by: FAMILY MEDICINE

## 2025-05-29 NOTE — PROGRESS NOTES
"Name: Radha Ma      : 1935      MRN: [id number]  Encounter Provider: Anjelica Sr DO  Encounter Date: 2025   Encounter department: [facility] [place]  :  Assessment & Plan  Acute right hip pain  Landed on her right hip when fell recently  Orders:    XR hip/pelv 2-3 vws right if performed; Future    History of recent fall    Orders:    XR hip/pelv 2-3 vws right if performed; Future    Recurrent falls    Orders:    Ambulatory referral to Physical Therapy (*VB); Future    At risk for falls    Orders:    Ambulatory referral to Physical Therapy (*VB); Future    Gait disturbance    Orders:    Ambulatory referral to Physical Therapy (*VB); Future    Sinus symptom  Exam normal today  Continue OTC PRN meds         Chief Complaint   Patient presents with    Sinus Problem     Coming and going for 3 weeks    Fall     1 month ago    Dizziness     Caused fall    Hip Pain          History of Present Illness   Acute problem visit  States had sudden pain in right foot about a month ago, saw her podiatrist and thought was a stress fracture, so was wearing a boot for awhile and resting, and around the same time developed \"Right side of head sinuses would burn and make feel dizzy\" - using nasal spray with some benefit  Also reports she fell \"from a dizzy spell that never had before\" (not same dizziness sx as she's been getting from sinuses per pt) - hit right hip and tailbone, still in a lot of pain in the hip - tylenol helps  Thinking that dizziness is coming from medications - states recently received in the mail new generic refills of 2 meds that the pills now look identical, states her mail-order pharmacy changed the look of the pills with the most recent refill, and she thinks she may have taken 2 of the same ones instead of one of each that day that she fell- she does use a 14-day pill organizer  Uses a walker at home - today has a cane that she is using  Also states, \"They are starting me on " "prolia for the bones next week\"  Will be going to visit her son in [place] from July 9th through August 13th      Review of Systems   All other systems reviewed and are negative.      Objective   /72 (BP Location: Left arm, Patient Position: Sitting, Cuff Size: Standard)   Pulse 74   Temp 98.1 °F (36.7 °C) (Tympanic)   Resp 18   Ht 5' 1\" (1.549 m)   Wt 56.8 kg (125 lb 3.2 oz)   SpO2 99%   BMI 23.66 kg/m²      Physical Exam  Vitals and nursing note reviewed.   Constitutional:       General: She is not in acute distress.     Appearance: Normal appearance. She is well-developed and well-groomed. She is not ill-appearing, toxic-appearing or diaphoretic.      Comments: Appears much younger than stated age   HENT:      Head: Normocephalic and atraumatic.      Right Ear: Tympanic membrane, ear canal and external ear normal.      Left Ear: Tympanic membrane, ear canal and external ear normal.      Nose: Nose normal.      Mouth/Throat:      Lips: Pink.      Mouth: Mucous membranes are moist.      Pharynx: Oropharynx is clear.     Eyes:      General: Lids are normal.      Extraocular Movements: Extraocular movements intact.      Right eye: No nystagmus.      Left eye: No nystagmus.      Conjunctiva/sclera: Conjunctivae normal.      Pupils: Pupils are equal, round, and reactive to light.     Neck:      Vascular: Normal carotid pulses. No carotid bruit or JVD.     Cardiovascular:      Rate and Rhythm: Normal rate and regular rhythm.      Pulses: Normal pulses.      Heart sounds: Normal heart sounds.   Pulmonary:      Effort: Pulmonary effort is normal.      Breath sounds: Normal breath sounds and air entry.     Musculoskeletal:      Right hip: Bony tenderness present. No deformity or crepitus. Decreased range of motion. Normal strength.      Right lower leg: No edema.      Left lower leg: No edema.   Lymphadenopathy:      Cervical: No cervical adenopathy.     Skin:     General: Skin is warm and dry.      Capillary " Refill: Capillary refill takes less than 2 seconds.      Coloration: Skin is not pale.     Neurological:      General: No focal deficit present.      Mental Status: She is alert and oriented to person, place, and time.      Cranial Nerves: No cranial nerve deficit or facial asymmetry.      Sensory: Sensation is intact.      Motor: Motor function is intact.      Comments: Using cane to ambulate   Psychiatric:         Mood and Affect: Mood normal.         Behavior: Behavior normal. Behavior is cooperative.         Cognition and Memory: Cognition and memory normal.         Judgment: Judgment normal.         Falls Plan of Care: Balance, strength, and gait training instructions were provided. Patient referred to physical therapy.

## 2025-05-29 NOTE — PATIENT INSTRUCTIONS
Patient Education     Preventing falls in adults   The Basics   Written by the doctors and editors at [facility]   Am I at risk of falling? -- Falls can happen to anyone, no matter how old they are. Several things can increase your risk of a fall, including:   Vision problems   Having certain chronic health conditions, being sick, having recently been in the hospital, or having had surgery   Changing the medicines you take   An unsafe or unfamiliar setting (for example, a room with rugs or furniture that might trip you, or an area you don't know well)  Your risk of falling increases as you grow older. That's because getting older can make it harder to walk steadily and keep your balance. Also, the effects of falls are more serious for older people.  Overall, 3 to 4 out of every 10 people over the age of 65 fall each year. Many people who fracture a hip never fully recover to how they were before the fracture. If you have fallen in the past, you are at higher risk of falling again.  How can my doctor help me avoid falling? -- Your doctor can talk to you about the following things:   Past falls - It is important to tell your doctor about any times that you have fallen or almost fallen. They can then suggest ways to prevent another fall.   Your health conditions - Some health problems can put you at risk of falling. These include conditions that affect eyesight, hearing, muscle strength, or balance.   What to do if you are in the hospital - Falls can happen in the hospital because you are in an unfamiliar place. You might feel unsteady or drowsy from medicines or anesthesia. Or you might be attached to catheters or IV tubing that you could trip over. You are also likely to be weaker than usual.   Your medicines - Certain medicines can increase the risk of falling. These include some medicines for sleeping problems, anxiety, high blood pressure, or depression. Adding new medicines, or changing doses of some medicines, can  also affect your risk of falling.  The more your doctor knows about your situation, the better they can help you. For example, if you fell because you have a condition that causes pain, your doctor might suggest treatments to help with the pain. Or if any of your medicines are making you dizzy and more likely to fall, your doctor might switch you to a different medicine.  Is there anything I can do on my own? -- Yes. To help keep from falling, you can:   Make your home safer - To avoid falling at home, get rid of things that might make you trip or slip. This can include furniture, electrical cords, clutter, and loose rugs (figure 1). Keep your home well lit so you can easily see where you are going. Avoid storing things in high places so you don't have to reach or climb.   Wear non-slip socks or sturdy shoes that fit well - Wearing shoes with high heels or slippery soles, or shoes that are too loose, can lead to falls. Walking around in bare feet, or only socks, can also increase your risk of falling.   Take vitamin D pills - Taking vitamin D might lower the risk of falls in older people. This is because vitamin D helps make bones and muscles stronger. Your doctor can talk to you about whether you should take extra vitamin D, and how much.   Stay active - Moving your body regularly can help lower your risk of falling. It might also help prevent you from getting hurt if you do fall. It is best to do a few different activities that help with both strength and balance. There are many kinds of exercise that can be safe for older people. These include walking, swimming, and andrew chi (a Chinese martial art involving slow, gentle movements).   Use a cane, walker, or other safety devices - If your doctor recommends that you use a cane or walker, make sure that it's the right size and you know how to use it. There are other devices that might help you avoid falling, too. These include grab bars or a sturdy seat for the  shower, non-slip bath mats, and hand rails or treads for the stairs (to prevent slipping).   Take extra care if you have had surgery or are in the hospital - Ask for help with getting out of bed, getting up from a chair, or going to the bathroom. Your body needs time to heal, and it's normal to need help with these things while you recover. It can also help to keep your belongings within reach, and avoid walking around in the dark. If you need help, use the call button instead of trying to get up.  If you worry that you could fall, you can get an emergency alert system. This is usually an alarm button that lets you call for help if you fall and can't get up. If you live alone and you do not have an emergency alert system, always carry a cell phone or portable phone with you when moving around the house.  How do I get up from a fall? -- If you do fall, try not to be afraid. Stay calm, and take slow, deep breaths. If someone is near you, call for help right away. If you have an emergency alert system, use it.  Try to find out if you are hurt. If you are hurt badly, trying to get up can make things worse. If you do not think that you are hurt badly, come up with a plan to get up off of the floor.  Some tips to get up after a fall if you are alone:   Look around you for a piece of sturdy furniture such as a couch or chair. Try to move your body to the furniture. You might need to scoot, crawl, or roll to get close. Do these movements very slowly. If you feel any sharp pains, you might need to stop.   Once you are close to the furniture, roll onto your side. Pull your knees up toward your chest, and try to get on your hands and knees.   Put your hands on the seat of the couch or chair.   Bring 1 leg forward so the foot is flat on the floor. If you have a stronger leg, move this leg first.   Now, try to stand up. Once both feet are on the ground, slowly turn and lower yourself to sit down on the seat.  What should I do  after a fall? -- If you had a fall, see your doctor right away, even if you aren't hurt. Your doctor can try to figure out what caused you to fall, and how likely you are to fall again. They will do an exam and talk to you about your health problems, medicines, and activities. They can also check how well you walk, move, and balance. Then, they can suggest things you can do to lower your risk of falling again.  Many older people have a hard time recovering after a fall. Doing things to prevent falling can help you protect your health and independence.  All topics are updated as new evidence becomes available and our peer review process is complete.  This topic retrieved from [employer] on: Apr 04, 2024.  Topic 73517 Version 25.0  Release: 32.2.4 - C32.93  © 2024 UpToDate, Inc. and/or its affiliates. All rights reserved.  figure 1: How to avoid falling at home     This picture shows some of the things that can cause a fall in your home. Look around and remove any loose rugs, electrical cords, clutter, or furniture that could trip you.  Graphic 60784 Version 1.0  Consumer Information Use and Disclaimer   Disclaimer: This generalized information is a limited summary of diagnosis, treatment, and/or medication information. It is not meant to be comprehensive and should be used as a tool to help the user understand and/or assess potential diagnostic and treatment options. It does NOT include all information about conditions, treatments, medications, side effects, or risks that may apply to a specific patient. It is not intended to be medical advice or a substitute for the medical advice, diagnosis, or treatment of a health care provider based on the health care provider's examination and assessment of a patient's specific and unique circumstances. Patients must speak with a health care provider for complete information about their health, medical questions, and treatment options, including any risks or benefits regarding  use of medications. This information does not endorse any treatments or medications as safe, effective, or approved for treating a specific patient. UpToDate, Inc. and its affiliates disclaim any warranty or liability relating to this information or the use thereof.The use of this information is governed by the Terms of Use, available at https://www.[employer].com/en/know/clinical-effectiveness-terms. 2024© UpToDate, Inc. and its affiliates and/or licensors. All rights reserved.  Copyright   © 2024 UpToDate, Inc. and/or its affiliates. All rights reserved.

## 2025-05-30 ENCOUNTER — TELEPHONE (OUTPATIENT)
Dept: UROLOGY | Facility: AMBULATORY SURGERY CENTER | Age: 89
End: 2025-05-30

## 2025-05-30 NOTE — TELEPHONE ENCOUNTER
Pt was scheduled for an appt on 6/6 with FT and I called to reschedule it to 6/11 as per email sent to us from Berta Calvin. Pt informed me she has a physial therapy appt that day and will not make it see FT on 6/11. SO then his next available afternoon appt is 9/5- I rescheduled to there and pt told me she has a vacation planned from 7/9-8/18- I see she is in for a surgery on 7/11.     Was the 6/6 appt going to be a pre-op H & P? I was not sure, but told pt I would reach out to surgery scheduler.

## 2025-05-31 ENCOUNTER — RESULTS FOLLOW-UP (OUTPATIENT)
Dept: FAMILY MEDICINE CLINIC | Facility: CLINIC | Age: 89
End: 2025-05-31

## 2025-06-02 NOTE — TELEPHONE ENCOUNTER
Called and spoke to pt, confirmed appt for 6/6 at 1pm.     I cannot add it onto the schedule. Thanks

## 2025-06-05 ENCOUNTER — HOSPITAL ENCOUNTER (OUTPATIENT)
Dept: NON INVASIVE DIAGNOSTICS | Facility: CLINIC | Age: 89
Discharge: HOME | End: 2025-06-05
Attending: INTERNAL MEDICINE
Payer: MEDICARE

## 2025-06-05 VITALS
HEART RATE: 74 BPM | WEIGHT: 125 LBS | HEIGHT: 61 IN | BODY MASS INDEX: 23.6 KG/M2 | DIASTOLIC BLOOD PRESSURE: 72 MMHG | SYSTOLIC BLOOD PRESSURE: 130 MMHG

## 2025-06-05 DIAGNOSIS — I25.10 CORONARY ARTERY DISEASE INVOLVING NATIVE CORONARY ARTERY OF NATIVE HEART WITHOUT ANGINA PECTORIS: ICD-10-CM

## 2025-06-05 DIAGNOSIS — I65.23 CAROTID ARTERY STENOSIS, ASYMPTOMATIC, BILATERAL: ICD-10-CM

## 2025-06-05 DIAGNOSIS — R60.0 BILATERAL LEG EDEMA: ICD-10-CM

## 2025-06-05 DIAGNOSIS — E78.2 MIXED HYPERLIPIDEMIA: ICD-10-CM

## 2025-06-05 DIAGNOSIS — M81.0 AGE-RELATED OSTEOPOROSIS WITHOUT CURRENT PATHOLOGICAL FRACTURE: Primary | ICD-10-CM

## 2025-06-05 LAB
AORTIC ROOT: 3 CM
AORTIC VALVE MEAN VELOCITY: 16.5 M/S
ASCENDING AORTA: 3.3 CM
AV AREA BY CONTINUOUS VTI: 1.2 CM2
AV AREA PEAK VELOCITY: 1.1 CM2
AV LVOT MEAN GRADIENT: 2 MMHG
AV LVOT PEAK GRADIENT: 4 MMHG
AV MEAN PRESS GRAD SYS DOP V1V2: 12 MMHG
AV ORIFICE AREA US: 1.15 CM2
AV PEAK GRADIENT: 19 MMHG
AV VELOCITY RATIO: 0.45
AV VMAX SYS DOP: 2.19 M/S
BSA FOR ECHO PROCEDURE: 1.55 M2
DOP CALC AO VTI: 54.17 CM
DOP CALC LVOT AREA: 2.54 CM2
DOP CALC LVOT CARDIAC INDEX: 2.93 L/MIN/M2
DOP CALC LVOT CARDIAC OUTPUT: 4.54 L/MIN
DOP CALC LVOT DIAMETER: 1.8 CM
DOP CALC LVOT PEAK VEL VTI: 24.51 CM
DOP CALC LVOT PEAK VEL: 0.95 M/S
DOP CALC LVOT STROKE INDEX: 42.6 ML/M2
DOP CALC LVOT STROKE VOLUME: 66
E WAVE DECELERATION TIME: 255 MS
E/A RATIO: 0.76
FRACTIONAL SHORTENING: 30 (ref 28–44)
INTERVENTRICULAR SEPTUM IN DIASTOLE (PARASTERNAL SHORT AXIS VIEW): 1.1 CM
INTERVENTRICULAR SEPTUM: 1.1 CM (ref 0.6–1.1)
LAAS-AP2: 13 CM2
LAAS-AP4: 12.9 CM2
LEFT ATRIUM SIZE: 3.4 CM
LEFT ATRIUM VOLUME (MOD BIPLANE): 34 ML
LEFT ATRIUM VOLUME INDEX (MOD BIPLANE): 21.9 ML/M2
LEFT INTERNAL DIMENSION IN SYSTOLE: 2.8 CM (ref 2.1–4)
LEFT VENTRICULAR INTERNAL DIMENSION IN DIASTOLE: 4 CM (ref 3.5–6)
LEFT VENTRICULAR POSTERIOR WALL IN END DIASTOLE: 0.8 CM
LEFT VENTRICULAR STROKE VOLUME: 42 ML
LV EF US.2D.A4C+ESTIMATED: 68 %
LVSV (TEICH): 42 ML
MV E'TISSUE VEL-LAT: 7 CM/S
MV E'TISSUE VEL-SEP: 5 CM/S
MV PEAK A VEL: 1.08 M/S
MV PEAK E VEL: 82 CM/S
MV STENOSIS PRESSURE HALF TIME: 74 MS
MV VALVE AREA P 1/2 METHOD: 3
RIGHT ATRIUM AREA SYSTOLE A4C: 9.4 CM2
RIGHT VENTRICLE ID DIMENSION: 3 CM
SL CV LEFT ATRIUM LENGTH A2C: 3.9 CM
SL CV LV EF: 65
SL CV PED ECHO LEFT VENTRICLE DIASTOLIC VOLUME (MOD BIPLANE) 2D: 71 ML
SL CV PED ECHO LEFT VENTRICLE SYSTOLIC VOLUME (MOD BIPLANE) 2D: 29 ML
TR MAX PG: 30 MMHG
TR PEAK VELOCITY: 2.8 M/S
TRICUSPID ANNULAR PLANE SYSTOLIC EXCURSION: 2 CM
TRICUSPID VALVE PEAK REGURGITATION VELOCITY: 2.75 M/S

## 2025-06-05 PROCEDURE — 93880 EXTRACRANIAL BILAT STUDY: CPT | Performed by: SURGERY

## 2025-06-05 PROCEDURE — 93306 TTE W/DOPPLER COMPLETE: CPT

## 2025-06-05 PROCEDURE — 93306 TTE W/DOPPLER COMPLETE: CPT | Performed by: INTERNAL MEDICINE

## 2025-06-05 PROCEDURE — 93880 EXTRACRANIAL BILAT STUDY: CPT

## 2025-06-06 ENCOUNTER — OFFICE VISIT (OUTPATIENT)
Dept: UROLOGY | Facility: AMBULATORY SURGERY CENTER | Age: 89
End: 2025-06-06
Payer: MEDICARE

## 2025-06-06 VITALS
SYSTOLIC BLOOD PRESSURE: 120 MMHG | DIASTOLIC BLOOD PRESSURE: 78 MMHG | WEIGHT: 124 LBS | HEIGHT: 61 IN | OXYGEN SATURATION: 98 % | HEART RATE: 83 BPM | BODY MASS INDEX: 23.41 KG/M2

## 2025-06-06 DIAGNOSIS — C66.2 URETER MALIGNANT NEOPLASM, LEFT (HCC): Primary | ICD-10-CM

## 2025-06-06 PROCEDURE — 99213 OFFICE O/P EST LOW 20 MIN: CPT | Performed by: UROLOGY

## 2025-06-06 NOTE — PROGRESS NOTES
"Name: Radha Ma      : 1935      MRN: [id number]  Encounter Provider: Jet Fernandes MD  Encounter Date: 2025   Encounter department: [facility] [place]  :  Assessment & Plan  Ureter malignant neoplasm, left (HCC)    Orders:  •  CT renal protocol; Future  •  Cytology, urine; Future    Impression: History of left ureter Urothelial carcinoma    Plan: I recommend obtaining a urine cytology.  I also recommend repeating a CT renal protocol.  If there is any question of abnormality on either the cytology or on upper tract imaging the neck step would be to return to the operating room for cystoscopy with bilateral retrograde pyelograms and left ureteroscopy.  The patient is amenable with this plan and will obtain a CT scan prior to her travels planned for July and return to see me in follow-up in 2025.    History of Present Illness   Radha Ma is a 89 y.o. female who presents for follow-up of a small ureteral lesion.  This was biopsied in follow-up  and was a low-grade superficial urothelial carcinoma.  She returned to the operating room for second look ureteroscopy in 2025 and there was no sign of recurrent cancer.  She returns in follow-up today.  She denies any hematuria.    Review of Systems       Objective   /78 (BP Location: Left arm, Patient Position: Sitting, Cuff Size: Adult)   Pulse 83   Ht 5' 1\" (1.549 m) Comment: verbal  Wt 56.2 kg (124 lb) Comment: verbal  SpO2 98%   BMI 23.43 kg/m²     Physical Exam on examination she is in no acute distress.  Gait slow with the assistance of a cane.  Affect normal    Results   No results found for: \"PSA\"  Lab Results   Component Value Date    CALCIUM 9.9 2025    K 3.9 2025    CO2 29 2025     2025    BUN 24 2025    CREATININE 0.78 2025     Lab Results   Component Value Date    WBC 4.47 2025    HGB 12.6 2025    HCT 37.9 2025    MCV 98 " 03/24/2025     03/24/2025       Office Urine Dip  No results found for this or any previous visit (from the past hour).

## 2025-06-10 ENCOUNTER — CLINICAL SUPPORT (OUTPATIENT)
Dept: ENDOCRINOLOGY | Facility: CLINIC | Age: 89
End: 2025-06-10
Payer: MEDICARE

## 2025-06-10 VITALS
HEART RATE: 78 BPM | WEIGHT: 126.2 LBS | HEIGHT: 61 IN | BODY MASS INDEX: 23.83 KG/M2 | RESPIRATION RATE: 16 BRPM | TEMPERATURE: 97.8 F | SYSTOLIC BLOOD PRESSURE: 112 MMHG | DIASTOLIC BLOOD PRESSURE: 68 MMHG | OXYGEN SATURATION: 98 %

## 2025-06-10 DIAGNOSIS — M81.0 AGE-RELATED OSTEOPOROSIS WITHOUT CURRENT PATHOLOGICAL FRACTURE: Primary | ICD-10-CM

## 2025-06-10 PROCEDURE — 96372 THER/PROPH/DIAG INJ SC/IM: CPT

## 2025-06-11 ENCOUNTER — EVALUATION (OUTPATIENT)
Dept: PHYSICAL THERAPY | Facility: REHABILITATION | Age: 89
End: 2025-06-11
Attending: FAMILY MEDICINE
Payer: MEDICARE

## 2025-06-11 DIAGNOSIS — R29.6 RECURRENT FALLS: ICD-10-CM

## 2025-06-11 DIAGNOSIS — R26.9 GAIT DISTURBANCE: ICD-10-CM

## 2025-06-11 DIAGNOSIS — M25.551 RIGHT HIP PAIN: Primary | ICD-10-CM

## 2025-06-11 PROCEDURE — 97163 PT EVAL HIGH COMPLEX 45 MIN: CPT | Performed by: PHYSICAL THERAPIST

## 2025-06-11 NOTE — PROGRESS NOTES
PT Evaluation     Today's date: 2025  Patient name: Radha Ma  : 1935  MRN: [id number]  Referring provider: Anjelica Sr DO  Dx:   Encounter Diagnosis     ICD-10-CM    1. Right hip pain  M25.551       2. Gait disturbance  R26.9       3. Recurrent falls  R29.6                      Assessment  Impairments: abnormal coordination, abnormal gait, abnormal or restricted ROM, activity intolerance, impaired balance, impaired physical strength, lacks appropriate home exercise program and pain with function  Symptom irritability: high    Assessment details: Pt is a pleasant 89 year-old female presenting to PT approximately 6 weeks s/p mechanical fall at home onto right hip. Pt presents with right hip pain, decreased right hip range of motion, decreased bilateral lower extremity strength and flexibility, impaired balance, impaired gait, impaired functional mobility, and decreased tolerance to activity. Pt is a good candidate for outpatient physical therapy and would benefit from skilled physical therapy to address limitations and to achieve goals. Thank you for this referral.   Understanding of Dx/Px/POC: good     Prognosis: good    Goals  Short-Term Goals (4 weeks)   1. Patient will decrease worst rating of pain by 25% to improve quality of life.  2. Patient will increase strength by 1/2 MMT to improve quality of life with improved efficiency of daily activities.  3. Patient will improve ROM by 25% indicating improved mobility of affected area.    Long-Term Goals (8 weeks)   1. Patient will decrease pain by 50% at worst in comparison to IE indicating significant reduction in pain and improved quality of life.  2. Patient will demonstrate strength WFL compared to IE levels indicating ability to independently manage pain symptoms to accomplish daily activities.   3. Patient will be independent with HEP with good form accomplished.      Plan  Patient would benefit from: PT eval and skilled PT  Planned  modality interventions: cryotherapy and thermotherapy: hydrocollator packs    Planned therapy interventions: IADL retraining, body mechanics training, flexibility, functional ROM exercises, home exercise program, neuromuscular re-education, manual therapy, postural training, strengthening, stretching, therapeutic activities, therapeutic exercise, joint mobilization and IASTM    Frequency: 2x week  Duration in weeks: 8  Treatment plan discussed with: patient        Subjective Evaluation    History of Present Illness  Mechanism of injury: Pt is an 89 year-old female presenting to PT approximately 6 weeks s/p mechanical fall at home onto right hip. Pt reports she has been experiencing intermittent dizziness, feels this is due to medications she has been taking and sinus inflammation/irritation. Pt went to PCP for further evaluation, right hip x-ray performed and negative for fracture. Pt referred to OPPT.    Pt reports she continues to have intermittent lightheadedness, however, can not associate her lightheadedness with any precipitating factors, triggers  or positions. She denies symptoms with changes in position or head position.     Pt ambulates with RW at home and SPC in the community.    Pain Location: right hip, tailbone    Pain Type: soreness    Pain Intensity:  Current: 5  Best: 5  Worst: 8    Pt reports increased pain and/or difficulty with: laying on right side, sit-stand transfers, walking, standing. Pt denies sleep disturbance.     Pt reports decreased pain with: rest, heat, Tylenol            Recurrent probem    Quality of life: good    Patient Goals  Patient goals for therapy: increased strength, decreased pain, improved balance and increased motion      Diagnostic Tests  X-ray: normal        Objective     Observations     Additional Observation Details  Flexibility: R>L iliopsoas tightness    Ambulatory Balance: Fair -    Gait: moderately antalgic with decreased R stance time, decreased left step  length, decreased foot clearance.        Tenderness     Right Hip   Tenderness in the greater trochanter.     Lumbar Screen  Lumbar range of motion within normal limits.    Neurological Testing     Sensation     Hip   Left Hip   Intact: light touch    Right Hip   Intact: light touch    Active Range of Motion   Left Hip   Flexion: WFL  Extension: -5 degrees   Abduction: WFL  External rotation (prone): WFL  Internal rotation (prone): WFL    Right Hip   Flexion: 110 degrees   Extension: -10 degrees   Abduction: WFL  External rotation (prone): 15 degrees   Internal rotation (prone): WFL    Strength/Myotome Testing     Left Hip   Planes of Motion   Flexion: 4+  Extension: 3-  Abduction: 4  Adduction: 4+  External rotation: 4  Internal rotation: 4    Right Hip   Planes of Motion   Flexion: 4-  Extension: 3-  Abduction: 3  Adduction: 3+  External rotation: 3  Internal rotation: 3    Tests     Left Hip   Modified Giancarlo: Positive.     Right Hip   Modified Giancarlo: Positive.     Biodex LOS Testing with B UE Support:  Direction Control Actual (**w/BUE Support) Goal (**without/BUE support)   Overall 69 65   Forward 93 75   Backward 94 75   Left 89 80   Right 91 80   Forward/Left 84 80   Forward/Right 76 65   Backward/Left 51 65   Backward/Right 58 65                Precautions:   Patient Active Problem List   Diagnosis    Essential hypertension    Mixed hyperlipidemia    Coronary artery disease without angina pectoris - S/P stent placement x 2 (2011)    Gastroesophageal reflux disease without esophagitis    Chronic diastolic (congestive) heart failure (HCC)    Age-related osteoporosis with current pathological fracture    Ambulatory dysfunction    Elderly person living alone    Low back pain without sciatica    S/P ORIF (open reduction internal fixation) fracture    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HCC)    Bladder cancer (HCC)    Allergic rhinitis    Anemia due to vitamin B12 deficiency    Biliary dyskinesia     Cholelithiasis    Chronic right shoulder pain    Degenerative joint disease    Controlled depression    Deviated nasal septum    Heart valve disorder    IBS (irritable bowel syndrome)    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HCC)    Mild vitamin D deficiency    Mixed hearing loss, unilateral    Pancreatic cyst    Raynaud's disease without gangrene    S/P angioplasty with stent    Urge incontinence of urine    History of pulmonary embolus (PE)    Age-related cataract of both eyes    Hyperparathyroidism, unspecified ([facility])    Fear of falling    Balance problems    Difficulty navigating stairs    Lower abdominal pain, unspecified    History of falling    Bilateral leg edema    New onset of headaches    Carotid artery stenosis, asymptomatic, bilateral    Ventral hernia    Cerebral aneurysm without rupture    Intermittent pain and swelling of hand    Right hand paresthesia    BMI 23.0-23.9, adult    Carpal tunnel syndrome of right wrist    CKD (chronic kidney disease) stage 2, GFR 60-89 ml/min    TIFFANY (renal artery stenosis) ([facility])    History of pneumonia    Fatigue    Swelling of left lower extremity    Rib pain on left side    Seropositive rheumatoid arthritis ([facility])    Long term (current) use of immunosuppressive biologic    Age-related osteoporosis without current pathological fracture    Hydronephrosis, left    Long term current use of diuretic    Malignant tumor of ureter, left ([facility])    Chronic kidney disease, stage 3a ([facility])          Daily Treatment Diary      Visit # 1 2 3 4 5 6 7 8 9 10   Assessment  6/11                     Eval/Reval  MD                 **   FOTO         **         **   Manuals    R Hip ER PROM                        R HF Stretch                          Prescribed POC    Pt Education  MD                      HEP Issued/Updated  MD                      Bike                        Hip Add                        TB Hip ABD                        H/L [place]                         SAQ                        LAQ                        Standing Hip ABD                        SLS with UE Support                        Tandem Balance with UE Support              1/4 Squats              Biodex LOS:                                                  Modalities    CP R Hip   Post-Tx                                    QR Code:  NP  Med Bridge HEP:  Next Session

## 2025-06-17 ENCOUNTER — OFFICE VISIT (OUTPATIENT)
Dept: PHYSICAL THERAPY | Facility: REHABILITATION | Age: 89
End: 2025-06-17
Attending: FAMILY MEDICINE
Payer: MEDICARE

## 2025-06-17 ENCOUNTER — APPOINTMENT (OUTPATIENT)
Dept: LAB | Facility: CLINIC | Age: 89
End: 2025-06-17
Attending: STUDENT IN AN ORGANIZED HEALTH CARE EDUCATION/TRAINING PROGRAM
Payer: MEDICARE

## 2025-06-17 ENCOUNTER — APPOINTMENT (OUTPATIENT)
Dept: LAB | Facility: CLINIC | Age: 89
End: 2025-06-17
Payer: MEDICARE

## 2025-06-17 DIAGNOSIS — Z79.899 HIGH RISK MEDICATION USE: ICD-10-CM

## 2025-06-17 DIAGNOSIS — C66.2 URETER MALIGNANT NEOPLASM, LEFT (HCC): ICD-10-CM

## 2025-06-17 DIAGNOSIS — R29.6 RECURRENT FALLS: ICD-10-CM

## 2025-06-17 DIAGNOSIS — R26.9 GAIT DISTURBANCE: ICD-10-CM

## 2025-06-17 DIAGNOSIS — M25.551 RIGHT HIP PAIN: Primary | ICD-10-CM

## 2025-06-17 LAB
ALBUMIN SERPL BCG-MCNC: 4 G/DL (ref 3.5–5)
ALP SERPL-CCNC: 71 U/L (ref 34–104)
ALT SERPL W P-5'-P-CCNC: 15 U/L (ref 7–52)
ANION GAP SERPL CALCULATED.3IONS-SCNC: 3 MMOL/L (ref 4–13)
AST SERPL W P-5'-P-CCNC: 14 U/L (ref 13–39)
BASOPHILS # BLD AUTO: 0.03 THOUSANDS/ÂΜL (ref 0–0.1)
BASOPHILS NFR BLD AUTO: 1 % (ref 0–1)
BILIRUB SERPL-MCNC: 0.56 MG/DL (ref 0.2–1)
BUN SERPL-MCNC: 22 MG/DL (ref 5–25)
CALCIUM SERPL-MCNC: 8.7 MG/DL (ref 8.4–10.2)
CHLORIDE SERPL-SCNC: 106 MMOL/L (ref 96–108)
CO2 SERPL-SCNC: 29 MMOL/L (ref 21–32)
CREAT SERPL-MCNC: 0.63 MG/DL (ref 0.6–1.3)
EOSINOPHIL # BLD AUTO: 0.2 THOUSAND/ÂΜL (ref 0–0.61)
EOSINOPHIL NFR BLD AUTO: 5 % (ref 0–6)
ERYTHROCYTE [DISTWIDTH] IN BLOOD BY AUTOMATED COUNT: 13.3 % (ref 11.6–15.1)
GFR SERPL CREATININE-BSD FRML MDRD: 80 ML/MIN/1.73SQ M
GLUCOSE P FAST SERPL-MCNC: 89 MG/DL (ref 65–99)
HCT VFR BLD AUTO: 37 % (ref 34.8–46.1)
HGB BLD-MCNC: 12.2 G/DL (ref 11.5–15.4)
IMM GRANULOCYTES # BLD AUTO: 0.01 THOUSAND/UL (ref 0–0.2)
IMM GRANULOCYTES NFR BLD AUTO: 0 % (ref 0–2)
LYMPHOCYTES # BLD AUTO: 1.26 THOUSANDS/ÂΜL (ref 0.6–4.47)
LYMPHOCYTES NFR BLD AUTO: 30 % (ref 14–44)
MCH RBC QN AUTO: 33.2 PG (ref 26.8–34.3)
MCHC RBC AUTO-ENTMCNC: 33 G/DL (ref 31.4–37.4)
MCV RBC AUTO: 101 FL (ref 82–98)
MONOCYTES # BLD AUTO: 0.47 THOUSAND/ÂΜL (ref 0.17–1.22)
MONOCYTES NFR BLD AUTO: 11 % (ref 4–12)
NEUTROPHILS # BLD AUTO: 2.22 THOUSANDS/ÂΜL (ref 1.85–7.62)
NEUTS SEG NFR BLD AUTO: 53 % (ref 43–75)
NRBC BLD AUTO-RTO: 0 /100 WBCS
PLATELET # BLD AUTO: 172 THOUSANDS/UL (ref 149–390)
PMV BLD AUTO: 10.1 FL (ref 8.9–12.7)
POTASSIUM SERPL-SCNC: 3.9 MMOL/L (ref 3.5–5.3)
PROT SERPL-MCNC: 7 G/DL (ref 6.4–8.4)
RBC # BLD AUTO: 3.68 MILLION/UL (ref 3.81–5.12)
SODIUM SERPL-SCNC: 138 MMOL/L (ref 135–147)
WBC # BLD AUTO: 4.19 THOUSAND/UL (ref 4.31–10.16)

## 2025-06-17 PROCEDURE — 88112 CYTOPATH CELL ENHANCE TECH: CPT | Performed by: PATHOLOGY

## 2025-06-17 PROCEDURE — 85025 COMPLETE CBC W/AUTO DIFF WBC: CPT

## 2025-06-17 PROCEDURE — 97110 THERAPEUTIC EXERCISES: CPT

## 2025-06-17 PROCEDURE — 97112 NEUROMUSCULAR REEDUCATION: CPT

## 2025-06-17 PROCEDURE — 36415 COLL VENOUS BLD VENIPUNCTURE: CPT

## 2025-06-17 PROCEDURE — 80053 COMPREHEN METABOLIC PANEL: CPT

## 2025-06-17 NOTE — PROGRESS NOTES
Daily Note     Today's date: 2025  Patient name: Radha Ma  : 1935  MRN: [id number]  Referring provider: Anjelica Sr DO  Dx:   Encounter Diagnosis     ICD-10-CM    1. Right hip pain  M25.551       2. Gait disturbance  R26.9       3. Recurrent falls  R29.6                      Subjective: Pt comes to therapy reporting her R hip does not bother her as much as her L LE today, notes some pain behind the knee today.       Objective: See treatment diary below  Issued HEP to which pt verbalizes and demonstrates understanding.    Assessment: Pt with good tolerance to treatment and initiation of program as listed below. Unable to complete full rev on bike due to L LE today, however reports improved ROM upon completion. Pt with difficulty bending L knee, requiring assist for positioning with supine TE. Exercises limited more by L>R LE on this date. Instructions also provided for seated positions with HEP. Pt able to complete all exercises as listed below without increased R hip pain or adverse response. B UE use during Biodex with mild-moderate cues for weight shift, good carryover. Will continue to gradually progress as able. Pt denies adverse response post treatment and would benefit from continued skilled PT to improve current deficits and maximize function.      Plan: Continue per plan of care.  Progress treatment as tolerated.       Precautions:   Patient Active Problem List   Diagnosis    Essential hypertension    Mixed hyperlipidemia    Coronary artery disease without angina pectoris - S/P stent placement x 2 ()    Gastroesophageal reflux disease without esophagitis    Chronic diastolic (congestive) heart failure (HCC)    Age-related osteoporosis with current pathological fracture    Ambulatory dysfunction    Elderly person living alone    Low back pain without sciatica    S/P ORIF (open reduction internal fixation) fracture    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HCC)    Bladder  cancer (HCC)    Allergic rhinitis    Anemia due to vitamin B12 deficiency    Biliary dyskinesia    Cholelithiasis    Chronic right shoulder pain    Degenerative joint disease    Controlled depression    Deviated nasal septum    Heart valve disorder    IBS (irritable bowel syndrome)    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HCC)    Mild vitamin D deficiency    Mixed hearing loss, unilateral    Pancreatic cyst    Raynaud's disease without gangrene    S/P angioplasty with stent    Urge incontinence of urine    History of pulmonary embolus (PE)    Age-related cataract of both eyes    Hyperparathyroidism, unspecified (HCC)    Fear of falling    Balance problems    Difficulty navigating stairs    Lower abdominal pain, unspecified    History of falling    Bilateral leg edema    New onset of headaches    Carotid artery stenosis, asymptomatic, bilateral    Ventral hernia    Cerebral aneurysm without rupture    Intermittent pain and swelling of hand    Right hand paresthesia    BMI 23.0-23.9, adult    Carpal tunnel syndrome of right wrist    CKD (chronic kidney disease) stage 2, GFR 60-89 ml/min    TIFFANY (renal artery stenosis) ([facility])    History of pneumonia    Fatigue    Swelling of left lower extremity    Rib pain on left side    Seropositive rheumatoid arthritis (HCC)    Long term (current) use of immunosuppressive biologic    Age-related osteoporosis without current pathological fracture    Hydronephrosis, left    Long term current use of diuretic    Malignant tumor of ureter, left (HCC)    Chronic kidney disease, stage 3a ([facility])          Daily Treatment Diary      Visit # 1 2 3 4 5 6 7 8 9 10   Assessment  6/11 6/17                   Eval/Satish MARC                 **   FOTO         **         **   Manuals    R Hip ER PROM    SE                    R HF Stretch  nv                        Prescribed POC    Pt Education  MD                      HEP Issued/Updated  MD Weber    5' AAROM                    Hip Add     "5\"x10                    TB Hip ABD    OTB  5\"x10                    H/L March                        Bridge                        SAQ    5\"x10                    LAQ                        Standing Hip ABD                        SLS with UE Support                        Tandem Balance with UE Support              1/4 Squats              Biodex LOS:  1:11 83%  49\", 84%  B UE assist                                                Modalities    CP R Hip   Post-Tx                                    QR Code:  NP  [facility] HEP:  Access Code: [email or web address]  URL: [email or web address]  Date: 06/17/2025  Prepared by: Ashley Hamilton    Exercises  - Supine Hip Adductor Squeeze with Small Ball  - 1 x daily - 1 sets - 10 reps - 5 hold  - Seated Hip Adduction Isometrics with Ball  - 1 x daily - 1 sets - 10 reps - 5 hold  - Seated Isometric Hip Abduction with Belt  - 1 x daily - 1 sets - 10 reps - 5 hold  - Hooklying Clamshell with Resistance  - 1 x daily - 1 sets - 10 reps - 5 hold           "

## 2025-06-18 DIAGNOSIS — K21.9 GASTROESOPHAGEAL REFLUX DISEASE WITHOUT ESOPHAGITIS: ICD-10-CM

## 2025-06-19 ENCOUNTER — OFFICE VISIT (OUTPATIENT)
Dept: PHYSICAL THERAPY | Facility: REHABILITATION | Age: 89
End: 2025-06-19
Attending: FAMILY MEDICINE
Payer: MEDICARE

## 2025-06-19 ENCOUNTER — RESULTS FOLLOW-UP (OUTPATIENT)
Age: 89
End: 2025-06-19

## 2025-06-19 DIAGNOSIS — R26.9 GAIT DISTURBANCE: ICD-10-CM

## 2025-06-19 DIAGNOSIS — M25.551 RIGHT HIP PAIN: Primary | ICD-10-CM

## 2025-06-19 DIAGNOSIS — R29.6 RECURRENT FALLS: ICD-10-CM

## 2025-06-19 PROCEDURE — 88112 CYTOPATH CELL ENHANCE TECH: CPT | Performed by: PATHOLOGY

## 2025-06-19 PROCEDURE — 97110 THERAPEUTIC EXERCISES: CPT

## 2025-06-19 PROCEDURE — 97112 NEUROMUSCULAR REEDUCATION: CPT

## 2025-06-19 PROCEDURE — 97140 MANUAL THERAPY 1/> REGIONS: CPT

## 2025-06-19 RX ORDER — OMEPRAZOLE 40 MG/1
40 CAPSULE, DELAYED RELEASE ORAL DAILY
Qty: 90 CAPSULE | Refills: 1 | Status: SHIPPED | OUTPATIENT
Start: 2025-06-19

## 2025-06-19 NOTE — PROGRESS NOTES
Daily Note     Today's date: 2025  Patient name: Radha Ma  : 1935  MRN: [id number]  Referring provider: Anjelica Sr DO  Dx:   Encounter Diagnosis     ICD-10-CM    1. Right hip pain  M25.551       2. Gait disturbance  R26.9       3. Recurrent falls  R29.6                      Subjective: Pt comes to therapy amb with SPC, continues to c/o increased posterior L knee pain> R hip pain. She also reports tenderness along L lateral knee where hardware is from previous fracture. She reports both of her legs seem more swollen today. She further states she continues with intermittent, short episodes of dizziness most days. No dizziness reported upon arrival to therapy.    Objective: See treatment diary below    Assessment: Pt with fair tolerance to treatment. Pt advised by primary PT to use walker instead of SPC to improve gait and unload LE's. Will assess if this assists in providing pain relief in L knee at nv.  Pt able to perform exercises as listed below and add H/L marches without adverse response or increase in R hip or L knee pain. Favorable response to manual techniques with addition of HF stretch, R hip mobility improves upon completion. CP provided post treatment with good tolerance. Gait improves post treatment with pt able to demonstrate step through gait pattern. Will continue to progress as able. Pt would benefit from continued skilled PT to improve current deficits and maximize function.    Plan: Continue per plan of care.  Progress treatment as tolerated.       Precautions:   Patient Active Problem List   Diagnosis    Essential hypertension    Mixed hyperlipidemia    Coronary artery disease without angina pectoris - S/P stent placement x 2 ()    Gastroesophageal reflux disease without esophagitis    Chronic diastolic (congestive) heart failure (HCC)    Age-related osteoporosis with current pathological fracture    Ambulatory dysfunction    Elderly person living alone    Low back  pain without sciatica    S/P ORIF (open reduction internal fixation) fracture    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HCC)    Bladder cancer (HCC)    Allergic rhinitis    Anemia due to vitamin B12 deficiency    Biliary dyskinesia    Cholelithiasis    Chronic right shoulder pain    Degenerative joint disease    Controlled depression    Deviated nasal septum    Heart valve disorder    IBS (irritable bowel syndrome)    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HCC)    Mild vitamin D deficiency    Mixed hearing loss, unilateral    Pancreatic cyst    Raynaud's disease without gangrene    S/P angioplasty with stent    Urge incontinence of urine    History of pulmonary embolus (PE)    Age-related cataract of both eyes    Hyperparathyroidism, unspecified ([facility])    Fear of falling    Balance problems    Difficulty navigating stairs    Lower abdominal pain, unspecified    History of falling    Bilateral leg edema    New onset of headaches    Carotid artery stenosis, asymptomatic, bilateral    Ventral hernia    Cerebral aneurysm without rupture    Intermittent pain and swelling of hand    Right hand paresthesia    BMI 23.0-23.9, adult    Carpal tunnel syndrome of right wrist    CKD (chronic kidney disease) stage 2, GFR 60-89 ml/min    TIFFANY (renal artery stenosis) ([facility])    History of pneumonia    Fatigue    Swelling of left lower extremity    Rib pain on left side    Seropositive rheumatoid arthritis (HCC)    Long term (current) use of immunosuppressive biologic    Age-related osteoporosis without current pathological fracture    Hydronephrosis, left    Long term current use of diuretic    Malignant tumor of ureter, left (HCC)    Chronic kidney disease, stage 3a ([facility])          Daily Treatment Diary      Visit # 1 2 3 4 5 6 7 8 9 10   Assessment  6/11 6/17 6/19                 Nicole/Satish MARC                 **   FOTO         **         **   Manuals    R Hip ER PROM    SE  SE                  R HF Stretch  nv SE                      "  Prescribed POC    Pt Education  MD                      HEP Issued/Updated  MD Weber    5' AAROM  unable                  Hip Add    5\"x10  5\"x10                  TB Hip ABD    OTB  5\"x10  OTB  5\"x10                  H/L March 5\"  2x5 ea                  Bridge                        SAQ    5\"x10  5\"x10 B                  LAQ      nv                  Standing Hip ABD                        SLS with UE Support                        Tandem Balance with UE Support              1/4 Squats              Biodex LOS:  1:11 83%  49\", 84%  B UE assist                                                Modalities    CP R Hip   Post-Tx      R hip + L knee   90/90                              QR Code:  NP  [facility] HEP:  Access Code: [email or web address]  URL: [email or web address]  Date: 06/17/2025  Prepared by: Ashley Hamilton    Exercises  - Supine Hip Adductor Squeeze with Small Ball  - 1 x daily - 1 sets - 10 reps - 5 hold  - Seated Hip Adduction Isometrics with Ball  - 1 x daily - 1 sets - 10 reps - 5 hold  - Seated Isometric Hip Abduction with Belt  - 1 x daily - 1 sets - 10 reps - 5 hold  - Hooklying Clamshell with Resistance  - 1 x daily - 1 sets - 10 reps - 5 hold           "

## 2025-06-20 ENCOUNTER — HOSPITAL ENCOUNTER (OUTPATIENT)
Dept: RADIOLOGY | Facility: MEDICAL CENTER | Age: 89
Discharge: HOME | End: 2025-06-20
Attending: UROLOGY
Payer: MEDICARE

## 2025-06-20 DIAGNOSIS — C66.2 URETER MALIGNANT NEOPLASM, LEFT (HCC): ICD-10-CM

## 2025-06-20 PROCEDURE — 74178 CT ABD&PLV WO CNTR FLWD CNTR: CPT

## 2025-06-20 RX ADMIN — IOHEXOL 85 ML: 350 INJECTION, SOLUTION INTRAVENOUS at 11:55

## 2025-06-24 ENCOUNTER — APPOINTMENT (OUTPATIENT)
Dept: PHYSICAL THERAPY | Facility: REHABILITATION | Age: 89
End: 2025-06-24
Attending: FAMILY MEDICINE
Payer: MEDICARE

## 2025-06-26 ENCOUNTER — OFFICE VISIT (OUTPATIENT)
Dept: PHYSICAL THERAPY | Facility: REHABILITATION | Age: 89
End: 2025-06-26
Attending: FAMILY MEDICINE
Payer: MEDICARE

## 2025-06-26 DIAGNOSIS — R29.6 RECURRENT FALLS: ICD-10-CM

## 2025-06-26 DIAGNOSIS — R26.9 GAIT DISTURBANCE: ICD-10-CM

## 2025-06-26 DIAGNOSIS — M25.551 RIGHT HIP PAIN: Primary | ICD-10-CM

## 2025-06-26 PROCEDURE — 97112 NEUROMUSCULAR REEDUCATION: CPT | Performed by: PHYSICAL THERAPIST

## 2025-06-26 PROCEDURE — 97110 THERAPEUTIC EXERCISES: CPT | Performed by: PHYSICAL THERAPIST

## 2025-06-26 NOTE — PROGRESS NOTES
Daily Note     Today's date: 2025  Patient name: Radha Ma  : 1935  MRN: [id number]  Referring provider: Anjelica Sr DO  Dx:   Encounter Diagnosis     ICD-10-CM    1. Right hip pain  M25.551       2. Gait disturbance  R26.9       3. Recurrent falls  R29.6                      Subjective: Pt reports she has been using her RW at home but doesn't feel she is able to carry it down the stairs to use outside of her home. Advised patient to try to use one hand on the handrail and use the folded walker similar to SPC when descending/ascending stairs to provide another point of contact and increased stability instead of carrying in her opposite arm.     Objective: See treatment diary below. Pt issued updated HEP to which she demonstrated and verbalized understanding.    Assessment: Pt presents to session with improved gait with less use of L UE to support herself on surfaces while using SPC in R UE. Pt with good tolerance to progression of program with addition of LAQ, bridges and standing hip abduction with B UE support. Pt with appropriate challenge and fatigue without c/o pain or adverse response. Pt demonstrates good understanding and carryover with current program. Will continue to progress program as able. Pt will benefit from continued skilled PT intervention in order to address remaining limitations and to restore maximal function.     Plan: Continue per plan of care.  Progress treatment as tolerated.       Precautions:   Patient Active Problem List   Diagnosis    Essential hypertension    Mixed hyperlipidemia    Coronary artery disease without angina pectoris - S/P stent placement x 2 ()    Gastroesophageal reflux disease without esophagitis    Chronic diastolic (congestive) heart failure (HCC)    Age-related osteoporosis with current pathological fracture    Ambulatory dysfunction    Elderly person living alone    Low back pain without sciatica    S/P ORIF (open reduction internal  fixation) fracture    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HCC)    Bladder cancer (HCC)    Allergic rhinitis    Anemia due to vitamin B12 deficiency    Biliary dyskinesia    Cholelithiasis    Chronic right shoulder pain    Degenerative joint disease    Controlled depression    Deviated nasal septum    Heart valve disorder    IBS (irritable bowel syndrome)    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HCC)    Mild vitamin D deficiency    Mixed hearing loss, unilateral    Pancreatic cyst    Raynaud's disease without gangrene    S/P angioplasty with stent    Urge incontinence of urine    History of pulmonary embolus (PE)    Age-related cataract of both eyes    Hyperparathyroidism, unspecified (HCC)    Fear of falling    Balance problems    Difficulty navigating stairs    Lower abdominal pain, unspecified    History of falling    Bilateral leg edema    New onset of headaches    Carotid artery stenosis, asymptomatic, bilateral    Ventral hernia    Cerebral aneurysm without rupture    Intermittent pain and swelling of hand    Right hand paresthesia    BMI 23.0-23.9, adult    Carpal tunnel syndrome of right wrist    CKD (chronic kidney disease) stage 2, GFR 60-89 ml/min    TIFFANY (renal artery stenosis) ([facility])    History of pneumonia    Fatigue    Swelling of left lower extremity    Rib pain on left side    Seropositive rheumatoid arthritis (HCC)    Long term (current) use of immunosuppressive biologic    Age-related osteoporosis without current pathological fracture    Hydronephrosis, left    Long term current use of diuretic    Malignant tumor of ureter, left (HCC)    Chronic kidney disease, stage 3a ([facility])          Daily Treatment Diary      Visit # 1 2 3 4 5 6 7 8 9 10   Assessment  6/11 6/17 6/19 6/26               Nicole/Satish MARC                 **   FOTO         **         **   Manuals    R Hip ER PROM    SE  SE  NV                R HF Stretch  nv SE  NV                      Prescribed POC    Pt Education  MD               "        HEP Issued/Updated  MD Weber    5' AAROM  unable                  Hip Add    5\"x10  5\"x10  5\"   2x10                TB Hip ABD    OTB  5\"x10  OTB  5\"x10  Orange   5\"   2x10                H/L March      5\"  2x5 ea  5\"   1x10 R    1x8 L                Low Bridge        5\"x10                SAQ    5\"x10  5\"x10 B  5\"   2x10 ea                LAQ      nv  5\"    1x10 ea                Standing Hip ABD        5\"    1x10 ea                SLS with UE Support                        Tandem Balance with UE Support              1/4 Squats              Biodex LOS:  1:11 83%  49\", 84%  B UE assist                                                Modalities    CP R Hip   Post-Tx      R hip + L knee   90/90 R hip + L knee   90/90  10'                            QR Code:  NP  Med Bridge HEP:  Access Code: [email or web address]  URL: [email or web address]  Date: 06/26/2025  Prepared by: Nicolle Reis    Exercises  - Supine Hip Adductor Squeeze with Small Ball  - 1 x daily - 2 sets - 10 reps - 5 hold  - Hooklying Clamshell with Resistance  - 1 x daily - 2 sets - 10 reps - 5 hold  - Supine March  - 1 x daily - 1 sets - 10 reps - 5 hold  - Beginner Bridge  - 1 x daily - 1 sets - 10 reps - 5 hold  - Supine Short Arc Quad  - 1 x daily - 1 sets - 10 reps - 5 hold  - Seated Long Arc Quad  - 1 x daily - 1 sets - 10 reps - 5 hold  - Standing Hip Abduction with Counter Support  - 1 x daily - 1 sets - 10 reps - 5 hold         "

## 2025-06-27 DIAGNOSIS — I10 ESSENTIAL HYPERTENSION: ICD-10-CM

## 2025-06-27 RX ORDER — AMLODIPINE BESYLATE 5 MG/1
5 TABLET ORAL 2 TIMES DAILY
Qty: 180 TABLET | Refills: 1 | Status: SHIPPED | OUTPATIENT
Start: 2025-06-27

## 2025-06-28 DIAGNOSIS — I10 ESSENTIAL HYPERTENSION: ICD-10-CM

## 2025-06-30 RX ORDER — CARVEDILOL 12.5 MG/1
TABLET ORAL
Qty: 90 TABLET | Refills: 1 | Status: SHIPPED | OUTPATIENT
Start: 2025-06-30

## 2025-06-30 NOTE — PROGRESS NOTES
Name: Radha Ma      : 1935      MRN: [id number]  Encounter Provider: Nicki Olsen DO  Encounter Date: 2025   Encounter department: [facility] [address]  :  Assessment & Plan  Rheumatoid arthritis involving multiple sites with positive rheumatoid factor (HCC)  Patient is an 89-year-old female presenting for follow-up of longstanding seropositive RA.  Patient is currently on methotrexate 15 mg once weekly with folic acid supplements.  No prolonged morning stiffness or joint swelling.  No synovitis or joint tenderness on exam.  Most recent labs reviewed and stable.    -Continue methotrexate 15 mg once weekly  - Continue folic acid to 2 mg daily   -CBC and CMP every 3 months while on methotrexate  -Hold methotrexate if concerns for any active infection       High risk medication use  Patient's rheumatologic medication(s) require intensive monitoring for toxicity.         Primary generalized (osteo)arthritis  Patient has generalized osteoarthritis of multiple joints.  Patient reports some worsening arthralgias after Prolia injection, however it is improving.  Pain was constant and worse with activity.  Discussed conservative measures such as trying Voltaren gel. Continue Tylenol, no more than 3 g a day. If significant worsening of symptoms, can consider intra-articular steroids.        RTC in 6 months     Pertinent Medical History   Seropositive RA (RF 40, CCP negative)   Per chart review, patient was previously following at [facility] with Dr. Echevarria for seropositive RA, generalized osteoarthritis and severe osteoporosis.  Patient was found to have inflammatory joint pain of the hands and wrist.  Swelling was around mostly the MCPs.  RF 40 and CCP negative. Patient started on MTX.  - 24: Established care. Doing well on MTX 15mg weekly with folic acid. Continue medications   - 3/28/2025: Recurrent oral ulcers, increase folic acid to 2 mg daily.  Continue methotrexate 15 mg  weekly    2. Osteoporosis   Patient has a history of vertebral fractures in the past.  In 2018, patient slipped in the shower and fell on her back and broke her left femur.  Patient was treated with Prolia from [id number]. Patient now follows with endocrinology for osteoporosis.     History of Present Illness   Radha Ma is a 89 y.o. female with a hx of CHF, HTN, GERD, carotid artery stenosis, hyperparathyroidism  who presents for f/u of RA.    HPI   Patient states she had her Prolia injection in May with endocrinology.  Reports after that she got severe arthralgias.  Reports pain in her neck, hands, knees.  Pain was constant and worse with activity.  Patient states it slowly now starting to get better.  Takes Tylenol for the pain.  Denies prolonged morning stiffness or joint swelling.  Oral ulcers have improved since increasing the folic acid dose.    Review of Systems  Complete ROS conducted as per HPI. In addition, denies:  Fever  Photosensitive rash  Sicca symptoms  Muscle weakness  Uveitis  Dactylitis  Chest pain  SOB  Pleurisy  Gross hematuria  Foamy urine  Joint issues other than noted above    Current Outpatient Medications on File Prior to Visit   Medication Sig Dispense Refill    acetaminophen (Tylenol 8 Hour) 650 mg CR tablet Take 1 tablet (650 mg total) by mouth daily at bedtime      amLODIPine (NORVASC) 5 mg tablet TAKE 1 TABLET BY MOUTH TWICE  DAILY 180 tablet 1    aspirin 81 MG tablet Take 81 mg by mouth in the morning.      atorvastatin (LIPITOR) 40 mg tablet TAKE 1 TABLET BY MOUTH DAILY  AFTER DINNER 90 tablet 1    bumetanide (BUMEX) 1 mg tablet TAKE 1/2 TABLET BY MOUTH DAILY  (MAY TAKE 1 FULL TABLET IF  NEEDED) (Patient taking differently: TAKE 1/2 TABLET BY MOUTH DAILY  (MAY TAKE 1 FULL TABLET IF  NEEDED)) 90 tablet 1    carvedilol (COREG) 12.5 mg tablet TAKE ONE-HALF TABLET BY MOUTH  TWICE DAILY WITH MEALS 90 tablet 1    Cholecalciferol 2000 units TABS Take 2,000 Units by mouth in the morning.  "Per pt stopped 8/26 per MD instructions.      DULoxetine (CYMBALTA) 30 mg delayed release capsule TAKE 1 CAPSULE BY MOUTH DAILY 90 capsule 1    fexofenadine (ALLEGRA) 180 MG tablet Take 180 mg by mouth daily as needed      folic acid (FOLVITE) 1 mg tablet Take 2 tablets (2 mg total) by mouth daily 60 tablet 3    losartan (COZAAR) 100 MG tablet TAKE 1 TABLET BY MOUTH DAILY 90 tablet 1    methotrexate 2.5 MG tablet Take 6 tablets (15 mg total) by mouth once a week 24 tablet 3    MULTIPLE VITAMIN PO Take by mouth in the morning. Per pt stopped 8/26 per MD instructions.      omeprazole (PriLOSEC) 40 MG capsule TAKE 1 CAPSULE BY MOUTH DAILY 90 capsule 1    ondansetron (ZOFRAN) 4 mg tablet Take 1 tablet by mouth every 8 (eight) hours as needed       No current facility-administered medications on file prior to visit.      Social History     Tobacco Use    Smoking status: Never    Smokeless tobacco: Never   Vaping Use    Vaping status: Never Used   Substance and Sexual Activity    Alcohol use: Never    Drug use: Never         Objective   /62   Pulse 83   Temp (!) 95 °F (35 °C) (Tympanic)   Ht 5' 1\" (1.549 m)   Wt 57.2 kg (126 lb 3.2 oz)   SpO2 96%   BMI 23.85 kg/m²     Physical Exam  General appearance: normal appearing, no acute distress  Skin: normal, no rashes  HEENT: normal, moist oropharynx, no nasal or oral ulcers  Lungs: normal respiratory effort, comfortable on room air, lungs clear to auscultation b/l   Heart: normal heart sounds, normal rate, normal rhythm,  Abdomen: soft, normal bowel sounds, no tenderness  Neurologic: no obvious neurological deficits   Extremities: no edema, warm and well perfused     Musculoskeletal Exam:   - Observation: [place] and Simba nodes present, squaring of the CMC joints bilaterally  - Palpation: no joint tenderness  - Synovitis: absent  - Joint effusions: absent  - ROM: intact throughout  - Muscle Strength: 5/5 throughout       Recent labs:  Lab Results   Component " Value Date/Time    SODIUM 138 06/17/2025 09:43 AM    K 3.9 06/17/2025 09:43 AM    BUN 22 06/17/2025 09:43 AM    CREATININE 0.63 06/17/2025 09:43 AM    GLUC 88 05/21/2024 05:55 AM    CALCIUM 8.7 06/17/2025 09:43 AM    AST 14 06/17/2025 09:43 AM    ALT 15 06/17/2025 09:43 AM    ALB 4.0 06/17/2025 09:43 AM    TP 7.0 06/17/2025 09:43 AM    EGFR 80 06/17/2025 09:43 AM     Lab Results   Component Value Date/Time    HGB 12.2 06/17/2025 09:43 AM    WBC 4.19 (L) 06/17/2025 09:43 AM     06/17/2025 09:43 AM    INR 0.99 04/08/2018 02:03 AM    PTT 30 04/08/2018 02:03 AM     Lab Results   Component Value Date/Time    FYY4CWBUCKUP 3.247 01/29/2024 12:07 PM       RF 40  CCP negative     I have personally reviewed notes, labs, and imaging available in the chart.     Nicki Olsen DO, CCD, [facility]

## 2025-07-01 ENCOUNTER — OFFICE VISIT (OUTPATIENT)
Dept: PHYSICAL THERAPY | Facility: REHABILITATION | Age: 89
End: 2025-07-01
Attending: FAMILY MEDICINE
Payer: MEDICARE

## 2025-07-01 ENCOUNTER — OFFICE VISIT (OUTPATIENT)
Age: 89
End: 2025-07-01
Payer: MEDICARE

## 2025-07-01 VITALS
HEIGHT: 61 IN | DIASTOLIC BLOOD PRESSURE: 62 MMHG | BODY MASS INDEX: 23.83 KG/M2 | OXYGEN SATURATION: 96 % | TEMPERATURE: 95 F | WEIGHT: 126.2 LBS | HEART RATE: 83 BPM | SYSTOLIC BLOOD PRESSURE: 120 MMHG

## 2025-07-01 DIAGNOSIS — R26.9 GAIT DISTURBANCE: ICD-10-CM

## 2025-07-01 DIAGNOSIS — M25.551 RIGHT HIP PAIN: Primary | ICD-10-CM

## 2025-07-01 DIAGNOSIS — Z79.899 HIGH RISK MEDICATION USE: ICD-10-CM

## 2025-07-01 DIAGNOSIS — M05.79 RHEUMATOID ARTHRITIS INVOLVING MULTIPLE SITES WITH POSITIVE RHEUMATOID FACTOR (HCC): Primary | ICD-10-CM

## 2025-07-01 DIAGNOSIS — R29.6 RECURRENT FALLS: ICD-10-CM

## 2025-07-01 DIAGNOSIS — M15.0 PRIMARY GENERALIZED (OSTEO)ARTHRITIS: ICD-10-CM

## 2025-07-01 PROCEDURE — 97110 THERAPEUTIC EXERCISES: CPT

## 2025-07-01 PROCEDURE — 97112 NEUROMUSCULAR REEDUCATION: CPT

## 2025-07-01 PROCEDURE — 99214 OFFICE O/P EST MOD 30 MIN: CPT | Performed by: STUDENT IN AN ORGANIZED HEALTH CARE EDUCATION/TRAINING PROGRAM

## 2025-07-01 PROCEDURE — G2211 COMPLEX E/M VISIT ADD ON: HCPCS | Performed by: STUDENT IN AN ORGANIZED HEALTH CARE EDUCATION/TRAINING PROGRAM

## 2025-07-01 NOTE — PROGRESS NOTES
Daily Note     Today's date: 2025  Patient name: Radha Ma  : 1935  MRN: [id number]  Referring provider: Anjelica Sr DO  Dx:   Encounter Diagnosis     ICD-10-CM    1. Right hip pain  M25.551       2. Gait disturbance  R26.9       3. Recurrent falls  R29.6                      Subjective: Pt comes to therapy using SPC and reporting her leg feels much better, that some times she feels that she does not even need to use her cane.    Objective: See treatment diary below.     Assessment: Pt with good tolerance to addition of SLS and tandem balance with B UE support. MC/VC for proper hip activation on R side for support with fair carryover. Good recall of exercises and completes with min cues for performance on this date. No c/o increased pain or adverse with completion. Will continue to progress as able. Pt would benefit from continued skilled PT to improve current deficits and maximize function.    Plan: Continue per plan of care.  Progress treatment as tolerated.       Precautions:   Patient Active Problem List   Diagnosis    Essential hypertension    Mixed hyperlipidemia    Coronary artery disease without angina pectoris - S/P stent placement x 2 ()    Gastroesophageal reflux disease without esophagitis    Chronic diastolic (congestive) heart failure (HCC)    Age-related osteoporosis with current pathological fracture    Ambulatory dysfunction    Elderly person living alone    Low back pain without sciatica    S/P ORIF (open reduction internal fixation) fracture    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HCC)    Bladder cancer (HCC)    Allergic rhinitis    Anemia due to vitamin B12 deficiency    Biliary dyskinesia    Cholelithiasis    Chronic right shoulder pain    Degenerative joint disease    Controlled depression    Deviated nasal septum    Heart valve disorder    IBS (irritable bowel syndrome)    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HCC)    Mild vitamin D deficiency    Mixed hearing  "loss, unilateral    Pancreatic cyst    Raynaud's disease without gangrene    S/P angioplasty with stent    Urge incontinence of urine    History of pulmonary embolus (PE)    Age-related cataract of both eyes    Hyperparathyroidism, unspecified (HCC)    Fear of falling    Balance problems    Difficulty navigating stairs    Lower abdominal pain, unspecified    History of falling    Bilateral leg edema    New onset of headaches    Carotid artery stenosis, asymptomatic, bilateral    Ventral hernia    Cerebral aneurysm without rupture    Intermittent pain and swelling of hand    Right hand paresthesia    BMI 23.0-23.9, adult    Carpal tunnel syndrome of right wrist    CKD (chronic kidney disease) stage 2, GFR 60-89 ml/min    TIFFANY (renal artery stenosis) ([facility])    History of pneumonia    Fatigue    Swelling of left lower extremity    Rib pain on left side    Seropositive rheumatoid arthritis (HCC)    Long term (current) use of immunosuppressive biologic    Age-related osteoporosis without current pathological fracture    Hydronephrosis, left    Long term current use of diuretic    Malignant tumor of ureter, left (HCC)    Chronic kidney disease, stage 3a ([facility])          Daily Treatment Diary      Visit # 1 2 3 4 5 6 7 8 9 10   Assessment  6/11 6/17 6/19 6/26 7/1             Nicole/Satish MARC                 **   FOTO         **         **   Manuals    R Hip ER PROM    SE  SE  NV                R HF Stretch  nv SE  NV                      Prescribed POC    Pt Education  MD                      HEP Issued/Updated  MD Weber    5' AAROM  unable                  Hip Add    5\"x10  5\"x10  5\"   2x10  5\"  2x10              TB Hip ABD    OTB  5\"x10  OTB  5\"x10  Orange   5\"   2x10  orange  5\"  2x10              H/L March 5\"  2x5 ea  5\"   1x10 R    1x8 L  5\"                Low Bridge        5\"x10  5\"x10              SAQ    5\"x10  5\"x10 B  5\"   2x10 ea  5\"  2x10 ea              LAQ      nv  5\"    1x10 ea  " "5\"  1x10 ea              Standing Hip ABD        5\"    1x10 ea  5\"  1x10 ea              SLS with UE Support          10\"x2 ea              Tandem Balance with UE Support     10\"x2 ea         1/4 Squats              Biodex LOS:  1:11 83%  49\", 84%  B UE assist                                                Modalities    CP R Hip   Post-Tx      R hip + L knee   90/90 R hip + L knee   90/90  10'                            QR Code:  NP  Med Install[facility] HEP:  Access Code: [email or web address]  URL: [email or web address]  Date: 06/26/2025  Prepared by: Nicolle Reis    Exercises  - Supine Hip Adductor Squeeze with Small Ball  - 1 x daily - 2 sets - 10 reps - 5 hold  - Hooklying Clamshell with Resistance  - 1 x daily - 2 sets - 10 reps - 5 hold  - Supine March  - 1 x daily - 1 sets - 10 reps - 5 hold  - Beginner Bridge  - 1 x daily - 1 sets - 10 reps - 5 hold  - Supine Short Arc Quad  - 1 x daily - 1 sets - 10 reps - 5 hold  - Seated Long Arc Quad  - 1 x daily - 1 sets - 10 reps - 5 hold  - Standing Hip Abduction with Counter Support  - 1 x daily - 1 sets - 10 reps - 5 hold         "

## 2025-07-03 ENCOUNTER — EVALUATION (OUTPATIENT)
Dept: PHYSICAL THERAPY | Facility: REHABILITATION | Age: 89
End: 2025-07-03
Attending: FAMILY MEDICINE
Payer: MEDICARE

## 2025-07-03 DIAGNOSIS — M25.551 RIGHT HIP PAIN: Primary | ICD-10-CM

## 2025-07-03 DIAGNOSIS — R26.9 GAIT DISTURBANCE: ICD-10-CM

## 2025-07-03 DIAGNOSIS — R29.6 RECURRENT FALLS: ICD-10-CM

## 2025-07-03 PROCEDURE — 97112 NEUROMUSCULAR REEDUCATION: CPT | Performed by: PHYSICAL THERAPIST

## 2025-07-03 PROCEDURE — 97110 THERAPEUTIC EXERCISES: CPT | Performed by: PHYSICAL THERAPIST

## 2025-07-03 NOTE — PROGRESS NOTES
Daily Note     Today's date: 7/3/2025  Patient name: Radha Ma  : 1935  MRN: [id number]  Referring provider: Anjelica Sr DO  Dx:   Encounter Diagnosis     ICD-10-CM    1. Right hip pain  M25.551       2. Gait disturbance  R26.9       3. Recurrent falls  R29.6                      Subjective: Pt reports she feels her right hip and left knee are better, decreased pain and improved walking. She will be visiting her son for the next few weeks and will resume PT when she returns.    Objective: See treatment diary below.     Assessment: Pt presents to session with improved gait with decreased use of L UE to support herself on surfaces while using SPC in R UE. Pt with good tolerance to progression of program as listed with appropriate challenge and fatigue without c/o pain or adverse response. Pt demonstrates good understanding and carryover with current program. Will continue to progress program as able. Pt will benefit from continued skilled PT intervention in order to address remaining limitations and to restore maximal function.     Plan: Continue per plan of care.  Progress treatment as tolerated.       Precautions:   Patient Active Problem List   Diagnosis    Essential hypertension    Mixed hyperlipidemia    Coronary artery disease without angina pectoris - S/P stent placement x 2 ()    Gastroesophageal reflux disease without esophagitis    Chronic diastolic (congestive) heart failure (HCC)    Age-related osteoporosis with current pathological fracture    Ambulatory dysfunction    Elderly person living alone    Low back pain without sciatica    S/P ORIF (open reduction internal fixation) fracture    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HCC)    Bladder cancer (HCC)    Allergic rhinitis    Anemia due to vitamin B12 deficiency    Biliary dyskinesia    Cholelithiasis    Chronic right shoulder pain    Degenerative joint disease    Controlled depression    Deviated nasal septum    Heart valve  "disorder    IBS (irritable bowel syndrome)    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HCC)    Mild vitamin D deficiency    Mixed hearing loss, unilateral    Pancreatic cyst    Raynaud's disease without gangrene    S/P angioplasty with stent    Urge incontinence of urine    History of pulmonary embolus (PE)    Age-related cataract of both eyes    Hyperparathyroidism, unspecified (HCC)    Fear of falling    Balance problems    Difficulty navigating stairs    Lower abdominal pain, unspecified    History of falling    Bilateral leg edema    New onset of headaches    Carotid artery stenosis, asymptomatic, bilateral    Ventral hernia    Cerebral aneurysm without rupture    Intermittent pain and swelling of hand    Right hand paresthesia    BMI 23.0-23.9, adult    Carpal tunnel syndrome of right wrist    CKD (chronic kidney disease) stage 2, GFR 60-89 ml/min    TIFFANY (renal artery stenosis) ([facility])    History of pneumonia    Fatigue    Swelling of left lower extremity    Rib pain on left side    Seropositive rheumatoid arthritis ([facility])    Long term (current) use of immunosuppressive biologic    Age-related osteoporosis without current pathological fracture    Hydronephrosis, left    Long term current use of diuretic    Malignant tumor of ureter, left ([facility])    Chronic kidney disease, stage 3a ([facility])          Daily Treatment Diary      Visit # 1 2 3 4 5 6 7 8 9 10   Assessment  6/11  6/17  6/19  6/26  7/1  7/3           Eval/Satish MARC                 **   FOTO         **         **   Manuals    R Hip ER PROM    SE  SE  NV                R HF Stretch  nv SE  NV                      Prescribed POC    Pt Education  MD                      HEP Issued/Updated  MD Weber    5' AAROM  unable                  Hip Add    5\"x10  5\"x10  5\"   2x10  5\"  2x10  5\"   2x10            TB Hip ABD    OTB  5\"x10  OTB  5\"x10  Orange   5\"   2x10  orange  5\"  2x10  Orange   5\"   2x10            H/L March 5\"  2x5 ea  5\"   1x10 R    1x8 L  " "5\"    5\"   1x10 ea            Low Bridge        5\"x10  5\"x10  5\"x10            SAQ    5\"x10  5\"x10 B  5\"   2x10 ea  5\"  2x10 ea  5\"   2x10 ea            LAQ      nv  5\"    1x10 ea  5\"  1x10 ea  5\"   1x10 ea            Standing Hip ABD        5\"    1x10 ea  5\"  1x10 ea  5\"   1x10 ea            SLS with UE Support          10\"x2 ea  10\"x5 ea            Tandem Balance with UE Support     10\"x2 ea  20\"x2 ea        1/4 Squats              Biodex LOS:  1:11 83%  49\", 84%  B UE assist                                                Modalities    CP R Hip   Post-Tx      R hip + L knee   90/90 R hip + L knee   90/90  10'                            QR Code:  NP  Med Bridge HEP:  Access Code: [email or web address]  URL: [email or web address]  Date: 06/26/2025  Prepared by: Nicolle Reis    Exercises  - Supine Hip Adductor Squeeze with Small Ball  - 1 x daily - 2 sets - 10 reps - 5 hold  - Hooklying Clamshell with Resistance  - 1 x daily - 2 sets - 10 reps - 5 hold  - Supine March  - 1 x daily - 1 sets - 10 reps - 5 hold  - Beginner Bridge  - 1 x daily - 1 sets - 10 reps - 5 hold  - Supine Short Arc Quad  - 1 x daily - 1 sets - 10 reps - 5 hold  - Seated Long Arc Quad  - 1 x daily - 1 sets - 10 reps - 5 hold  - Standing Hip Abduction with Counter Support  - 1 x daily - 1 sets - 10 reps - 5 hold         "

## 2025-07-21 NOTE — PLAN OF CARE
Problem: PAIN - ADULT  Goal: Verbalizes/displays adequate comfort level or baseline comfort level  Description: Interventions:  - Encourage patient to monitor pain and request assistance  - Assess pain using appropriate pain scale  - Administer analgesics based on type and severity of pain and evaluate response  - Implement non-pharmacological measures as appropriate and evaluate response  - Consider cultural and social influences on pain and pain management  - Notify physician/advanced practitioner if interventions unsuccessful or patient reports new pain  Outcome: Progressing     Problem: INFECTION - ADULT  Goal: Absence or prevention of progression during hospitalization  Description: INTERVENTIONS:  - Assess and monitor for signs and symptoms of infection  - Monitor lab/diagnostic results  - Monitor all insertion sites, i.e. indwelling lines, tubes, and drains  - Monitor endotracheal if appropriate and nasal secretions for changes in amount and color  - [place] appropriate cooling/warming therapies per order  - Administer medications as ordered  - Instruct and encourage patient and family to use good hand hygiene technique  - Identify and instruct in appropriate isolation precautions for identified infection/condition  Outcome: Progressing  Goal: Absence of fever/infection during neutropenic period  Description: INTERVENTIONS:  - Monitor WBC    Outcome: Progressing     Problem: SAFETY ADULT  Goal: Patient will remain free of falls  Description: INTERVENTIONS:  - Educate patient/family on patient safety including physical limitations  - Instruct patient to call for assistance with activity   - Consult OT/PT to assist with strengthening/mobility   - Keep Call bell within reach  - Keep bed low and locked with side rails adjusted as appropriate  - Keep care items and personal belongings within reach  - Initiate and maintain comfort rounds  - Make Fall Risk Sign visible to staff  - Offer Toileting every  Hours,  -continue to maintain follow up with hepatology (Patient has seen Dr. Rachel within the past year).    in advance of need  - Initiate/Maintain alarm  - Obtain necessary fall risk management equipment:   - Apply yellow socks and bracelet for high fall risk patients  - Consider moving patient to room near nurses station  Outcome: Progressing  Goal: Maintain or return to baseline ADL function  Description: INTERVENTIONS:  -  Assess patient's ability to carry out ADLs; assess patient's baseline for ADL function and identify physical deficits which impact ability to perform ADLs (bathing, care of mouth/teeth, toileting, grooming, dressing, etc.)  - Assess/evaluate cause of self-care deficits   - Assess range of motion  - Assess patient's mobility; develop plan if impaired  - Assess patient's need for assistive devices and provide as appropriate  - Encourage maximum independence but intervene and supervise when necessary  - Involve family in performance of ADLs  - Assess for home care needs following discharge   - Consider OT consult to assist with ADL evaluation and planning for discharge  - Provide patient education as appropriate  Outcome: Progressing  Goal: Maintains/Returns to pre admission functional level  Description: INTERVENTIONS:  - Perform AM-PAC 6 Click Basic Mobility/ Daily Activity assessment daily.  - Set and communicate daily mobility goal to care team and patient/family/caregiver.   - Collaborate with rehabilitation services on mobility goals if consulted  - Perform Range of Motion  times a day.  - Reposition patient every  hours.  - Dangle patient  times a day  - Stand patient  times a day  - Ambulate patient  times a day  - Out of bed to chair  times a day   - Out of bed for meals  times a day  - Out of bed for toileting  - Record patient progress and toleration of activity level   Outcome: Progressing     Problem: DISCHARGE PLANNING  Goal: Discharge to home or other facility with appropriate resources  Description: INTERVENTIONS:  - Identify barriers to discharge w/patient and caregiver  - Arrange for  needed discharge resources and transportation as appropriate  - Identify discharge learning needs (meds, wound care, etc.)  - Arrange for interpretive services to assist at discharge as needed  - Refer to Case Management Department for coordinating discharge planning if the patient needs post-hospital services based on physician/advanced practitioner order or complex needs related to functional status, cognitive ability, or social support system  Outcome: Progressing     Problem: Knowledge Deficit  Goal: Patient/family/caregiver demonstrates understanding of disease process, treatment plan, medications, and discharge instructions  Description: Complete learning assessment and assess knowledge base.  Interventions:  - Provide teaching at level of understanding  - Provide teaching via preferred learning methods  Outcome: Progressing

## 2025-08-18 ENCOUNTER — OFFICE VISIT (OUTPATIENT)
Dept: HEMATOLOGY ONCOLOGY | Facility: CLINIC | Age: 89
End: 2025-08-18
Payer: MEDICARE

## 2025-08-18 VITALS
RESPIRATION RATE: 17 BRPM | OXYGEN SATURATION: 98 % | HEIGHT: 61 IN | WEIGHT: 125 LBS | BODY MASS INDEX: 23.6 KG/M2 | DIASTOLIC BLOOD PRESSURE: 80 MMHG | HEART RATE: 78 BPM | TEMPERATURE: 98.1 F | SYSTOLIC BLOOD PRESSURE: 136 MMHG

## 2025-08-18 DIAGNOSIS — C91.10 CHRONIC LARGE GRANULAR LYMPHOCYTIC LEUKEMIA (HCC): Primary | ICD-10-CM

## 2025-08-18 PROCEDURE — 99213 OFFICE O/P EST LOW 20 MIN: CPT | Performed by: PHYSICIAN ASSISTANT

## 2025-08-22 ENCOUNTER — OFFICE VISIT (OUTPATIENT)
Dept: UROLOGY | Facility: AMBULATORY SURGERY CENTER | Age: 89
End: 2025-08-22
Payer: MEDICARE

## 2025-08-22 VITALS
WEIGHT: 122 LBS | BODY MASS INDEX: 23.03 KG/M2 | SYSTOLIC BLOOD PRESSURE: 136 MMHG | HEIGHT: 61 IN | DIASTOLIC BLOOD PRESSURE: 78 MMHG | OXYGEN SATURATION: 97 % | HEART RATE: 80 BPM

## 2025-08-22 DIAGNOSIS — R30.0 DYSURIA: Primary | ICD-10-CM

## 2025-08-22 DIAGNOSIS — C66.2 URETER MALIGNANT NEOPLASM, LEFT (HCC): ICD-10-CM

## 2025-08-22 LAB
SL AMB  POCT GLUCOSE, UA: NEGATIVE
SL AMB LEUKOCYTE ESTERASE,UA: NORMAL
SL AMB POCT BILIRUBIN,UA: NEGATIVE
SL AMB POCT BLOOD,UA: NORMAL
SL AMB POCT CLARITY,UA: NORMAL
SL AMB POCT COLOR,UA: YELLOW
SL AMB POCT KETONES,UA: NEGATIVE
SL AMB POCT NITRITE,UA: NORMAL
SL AMB POCT PH,UA: 7
SL AMB POCT SPECIFIC GRAVITY,UA: 1.01
SL AMB POCT URINE PROTEIN: NORMAL
SL AMB POCT UROBILINOGEN: 0.2

## 2025-08-22 PROCEDURE — 81002 URINALYSIS NONAUTO W/O SCOPE: CPT | Performed by: UROLOGY

## 2025-08-22 PROCEDURE — 99213 OFFICE O/P EST LOW 20 MIN: CPT | Performed by: UROLOGY

## 2025-08-22 RX ORDER — CEPHALEXIN 500 MG/1
500 CAPSULE ORAL 3 TIMES DAILY
Qty: 9 CAPSULE | Refills: 0 | Status: SHIPPED | OUTPATIENT
Start: 2025-08-22 | End: 2025-08-25

## 2025-08-22 RX ORDER — CEPHALEXIN 500 MG/1
500 CAPSULE ORAL 3 TIMES DAILY
Qty: 9 CAPSULE | Refills: 0 | Status: SHIPPED | OUTPATIENT
Start: 2025-08-22 | End: 2025-08-22 | Stop reason: CLARIF

## 2025-08-24 LAB — BACTERIA UR CULT: ABNORMAL

## (undated) DEVICE — STERILE CYSTO PACK: Brand: CARDINAL HEALTH

## (undated) DEVICE — VIOLET BRAIDED (POLYGLACTIN 910), SYNTHETIC ABSORBABLE SUTURE: Brand: COATED VICRYL

## (undated) DEVICE — CHLORAPREP HI-LITE 26ML ORANGE

## (undated) DEVICE — DRAPE SURGIKIT SADDLE BAG

## (undated) DEVICE — GAUZE SPONGES,16 PLY: Brand: CURITY

## (undated) DEVICE — 3.2MM GUIDE WIRE 400MM

## (undated) DEVICE — DRAPE C-ARMOUR

## (undated) DEVICE — GLOVE SRG BIOGEL 8

## (undated) DEVICE — 4.2MM THREE-FLUTED DRILL BIT QC/NEEDLE POINT/145MM

## (undated) DEVICE — MEDI-VAC YANKAUER SUCTION HANDLE W/STRAIGHT TIP & CONTROL VENT: Brand: CARDINAL HEALTH

## (undated) DEVICE — INTENDED FOR TISSUE SEPARATION, AND OTHER PROCEDURES THAT REQUIRE A SHARP SURGICAL BLADE TO PUNCTURE OR CUT.: Brand: BARD-PARKER SAFETY BLADES SIZE 15, STERILE

## (undated) DEVICE — BASKET SPECIMEN RETRIVAL 1.9FR 120CM

## (undated) DEVICE — GLOVE SRG BIOGEL ECLIPSE 7

## (undated) DEVICE — STERILE ORIF HIP PACK: Brand: CARDINAL HEALTH

## (undated) DEVICE — NEEDLE 25G X 1 1/2

## (undated) DEVICE — 5.0MM TI LOCKING SCREW W/T25 STARDRIVE 50MM F/IM NAIL-STER: Type: IMPLANTABLE DEVICE | Site: HIP | Status: NON-FUNCTIONAL

## (undated) DEVICE — SUT VICRYL 3-0 SH 27 IN J416H

## (undated) DEVICE — ANTIBACTERIAL UNDYED BRAIDED (POLYGLACTIN 910), SYNTHETIC ABSORBABLE SUTURE: Brand: COATED VICRYL

## (undated) DEVICE — SUT VICRYL 2-0 REEL 54 IN J286G

## (undated) DEVICE — 2000CC GUARDIAN II: Brand: GUARDIAN

## (undated) DEVICE — PADDING CAST 4 IN  COTTON STRL

## (undated) DEVICE — ADHESIVE SKIN HIGH VISCOSITY EXOFIN 1ML

## (undated) DEVICE — PACK TUR

## (undated) DEVICE — FIBER BALL TIP QUANTA 272 MICRON

## (undated) DEVICE — CURITY NON-ADHERENT STRIPS: Brand: CURITY

## (undated) DEVICE — 3000CC GUARDIAN II: Brand: GUARDIAN

## (undated) DEVICE — STRL COTTON TIP APPLCTR 6IN PK: Brand: CARDINAL HEALTH

## (undated) DEVICE — PREMIUM DRY TRAY LF: Brand: MEDLINE INDUSTRIES, INC.

## (undated) DEVICE — GLOVE SRG BIOGEL ECLIPSE 7.5

## (undated) DEVICE — BETHLEHEM UNIVERSAL MINOR GEN: Brand: CARDINAL HEALTH

## (undated) DEVICE — PAD GROUNDING ADULT

## (undated) DEVICE — 6617 IOBAN II PATIENT ISOLATION DRAPE 5/BX,4BX/CS: Brand: STERI-DRAPE™ IOBAN™ 2

## (undated) DEVICE — ADHESIVE SKN CLSR HISTOACRYL FLEX 0.5ML LF

## (undated) DEVICE — PLUMEPEN PRO 10FT

## (undated) DEVICE — PAD CAST 4 IN COTTON NON STERILE

## (undated) DEVICE — OCCLUSIVE GAUZE STRIP,3% BISMUTH TRIBROMOPHENATE IN PETROLATUM BLEND: Brand: XEROFORM

## (undated) DEVICE — ABDOMINAL PAD: Brand: DERMACEA

## (undated) DEVICE — ARTHROSCOPY FLOOR MAT

## (undated) DEVICE — GUIDEWIRE STRGHT TIP 0.035 IN  SOLO PLUS

## (undated) DEVICE — CATH URETERAL 5FR X 70 CM FLEX TIP POLYUR BARD

## (undated) DEVICE — RETROGRADE KNIFE BOX OF 6: Brand: ECTRA

## (undated) DEVICE — STERILE BETHLEHEM PLASTIC HAND: Brand: CARDINAL HEALTH

## (undated) DEVICE — UNDYED BRAIDED (POLYGLACTIN 910), SYNTHETIC ABSORBABLE SUTURE: Brand: COATED VICRYL

## (undated) DEVICE — SHEATH URETERAL ACCESS 12/14FR 35CM PROXIS

## (undated) DEVICE — SUT VICRYL PLUS 2-0 CTB-1 27 IN VCPB259H

## (undated) DEVICE — SHEATH URETERAL ACCESS 10/12FR 35CM PROXIS

## (undated) DEVICE — HEAVY DUTY TABLE COVER: Brand: CONVERTORS

## (undated) DEVICE — SPECIMEN CONTAINER STERILE PEEL PACK

## (undated) DEVICE — 2.5MM REAMING ROD WITH BALL TIP/950MM-STERILE

## (undated) DEVICE — SYRINGE 10ML LL

## (undated) DEVICE — DISPOSABLE EQUIPMENT COVER: Brand: SMALL TOWEL DRAPE

## (undated) DEVICE — BETADINE SURGICAL SCRUB 32OZ

## (undated) DEVICE — BUTTON SWITCH PENCIL HOLSTER: Brand: VALLEYLAB

## (undated) DEVICE — 3M™ TEGADERM™ TRANSPARENT FILM DRESSING FRAME STYLE, 1628, 6 IN X 8 IN (15 CM X 20 CM), 10/CT 8CT/CASE: Brand: 3M™ TEGADERM™

## (undated) DEVICE — SPONGE PVP SCRUB WING STERILE

## (undated) DEVICE — ALL PURPOSE SPONGES,NONWOVEN, 4 PLY: Brand: CURITY

## (undated) DEVICE — SUT VICRYL PLUS 1 CTB-1 36 IN VCPB947H

## (undated) DEVICE — GLOVE INDICATOR PI UNDERGLOVE SZ 8 BLUE

## (undated) DEVICE — ACE WRAP 4 IN UNSTERILE

## (undated) DEVICE — FIBER STD QUANTA 272 MICRON

## (undated) DEVICE — GLOVE SRG BIOGEL 7.5

## (undated) DEVICE — INTENDED FOR TISSUE SEPARATION, AND OTHER PROCEDURES THAT REQUIRE A SHARP SURGICAL BLADE TO PUNCTURE OR CUT.: Brand: BARD-PARKER SAFETY BLADES SIZE 10, STERILE

## (undated) DEVICE — SCD SEQUENTIAL COMPRESSION COMFORT SLEEVE MEDIUM KNEE LENGTH: Brand: KENDALL SCD

## (undated) DEVICE — REM POLYHESIVE ADULT PATIENT RETURN ELECTRODE: Brand: VALLEYLAB

## (undated) DEVICE — GLOVE INDICATOR PI UNDERGLOVE SZ 8.5 BLUE

## (undated) DEVICE — SUT MONOCRYL 4-0 PS-2 18 IN Y496G

## (undated) DEVICE — CUFF TOURNIQUET 18 X 4 IN QUICK CONNECT DISP 1 BLADDER

## (undated) DEVICE — 3M™ TEGADERM™ TRANSPARENT FILM DRESSING FRAME STYLE, 1626W, 4 IN X 4-3/4 IN (10 CM X 12 CM), 50/CT 4CT/CASE: Brand: 3M™ TEGADERM™